# Patient Record
Sex: FEMALE | Race: WHITE | NOT HISPANIC OR LATINO | Employment: OTHER | URBAN - METROPOLITAN AREA
[De-identification: names, ages, dates, MRNs, and addresses within clinical notes are randomized per-mention and may not be internally consistent; named-entity substitution may affect disease eponyms.]

---

## 2017-01-25 ENCOUNTER — LAB (OUTPATIENT)
Dept: LAB | Facility: HOSPITAL | Age: 77
End: 2017-01-25

## 2017-01-25 DIAGNOSIS — C50.511 MALIGNANT NEOPLASM OF LOWER-OUTER QUADRANT OF RIGHT FEMALE BREAST (HCC): ICD-10-CM

## 2017-01-25 DIAGNOSIS — C88.0 MACROGLOBULINEMIA OF WALDENSTROM (HCC): ICD-10-CM

## 2017-01-25 LAB
ALBUMIN SERPL-MCNC: 4.7 G/DL (ref 3.5–5.2)
ALBUMIN/GLOB SERPL: 1.9 G/DL (ref 1.1–2.4)
ALP SERPL-CCNC: 77 U/L (ref 38–116)
ALT SERPL W P-5'-P-CCNC: 12 U/L (ref 0–33)
ANION GAP SERPL CALCULATED.3IONS-SCNC: 14.9 MMOL/L
AST SERPL-CCNC: 18 U/L (ref 0–32)
BASOPHILS # BLD AUTO: 0.05 10*3/MM3 (ref 0–0.1)
BASOPHILS NFR BLD AUTO: 0.9 % (ref 0–1.1)
BILIRUB SERPL-MCNC: 0.6 MG/DL (ref 0.1–1.2)
BUN BLD-MCNC: 9 MG/DL (ref 6–20)
BUN/CREAT SERPL: 11 (ref 7.3–30)
CALCIUM SPEC-SCNC: 10.3 MG/DL (ref 8.5–10.2)
CHLORIDE SERPL-SCNC: 92 MMOL/L (ref 98–107)
CO2 SERPL-SCNC: 29.1 MMOL/L (ref 22–29)
CREAT BLD-MCNC: 0.82 MG/DL (ref 0.6–1.1)
DEPRECATED RDW RBC AUTO: 39.8 FL (ref 37–49)
EOSINOPHIL # BLD AUTO: 0.19 10*3/MM3 (ref 0–0.36)
EOSINOPHIL NFR BLD AUTO: 3.3 % (ref 1–5)
ERYTHROCYTE [DISTWIDTH] IN BLOOD BY AUTOMATED COUNT: 11.8 % (ref 11.7–14.5)
GFR SERPL CREATININE-BSD FRML MDRD: 68 ML/MIN/1.73
GLOBULIN UR ELPH-MCNC: 2.5 GM/DL (ref 1.8–3.5)
GLUCOSE BLD-MCNC: 107 MG/DL (ref 74–124)
HCT VFR BLD AUTO: 40.9 % (ref 34–45)
HGB BLD-MCNC: 13.9 G/DL (ref 11.5–14.9)
IMM GRANULOCYTES # BLD: 0.02 10*3/MM3 (ref 0–0.03)
IMM GRANULOCYTES NFR BLD: 0.3 % (ref 0–0.5)
LYMPHOCYTES # BLD AUTO: 1.25 10*3/MM3 (ref 1–3.5)
LYMPHOCYTES NFR BLD AUTO: 21.5 % (ref 20–49)
MCH RBC QN AUTO: 31.4 PG (ref 27–33)
MCHC RBC AUTO-ENTMCNC: 34 G/DL (ref 32–35)
MCV RBC AUTO: 92.5 FL (ref 83–97)
MONOCYTES # BLD AUTO: 0.46 10*3/MM3 (ref 0.25–0.8)
MONOCYTES NFR BLD AUTO: 7.9 % (ref 4–12)
NEUTROPHILS # BLD AUTO: 3.84 10*3/MM3 (ref 1.5–7)
NEUTROPHILS NFR BLD AUTO: 66.1 % (ref 39–75)
NRBC BLD MANUAL-RTO: 0 /100 WBC (ref 0–0)
PLATELET # BLD AUTO: 174 10*3/MM3 (ref 150–375)
PMV BLD AUTO: 9.1 FL (ref 8.9–12.1)
POTASSIUM BLD-SCNC: 3.9 MMOL/L (ref 3.5–4.7)
PROT SERPL-MCNC: 7.2 G/DL (ref 6.3–8)
RBC # BLD AUTO: 4.42 10*6/MM3 (ref 3.9–5)
SODIUM BLD-SCNC: 136 MMOL/L (ref 134–145)
WBC NRBC COR # BLD: 5.81 10*3/MM3 (ref 4–10)

## 2017-01-25 PROCEDURE — 85025 COMPLETE CBC W/AUTO DIFF WBC: CPT | Performed by: INTERNAL MEDICINE

## 2017-01-25 PROCEDURE — 36415 COLL VENOUS BLD VENIPUNCTURE: CPT | Performed by: INTERNAL MEDICINE

## 2017-01-25 PROCEDURE — 80053 COMPREHEN METABOLIC PANEL: CPT | Performed by: INTERNAL MEDICINE

## 2017-01-26 LAB
ALBUMIN SERPL-MCNC: 4.1 G/DL (ref 2.9–4.4)
ALBUMIN/GLOB SERPL: 1.6 {RATIO} (ref 0.7–1.7)
ALPHA1 GLOB FLD ELPH-MCNC: 0.3 G/DL (ref 0–0.4)
ALPHA2 GLOB SERPL ELPH-MCNC: 0.8 G/DL (ref 0.4–1)
B-GLOBULIN SERPL ELPH-MCNC: 0.9 G/DL (ref 0.7–1.3)
GAMMA GLOB SERPL ELPH-MCNC: 0.7 G/DL (ref 0.4–1.8)
GLOBULIN SER CALC-MCNC: 2.6 G/DL (ref 2.2–3.9)
IGA SERPL-MCNC: 54 MG/DL (ref 64–422)
IGG SERPL-MCNC: 452 MG/DL (ref 700–1600)
IGM SERPL-MCNC: 283 MG/DL (ref 26–217)
INTERPRETATION SERPL IEP-IMP: ABNORMAL
KAPPA LC SERPL-MCNC: 12.91 MG/L (ref 3.3–19.4)
KAPPA LC/LAMBDA SER: 1.66 {RATIO} (ref 0.26–1.65)
LAMBDA LC FREE SERPL-MCNC: 7.8 MG/L (ref 5.71–26.3)
Lab: ABNORMAL
M-SPIKE: 0.4 G/DL
PROT SERPL-MCNC: 6.7 G/DL (ref 6–8.5)

## 2017-02-01 ENCOUNTER — APPOINTMENT (OUTPATIENT)
Dept: LAB | Facility: HOSPITAL | Age: 77
End: 2017-02-01

## 2017-02-01 ENCOUNTER — OFFICE VISIT (OUTPATIENT)
Dept: ONCOLOGY | Facility: CLINIC | Age: 77
End: 2017-02-01

## 2017-02-01 VITALS
HEART RATE: 62 BPM | WEIGHT: 151.4 LBS | SYSTOLIC BLOOD PRESSURE: 138 MMHG | DIASTOLIC BLOOD PRESSURE: 62 MMHG | BODY MASS INDEX: 25.22 KG/M2 | HEIGHT: 65 IN | RESPIRATION RATE: 16 BRPM | OXYGEN SATURATION: 100 % | TEMPERATURE: 98.9 F

## 2017-02-01 DIAGNOSIS — C88.0 MACROGLOBULINEMIA OF WALDENSTROM (HCC): Primary | ICD-10-CM

## 2017-02-01 DIAGNOSIS — E83.52 HYPERCALCEMIA: ICD-10-CM

## 2017-02-01 DIAGNOSIS — C50.811 MALIGNANT NEOPLASM OF OVERLAPPING SITES OF RIGHT FEMALE BREAST (HCC): ICD-10-CM

## 2017-02-01 PROCEDURE — G0463 HOSPITAL OUTPT CLINIC VISIT: HCPCS | Performed by: INTERNAL MEDICINE

## 2017-02-01 PROCEDURE — 99213 OFFICE O/P EST LOW 20 MIN: CPT | Performed by: INTERNAL MEDICINE

## 2017-02-01 NOTE — PROGRESS NOTES
Subjective  REASONS FOR FOLLOWUP:     1. History of Waldenstrom macroglobulinemia. The patient  remains in remission about this condition with stable level of immunoglobulin M . Normal IgA. Normal IgG. Normal white count, hemoglobin, and platelets. No abnormal bleeding. No peripheral adenopathy. No hepatosplenomegaly. The patient will remain in observation.   2. History of breast cancer stage I on the left, status post mastectomy. The patient remains on aromatase inhibitor without any evidence of breast cancer recurrence and good tolerance to her aromatase inhibitor medicine. She will remain on the same issue for the time being.                History of Present Illness  As above. She is here today with no complaints in regard to her previous history of breast cancer status post left mastectomy and her Waldenström macroglobulinemia.       The patient feels like having a good appetite, stable weight, normal bowel activity, normal urination. She has had some exacerbation of her usual fall allergies with a runny nose, sore throat and minimal cough and wheezing associated with her asthma. No sputum production. No pleuritic pain, no hemoptysis, no fever, no chills. She has not developed any other masses at any level and no peripheral adenopathy. No abnormal bleeding. Her right knee is back to normality and she has been able to walk any distance with no limitations.             1. Past Medical History, Past Surgical HistoryHypertension.    2. Hypothyroidism many years ago and has not taken any thyroid medication in quite some time.   3. Thyroid nodule removed more than 35 years ago.    4. Cholecystectomy.    5. Tonsillectomy and adenoidectomy.    6. Appendectomy.  7. Partial hysterectomy many years ago.      She has mammogram and pelvic examination yearly that have been normal.  Colonoscopy 4 years ago was normal.   She also has had a leaky valve in her heart without any significance.      During the recent workup at  [facility], the patient had a transesophageal echocardiogram that failed to document any vegetation.    Clostridium colitis requiring admission to [facility] in 2008.      Arthroscopy in 2009 of the right knee with finding of chondromalacia and appropriate cleanup of the joint was performed by Dr. Moraes.      On 2013 the patient has lost 14 pounds, just cutting down on junk food.  Her glucose intolerance has improved substantially and her glucose has normalized.      On 2014, we reviewed her blood pressure issues recently. She has had a very stable blood pressure for the last week, and today is normal. Her physical examination is otherwise unremarkable and normal feeling.     We advised her to remain in observation from this point of view.    As per 10/01/2014, the patient has undergone evaluation by neurology service at [facility] for consideration of gabapentin that she has been taking for so many years for possible seizure activity.   I am aware of an MRI scan of the brain that shows not too much, besides minimal vascular disease and no other lesions.  Her neurologist will be informing us in regard how to proceed in this regard.      She wanted to have a right knee replaced in 2009 through her orthopedic surgeon. She developed delirium in the hospital and extensive ecchymosis and hematoma formation in the whole right lower extremity requiring transfusion of red cells for a hemoglobin of 7.   SOCIAL HISTORY:  .  Retired from [employer] where she worked for many years; she never was exposed to any chemicals.   She is a never smoker and nondrinker.   She lives with her oldest daughter.      FAMILY HISTORY: The patient’s father  of complications from heart disease at age 70.  Mother  after a psychiatric illness at age 67.  A brother  in a car accident at 43 and a sister, age 75, is in good health.   Her three daughters are in  "good health.  She has no FH of lymphoma or leukemia.    Review of Systems    General: no fever, chills, fatigue, weight changes, or lack of appetite.  Eyes: no epiphora, conjunctivitis, pain, glaucoma, blurred vision, blindness, secretion, photophobia.  Ears: no otorrhea, tinnitus, otorrhagia, deafness, pain, vertigo.  Nose: no rhinorrhea, epistaxis, alteration in perception of odors, sinuses pressure.  Mouth: no alteration in gums or teeth,  ulcers, no difficulty with mastication or deglut ion, no odynophagia.  Neck: no masses or pain, no thyroid alterations, no pain in muscles or arteries, no carotid odynia, no crepitation.  Lungs: no cough, sputum production, dyspnea, trepopnea, pleuritic pain, hemoptysis.  Heart: no syncope, irregularity, palpitations, angina, orthopnea, paroxysmal nocturnal dyspnea.  GI: no dysphagia, odynophagia, no regurgitation, no heartburn, indigestion, nausea, vomiting, hematemesis ,melena, jaundice, distention, obstipation, enterorrhagia, proctalgia, anal  Lesions, changes in bowel habits.  : no frequency, hesitancy, hematuria, discharge, pain.  Musculoskeletal: no muscle or tendon pain or inflammation, joint pain, edema, functional limitation, fasciculations,NEW R SHOULDER mass.MRI DONE REPORT REQUESTED  Neurologic: no headache, seizures, alterations on Craneal nerves, no motor or senssory deficit, normal coordination.  Skin: stated r le  rash,severe pruritus.  Psychiatric: no anxiety, depression, agitation, delusions, proper insight.  A comprehensive 14 point review of systems was performed and was negative except as mentioned.    Medications:  The current medication list was reviewed in the EMR    ALLERGIES:    Allergies   Allergen Reactions   • Cortisone    • Darvon  [Propoxyphene] Palpitations       Objective      Vitals:    02/01/17 1032   BP: 138/62   Pulse: 62   Resp: 16   Temp: 98.9 °F (37.2 °C)   SpO2: 100%   Weight: 151 lb 6.4 oz (68.7 kg)   Height: 64.76\" (164.5 cm) "   PainSc: 0-No pain     Current Status 2/1/2017   ECOG score 0       Physical Exam    GENERAL:  Well-developed, well-nourished  Patient  in no acute distress.   SKIN:  Warm, dry with papular erythematous external rle rash and ryness, no typical of anything in particular,no purpura or petechiae.  HEENT:  Pupils were equal and reactive to light and accomodation, conjunctivas non injected, no pterigion, normal extraocular movements, normal visual acuity.   Mouth mucosa was moist, no exudates in oropharynx, normal gum line, normal roof of the mouth and pillars, normal papillations of the tongue.  NECK:  Supple with good range of motion; no thyromegaly or masses, no JVD or bruits, no cervical adenopathies.  LYMPHATICS:  No cervical, supraclavicular, axillary or inguinal adenopathy.  CHEST:  Lungs clear to percussion and auscultation, normal breath sounds bilaterally, no wheezing, crackles or ronchi, no stridor.  CARDIAC:  Regular rate and rhythm without murmurs, rubs or gallops.  INSPECTION of right breast documented symmetry of the tissue per se and location and size of the nipple,no retractions or inversion of the nipple, normal skin without lesions, no erythema or nodules,no paud'orange, no prominence of superficial veins or chest wall collateral circulation.PALPATION of the breast documented normal skin turgor, no induration, alteration in local temperature, or pain, no palpable masses or nodules, normal mobility of the tissues,no fixation of the tissue or parenchyma to the chest wall, no alteration at the tail of the breast or axillas, no adenopathies.Mastectomy left Surgical site was well healed.No lymphedema in either extremity.  ABDOMEN:  Soft, nontender with no organomegaly or masses, no ascites, no collateral circulation, no abdominal pain, no inguinal hernias, no abdominal bruits.  EXTREMITIES:  No clubbing, cyanosis or edema, no deformities or pain.Pain and edema in replaced right knee with limitation for  flexion, extension.  NEUROLOGICAL:  Patient was awake, alert, oriented to time, person and place    RECENT LABS:  Hematology WBC   Date Value Ref Range Status   01/25/2017 5.81 4.00 - 10.00 10*3/mm3 Final   07/10/2014 4.36 (L) 4.50 - 10.70 K/Cumm Final     RBC   Date Value Ref Range Status   01/25/2017 4.42 3.90 - 5.00 10*6/mm3 Final   07/10/2014 4.11 3.90 - 5.20 Million Final     HEMOGLOBIN   Date Value Ref Range Status   01/25/2017 13.9 11.5 - 14.9 g/dL Final   07/10/2014 13.0 11.9 - 15.5 g/dL Final     HEMATOCRIT   Date Value Ref Range Status   01/25/2017 40.9 34.0 - 45.0 % Final   07/10/2014 38.4 35.6 - 45.5 % Final     PLATELETS   Date Value Ref Range Status   01/25/2017 174 150 - 375 10*3/mm3 Final   07/10/2014 115 (L) 140 - 500 K/Cumm Final        Component      Latest Ref Rng 11/2/2016 1/25/2017 1/25/2017          11:13 AM 10:34 AM 10:34 AM   IgG      700 - 1600 mg/dL 441 (L)  452 (L)   IgA      64 - 422 mg/dL 35 (L)  54 (L)   IgM      26 - 217 mg/dL 311 (H)  283 (H)   Total Protein      6.3 - 8.0 g/dL 6.8 7.2 6.7   Albumin      3.50 - 5.20 g/dL 4.2 4.70 4.1   Alpha-1-Globulin      0.0 - 0.4 g/dL 0.3  0.3   Alpha-2-Globulin      0.4 - 1.0 g/dL 0.7  0.8   Beta Globulin      0.7 - 1.3 g/dL 1.0  0.9   Gamma Globulin      0.4 - 1.8 g/dL 0.6  0.7   M-Pratik      Not Observed g/dL 0.3 (H)  0.4 (H)   Globulin      2.2 - 3.9 g/dL 2.6  2.6   A/G Ratio      1.1 - 2.4 g/dL 1.7 1.9 1.6   Immunofixation Reflex, Serum       Comment  Comment   Please Note       Comment  Comment   Free Light Chain, Elmo      3.30 - 19.40 mg/L 12.44  12.91   Free Lambda Light Chains      5.71 - 26.30 mg/L 6.39  7.80   Kappa/Lambda Ratio      0.26 - 1.65 1.95 (H)  1.66 (H)   :  Final result   Visible to patient:  No (Not Released) Dx:  Macroglobulinemia of Waldenstrom; Mal...      Newer results are available. Click to view them now.            Ref Range & Units 7d ago     Glucose 74 - 124 mg/dL 107   BUN 6 - 20 mg/dL 9   Creatinine 0.60 -  1.10 mg/dL 0.82   Sodium 134 - 145 mmol/L 136   Potassium 3.5 - 4.7 mmol/L 3.9   Chloride 98 - 107 mmol/L 92 (L)   CO2 22.0 - 29.0 mmol/L 29.1 (H)   Calcium 8.5 - 10.2 mg/dL 10.3 (H)   Total Protein 6.3 - 8.0 g/dL 7.2   Albumin 3.50 - 5.20 g/dL 4.70   ALT (SGPT) 0 - 33 U/L 12   AST (SGOT) 0 - 32 U/L 18   Alkaline Phosphatase 38 - 116 U/L 77   Total Bilirubin 0.1 - 1.2 mg/dL 0.6   eGFR Non African Amer >60 mL/min/1.73 68   Globulin 1.8 - 3.5 gm/dL 2.5   A/G Ratio 1.1 - 2.4 g/dL 1.9   BUN/Creatinine Ratio 7.3 - 30.0 11.0   Anion Gap mmol/L 14.9   Resulting Agency   CBC LAB                  Assessment/Plan     1. 1.  This patient has history of Waldenström macroglobulinemia.  Her Igm monoclonal protein remains stable.  The patient is asymptomatic in this regard.  She has no abnormal bleeding.  No peripheral adenopathy.  No hepatosplenomegaly.  No B symptoms. .  Her CBC remains normal with normal white count, hemoglobin and platelets.  Her platelet count is very much normal as well and her chemistry profile remains unchanged with the exception of serum      Calcium that has become elevated. The patient is not taking an excessive amount of Tums, either drinking an excessive amount of milk or eating an excessive amount of cheese. The orange juice that she takes does not have any calcium supplemented. She is taking 2 medicines that could be the culprit in regard high calcium; one is her vitamin D and second is the Hyzaar that contains hydrochlorothiazide. Given this fact, I advised her even though this is not symptomatic to discontinue the vitamin D and recheck another BMP and a PTH level in a couple of weeks. I do not think the high calcium is related to the Waldenström.    Also in the blood draw in 2 weeks, were going to run a PTH.     Otherwise, given the stability of her breast cancer and her stability of her Waldenström, [facility] and she will remain on Femara adjuvant  therapy for her breast cancer. Her mastectomy site is normal. She is up to date with her mammogram.     A tentative appointment to see her back in 3 months with a CBC, CMP and serum protein immunoelectrophoresis, kappa lambda chains in the blood the week before MD appointment.    She will be called at home with the report of her calcium level when she comes back in a couple of weeks and subsequent her level of PTH once that becomes available.     Also the patient complained of minimal hirsutism in the upper lip area associated with Femara. This has no implications besides the need to bleach or wax if she thinks that she needs to do that. Today I did not observe any excessive amount of hair growth in this area. She has not shaved.                                 2/1/2017      CC:

## 2017-02-16 ENCOUNTER — CLINICAL SUPPORT (OUTPATIENT)
Dept: ONCOLOGY | Facility: HOSPITAL | Age: 77
End: 2017-02-16

## 2017-02-16 ENCOUNTER — LAB (OUTPATIENT)
Dept: LAB | Facility: HOSPITAL | Age: 77
End: 2017-02-16

## 2017-02-16 DIAGNOSIS — E83.52 HYPERCALCEMIA: ICD-10-CM

## 2017-02-16 DIAGNOSIS — C88.0 MACROGLOBULINEMIA OF WALDENSTROM (HCC): ICD-10-CM

## 2017-02-16 DIAGNOSIS — C50.811 MALIGNANT NEOPLASM OF OVERLAPPING SITES OF RIGHT FEMALE BREAST (HCC): Primary | ICD-10-CM

## 2017-02-16 LAB
ANION GAP SERPL CALCULATED.3IONS-SCNC: 12 MMOL/L
BASOPHILS # BLD AUTO: 0.02 10*3/MM3 (ref 0–0.1)
BASOPHILS NFR BLD AUTO: 0.4 % (ref 0–1.1)
BUN BLD-MCNC: 10 MG/DL (ref 6–20)
BUN/CREAT SERPL: 10.9 (ref 7.3–30)
CALCIUM SPEC-SCNC: 10.2 MG/DL (ref 8.5–10.2)
CHLORIDE SERPL-SCNC: 90 MMOL/L (ref 98–107)
CO2 SERPL-SCNC: 32 MMOL/L (ref 22–29)
CREAT BLD-MCNC: 0.92 MG/DL (ref 0.6–1.1)
DEPRECATED RDW RBC AUTO: 41.5 FL (ref 37–49)
EOSINOPHIL # BLD AUTO: 0.23 10*3/MM3 (ref 0–0.36)
EOSINOPHIL NFR BLD AUTO: 4.1 % (ref 1–5)
ERYTHROCYTE [DISTWIDTH] IN BLOOD BY AUTOMATED COUNT: 12.2 % (ref 11.7–14.5)
GFR SERPL CREATININE-BSD FRML MDRD: 59 ML/MIN/1.73
GLUCOSE BLD-MCNC: 120 MG/DL (ref 74–124)
HCT VFR BLD AUTO: 41.4 % (ref 34–45)
HGB BLD-MCNC: 14.6 G/DL (ref 11.5–14.9)
IMM GRANULOCYTES # BLD: 0.02 10*3/MM3 (ref 0–0.03)
IMM GRANULOCYTES NFR BLD: 0.4 % (ref 0–0.5)
LYMPHOCYTES # BLD AUTO: 1.55 10*3/MM3 (ref 1–3.5)
LYMPHOCYTES NFR BLD AUTO: 27.8 % (ref 20–49)
MCH RBC QN AUTO: 32.4 PG (ref 27–33)
MCHC RBC AUTO-ENTMCNC: 35.3 G/DL (ref 32–35)
MCV RBC AUTO: 92 FL (ref 83–97)
MONOCYTES # BLD AUTO: 0.49 10*3/MM3 (ref 0.25–0.8)
MONOCYTES NFR BLD AUTO: 8.8 % (ref 4–12)
NEUTROPHILS # BLD AUTO: 3.26 10*3/MM3 (ref 1.5–7)
NEUTROPHILS NFR BLD AUTO: 58.5 % (ref 39–75)
NRBC BLD MANUAL-RTO: 0 /100 WBC (ref 0–0)
PLATELET # BLD AUTO: 165 10*3/MM3 (ref 150–375)
PMV BLD AUTO: 9.3 FL (ref 8.9–12.1)
POTASSIUM BLD-SCNC: 4.1 MMOL/L (ref 3.5–4.7)
PTH-INTACT SERPL-MCNC: 33.9 PG/ML (ref 15–65)
RBC # BLD AUTO: 4.5 10*6/MM3 (ref 3.9–5)
SODIUM BLD-SCNC: 134 MMOL/L (ref 134–145)
WBC NRBC COR # BLD: 5.57 10*3/MM3 (ref 4–10)

## 2017-02-16 PROCEDURE — 80048 BASIC METABOLIC PNL TOTAL CA: CPT | Performed by: INTERNAL MEDICINE

## 2017-02-16 PROCEDURE — 36415 COLL VENOUS BLD VENIPUNCTURE: CPT | Performed by: INTERNAL MEDICINE

## 2017-02-16 PROCEDURE — 85025 COMPLETE CBC W/AUTO DIFF WBC: CPT | Performed by: INTERNAL MEDICINE

## 2017-02-16 PROCEDURE — 83970 ASSAY OF PARATHORMONE: CPT | Performed by: INTERNAL MEDICINE

## 2017-02-16 NOTE — PROGRESS NOTES
CBC REVIEWED WITH PATIENT AND LABS WNL. CALLED PATIENT WITH CALCIUM RESULTS. LVM PER PATIENT'S REQUEST. CALCIUM AT THE HIGH END OF NORMAL AT 10.2. PTH LEVEL DRAWN. IT WILL BE SENT OUT.

## 2017-02-17 ENCOUNTER — TELEPHONE (OUTPATIENT)
Dept: ONCOLOGY | Facility: CLINIC | Age: 77
End: 2017-02-17

## 2017-02-17 NOTE — TELEPHONE ENCOUNTER
Phone with pt normal calcium normal pth nothing else to do, don't take any more vitamin d, she agrees.

## 2017-03-16 ENCOUNTER — HOSPITAL ENCOUNTER (EMERGENCY)
Facility: HOSPITAL | Age: 77
Discharge: HOME | End: 2017-03-16
Attending: EMERGENCY MEDICINE | Admitting: EMERGENCY MEDICINE

## 2017-03-16 ENCOUNTER — APPOINTMENT (OUTPATIENT)
Dept: CT IMAGING | Facility: HOSPITAL | Age: 77
End: 2017-03-16

## 2017-03-16 ENCOUNTER — TELEPHONE (OUTPATIENT)
Dept: ONCOLOGY | Facility: CLINIC | Age: 77
End: 2017-03-16

## 2017-03-16 VITALS
RESPIRATION RATE: 14 BRPM | TEMPERATURE: 97.5 F | HEIGHT: 65 IN | OXYGEN SATURATION: 97 % | WEIGHT: 150 LBS | BODY MASS INDEX: 24.99 KG/M2 | SYSTOLIC BLOOD PRESSURE: 147 MMHG | HEART RATE: 67 BPM | DIASTOLIC BLOOD PRESSURE: 72 MMHG

## 2017-03-16 DIAGNOSIS — H70.92 MASTOIDITIS, LEFT: Primary | ICD-10-CM

## 2017-03-16 LAB
ALBUMIN SERPL-MCNC: 5 G/DL (ref 3.5–5.2)
ALBUMIN/GLOB SERPL: 2.1 G/DL
ALP SERPL-CCNC: 80 U/L (ref 39–117)
ALT SERPL W P-5'-P-CCNC: 17 U/L (ref 1–33)
ANION GAP SERPL CALCULATED.3IONS-SCNC: 15.7 MMOL/L
AST SERPL-CCNC: 23 U/L (ref 1–32)
BASOPHILS # BLD AUTO: 0.01 10*3/MM3 (ref 0–0.2)
BASOPHILS NFR BLD AUTO: 0.1 % (ref 0–1.5)
BILIRUB SERPL-MCNC: 0.7 MG/DL (ref 0.1–1.2)
BUN BLD-MCNC: 10 MG/DL (ref 8–23)
BUN/CREAT SERPL: 11.1 (ref 7–25)
CALCIUM SPEC-SCNC: 10.4 MG/DL (ref 8.6–10.5)
CHLORIDE SERPL-SCNC: 90 MMOL/L (ref 98–107)
CO2 SERPL-SCNC: 26.3 MMOL/L (ref 22–29)
CREAT BLD-MCNC: 0.9 MG/DL (ref 0.57–1)
D-LACTATE SERPL-SCNC: 1.1 MMOL/L (ref 0.5–2)
DEPRECATED RDW RBC AUTO: 42.3 FL (ref 37–54)
EOSINOPHIL # BLD AUTO: 0.33 10*3/MM3 (ref 0–0.7)
EOSINOPHIL NFR BLD AUTO: 4.7 % (ref 0.3–6.2)
ERYTHROCYTE [DISTWIDTH] IN BLOOD BY AUTOMATED COUNT: 12.5 % (ref 11.7–13)
GFR SERPL CREATININE-BSD FRML MDRD: 61 ML/MIN/1.73
GLOBULIN UR ELPH-MCNC: 2.4 GM/DL
GLUCOSE BLD-MCNC: 139 MG/DL (ref 65–99)
HCT VFR BLD AUTO: 41.9 % (ref 35.6–45.5)
HGB BLD-MCNC: 14.7 G/DL (ref 11.9–15.5)
IMM GRANULOCYTES # BLD: 0.02 10*3/MM3 (ref 0–0.03)
IMM GRANULOCYTES NFR BLD: 0.3 % (ref 0–0.5)
INR PPP: 0.97 (ref 0.9–1.1)
LYMPHOCYTES # BLD AUTO: 1.26 10*3/MM3 (ref 0.9–4.8)
LYMPHOCYTES NFR BLD AUTO: 17.8 % (ref 19.6–45.3)
MCH RBC QN AUTO: 32.4 PG (ref 26.9–32)
MCHC RBC AUTO-ENTMCNC: 35.1 G/DL (ref 32.4–36.3)
MCV RBC AUTO: 92.3 FL (ref 80.5–98.2)
MONOCYTES # BLD AUTO: 0.42 10*3/MM3 (ref 0.2–1.2)
MONOCYTES NFR BLD AUTO: 5.9 % (ref 5–12)
NEUTROPHILS # BLD AUTO: 5.03 10*3/MM3 (ref 1.9–8.1)
NEUTROPHILS NFR BLD AUTO: 71.2 % (ref 42.7–76)
PLATELET # BLD AUTO: 154 10*3/MM3 (ref 140–500)
PMV BLD AUTO: 9.9 FL (ref 6–12)
POTASSIUM BLD-SCNC: 3.6 MMOL/L (ref 3.5–5.2)
PROCALCITONIN SERPL-MCNC: 0.11 NG/ML (ref 0.1–0.25)
PROT SERPL-MCNC: 7.4 G/DL (ref 6–8.5)
PROTHROMBIN TIME: 12.5 SECONDS (ref 11.7–14.2)
RBC # BLD AUTO: 4.54 10*6/MM3 (ref 3.9–5.2)
SODIUM BLD-SCNC: 132 MMOL/L (ref 136–145)
WBC NRBC COR # BLD: 7.07 10*3/MM3 (ref 4.5–10.7)

## 2017-03-16 PROCEDURE — 85610 PROTHROMBIN TIME: CPT | Performed by: EMERGENCY MEDICINE

## 2017-03-16 PROCEDURE — 25010000002 PIPERACILLIN SOD-TAZOBACTAM PER 1 G: Performed by: EMERGENCY MEDICINE

## 2017-03-16 PROCEDURE — 96375 TX/PRO/DX INJ NEW DRUG ADDON: CPT

## 2017-03-16 PROCEDURE — 25010000002 ONDANSETRON PER 1 MG: Performed by: EMERGENCY MEDICINE

## 2017-03-16 PROCEDURE — 80053 COMPREHEN METABOLIC PANEL: CPT | Performed by: EMERGENCY MEDICINE

## 2017-03-16 PROCEDURE — 96365 THER/PROPH/DIAG IV INF INIT: CPT

## 2017-03-16 PROCEDURE — 85025 COMPLETE CBC W/AUTO DIFF WBC: CPT | Performed by: EMERGENCY MEDICINE

## 2017-03-16 PROCEDURE — 70482 CT ORBIT/EAR/FOSSA W/O&W/DYE: CPT

## 2017-03-16 PROCEDURE — 99284 EMERGENCY DEPT VISIT MOD MDM: CPT

## 2017-03-16 PROCEDURE — 0 IOPAMIDOL 61 % SOLUTION: Performed by: EMERGENCY MEDICINE

## 2017-03-16 PROCEDURE — 84145 PROCALCITONIN (PCT): CPT | Performed by: EMERGENCY MEDICINE

## 2017-03-16 PROCEDURE — 83605 ASSAY OF LACTIC ACID: CPT | Performed by: EMERGENCY MEDICINE

## 2017-03-16 PROCEDURE — 25010000002 HYDROMORPHONE PER 4 MG: Performed by: EMERGENCY MEDICINE

## 2017-03-16 RX ORDER — HYDROMORPHONE HYDROCHLORIDE 1 MG/ML
0.5 INJECTION, SOLUTION INTRAMUSCULAR; INTRAVENOUS; SUBCUTANEOUS ONCE
Status: COMPLETED | OUTPATIENT
Start: 2017-03-16 | End: 2017-03-16

## 2017-03-16 RX ORDER — ONDANSETRON 2 MG/ML
4 INJECTION INTRAMUSCULAR; INTRAVENOUS ONCE
Status: COMPLETED | OUTPATIENT
Start: 2017-03-16 | End: 2017-03-16

## 2017-03-16 RX ORDER — ONDANSETRON 4 MG/1
4 TABLET, FILM COATED ORAL EVERY 6 HOURS PRN
Qty: 20 TABLET | Refills: 0 | Status: SHIPPED | OUTPATIENT
Start: 2017-03-16 | End: 2017-04-26

## 2017-03-16 RX ORDER — AMOXICILLIN AND CLAVULANATE POTASSIUM 875; 125 MG/1; MG/1
1 TABLET, FILM COATED ORAL 2 TIMES DAILY
COMMUNITY
Start: 2017-03-14 | End: 2017-04-26

## 2017-03-16 RX ORDER — TRAMADOL HYDROCHLORIDE 50 MG/1
50 TABLET ORAL EVERY 6 HOURS PRN
Qty: 20 TABLET | Refills: 0 | Status: SHIPPED | OUTPATIENT
Start: 2017-03-16 | End: 2017-04-26

## 2017-03-16 RX ADMIN — IOPAMIDOL 85 ML: 612 INJECTION, SOLUTION INTRAVENOUS at 16:23

## 2017-03-16 RX ADMIN — HYDROMORPHONE HYDROCHLORIDE 0.5 MG: 1 INJECTION, SOLUTION INTRAMUSCULAR; INTRAVENOUS; SUBCUTANEOUS at 14:48

## 2017-03-16 RX ADMIN — TAZOBACTAM SODIUM AND PIPERACILLIN SODIUM 4.5 G: 500; 4 INJECTION, SOLUTION INTRAVENOUS at 14:56

## 2017-03-16 RX ADMIN — ONDANSETRON 4 MG: 2 INJECTION INTRAMUSCULAR; INTRAVENOUS at 14:49

## 2017-03-16 NOTE — TELEPHONE ENCOUNTER
----- Message from Gisela Sheehan sent at 3/16/2017 10:18 AM EDT -----  Contact: self   Patient has questions for the nurse thinks she needs to go to ER but wants Dr Larose to   Admit her

## 2017-03-16 NOTE — TELEPHONE ENCOUNTER
Pt. States she went to see her allergist on tuesday due to pain lt lower ear.  Was dx with mastoiditis.  Was prescribed augmentin 875 bid x 10 days.  Was told if she wasn't any bettter by today she would need to go to the er.  Wants to make sure dr. Larose is ok with this.  All d/w dr. Larose, pt. Advised to go to the er as prescribed.  V/u.

## 2017-03-16 NOTE — ED PROVIDER NOTES
EMERGENCY DEPARTMENT ENCOUNTER    CHIEF COMPLAINT  Chief Complaint: Earache  History given by: Pt  History limited by: N/A  Room Number: 17/17  PMD: No Known Provider      HPI:  Pt is a 76 y.o. female who presents complaining of L earache over the past 4 days. She was seen by her allergist two days ago and was diagnosed with mastoiditis. Pt was started on Augmentin, which provided brief relief but her symptoms have begun to worsen. Pt was told by her allergist to come to the ER for a CT scan if her symptoms worsened. Pt was unable to get an appointment with her PCP.    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   • Chronic right shoulder pain 11/22/2016   • Hypercalcemia 02/01/2017     Resolved Ambulatory Problems     Diagnosis Date Noted   • Lymphoma malignant, nodular, 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     Past Medical History   Diagnosis Date   • Asthma    • Breast cancer in female    • Cancer    • Clostridium difficile colitis    • Disease of thyroid gland    • H/O Ecchymosis    • H/O transesophageal echocardiography ([facility]ICRUZ)    • History of right breast cancer    • History of Waldenstrom's macroglobulinemia    • Hypertension    • Leaky heart valve    • Osteoporosis    • Thyroid nodule        PAST SURGICAL HISTORY  Past Surgical History   Procedure Laterality Date   • Replacement total knee Right    • Mastectomy     • Cholecystectomy     • Appendectomy     • Adenoidectomy     • Tonsillectomy     • Hysterectomy        Partial, many years ago   • Colonoscopy     • Knee arthroscopy Right 03/2009     Finding of chondromalacia and appropriate cleanup of joint was performed by Dr. Morase.       FAMILY HISTORY  Family History   Problem Relation Age of Onset   • Mental illness Mother    • Heart disease Father    • No Known Problems Sister    • No Known Problems Daughter    • No Known Problems Daughter    • No Known Problems Daughter    • Lymphoma Neg Hx    • Leukemia Neg Hx        SOCIAL HISTORY  Social History     Social History   • Marital status:      Spouse name: N/A   • Number of children: N/A   • Years of education: N/A     Occupational History   • Retired [employer]     Social History Main Topics   • Smoking status: Never Smoker   • Smokeless tobacco: Never Used   • Alcohol use No   • Drug use: No   • Sexual activity: Defer     Other Topics Concern   • Not on file     Social History Narrative    She lives with her oldest daughter.       ALLERGIES  Cortisone and Darvon  [propoxyphene]    REVIEW OF SYSTEMS  Review of Systems   Constitutional: Negative.    HENT: Positive for ear pain (L).    Respiratory: Negative.    Cardiovascular: Negative.    Gastrointestinal: Negative.    Genitourinary: Negative.    All other systems reviewed and are negative.      PHYSICAL EXAM  ED Triage Vitals   Temp Heart Rate Resp BP SpO2   03/16/17 1238 03/16/17 1238 03/16/17 1238 03/16/17 1254 03/16/17 1238   97.3 °F (36.3 °C) 85 16 147/75 97 %      Temp src Heart Rate Source Patient Position BP Location FiO2 (%)   03/16/17 1238 -- 03/16/17 1254 03/16/17 1254 --   Tympanic  Sitting Right arm        Physical Exam   Constitutional: She is oriented to person, place, and time and well-developed, well-nourished, and in no distress. No distress.   HENT:   Head: Normocephalic and atraumatic.   Left Ear: Tympanic membrane and ear canal normal.   No TMJ tenderness. There is tenderness over the L mastoid.   Eyes: EOM are normal. Pupils are equal,  round, and reactive to light.   Neck: Normal range of motion. Neck supple.   Cardiovascular: Normal rate, regular rhythm and normal heart sounds.    Pulmonary/Chest: Effort normal and breath sounds normal. No respiratory distress.   Abdominal: Soft. Bowel sounds are normal. She exhibits no distension. There is no tenderness. There is no rebound and no guarding.   Musculoskeletal: Normal range of motion. She exhibits no edema.   Neurological: She is alert and oriented to person, place, and time. She has normal sensation and normal strength.   Normal neuro exam   Skin: Skin is warm and dry. No rash noted.   Psychiatric: Mood and affect normal.   Nursing note and vitals reviewed.      LAB RESULTS  Lab Results (last 24 hours)     Procedure Component Value Units Date/Time    CBC & Differential [[id number]] Collected:  03/16/17 1432    Specimen:  Blood Updated:  03/16/17 1502    Narrative:       The following orders were created for panel order CBC & Differential.  Procedure                               Abnormality         Status                     ---------                               -----------         ------                     CBC Auto Differential[[id number]]         Abnormal            Final result                 Please view results for these tests on the individual orders.    Comprehensive Metabolic Panel [[id number]]  (Abnormal) Collected:  03/16/17 1432    Specimen:  Blood Updated:  03/16/17 1509     Glucose 139 (H) mg/dL      BUN 10 mg/dL      Creatinine 0.90 mg/dL      Sodium 132 (L) mmol/L      Potassium 3.6 mmol/L      Chloride 90 (L) mmol/L      CO2 26.3 mmol/L      Calcium 10.4 mg/dL      Total Protein 7.4 g/dL      Albumin 5.00 g/dL      ALT (SGPT) 17 U/L      AST (SGOT) 23 U/L      Alkaline Phosphatase 80 U/L      Total Bilirubin 0.7 mg/dL      eGFR Non African Amer 61 mL/min/1.73      Globulin 2.4 gm/dL      A/G Ratio 2.1 g/dL      BUN/Creatinine Ratio 11.1      Anion Gap 15.7 mmol/L     Narrative:        "The MDRD GFR formula is only valid for adults with stable renal function between ages 18 and 70.    Protime-INR [[id number]]  (Normal) Collected:  03/16/17 1432    Specimen:  Blood Updated:  03/16/17 1508     Protime 12.5 Seconds      INR 0.97     Procalcitonin [[id number]]  (Normal) Collected:  03/16/17 1432    Specimen:  Blood Updated:  03/16/17 1515     Procalcitonin 0.11 ng/mL     Narrative:       As a Marker for Sepsis (Non-Neonates):   1. <0.5 ng/mL represents a low risk of severe sepsis and/or septic shock.  1. >2 ng/mL represents a high risk of severe sepsis and/or septic shock.    As a Marker for Lower Respiratory Tract Infections that require antibiotic therapy:  PCT on Admission     Antibiotic Therapy             6-12 Hrs later  > 0.5                Strongly Recommended            >0.25 - <0.5         Recommended  0.1 - 0.25           Discouraged                   Remeasure/reassess PCT  <0.1                 Strongly Discouraged          Remeasure/reassess PCT      As 28 day mortality risk marker: \"Change in Procalcitonin Result\" (> 80 % or <=80 %) if Day 0 (or Day 1) and Day 4 values are available. Refer to http://www.[employer]-pct-calculator.com/   Change in PCT <=80 %   A decrease of PCT levels below or equal to 80 % defines a positive change in PCT test result representing a higher risk for 28-day all-cause mortality of patients diagnosed with severe sepsis or septic shock.  Change in PCT > 80 %   A decrease of PCT levels of more than 80 % defines a negative change in PCT result representing a lower risk for 28-day all-cause mortality of patients diagnosed with severe sepsis or septic shock.                Lactic Acid, Plasma [[id number]]  (Normal) Collected:  03/16/17 1432    Specimen:  Blood Updated:  03/16/17 1457     Lactate 1.1 mmol/L     CBC Auto Differential [[id number]]  (Abnormal) Collected:  03/16/17 1432    Specimen:  Blood Updated:  03/16/17 1502     WBC 7.07 10*3/mm3      RBC 4.54 10*6/mm3      " Hemoglobin 14.7 g/dL      Hematocrit 41.9 %      MCV 92.3 fL      MCH 32.4 (H) pg      MCHC 35.1 g/dL      RDW 12.5 %      RDW-SD 42.3 fl      MPV 9.9 fL      Platelets 154 10*3/mm3      Neutrophil % 71.2 %      Lymphocyte % 17.8 (L) %      Monocyte % 5.9 %      Eosinophil % 4.7 %      Basophil % 0.1 %      Immature Grans % 0.3 %      Neutrophils, Absolute 5.03 10*3/mm3      Lymphocytes, Absolute 1.26 10*3/mm3      Monocytes, Absolute 0.42 10*3/mm3      Eosinophils, Absolute 0.33 10*3/mm3      Basophils, Absolute 0.01 10*3/mm3      Immature Grans, Absolute 0.02 10*3/mm3           I ordered the above labs and reviewed the results    RADIOLOGY  CT Temporal Bones With & Without Contrast   Final Result       No evidence for mastoiditis. Follow-up as indications persist.               This report was finalized on 3/16/2017 5:29 PM by Dr. Thiago Walsh MD.            I ordered the above noted radiological studies. Interpreted by radiologist. Discussed with radiologist. Reviewed by me in PACS.       PROCEDURES  Procedures      PROGRESS AND CONSULTS  ED Course   2:11 PM:  Vitals: BP: 147/75 HR: 85 Temp: 97.3 °F (36.3 °C) (Tympanic) O2 sat: 97%  D/w pt plan for labs and CT for further evaluation. Ordered Zosyn for infection, Dilaudid and Zofran for pain management. Pt understands and agrees with the plan, all questions answered.    2:17 PM:  Discussed pt's case with Dr. Dennison (Radiology), who recommends CT Temporal Bones to evaluate for mastoiditis.    5:54 PM:  Vitals: BP: 151/74 HR: 64 Temp: 97.3 °F (36.3 °C) (Tympanic) O2 sat: 97%  Rechecked pt. Pt is resting comfortably. Discussed results of labs and imaging, which were unremarkable, and the plan for discharge. Advised the pt to continue taking her antibiotics as prescribed and the need to f/u with ENT for further care. Will provide the pt prescriptions for Ultram and Zofran to manage her pain. Pt understands and agrees with the plan, all questions  answered.    MEDICAL DECISION MAKING  Results were reviewed/discussed with the patient and they were also made aware of online access. Pt also made aware that some labs, such as cultures, will not be resulted during ER visit and follow up with PMD is necessary.     MDM  Number of Diagnoses or Management Options     Amount and/or Complexity of Data Reviewed  Clinical lab tests: reviewed and ordered (Sodium 132)  Tests in the radiology section of CPT®: reviewed and ordered (CT Temporal Bones: No evidence for mastoiditis. Follow-up as indications persist.)  Discussion of test results with the performing providers: yes  Independent visualization of images, tracings, or specimens: yes    Patient Progress  Patient progress: stable         DIAGNOSIS  Final diagnoses:   Mastoiditis, left - partially treated       DISPOSITION  DISCHARGE    Patient discharged in stable condition.    Reviewed implications of results, diagnosis, meds, responsibility to follow up, warning signs and symptoms of possible worsening, potential complications and reasons to return to ER, including any new or worsening symptoms.    Patient/Family voiced understanding of above instructions.    Discussed plan for discharge, as there is no emergent indication for admission.  Pt/family is agreeable and understands need for follow up and repeat testing.  Pt is aware that discharge does not mean that nothing is wrong but it indicates no emergency is present that requires admission and they must continue care with follow-up as given below or physician of their choice.     FOLLOW-UP  Dick Esteves MD  [address] [place] [address]  [phone]    In 1 week           Medication List      New Prescriptions          ondansetron 4 MG tablet   Commonly known as:  ZOFRAN   Take 1 tablet by mouth Every 6 (Six) Hours As Needed for Nausea or   Vomiting.       traMADol 50 MG tablet   Commonly known as:  ULTRAM   Take 1 tablet by mouth Every 6 (Six)  Hours As Needed for Moderate Pain   (4-6).           Latest Documented Vital Signs:  As of 6:27 PM  BP- 147/75 HR- 85 Temp- 97.3 °F (36.3 °C) (Tympanic) O2 sat- 97%    --  Documentation assistance provided by jenelle Vela for Dr. Fernandez.  Information recorded by the scribe was done at my direction and has been verified and validated by me.     Gomez Vela  03/16/17 1827       Evan Fernandez MD  03/16/17 2030

## 2017-04-19 ENCOUNTER — LAB (OUTPATIENT)
Dept: LAB | Facility: HOSPITAL | Age: 77
End: 2017-04-19

## 2017-04-19 DIAGNOSIS — C50.811 MALIGNANT NEOPLASM OF OVERLAPPING SITES OF RIGHT FEMALE BREAST (HCC): ICD-10-CM

## 2017-04-19 DIAGNOSIS — C88.0 MACROGLOBULINEMIA OF WALDENSTROM (HCC): ICD-10-CM

## 2017-04-19 LAB
ALBUMIN SERPL-MCNC: 4.7 G/DL (ref 3.5–5.2)
ALBUMIN/GLOB SERPL: 2.2 G/DL (ref 1.1–2.4)
ALP SERPL-CCNC: 76 U/L (ref 38–116)
ALT SERPL W P-5'-P-CCNC: 13 U/L (ref 0–33)
ANION GAP SERPL CALCULATED.3IONS-SCNC: 12.2 MMOL/L
AST SERPL-CCNC: 18 U/L (ref 0–32)
BASOPHILS # BLD AUTO: 0.03 10*3/MM3 (ref 0–0.1)
BASOPHILS NFR BLD AUTO: 0.5 % (ref 0–1.1)
BILIRUB SERPL-MCNC: 0.5 MG/DL (ref 0.1–1.2)
BUN BLD-MCNC: 8 MG/DL (ref 6–20)
BUN/CREAT SERPL: 11.6 (ref 7.3–30)
CALCIUM SPEC-SCNC: 9.9 MG/DL (ref 8.5–10.2)
CHLORIDE SERPL-SCNC: 89 MMOL/L (ref 98–107)
CO2 SERPL-SCNC: 29.8 MMOL/L (ref 22–29)
CREAT BLD-MCNC: 0.69 MG/DL (ref 0.6–1.1)
DEPRECATED RDW RBC AUTO: 41.1 FL (ref 37–49)
EOSINOPHIL # BLD AUTO: 0.11 10*3/MM3 (ref 0–0.36)
EOSINOPHIL NFR BLD AUTO: 1.9 % (ref 1–5)
ERYTHROCYTE [DISTWIDTH] IN BLOOD BY AUTOMATED COUNT: 11.9 % (ref 11.7–14.5)
GFR SERPL CREATININE-BSD FRML MDRD: 82 ML/MIN/1.73
GLOBULIN UR ELPH-MCNC: 2.1 GM/DL (ref 1.8–3.5)
GLUCOSE BLD-MCNC: 102 MG/DL (ref 74–124)
HCT VFR BLD AUTO: 40.6 % (ref 34–45)
HGB BLD-MCNC: 14.3 G/DL (ref 11.5–14.9)
IMM GRANULOCYTES # BLD: 0.01 10*3/MM3 (ref 0–0.03)
IMM GRANULOCYTES NFR BLD: 0.2 % (ref 0–0.5)
LYMPHOCYTES # BLD AUTO: 1.45 10*3/MM3 (ref 1–3.5)
LYMPHOCYTES NFR BLD AUTO: 25.3 % (ref 20–49)
MCH RBC QN AUTO: 32.8 PG (ref 27–33)
MCHC RBC AUTO-ENTMCNC: 35.2 G/DL (ref 32–35)
MCV RBC AUTO: 93.1 FL (ref 83–97)
MONOCYTES # BLD AUTO: 0.5 10*3/MM3 (ref 0.25–0.8)
MONOCYTES NFR BLD AUTO: 8.7 % (ref 4–12)
NEUTROPHILS # BLD AUTO: 3.62 10*3/MM3 (ref 1.5–7)
NEUTROPHILS NFR BLD AUTO: 63.4 % (ref 39–75)
NRBC BLD MANUAL-RTO: 0 /100 WBC (ref 0–0)
PLATELET # BLD AUTO: 147 10*3/MM3 (ref 150–375)
PMV BLD AUTO: 9.3 FL (ref 8.9–12.1)
POTASSIUM BLD-SCNC: 4.1 MMOL/L (ref 3.5–4.7)
PROT SERPL-MCNC: 6.8 G/DL (ref 6.3–8)
RBC # BLD AUTO: 4.36 10*6/MM3 (ref 3.9–5)
SODIUM BLD-SCNC: 131 MMOL/L (ref 134–145)
WBC NRBC COR # BLD: 5.72 10*3/MM3 (ref 4–10)

## 2017-04-19 PROCEDURE — 85025 COMPLETE CBC W/AUTO DIFF WBC: CPT

## 2017-04-19 PROCEDURE — 80053 COMPREHEN METABOLIC PANEL: CPT

## 2017-04-19 PROCEDURE — 36415 COLL VENOUS BLD VENIPUNCTURE: CPT

## 2017-04-20 LAB
ALBUMIN SERPL-MCNC: 4 G/DL (ref 2.9–4.4)
ALBUMIN/GLOB SERPL: 1.7 {RATIO} (ref 0.7–1.7)
ALPHA1 GLOB FLD ELPH-MCNC: 0.3 G/DL (ref 0–0.4)
ALPHA2 GLOB SERPL ELPH-MCNC: 0.7 G/DL (ref 0.4–1)
B-GLOBULIN SERPL ELPH-MCNC: 0.9 G/DL (ref 0.7–1.3)
GAMMA GLOB SERPL ELPH-MCNC: 0.6 G/DL (ref 0.4–1.8)
GLOBULIN SER CALC-MCNC: 2.5 G/DL (ref 2.2–3.9)
IGA SERPL-MCNC: 30 MG/DL (ref 64–422)
IGG SERPL-MCNC: 421 MG/DL (ref 700–1600)
IGM SERPL-MCNC: 304 MG/DL (ref 26–217)
INTERPRETATION SERPL IEP-IMP: ABNORMAL
KAPPA LC SERPL-MCNC: 12.18 MG/L (ref 3.3–19.4)
KAPPA LC/LAMBDA SER: 2.16 {RATIO} (ref 0.26–1.65)
LAMBDA LC FREE SERPL-MCNC: 5.65 MG/L (ref 5.71–26.3)
Lab: ABNORMAL
M-SPIKE: 0.3 G/DL
PROT SERPL-MCNC: 6.5 G/DL (ref 6–8.5)

## 2017-04-26 ENCOUNTER — OFFICE VISIT (OUTPATIENT)
Dept: ONCOLOGY | Facility: CLINIC | Age: 77
End: 2017-04-26

## 2017-04-26 ENCOUNTER — APPOINTMENT (OUTPATIENT)
Dept: LAB | Facility: HOSPITAL | Age: 77
End: 2017-04-26

## 2017-04-26 VITALS
BODY MASS INDEX: 25.96 KG/M2 | OXYGEN SATURATION: 100 % | HEART RATE: 65 BPM | DIASTOLIC BLOOD PRESSURE: 60 MMHG | HEIGHT: 65 IN | WEIGHT: 155.8 LBS | SYSTOLIC BLOOD PRESSURE: 134 MMHG | TEMPERATURE: 98.3 F | RESPIRATION RATE: 12 BRPM

## 2017-04-26 DIAGNOSIS — C50.811 MALIGNANT NEOPLASM OF OVERLAPPING SITES OF RIGHT FEMALE BREAST (HCC): ICD-10-CM

## 2017-04-26 DIAGNOSIS — C88.0 MACROGLOBULINEMIA OF WALDENSTROM (HCC): Primary | ICD-10-CM

## 2017-04-26 DIAGNOSIS — E83.52 HYPERCALCEMIA: ICD-10-CM

## 2017-04-26 PROCEDURE — G0463 HOSPITAL OUTPT CLINIC VISIT: HCPCS | Performed by: INTERNAL MEDICINE

## 2017-04-26 PROCEDURE — 99214 OFFICE O/P EST MOD 30 MIN: CPT | Performed by: INTERNAL MEDICINE

## 2017-04-26 RX ORDER — BACLOFEN 10 MG/1
5 TABLET ORAL
Qty: 10 TABLET | Refills: 0 | Status: SHIPPED | OUTPATIENT
Start: 2017-04-26 | End: 2017-07-24

## 2017-04-26 NOTE — PROGRESS NOTES
Subjective  REASONS FOR FOLLOWUP:     1. History of Waldenstrom macroglobulinemia. The patient  remains in remission about this condition with stable level of immunoglobulin M . Normal IgA. Normal IgG. Normal white count, hemoglobin, and platelets. No abnormal bleeding. No peripheral adenopathy. No hepatosplenomegaly. The patient will remain in observation.   2. History of breast cancer stage I on the left, status post mastectomy. The patient remains on aromatase inhibitor without any evidence of breast cancer recurrence and good tolerance to her aromatase inhibitor medicine. She will remain on the same issue for the time being.                History of Present Illness  As above. She is here today with no complaints in regard to her previous history of breast cancer status post left mastectomy and her Waldenström macroglobulinemia.       The patient feels like having a good appetite, stable weight, normal bowel activity, normal urination. She has had some exacerbation of her usual fall allergies with a runny nose, sore throat and minimal cough and wheezing associated with her asthma. No sputum production. No pleuritic pain, no hemoptysis, no fever, no chills. She has not developed any other masses at any level and no peripheral adenopathy. No abnormal bleeding. Her right knee is back to normality and she has been able to walk any distance with no limitations.      In the meantime she developed a very peculiar severe pain in the muscles posteriorly on the neck on the left side. She was seen by her primary physician and subsequently ENT; initially she was told that she could have mastoiditis, a visit to the Emergency Room [facility] ruled out this condition through a temporal bone x-rays and CT scan.  The patient had no ear systems to suggest this diagnosis in the first place anyway.  Subsequently she was told that she had TMJ, but the symptoms do not match this. Typically the pain is in the  posterior occipital area on the left side associated with significant discomfort upon moving the head in certain situations including extension and lateral inclination toward the right that stretches the muscle.  When she inclines her head toward the left the pain improves.  She has used local sport creams with some benefit and Aleve with some benefit. She has not had any hearing deficit, she has not had any alteration in any other cranial nerve, she has not developed any rashes or blister formation, she has not had any chin numb syndrome and she has not developed any numbness in the shoulder or alterations in her chest wall.  She has not had any pain in the carotid artery on the left and neither alteration in the left side of the neck.  She has been stressed out to the point that she took some Zoloft by Dr. Johnson that made her sick and she discontinued.      The patient denies any fevers or chills, she denies any alterations inside of her mouth and she denies any alterations in her face otherwise. She has not had any other sites of skeletal pain or bone pain besides her usual osteoarthritis.  The pain intensity sometimes is as bad as 7/10 and keeps her from moving her head.             1. Past Medical History, Past Surgical HistoryHypertension.    2. Hypothyroidism many years ago and has not taken any thyroid medication in quite some time.   3. Thyroid nodule removed more than 35 years ago.    4. Cholecystectomy.    5. Tonsillectomy and adenoidectomy.    6. Appendectomy.  7. Partial hysterectomy many years ago.      She has mammogram and pelvic examination yearly that have been normal.  Colonoscopy 4 years ago was normal.   She also has had a leaky valve in her heart without any significance.      During the recent workup at [facility], the patient had a transesophageal echocardiogram that failed to document any vegetation.    Clostridium colitis requiring admission to [facility] in  2008.      Arthroscopy in 2009 of the right knee with finding of chondromalacia and appropriate cleanup of the joint was performed by Dr. Moraes.      On 2013 the patient has lost 14 pounds, just cutting down on junk food.  Her glucose intolerance has improved substantially and her glucose has normalized.      On 2014, we reviewed her blood pressure issues recently. She has had a very stable blood pressure for the last week, and today is normal. Her physical examination is otherwise unremarkable and normal feeling.     We advised her to remain in observation from this point of view.    As per 10/01/2014, the patient has undergone evaluation by neurology service at [facility] for consideration of gabapentin that she has been taking for so many years for possible seizure activity.   I am aware of an MRI scan of the brain that shows not too much, besides minimal vascular disease and no other lesions.  Her neurologist will be informing us in regard how to proceed in this regard.      She wanted to have a right knee replaced in 2009 through her orthopedic surgeon. She developed delirium in the hospital and extensive ecchymosis and hematoma formation in the whole right lower extremity requiring transfusion of red cells for a hemoglobin of 7.   SOCIAL HISTORY:  .  Retired from [employer] where she worked for many years; she never was exposed to any chemicals.   She is a never smoker and nondrinker.   She lives with her oldest daughter.      FAMILY HISTORY: The patient’s father  of complications from heart disease at age 70.  Mother  after a psychiatric illness at age 67.  A brother  in a car accident at 43 and a sister, age 75, is in good health.   Her three daughters are in good health.  She has no FH of lymphoma or leukemia.    Review of Systems    General: no fever, chills, fatigue, weight changes, or lack of appetite.  Eyes: no epiphora, conjunctivitis,  "pain, glaucoma, blurred vision, blindness, secretion, photophobia.  Ears: no otorrhea, tinnitus, otorrhagia, deafness, pain, vertigo.  Nose: no rhinorrhea, epistaxis, alteration in perception of odors, sinuses pressure.  Mouth: no alteration in gums or teeth,  ulcers, no difficulty with mastication or deglut ion, no odynophagia.  Neck: no masses or pain, no thyroid alterations, no pain in muscles or arteries, no carotid odynia, no crepitation.  Lungs: no cough, sputum production, dyspnea, trepopnea, pleuritic pain, hemoptysis.  Heart: no syncope, irregularity, palpitations, angina, orthopnea, paroxysmal nocturnal dyspnea.  GI: no dysphagia, odynophagia, no regurgitation, no heartburn, indigestion, nausea, vomiting, hematemesis ,melena, jaundice, distention, obstipation, enterorrhagia, proctalgia, anal  Lesions, changes in bowel habits.  : no frequency, hesitancy, hematuria, discharge, pain.  Musculoskeletal: STATED muscle or tendon pain or inflammation, joint pain, edema, functional limitation, fasciculations,  Neurologic: no headache, seizures, alterations on Craneal nerves, no motor or senssory deficit, normal coordination.  Skin: stated r le  rash,severe pruritus.  Psychiatric: no anxiety, depression, agitation, delusions, proper insight.  A comprehensive 14 point review of systems was performed and was negative except as mentioned.    Medications:  The current medication list was reviewed in the EMR    ALLERGIES:    Allergies   Allergen Reactions   • Cortisone    • Darvon  [Propoxyphene] Palpitations       Objective      Vitals:    04/26/17 1057   BP: 134/60   Pulse: 65   Resp: 12   Temp: 98.3 °F (36.8 °C)   TempSrc: Oral   SpO2: 100%   Weight: 155 lb 12.8 oz (70.7 kg)   Height: 64.76\" (164.5 cm)   PainSc: 4  Comment: LT side of neck pain     Current Status 4/26/2017   ECOG score 0       Physical Exam    GENERAL:  Well-developed, well-nourished  Patient  in no acute distress.   SKIN:  Warm, dry with papular " erythematous external rle rash and ryness, no typical of anything in particular,no purpura or petechiae.  HEENT:  Pupils were equal and reactive to light and accomodation, conjunctivas non injected, no pterigion, normal extraocular movements, normal visual acuity.   Mouth mucosa was moist, no exudates in oropharynx, normal gum line, normal roof of the mouth and pillars, normal papillations of the tongue.  NECK:  Supple with good range of motion; no thyromegaly or masses, no JVD or bruits, no cervical adenopathies.  LYMPHATICS:  No cervical, supraclavicular, axillary or inguinal adenopathy.  CHEST:  Lungs clear to percussion and auscultation, normal breath sounds bilaterally, no wheezing, crackles or ronchi, no stridor.  CARDIAC:  Regular rate and rhythm without murmurs, rubs or gallops.  INSPECTION of right breast documented symmetry of the tissue per se and location and size of the nipple,no retractions or inversion of the nipple, normal skin without lesions, no erythema or nodules,no paud'orange, no prominence of superficial veins or chest wall collateral circulation.PALPATION of the breast documented normal skin turgor, no induration, alteration in local temperature, or pain, no palpable masses or nodules, normal mobility of the tissues,no fixation of the tissue or parenchyma to the chest wall, no alteration at the tail of the breast or axillas, no adenopathies.Mastectomy left Surgical site was well healed.No lymphedema in either extremity.  ABDOMEN:  Soft, nontender with no organomegaly or masses, no ascites, no collateral circulation, no abdominal pain, no inguinal hernias, no abdominal bruits.  EXTREMITIES:  No clubbing, cyanosis or edema, no deformities or pain.Pain and edema in replaced right knee with limitation for flexion, extension. She has posterior cervical pain on the left side, especially at the base of the cranium with significant discomfort upon stretching her head and inclining her head toward the  right.  Inclination of the head toward the left decreased the pain. She had limitation in extension and flexion because of discomfort. She had no tenderness in the parotid glands, she had no tenderness in the TMJs, aperture of her mouth was completely normal with normal opening of her mouth and no pain upon placing my pinky finger in her ear canals. She had no tenderness in the mastoid area per se.  Tympanic membranes were normal and her oral mucosals were normal, her neck was normal in regard to movements and range of motion and no alterations in the trapezius muscle. She had no lesions in the neck that would suggest shingles.  She had no obvious sensory deficit in the upper or lower extremities.     She had no numbness in her chin.       NEUROLOGICAL:  Patient was awake, alert, oriented to time, person and place    RECENT LABS:  Hematology WBC   Date Value Ref Range Status   04/19/2017 5.72 4.00 - 10.00 10*3/mm3 Final     RBC   Date Value Ref Range Status   04/19/2017 4.36 3.90 - 5.00 10*6/mm3 Final     Hemoglobin   Date Value Ref Range Status   04/19/2017 14.3 11.5 - 14.9 g/dL Final     Hematocrit   Date Value Ref Range Status   04/19/2017 40.6 34.0 - 45.0 % Final     Platelets   Date Value Ref Range Status   04/19/2017 147 (L) 150 - 375 10*3/mm3 Final        Component      Latest Ref Rng & Units 1/25/2017 3/16/2017 4/19/2017 4/19/2017          10:34 AM   1:19 PM  1:19 PM   IgG      700 - 1600 mg/dL 452 (L)   421 (L)   IgA      64 - 422 mg/dL 54 (L)   30 (L)   IgM      26 - 217 mg/dL 283 (H)   304 (H)   Total Protein      6.3 - 8.0 g/dL 6.7 7.4 6.8 6.5   Albumin      3.50 - 5.20 g/dL 4.1 5.00 4.70 4.0   Alpha-1-Globulin      0.0 - 0.4 g/dL 0.3   0.3   Alpha-2-Globulin      0.4 - 1.0 g/dL 0.8   0.7   Beta Globulin      0.7 - 1.3 g/dL 0.9   0.9   Gamma Globulin      0.4 - 1.8 g/dL 0.7   0.6   M-Pratik      Not Observed g/dL 0.4 (H)   0.3 (H)   Globulin      2.2 - 3.9 g/dL 2.6   2.5   A/G Ratio      1.1 - 2.4 g/dL  1.6 2.1 2.2 1.7   Immunofixation Reflex, Serum       Comment   Comment   Please Note       Comment   Comment   Free Light Chain, [place]      3.30 - 19.40 mg/L 12.91   12.18   Free Lambda Light Chains      5.71 - 26.30 mg/L 7.80   5.65 (L)   Kappa/Lambda Ratio      0.26 - 1.65 1.66 (H)   2.16 (H)       TEMPORAL BONES W WO CONTRAST-      INDICATIONS: Left mastoid pain      TECHNIQUE: CT OF THE TEMPORAL BONES, WITHOUT AND WITH INTRAVENOUS  CONTRAST MATERIAL      COMPARISON: None available      FINDINGS:      The mastoid air cells are clear. Minimal mucosal thickening is seen in  ethmoid air cells, right sphenoid sinus, left maxillary sinus. Small  likely mucous retention cyst at left ethmoid air cell. Ostiomeatal units  are patent.      The external auditory canals are clear. The tympanic membranes appear  unremarkable.      The ossicles appear intact, unremarkable. Prussak's space is clear. No  evidence for cholesteatoma or middle ear effusion.The structures of the  inner ears appear unremarkable. No otosclerosis is identified.      Temporomandibular joints appear unremarkable.          Visualized orbital contents appear unremarkable. No enhancing lesions  are seen of the visualized base of the brain.      No enhancing lesion or fluid collection is identified in the soft  tissues.          IMPRESSION:      No evidence for mastoiditis. Follow-up as indications persist.        Assessment/Plan     1.  1.  This patient has history of Waldenstroms macroglobulinemia; this condition remains asymptomatic at this time.  Her monoclonal protein IgM with minimal rise has no implications at this time, her total immune globulin level remains normal, her chemistry profile is normal and here CBC remains normal.  There is no indication to pursue any other therapy at this time on this condition.   2.  History of breast cancer.  The patient has had a left-side mastectomy.  She remains on Femara adjuvant therapy with no side effects of  the medicine and no clinical evidence of recurrence of breast cancer.  3.  Hypercalcemia. In the past we have performed a PTH and also we have performed further analysis figuring out the reason for this.  We asked her to discontinue her calcium supplement and since then the calcium has normalized.  No other indication for intervention from this point of view.   4.  Hyponatremia.  This corresponds to a pseudohyponatremia associated with her monoclonal protein with no indication for any intervention or modification in medicines or anything of this nature at this time.   5.  Patient has a peculiar pain at the base of the cranium on the left side that is in my opinion associated with muscle spasm.  She has been told that she has TMJ; in my opinion her alignment of her teeth and her TMJ is functionally perfectly normal. She also was told that she has mastoiditis; the pain is not related to this and her ear canals were normal, tympanic membranes are normal and mastoid bone CT scan stated above was completely unremarkable.     Therefore, again in my opinion, the problem is muscle spasm and for this reason I advised her to use local massage, local heat and to take a very low-dose of baclofen 5 mg at nighttime only with 10 tablets, no refills. Otherwise, we will review her back in 3 months with a CBC, CMP and an ARON.                                      4/26/2017      CC:

## 2017-07-17 ENCOUNTER — LAB (OUTPATIENT)
Dept: LAB | Facility: HOSPITAL | Age: 77
End: 2017-07-17

## 2017-07-17 DIAGNOSIS — C88.0 MACROGLOBULINEMIA OF WALDENSTROM (HCC): ICD-10-CM

## 2017-07-17 DIAGNOSIS — E83.52 HYPERCALCEMIA: ICD-10-CM

## 2017-07-17 DIAGNOSIS — C50.811 MALIGNANT NEOPLASM OF OVERLAPPING SITES OF RIGHT FEMALE BREAST (HCC): ICD-10-CM

## 2017-07-17 LAB
ALBUMIN SERPL-MCNC: 4.5 G/DL (ref 3.5–5.2)
ALBUMIN/GLOB SERPL: 1.7 G/DL (ref 1.1–2.4)
ALP SERPL-CCNC: 75 U/L (ref 38–116)
ALT SERPL W P-5'-P-CCNC: 16 U/L (ref 0–33)
ANION GAP SERPL CALCULATED.3IONS-SCNC: 13.4 MMOL/L
AST SERPL-CCNC: 21 U/L (ref 0–32)
BASOPHILS # BLD AUTO: 0.03 10*3/MM3 (ref 0–0.1)
BASOPHILS NFR BLD AUTO: 0.6 % (ref 0–1.1)
BILIRUB SERPL-MCNC: 0.7 MG/DL (ref 0.1–1.2)
BUN BLD-MCNC: 9 MG/DL (ref 6–20)
BUN/CREAT SERPL: 11.1 (ref 7.3–30)
CALCIUM SPEC-SCNC: 10.1 MG/DL (ref 8.5–10.2)
CHLORIDE SERPL-SCNC: 91 MMOL/L (ref 98–107)
CO2 SERPL-SCNC: 29.6 MMOL/L (ref 22–29)
CREAT BLD-MCNC: 0.81 MG/DL (ref 0.6–1.1)
DEPRECATED RDW RBC AUTO: 42 FL (ref 37–49)
EOSINOPHIL # BLD AUTO: 0.28 10*3/MM3 (ref 0–0.36)
EOSINOPHIL NFR BLD AUTO: 5.6 % (ref 1–5)
ERYTHROCYTE [DISTWIDTH] IN BLOOD BY AUTOMATED COUNT: 12.1 % (ref 11.7–14.5)
GFR SERPL CREATININE-BSD FRML MDRD: 69 ML/MIN/1.73
GLOBULIN UR ELPH-MCNC: 2.6 GM/DL (ref 1.8–3.5)
GLUCOSE BLD-MCNC: 106 MG/DL (ref 74–124)
HCT VFR BLD AUTO: 41.4 % (ref 34–45)
HGB BLD-MCNC: 14.7 G/DL (ref 11.5–14.9)
IMM GRANULOCYTES # BLD: 0.02 10*3/MM3 (ref 0–0.03)
IMM GRANULOCYTES NFR BLD: 0.4 % (ref 0–0.5)
LYMPHOCYTES # BLD AUTO: 1.23 10*3/MM3 (ref 1–3.5)
LYMPHOCYTES NFR BLD AUTO: 24.6 % (ref 20–49)
MCH RBC QN AUTO: 33.2 PG (ref 27–33)
MCHC RBC AUTO-ENTMCNC: 35.5 G/DL (ref 32–35)
MCV RBC AUTO: 93.5 FL (ref 83–97)
MONOCYTES # BLD AUTO: 0.48 10*3/MM3 (ref 0.25–0.8)
MONOCYTES NFR BLD AUTO: 9.6 % (ref 4–12)
NEUTROPHILS # BLD AUTO: 2.96 10*3/MM3 (ref 1.5–7)
NEUTROPHILS NFR BLD AUTO: 59.2 % (ref 39–75)
NRBC BLD MANUAL-RTO: 0 /100 WBC (ref 0–0)
PLATELET # BLD AUTO: 148 10*3/MM3 (ref 150–375)
PMV BLD AUTO: 9.2 FL (ref 8.9–12.1)
POTASSIUM BLD-SCNC: 3.9 MMOL/L (ref 3.5–4.7)
PROT SERPL-MCNC: 7.1 G/DL (ref 6.3–8)
RBC # BLD AUTO: 4.43 10*6/MM3 (ref 3.9–5)
SODIUM BLD-SCNC: 134 MMOL/L (ref 134–145)
WBC NRBC COR # BLD: 5 10*3/MM3 (ref 4–10)

## 2017-07-17 PROCEDURE — 36415 COLL VENOUS BLD VENIPUNCTURE: CPT

## 2017-07-17 PROCEDURE — 85025 COMPLETE CBC W/AUTO DIFF WBC: CPT

## 2017-07-17 PROCEDURE — 80053 COMPREHEN METABOLIC PANEL: CPT

## 2017-07-18 LAB
ALBUMIN SERPL-MCNC: 4.1 G/DL (ref 2.9–4.4)
ALBUMIN/GLOB SERPL: 1.6 {RATIO} (ref 0.7–1.7)
ALPHA1 GLOB FLD ELPH-MCNC: 0.3 G/DL (ref 0–0.4)
ALPHA2 GLOB SERPL ELPH-MCNC: 0.7 G/DL (ref 0.4–1)
B-GLOBULIN SERPL ELPH-MCNC: 0.9 G/DL (ref 0.7–1.3)
GAMMA GLOB SERPL ELPH-MCNC: 0.7 G/DL (ref 0.4–1.8)
GLOBULIN SER CALC-MCNC: 2.6 G/DL (ref 2.2–3.9)
IGA SERPL-MCNC: 33 MG/DL (ref 64–422)
IGG SERPL-MCNC: 414 MG/DL (ref 700–1600)
IGM SERPL-MCNC: 385 MG/DL (ref 26–217)
INTERPRETATION SERPL IEP-IMP: ABNORMAL
KAPPA LC SERPL-MCNC: 11.9 MG/L (ref 3.3–19.4)
KAPPA LC/LAMBDA SER: 1.8 {RATIO} (ref 0.26–1.65)
LAMBDA LC FREE SERPL-MCNC: 6.6 MG/L (ref 5.7–26.3)
Lab: ABNORMAL
M-SPIKE: 0.4 G/DL
PROT SERPL-MCNC: 6.7 G/DL (ref 6–8.5)

## 2017-07-24 ENCOUNTER — APPOINTMENT (OUTPATIENT)
Dept: LAB | Facility: HOSPITAL | Age: 77
End: 2017-07-24

## 2017-07-24 ENCOUNTER — OFFICE VISIT (OUTPATIENT)
Dept: ONCOLOGY | Facility: CLINIC | Age: 77
End: 2017-07-24

## 2017-07-24 VITALS
DIASTOLIC BLOOD PRESSURE: 78 MMHG | HEART RATE: 62 BPM | HEIGHT: 65 IN | SYSTOLIC BLOOD PRESSURE: 120 MMHG | OXYGEN SATURATION: 99 % | TEMPERATURE: 98.1 F | WEIGHT: 155.2 LBS | RESPIRATION RATE: 16 BRPM | BODY MASS INDEX: 25.86 KG/M2

## 2017-07-24 DIAGNOSIS — C50.812 MALIGNANT NEOPLASM OF OVERLAPPING SITES OF LEFT FEMALE BREAST (HCC): ICD-10-CM

## 2017-07-24 DIAGNOSIS — C88.0 MACROGLOBULINEMIA OF WALDENSTROM (HCC): Primary | ICD-10-CM

## 2017-07-24 PROCEDURE — 99213 OFFICE O/P EST LOW 20 MIN: CPT | Performed by: INTERNAL MEDICINE

## 2017-07-24 PROCEDURE — G0463 HOSPITAL OUTPT CLINIC VISIT: HCPCS | Performed by: INTERNAL MEDICINE

## 2017-07-24 RX ORDER — CYCLOBENZAPRINE HCL 10 MG
TABLET ORAL
COMMUNITY
Start: 2017-06-26 | End: 2017-07-24

## 2017-07-24 NOTE — PROGRESS NOTES
Subjective  REASONS FOR FOLLOWUP:     1. History of Waldenstrom macroglobulinemia. The patient  remains in remission about this condition with stable level of immunoglobulin M . Normal IgA. Normal IgG. Normal white count, hemoglobin, and platelets. No abnormal bleeding. No peripheral adenopathy. No hepatosplenomegaly. The patient will remain in observation.   2. History of breast cancer stage I on the left, status post mastectomy. The patient remains on aromatase inhibitor without any evidence of breast cancer recurrence and good tolerance to her aromatase inhibitor medicine. She will remain on the same issue for the time being.                History of Present Illness  As above. She is here today with no complaints in regard to her previous history of breast cancer status post left mastectomy and her Waldenström macroglobulinemia.       The patient feels like having a good appetite, stable weight, normal bowel activity, normal urination. She has had some exacerbation of her usual fall allergies with a runny nose, sore throat and minimal cough and wheezing associated with her asthma. No sputum production. No pleuritic pain, no hemoptysis, no fever, no chills. She has not developed any other masses at any level and no peripheral adenopathy. No abnormal bleeding. Her right knee is back to normality and she has been able to walk any distance with no limitations.  He occasionally the patient is complaining of some numbness sensation on the plantar aspect of both feet especially when she goes to bed but is of transitory phenomenon that doesn't last long.  Similar to the symptoms that she had when she was first diagnosed with Waldenström's with a difference that the pain and discomfort then was continuous and came down to below the knee all the time.  Has no difficulty walking she has no difficulty with perception of temperature .            1. Past Medical History, Past Surgical HistoryHypertension.     2. Hypothyroidism many years ago and has not taken any thyroid medication in quite some time.   3. Thyroid nodule removed more than 35 years ago.    4. Cholecystectomy.    5. Tonsillectomy and adenoidectomy.    6. Appendectomy.  7. Partial hysterectomy many years ago.      She has mammogram and pelvic examination yearly that have been normal.  Colonoscopy 4 years ago was normal.   She also has had a leaky valve in her heart without any significance.      During the recent workup at [facility], the patient had a transesophageal echocardiogram that failed to document any vegetation.    Clostridium colitis requiring admission to [facility] in 09/2008.      Arthroscopy in 03/2009 of the right knee with finding of chondromalacia and appropriate cleanup of the joint was performed by Dr. Moraes.      On 07/24/2013 the patient has lost 14 pounds, just cutting down on junk food.  Her glucose intolerance has improved substantially and her glucose has normalized.      On 01/12/2014, we reviewed her blood pressure issues recently. She has had a very stable blood pressure for the last week, and today is normal. Her physical examination is otherwise unremarkable and normal feeling.     We advised her to remain in observation from this point of view.    As per 10/01/2014, the patient has undergone evaluation by neurology service at [facility] for consideration of gabapentin that she has been taking for so many years for possible seizure activity.   I am aware of an MRI scan of the brain that shows not too much, besides minimal vascular disease and no other lesions.  Her neurologist will be informing us in regard how to proceed in this regard.      She wanted to have a right knee replaced in 12/2009 through her orthopedic surgeon. She developed delirium in the hospital and extensive ecchymosis and hematoma formation in the whole right lower extremity requiring transfusion of red cells  for a hemoglobin of 7.   SOCIAL HISTORY:  .  Retired from [employer] where she worked for many years; she never was exposed to any chemicals.   She is a never smoker and nondrinker.   She lives with her oldest daughter.      FAMILY HISTORY: The patient’s father  of complications from heart disease at age 70.  Mother  after a psychiatric illness at age 67.  A brother  in a car accident at 43 and a sister, age 75, is in good health.   Her three daughters are in good health.  She has no FH of lymphoma or leukemia.    Review of Systems    General: no fever, chills, fatigue, weight changes, or lack of appetite.  Eyes: no epiphora, conjunctivitis, pain, glaucoma, blurred vision, blindness, secretion, photophobia.  Ears: no otorrhea, tinnitus, otorrhagia, deafness, pain, vertigo.  Nose: no rhinorrhea, epistaxis, alteration in perception of odors, sinuses pressure.  Mouth: no alteration in gums or teeth,  ulcers, no difficulty with mastication or deglut ion, no odynophagia.  Neck: no masses or pain, no thyroid alterations, no pain in muscles or arteries, no carotid odynia, no crepitation.  Lungs: no cough, sputum production, dyspnea, trepopnea, pleuritic pain, hemoptysis.  Heart: no syncope, irregularity, palpitations, angina, orthopnea, paroxysmal nocturnal dyspnea.  GI: no dysphagia, odynophagia, no regurgitation, no heartburn, indigestion, nausea, vomiting, hematemesis ,melena, jaundice, distention, obstipation, enterorrhagia, proctalgia, anal  Lesions, changes in bowel habits.  : no frequency, hesitancy, hematuria, discharge, pain.  Musculoskeletal: no muscle or tendon pain or inflammation, joint pain, edema, functional limitation, fasciculations,  Neurologic: no headache, seizures, alterations on Craneal nerves, no motor  deficit, normal coordination.numbness feet as stated.  Skin: stated r le  rash,severe pruritus.  Psychiatric: no anxiety, depression, agitation, delusions, proper  "insight.  A comprehensive 14 point review of systems was performed and was negative except as mentioned.    Medications:  The current medication list was reviewed in the EMR    ALLERGIES:    Allergies   Allergen Reactions   • Cortisone    • Darvon  [Propoxyphene] Palpitations       Objective      Vitals:    07/24/17 1254   BP: 120/78   Pulse: 62   Resp: 16   Temp: 98.1 °F (36.7 °C)   TempSrc: Oral   SpO2: 99%   Weight: 155 lb 3.2 oz (70.4 kg)   Height: 64.76\" (164.5 cm)   PainSc: 0-No pain     Current Status 7/24/2017   ECOG score 0       Physical Exam    GENERAL:  Well-developed, well-nourished  Patient  in no acute distress.   SKIN:  Warm, dry with papular erythematous external rle rash and ryness, no typical of anything in particular,no purpura or petechiae.  HEENT:  Pupils were equal and reactive to light and accomodation, conjunctivas non injected, no pterigion, normal extraocular movements, normal visual acuity.   Mouth mucosa was moist, no exudates in oropharynx, normal gum line, normal roof of the mouth and pillars, normal papillations of the tongue.  NECK:  Supple with good range of motion; no thyromegaly or masses, no JVD or bruits, no cervical adenopathies.  LYMPHATICS:  No cervical, supraclavicular, axillary or inguinal adenopathy.  CHEST:  Lungs clear to percussion and auscultation, normal breath sounds bilaterally, no wheezing, crackles or ronchi, no stridor.  CARDIAC:  Regular rate and rhythm without murmurs, rubs or gallops.  INSPECTION of right breast documented symmetry of the tissue per se and location and size of the nipple,no retractions or inversion of the nipple, normal skin without lesions, no erythema or nodules,no paud'orange, no prominence of superficial veins or chest wall collateral circulation.PALPATION of the breast documented normal skin turgor, no induration, alteration in local temperature, or pain, no palpable masses or nodules, normal mobility of the tissues,no fixation of the " tissue or parenchyma to the chest wall, no alteration at the tail of the breast or axillas, no adenopathies.Mastectomy left Surgical site was well healed.No lymphedema in either extremity.  ABDOMEN:  Soft, nontender with no organomegaly or masses, no ascites, no collateral circulation, no abdominal pain, no inguinal hernias, no abdominal bruits.  EXTREMITIES:  No clubbing, cyanosis or edema, no deformities or pain.    NEUROLOGICAL:  Patient was awake, alert, oriented to time, person and place    RECENT LABS:  Hematology WBC   Date Value Ref Range Status   07/17/2017 5.00 4.00 - 10.00 10*3/mm3 Final     RBC   Date Value Ref Range Status   07/17/2017 4.43 3.90 - 5.00 10*6/mm3 Final     Hemoglobin   Date Value Ref Range Status   07/17/2017 14.7 11.5 - 14.9 g/dL Final     Hematocrit   Date Value Ref Range Status   07/17/2017 41.4 34.0 - 45.0 % Final     Platelets   Date Value Ref Range Status   07/17/2017 148 (L) 150 - 375 10*3/mm3 Final        Component      Latest Ref Rng & Units 4/19/2017 7/17/2017 7/17/2017           1:19 PM 11:29 AM 11:29 AM   IgG      700 - 1600 mg/dL 421 (L)  414 (L)   IgA      64 - 422 mg/dL 30 (L)  33 (L)   IgM      26 - 217 mg/dL 304 (H)  385 (H)   Total Protein      6.3 - 8.0 g/dL 6.5 7.1 6.7   Albumin      3.50 - 5.20 g/dL 4.0 4.50 4.1   Alpha-1-Globulin      0.0 - 0.4 g/dL 0.3  0.3   Alpha-2-Globulin      0.4 - 1.0 g/dL 0.7  0.7   Beta Globulin      0.7 - 1.3 g/dL 0.9  0.9   Gamma Globulin      0.4 - 1.8 g/dL 0.6  0.7   M-Pratik      Not Observed g/dL 0.3 (H)  0.4 (H)   Globulin      2.2 - 3.9 g/dL 2.5  2.6   A/G Ratio      1.1 - 2.4 g/dL 1.7 1.7 1.6   Immunofixation Reflex, Serum       Comment  Comment   Please Note       Comment  Comment   Free Light Chain, Kappa      3.3 - 19.4 mg/L 12.18  11.9   Free Lambda Light Chains      5.7 - 26.3 mg/L 5.65 (L)  6.6   Kappa/Lambda Ratio      0.26 - 1.65 2.16 (H)  1.80 (H)     Assessment/Plan     1.  1.  This patient has history of Waldenstrom’s  macroglobulinemia; this condition remains asymptomatic at this time.  Her monoclonal protein IgM with minimal rise has no implications at this time, her total immune globulin level remains normal, her chemistry profile is normal and here CBC remains normal.  There is no indication to pursue any other therapy at this time on this condition.   2.  History of  Left breast cancer.  The patient has had a left-side mastectomy.  She remains on Femara adjuvant therapy with no side effects of the medicine and no clinical evidence of recurrence of breast cancer.  3.  Hypercalcemia. In the past we have performed a PTH and also we have performed further analysis figuring out the reason for this.  We asked her to discontinue her calcium supplement and since then the calcium has normalized.  No other indication for intervention from this point of view.   4.  Hyponatremia.  This corresponds to a pseudohyponatremia associated with her monoclonal protein with no indication for any intervention or modification in medicines or anything of this nature at this time. .   Otherwise, we will review her back in 3 months with a CBC, CMP and an ARON.  To be done the week before md. she will remain on her Femara for the treatment adjuvantly LEFT breast cancer.  He has not experienced any side effects of this medication.  He is not due for her mammogram until the next visit.  I asked her to watch for more neuropathic pain in both feet this becomes permanently it will be an indicator to pursue therapy again for her Waldenström's                                    7/24/2017      CC:

## 2017-08-24 RX ORDER — LETROZOLE 2.5 MG/1
TABLET, FILM COATED ORAL
Qty: 90 TABLET | Refills: 2 | Status: SHIPPED | OUTPATIENT
Start: 2017-08-24 | End: 2018-05-29 | Stop reason: SDUPTHER

## 2017-10-04 ENCOUNTER — LAB (OUTPATIENT)
Dept: LAB | Facility: HOSPITAL | Age: 77
End: 2017-10-04

## 2017-10-04 DIAGNOSIS — C88.0 MACROGLOBULINEMIA OF WALDENSTROM (HCC): ICD-10-CM

## 2017-10-04 DIAGNOSIS — C50.812 MALIGNANT NEOPLASM OF OVERLAPPING SITES OF LEFT FEMALE BREAST (HCC): ICD-10-CM

## 2017-10-04 LAB
ALBUMIN SERPL-MCNC: 4.6 G/DL (ref 3.5–5.2)
ALBUMIN/GLOB SERPL: 1.8 G/DL (ref 1.1–2.4)
ALP SERPL-CCNC: 81 U/L (ref 38–116)
ALT SERPL W P-5'-P-CCNC: 14 U/L (ref 0–33)
ANION GAP SERPL CALCULATED.3IONS-SCNC: 12.9 MMOL/L
AST SERPL-CCNC: 18 U/L (ref 0–32)
BASOPHILS # BLD AUTO: 0.03 10*3/MM3 (ref 0–0.1)
BASOPHILS NFR BLD AUTO: 0.4 % (ref 0–1.1)
BILIRUB SERPL-MCNC: 0.9 MG/DL (ref 0.1–1.2)
BUN BLD-MCNC: 10 MG/DL (ref 6–20)
BUN/CREAT SERPL: 13 (ref 7.3–30)
CALCIUM SPEC-SCNC: 9.9 MG/DL (ref 8.5–10.2)
CHLORIDE SERPL-SCNC: 92 MMOL/L (ref 98–107)
CO2 SERPL-SCNC: 28.1 MMOL/L (ref 22–29)
CREAT BLD-MCNC: 0.77 MG/DL (ref 0.6–1.1)
DEPRECATED RDW RBC AUTO: 42.3 FL (ref 37–49)
EOSINOPHIL # BLD AUTO: 0.21 10*3/MM3 (ref 0–0.36)
EOSINOPHIL NFR BLD AUTO: 2.9 % (ref 1–5)
ERYTHROCYTE [DISTWIDTH] IN BLOOD BY AUTOMATED COUNT: 12.2 % (ref 11.7–14.5)
GFR SERPL CREATININE-BSD FRML MDRD: 72 ML/MIN/1.73
GLOBULIN UR ELPH-MCNC: 2.6 GM/DL (ref 1.8–3.5)
GLUCOSE BLD-MCNC: 110 MG/DL (ref 74–124)
HCT VFR BLD AUTO: 40.5 % (ref 34–45)
HGB BLD-MCNC: 14.1 G/DL (ref 11.5–14.9)
IMM GRANULOCYTES # BLD: 0.03 10*3/MM3 (ref 0–0.03)
IMM GRANULOCYTES NFR BLD: 0.4 % (ref 0–0.5)
LYMPHOCYTES # BLD AUTO: 1.15 10*3/MM3 (ref 1–3.5)
LYMPHOCYTES NFR BLD AUTO: 16 % (ref 20–49)
MCH RBC QN AUTO: 32.9 PG (ref 27–33)
MCHC RBC AUTO-ENTMCNC: 34.8 G/DL (ref 32–35)
MCV RBC AUTO: 94.6 FL (ref 83–97)
MONOCYTES # BLD AUTO: 0.65 10*3/MM3 (ref 0.25–0.8)
MONOCYTES NFR BLD AUTO: 9 % (ref 4–12)
NEUTROPHILS # BLD AUTO: 5.14 10*3/MM3 (ref 1.5–7)
NEUTROPHILS NFR BLD AUTO: 71.3 % (ref 39–75)
NRBC BLD MANUAL-RTO: 0 /100 WBC (ref 0–0)
PLATELET # BLD AUTO: 153 10*3/MM3 (ref 150–375)
PMV BLD AUTO: 9.2 FL (ref 8.9–12.1)
POTASSIUM BLD-SCNC: 3.9 MMOL/L (ref 3.5–4.7)
PROT SERPL-MCNC: 7.2 G/DL (ref 6.3–8)
RBC # BLD AUTO: 4.28 10*6/MM3 (ref 3.9–5)
SODIUM BLD-SCNC: 133 MMOL/L (ref 134–145)
WBC NRBC COR # BLD: 7.21 10*3/MM3 (ref 4–10)

## 2017-10-04 PROCEDURE — 85025 COMPLETE CBC W/AUTO DIFF WBC: CPT | Performed by: INTERNAL MEDICINE

## 2017-10-04 PROCEDURE — 36415 COLL VENOUS BLD VENIPUNCTURE: CPT | Performed by: INTERNAL MEDICINE

## 2017-10-04 PROCEDURE — 80053 COMPREHEN METABOLIC PANEL: CPT | Performed by: INTERNAL MEDICINE

## 2017-10-05 LAB
ALBUMIN SERPL-MCNC: 4.1 G/DL (ref 2.9–4.4)
ALBUMIN/GLOB SERPL: 1.5 {RATIO} (ref 0.7–1.7)
ALPHA1 GLOB FLD ELPH-MCNC: 0.4 G/DL (ref 0–0.4)
ALPHA2 GLOB SERPL ELPH-MCNC: 0.8 G/DL (ref 0.4–1)
B-GLOBULIN SERPL ELPH-MCNC: 1 G/DL (ref 0.7–1.3)
GAMMA GLOB SERPL ELPH-MCNC: 0.7 G/DL (ref 0.4–1.8)
GLOBULIN SER CALC-MCNC: 2.9 G/DL (ref 2.2–3.9)
IGA SERPL-MCNC: 35 MG/DL (ref 64–422)
IGG SERPL-MCNC: 384 MG/DL (ref 700–1600)
IGM SERPL-MCNC: 434 MG/DL (ref 26–217)
INTERPRETATION SERPL IEP-IMP: ABNORMAL
KAPPA LC SERPL-MCNC: 11.2 MG/L (ref 3.3–19.4)
KAPPA LC/LAMBDA SER: 2.07 {RATIO} (ref 0.26–1.65)
LAMBDA LC FREE SERPL-MCNC: 5.4 MG/L (ref 5.7–26.3)
Lab: ABNORMAL
M-SPIKE: 0.5 G/DL
PROT SERPL-MCNC: 7 G/DL (ref 6–8.5)

## 2017-10-11 ENCOUNTER — APPOINTMENT (OUTPATIENT)
Dept: ONCOLOGY | Facility: CLINIC | Age: 77
End: 2017-10-11

## 2017-10-11 ENCOUNTER — APPOINTMENT (OUTPATIENT)
Dept: LAB | Facility: HOSPITAL | Age: 77
End: 2017-10-11

## 2017-10-11 ENCOUNTER — OFFICE VISIT (OUTPATIENT)
Dept: ONCOLOGY | Facility: CLINIC | Age: 77
End: 2017-10-11

## 2017-10-11 VITALS
OXYGEN SATURATION: 99 % | HEIGHT: 64 IN | TEMPERATURE: 97.9 F | BODY MASS INDEX: 26.5 KG/M2 | RESPIRATION RATE: 12 BRPM | WEIGHT: 155.2 LBS | DIASTOLIC BLOOD PRESSURE: 82 MMHG | SYSTOLIC BLOOD PRESSURE: 148 MMHG | HEART RATE: 66 BPM

## 2017-10-11 DIAGNOSIS — Z17.0 MALIGNANT NEOPLASM OF OVERLAPPING SITES OF LEFT BREAST IN FEMALE, ESTROGEN RECEPTOR POSITIVE (HCC): ICD-10-CM

## 2017-10-11 DIAGNOSIS — C50.812 MALIGNANT NEOPLASM OF OVERLAPPING SITES OF LEFT BREAST IN FEMALE, ESTROGEN RECEPTOR POSITIVE (HCC): ICD-10-CM

## 2017-10-11 DIAGNOSIS — C88.0 MACROGLOBULINEMIA OF WALDENSTROM (HCC): Primary | ICD-10-CM

## 2017-10-11 DIAGNOSIS — E83.52 HYPERCALCEMIA: ICD-10-CM

## 2017-10-11 PROCEDURE — 99214 OFFICE O/P EST MOD 30 MIN: CPT | Performed by: INTERNAL MEDICINE

## 2017-10-11 NOTE — PROGRESS NOTES
Subjective  REASONS FOR FOLLOWUP:     1. History of Waldenstrom macroglobulinemia. The patient  remains in remission about this condition with stable level of immunoglobulin M . Normal IgA. Normal IgG. Normal white count, hemoglobin, and platelets. No abnormal bleeding. No peripheral adenopathy. No hepatosplenomegaly. The patient will remain in observation.   2. History of breast cancer stage I on the left, status post mastectomy. The patient remains on aromatase inhibitor without any evidence of breast cancer recurrence and good tolerance to her aromatase inhibitor medicine. She will remain on the same issue for the time being.                History of Present Illness  As above. She is here today with no complaints in regard to her previous history of breast cancer status post left mastectomy and her Waldenström macroglobulinemia.       The patient feels like having a good appetite, stable weight, normal bowel activity, normal urination. She has had some exacerbation of her usual summer allergies with a runny nose, sore throat and  cough and wheezing associated with her asthma requiring nebs by his allergy MD.. No sputum production. No pleuritic pain, no hemoptysis, no fever, no chills. She has not developed any other masses at any level and no peripheral adenopathy. No abnormal bleeding. Her right knee is back to normality and she has been able to walk any distance with no limitations.  He occasionally the patient is complaining of some numbness sensation on the plantar aspect of both feet especially when she goes to bed but is of transitory phenomenon that doesn't last long.  Similar to the symptoms that she had when she was first diagnosed with Waldenström's with a difference that the pain and discomfort then was continuous and came down to below the knee all the time.  Has no difficulty walking she has no difficulty with perception of temperature .            1. Past Medical History, Past Surgical  HistoryHypertension.    2. Hypothyroidism many years ago and has not taken any thyroid medication in quite some time.   3. Thyroid nodule removed more than 35 years ago.    4. Cholecystectomy.    5. Tonsillectomy and adenoidectomy.    6. Appendectomy.  7. Partial hysterectomy many years ago.      She has mammogram and pelvic examination yearly that have been normal.  Colonoscopy 4 years ago was normal.   She also has had a leaky valve in her heart without any significance.      During the recent workup at [facility], the patient had a transesophageal echocardiogram that failed to document any vegetation.    Clostridium colitis requiring admission to [facility] in 09/2008.      Arthroscopy in 03/2009 of the right knee with finding of chondromalacia and appropriate cleanup of the joint was performed by Dr. Moraes.      On 07/24/2013 the patient has lost 14 pounds, just cutting down on junk food.  Her glucose intolerance has improved substantially and her glucose has normalized.      On 01/12/2014, we reviewed her blood pressure issues recently. She has had a very stable blood pressure for the last week, and today is normal. Her physical examination is otherwise unremarkable and normal feeling.     We advised her to remain in observation from this point of view.    As per 10/01/2014, the patient has undergone evaluation by neurology service at [facility] for consideration of gabapentin that she has been taking for so many years for possible seizure activity.   I am aware of an MRI scan of the brain that shows not too much, besides minimal vascular disease and no other lesions.  Her neurologist will be informing us in regard how to proceed in this regard.      She wanted to have a right knee replaced in 12/2009 through her orthopedic surgeon. She developed delirium in the hospital and extensive ecchymosis and hematoma formation in the whole right lower extremity requiring  transfusion of red cells for a hemoglobin of 7.   SOCIAL HISTORY:  .  Retired from [employer] where she worked for many years; she never was exposed to any chemicals.   She is a never smoker and nondrinker.   She lives with her oldest daughter.      FAMILY HISTORY: The patient’s father  of complications from heart disease at age 70.  Mother  after a psychiatric illness at age 67.  A brother  in a car accident at 43 and a sister, age 75, is in good health.   Her three daughters are in good health.  She has no FH of lymphoma or leukemia.    Review of Systems    General: no fever, chills, fatigue, weight changes, or lack of appetite.  Eyes: no epiphora, conjunctivitis, pain, glaucoma, blurred vision, blindness, secretion, photophobia.  Ears: no otorrhea, tinnitus, otorrhagia, deafness, pain, vertigo.  Nose: no rhinorrhea, epistaxis, alteration in perception of odors, sinuses pressure.  Mouth: no alteration in gums or teeth,  ulcers, no difficulty with mastication or deglut ion, no odynophagia.  Neck: no masses or pain, no thyroid alterations, no pain in muscles or arteries, no carotid odynia, no crepitation.  Lungs: stated cough, sputum production, dyspnea,no  trepopnea, pleuritic pain, hemoptysis.  Heart: no syncope, irregularity, palpitations, angina, orthopnea, paroxysmal nocturnal dyspnea.  GI: no dysphagia, odynophagia, no regurgitation, no heartburn, indigestion, nausea, vomiting, hematemesis ,melena, jaundice, distention, obstipation, enterorrhagia, proctalgia, anal  Lesions, changes in bowel habits.  : no frequency, hesitancy, hematuria, discharge, pain.  Musculoskeletal: no muscle or tendon pain or inflammation, joint pain, edema, functional limitation, fasciculations,  Neurologic: no headache, seizures, alterations on Craneal nerves, no motor  deficit, normal coordination.numbness feet as stated.  Skin: stated r le  rash,severe pruritus.  Psychiatric: no anxiety, depression,  "agitation, delusions, proper insight.  A comprehensive 14 point review of systems was performed and was negative except as mentioned.    Medications:  The current medication list was reviewed in the EMR    ALLERGIES:    Allergies   Allergen Reactions   • Cortisone    • Darvon  [Propoxyphene] Palpitations       Objective      Vitals:    10/11/17 0911   BP: 148/82   Pulse: 66   Resp: 12   Temp: 97.9 °F (36.6 °C)   TempSrc: Oral   SpO2: 99%   Weight: 155 lb 3.2 oz (70.4 kg)   Height: 63.98\" (162.5 cm)  Comment: new height   PainSc: 0-No pain     Current Status 10/11/2017   ECOG score 0       Physical Exam    GENERAL:  Well-developed, well-nourished  Patient  in no acute distress.   SKIN:  Warm, dry with papular erythematous external rle rash and ryness, no typical of anything in particular,no purpura or petechiae.  HEENT:  Pupils were equal and reactive to light and accomodation, conjunctivas non injected, no pterigion, normal extraocular movements, normal visual acuity.   Mouth mucosa was moist, no exudates in oropharynx, normal gum line, normal roof of the mouth and pillars, normal papillations of the tongue.  NECK:  Supple with good range of motion; no thyromegaly or masses, no JVD or bruits, no cervical adenopathies.  LYMPHATICS:  No cervical, supraclavicular, axillary or inguinal adenopathy.  CHEST:  Lungs clear to percussion and auscultation, normal breath sounds bilaterally, no wheezing, crackles or ronchi, no stridor.  CARDIAC:  Regular rate and rhythm without murmurs, rubs or gallops.  INSPECTION of right breast documented symmetry of the tissue per se and location and size of the nipple,no retractions or inversion of the nipple, normal skin without lesions, no erythema or nodules,no paud'orange, no prominence of superficial veins or chest wall collateral circulation.PALPATION of the breast documented normal skin turgor, no induration, alteration in local temperature, or pain, no palpable masses or nodules, " normal mobility of the tissues,no fixation of the tissue or parenchyma to the chest wall, no alteration at the tail of the breast or axillas, no adenopathies.Mastectomy left Surgical site was well healed.No lymphedema in either extremity.  ABDOMEN:  Soft, nontender with no organomegaly or masses, no ascites, no collateral circulation, no abdominal pain, no inguinal hernias, no abdominal bruits.  EXTREMITIES:  No clubbing, cyanosis or edema, no deformities or pain.   NEUROLOGICAL:  Patient was awake, alert, oriented to time, person and place, decreased vibratory sensation both feet, normal gait, negative Romberg.abolished reflexes all over, normal strength in upper and lower extremities.    RECENT LABS:Comprehensive Metabolic Panel   Order: [id number]   Status:  Final result   Visible to patient:  No (Not Released) Dx:  Macroglobulinemia of Waldenstrom; Mal...      Newer results are available. Click to view them now.            Ref Range & Units 7d ago     Glucose 74 - 124 mg/dL 110   BUN 6 - 20 mg/dL 10   Creatinine 0.60 - 1.10 mg/dL 0.77   Sodium 134 - 145 mmol/L 133 (L)   Potassium 3.5 - 4.7 mmol/L 3.9   Chloride 98 - 107 mmol/L 92 (L)   CO2 22.0 - 29.0 mmol/L 28.1   Calcium 8.5 - 10.2 mg/dL 9.9   Total Protein 6.3 - 8.0 g/dL 7.2   Albumin 3.50 - 5.20 g/dL 4.60   ALT (SGPT) 0 - 33 U/L 14   AST (SGOT) 0 - 32 U/L 18   Alkaline Phosphatase 38 - 116 U/L 81   Total Bilirubin 0.1 - 1.2 mg/dL 0.9   eGFR Non African Amer >60 mL/min/1.73 72   Globulin 1.8 - 3.5 gm/dL 2.6   A/G Ratio 1.1 - 2.4 g/dL 1.8   BUN/Creatinine Ratio 7.3 - 30.0 13.0   Anion Gap mmol/L 12.9   Resulting Agency   CBC LAB                                                                        WBC   Date Value Ref Range Status   10/04/2017 7.21 4.00 - 10.00 10*3/mm3 Final     RBC   Date Value Ref Range Status   10/04/2017 4.28 3.90 - 5.00 10*6/mm3 Final     Hemoglobin   Date Value Ref Range Status   10/04/2017 14.1 11.5 - 14.9 g/dL Final     Hematocrit    Date Value Ref Range Status   10/04/2017 40.5 34.0 - 45.0 % Final     Platelets   Date Value Ref Range Status   10/04/2017 153 150 - 375 10*3/mm3 Final      Assessment/Plan    1.  1.  This patient has history of Waldenstrom’s macroglobulinemia;The patient is experiencing a little bit more numbness in her feet at nighttime, almost restless legs, and her monoclonal protein studies are increased and IgM level to the point that I do believe that the patient is going to require retreatment. 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 I do not want to go in this way again. In the past also she has been refractory to Rituxan therapy but maybe this is the only choice that we have under the present circumstances. I will further investigate in regard on how to proceed about medications as well. She is not too much into mood of taking anything unless that it is Rituxan-related treatment that was so benign on her in the past.      2. The patient has history of left breast cancer status post mastectomy, undergoing adjuvant therapy with Femara. I find no side effects of this medication. No evidence of recurrence of breast cancer. Her right breast examination remains normal.     3. Difficult to explain all her respiratory symptomatology in the summer besides that this year has been terrible for allergy patients in [place]. She already has been by her allergist medicine physician. She is having no symptoms at this time. Her oxygenation on room air and after exercise today remain unchanged at 97%. Her pulmonary auscultation is normal. Her heart auscultation is normal. She has no edema and she has no jugular vein distention.     Therefore after all these reviews, we will review further information about treatment for her Waldenstrom because she does not want to pursue any other intervention besides going back into Rituxan. This will be discussed with her on  the telephone in a few days.                                   10/11/2017      CC:

## 2017-10-13 ENCOUNTER — TELEPHONE (OUTPATIENT)
Dept: ONCOLOGY | Facility: CLINIC | Age: 77
End: 2017-10-13

## 2017-10-13 NOTE — TELEPHONE ENCOUNTER
PHONE WITH PT: I THINK RE TREATMENT WITH RITUXAN IS THE BEST CHOICE, SHE AGREES, WEEKLY X 4, WE WILL CALL HER AT HOME ON WEDNESDAY NEXT WEEK AFTER RETURNING FROM TRIP TO [facility] TO START IN 2 WEEKS, SHE AGREES

## 2017-10-16 ENCOUNTER — TELEPHONE (OUTPATIENT)
Dept: ONCOLOGY | Facility: CLINIC | Age: 77
End: 2017-10-16

## 2017-10-16 ENCOUNTER — TELEPHONE (OUTPATIENT)
Dept: ONCOLOGY | Facility: HOSPITAL | Age: 77
End: 2017-10-16

## 2017-10-16 NOTE — TELEPHONE ENCOUNTER
----- Message from Jenna Shipley RN sent at 10/16/2017  7:41 AM EDT -----  Per Dr Larose, please schedule, pt is out of town until wednesday  ----- Message -----     From: Florencio Larose MD     Sent: 10/13/2017   5:52 PM       To: Cyn Alejo RN, #    SHE NEEDS RITUXAN WEEKLY X 4 WEEKS, START IN 2 WEEKS, CALL HER ON WEDNESDAY NEXT WEEK READ MY PHONE NOTE, MD SERA IN 6 WEEKS

## 2017-10-16 NOTE — TELEPHONE ENCOUNTER
----- Message from Florencio Larose MD sent at 10/13/2017  5:52 PM EDT -----  SHE NEEDS RITUXAN WEEKLY X 4 WEEKS, START IN 2 WEEKS, CALL HER ON WEDNESDAY NEXT WEEK READ MY PHONE NOTE, MD SERA IN 6 WEEKS

## 2017-10-27 DIAGNOSIS — C88.0 MACROGLOBULINEMIA OF WALDENSTROM (HCC): ICD-10-CM

## 2017-10-27 RX ORDER — DIPHENHYDRAMINE HYDROCHLORIDE 50 MG/ML
50 INJECTION INTRAMUSCULAR; INTRAVENOUS AS NEEDED
Status: CANCELLED | OUTPATIENT
Start: 2017-11-01

## 2017-10-27 RX ORDER — DIPHENHYDRAMINE HYDROCHLORIDE 50 MG/ML
50 INJECTION INTRAMUSCULAR; INTRAVENOUS AS NEEDED
Status: CANCELLED | OUTPATIENT
Start: 2017-11-22

## 2017-10-27 RX ORDER — FAMOTIDINE 10 MG/ML
20 INJECTION, SOLUTION INTRAVENOUS AS NEEDED
Status: CANCELLED | OUTPATIENT
Start: 2017-11-22

## 2017-10-27 RX ORDER — MEPERIDINE HYDROCHLORIDE 50 MG/ML
25 INJECTION INTRAMUSCULAR; INTRAVENOUS; SUBCUTANEOUS
Status: CANCELLED | OUTPATIENT
Start: 2017-11-15

## 2017-10-27 RX ORDER — MEPERIDINE HYDROCHLORIDE 50 MG/ML
25 INJECTION INTRAMUSCULAR; INTRAVENOUS; SUBCUTANEOUS
Status: CANCELLED | OUTPATIENT
Start: 2017-11-22

## 2017-10-27 RX ORDER — FAMOTIDINE 10 MG/ML
20 INJECTION, SOLUTION INTRAVENOUS AS NEEDED
Status: CANCELLED | OUTPATIENT
Start: 2017-11-08

## 2017-10-27 RX ORDER — DIPHENHYDRAMINE HYDROCHLORIDE 50 MG/ML
50 INJECTION INTRAMUSCULAR; INTRAVENOUS AS NEEDED
Status: CANCELLED | OUTPATIENT
Start: 2017-11-08

## 2017-10-27 RX ORDER — MEPERIDINE HYDROCHLORIDE 50 MG/ML
25 INJECTION INTRAMUSCULAR; INTRAVENOUS; SUBCUTANEOUS
Status: CANCELLED | OUTPATIENT
Start: 2017-11-08

## 2017-10-27 RX ORDER — ACETAMINOPHEN 325 MG/1
650 TABLET ORAL ONCE
Status: CANCELLED | OUTPATIENT
Start: 2017-11-15

## 2017-10-27 RX ORDER — FAMOTIDINE 10 MG/ML
20 INJECTION, SOLUTION INTRAVENOUS AS NEEDED
Status: CANCELLED | OUTPATIENT
Start: 2017-11-01

## 2017-10-27 RX ORDER — MEPERIDINE HYDROCHLORIDE 50 MG/ML
25 INJECTION INTRAMUSCULAR; INTRAVENOUS; SUBCUTANEOUS
Status: CANCELLED | OUTPATIENT
Start: 2017-11-01

## 2017-10-27 RX ORDER — ACETAMINOPHEN 325 MG/1
650 TABLET ORAL ONCE
Status: CANCELLED | OUTPATIENT
Start: 2017-11-01

## 2017-10-27 RX ORDER — FAMOTIDINE 10 MG/ML
20 INJECTION, SOLUTION INTRAVENOUS AS NEEDED
Status: CANCELLED | OUTPATIENT
Start: 2017-11-15

## 2017-10-27 RX ORDER — ACETAMINOPHEN 325 MG/1
650 TABLET ORAL ONCE
Status: CANCELLED | OUTPATIENT
Start: 2017-11-08

## 2017-10-27 RX ORDER — DIPHENHYDRAMINE HYDROCHLORIDE 50 MG/ML
50 INJECTION INTRAMUSCULAR; INTRAVENOUS AS NEEDED
Status: CANCELLED | OUTPATIENT
Start: 2017-11-15

## 2017-10-27 RX ORDER — ACETAMINOPHEN 325 MG/1
650 TABLET ORAL ONCE
Status: CANCELLED | OUTPATIENT
Start: 2017-11-22

## 2017-10-30 DIAGNOSIS — C88.0 MACROGLOBULINEMIA OF WALDENSTROM (HCC): ICD-10-CM

## 2017-10-30 RX ORDER — FAMOTIDINE 10 MG/ML
20 INJECTION, SOLUTION INTRAVENOUS ONCE
Status: CANCELLED | OUTPATIENT
Start: 2017-11-08

## 2017-10-30 RX ORDER — FAMOTIDINE 10 MG/ML
20 INJECTION, SOLUTION INTRAVENOUS ONCE
Status: CANCELLED | OUTPATIENT
Start: 2017-11-15

## 2017-10-30 RX ORDER — FAMOTIDINE 10 MG/ML
20 INJECTION, SOLUTION INTRAVENOUS ONCE
Status: CANCELLED | OUTPATIENT
Start: 2017-11-22

## 2017-10-30 RX ORDER — FAMOTIDINE 10 MG/ML
20 INJECTION, SOLUTION INTRAVENOUS ONCE
Status: CANCELLED | OUTPATIENT
Start: 2017-11-01

## 2017-10-31 RX ORDER — LANSOPRAZOLE 30 MG/1
30 CAPSULE, DELAYED RELEASE ORAL DAILY
Qty: 90 CAPSULE | Refills: 0 | Status: SHIPPED | OUTPATIENT
Start: 2017-10-31 | End: 2017-12-14 | Stop reason: SDUPTHER

## 2017-11-01 ENCOUNTER — LAB (OUTPATIENT)
Dept: LAB | Facility: HOSPITAL | Age: 77
End: 2017-11-01

## 2017-11-01 ENCOUNTER — INFUSION (OUTPATIENT)
Dept: ONCOLOGY | Facility: HOSPITAL | Age: 77
End: 2017-11-01

## 2017-11-01 VITALS
BODY MASS INDEX: 26.66 KG/M2 | HEART RATE: 65 BPM | SYSTOLIC BLOOD PRESSURE: 148 MMHG | WEIGHT: 155.2 LBS | DIASTOLIC BLOOD PRESSURE: 80 MMHG | TEMPERATURE: 98.3 F

## 2017-11-01 DIAGNOSIS — C88.0 MACROGLOBULINEMIA OF WALDENSTROM (HCC): ICD-10-CM

## 2017-11-01 DIAGNOSIS — C88.0 MACROGLOBULINEMIA OF WALDENSTROM (HCC): Primary | ICD-10-CM

## 2017-11-01 LAB
ALBUMIN SERPL-MCNC: 4.5 G/DL (ref 3.5–5.2)
ALBUMIN/GLOB SERPL: 1.8 G/DL (ref 1.1–2.4)
ALP SERPL-CCNC: 69 U/L (ref 38–116)
ALT SERPL W P-5'-P-CCNC: 14 U/L (ref 0–33)
ANION GAP SERPL CALCULATED.3IONS-SCNC: 9.8 MMOL/L
AST SERPL-CCNC: 21 U/L (ref 0–32)
BASOPHILS # BLD AUTO: 0.03 10*3/MM3 (ref 0–0.1)
BASOPHILS NFR BLD AUTO: 0.5 % (ref 0–1.1)
BILIRUB SERPL-MCNC: 0.6 MG/DL (ref 0.1–1.2)
BUN BLD-MCNC: 8 MG/DL (ref 6–20)
BUN/CREAT SERPL: 10.4 (ref 7.3–30)
CALCIUM SPEC-SCNC: 10.3 MG/DL (ref 8.5–10.2)
CHLORIDE SERPL-SCNC: 92 MMOL/L (ref 98–107)
CO2 SERPL-SCNC: 29.2 MMOL/L (ref 22–29)
CREAT BLD-MCNC: 0.77 MG/DL (ref 0.6–1.1)
DEPRECATED RDW RBC AUTO: 39.9 FL (ref 37–49)
EOSINOPHIL # BLD AUTO: 0.48 10*3/MM3 (ref 0–0.36)
EOSINOPHIL NFR BLD AUTO: 8.7 % (ref 1–5)
ERYTHROCYTE [DISTWIDTH] IN BLOOD BY AUTOMATED COUNT: 12 % (ref 11.7–14.5)
GFR SERPL CREATININE-BSD FRML MDRD: 72 ML/MIN/1.73
GLOBULIN UR ELPH-MCNC: 2.5 GM/DL (ref 1.8–3.5)
GLUCOSE BLD-MCNC: 105 MG/DL (ref 74–124)
HCT VFR BLD AUTO: 39.2 % (ref 34–45)
HGB BLD-MCNC: 14.3 G/DL (ref 11.5–14.9)
IMM GRANULOCYTES # BLD: 0.03 10*3/MM3 (ref 0–0.03)
IMM GRANULOCYTES NFR BLD: 0.5 % (ref 0–0.5)
LYMPHOCYTES # BLD AUTO: 1.58 10*3/MM3 (ref 1–3.5)
LYMPHOCYTES NFR BLD AUTO: 28.5 % (ref 20–49)
MCH RBC QN AUTO: 33 PG (ref 27–33)
MCHC RBC AUTO-ENTMCNC: 36.5 G/DL (ref 32–35)
MCV RBC AUTO: 90.5 FL (ref 83–97)
MONOCYTES # BLD AUTO: 0.61 10*3/MM3 (ref 0.25–0.8)
MONOCYTES NFR BLD AUTO: 11 % (ref 4–12)
NEUTROPHILS # BLD AUTO: 2.81 10*3/MM3 (ref 1.5–7)
NEUTROPHILS NFR BLD AUTO: 50.8 % (ref 39–75)
NRBC BLD MANUAL-RTO: 0 /100 WBC (ref 0–0)
PLATELET # BLD AUTO: 141 10*3/MM3 (ref 150–375)
PMV BLD AUTO: 9.7 FL (ref 8.9–12.1)
POTASSIUM BLD-SCNC: 3.9 MMOL/L (ref 3.5–4.7)
PROT SERPL-MCNC: 7 G/DL (ref 6.3–8)
RBC # BLD AUTO: 4.33 10*6/MM3 (ref 3.9–5)
SODIUM BLD-SCNC: 131 MMOL/L (ref 134–145)
WBC NRBC COR # BLD: 5.54 10*3/MM3 (ref 4–10)

## 2017-11-01 PROCEDURE — 25010000002 RITUXIMAB 10 MG/ML SOLUTION 10 ML VIAL: Performed by: INTERNAL MEDICINE

## 2017-11-01 PROCEDURE — 80053 COMPREHEN METABOLIC PANEL: CPT | Performed by: INTERNAL MEDICINE

## 2017-11-01 PROCEDURE — 36415 COLL VENOUS BLD VENIPUNCTURE: CPT | Performed by: INTERNAL MEDICINE

## 2017-11-01 PROCEDURE — 25010000002 RITUXIMAB 10 MG/ML SOLUTION 50 ML VIAL: Performed by: INTERNAL MEDICINE

## 2017-11-01 PROCEDURE — 36416 COLLJ CAPILLARY BLOOD SPEC: CPT | Performed by: INTERNAL MEDICINE

## 2017-11-01 PROCEDURE — 25010000002 DIPHENHYDRAMINE PER 50 MG: Performed by: INTERNAL MEDICINE

## 2017-11-01 PROCEDURE — 96415 CHEMO IV INFUSION ADDL HR: CPT | Performed by: INTERNAL MEDICINE

## 2017-11-01 PROCEDURE — 96413 CHEMO IV INFUSION 1 HR: CPT | Performed by: INTERNAL MEDICINE

## 2017-11-01 PROCEDURE — 85025 COMPLETE CBC W/AUTO DIFF WBC: CPT | Performed by: INTERNAL MEDICINE

## 2017-11-01 PROCEDURE — 96375 TX/PRO/DX INJ NEW DRUG ADDON: CPT | Performed by: INTERNAL MEDICINE

## 2017-11-01 RX ORDER — FAMOTIDINE 10 MG/ML
20 INJECTION, SOLUTION INTRAVENOUS ONCE
Status: COMPLETED | OUTPATIENT
Start: 2017-11-01 | End: 2017-11-01

## 2017-11-01 RX ORDER — ACETAMINOPHEN 325 MG/1
650 TABLET ORAL ONCE
Status: COMPLETED | OUTPATIENT
Start: 2017-11-01 | End: 2017-11-01

## 2017-11-01 RX ADMIN — ACETAMINOPHEN 650 MG: 325 TABLET ORAL at 09:51

## 2017-11-01 RX ADMIN — SODIUM CHLORIDE 250 ML: 900 INJECTION, SOLUTION INTRAVENOUS at 09:51

## 2017-11-01 RX ADMIN — RITUXIMAB 660 MG: 10 INJECTION, SOLUTION INTRAVENOUS at 10:39

## 2017-11-01 RX ADMIN — DIPHENHYDRAMINE HYDROCHLORIDE 50 MG: 50 INJECTION, SOLUTION INTRAMUSCULAR; INTRAVENOUS at 09:54

## 2017-11-01 RX ADMIN — FAMOTIDINE 20 MG: 10 INJECTION, SOLUTION INTRAVENOUS at 09:52

## 2017-11-02 LAB
HBV CORE AB SER DONR QL IA: NEGATIVE
HBV SURFACE AB SER QL: NON REACTIVE
HBV SURFACE AG SERPL QL IA: NEGATIVE

## 2017-11-08 ENCOUNTER — APPOINTMENT (OUTPATIENT)
Dept: LAB | Facility: HOSPITAL | Age: 77
End: 2017-11-08

## 2017-11-08 ENCOUNTER — INFUSION (OUTPATIENT)
Dept: ONCOLOGY | Facility: HOSPITAL | Age: 77
End: 2017-11-08

## 2017-11-08 ENCOUNTER — OFFICE VISIT (OUTPATIENT)
Dept: ONCOLOGY | Facility: CLINIC | Age: 77
End: 2017-11-08

## 2017-11-08 ENCOUNTER — LAB (OUTPATIENT)
Dept: LAB | Facility: HOSPITAL | Age: 77
End: 2017-11-08

## 2017-11-08 VITALS
DIASTOLIC BLOOD PRESSURE: 70 MMHG | RESPIRATION RATE: 16 BRPM | HEART RATE: 64 BPM | WEIGHT: 154.8 LBS | HEIGHT: 64 IN | SYSTOLIC BLOOD PRESSURE: 122 MMHG | TEMPERATURE: 98 F | BODY MASS INDEX: 26.43 KG/M2

## 2017-11-08 VITALS — DIASTOLIC BLOOD PRESSURE: 77 MMHG | HEART RATE: 63 BPM | SYSTOLIC BLOOD PRESSURE: 145 MMHG

## 2017-11-08 DIAGNOSIS — Z17.0 MALIGNANT NEOPLASM OF OVERLAPPING SITES OF LEFT BREAST IN FEMALE, ESTROGEN RECEPTOR POSITIVE (HCC): ICD-10-CM

## 2017-11-08 DIAGNOSIS — C88.0 MACROGLOBULINEMIA OF WALDENSTROM (HCC): Primary | ICD-10-CM

## 2017-11-08 DIAGNOSIS — C88.0 MACROGLOBULINEMIA OF WALDENSTROM (HCC): ICD-10-CM

## 2017-11-08 DIAGNOSIS — C50.812 MALIGNANT NEOPLASM OF OVERLAPPING SITES OF LEFT BREAST IN FEMALE, ESTROGEN RECEPTOR POSITIVE (HCC): ICD-10-CM

## 2017-11-08 LAB
BASOPHILS # BLD AUTO: 0.04 10*3/MM3 (ref 0–0.1)
BASOPHILS NFR BLD AUTO: 0.7 % (ref 0–1.1)
DEPRECATED RDW RBC AUTO: 40.5 FL (ref 37–49)
EOSINOPHIL # BLD AUTO: 0.28 10*3/MM3 (ref 0–0.36)
EOSINOPHIL NFR BLD AUTO: 4.7 % (ref 1–5)
ERYTHROCYTE [DISTWIDTH] IN BLOOD BY AUTOMATED COUNT: 12.1 % (ref 11.7–14.5)
HCT VFR BLD AUTO: 41.2 % (ref 34–45)
HGB BLD-MCNC: 14.7 G/DL (ref 11.5–14.9)
IMM GRANULOCYTES # BLD: 0.02 10*3/MM3 (ref 0–0.03)
IMM GRANULOCYTES NFR BLD: 0.3 % (ref 0–0.5)
LYMPHOCYTES # BLD AUTO: 1.54 10*3/MM3 (ref 1–3.5)
LYMPHOCYTES NFR BLD AUTO: 26 % (ref 20–49)
MCH RBC QN AUTO: 32.4 PG (ref 27–33)
MCHC RBC AUTO-ENTMCNC: 35.7 G/DL (ref 32–35)
MCV RBC AUTO: 90.7 FL (ref 83–97)
MONOCYTES # BLD AUTO: 0.71 10*3/MM3 (ref 0.25–0.8)
MONOCYTES NFR BLD AUTO: 12 % (ref 4–12)
NEUTROPHILS # BLD AUTO: 3.34 10*3/MM3 (ref 1.5–7)
NEUTROPHILS NFR BLD AUTO: 56.3 % (ref 39–75)
NRBC BLD MANUAL-RTO: 0 /100 WBC (ref 0–0)
PLATELET # BLD AUTO: 165 10*3/MM3 (ref 150–375)
PMV BLD AUTO: 9.3 FL (ref 8.9–12.1)
RBC # BLD AUTO: 4.54 10*6/MM3 (ref 3.9–5)
WBC NRBC COR # BLD: 5.93 10*3/MM3 (ref 4–10)

## 2017-11-08 PROCEDURE — 25010000002 DIPHENHYDRAMINE PER 50 MG: Performed by: INTERNAL MEDICINE

## 2017-11-08 PROCEDURE — 85025 COMPLETE CBC W/AUTO DIFF WBC: CPT | Performed by: INTERNAL MEDICINE

## 2017-11-08 PROCEDURE — 96375 TX/PRO/DX INJ NEW DRUG ADDON: CPT | Performed by: INTERNAL MEDICINE

## 2017-11-08 PROCEDURE — 25010000002 RITUXIMAB 10 MG/ML SOLUTION 10 ML VIAL: Performed by: INTERNAL MEDICINE

## 2017-11-08 PROCEDURE — 99213 OFFICE O/P EST LOW 20 MIN: CPT | Performed by: INTERNAL MEDICINE

## 2017-11-08 PROCEDURE — 96413 CHEMO IV INFUSION 1 HR: CPT | Performed by: INTERNAL MEDICINE

## 2017-11-08 PROCEDURE — 96415 CHEMO IV INFUSION ADDL HR: CPT | Performed by: INTERNAL MEDICINE

## 2017-11-08 PROCEDURE — 36416 COLLJ CAPILLARY BLOOD SPEC: CPT | Performed by: INTERNAL MEDICINE

## 2017-11-08 PROCEDURE — 25010000002 RITUXIMAB 10 MG/ML SOLUTION 50 ML VIAL: Performed by: INTERNAL MEDICINE

## 2017-11-08 RX ORDER — ACETAMINOPHEN 325 MG/1
650 TABLET ORAL ONCE
Status: COMPLETED | OUTPATIENT
Start: 2017-11-08 | End: 2017-11-08

## 2017-11-08 RX ORDER — INFLUENZA A VIRUS A/MICHIGAN/45/2015 X-275 (H1N1) ANTIGEN (FORMALDEHYDE INACTIVATED), INFLUENZA A VIRUS A/SINGAPORE/INFIMH-16-0019/2016 IVR-186 (H3N2) ANTIGEN (FORMALDEHYDE INACTIVATED), AND INFLUENZA B VIRUS B/MARYLAND/15/2016 BX-69A (A B/COLORADO/6/2017-LIKE VIRUS) ANTIGEN (FORMALDEHYDE INACTIVATED) 60; 60; 60 UG/.5ML; UG/.5ML; UG/.5ML
INJECTION, SUSPENSION INTRAMUSCULAR
Refills: 0 | COMMUNITY
Start: 2017-10-18 | End: 2017-12-14

## 2017-11-08 RX ORDER — FAMOTIDINE 10 MG/ML
20 INJECTION, SOLUTION INTRAVENOUS ONCE
Status: COMPLETED | OUTPATIENT
Start: 2017-11-08 | End: 2017-11-08

## 2017-11-08 RX ADMIN — ACETAMINOPHEN 650 MG: 325 TABLET ORAL at 10:49

## 2017-11-08 RX ADMIN — SODIUM CHLORIDE 250 ML: 900 INJECTION, SOLUTION INTRAVENOUS at 10:46

## 2017-11-08 RX ADMIN — DIPHENHYDRAMINE HYDROCHLORIDE 25 MG: 50 INJECTION, SOLUTION INTRAMUSCULAR; INTRAVENOUS at 10:48

## 2017-11-08 RX ADMIN — FAMOTIDINE 20 MG: 10 INJECTION, SOLUTION INTRAVENOUS at 10:46

## 2017-11-08 RX ADMIN — RITUXIMAB 660 MG: 10 INJECTION, SOLUTION INTRAVENOUS at 11:51

## 2017-11-08 NOTE — PROGRESS NOTES
Subjective  REASONS FOR FOLLOWUP:     1. History of Waldenstrom macroglobulinemia. The patient  remains in remission about this condition with stable level of immunoglobulin M . Normal IgA. Normal IgG. Normal white count, hemoglobin, and platelets. No abnormal bleeding. No peripheral adenopathy. No hepatosplenomegaly. The patient will remain in observation.   2. History of breast cancer stage I on the left, status post mastectomy. The patient remains on aromatase inhibitor without any evidence of breast cancer recurrence and good tolerance to her aromatase inhibitor medicine. She will remain on the same issue for the time being.                History of Present Illness  As above. She is here today with no complaints in regard to her previous history of breast cancer status post left mastectomy and her Waldenström macroglobulinemia.       The patient feels like having a good appetite, stable weight, normal bowel activity, normal urination.  She has not developed any other masses at any level and no peripheral adenopathy. No abnormal bleeding. Her right knee is back to normality and she has been able to walk any distance with no limitations.  He occasionally the patient is complaining of some numbness sensation on the plantar aspect of both feet especially when she goes to bed but is of transitory phenomenon that doesn't last long.  Similar to the symptoms that she had when she was first diagnosed with Waldenström's with a difference that the pain and discomfort then was continuous and came down to below the knee all the time.  Has no difficulty walking she has no difficulty with perception of temperature .    On 11/08/2017, the patient stated that she did not have any issues in regard to the Rituxan infusion the 1st with time chills, fevers, nausea, or vomiting.  She will continue and complete 4 cycles of Rituxan and we are planning to repeat laboratory assessment after this to followup on her Waldenstrom's.  In  regard to her history of breast cancer, she will remain on her hormone adjuvant therapy.             1. Past Medical History, Past Surgical HistoryHypertension.    2. Hypothyroidism many years ago and has not taken any thyroid medication in quite some time.   3. Thyroid nodule removed more than 35 years ago.    4. Cholecystectomy.    5. Tonsillectomy and adenoidectomy.    6. Appendectomy.  7. Partial hysterectomy many years ago.      She has mammogram and pelvic examination yearly that have been normal.  Colonoscopy 4 years ago was normal.   She also has had a leaky valve in her heart without any significance.      During the recent workup at [facility], the patient had a transesophageal echocardiogram that failed to document any vegetation.    Clostridium colitis requiring admission to [facility] in 09/2008.      Arthroscopy in 03/2009 of the right knee with finding of chondromalacia and appropriate cleanup of the joint was performed by Dr. Moraes.      On 07/24/2013 the patient has lost 14 pounds, just cutting down on junk food.  Her glucose intolerance has improved substantially and her glucose has normalized.      On 01/12/2014, we reviewed her blood pressure issues recently. She has had a very stable blood pressure for the last week, and today is normal. Her physical examination is otherwise unremarkable and normal feeling.     We advised her to remain in observation from this point of view.    As per 10/01/2014, the patient has undergone evaluation by neurology service at [facility] for consideration of gabapentin that she has been taking for so many years for possible seizure activity.   I am aware of an MRI scan of the brain that shows not too much, besides minimal vascular disease and no other lesions.  Her neurologist will be informing us in regard how to proceed in this regard.      She wanted to have a right knee replaced in 12/2009 through her orthopedic  surgeon. She developed delirium in the hospital and extensive ecchymosis and hematoma formation in the whole right lower extremity requiring transfusion of red cells for a hemoglobin of 7.   SOCIAL HISTORY:  .  Retired from [employer] where she worked for many years; she never was exposed to any chemicals.   She is a never smoker and nondrinker.   She lives with her oldest daughter.      FAMILY HISTORY: The patient’s father  of complications from heart disease at age 70.  Mother  after a psychiatric illness at age 67.  A brother  in a car accident at 43 and a sister, age 75, is in good health.   Her three daughters are in good health.  She has no FH of lymphoma or leukemia.    Review of Systems    General: no fever, chills, fatigue, weight changes, or lack of appetite.  Eyes: no epiphora, conjunctivitis, pain, glaucoma, blurred vision, blindness, secretion, photophobia.  Ears: no otorrhea, tinnitus, otorrhagia, deafness, pain, vertigo.  Nose: no rhinorrhea, epistaxis, alteration in perception of odors, sinuses pressure.  Mouth: no alteration in gums or teeth,  ulcers, no difficulty with mastication or deglut ion, no odynophagia.  Neck: no masses or pain, no thyroid alterations, no pain in muscles or arteries, no carotid odynia, no crepitation.  Lungs: stated cough, sputum production, dyspnea,no  trepopnea, pleuritic pain, hemoptysis.  Heart: no syncope, irregularity, palpitations, angina, orthopnea, paroxysmal nocturnal dyspnea.  GI: no dysphagia, odynophagia, no regurgitation, no heartburn, indigestion, nausea, vomiting, hematemesis ,melena, jaundice, distention, obstipation, enterorrhagia, proctalgia, anal  Lesions, changes in bowel habits.  : no frequency, hesitancy, hematuria, discharge, pain.  Musculoskeletal: no muscle or tendon pain or inflammation, joint pain, edema, functional limitation, fasciculations,  Neurologic: no headache, seizures, alterations on Craneal nerves, no motor   "deficit, normal coordination.numbness feet as stated.  Skin: stated r le  rash,severe pruritus.  Psychiatric: no anxiety, depression, agitation, delusions, proper insight.  A comprehensive 14 point review of systems was performed and was negative except as mentioned.    Medications:  The current medication list was reviewed in the EMR    ALLERGIES:    Allergies   Allergen Reactions   • Cortisone    • Darvon  [Propoxyphene] Palpitations       Objective      Vitals:    11/08/17 0946   BP: 122/70   Pulse: 64   Resp: 16   Temp: 98 °F (36.7 °C)   Weight: 154 lb 12.8 oz (70.2 kg)   Height: 63.98\" (162.5 cm)   PainSc: 0-No pain     Current Status 11/8/2017   ECOG score 0       Physical Exam    GENERAL:  Well-developed, well-nourished  Patient  in no acute distress.   SKIN:  Warm, dry with papular erythematous external rle rash and ryness, no typical of anything in particular,no purpura or petechiae.  HEENT:  Pupils were equal and reactive to light and accomodation, conjunctivas non injected, no pterigion, normal extraocular movements, normal visual acuity.   Mouth mucosa was moist, no exudates in oropharynx, normal gum line, normal roof of the mouth and pillars, normal papillations of the tongue.  NECK:  Supple with good range of motion; no thyromegaly or masses, no JVD or bruits, no cervical adenopathies.  LYMPHATICS:  No cervical, supraclavicular, axillary or inguinal adenopathy.  CHEST:  Lungs clear to percussion and auscultation, normal breath sounds bilaterally, no wheezing, crackles or ronchi, no stridor.  CARDIAC:  Regular rate and rhythm without murmurs, rubs or gallops.  INSPECTION of right breast documented symmetry of the tissue per se and location and size of the nipple,no retractions or inversion of the nipple, normal skin without lesions, no erythema or nodules,no paud'orange, no prominence of superficial veins or chest wall collateral circulation.PALPATION of the breast documented normal skin turgor, no " induration, alteration in local temperature, or pain, no palpable masses or nodules, normal mobility of the tissues,no fixation of the tissue or parenchyma to the chest wall, no alteration at the tail of the breast or axillas, no adenopathies.Mastectomy left Surgical site was well healed.No lymphedema in either extremity.  ABDOMEN:  Soft, nontender with no organomegaly or masses, no ascites, no collateral circulation, no abdominal pain, no inguinal hernias, no abdominal bruits.  EXTREMITIES:  No clubbing, cyanosis or edema, no deformities or pain.   NEUROLOGICAL:  Patient was awake, alert, oriented to time, person and place, decreased vibratory sensation both feet, normal gait, negative Romberg.abolished reflexes all over, normal strength in upper and lower extremities.    RECENT LABS:                                                                 WBC   Date Value Ref Range Status   11/08/2017 5.93 4.00 - 10.00 10*3/mm3 Final     RBC   Date Value Ref Range Status   11/08/2017 4.54 3.90 - 5.00 10*6/mm3 Final     Hemoglobin   Date Value Ref Range Status   11/08/2017 14.7 11.5 - 14.9 g/dL Final     Hematocrit   Date Value Ref Range Status   11/08/2017 41.2 34.0 - 45.0 % Final     Platelets   Date Value Ref Range Status   11/08/2017 165 150 - 375 10*3/mm3 Final      Assessment/Plan    1.  1.  This patient has history of Waldenstrom’s macroglobulinemia;The patient is experiencing a little bit more numbness in her feet at nighttime, almost restless legs, and her monoclonal protein studies are increased and IgM level to the point that I do believe that the patient is going to require retreatment. 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 I do not want to go in this way again.Since the previous visit, I advised the patient to resume Rituxan, she will complete her 2nd treatment today out of 4, and we are planning in 5 weeks to measure her monoclonal  protein studies and review her back in 6 weeks.    In regard to her history of breast cancer, there is no indication of recurrent disease, she is up-to-date on her right-sided mammogram, she remains on adjuvant hormonal therapy, and she has no need for any other medication or followup on this and left-sided mastectomy remains normal.      Therefore, my plan again is to proceed with Rituxan today, next week and the following week, monoclonal protein studies, CBC and CMP in 5 weeks and doctor visit in 6 weeks.        2. The patient has history of left breast cancer status post mastectomy, undergoing adjuvant therapy with Femara. I find no side effects of this medication. No evidence of recurrence of breast cancer. Her right breast examination remains normal.                                        11/8/2017      CC:

## 2017-11-15 ENCOUNTER — LAB (OUTPATIENT)
Dept: LAB | Facility: HOSPITAL | Age: 77
End: 2017-11-15

## 2017-11-15 ENCOUNTER — INFUSION (OUTPATIENT)
Dept: ONCOLOGY | Facility: HOSPITAL | Age: 77
End: 2017-11-15

## 2017-11-15 VITALS
DIASTOLIC BLOOD PRESSURE: 77 MMHG | SYSTOLIC BLOOD PRESSURE: 146 MMHG | TEMPERATURE: 98.1 F | BODY MASS INDEX: 26.93 KG/M2 | HEART RATE: 60 BPM | WEIGHT: 156.8 LBS

## 2017-11-15 DIAGNOSIS — C88.0 MACROGLOBULINEMIA OF WALDENSTROM (HCC): Primary | ICD-10-CM

## 2017-11-15 DIAGNOSIS — C50.812 MALIGNANT NEOPLASM OF OVERLAPPING SITES OF LEFT BREAST IN FEMALE, ESTROGEN RECEPTOR POSITIVE (HCC): ICD-10-CM

## 2017-11-15 DIAGNOSIS — Z17.0 MALIGNANT NEOPLASM OF OVERLAPPING SITES OF LEFT BREAST IN FEMALE, ESTROGEN RECEPTOR POSITIVE (HCC): ICD-10-CM

## 2017-11-15 DIAGNOSIS — C88.0 MACROGLOBULINEMIA OF WALDENSTROM (HCC): ICD-10-CM

## 2017-11-15 LAB
BASOPHILS # BLD AUTO: 0.06 10*3/MM3 (ref 0–0.1)
BASOPHILS NFR BLD AUTO: 1.1 % (ref 0–1.1)
DEPRECATED RDW RBC AUTO: 40.7 FL (ref 37–49)
EOSINOPHIL # BLD AUTO: 0.35 10*3/MM3 (ref 0–0.36)
EOSINOPHIL NFR BLD AUTO: 6.2 % (ref 1–5)
ERYTHROCYTE [DISTWIDTH] IN BLOOD BY AUTOMATED COUNT: 12.2 % (ref 11.7–14.5)
HCT VFR BLD AUTO: 40.3 % (ref 34–45)
HGB BLD-MCNC: 14.4 G/DL (ref 11.5–14.9)
IMM GRANULOCYTES # BLD: 0.02 10*3/MM3 (ref 0–0.03)
IMM GRANULOCYTES NFR BLD: 0.4 % (ref 0–0.5)
LYMPHOCYTES # BLD AUTO: 1.39 10*3/MM3 (ref 1–3.5)
LYMPHOCYTES NFR BLD AUTO: 24.4 % (ref 20–49)
MCH RBC QN AUTO: 32.4 PG (ref 27–33)
MCHC RBC AUTO-ENTMCNC: 35.7 G/DL (ref 32–35)
MCV RBC AUTO: 90.8 FL (ref 83–97)
MONOCYTES # BLD AUTO: 0.69 10*3/MM3 (ref 0.25–0.8)
MONOCYTES NFR BLD AUTO: 12.1 % (ref 4–12)
NEUTROPHILS # BLD AUTO: 3.18 10*3/MM3 (ref 1.5–7)
NEUTROPHILS NFR BLD AUTO: 55.8 % (ref 39–75)
NRBC BLD MANUAL-RTO: 0 /100 WBC (ref 0–0)
PLATELET # BLD AUTO: 162 10*3/MM3 (ref 150–375)
PMV BLD AUTO: 9.4 FL (ref 8.9–12.1)
RBC # BLD AUTO: 4.44 10*6/MM3 (ref 3.9–5)
WBC NRBC COR # BLD: 5.69 10*3/MM3 (ref 4–10)

## 2017-11-15 PROCEDURE — 85025 COMPLETE CBC W/AUTO DIFF WBC: CPT | Performed by: INTERNAL MEDICINE

## 2017-11-15 PROCEDURE — 25010000002 DIPHENHYDRAMINE PER 50 MG: Performed by: INTERNAL MEDICINE

## 2017-11-15 PROCEDURE — 96415 CHEMO IV INFUSION ADDL HR: CPT | Performed by: INTERNAL MEDICINE

## 2017-11-15 PROCEDURE — 25010000002 RITUXIMAB 10 MG/ML SOLUTION 10 ML VIAL: Performed by: INTERNAL MEDICINE

## 2017-11-15 PROCEDURE — 25010000002 RITUXIMAB 10 MG/ML SOLUTION 50 ML VIAL: Performed by: INTERNAL MEDICINE

## 2017-11-15 PROCEDURE — 96375 TX/PRO/DX INJ NEW DRUG ADDON: CPT | Performed by: INTERNAL MEDICINE

## 2017-11-15 PROCEDURE — 96413 CHEMO IV INFUSION 1 HR: CPT | Performed by: INTERNAL MEDICINE

## 2017-11-15 PROCEDURE — 36416 COLLJ CAPILLARY BLOOD SPEC: CPT | Performed by: INTERNAL MEDICINE

## 2017-11-15 RX ORDER — FAMOTIDINE 10 MG/ML
20 INJECTION, SOLUTION INTRAVENOUS ONCE
Status: COMPLETED | OUTPATIENT
Start: 2017-11-15 | End: 2017-11-15

## 2017-11-15 RX ORDER — ACETAMINOPHEN 325 MG/1
650 TABLET ORAL ONCE
Status: COMPLETED | OUTPATIENT
Start: 2017-11-15 | End: 2017-11-15

## 2017-11-15 RX ADMIN — FAMOTIDINE 20 MG: 10 INJECTION, SOLUTION INTRAVENOUS at 09:40

## 2017-11-15 RX ADMIN — RITUXIMAB 660 MG: 10 INJECTION, SOLUTION INTRAVENOUS at 10:31

## 2017-11-15 RX ADMIN — ACETAMINOPHEN 650 MG: 325 TABLET ORAL at 09:40

## 2017-11-15 RX ADMIN — SODIUM CHLORIDE 250 ML: 900 INJECTION, SOLUTION INTRAVENOUS at 09:37

## 2017-11-15 RX ADMIN — DIPHENHYDRAMINE HYDROCHLORIDE 25 MG: 50 INJECTION, SOLUTION INTRAMUSCULAR; INTRAVENOUS at 09:43

## 2017-11-22 ENCOUNTER — INFUSION (OUTPATIENT)
Dept: ONCOLOGY | Facility: HOSPITAL | Age: 77
End: 2017-11-22

## 2017-11-22 ENCOUNTER — LAB (OUTPATIENT)
Dept: LAB | Facility: HOSPITAL | Age: 77
End: 2017-11-22

## 2017-11-22 VITALS
TEMPERATURE: 98.6 F | DIASTOLIC BLOOD PRESSURE: 86 MMHG | BODY MASS INDEX: 26.83 KG/M2 | SYSTOLIC BLOOD PRESSURE: 153 MMHG | WEIGHT: 156.2 LBS | HEART RATE: 65 BPM

## 2017-11-22 DIAGNOSIS — C88.0 MACROGLOBULINEMIA OF WALDENSTROM (HCC): Primary | ICD-10-CM

## 2017-11-22 LAB
BASOPHILS # BLD AUTO: 0.03 10*3/MM3 (ref 0–0.1)
BASOPHILS NFR BLD AUTO: 0.5 % (ref 0–1.1)
DEPRECATED RDW RBC AUTO: 40.5 FL (ref 37–49)
EOSINOPHIL # BLD AUTO: 0.58 10*3/MM3 (ref 0–0.36)
EOSINOPHIL NFR BLD AUTO: 10.2 % (ref 1–5)
ERYTHROCYTE [DISTWIDTH] IN BLOOD BY AUTOMATED COUNT: 12.1 % (ref 11.7–14.5)
HCT VFR BLD AUTO: 40 % (ref 34–45)
HGB BLD-MCNC: 14.5 G/DL (ref 11.5–14.9)
IMM GRANULOCYTES # BLD: 0.03 10*3/MM3 (ref 0–0.03)
IMM GRANULOCYTES NFR BLD: 0.5 % (ref 0–0.5)
LYMPHOCYTES # BLD AUTO: 1.08 10*3/MM3 (ref 1–3.5)
LYMPHOCYTES NFR BLD AUTO: 19.1 % (ref 20–49)
MCH RBC QN AUTO: 33 PG (ref 27–33)
MCHC RBC AUTO-ENTMCNC: 36.3 G/DL (ref 32–35)
MCV RBC AUTO: 91.1 FL (ref 83–97)
MONOCYTES # BLD AUTO: 0.64 10*3/MM3 (ref 0.25–0.8)
MONOCYTES NFR BLD AUTO: 11.3 % (ref 4–12)
NEUTROPHILS # BLD AUTO: 3.3 10*3/MM3 (ref 1.5–7)
NEUTROPHILS NFR BLD AUTO: 58.4 % (ref 39–75)
NRBC BLD MANUAL-RTO: 0 /100 WBC (ref 0–0)
PLATELET # BLD AUTO: 156 10*3/MM3 (ref 150–375)
PMV BLD AUTO: 9.8 FL (ref 8.9–12.1)
RBC # BLD AUTO: 4.39 10*6/MM3 (ref 3.9–5)
WBC NRBC COR # BLD: 5.66 10*3/MM3 (ref 4–10)

## 2017-11-22 PROCEDURE — 96375 TX/PRO/DX INJ NEW DRUG ADDON: CPT | Performed by: NURSE PRACTITIONER

## 2017-11-22 PROCEDURE — 96413 CHEMO IV INFUSION 1 HR: CPT | Performed by: NURSE PRACTITIONER

## 2017-11-22 PROCEDURE — 25010000002 DIPHENHYDRAMINE PER 50 MG: Performed by: INTERNAL MEDICINE

## 2017-11-22 PROCEDURE — 25010000002 RITUXIMAB 10 MG/ML SOLUTION 50 ML VIAL: Performed by: INTERNAL MEDICINE

## 2017-11-22 PROCEDURE — 25010000002 RITUXIMAB 10 MG/ML SOLUTION 10 ML VIAL: Performed by: INTERNAL MEDICINE

## 2017-11-22 PROCEDURE — 36416 COLLJ CAPILLARY BLOOD SPEC: CPT | Performed by: INTERNAL MEDICINE

## 2017-11-22 PROCEDURE — 85025 COMPLETE CBC W/AUTO DIFF WBC: CPT | Performed by: INTERNAL MEDICINE

## 2017-11-22 PROCEDURE — 96415 CHEMO IV INFUSION ADDL HR: CPT | Performed by: NURSE PRACTITIONER

## 2017-11-22 RX ORDER — FAMOTIDINE 10 MG/ML
20 INJECTION, SOLUTION INTRAVENOUS ONCE
Status: COMPLETED | OUTPATIENT
Start: 2017-11-22 | End: 2017-11-22

## 2017-11-22 RX ORDER — ACETAMINOPHEN 325 MG/1
650 TABLET ORAL ONCE
Status: COMPLETED | OUTPATIENT
Start: 2017-11-22 | End: 2017-11-22

## 2017-11-22 RX ADMIN — DIPHENHYDRAMINE HYDROCHLORIDE 25 MG: 50 INJECTION, SOLUTION INTRAMUSCULAR; INTRAVENOUS at 09:52

## 2017-11-22 RX ADMIN — ACETAMINOPHEN 650 MG: 325 TABLET ORAL at 09:50

## 2017-11-22 RX ADMIN — SODIUM CHLORIDE 250 ML: 900 INJECTION, SOLUTION INTRAVENOUS at 09:40

## 2017-11-22 RX ADMIN — RITUXIMAB 660 MG: 10 INJECTION, SOLUTION INTRAVENOUS at 10:39

## 2017-11-22 RX ADMIN — FAMOTIDINE 20 MG: 10 INJECTION, SOLUTION INTRAVENOUS at 09:50

## 2017-11-28 ENCOUNTER — APPOINTMENT (OUTPATIENT)
Dept: WOMENS IMAGING | Facility: HOSPITAL | Age: 77
End: 2017-11-28

## 2017-11-28 PROCEDURE — 77063 BREAST TOMOSYNTHESIS BI: CPT | Performed by: RADIOLOGY

## 2017-11-28 PROCEDURE — MDREVIEWSP: Performed by: RADIOLOGY

## 2017-11-28 PROCEDURE — G0202 SCR MAMMO BI INCL CAD: HCPCS | Performed by: RADIOLOGY

## 2017-12-04 ENCOUNTER — TELEPHONE (OUTPATIENT)
Dept: ONCOLOGY | Facility: HOSPITAL | Age: 77
End: 2017-12-04

## 2017-12-04 NOTE — TELEPHONE ENCOUNTER
Wants Dr. Houston to refer her to a surgeon about her carpal tunnel . In basket message to Dr. Larose and called patient and told her we would let her know.

## 2017-12-07 ENCOUNTER — LAB (OUTPATIENT)
Dept: LAB | Facility: HOSPITAL | Age: 77
End: 2017-12-07

## 2017-12-07 DIAGNOSIS — C50.812 MALIGNANT NEOPLASM OF OVERLAPPING SITES OF LEFT BREAST IN FEMALE, ESTROGEN RECEPTOR POSITIVE (HCC): ICD-10-CM

## 2017-12-07 DIAGNOSIS — Z17.0 MALIGNANT NEOPLASM OF OVERLAPPING SITES OF LEFT BREAST IN FEMALE, ESTROGEN RECEPTOR POSITIVE (HCC): ICD-10-CM

## 2017-12-07 DIAGNOSIS — G56.00 CARPAL TUNNEL SYNDROME, UNSPECIFIED LATERALITY: Primary | ICD-10-CM

## 2017-12-07 DIAGNOSIS — C88.0 MACROGLOBULINEMIA OF WALDENSTROM (HCC): ICD-10-CM

## 2017-12-07 LAB
ALBUMIN SERPL-MCNC: 4.5 G/DL (ref 3.5–5.2)
ALBUMIN/GLOB SERPL: 1.7 G/DL (ref 1.1–2.4)
ALP SERPL-CCNC: 74 U/L (ref 38–116)
ALT SERPL W P-5'-P-CCNC: 11 U/L (ref 0–33)
ANION GAP SERPL CALCULATED.3IONS-SCNC: 11.1 MMOL/L
AST SERPL-CCNC: 18 U/L (ref 0–32)
BASOPHILS # BLD AUTO: 0.04 10*3/MM3 (ref 0–0.1)
BASOPHILS NFR BLD AUTO: 0.7 % (ref 0–1.1)
BILIRUB SERPL-MCNC: 0.6 MG/DL (ref 0.1–1.2)
BUN BLD-MCNC: 8 MG/DL (ref 6–20)
BUN/CREAT SERPL: 10.4 (ref 7.3–30)
CALCIUM SPEC-SCNC: 10.5 MG/DL (ref 8.5–10.2)
CHLORIDE SERPL-SCNC: 93 MMOL/L (ref 98–107)
CO2 SERPL-SCNC: 29.9 MMOL/L (ref 22–29)
CREAT BLD-MCNC: 0.77 MG/DL (ref 0.6–1.1)
DEPRECATED RDW RBC AUTO: 40.5 FL (ref 37–49)
EOSINOPHIL # BLD AUTO: 0.44 10*3/MM3 (ref 0–0.36)
EOSINOPHIL NFR BLD AUTO: 7.5 % (ref 1–5)
ERYTHROCYTE [DISTWIDTH] IN BLOOD BY AUTOMATED COUNT: 12 % (ref 11.7–14.5)
GFR SERPL CREATININE-BSD FRML MDRD: 72 ML/MIN/1.73
GLOBULIN UR ELPH-MCNC: 2.6 GM/DL (ref 1.8–3.5)
GLUCOSE BLD-MCNC: 111 MG/DL (ref 74–124)
HCT VFR BLD AUTO: 41.4 % (ref 34–45)
HGB BLD-MCNC: 14.6 G/DL (ref 11.5–14.9)
IMM GRANULOCYTES # BLD: 0.02 10*3/MM3 (ref 0–0.03)
IMM GRANULOCYTES NFR BLD: 0.3 % (ref 0–0.5)
LYMPHOCYTES # BLD AUTO: 1.35 10*3/MM3 (ref 1–3.5)
LYMPHOCYTES NFR BLD AUTO: 23.2 % (ref 20–49)
MCH RBC QN AUTO: 32.4 PG (ref 27–33)
MCHC RBC AUTO-ENTMCNC: 35.3 G/DL (ref 32–35)
MCV RBC AUTO: 92 FL (ref 83–97)
MONOCYTES # BLD AUTO: 0.62 10*3/MM3 (ref 0.25–0.8)
MONOCYTES NFR BLD AUTO: 10.6 % (ref 4–12)
NEUTROPHILS # BLD AUTO: 3.36 10*3/MM3 (ref 1.5–7)
NEUTROPHILS NFR BLD AUTO: 57.7 % (ref 39–75)
NRBC BLD MANUAL-RTO: 0 /100 WBC (ref 0–0)
PLATELET # BLD AUTO: 178 10*3/MM3 (ref 150–375)
PMV BLD AUTO: 9.2 FL (ref 8.9–12.1)
POTASSIUM BLD-SCNC: 3.9 MMOL/L (ref 3.5–4.7)
PROT SERPL-MCNC: 7.1 G/DL (ref 6.3–8)
RBC # BLD AUTO: 4.5 10*6/MM3 (ref 3.9–5)
SODIUM BLD-SCNC: 134 MMOL/L (ref 134–145)
WBC NRBC COR # BLD: 5.83 10*3/MM3 (ref 4–10)

## 2017-12-07 PROCEDURE — 85025 COMPLETE CBC W/AUTO DIFF WBC: CPT | Performed by: INTERNAL MEDICINE

## 2017-12-07 PROCEDURE — 80053 COMPREHEN METABOLIC PANEL: CPT | Performed by: INTERNAL MEDICINE

## 2017-12-07 PROCEDURE — 36415 COLL VENOUS BLD VENIPUNCTURE: CPT | Performed by: INTERNAL MEDICINE

## 2017-12-08 LAB
ALBUMIN SERPL-MCNC: 4.2 G/DL (ref 2.9–4.4)
ALBUMIN/GLOB SERPL: 1.6 {RATIO} (ref 0.7–1.7)
ALPHA1 GLOB FLD ELPH-MCNC: 0.3 G/DL (ref 0–0.4)
ALPHA2 GLOB SERPL ELPH-MCNC: 0.7 G/DL (ref 0.4–1)
B-GLOBULIN SERPL ELPH-MCNC: 0.9 G/DL (ref 0.7–1.3)
GAMMA GLOB SERPL ELPH-MCNC: 0.8 G/DL (ref 0.4–1.8)
GLOBULIN SER CALC-MCNC: 2.7 G/DL (ref 2.2–3.9)
IGA SERPL-MCNC: 52 MG/DL (ref 64–422)
IGG SERPL-MCNC: 430 MG/DL (ref 700–1600)
IGM SERPL-MCNC: 502 MG/DL (ref 26–217)
INTERPRETATION SERPL IEP-IMP: ABNORMAL
KAPPA LC SERPL-MCNC: 12.1 MG/L (ref 3.3–19.4)
KAPPA LC/LAMBDA SER: 1.83 {RATIO} (ref 0.26–1.65)
LAMBDA LC FREE SERPL-MCNC: 6.6 MG/L (ref 5.7–26.3)
Lab: ABNORMAL
M-SPIKE: ABNORMAL G/DL
PROT SERPL-MCNC: 6.9 G/DL (ref 6–8.5)

## 2017-12-14 ENCOUNTER — APPOINTMENT (OUTPATIENT)
Dept: LAB | Facility: HOSPITAL | Age: 77
End: 2017-12-14

## 2017-12-14 ENCOUNTER — OFFICE VISIT (OUTPATIENT)
Dept: FAMILY MEDICINE CLINIC | Facility: CLINIC | Age: 77
End: 2017-12-14

## 2017-12-14 ENCOUNTER — OFFICE VISIT (OUTPATIENT)
Dept: ONCOLOGY | Facility: CLINIC | Age: 77
End: 2017-12-14

## 2017-12-14 VITALS
HEART RATE: 72 BPM | DIASTOLIC BLOOD PRESSURE: 82 MMHG | WEIGHT: 154 LBS | SYSTOLIC BLOOD PRESSURE: 180 MMHG | OXYGEN SATURATION: 96 % | BODY MASS INDEX: 26.29 KG/M2 | TEMPERATURE: 98.1 F | HEIGHT: 64 IN

## 2017-12-14 VITALS
WEIGHT: 154.4 LBS | HEIGHT: 64 IN | TEMPERATURE: 97.9 F | HEART RATE: 71 BPM | BODY MASS INDEX: 26.36 KG/M2 | DIASTOLIC BLOOD PRESSURE: 80 MMHG | SYSTOLIC BLOOD PRESSURE: 178 MMHG | RESPIRATION RATE: 16 BRPM | OXYGEN SATURATION: 97 %

## 2017-12-14 DIAGNOSIS — C50.812 MALIGNANT NEOPLASM OF OVERLAPPING SITES OF LEFT BREAST IN FEMALE, ESTROGEN RECEPTOR POSITIVE (HCC): ICD-10-CM

## 2017-12-14 DIAGNOSIS — E03.9 ADULT HYPOTHYROIDISM: ICD-10-CM

## 2017-12-14 DIAGNOSIS — E83.52 HYPERCALCEMIA: ICD-10-CM

## 2017-12-14 DIAGNOSIS — Z17.0 MALIGNANT NEOPLASM OF OVERLAPPING SITES OF LEFT BREAST IN FEMALE, ESTROGEN RECEPTOR POSITIVE (HCC): ICD-10-CM

## 2017-12-14 DIAGNOSIS — J45.20 MILD INTERMITTENT ASTHMA WITHOUT COMPLICATION: ICD-10-CM

## 2017-12-14 DIAGNOSIS — C88.0 MACROGLOBULINEMIA OF WALDENSTROM (HCC): Primary | ICD-10-CM

## 2017-12-14 DIAGNOSIS — I10 ESSENTIAL HYPERTENSION: Primary | ICD-10-CM

## 2017-12-14 PROBLEM — G43.909 HEADACHE, MIGRAINE: Status: ACTIVE | Noted: 2017-12-14

## 2017-12-14 PROBLEM — G40.109: Status: ACTIVE | Noted: 2017-12-14

## 2017-12-14 PROBLEM — J45.909 AIRWAY HYPERREACTIVITY: Status: ACTIVE | Noted: 2017-12-14

## 2017-12-14 PROBLEM — G64 DISORDER OF PERIPHERAL NERVOUS SYSTEM: Status: ACTIVE | Noted: 2017-12-14

## 2017-12-14 PROCEDURE — G0463 HOSPITAL OUTPT CLINIC VISIT: HCPCS | Performed by: INTERNAL MEDICINE

## 2017-12-14 PROCEDURE — 99213 OFFICE O/P EST LOW 20 MIN: CPT | Performed by: INTERNAL MEDICINE

## 2017-12-14 PROCEDURE — 99214 OFFICE O/P EST MOD 30 MIN: CPT | Performed by: FAMILY MEDICINE

## 2017-12-14 RX ORDER — GABAPENTIN 400 MG/1
400 CAPSULE ORAL 4 TIMES DAILY
Qty: 120 CAPSULE | Refills: 0 | Status: SHIPPED | OUTPATIENT
Start: 2017-12-14 | End: 2018-01-26 | Stop reason: SDUPTHER

## 2017-12-14 RX ORDER — LEVOTHYROXINE SODIUM 0.05 MG/1
50 TABLET ORAL DAILY
Qty: 90 TABLET | Refills: 1 | Status: SHIPPED | OUTPATIENT
Start: 2017-12-14 | End: 2018-07-23 | Stop reason: SDUPTHER

## 2017-12-14 RX ORDER — LOSARTAN POTASSIUM AND HYDROCHLOROTHIAZIDE 25; 100 MG/1; MG/1
1 TABLET ORAL DAILY
Qty: 90 TABLET | Refills: 1 | Status: SHIPPED | OUTPATIENT
Start: 2017-12-14 | End: 2018-02-26 | Stop reason: ALTCHOICE

## 2017-12-14 RX ORDER — METOPROLOL SUCCINATE 25 MG/1
25 TABLET, EXTENDED RELEASE ORAL DAILY
Qty: 90 TABLET | Refills: 1 | Status: SHIPPED | OUTPATIENT
Start: 2017-12-14 | End: 2018-05-29 | Stop reason: SDUPTHER

## 2017-12-14 RX ORDER — POTASSIUM CHLORIDE 20 MEQ/1
20 TABLET, EXTENDED RELEASE ORAL DAILY
Qty: 90 TABLET | Refills: 1 | Status: SHIPPED | OUTPATIENT
Start: 2017-12-14 | End: 2018-09-27 | Stop reason: SDUPTHER

## 2017-12-14 RX ORDER — LANSOPRAZOLE 30 MG/1
30 CAPSULE, DELAYED RELEASE ORAL DAILY
Qty: 90 CAPSULE | Refills: 0 | Status: ON HOLD | OUTPATIENT
Start: 2017-12-14 | End: 2019-01-07

## 2017-12-14 NOTE — PROGRESS NOTES
Subjective  REASONS FOR FOLLOWUP:     1. History of Waldenstrom macroglobulinemia. The patient  remains in remission about this condition with stable level of immunoglobulin M . Normal IgA. Normal IgG. Normal white count, hemoglobin, and platelets. No abnormal bleeding. No peripheral adenopathy. No hepatosplenomegaly. The patient will remain in observation.   2. History of breast cancer stage I on the left, status post mastectomy. The patient remains on aromatase inhibitor without any evidence of breast cancer recurrence and good tolerance to her aromatase inhibitor medicine. She will remain on the same issue for the time being.                History of Present Illness  As above. She is here today with no complaints in regard to her previous history of breast cancer status post left mastectomy and her Waldenström macroglobulinemia.       The patient feels like having a good appetite, stable weight, normal bowel activity, normal urination.  She has not developed any other masses at any level and no peripheral adenopathy. No abnormal bleeding. Her right knee is back to normality and she has been able to walk any distance with no limitations.  He occasionally the patient is complaining of some numbness sensation on the plantar aspect of both feet especially when she goes to bed but is of transitory phenomenon that doesn't last long.  Similar to the symptoms that she had when she was first diagnosed with Waldenström's with a difference that the pain and discomfort then was continuous and came down to below the knee all the time.  Has no difficulty walking she has no difficulty with perception of temperature .    On 11/08/2017, the patient stated that she did not have any issues in regard to the Rituxan infusion the 1st with time chills, fevers, nausea, or vomiting.  She will continue and complete 4 cycles of Rituxan and we are planning to repeat laboratory assessment after this to followup on her Waldenstrom's.  In  regard to her history of breast cancer, she will remain on her hormone adjuvant therapy.       On 12/14/2017, she had further crescendo pain in the carpal tunnel area on the right hand with very significant symptomatology. She was scheduled to be seen by Dr. Garcia, [facility], very soon. She believes that he helps her and an orthopedic device makes the pain worse. This could be associated with her Waldenstrom.          1. Past Medical History, Past Surgical HistoryHypertension.    2. Hypothyroidism many years ago and has not taken any thyroid medication in quite some time.   3. Thyroid nodule removed more than 35 years ago.    4. Cholecystectomy.    5. Tonsillectomy and adenoidectomy.    6. Appendectomy.  7. Partial hysterectomy many years ago.      She has mammogram and pelvic examination yearly that have been normal.  Colonoscopy 4 years ago was normal.   She also has had a leaky valve in her heart without any significance.      During the recent workup at [facility], the patient had a transesophageal echocardiogram that failed to document any vegetation.    Clostridium colitis requiring admission to [facility] in 09/2008.      Arthroscopy in 03/2009 of the right knee with finding of chondromalacia and appropriate cleanup of the joint was performed by Dr. Moraes.      On 07/24/2013 the patient has lost 14 pounds, just cutting down on junk food.  Her glucose intolerance has improved substantially and her glucose has normalized.      On 01/12/2014, we reviewed her blood pressure issues recently. She has had a very stable blood pressure for the last week, and today is normal. Her physical examination is otherwise unremarkable and normal feeling.     We advised her to remain in observation from this point of view.    As per 10/01/2014, the patient has undergone evaluation by neurology service at [facility] for consideration of gabapentin that she has  been taking for so many years for possible seizure activity.   I am aware of an MRI scan of the brain that shows not too much, besides minimal vascular disease and no other lesions.  Her neurologist will be informing us in regard how to proceed in this regard.      She wanted to have a right knee replaced in 2009 through her orthopedic surgeon. She developed delirium in the hospital and extensive ecchymosis and hematoma formation in the whole right lower extremity requiring transfusion of red cells for a hemoglobin of 7.   SOCIAL HISTORY:  .  Retired from [employer] where she worked for many years; she never was exposed to any chemicals.   She is a never smoker and nondrinker.   She lives with her oldest daughter.      FAMILY HISTORY: The patient’s father  of complications from heart disease at age 70.  Mother  after a psychiatric illness at age 67.  A brother  in a car accident at 43 and a sister, age 75, is in good health.   Her three daughters are in good health.  She has no FH of lymphoma or leukemia.    Review of Systems    General: no fever, chills, fatigue, weight changes, or lack of appetite.  Eyes: no epiphora, conjunctivitis, pain, glaucoma, blurred vision, blindness, secretion, photophobia.  Ears: no otorrhea, tinnitus, otorrhagia, deafness, pain, vertigo.  Nose: no rhinorrhea, epistaxis, alteration in perception of odors, sinuses pressure.  Mouth: no alteration in gums or teeth,  ulcers, no difficulty with mastication or deglut ion, no odynophagia.  Neck: no masses or pain, no thyroid alterations, no pain in muscles or arteries, no carotid odynia, no crepitation.  Lungs: stated cough, sputum production, dyspnea,no  trepopnea, pleuritic pain, hemoptysis.  Heart: no syncope, irregularity, palpitations, angina, orthopnea, paroxysmal nocturnal dyspnea.  GI: no dysphagia, odynophagia, no regurgitation, no heartburn, indigestion, nausea, vomiting, hematemesis ,melena, jaundice,  "distention, obstipation, enterorrhagia, proctalgia, anal  Lesions, changes in bowel habits.  : no frequency, hesitancy, hematuria, discharge, pain.  Musculoskeletal: no muscle or tendon pain or inflammation, joint pain, edema, functional limitation, fasciculations,  Neurologic: no headache, seizures, alterations on Craneal nerves, no motor  deficit, normal coordination.numbness feet as stated.  Skin: stated r le  rash,severe pruritus.  Psychiatric: no anxiety, depression, agitation, delusions, proper insight.  A comprehensive 14 point review of systems was performed and was negative except as mentioned.    Medications:  The current medication list was reviewed in the EMR    ALLERGIES:    Allergies   Allergen Reactions   • Cortisone    • Darvon  [Propoxyphene] Palpitations       Objective      Vitals:    12/14/17 1541   BP: 178/80   Pulse: 71   Resp: 16   Temp: 97.9 °F (36.6 °C)   TempSrc: Oral   SpO2: 97%   Weight: 70 kg (154 lb 6.4 oz)   Height: 162.5 cm (63.98\")   PainSc: 0-No pain     Current Status 12/14/2017   ECOG score 0       Physical Exam    GENERAL:  Well-developed, well-nourished  Patient  in no acute distress.   SKIN:  Warm, dry with papular erythematous external rle rash and ryness, no typical of anything in particular,no purpura or petechiae.  HEENT:  Pupils were equal and reactive to light and accomodation, conjunctivas non injected, no pterigion, normal extraocular movements, normal visual acuity.   Mouth mucosa was moist, no exudates in oropharynx, normal gum line, normal roof of the mouth and pillars, normal papillations of the tongue.  NECK:  Supple with good range of motion; no thyromegaly or masses, no JVD or bruits, no cervical adenopathies.  LYMPHATICS:  No cervical, supraclavicular, axillary or inguinal adenopathy.  CHEST:  Lungs clear to percussion and auscultation, normal breath sounds bilaterally, no wheezing, crackles or ronchi, no stridor.  CARDIAC:  Regular rate and rhythm without " murmurs, rubs or gallops.  INSPECTION of right breast documented symmetry of the tissue per se and location and size of the nipple,no retractions or inversion of the nipple, normal skin without lesions, no erythema or nodules,no paud'orange, no prominence of superficial veins or chest wall collateral circulation.PALPATION of the breast documented normal skin turgor, no induration, alteration in local temperature, or pain, no palpable masses or nodules, normal mobility of the tissues,no fixation of the tissue or parenchyma to the chest wall, no alteration at the tail of the breast or axillas, no adenopathies.Mastectomy left Surgical site was well healed.No lymphedema in either extremity.  ABDOMEN:  Soft, nontender with no organomegaly or masses, no ascites, no collateral circulation, no abdominal pain, no inguinal hernias, no abdominal bruits.  EXTREMITIES:  No clubbing, cyanosis or edema, no deformities or pain.   NEUROLOGICAL:  Patient was awake, alert, oriented to time, person and place, decreased vibratory sensation both feet, normal gait, negative Romberg.abolished reflexes all over, normal strength in upper and lower extremities.    RECENT LABS:  Component      Latest Ref Rng & Units 10/4/2017 11/1/2017 12/7/2017 12/7/2017          10:06 AM  9:12 AM 10:13 AM 10:13 AM   IgG      700 - 1600 mg/dL 384 (L)   430 (L)   IgA      64 - 422 mg/dL 35 (L)   52 (L)   IgM      26 - 217 mg/dL 434 (H)   502 (H)   Total Protein      6.3 - 8.0 g/dL 7.0 7.0 7.1 6.9   Albumin      3.50 - 5.20 g/dL 4.1 4.50 4.50 4.2   Alpha-1-Globulin      0.0 - 0.4 g/dL 0.4   0.3   Alpha-2-Globulin      0.4 - 1.0 g/dL 0.8   0.7   Beta Globulin      0.7 - 1.3 g/dL 1.0   0.9   Gamma Globulin      0.4 - 1.8 g/dL 0.7   0.8   M-Pratik      Not Observed g/dL 0.5 (H)   Comment:   Globulin      2.2 - 3.9 g/dL 2.9   2.7   A/G Ratio      1.1 - 2.4 g/dL 1.5 1.8 1.7 1.6   Immunofixation Reflex, Serum       Comment   Comment   Please Note       Comment    Comment   Free Light Chain, Kappa      3.3 - 19.4 mg/L 11.2   12.1   Free Lambda Light Chains      5.7 - 26.3 mg/L 5.4 (L)   6.6   Kappa/Lambda Ratio      0.26 - 1.65 2.07 (H)   1.83 (H)                     Component      Latest Ref Rng & Units 11/1/2017 12/7/2017           9:12 AM 10:13 AM   Glucose      74 - 124 mg/dL 105 111   BUN      6 - 20 mg/dL 8 8   Creatinine      0.60 - 1.10 mg/dL 0.77 0.77   Sodium      134 - 145 mmol/L 131 (L) 134   Potassium      3.5 - 4.7 mmol/L 3.9 3.9   Chloride      98 - 107 mmol/L 92 (L) 93 (L)   CO2      22.0 - 29.0 mmol/L 29.2 (H) 29.9 (H)   Calcium      8.5 - 10.2 mg/dL 10.3 (H) 10.5 (H)   Total Protein      6.3 - 8.0 g/dL 7.0 7.1   Albumin      3.50 - 5.20 g/dL 4.50 4.50   ALT (SGPT)      0 - 33 U/L 14 11   AST (SGOT)      0 - 32 U/L 21 18   Alkaline Phosphatase      38 - 116 U/L 69 74   Total Bilirubin      0.1 - 1.2 mg/dL 0.6 0.6         11/1/2017  Day 1 11/8/2017  Day 8   RiTUXimab  mg/m2  = 660 mg 375 mg/m2  = 660 mg   acetaminophen (TYLENOL)  mg 650 mg   diphenhydrAMINE (BENADRYL) IV 50 mg 25 mg   famotidine (PEPCID) IV 20 mg 20 mg   sodium chloride 0.9 %  mL 250 mL   RiTUXimab (RITUXAN)  mg/m2  = 660 mg 375 mg/m2  = 660 mg      11/15/2017  Day 15 11/22/2017  Day 22   RiTUXimab  mg/m2  = 660 mg 375 mg/m2  = 660 mg   acetaminophen (TYLENOL)  mg 650 mg   diphenhydrAMINE (BENADRYL) IV 25 mg 25 mg   famotidine (PEPCID) IV 20 mg 20 mg   sodium chloride 0.9 %  mL 250 mL   RiTUXimab (RITUXAN)  mg/m2  = 660 mg 375 mg/m2  = 660 mg      WBC   Date Value Ref Range Status   12/07/2017 5.83 4.00 - 10.00 10*3/mm3 Final     RBC   Date Value Ref Range Status   12/07/2017 4.50 3.90 - 5.00 10*6/mm3 Final     Hemoglobin   Date Value Ref Range Status   12/07/2017 14.6 11.5 - 14.9 g/dL Final     Hematocrit   Date Value Ref Range Status   12/07/2017 41.4 34.0 - 45.0 % Final     Platelets   Date Value Ref Range Status   12/07/2017 178 150 - 375  10*3/mm3 Final      Assessment/Plan    1.  1.  This patient has history of Waldenstrom’s macroglobulinemia;In the interim the patient has had completion of her Rituxan therapy 6 weeks ago and she is here for monoclonal protein studies that show a raise in her monoclonal protein IgM as expected after Rituxan use. The patient has also increase in the intensity of pain and discomfort due to carpal tunnel in the right upper extremity and this could be also associated with Waldenstrom. The patient also has had minimal elevation of the calcium level. This has been investigated in the past. Vitamin D and calcium supplements were discontinued. PTH level was done in the past, being negative normal. Nevertheless, we need to reinvestigate all these issues. In preparation for this, we will review her back in 6 weeks and in 5 weeks she will have a CBC, serum protein immunoelectrophoresis, chemistry profile and a PTH level. She will be reviewed a week later. She already has an appointment to see Dr. Garcia, [facility], to review the carpal tunnel issue on the right hand and decide how to proceed. I made the patient aware that she does not need to be surprised if he asks her to proceed with surgery. The functional limitation and the pain in the hand are very substantial.     In regard to her breast cancer, recent mammogram was negative on the right chest wall and the left remains normal. Femara is ongoing. No indication of recurrent disease.                                         12/14/2017      CC:

## 2017-12-15 NOTE — PROGRESS NOTES
Subjective   Karli Loomis is a 77 y.o. female. Pt is here to establish care. Was previously seen by Dr. Johnson. Has hx significant for left breast cancer, ductal carcinoma in situ with negative LNs s/p mastectomy 7/2014 and tx with aromatase inhibitor. Also with Waldenstrom's macroglobulinemia with recent recurrence restarted Rituxan and followed by Hem/Onc. Here with follow up for HTN and hypothyroidism. Having some break through symptoms of her asthma and has hypercalcemia.    Chief Complaint   Patient presents with   • Hypothyroidism     routine followup         History of Present Illness    Hypothyroidism  Reports this was found incidentally on labs and was started on thyroid medication. Complains that she wonders about the dose being too low. Her skin is so dry all the time. She reports that she did not ever have a thyroid nodule or ultrasound. In documentation in her chart it is mentioned that she had a thyroid nodule removed about 35 years ago. Last TSH from 1/5/16 was normal at 2.18. No labs otherwise for TSH available in the chart.    HTN  Not well controlled today. Pt reports she is very stressed and worried about going to her Hem/Onc appointment shortly after this one today. She will find out whether the treatment she restarted for Waldenstrom's has been effective again. Had been on rituxan then became resistant, transitioned to Videlco which gave her significant neuropathy and she is now going back to rituxan. Tolerated tx just so worried about the outcome. BP is going to be high as she explains. She has taken amlodipine 5 mg and losartan-HCTZ 10-25 mg daily. Reported that Dr. Larose encouraged her to ask PCP about d/c amlodipine related to her LE symptoms/swelling. Consider metoprolol.     Asthma  Diagnosed 4 years ago. Has significant allergies. Taking flonase with good relief, cetirizine daily. Has previously been on singulair without improvement. She had been taking Qvar with really good results,  not much breathing or coughing difficulties. As this is very expensive she was getting samples from allergist but they have lost the agent who brings them. She was switched to mometasone for the past one month. Feels she gets more SOB, feels her voice has changed some. Has an albuterol inhaler at home, doesn't use ever because on the Qvar she never needed it. Has also tried breo but had palpitations, and others that just did not work as well.    Hypercalcemia  Hx of and now recurrence. Per chart review had mild elevated of Ca in 12/1/16 of 10.5 and vitamin D was 42, elevated in 1/25/17 at 10.3, work up 2/16/17 done showed Ca 10.2 (normal), iPTH 33.9 (nl). It had been normal for a several month duration after this and then 11/17/17 was mildly elevated again to 10.3 and 12/7/17 to of 10.5 with normal albumin 4.5, potassium 3.9 and sodium 134. She is uptodate on her right mammogram. Had colonoscopy about 4 years ago and because of terrible prep experience she refuses to do again. Has been doing the cologuard. She does take hydrochlorothiazide.    Of note she developed GERD/heartburn symptoms in 2014. She was prescribed daily lansoprazole but does not take it that way. She takes it for about 4 day duration when develops symptoms, has relief then she goes off again. Will take famotine intermittently when she knows she will have something like pizza to aggravate her GERD. She never takes them together.    The following portions of the patient's history were reviewed and updated as appropriate: allergies, current medications, past family history, past medical history, past social history, past surgical history and problem list.      Review of Systems   Constitutional: Negative for activity change, appetite change, fatigue and unexpected weight change.   HENT: Negative for congestion, postnasal drip, rhinorrhea and sinus pain.    Respiratory: Negative for cough and shortness of breath.    Cardiovascular: Negative for chest  "pain and leg swelling.   Gastrointestinal: Negative for abdominal pain, blood in stool, constipation and diarrhea.   Genitourinary: Negative for difficulty urinating and dysuria.   Musculoskeletal: Positive for arthralgias and myalgias. Negative for back pain, gait problem and joint swelling.   Allergic/Immunologic: Positive for environmental allergies.   Neurological: Negative for weakness and headaches.   Psychiatric/Behavioral: Negative for dysphoric mood and sleep disturbance.       Objective   Blood pressure 180/82, pulse 72, temperature 98.1 °F (36.7 °C), height 163.2 cm (64.25\"), weight 69.9 kg (154 lb), SpO2 96 %.  Physical Exam   Constitutional: She is oriented to person, place, and time. She appears well-nourished. No distress.   HENT:   Right Ear: External ear normal.   Left Ear: External ear normal.   Nose: Nose normal.   Mouth/Throat: Oropharynx is clear and moist. No oropharyngeal exudate.   Bilateral TMs and ear canals are normal   Eyes: Conjunctivae are normal. Right eye exhibits no discharge. Left eye exhibits no discharge.   Neck: Neck supple. No thyromegaly present.   Cardiovascular: Normal rate, regular rhythm and normal heart sounds.  Exam reveals no gallop and no friction rub.    No murmur heard.  Pulmonary/Chest: Effort normal and breath sounds normal. No respiratory distress. She has no wheezes. She has no rales.   Lymphadenopathy:     She has no cervical adenopathy.   Neurological: She is alert and oriented to person, place, and time. She exhibits normal muscle tone. Coordination normal.   Psychiatric:   Tearful when talking about being treated for Waldenstrom's again and fearful of lab results.   Vitals reviewed.      Assessment/Plan   Karli was seen today for hypothyroidism.    Diagnoses and all orders for this visit:    Essential hypertension    Adult hypothyroidism  -     TSH Rfx On Abnormal To Free T4    Mild intermittent asthma without complication    Hypercalcemia    Other orders  -     " metoprolol succinate XL (TOPROL-XL) 25 MG 24 hr tablet; Take 1 tablet by mouth Daily.  -     lansoprazole (PREVACID) 30 MG capsule; Take 1 capsule by mouth Daily.  -     levothyroxine (SYNTHROID, LEVOTHROID) 50 MCG tablet; Take 1 tablet by mouth Daily.  -     losartan-hydrochlorothiazide (HYZAAR) 100-25 MG per tablet; Take 1 tablet by mouth Daily.  -     potassium chloride (KLOR-CON) 20 MEQ CR tablet; Take 1 tablet by mouth Daily.  -     gabapentin (NEURONTIN) 400 MG capsule; Take 1 capsule by mouth 4 (Four) Times a Day.    discussed her asthma symptoms  We do not have Qvar samples for her like she was getting from allergy  Discussed using her albuterol, has already been on singulair without improvement  Follow up with allergy as scheduled  May need an alternative steroid/controller inhaler if continues to be less well controlled on mometasone    Hypercalcemia  Per onc note, working up again with PTH repeat would add ionized Ca  Mild, chronic elevated is unlikely malignancy as that is rapidly progressive but pt has hx of breast cancer, up to date on her mammogram  She is also on thiazide diuretic  If PTH is normal can consider other labs including Ph, Mg, urine Ca, vit D    Ordered TSH for pt today but she had to leave prior to labs to make it to her other appointment

## 2017-12-20 ENCOUNTER — RESULTS ENCOUNTER (OUTPATIENT)
Dept: FAMILY MEDICINE CLINIC | Facility: CLINIC | Age: 77
End: 2017-12-20

## 2017-12-20 DIAGNOSIS — E83.52 HYPERCALCEMIA: ICD-10-CM

## 2017-12-21 ENCOUNTER — OFFICE VISIT (OUTPATIENT)
Dept: RETAIL CLINIC | Facility: CLINIC | Age: 77
End: 2017-12-21

## 2017-12-21 VITALS
DIASTOLIC BLOOD PRESSURE: 70 MMHG | RESPIRATION RATE: 16 BRPM | HEART RATE: 83 BPM | OXYGEN SATURATION: 93 % | SYSTOLIC BLOOD PRESSURE: 150 MMHG | TEMPERATURE: 96.9 F

## 2017-12-21 DIAGNOSIS — J40 BRONCHITIS: Primary | ICD-10-CM

## 2017-12-21 PROCEDURE — 99213 OFFICE O/P EST LOW 20 MIN: CPT | Performed by: NURSE PRACTITIONER

## 2017-12-21 RX ORDER — DOXYCYCLINE HYCLATE 100 MG
100 TABLET ORAL 2 TIMES DAILY
Qty: 20 TABLET | Refills: 0 | Status: SHIPPED | OUTPATIENT
Start: 2017-12-21 | End: 2017-12-31

## 2017-12-21 NOTE — PATIENT INSTRUCTIONS
Acute Bronchitis  Bronchitis is inflammation of the airways that extend from the windpipe into the lungs (bronchi). The inflammation often causes mucus to develop. This leads to a cough, which is the most common symptom of bronchitis.   In acute bronchitis, the condition usually develops suddenly and goes away over time, usually in a couple weeks. Smoking, allergies, and asthma can make bronchitis worse. Repeated episodes of bronchitis may cause further lung problems.   CAUSES  Acute bronchitis is most often caused by the same virus that causes a cold. The virus can spread from person to person (contagious) through coughing, sneezing, and touching contaminated objects.  SIGNS AND SYMPTOMS   · Cough.    · Fever.    · Coughing up mucus.    · Body aches.    · Chest congestion.    · Chills.    · Shortness of breath.    · Sore throat.    DIAGNOSIS   Acute bronchitis is usually diagnosed through a physical exam. Your health care provider will also ask you questions about your medical history. Tests, such as chest X-rays, are sometimes done to rule out other conditions.   TREATMENT   Acute bronchitis usually goes away in a couple weeks. Oftentimes, no medical treatment is necessary. Medicines are sometimes given for relief of fever or cough. Antibiotic medicines are usually not needed but may be prescribed in certain situations. In some cases, an inhaler may be recommended to help reduce shortness of breath and control the cough. A cool mist vaporizer may also be used to help thin bronchial secretions and make it easier to clear the chest.   HOME CARE INSTRUCTIONS  · Get plenty of rest.    · Drink enough fluids to keep your urine clear or pale yellow (unless you have a medical condition that requires fluid restriction). Increasing fluids may help thin your respiratory secretions (sputum) and reduce chest congestion, and it will prevent dehydration.    · Take medicines only as directed by your health care provider.  · If  you were prescribed an antibiotic medicine, finish it all even if you start to feel better.  · Avoid smoking and secondhand smoke. Exposure to cigarette smoke or irritating chemicals will make bronchitis worse. If you are a smoker, consider using nicotine gum or skin patches to help control withdrawal symptoms. Quitting smoking will help your lungs heal faster.    · Reduce the chances of another bout of acute bronchitis by washing your hands frequently, avoiding people with cold symptoms, and trying not to touch your hands to your mouth, nose, or eyes.    · Keep all follow-up visits as directed by your health care provider.    SEEK MEDICAL CARE IF:  Your symptoms do not improve after 1 week of treatment.   SEEK IMMEDIATE MEDICAL CARE IF:  · You develop an increased fever or chills.    · You have chest pain.    · You have severe shortness of breath.  · You have bloody sputum.    · You develop dehydration.  · You faint or repeatedly feel like you are going to pass out.  · You develop repeated vomiting.  · You develop a severe headache.  MAKE SURE YOU:   · Understand these instructions.  · Will watch your condition.  · Will get help right away if you are not doing well or get worse.     This information is not intended to replace advice given to you by your health care provider. Make sure you discuss any questions you have with your health care provider.     Document Released: 01/25/2006 Document Revised: 01/08/2016 Document Reviewed: 06/10/2014  [employer] Patient Education ©2017 [employer]    Doxycyline as prescribed.  Albuterol nebulizer treatments every 4 hours as needed for shortness of breath and wheezing.  Follow up if no improvement.  To Urgent care or ER for increasing shortness of breath, chest pain, high fever or the like.

## 2017-12-21 NOTE — PROGRESS NOTES
"Subjective   Patient ID: Karli Loomis is a 77 y.o. female presents with   Chief Complaint   Patient presents with   • Shortness of Breath   • Cough     bronchitis   • Nasal Congestion       Shortness of Breath   This is a new problem. The current episode started 1 to 4 weeks ago (4 weeks). The problem occurs daily. The problem has been unchanged. Associated symptoms include sputum production and wheezing. Pertinent negatives include no abdominal pain, chest pain, claudication, coryza, ear pain, fever, headaches, hemoptysis, leg pain, leg swelling, neck pain, orthopnea, PND, rash, rhinorrhea, sore throat, syncope or vomiting. Nothing aggravates the symptoms. The patient has no known risk factors for DVT/PE. She has tried beta agonist inhalers (2 rounds of Z-pack) for the symptoms. The treatment provided no relief. Her past medical history is significant for chronic lung disease (bronchitis). There is no history of allergies, CAD or a heart failure.   Cough   This is a new problem. The current episode started 1 to 4 weeks ago (4 weeks). The problem occurs every few minutes. The cough is productive of purulent sputum. Associated symptoms include shortness of breath and wheezing. Pertinent negatives include no chest pain, chills, ear congestion, ear pain, fever, headaches, hemoptysis, rash, rhinorrhea or sore throat. Her past medical history is significant for bronchitis.     Patient states she has taken 2 \"rounds\" of Z-pack with no improvement in her symptoms.      Allergies   Allergen Reactions   • Cortisone    • Darvon  [Propoxyphene] Palpitations       The following portions of the patient's history were reviewed and updated as appropriate: allergies, current medications, past family history, past medical history, past social history, past surgical history and problem list.      Review of Systems   Constitutional: Negative for chills and fever.   HENT: Negative for congestion, ear pain, rhinorrhea and sore throat. "    Respiratory: Positive for cough, sputum production, chest tightness, shortness of breath and wheezing. Negative for hemoptysis.    Cardiovascular: Negative for chest pain, palpitations, orthopnea, claudication, leg swelling, syncope and PND.   Gastrointestinal: Negative for abdominal pain, diarrhea, nausea and vomiting.   Musculoskeletal: Negative for neck pain.   Skin: Negative for rash.   Neurological: Negative for headaches.       Objective     Vitals:    12/21/17 1000   BP: 150/70   Pulse: 83   Resp: 16   Temp: 96.9 °F (36.1 °C)   SpO2: 93%         Physical Exam   Constitutional: She is oriented to person, place, and time. She appears well-developed and well-nourished. She does not appear ill. No distress.   HENT:   Head: Normocephalic.   Right Ear: Hearing, tympanic membrane, external ear and ear canal normal.   Left Ear: Hearing, tympanic membrane, external ear and ear canal normal.   Nose: Nose normal. No rhinorrhea or sinus tenderness.   Mouth/Throat: Oropharynx is clear and moist and mucous membranes are normal. Tonsils are 2+ on the right. Tonsils are 2+ on the left. No tonsillar exudate.   Eyes: Conjunctivae are normal.   Sclera white.   Neck: No tracheal deviation present.   Cardiovascular: Normal rate, regular rhythm, S1 normal, S2 normal and normal heart sounds.    Pulmonary/Chest: Effort normal. No accessory muscle usage. No respiratory distress. She has wheezes (inspiratory and expiratory) in the right upper field, the right middle field, the right lower field, the left upper field, the left middle field and the left lower field.   Abdominal: Soft. Bowel sounds are normal. There is no tenderness.   Lymphadenopathy:     She has no cervical adenopathy.   Neurological: She is alert and oriented to person, place, and time.   Skin: Skin is warm and dry.   Vitals reviewed.    Offered patient nebulizer treatment.  Patient refused and stated she would take one as soon as she gets home.  Suggested patient  "take course of steroids as well.  Patient refused stating that she \"can't tolerate\" steroids.  She also stated that she can only tolerate Z-pack because other antibiotics cause her to have diarrhea and C. Diff.      Karli was seen today for shortness of breath, cough and nasal congestion.    Diagnoses and all orders for this visit:    Bronchitis  -     doxycycline (VIBRAMYICN) 100 MG tablet; Take 1 tablet by mouth 2 (Two) Times a Day for 10 days.      Doxycyline as prescribed.  Albuterol nebulizer treatments every 4 hours as needed for shortness of breath and wheezing.  Follow up if no improvement.  To Urgent care or ER for increasing shortness of breath, chest pain, high fever or the like.   Follow-up with Primary Care Physician in 48-72 hours if these symptoms worsen or fail to improve as anticipated. Patient verbalizes understanding.    "

## 2017-12-22 ENCOUNTER — OFFICE VISIT (OUTPATIENT)
Dept: FAMILY MEDICINE CLINIC | Facility: CLINIC | Age: 77
End: 2017-12-22

## 2017-12-22 VITALS
OXYGEN SATURATION: 96 % | HEART RATE: 81 BPM | DIASTOLIC BLOOD PRESSURE: 60 MMHG | SYSTOLIC BLOOD PRESSURE: 120 MMHG | TEMPERATURE: 98.4 F

## 2017-12-22 DIAGNOSIS — J45.41 MODERATE PERSISTENT ASTHMA WITH EXACERBATION: Primary | ICD-10-CM

## 2017-12-22 DIAGNOSIS — R06.2 WHEEZING: ICD-10-CM

## 2017-12-22 DIAGNOSIS — R05.9 COUGH: ICD-10-CM

## 2017-12-22 PROCEDURE — 99214 OFFICE O/P EST MOD 30 MIN: CPT | Performed by: FAMILY MEDICINE

## 2017-12-22 RX ORDER — IPRATROPIUM BROMIDE AND ALBUTEROL SULFATE 2.5; .5 MG/3ML; MG/3ML
3 SOLUTION RESPIRATORY (INHALATION)
Status: DISCONTINUED | OUTPATIENT
Start: 2017-12-22 | End: 2019-01-07

## 2017-12-22 NOTE — PROGRESS NOTES
Subjective   Karli Loomis is a 77 y.o. female. Pt is here because of asthma exacerbation.    Chief Complaint   Patient presents with   • Cough     worse since last week, small amount of blood in sputum        History of Present Illness    Asthma  Pt has been coughing Since the beginning of December.  About a month ago she was changed from her controller being Qvar to Asmanex.  Since that time she relates that her asthma has not been as well controlled.  She used to never need her albuterol inhaler and now she feels she needs it more often.  She gets short of breath with minimal exertion.  She has been coughing up blood-tinged sputum since last night.  She started doing her nebulizer treatments yesterday and did 2.  She did have some relief with this.  She went to see urgent care yesterday and since she had already been treated with 2 rounds of azithromycin 5 day courses without improvement she was started on doxycycline.  She took her first dose of that medication this morning.  Per her history she usually does improve after the second Z-Ivan but usually requires a second one.  She feels like late in the mid touch the symptoms this time.  She's not had any GI symptoms like abdominal cramping or diarrhea.  She has some upper airway congestion and significant cough.  She also has felt more weak today.  When she got up she was going to do a breathing treatment but she felt so tired she couldn't even get it ready and call the office.  Her son brought her today.  She does live with her granddaughter who is a nurse but is working now.  Patient historically also cannot tolerate steroids.  They make her feel like she needs to go running and she cannot get any rest.  Her first not to have a steroid treatment at this time      The following portions of the patient's history were reviewed and updated as appropriate: allergies, current medications and problem list.      Review of Systems   Constitutional: Positive for activity  change, appetite change and fatigue. Negative for fever.   HENT: Positive for congestion, rhinorrhea and sore throat.    Respiratory: Positive for cough, shortness of breath and wheezing.    Cardiovascular: Negative for chest pain.   Gastrointestinal: Negative for abdominal pain, diarrhea and vomiting.       Objective   Blood pressure 120/60, pulse 81, temperature 98.4 °F (36.9 °C), SpO2 96 %.  Physical Exam   Constitutional: She is oriented to person, place, and time. She appears well-nourished. No distress.   Cardiovascular: Normal rate, regular rhythm and normal heart sounds.    Pulmonary/Chest: No respiratory distress. She has wheezes.   Pt with little increased WOB but able to speak in full sentences. Course sounds throughout, poor air movement prior to duo-neb. After duo-neb treatment there was little better air movement and expiratory wheeze.   Neurological: She is alert and oriented to person, place, and time.   Vitals reviewed.      Assessment/Plan   Karli was seen today for cough.    Diagnoses and all orders for this visit:    Moderate persistent asthma with exacerbation  -     ipratropium-albuterol (DUO-NEB) nebulizer solution 3 mL; Take 3 mL by nebulization 4 (Four) Times a Day.  -     XR chest pa and lateral; Future  -     XR chest pa and lateral    Wheezing  -     ipratropium-albuterol (DUO-NEB) nebulizer solution 3 mL; Take 3 mL by nebulization 4 (Four) Times a Day.  -     XR chest pa and lateral; Future  -     XR chest pa and lateral    Cough  -     ipratropium-albuterol (DUO-NEB) nebulizer solution 3 mL; Take 3 mL by nebulization 4 (Four) Times a Day.  -     XR chest pa and lateral; Future  -     XR chest pa and lateral    She was given 2 DuoNeb's in the office and had a chest x-ray.  Chest x-ray was notable for no consolidation, costophrenic angles are clear, no cardiomegaly, right-sided haziness of the upper lobe and base, clear on left. Fluid line between RUL and RML. Formal radiology read  pending.    Pt given symbicort 160-45 to take 4 puffs BID while she is sick, d/c asmanex at this time    Called hospital team for admission but [facility] is on diversion. Discussed with pt. She would like to try to go home, grand daughter who is nurse in home at 2:30 this afternoon. Told her to do her neb every 4 hours at a minimum and have very low tolerance for any worsening of her breathing she needs to go to ED. Given alternative of [facility] [address] for evaluation and possible admission. Pt would like to avoid steroid burst as well, discussed she should call if she changes her mind as she would likely benefit from them from a respiratory stand point. She can continue with doxycycline.

## 2018-01-04 ENCOUNTER — OFFICE VISIT (OUTPATIENT)
Dept: FAMILY MEDICINE CLINIC | Facility: CLINIC | Age: 78
End: 2018-01-04

## 2018-01-04 VITALS
SYSTOLIC BLOOD PRESSURE: 128 MMHG | OXYGEN SATURATION: 99 % | BODY MASS INDEX: 25.49 KG/M2 | WEIGHT: 153 LBS | HEIGHT: 65 IN | TEMPERATURE: 98.3 F | DIASTOLIC BLOOD PRESSURE: 62 MMHG

## 2018-01-04 DIAGNOSIS — J45.40 MODERATE PERSISTENT ASTHMA WITHOUT COMPLICATION: Primary | ICD-10-CM

## 2018-01-04 PROCEDURE — 99213 OFFICE O/P EST LOW 20 MIN: CPT | Performed by: FAMILY MEDICINE

## 2018-01-04 RX ORDER — BUDESONIDE AND FORMOTEROL FUMARATE DIHYDRATE 160; 4.5 UG/1; UG/1
2 AEROSOL RESPIRATORY (INHALATION)
Qty: 10.2 G | Refills: 12 | Status: SHIPPED | OUTPATIENT
Start: 2018-01-04 | End: 2018-01-16 | Stop reason: SDUPTHER

## 2018-01-04 NOTE — PROGRESS NOTES
"Subjective   Karli Loomis is a 77 y.o. female. Pt here to follow up after asthma exacerbation.    Chief Complaint   Patient presents with   • Follow-up        History of Present Illness    Asthma exacerbation  Pt reported doing much better. She did not go to the hospital for the illness.  She ended up doing her DuoNeb at home every 4 hours.  Did report significant shakiness as a side effect but had improvement with the treatments as far as her breathing goes.  Also was using the Symbicort she was given in the office and feels this really helped too.  Only has about 4 days of Symbicort left from this sample.  Reported she just had significant amounts of sputum production and nasal secretions but this is also improving.  She has been doing the DuoNeb's every 4 hours at home since 12/22.  Wondering when she should decrease the frequency and when she should resume her allergy shots.      The following portions of the patient's history were reviewed and updated as appropriate: allergies, current medications and problem list.      Review of Systems   Constitutional: Positive for activity change and appetite change.   HENT: Negative for congestion.    Respiratory: Negative for cough, shortness of breath and wheezing.        Objective   Blood pressure 128/62, temperature 98.3 °F (36.8 °C), temperature source Oral, height 165.1 cm (65\"), weight 69.4 kg (153 lb), SpO2 99 %.  Physical Exam   Constitutional: She appears well-nourished. No distress.   Cardiovascular: Normal rate, regular rhythm and normal heart sounds.    No murmur heard.  Pulmonary/Chest: Breath sounds normal. No respiratory distress.   Good air movement throughout.  Had one expiratory wheeze   Psychiatric: She has a normal mood and affect. Her behavior is normal.   Vitals reviewed.      Assessment/Plan   Karli was seen today for follow-up.    Diagnoses and all orders for this visit:    Moderate persistent asthma without complication    Other orders  -     " budesonide-formoterol (SYMBICORT) 160-4.5 MCG/ACT inhaler; Inhale 2 puffs 2 (Two) Times a Day.    Patient to continue on her DuoNebs for the next few days.  She can start to space the treatments if tolerated to 2-3 treatments per day.  Would really like to get her prescription for Symbicort since she does significantly better on this and the other options.  She has been on Asmanex and Dulera.  We'll contact insurance to address this coverage.  Hold on allergy shots at this time since we'll have to go to elevated dose since has been off for 2 months.  Think would be appropriate if she continues to do well and Symbicort goes through to resume the shots.    Patient to follow-up with oncology.  Expecting port placement and new chemotherapy as planned from last visit.  She will follow-up after this has been taking care of.

## 2018-01-16 ENCOUNTER — TELEPHONE (OUTPATIENT)
Dept: FAMILY MEDICINE CLINIC | Facility: CLINIC | Age: 78
End: 2018-01-16

## 2018-01-16 RX ORDER — BUDESONIDE AND FORMOTEROL FUMARATE DIHYDRATE 160; 4.5 UG/1; UG/1
2 AEROSOL RESPIRATORY (INHALATION)
Qty: 10.2 G | Refills: 12 | Status: SHIPPED | OUTPATIENT
Start: 2018-01-16 | End: 2018-11-29

## 2018-01-24 RX ORDER — GABAPENTIN 400 MG/1
CAPSULE ORAL
Qty: 120 CAPSULE | Refills: 0 | Status: CANCELLED | OUTPATIENT
Start: 2018-01-24

## 2018-01-25 ENCOUNTER — OFFICE VISIT (OUTPATIENT)
Dept: ONCOLOGY | Facility: CLINIC | Age: 78
End: 2018-01-25

## 2018-01-25 ENCOUNTER — LAB (OUTPATIENT)
Dept: LAB | Facility: HOSPITAL | Age: 78
End: 2018-01-25

## 2018-01-25 VITALS
HEART RATE: 53 BPM | OXYGEN SATURATION: 99 % | DIASTOLIC BLOOD PRESSURE: 76 MMHG | BODY MASS INDEX: 26.7 KG/M2 | WEIGHT: 156.4 LBS | HEIGHT: 64 IN | TEMPERATURE: 98.2 F | SYSTOLIC BLOOD PRESSURE: 180 MMHG | RESPIRATION RATE: 16 BRPM

## 2018-01-25 DIAGNOSIS — Z17.0 MALIGNANT NEOPLASM OF OVERLAPPING SITES OF LEFT BREAST IN FEMALE, ESTROGEN RECEPTOR POSITIVE (HCC): ICD-10-CM

## 2018-01-25 DIAGNOSIS — C50.812 MALIGNANT NEOPLASM OF OVERLAPPING SITES OF LEFT BREAST IN FEMALE, ESTROGEN RECEPTOR POSITIVE (HCC): ICD-10-CM

## 2018-01-25 DIAGNOSIS — E83.52 HYPERCALCEMIA: ICD-10-CM

## 2018-01-25 DIAGNOSIS — C88.0 MACROGLOBULINEMIA OF WALDENSTROM (HCC): Primary | ICD-10-CM

## 2018-01-25 DIAGNOSIS — C88.0 MACROGLOBULINEMIA OF WALDENSTROM (HCC): ICD-10-CM

## 2018-01-25 LAB
ALBUMIN SERPL-MCNC: 4.8 G/DL (ref 3.5–5.2)
ALBUMIN/GLOB SERPL: 1.9 G/DL (ref 1.1–2.4)
ALP SERPL-CCNC: 80 U/L (ref 38–116)
ALT SERPL W P-5'-P-CCNC: 13 U/L (ref 0–33)
ANION GAP SERPL CALCULATED.3IONS-SCNC: 11.1 MMOL/L
AST SERPL-CCNC: 21 U/L (ref 0–32)
BASOPHILS # BLD AUTO: 0.02 10*3/MM3 (ref 0–0.1)
BASOPHILS NFR BLD AUTO: 0.4 % (ref 0–1.1)
BILIRUB SERPL-MCNC: 0.7 MG/DL (ref 0.1–1.2)
BUN BLD-MCNC: 7 MG/DL (ref 6–20)
BUN/CREAT SERPL: 9 (ref 7.3–30)
CALCIUM SPEC-SCNC: 10.1 MG/DL (ref 8.5–10.2)
CHLORIDE SERPL-SCNC: 88 MMOL/L (ref 98–107)
CO2 SERPL-SCNC: 29.9 MMOL/L (ref 22–29)
CREAT BLD-MCNC: 0.78 MG/DL (ref 0.6–1.1)
DEPRECATED RDW RBC AUTO: 41.1 FL (ref 37–49)
EOSINOPHIL # BLD AUTO: 0.15 10*3/MM3 (ref 0–0.36)
EOSINOPHIL NFR BLD AUTO: 3.4 % (ref 1–5)
ERYTHROCYTE [DISTWIDTH] IN BLOOD BY AUTOMATED COUNT: 11.9 % (ref 11.7–14.5)
GFR SERPL CREATININE-BSD FRML MDRD: 71 ML/MIN/1.73
GLOBULIN UR ELPH-MCNC: 2.5 GM/DL (ref 1.8–3.5)
GLUCOSE BLD-MCNC: 114 MG/DL (ref 74–124)
HCT VFR BLD AUTO: 42.2 % (ref 34–45)
HGB BLD-MCNC: 14.7 G/DL (ref 11.5–14.9)
IMM GRANULOCYTES # BLD: 0.01 10*3/MM3 (ref 0–0.03)
IMM GRANULOCYTES NFR BLD: 0.2 % (ref 0–0.5)
LYMPHOCYTES # BLD AUTO: 0.99 10*3/MM3 (ref 1–3.5)
LYMPHOCYTES NFR BLD AUTO: 22.2 % (ref 20–49)
MCH RBC QN AUTO: 32.6 PG (ref 27–33)
MCHC RBC AUTO-ENTMCNC: 34.8 G/DL (ref 32–35)
MCV RBC AUTO: 93.6 FL (ref 83–97)
MONOCYTES # BLD AUTO: 0.41 10*3/MM3 (ref 0.25–0.8)
MONOCYTES NFR BLD AUTO: 9.2 % (ref 4–12)
NEUTROPHILS # BLD AUTO: 2.87 10*3/MM3 (ref 1.5–7)
NEUTROPHILS NFR BLD AUTO: 64.6 % (ref 39–75)
NRBC BLD MANUAL-RTO: 0 /100 WBC (ref 0–0)
PLATELET # BLD AUTO: 146 10*3/MM3 (ref 150–375)
PMV BLD AUTO: 9.8 FL (ref 8.9–12.1)
POTASSIUM BLD-SCNC: 4.1 MMOL/L (ref 3.5–4.7)
PROT SERPL-MCNC: 7.3 G/DL (ref 6.3–8)
PTH-INTACT SERPL-MCNC: 31.6 PG/ML (ref 15–65)
RBC # BLD AUTO: 4.51 10*6/MM3 (ref 3.9–5)
SODIUM BLD-SCNC: 129 MMOL/L (ref 134–145)
WBC NRBC COR # BLD: 4.45 10*3/MM3 (ref 4–10)

## 2018-01-25 PROCEDURE — 99213 OFFICE O/P EST LOW 20 MIN: CPT | Performed by: INTERNAL MEDICINE

## 2018-01-25 PROCEDURE — 36415 COLL VENOUS BLD VENIPUNCTURE: CPT | Performed by: INTERNAL MEDICINE

## 2018-01-25 PROCEDURE — 80053 COMPREHEN METABOLIC PANEL: CPT | Performed by: INTERNAL MEDICINE

## 2018-01-25 PROCEDURE — 85025 COMPLETE CBC W/AUTO DIFF WBC: CPT | Performed by: INTERNAL MEDICINE

## 2018-01-25 PROCEDURE — 83970 ASSAY OF PARATHORMONE: CPT | Performed by: INTERNAL MEDICINE

## 2018-01-25 NOTE — PROGRESS NOTES
Subjective  REASONS FOR FOLLOWUP:     1. History of Waldenstrom macroglobulinemia. The patient  remains in remission about this condition with stable level of immunoglobulin M . Normal IgA. Normal IgG. Normal white count, hemoglobin, and platelets. No abnormal bleeding. No peripheral adenopathy. No hepatosplenomegaly. The patient will remain in observation.   2. History of breast cancer stage I on the left, status post mastectomy. The patient remains on aromatase inhibitor without any evidence of breast cancer recurrence and good tolerance to her aromatase inhibitor medicine. She will remain on the same issue for the time being.                History of Present Illness  As above. She is here today with no complaints in regard to her previous history of breast cancer status post left mastectomy and her Waldenström macroglobulinemia.       The patient feels like having a good appetite, stable weight, normal bowel activity, normal urination.  She has not developed any other masses at any level and no peripheral adenopathy. No abnormal bleeding. Her right knee is back to normality and she has been able to walk any distance with no limitations.  He occasionally the patient is complaining of some numbness sensation on the plantar aspect of both feet especially when she goes to bed but is of transitory phenomenon that doesn't last long.  Similar to the symptoms that she had when she was first diagnosed with Waldenström's with a difference that the pain and discomfort then was continuous and came down to below the knee all the time.  Has no difficulty walking she has no difficulty with perception of temperature .    The patient was further reviewed on 01/25/2018. Since the previous visit the patient had an episode of pneumonia. She was treated with antibiotics. She could not be admitted to the hospital because there was no bed available but finally she was able to get over it. She coughed so much that she bruised her rib  cage on the left with minimal pain that is slowly improving but no obvious symptoms that would suggest a refracture. She has no cough, no sputum production, no shortness of breath. Her appetite has improved. Her bowel function and urination are fine. She has been seen by hand surgery and she had a local steroid injection in the right wrist that gave her relief of the pain. The numbness in the 1st 3 fingers of the right hand remain ongoing and she has started physical therapy for this already. Otherwise no other new symptoms. She has good bowel activity, normal urination and no fevers, no chills, no sweats.        1. Past Medical History, Past Surgical HistoryHypertension.    2. Hypothyroidism many years ago and has not taken any thyroid medication in quite some time.   3. Thyroid nodule removed more than 35 years ago.    4. Cholecystectomy.    5. Tonsillectomy and adenoidectomy.    6. Appendectomy.  7. Partial hysterectomy many years ago.      She has mammogram and pelvic examination yearly that have been normal.  Colonoscopy 4 years ago was normal.   She also has had a leaky valve in her heart without any significance.      During the recent workup at [facility], the patient had a transesophageal echocardiogram that failed to document any vegetation.    Clostridium colitis requiring admission to [facility] in 09/2008.      Arthroscopy in 03/2009 of the right knee with finding of chondromalacia and appropriate cleanup of the joint was performed by Dr. Moraes.      On 07/24/2013 the patient has lost 14 pounds, just cutting down on junk food.  Her glucose intolerance has improved substantially and her glucose has normalized.      On 01/12/2014, we reviewed her blood pressure issues recently. She has had a very stable blood pressure for the last week, and today is normal. Her physical examination is otherwise unremarkable and normal feeling.     We advised her to remain in observation from  this point of view.    As per 10/01/2014, the patient has undergone evaluation by neurology service at [facility] for consideration of gabapentin that she has been taking for so many years for possible seizure activity.   I am aware of an MRI scan of the brain that shows not too much, besides minimal vascular disease and no other lesions.  Her neurologist will be informing us in regard how to proceed in this regard.      She wanted to have a right knee replaced in 2009 through her orthopedic surgeon. She developed delirium in the hospital and extensive ecchymosis and hematoma formation in the whole right lower extremity requiring transfusion of red cells for a hemoglobin of 7.   SOCIAL HISTORY:  .  Retired from [employer] where she worked for many years; she never was exposed to any chemicals.   She is a never smoker and nondrinker.   She lives with her oldest daughter.      FAMILY HISTORY: The patient’s father  of complications from heart disease at age 70.  Mother  after a psychiatric illness at age 67.  A brother  in a car accident at 43 and a sister, age 75, is in good health.   Her three daughters are in good health.  She has no FH of lymphoma or leukemia.    Review of Systems    General: no fever, chills, fatigue, weight changes, or lack of appetite.  Eyes: no epiphora, conjunctivitis, pain, glaucoma, blurred vision, blindness, secretion, photophobia.  Ears: no otorrhea, tinnitus, otorrhagia, deafness, pain, vertigo.  Nose: no rhinorrhea, epistaxis, alteration in perception of odors, sinuses pressure.  Mouth: no alteration in gums or teeth,  ulcers, no difficulty with mastication or deglut ion, no odynophagia.  Neck: no masses or pain, no thyroid alterations, no pain in muscles or arteries, no carotid odynia, no crepitation.  Lungs: RESOLVED cough, sputum production,NO dyspnea,no  trepopnea, pleuritic pain, hemoptysis.  Heart: no syncope, irregularity, palpitations,  "angina, orthopnea, paroxysmal nocturnal dyspnea.  GI: no dysphagia, odynophagia, no regurgitation, no heartburn, indigestion, nausea, vomiting, hematemesis ,melena, jaundice, distention, obstipation, enterorrhagia, proctalgia, anal  Lesions, changes in bowel habits.  : no frequency, hesitancy, hematuria, discharge, pain.  Musculoskeletal: no muscle or tendon pain or inflammation, joint pain, edema, functional limitation, fasciculations,  Neurologic: no headache, seizures, alterations on Craneal nerves, no motor  deficit, normal coordination.numbness feet as stated.  Skin:NEGATIVE  Psychiatric: no anxiety, depression, agitation, delusions, proper insight.  A comprehensive 14 point review of systems was performed and was negative except as mentioned.    Medications:  The current medication list was reviewed in the EMR    ALLERGIES:    Allergies   Allergen Reactions   • Cortisone    • Darvon  [Propoxyphene] Palpitations       Objective      Vitals:    01/25/18 1055   BP: 180/76  Comment: pt may not have taken her BP meds this morning.   Pulse: 53   Resp: 16   Temp: 98.2 °F (36.8 °C)   TempSrc: Oral   SpO2: 99%   Weight: 70.9 kg (156 lb 6.4 oz)   Height: 162.5 cm (63.98\")   PainSc: 4  Comment: L breast/axilla- past few weeks     Current Status 1/25/2018   ECOG score 0       Physical Exam    GENERAL:  Well-developed, well-nourished  Patient  in no acute distress.   SKIN:  Warm, dry with papular erythematous external rle rash and ryness, no typical of anything in particular,no purpura or petechiae.  HEENT:  Pupils were equal and reactive to light and accomodation, conjunctivas non injected, no pterigion, normal extraocular movements, normal visual acuity.   Mouth mucosa was moist, no exudates in oropharynx, normal gum line, normal roof of the mouth and pillars, normal papillations of the tongue.  NECK:  Supple with good range of motion; no thyromegaly or masses, no JVD or bruits, no cervical adenopathies.  LYMPHATICS:  " No cervical, supraclavicular, axillary or inguinal adenopathy.  CHEST:  Lungs clear to percussion and auscultation, normal breath sounds bilaterally, no wheezing, crackles or ronchi, no stridor.NO PAIN ON RIB CAGE LEFT NEITHER CREPITATION.  CARDIAC:  Regular rate and rhythm without murmurs, rubs or gallops.  ABDOMEN:  Soft, nontender with no organomegaly or masses, no ascites, no collateral circulation, no abdominal pain, no inguinal hernias, no abdominal bruits.  EXTREMITIES:  No clubbing, cyanosis or edema, no deformities or pain.   NEUROLOGICAL:  Patient was awake, alert, oriented to time, person and place, decreased vibratory sensation both feet, normal gait, negative Romberg.abolished reflexes all over, normal strength in upper and lower extremities.    RECENT LABS:  Assessment/Plan    1.  1.  This patient has history of Waldenstrom’s macroglobulinemia;In the interim the patient has had completion of her Rituxan therapyweeks ago and she is here for monoclonal protein studies.She has now healed from the point of view of her pneumonia and I think the pain that she has in the rib cage on the left is consequence of so much coughing that was so bad and severe then. The pain is slowly improving. She has no lesions in the skin that would suggest shingles and she has nothing that to suggest a fracture of the rib. Her left mastectomy site remains intact. She continues her hormonal therapy for her breast cancer with no difficulties whatsoever.     She has had relief of the pain in the right hand after steroid injection by hand surgery. She is starting physical therapy for this as well.    She has no issues that would suggest high calcium level manifestations. She is no longer taking calcium or vitamin D supplementation as I suggested during the previous visit.    RECOMMENDATIONS: The patient will be called at home with the report of her monoclonal protein studies and calcium levels once that they become available this  coming Monday. Otherwise tentative appointment to see her back in 3 months and follow up on her monoclonal proteins them. If the patient has a rise in her monoclonal protein IgM she will be scheduled to proceed with therapy with Rituxan, bendamustine                                           1/25/2018      CC:

## 2018-01-26 ENCOUNTER — TELEPHONE (OUTPATIENT)
Dept: FAMILY MEDICINE CLINIC | Facility: CLINIC | Age: 78
End: 2018-01-26

## 2018-01-26 ENCOUNTER — TELEPHONE (OUTPATIENT)
Dept: ONCOLOGY | Facility: CLINIC | Age: 78
End: 2018-01-26

## 2018-01-26 LAB
ALBUMIN SERPL-MCNC: 4 G/DL (ref 2.9–4.4)
ALBUMIN/GLOB SERPL: 1.5 {RATIO} (ref 0.7–1.7)
ALPHA1 GLOB FLD ELPH-MCNC: 0.3 G/DL (ref 0–0.4)
ALPHA2 GLOB SERPL ELPH-MCNC: 0.8 G/DL (ref 0.4–1)
B-GLOBULIN SERPL ELPH-MCNC: 0.9 G/DL (ref 0.7–1.3)
GAMMA GLOB SERPL ELPH-MCNC: 0.7 G/DL (ref 0.4–1.8)
GLOBULIN SER CALC-MCNC: 2.7 G/DL (ref 2.2–3.9)
IGA SERPL-MCNC: 34 MG/DL (ref 64–422)
IGG SERPL-MCNC: 394 MG/DL (ref 700–1600)
IGM SERPL-MCNC: 365 MG/DL (ref 26–217)
INTERPRETATION SERPL IEP-IMP: ABNORMAL
KAPPA LC SERPL-MCNC: 11.2 MG/L (ref 3.3–19.4)
KAPPA LC/LAMBDA SER: 3.11 {RATIO} (ref 0.26–1.65)
LAMBDA LC FREE SERPL-MCNC: 3.6 MG/L (ref 5.7–26.3)
Lab: ABNORMAL
M-SPIKE: 0.4 G/DL
PROT SERPL-MCNC: 6.7 G/DL (ref 6–8.5)

## 2018-01-26 RX ORDER — GABAPENTIN 400 MG/1
400 CAPSULE ORAL 4 TIMES DAILY
Qty: 120 CAPSULE | Refills: 0 | OUTPATIENT
Start: 2018-01-26 | End: 2018-02-20 | Stop reason: SDUPTHER

## 2018-01-26 NOTE — TELEPHONE ENCOUNTER
PHONE WITH PT; M SPIKE SUBSTANTIALLY DOWN, NO NEED FOR MORE THERAPY NOW, REPEAT TEST IN 11 WEEKS AND SEE HER AFTER THAT, HER LOW SODIUM IS PSEUDOHYPONATREMIA RELATED TO THE M PROTEIN NO NEED FOR CORRECTION.SHE AGREES

## 2018-01-29 ENCOUNTER — TELEPHONE (OUTPATIENT)
Dept: ONCOLOGY | Facility: CLINIC | Age: 78
End: 2018-01-29

## 2018-02-01 RX ORDER — GABAPENTIN 400 MG/1
CAPSULE ORAL
Qty: 120 CAPSULE | Refills: 0 | OUTPATIENT
Start: 2018-02-01

## 2018-02-20 ENCOUNTER — OFFICE VISIT (OUTPATIENT)
Dept: FAMILY MEDICINE CLINIC | Facility: CLINIC | Age: 78
End: 2018-02-20

## 2018-02-20 VITALS
HEART RATE: 60 BPM | SYSTOLIC BLOOD PRESSURE: 140 MMHG | DIASTOLIC BLOOD PRESSURE: 80 MMHG | HEIGHT: 64 IN | BODY MASS INDEX: 26.63 KG/M2 | WEIGHT: 156 LBS | OXYGEN SATURATION: 99 %

## 2018-02-20 DIAGNOSIS — E87.1 HYPONATREMIA: ICD-10-CM

## 2018-02-20 DIAGNOSIS — E03.9 ADULT HYPOTHYROIDISM: ICD-10-CM

## 2018-02-20 DIAGNOSIS — R51.9 NONINTRACTABLE HEADACHE, UNSPECIFIED CHRONICITY PATTERN, UNSPECIFIED HEADACHE TYPE: Primary | ICD-10-CM

## 2018-02-20 DIAGNOSIS — R35.0 URINE FREQUENCY: ICD-10-CM

## 2018-02-20 LAB
BILIRUB BLD-MCNC: NEGATIVE MG/DL
CLARITY, POC: CLEAR
COLOR UR: YELLOW
GLUCOSE UR STRIP-MCNC: NEGATIVE MG/DL
KETONES UR QL: NEGATIVE
LEUKOCYTE EST, POC: NEGATIVE
NITRITE UR-MCNC: NEGATIVE MG/ML
PH UR: 7 [PH] (ref 5–8)
PROT UR STRIP-MCNC: NEGATIVE MG/DL
RBC # UR STRIP: NEGATIVE /UL
SP GR UR: 1.01 (ref 1–1.03)
UROBILINOGEN UR QL: NORMAL

## 2018-02-20 PROCEDURE — 81003 URINALYSIS AUTO W/O SCOPE: CPT | Performed by: FAMILY MEDICINE

## 2018-02-20 PROCEDURE — 99214 OFFICE O/P EST MOD 30 MIN: CPT | Performed by: FAMILY MEDICINE

## 2018-02-20 RX ORDER — GABAPENTIN 400 MG/1
400 CAPSULE ORAL 4 TIMES DAILY
Qty: 120 CAPSULE | Refills: 2 | Status: SHIPPED | OUTPATIENT
Start: 2018-02-20 | End: 2018-05-29 | Stop reason: SDUPTHER
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
## 2018-02-21 LAB
BUN SERPL-MCNC: 8 MG/DL (ref 8–23)
BUN/CREAT SERPL: 9.9 (ref 7–25)
CALCIUM SERPL-MCNC: 10.2 MG/DL (ref 8.6–10.5)
CHLORIDE SERPL-SCNC: 85 MMOL/L (ref 98–107)
CO2 SERPL-SCNC: 30.6 MMOL/L (ref 22–29)
CREAT SERPL-MCNC: 0.81 MG/DL (ref 0.57–1)
GFR SERPLBLD CREATININE-BSD FMLA CKD-EPI: 68 ML/MIN/1.73
GFR SERPLBLD CREATININE-BSD FMLA CKD-EPI: 83 ML/MIN/1.73
GLUCOSE SERPL-MCNC: 103 MG/DL (ref 65–99)
POTASSIUM SERPL-SCNC: 4.2 MMOL/L (ref 3.5–5.2)
SODIUM SERPL-SCNC: 128 MMOL/L (ref 136–145)
TSH SERPL DL<=0.005 MIU/L-ACNC: 1.88 MIU/ML (ref 0.27–4.2)

## 2018-02-26 DIAGNOSIS — E87.1 HYPONATREMIA: Primary | ICD-10-CM

## 2018-02-26 RX ORDER — LOSARTAN POTASSIUM 100 MG/1
100 TABLET ORAL DAILY
Qty: 30 TABLET | Refills: 2 | Status: SHIPPED | OUTPATIENT
Start: 2018-02-26 | End: 2018-03-08 | Stop reason: ALTCHOICE

## 2018-02-26 NOTE — PROGRESS NOTES
Spoke with patient about her lab results.  Sodium continues to be low.  We will discontinue the hydrochlorothiazide but at this time continue losartan 100 mg.  Noted that it can also be a cause of hyponatremia.  She will take her blood pressure during this time to make sure it stays well-controlled with medication changes.  She will continue to practice the same low sodium diet as we don't want to exacerbate her hypertension.  Follow-up in one week with repeat BMP.

## 2018-03-05 ENCOUNTER — RESULTS ENCOUNTER (OUTPATIENT)
Dept: FAMILY MEDICINE CLINIC | Facility: CLINIC | Age: 78
End: 2018-03-05

## 2018-03-05 DIAGNOSIS — E87.1 HYPONATREMIA: ICD-10-CM

## 2018-03-08 LAB
BUN SERPL-MCNC: 8 MG/DL (ref 8–23)
BUN/CREAT SERPL: 9.9 (ref 7–25)
CALCIUM SERPL-MCNC: 9.7 MG/DL (ref 8.6–10.5)
CHLORIDE SERPL-SCNC: 97 MMOL/L (ref 98–107)
CO2 SERPL-SCNC: 31.4 MMOL/L (ref 22–29)
CREAT SERPL-MCNC: 0.81 MG/DL (ref 0.57–1)
GFR SERPLBLD CREATININE-BSD FMLA CKD-EPI: 68 ML/MIN/1.73
GFR SERPLBLD CREATININE-BSD FMLA CKD-EPI: 83 ML/MIN/1.73
GLUCOSE SERPL-MCNC: 100 MG/DL (ref 65–99)
POTASSIUM SERPL-SCNC: 4.2 MMOL/L (ref 3.5–5.2)
SODIUM SERPL-SCNC: 139 MMOL/L (ref 136–145)

## 2018-03-08 RX ORDER — LOSARTAN POTASSIUM AND HYDROCHLOROTHIAZIDE 25; 100 MG/1; MG/1
1 TABLET ORAL DAILY
Qty: 90 TABLET | Refills: 1 | Status: SHIPPED | OUTPATIENT
Start: 2018-03-08 | End: 2018-10-11 | Stop reason: SDUPTHER

## 2018-03-14 ENCOUNTER — TELEPHONE (OUTPATIENT)
Dept: FAMILY MEDICINE CLINIC | Facility: CLINIC | Age: 78
End: 2018-03-14

## 2018-04-12 ENCOUNTER — LAB (OUTPATIENT)
Dept: LAB | Facility: HOSPITAL | Age: 78
End: 2018-04-12

## 2018-04-12 ENCOUNTER — OFFICE VISIT (OUTPATIENT)
Dept: FAMILY MEDICINE CLINIC | Facility: CLINIC | Age: 78
End: 2018-04-12

## 2018-04-12 VITALS
BODY MASS INDEX: 26.89 KG/M2 | DIASTOLIC BLOOD PRESSURE: 70 MMHG | TEMPERATURE: 98.2 F | HEIGHT: 64 IN | SYSTOLIC BLOOD PRESSURE: 150 MMHG | HEART RATE: 65 BPM | OXYGEN SATURATION: 97 % | WEIGHT: 157.5 LBS

## 2018-04-12 DIAGNOSIS — Z17.0 MALIGNANT NEOPLASM OF OVERLAPPING SITES OF LEFT BREAST IN FEMALE, ESTROGEN RECEPTOR POSITIVE (HCC): Primary | ICD-10-CM

## 2018-04-12 DIAGNOSIS — C50.812 MALIGNANT NEOPLASM OF OVERLAPPING SITES OF LEFT BREAST IN FEMALE, ESTROGEN RECEPTOR POSITIVE (HCC): Primary | ICD-10-CM

## 2018-04-12 DIAGNOSIS — J40 BRONCHITIS: Primary | ICD-10-CM

## 2018-04-12 LAB
ALBUMIN SERPL-MCNC: 4.4 G/DL (ref 3.5–5.2)
ALBUMIN/GLOB SERPL: 1.6 G/DL (ref 1.1–2.4)
ALP SERPL-CCNC: 72 U/L (ref 38–116)
ALT SERPL W P-5'-P-CCNC: 13 U/L (ref 0–33)
ANION GAP SERPL CALCULATED.3IONS-SCNC: 10.6 MMOL/L
AST SERPL-CCNC: 21 U/L (ref 0–32)
BASOPHILS # BLD AUTO: 0.04 10*3/MM3 (ref 0–0.1)
BASOPHILS NFR BLD AUTO: 0.7 % (ref 0–1.1)
BILIRUB SERPL-MCNC: 0.5 MG/DL (ref 0.1–1.2)
BUN BLD-MCNC: 9 MG/DL (ref 6–20)
BUN/CREAT SERPL: 12.3 (ref 7.3–30)
CALCIUM SPEC-SCNC: 10.5 MG/DL (ref 8.5–10.2)
CHLORIDE SERPL-SCNC: 90 MMOL/L (ref 98–107)
CO2 SERPL-SCNC: 31.4 MMOL/L (ref 22–29)
CREAT BLD-MCNC: 0.73 MG/DL (ref 0.6–1.1)
DEPRECATED RDW RBC AUTO: 41.6 FL (ref 37–49)
EOSINOPHIL # BLD AUTO: 0.29 10*3/MM3 (ref 0–0.36)
EOSINOPHIL NFR BLD AUTO: 5.4 % (ref 1–5)
ERYTHROCYTE [DISTWIDTH] IN BLOOD BY AUTOMATED COUNT: 12.5 % (ref 11.7–14.5)
GFR SERPL CREATININE-BSD FRML MDRD: 77 ML/MIN/1.73
GLOBULIN UR ELPH-MCNC: 2.7 GM/DL (ref 1.8–3.5)
GLUCOSE BLD-MCNC: 108 MG/DL (ref 74–124)
HCT VFR BLD AUTO: 42.2 % (ref 34–45)
HGB BLD-MCNC: 14.5 G/DL (ref 11.5–14.9)
IMM GRANULOCYTES # BLD: 0.02 10*3/MM3 (ref 0–0.03)
IMM GRANULOCYTES NFR BLD: 0.4 % (ref 0–0.5)
LYMPHOCYTES # BLD AUTO: 1.12 10*3/MM3 (ref 1–3.5)
LYMPHOCYTES NFR BLD AUTO: 20.7 % (ref 20–49)
MCH RBC QN AUTO: 31.5 PG (ref 27–33)
MCHC RBC AUTO-ENTMCNC: 34.4 G/DL (ref 32–35)
MCV RBC AUTO: 91.5 FL (ref 83–97)
MONOCYTES # BLD AUTO: 0.48 10*3/MM3 (ref 0.25–0.8)
MONOCYTES NFR BLD AUTO: 8.9 % (ref 4–12)
NEUTROPHILS # BLD AUTO: 3.47 10*3/MM3 (ref 1.5–7)
NEUTROPHILS NFR BLD AUTO: 63.9 % (ref 39–75)
NRBC BLD MANUAL-RTO: 0 /100 WBC (ref 0–0)
PLATELET # BLD AUTO: 184 10*3/MM3 (ref 150–375)
PMV BLD AUTO: 9.4 FL (ref 8.9–12.1)
POTASSIUM BLD-SCNC: 4 MMOL/L (ref 3.5–4.7)
PROT SERPL-MCNC: 7.1 G/DL (ref 6.3–8)
RBC # BLD AUTO: 4.61 10*6/MM3 (ref 3.9–5)
SODIUM BLD-SCNC: 132 MMOL/L (ref 134–145)
WBC NRBC COR # BLD: 5.42 10*3/MM3 (ref 4–10)

## 2018-04-12 PROCEDURE — 85025 COMPLETE CBC W/AUTO DIFF WBC: CPT | Performed by: INTERNAL MEDICINE

## 2018-04-12 PROCEDURE — 36415 COLL VENOUS BLD VENIPUNCTURE: CPT | Performed by: INTERNAL MEDICINE

## 2018-04-12 PROCEDURE — 80053 COMPREHEN METABOLIC PANEL: CPT

## 2018-04-12 PROCEDURE — 99213 OFFICE O/P EST LOW 20 MIN: CPT | Performed by: NURSE PRACTITIONER

## 2018-04-12 RX ORDER — BENZONATATE 100 MG/1
100 CAPSULE ORAL 3 TIMES DAILY PRN
Qty: 30 CAPSULE | Refills: 0 | Status: SHIPPED | OUTPATIENT
Start: 2018-04-12 | End: 2018-08-17

## 2018-04-12 RX ORDER — DOXYCYCLINE 100 MG/1
100 CAPSULE ORAL EVERY 12 HOURS SCHEDULED
Qty: 20 CAPSULE | Refills: 0 | Status: SHIPPED | OUTPATIENT
Start: 2018-04-12 | End: 2018-08-17

## 2018-04-12 NOTE — PROGRESS NOTES
Subjective   Karli Loomis is a 77 y.o. female presents with cough x 12 days ago. Start a z-luis fernando from her allergist, started diarrhea, stopped on day 4. Previously tolerated doxycycline back in December. Mild wheezing, at night when lying down. Using nebulizer with good relief. Denies shortness of breath currently. Improving. Cough is productive, purulent sputum, frequent. Currently taking symbicort.  Denies sinus pain or pressure, increased post nasal drainage.     Bronchitis   This is a new problem. The current episode started 1 to 4 weeks ago. The problem occurs daily. The problem has been unchanged. Associated symptoms include congestion and coughing. Pertinent negatives include no abdominal pain, anorexia, arthralgias, change in bowel habit, chest pain, chills, diaphoresis, fatigue, fever, headaches, joint swelling, myalgias, nausea, neck pain, numbness, rash, sore throat, swollen glands, urinary symptoms, vertigo, visual change, vomiting or weakness. Nothing aggravates the symptoms. Treatments tried: nebulizer, zpak. The treatment provided mild relief.   Cough   This is a new problem. The current episode started 1 to 4 weeks ago. The problem has been gradually worsening. The problem occurs every few minutes. The cough is productive of purulent sputum. Associated symptoms include nasal congestion, postnasal drip, shortness of breath and wheezing. Pertinent negatives include no chest pain, chills, ear congestion, ear pain, fever, headaches, heartburn, hemoptysis, myalgias, rash, sore throat, sweats or weight loss. Nothing aggravates the symptoms. She has tried steroid inhaler and a beta-agonist inhaler for the symptoms. The treatment provided moderate relief. Her past medical history is significant for asthma and bronchitis.        The following portions of the patient's history were reviewed and updated as appropriate: allergies, current medications, past family history, past medical history, past social  history, past surgical history and problem list.    Review of Systems   Constitutional: Negative for chills, diaphoresis, fatigue, fever and weight loss.   HENT: Positive for congestion and postnasal drip. Negative for ear pain and sore throat.    Respiratory: Positive for cough, shortness of breath and wheezing. Negative for hemoptysis.    Cardiovascular: Negative for chest pain.   Gastrointestinal: Negative for abdominal pain, anorexia, change in bowel habit, heartburn, nausea and vomiting.   Musculoskeletal: Negative for arthralgias, joint swelling, myalgias and neck pain.   Skin: Negative for rash.   Neurological: Negative for vertigo, weakness, numbness and headaches.       Objective   Physical Exam   Constitutional: She is oriented to person, place, and time. She appears well-developed and well-nourished.   Cardiovascular: Normal rate, regular rhythm and normal heart sounds.  Exam reveals no gallop and no friction rub.    No murmur heard.  Pulmonary/Chest: Effort normal and breath sounds normal. No respiratory distress. She has no wheezes. She has no rales.   Neurological: She is alert and oriented to person, place, and time.   Skin: Skin is warm and dry.   Psychiatric: She has a normal mood and affect.   Vitals reviewed.      Assessment/Plan   Karli was seen today for bronchitis, cough and foot swelling.    Diagnoses and all orders for this visit:    Bronchitis    Other orders  -     doxycycline (MONODOX) 100 MG capsule; Take 1 capsule by mouth Every 12 (Twelve) Hours.  -     benzonatate (TESSALON PERLES) 100 MG capsule; Take 1 capsule by mouth 3 (Three) Times a Day As Needed for Cough.      Increase fluids  Start probiotic  Take antibiotic after eating to prevent upset stomach  Follow up for worsening symptom

## 2018-04-12 NOTE — PATIENT INSTRUCTIONS
Increase fluids  Start probiotic  Take antibiotic after eating to prevent upset stomach  Follow up for worsening symptom

## 2018-04-13 LAB
ALBUMIN SERPL-MCNC: 3.8 G/DL (ref 2.9–4.4)
ALBUMIN/GLOB SERPL: 1.6 {RATIO} (ref 0.7–1.7)
ALPHA1 GLOB FLD ELPH-MCNC: 0.3 G/DL (ref 0–0.4)
ALPHA2 GLOB SERPL ELPH-MCNC: 0.7 G/DL (ref 0.4–1)
B-GLOBULIN SERPL ELPH-MCNC: 0.9 G/DL (ref 0.7–1.3)
GAMMA GLOB SERPL ELPH-MCNC: 0.6 G/DL (ref 0.4–1.8)
GLOBULIN SER CALC-MCNC: 2.4 G/DL (ref 2.2–3.9)
IGA SERPL-MCNC: 33 MG/DL (ref 64–422)
IGG SERPL-MCNC: 374 MG/DL (ref 700–1600)
IGM SERPL-MCNC: 297 MG/DL (ref 26–217)
INTERPRETATION SERPL IEP-IMP: ABNORMAL
KAPPA LC SERPL-MCNC: 11.6 MG/L (ref 3.3–19.4)
KAPPA LC/LAMBDA SER: 2.97 {RATIO} (ref 0.26–1.65)
LAMBDA LC FREE SERPL-MCNC: 3.9 MG/L (ref 5.7–26.3)
Lab: ABNORMAL
M-SPIKE: 0.3 G/DL
PROT SERPL-MCNC: 6.2 G/DL (ref 6–8.5)

## 2018-04-19 ENCOUNTER — OFFICE VISIT (OUTPATIENT)
Dept: ONCOLOGY | Facility: CLINIC | Age: 78
End: 2018-04-19

## 2018-04-19 ENCOUNTER — OFFICE VISIT (OUTPATIENT)
Dept: LAB | Facility: HOSPITAL | Age: 78
End: 2018-04-19

## 2018-04-19 VITALS
HEART RATE: 58 BPM | HEIGHT: 64 IN | RESPIRATION RATE: 16 BRPM | DIASTOLIC BLOOD PRESSURE: 82 MMHG | OXYGEN SATURATION: 96 % | SYSTOLIC BLOOD PRESSURE: 130 MMHG | TEMPERATURE: 98.1 F | WEIGHT: 158.3 LBS | BODY MASS INDEX: 27.02 KG/M2

## 2018-04-19 DIAGNOSIS — C50.812 MALIGNANT NEOPLASM OF OVERLAPPING SITES OF LEFT BREAST IN FEMALE, ESTROGEN RECEPTOR POSITIVE (HCC): ICD-10-CM

## 2018-04-19 DIAGNOSIS — E83.52 HYPERCALCEMIA: ICD-10-CM

## 2018-04-19 DIAGNOSIS — C88.0 MACROGLOBULINEMIA OF WALDENSTROM (HCC): Primary | ICD-10-CM

## 2018-04-19 DIAGNOSIS — G25.0 BENIGN FAMILIAL TREMOR: ICD-10-CM

## 2018-04-19 DIAGNOSIS — Z17.0 MALIGNANT NEOPLASM OF OVERLAPPING SITES OF LEFT BREAST IN FEMALE, ESTROGEN RECEPTOR POSITIVE (HCC): ICD-10-CM

## 2018-04-19 LAB
ALBUMIN SERPL-MCNC: 4.7 G/DL (ref 3.5–5.2)
ALBUMIN/GLOB SERPL: 1.9 G/DL (ref 1.1–2.4)
ALP SERPL-CCNC: 83 U/L (ref 38–116)
ALT SERPL W P-5'-P-CCNC: 14 U/L (ref 0–33)
ANION GAP SERPL CALCULATED.3IONS-SCNC: 10.8 MMOL/L
AST SERPL-CCNC: 25 U/L (ref 0–32)
BILIRUB SERPL-MCNC: 0.8 MG/DL (ref 0.1–1.2)
BUN BLD-MCNC: 9 MG/DL (ref 6–20)
BUN/CREAT SERPL: 11.8 (ref 7.3–30)
CALCIUM SPEC-SCNC: 10.4 MG/DL (ref 8.5–10.2)
CHLORIDE SERPL-SCNC: 85 MMOL/L (ref 98–107)
CO2 SERPL-SCNC: 29.2 MMOL/L (ref 22–29)
CREAT BLD-MCNC: 0.76 MG/DL (ref 0.6–1.1)
FERRITIN SERPL-MCNC: 78.3 NG/ML
GFR SERPL CREATININE-BSD FRML MDRD: 74 ML/MIN/1.73
GLOBULIN UR ELPH-MCNC: 2.5 GM/DL (ref 1.8–3.5)
GLUCOSE BLD-MCNC: 111 MG/DL (ref 74–124)
HGB RETIC QN: 37.2 PG (ref 29.8–36.1)
IMM RETICS NFR: 3.4 % (ref 3–15.8)
IRON 24H UR-MRATE: 124 MCG/DL (ref 37–145)
IRON SATN MFR SERPL: 31 % (ref 14–48)
POTASSIUM BLD-SCNC: 3.9 MMOL/L (ref 3.5–4.7)
PROT SERPL-MCNC: 7.2 G/DL (ref 6.3–8)
PTH-INTACT SERPL-MCNC: 22.8 PG/ML (ref 15–65)
RETICS/RBC NFR AUTO: 1.38 % (ref 0.6–2)
SODIUM BLD-SCNC: 125 MMOL/L (ref 134–145)
TIBC SERPL-MCNC: 402 MCG/DL (ref 249–505)
TRANSFERRIN SERPL-MCNC: 287 MG/DL (ref 200–360)

## 2018-04-19 PROCEDURE — 82728 ASSAY OF FERRITIN: CPT | Performed by: INTERNAL MEDICINE

## 2018-04-19 PROCEDURE — 83970 ASSAY OF PARATHORMONE: CPT | Performed by: INTERNAL MEDICINE

## 2018-04-19 PROCEDURE — 80053 COMPREHEN METABOLIC PANEL: CPT | Performed by: INTERNAL MEDICINE

## 2018-04-19 PROCEDURE — 85046 RETICYTE/HGB CONCENTRATE: CPT | Performed by: INTERNAL MEDICINE

## 2018-04-19 PROCEDURE — 83540 ASSAY OF IRON: CPT | Performed by: INTERNAL MEDICINE

## 2018-04-19 PROCEDURE — 84466 ASSAY OF TRANSFERRIN: CPT | Performed by: INTERNAL MEDICINE

## 2018-04-19 PROCEDURE — 36415 COLL VENOUS BLD VENIPUNCTURE: CPT | Performed by: INTERNAL MEDICINE

## 2018-04-19 PROCEDURE — 99215 OFFICE O/P EST HI 40 MIN: CPT | Performed by: INTERNAL MEDICINE

## 2018-04-19 NOTE — PROGRESS NOTES
Subjective  REASONS FOR FOLLOWUP:     1. History of Waldenstrom macroglobulinemia. The patient  remains in remission about this condition with stable level of immunoglobulin M . Normal IgA. Normal IgG. Normal white count, hemoglobin, and platelets. No abnormal bleeding. No peripheral adenopathy. No hepatosplenomegaly. The patient will remain in observation.   2. History of breast cancer stage I on the left, status post mastectomy. The patient remains on aromatase inhibitor without any evidence of breast cancer recurrence and good tolerance to her aromatase inhibitor medicine. She will remain on the same issue for the time being.                History of Present Illness The patient returns today to the office for followup of her Waldenstrom's macroglobulinemia that has been treated in the past with Rituxan. She has had a new monoclonal protein study done the week before. Overall the patient continues feeling very fatigued and tired. She has had another bout of respiratory infection requiring antibiotic therapy and bronchodilator by her primary physician. She is healing from this and she has minimal mucoid sputum production at this time with no wheezing and no shortness of breath. Otherwise her appetite is acceptable, her weight is stable, her bowel function and urination are normal. She has had cold sensitivity and she has not had the desire to get out of the house. She has not had any changes in her bowel activity. Urination is normal. She has not had any skeletal pain. Her carpal tunnel syndrome has gotten dramatic improvement after local steroid injections by hand surgery. The patient also complains of significant progression of her tremor in her upper extremities especially when she 1st wakes up in the morning. She recalls that most of her family members have tremor similar to hers. She is having difficulty drinking a cup of coffee, putting sugar in her coffee and she is not able to write because of the  intensity of this. This is effecting the quality of her life in a negative way and this will require assessment.         1. Past Medical History, Past Surgical HistoryHypertension.    2. Hypothyroidism many years ago and has not taken any thyroid medication in quite some time.   3. Thyroid nodule removed more than 35 years ago.    4. Cholecystectomy.    5. Tonsillectomy and adenoidectomy.    6. Appendectomy.  7. Partial hysterectomy many years ago.      She has mammogram and pelvic examination yearly that have been normal.  Colonoscopy 4 years ago was normal.   She also has had a leaky valve in her heart without any significance.      During the recent workup at [facility], the patient had a transesophageal echocardiogram that failed to document any vegetation.    Clostridium colitis requiring admission to [facility] in 09/2008.      Arthroscopy in 03/2009 of the right knee with finding of chondromalacia and appropriate cleanup of the joint was performed by Dr. Moraes.      On 07/24/2013 the patient has lost 14 pounds, just cutting down on junk food.  Her glucose intolerance has improved substantially and her glucose has normalized.      On 01/12/2014, we reviewed her blood pressure issues recently. She has had a very stable blood pressure for the last week, and today is normal. Her physical examination is otherwise unremarkable and normal feeling.     We advised her to remain in observation from this point of view.    As per 10/01/2014, the patient has undergone evaluation by neurology service at [facility] for consideration of gabapentin that she has been taking for so many years for possible seizure activity.   I am aware of an MRI scan of the brain that shows not too much, besides minimal vascular disease and no other lesions.  Her neurologist will be informing us in regard how to proceed in this regard.      She wanted to have a right knee replaced in 12/2009 through  her orthopedic surgeon. She developed delirium in the hospital and extensive ecchymosis and hematoma formation in the whole right lower extremity requiring transfusion of red cells for a hemoglobin of 7.   SOCIAL HISTORY:  .  Retired from [employer] where she worked for many years; she never was exposed to any chemicals.   She is a never smoker and nondrinker.   She lives with her oldest daughter.      FAMILY HISTORY: The patient’s father  of complications from heart disease at age 70.  Mother  after a psychiatric illness at age 67.  A brother  in a car accident at 43 and a sister, age 75, is in good health.   Her three daughters are in good health.  She has no FH of lymphoma or leukemia.    Review of Systems   General: no fever, chills,SIGNIFICANT fatigue, weight changes, or lack of appetite.COLD SENSITIVE  Eyes: no epiphora, xerophthalmia,conjunctivitis, pain, glaucoma, blurred vision, blindness, secretion, photophobia, proptosis, diplopia.  Ears: no otorrhea, tinnitus, otorrhagia, deafness, pain, vertigo.  Nose: no rhinorrhea, epistaxis, alteration in perception of odors, sinuses pressure.  Mouth: no alteration in gums or teeth,  ulcers, no difficulty with mastication or deglut ion, no odynophagia.  Neck: no masses or pain, no thyroid alterations, no pain in muscles or arteries, no carotid odynia, no crepitation.  Respiratory: DRY cough, CLEAR sputum production, dyspnea,NO trepopnea, pleuritic pain, hemoptysis.  Heart: no syncope, irregularity, palpitations, angina, orthopnea, paroxysmal nocturnal dyspnea.  Vascular Venous: no tenderness,edema, palpable cords, postphlebitic syndrome, skin changes or ulcerations.  Vascular Arterial: no distal ischemia, claudication, gangrene, neuropathic ischemic pain, skin ulcers, paleness or cyanosis.  GI: no dysphagia, odynophagia, no regurgitation, no heartburn,no indigestion,no nausea,no vomiting,no hematemesis ,no melena,no jaundice,no distention,  "no obstipation,no enterorrhagia,no proctalgia,no anal  lesions, nochanges in bowel habits.  : no frequency, hesitancy, hematuria, discharge, pain.  Musculoskeletal: no muscle or tendon pain or inflammation, joint pain, edema, functional limitation, fasciculations, mass.  Neurologic: no headache, seizures, alterations on Craneal nerves, no motor or senssory deficit, normal coordination, no alteration in memory, orientation, calculation,writting, verbal or written language.TREMOR IN UPPER BODY HEAD AND HANDS.  Skin: no rashes, pruritus or localized lesions.  Psychiatric: no anxiety, depression, agitation, delusions, proper insight.     Medications:  The current medication list was reviewed in the EMR    ALLERGIES:    Allergies   Allergen Reactions   • Cortisone    • Darvon  [Propoxyphene] Palpitations       Objective      Vitals:    04/19/18 1030   BP: 130/82   Pulse: 58   Resp: 16   Temp: 98.1 °F (36.7 °C)   SpO2: 96%   Weight: 71.8 kg (158 lb 4.8 oz)   Height: 162.5 cm (63.98\")   PainSc: 0-No pain     Current Status 4/19/2018   ECOG score 0       Physical Exam    GENERAL:  Well-developed, well-nourished  Patient  in no acute distress.   SKIN:  Warm, dry without rashes, purpura or petechiae. PIGMENTED NEVUS WITH HALO ANTERIOR ABD WALL 6 MM SIZE NO ATYPICAL FEATURES; SEBORRHEIC KERATOSIS IN  BACK  HEENT:  Pupils were equal and reactive to light and accomodation, conjunctivas non injected, no pterigion, normal extraocular movements, normal visual acuity.   Mouth mucosa was moist, no exudates in oropharynx, normal gum line, normal roof of the mouth and pillars, normal papillations of the tongue.Ear canals were normal, as well tympanic membranes, normal hearing acuity.No pain in mastoid area or erythema.  NECK:  Supple with good range of motion; no thyromegaly or masses, no JVD or bruits, no cervical adenopathies.No carotid arteries pain, no carotid abnormal pulsation or arterial dance.  LYMPHATICS:  No cervical, " supraclavicular, axillary, epitrochlear or inguinal adenopathy.  CHEST:  Normal excursion of both michelet thoraces, normal voice fremitus, no subcutaneous emphysema, normal axillas, no rashes or acanthosis nigricans. Lungs clear to percussion and auscultation, normal breath sounds bilaterally, no wheezing, crackles or ronchi, no stridor, no rubs.  CARDIAC AND VASCULAR: PMI not displaced,no thrills, normal rate and regular rhythm, without murmurs, rubs or S3 or S4 right or left sided gallops. Normal femoral, popliteal, pedis, brachial and carotid pulses.  INSPECTION of R breast documented symmetry of the tissue per se and location and size of the nipple,no retractions or inversion of the nipple, normal skin without lesions, no erythema or nodules,no paud'orange, no prominence of superficial veins or chest wall collateral circulation.PALPATION of the breasts documented normal skin turgor, no induration, alteration in local temperature, or pain, no palpable masses or nodules, normal mobility of the tissues,no fixation of the tissue or parenchyma to the chest wall, no alteration at the tail of the breasts or axillas, no adenopathies.LEFT MASTECTOMY Surgical site was well healed.No lymphedema in either extremity.  ABDOMEN:  Soft, nontender with no organomegaly or masses, no ascites, no collateral circulation,no distention,no Max sign, no abdominal pain, no inguinal hernias,no umbilical hernias, no abdominal bruits. Normal bowel sounds.  GENITAL: Not  Performed.  EXTREMITIES  AND SPINE:  No clubbing, cyanosis or edema, no deformities or pain .No kyphosis, scoliosis, deformities or pain in spine, ribs or pelvic bone.  NEUROLOGICAL:  Patient was awake, alert, oriented to time, person and place,normal gait and coordination, negative Romberg; cranial nerves were normal, motor strength in upper and lower extremities was 5/5 proximally and distally, reflexes were symmetric, toes were down going, normal sensory modalities to  touch, pinprick, temperature discrimination, and vibratory sensation, normal propioception, no meningeal signs with supple neck.DISTAL TREMOR IN HANDS AND HEAD THAT GETS WORSE UPON MOVEMENT, NO SPACTICITY OR RIGIDITY, NO COGWHEEL PHENOMENOM          RECENT LABS:  Component      Latest Ref Rng & Units 12/7/2017 1/25/2018 1/25/2018 4/12/2018          10:13 AM 10:45 AM 10:45 AM 11:02 AM   IgG      700 - 1600 mg/dL 430 (L)  394 (L)    IgA      64 - 422 mg/dL 52 (L)  34 (L)    IgM      26 - 217 mg/dL 502 (H)  365 (H)    Total Protein      6.3 - 8.0 g/dL 6.9 7.3 6.7 7.1   Albumin      3.50 - 5.20 g/dL 4.2 4.80 4.0 4.40   Alpha-1-Globulin      0.0 - 0.4 g/dL 0.3  0.3    Alpha-2-Globulin      0.4 - 1.0 g/dL 0.7  0.8    Beta Globulin      0.7 - 1.3 g/dL 0.9  0.9    Gamma Globulin      0.4 - 1.8 g/dL 0.8  0.7    M-Pratik      Not Observed g/dL Comment:  0.4 (H)    Globulin      2.2 - 3.9 g/dL 2.7  2.7    A/G Ratio      1.1 - 2.4 g/dL 1.6 1.9 1.5 1.6   Immunofixation Reflex, Serum       Comment  Comment    Please Note       Comment  Comment    Free Light Chain, Kappa      3.3 - 19.4 mg/L 12.1  11.2    Free Lambda Light Chains      5.7 - 26.3 mg/L 6.6  3.6 (L)    Kappa/Lambda Ratio      0.26 - 1.65 1.83 (H)  3.11 (H)      Component      Latest Ref Rng & Units 4/12/2018          11:02 AM   IgG      700 - 1600 mg/dL 374 (L)   IgA      64 - 422 mg/dL 33 (L)   IgM      26 - 217 mg/dL 297 (H)   Total Protein      6.3 - 8.0 g/dL 6.2   Albumin      3.50 - 5.20 g/dL 3.8   Alpha-1-Globulin      0.0 - 0.4 g/dL 0.3   Alpha-2-Globulin      0.4 - 1.0 g/dL 0.7   Beta Globulin      0.7 - 1.3 g/dL 0.9   Gamma Globulin      0.4 - 1.8 g/dL 0.6   M-Pratik      Not Observed g/dL 0.3 (H)   Globulin      2.2 - 3.9 g/dL 2.4   A/G Ratio      1.1 - 2.4 g/dL 1.6   Immunofixation Reflex, Serum       Comment   Please Note       Comment   Free Light Chain, Kappa      3.3 - 19.4 mg/L 11.6   Free Lambda Light Chains      5.7 - 26.3 mg/L 3.9 (L)   Kappa/Lambda  Ratio      0.26 - 1.65 2.97 (H)   Comprehensive Metabolic Panel   Order: [id number]   Status:  Final result   Visible to patient:  No (Not Released) Dx:  Malignant neoplasm of overlapping sit...   Newer results are available. Click to view them now.    Ref Range & Units 7d ago   Glucose 74 - 124 mg/dL 108    BUN 6 - 20 mg/dL 9    Creatinine 0.60 - 1.10 mg/dL 0.73    Sodium 134 - 145 mmol/L 132     Potassium 3.5 - 4.7 mmol/L 4.0    Chloride 98 - 107 mmol/L 90     CO2 22.0 - 29.0 mmol/L 31.4     Calcium 8.5 - 10.2 mg/dL 10.5     Total Protein 6.3 - 8.0 g/dL 7.1    Albumin 3.50 - 5.20 g/dL 4.40    ALT (SGPT) 0 - 33 U/L 13    AST (SGOT) 0 - 32 U/L 21    Alkaline Phosphatase 38 - 116 U/L 72    Total Bilirubin 0.1 - 1.2 mg/dL 0.5    eGFR Non African Amer >60 mL/min/1.73 77    Globulin 1.8 - 3.5 gm/dL 2.7    A/G Ratio 1.1 - 2.4 g/dL 1.6    BUN/Creatinine Ratio 7.3 - 30.0 12.3    Anion Gap mmol/L 10.6    Resulting Agency   CBC            CBC Auto Differential   Order: [id number] - Part of Panel Order [id number]   Status:  Final result   Visible to patient:  No (Not Released)    Ref Range & Units 7d ago   WBC 4.00 - 10.00 10*3/mm3 5.42    RBC 3.90 - 5.00 10*6/mm3 4.61    Hemoglobin 11.5 - 14.9 g/dL 14.5    Hematocrit 34.0 - 45.0 % 42.2    MCV 83.0 - 97.0 fL 91.5    MCH 27.0 - 33.0 pg 31.5    MCHC 32.0 - 35.0 g/dL 34.4    RDW 11.7 - 14.5 % 12.5    RDW-SD 37.0 - 49.0 fl 41.6    MPV 8.9 - 12.1 fL 9.4    Platelets 150 - 375 10*3/mm3 184    Neutrophil % 39.0 - 75.0 % 63.9    Lymphocyte % 20.0 - 49.0 % 20.7    Monocyte % 4.0 - 12.0 % 8.9    Eosinophil % 1.0 - 5.0 % 5.4     Basophil % 0.0 - 1.1 % 0.7    Immature Grans % 0.0 - 0.5 % 0.4    Neutrophils, Absolute 1.50 - 7.00 10*3/mm3 3.47              Assessment/Plan    1. This patient has Waldenstrom's macroglobulinemia. She has been treated in the past with Rituxan as we discussed with her before sometimes can take several weeks before Rituxan is able to decrease her IgM monoclonal  protein production. It seems to be that indeed this is the case. Her IgM has reached the lowest level since last fall and actually the patient has minimal symptomatology related to this phenomenon at this time. She has no blurred vision, no abnormal bleeding, no peripheral adenopathy, no splenomegaly or B symptoms of any nature. Her white count, hemoglobin and platelets are excellent. Her white count differential is normal. Therefore from my point of view this condition will remain in observation planning to repeat her monoclonal protein studies in 3 months.   2. The patient has history of breast cancer on the left status post mastectomy stage I disease very early. She is undergoing adjuvant therapy with Femara. Femara is not producing any side effects and I do not find any clinical evidence of recurrence. Her right breast examination is normal. Her left chest examination is normal. She will remain on Femara for the time being.   3. The patient has cold sensitivity. If this is effect of just aging or effect of iron deficiency remains to be seen. In the past we have tested thyroid function being okay in regard to proper replacement of hormone. I think it is worth it to recheck ferritin, iron profile on her and see what these numbers look like. Her hemoglobin and MCV are normal though. Sometimes patients can have functional iron deficiency that is manifested by cold sensitivity without the need of administration of anemia.   4. The patient has very significant tremor. She has strong family history of tremor in her daughter, her son, her sister and her mother. All of the tremors in them were similar. 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 She is able to button shirts and function but she is having more and more difficulty. She has seen neurology before. She has not seen  them in a long time and it is time for referral and reevaluate this issue. I find nothing to suggest Parkinson's disease but again the differential diagnosis of this will be up to neurology. Please review my neurological exam as stated above.   5. 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 She has been checked by her dermatologist not too long ago. No mention about this issue. I think this remains to be seen if this is going to change. There are no atypical features but I think I will need to followup on this periodically. Right now I do not think she needs to have biopsy at this time.   6. The patient has an elevated calcium level. In the past we have measured PTH being normal. I do not think the Waldenstrom's is producing hypercalcemia and we have measured tumor markers in the past for breast cancer being negative normal. My personal diagnosis in this patient is that she has primary hyperparathyroidism but I have not been able to confirm this diagnosis. Maybe this is one of the reasons for fatigue. I have sent her back to the lab to check her calcium level today and PTH level and see what evolves from this. She will be called at home with the report of this whenever it becomes available.     In summary I will review her back in 3 months or sooner depending on the clinical circumstances.     The patient had great benefit of steroid injection for her carpel tunnel by hand surgery. She is very grateful about this.                                            4/19/2018      CC:

## 2018-04-23 ENCOUNTER — TELEPHONE (OUTPATIENT)
Dept: ONCOLOGY | Facility: CLINIC | Age: 78
End: 2018-04-23

## 2018-04-23 NOTE — TELEPHONE ENCOUNTER
PHONE WITH PT PTH FINE, LOW SODIUM IS REFLECTION OF PSEUDOHYPONATREMIA DUE TO M PROTEIN, SHE FEELS FINE , NO CHANGES IN CARE PLAN, SHE AGREES

## 2018-05-24 ENCOUNTER — TELEPHONE (OUTPATIENT)
Dept: FAMILY MEDICINE CLINIC | Facility: CLINIC | Age: 78
End: 2018-05-24

## 2018-05-29 RX ORDER — GABAPENTIN 400 MG/1
400 CAPSULE ORAL 4 TIMES DAILY
Qty: 120 CAPSULE | Refills: 2 | Status: SHIPPED | OUTPATIENT
Start: 2018-05-29 | End: 2018-08-23 | Stop reason: SDUPTHER

## 2018-05-30 RX ORDER — LETROZOLE 2.5 MG/1
TABLET, FILM COATED ORAL
Qty: 90 TABLET | Refills: 2 | Status: ON HOLD | OUTPATIENT
Start: 2018-05-30 | End: 2019-01-07

## 2018-05-30 RX ORDER — METOPROLOL SUCCINATE 25 MG/1
TABLET, EXTENDED RELEASE ORAL
Qty: 90 TABLET | Refills: 1 | Status: SHIPPED | OUTPATIENT
Start: 2018-05-30 | End: 2018-11-29

## 2018-06-12 ENCOUNTER — TELEPHONE (OUTPATIENT)
Dept: ONCOLOGY | Facility: CLINIC | Age: 78
End: 2018-06-12

## 2018-06-12 NOTE — TELEPHONE ENCOUNTER
Pt called and said her PCP wanted her to see a lung doctor for chronic bronchitis but she wants to go to a physician Dr Larose would recommend. Message sent to Dr Larose.

## 2018-06-13 ENCOUNTER — TELEPHONE (OUTPATIENT)
Dept: ONCOLOGY | Facility: CLINIC | Age: 78
End: 2018-06-13

## 2018-06-13 NOTE — TELEPHONE ENCOUNTER
Patient called and informed that pulmonary MD per Dr. Larose would be Dr. Ogden.  Patient had previously called and wanted recommendation from Dr. Larose.  Pt given name and phone number for Dr. Ogden.  Pt v/u.

## 2018-06-13 NOTE — TELEPHONE ENCOUNTER
----- Message from Florencio Larose MD sent at 6/12/2018  5:38 PM EDT -----  DR HOYOS AND ONLY HIM

## 2018-07-05 ENCOUNTER — LAB (OUTPATIENT)
Dept: LAB | Facility: HOSPITAL | Age: 78
End: 2018-07-05

## 2018-07-05 DIAGNOSIS — Z17.0 MALIGNANT NEOPLASM OF OVERLAPPING SITES OF LEFT BREAST IN FEMALE, ESTROGEN RECEPTOR POSITIVE (HCC): ICD-10-CM

## 2018-07-05 DIAGNOSIS — C50.812 MALIGNANT NEOPLASM OF OVERLAPPING SITES OF LEFT BREAST IN FEMALE, ESTROGEN RECEPTOR POSITIVE (HCC): ICD-10-CM

## 2018-07-05 DIAGNOSIS — E83.52 HYPERCALCEMIA: ICD-10-CM

## 2018-07-05 DIAGNOSIS — C88.0 MACROGLOBULINEMIA OF WALDENSTROM (HCC): ICD-10-CM

## 2018-07-05 LAB
ALBUMIN SERPL-MCNC: 4.4 G/DL (ref 3.5–5.2)
ALBUMIN/GLOB SERPL: 1.8 G/DL (ref 1.1–2.4)
ALP SERPL-CCNC: 71 U/L (ref 38–116)
ALT SERPL W P-5'-P-CCNC: 14 U/L (ref 0–33)
ANION GAP SERPL CALCULATED.3IONS-SCNC: 10.6 MMOL/L
AST SERPL-CCNC: 19 U/L (ref 0–32)
BASOPHILS # BLD AUTO: 0.04 10*3/MM3 (ref 0–0.1)
BASOPHILS NFR BLD AUTO: 0.7 % (ref 0–1.1)
BILIRUB SERPL-MCNC: 0.6 MG/DL (ref 0.1–1.2)
BUN BLD-MCNC: 8 MG/DL (ref 6–20)
BUN/CREAT SERPL: 9.8 (ref 7.3–30)
CALCIUM SPEC-SCNC: 10 MG/DL (ref 8.5–10.2)
CHLORIDE SERPL-SCNC: 92 MMOL/L (ref 98–107)
CO2 SERPL-SCNC: 29.4 MMOL/L (ref 22–29)
CREAT BLD-MCNC: 0.82 MG/DL (ref 0.6–1.1)
DEPRECATED RDW RBC AUTO: 42.5 FL (ref 37–49)
EOSINOPHIL # BLD AUTO: 0.39 10*3/MM3 (ref 0–0.36)
EOSINOPHIL NFR BLD AUTO: 7.1 % (ref 1–5)
ERYTHROCYTE [DISTWIDTH] IN BLOOD BY AUTOMATED COUNT: 12.5 % (ref 11.7–14.5)
GFR SERPL CREATININE-BSD FRML MDRD: 67 ML/MIN/1.73
GLOBULIN UR ELPH-MCNC: 2.4 GM/DL (ref 1.8–3.5)
GLUCOSE BLD-MCNC: 110 MG/DL (ref 74–124)
HCT VFR BLD AUTO: 42.2 % (ref 34–45)
HGB BLD-MCNC: 14.7 G/DL (ref 11.5–14.9)
IMM GRANULOCYTES # BLD: 0.02 10*3/MM3 (ref 0–0.03)
IMM GRANULOCYTES NFR BLD: 0.4 % (ref 0–0.5)
LYMPHOCYTES # BLD AUTO: 1.35 10*3/MM3 (ref 1–3.5)
LYMPHOCYTES NFR BLD AUTO: 24.4 % (ref 20–49)
MCH RBC QN AUTO: 32.3 PG (ref 27–33)
MCHC RBC AUTO-ENTMCNC: 34.8 G/DL (ref 32–35)
MCV RBC AUTO: 92.7 FL (ref 83–97)
MONOCYTES # BLD AUTO: 0.59 10*3/MM3 (ref 0.25–0.8)
MONOCYTES NFR BLD AUTO: 10.7 % (ref 4–12)
NEUTROPHILS # BLD AUTO: 3.14 10*3/MM3 (ref 1.5–7)
NEUTROPHILS NFR BLD AUTO: 56.7 % (ref 39–75)
NRBC BLD MANUAL-RTO: 0 /100 WBC (ref 0–0)
PLATELET # BLD AUTO: 166 10*3/MM3 (ref 150–375)
PMV BLD AUTO: 9.6 FL (ref 8.9–12.1)
POTASSIUM BLD-SCNC: 4.1 MMOL/L (ref 3.5–4.7)
PROT SERPL-MCNC: 6.8 G/DL (ref 6.3–8)
RBC # BLD AUTO: 4.55 10*6/MM3 (ref 3.9–5)
SODIUM BLD-SCNC: 132 MMOL/L (ref 134–145)
WBC NRBC COR # BLD: 5.53 10*3/MM3 (ref 4–10)

## 2018-07-05 PROCEDURE — 85025 COMPLETE CBC W/AUTO DIFF WBC: CPT | Performed by: INTERNAL MEDICINE

## 2018-07-05 PROCEDURE — 36415 COLL VENOUS BLD VENIPUNCTURE: CPT | Performed by: INTERNAL MEDICINE

## 2018-07-05 PROCEDURE — 80053 COMPREHEN METABOLIC PANEL: CPT | Performed by: INTERNAL MEDICINE

## 2018-07-06 LAB
ALBUMIN SERPL-MCNC: 4.1 G/DL (ref 2.9–4.4)
ALBUMIN/GLOB SERPL: 1.8 {RATIO} (ref 0.7–1.7)
ALPHA1 GLOB FLD ELPH-MCNC: 0.2 G/DL (ref 0–0.4)
ALPHA2 GLOB SERPL ELPH-MCNC: 0.7 G/DL (ref 0.4–1)
B-GLOBULIN SERPL ELPH-MCNC: 0.9 G/DL (ref 0.7–1.3)
GAMMA GLOB SERPL ELPH-MCNC: 0.5 G/DL (ref 0.4–1.8)
GLOBULIN SER CALC-MCNC: 2.3 G/DL (ref 2.2–3.9)
IGA SERPL-MCNC: 34 MG/DL (ref 64–422)
IGG SERPL-MCNC: 347 MG/DL (ref 700–1600)
IGM SERPL-MCNC: 313 MG/DL (ref 26–217)
INTERPRETATION SERPL IEP-IMP: ABNORMAL
KAPPA LC SERPL-MCNC: 10.7 MG/L (ref 3.3–19.4)
KAPPA LC/LAMBDA SER: 1.91 {RATIO} (ref 0.26–1.65)
LAMBDA LC FREE SERPL-MCNC: 5.6 MG/L (ref 5.7–26.3)
Lab: ABNORMAL
M-SPIKE: 0.2 G/DL
PROT SERPL-MCNC: 6.4 G/DL (ref 6–8.5)

## 2018-07-12 ENCOUNTER — APPOINTMENT (OUTPATIENT)
Dept: LAB | Facility: HOSPITAL | Age: 78
End: 2018-07-12

## 2018-07-12 ENCOUNTER — OFFICE VISIT (OUTPATIENT)
Dept: ONCOLOGY | Facility: CLINIC | Age: 78
End: 2018-07-12

## 2018-07-12 VITALS
HEART RATE: 65 BPM | DIASTOLIC BLOOD PRESSURE: 82 MMHG | SYSTOLIC BLOOD PRESSURE: 210 MMHG | WEIGHT: 158.2 LBS | HEIGHT: 64 IN | RESPIRATION RATE: 16 BRPM | BODY MASS INDEX: 27.01 KG/M2 | OXYGEN SATURATION: 98 % | TEMPERATURE: 98.6 F

## 2018-07-12 DIAGNOSIS — E83.52 HYPERCALCEMIA: ICD-10-CM

## 2018-07-12 DIAGNOSIS — C82.09 GRADE I FOLLICULAR LYMPHOMA OF EXTRANODAL SITE EXCLUDING SPLEEN AND OTHER SOLID ORGANS (HCC): Primary | ICD-10-CM

## 2018-07-12 DIAGNOSIS — C88.0 MACROGLOBULINEMIA OF WALDENSTROM (HCC): ICD-10-CM

## 2018-07-12 DIAGNOSIS — C50.812 MALIGNANT NEOPLASM OF OVERLAPPING SITES OF LEFT BREAST IN FEMALE, ESTROGEN RECEPTOR POSITIVE (HCC): ICD-10-CM

## 2018-07-12 DIAGNOSIS — Z17.0 MALIGNANT NEOPLASM OF OVERLAPPING SITES OF LEFT BREAST IN FEMALE, ESTROGEN RECEPTOR POSITIVE (HCC): ICD-10-CM

## 2018-07-12 DIAGNOSIS — J32.4 CHRONIC PANSINUSITIS: ICD-10-CM

## 2018-07-12 PROCEDURE — G0463 HOSPITAL OUTPT CLINIC VISIT: HCPCS | Performed by: INTERNAL MEDICINE

## 2018-07-12 PROCEDURE — 99215 OFFICE O/P EST HI 40 MIN: CPT | Performed by: INTERNAL MEDICINE

## 2018-07-12 RX ORDER — CEFDINIR 300 MG/1
300 CAPSULE ORAL 2 TIMES DAILY
Qty: 14 CAPSULE | Refills: 0 | Status: SHIPPED | OUTPATIENT
Start: 2018-07-12 | End: 2018-11-29

## 2018-07-12 NOTE — PROGRESS NOTES
Subjective  REASONS FOR FOLLOWUP:     1. History of Waldenstrom macroglobulinemia. The patient  remains in remission about this condition with stable level of immunoglobulin M . Normal IgA. Normal IgG. Normal white count, hemoglobin, and platelets. No abnormal bleeding. No peripheral adenopathy. No hepatosplenomegaly. The patient will remain in observation.   2. History of breast cancer stage I on the left, status post mastectomy. The patient remains on aromatase inhibitor without any evidence of breast cancer recurrence and good tolerance to her aromatase inhibitor medicine. She will remain on the same issue for the time being.                History of Present Illness This patient returns today to the office for followup stating that for the last 2 months she has been having persistent purulent rhinorrhea associated with stuffiness in the nose, postnasal drip and persistent cough with mucopurulent sputum production associated with wheezing. She has an element of shortness of breath. She has not had any fevers, chills, pleuritic pain or hemoptysis. She has not had any lesions in her mouth or trouble in her throat besides hoarseness. She denies any reflux symptomatology. She has not noticed any modification in the clinical picture no matter where she is. She has not had any itching in her eyes or nose; no sneezing and no pruritus at any level, neither skin lesions. No joint pain. Her appetite has remained stable. Her bowel function is normal. Urination is normal. She has not seen Dr. Ogden yet but she has an appointment to see him. The patient has seen her allergist, nurse practitioner, and she has taken different kinds of inhalers with not too much benefit in regard to the clinical picture. She does not want to take any prednisone because this medicine makes her very hyper and puts her out of focus completely.     She also denies any skeletal pain or other new neurological symptomatology. In spite of this, her  energy level is acceptable and she is very much planning a trip to the beach in September.            1. Past Medical History, Past Surgical HistoryHypertension.    2. Hypothyroidism many years ago and has not taken any thyroid medication in quite some time.   3. Thyroid nodule removed more than 35 years ago.    4. Cholecystectomy.    5. Tonsillectomy and adenoidectomy.    6. Appendectomy.  7. Partial hysterectomy many years ago.      She has mammogram and pelvic examination yearly that have been normal.  Colonoscopy 4 years ago was normal.   She also has had a leaky valve in her heart without any significance.      During the recent workup at [facility], the patient had a transesophageal echocardiogram that failed to document any vegetation.    Clostridium colitis requiring admission to [facility] in 09/2008.      Arthroscopy in 03/2009 of the right knee with finding of chondromalacia and appropriate cleanup of the joint was performed by Dr. Moraes.      On 07/24/2013 the patient has lost 14 pounds, just cutting down on junk food.  Her glucose intolerance has improved substantially and her glucose has normalized.      On 01/12/2014, we reviewed her blood pressure issues recently. She has had a very stable blood pressure for the last week, and today is normal. Her physical examination is otherwise unremarkable and normal feeling.     We advised her to remain in observation from this point of view.    As per 10/01/2014, the patient has undergone evaluation by neurology service at [facility] for consideration of gabapentin that she has been taking for so many years for possible seizure activity.   I am aware of an MRI scan of the brain that shows not too much, besides minimal vascular disease and no other lesions.  Her neurologist will be informing us in regard how to proceed in this regard.      She wanted to have a right knee replaced in 12/2009 through her orthopedic  surgeon. She developed delirium in the hospital and extensive ecchymosis and hematoma formation in the whole right lower extremity requiring transfusion of red cells for a hemoglobin of 7.   SOCIAL HISTORY:  .  Retired from [employer] where she worked for many years; she never was exposed to any chemicals.   She is a never smoker and nondrinker.   She lives with her oldest daughter.      FAMILY HISTORY: The patient’s father  of complications from heart disease at age 70.  Mother  after a psychiatric illness at age 67.  A brother  in a car accident at 43 and a sister, age 75, is in good health.   Her three daughters are in good health.  She has no FH of lymphoma or leukemia.    Review of Systems   General: no fever, chills, fatigue, weight changes, or lack of appetite.  Eyes: no epiphora, xerophthalmia,conjunctivitis, pain, glaucoma, blurred vision, blindness, secretion, photophobia, proptosis, diplopia.  Ears: no otorrhea, tinnitus, otorrhagia, deafness, pain, vertigo.  Nose: stated rhinorrhea,no epistaxis, alteration in perception of odors,significant sinuses pressure.  Mouth: no alteration in gums or teeth,  ulcers, no difficulty with mastication or deglut ion, no odynophagia.  Neck: no masses or pain, no thyroid alterations, no pain in muscles or arteries, no carotid odynia, no crepitation.  Respiratory: stated cough, sputum production, dyspnea,no trepopnea, pleuritic pain, hemoptysis.  Heart: no syncope, irregularity, palpitations, angina, orthopnea, paroxysmal nocturnal dyspnea.  Vascular Venous: no tenderness,edema, palpable cords, postphlebitic syndrome, skin changes or ulcerations.  Vascular Arterial: no distal ischemia, claudication, gangrene, neuropathic ischemic pain, skin ulcers, paleness or cyanosis.  GI: no dysphagia, odynophagia, no regurgitation, no heartburn,no indigestion,no nausea,no vomiting,no hematemesis ,no melena,no jaundice,no distention, no obstipation,no  enterorrhagia,no proctalgia,no anal  lesions, nochanges in bowel habits.  : no frequency, hesitancy, hematuria, discharge, pain.  Musculoskeletal: no muscle or tendon pain or inflammation, joint pain, edema, functional limitation, fasciculations, mass.  Neurologic: no headache, seizures, alterations on Craneal nerves, no motor or senssory deficit, normal coordination, no alteration in memory, orientation, calculation,writting, verbal or written language.  Skin: no rashes, pruritus or localized lesions.  Psychiatric: no anxiety, depression, agitation, delusions, proper insight.     Medications:  The current medication list was reviewed in the EMR    ALLERGIES:    Allergies   Allergen Reactions   • Cortisone    • Darvon  [Propoxyphene] Palpitations       Objective      There were no vitals filed for this visit.  Current Status 4/19/2018   ECOG score 0       Physical Exam    GENERAL:  Well-developed, well-nourished  Patient  in no acute distress.   SKIN:  Warm, dry without rashes, purpura or petechiae.  HEENT:  Pupils were equal and reactive to light and accomodation, conjunctivas non injected, no pterigion, normal extraocular movements, normal visual acuity.   Mouth mucosa was moist, no exudates in oropharynx, normal gum line, normal roof of the mouth and pillars, normal papillations of the tongue, congested red turbinates with purulent material.tm were normal  NECK:  Supple with good range of motion; no thyromegaly or masses, no JVD or bruits, no cervical adenopathies.No carotid arteries pain, no carotid abnormal pulsation or arterial dance.  LYMPHATICS:  No cervical, supraclavicular, axillary, epitrochlear or inguinal adenopathy.  CHEST:  Normal excursion of both michelet thoraces, normal voice fremitus, no subcutaneous emphysema, normal axillas, no rashes or acanthosis nigricans. Abnormal breath sounds bilaterally, with wheezing, no crackles or ronchi, no stridor, no rubs.  CARDIAC AND VASCULAR: normal rate and  regular rhythm, without murmurs, rubs or S3 or S4 right or left sided gallops. Normal femoral, popliteal, pedis, brachial and carotid pulses.  INSPECTION of r breast documented symmetry of the tissue per se and location and size of the nipple,no retractions or inversion of the nipple, normal skin without lesions, no erythema or nodules,no paud'orange, no prominence of superficial veins or chest wall collateral circulation.PALPATION of the breast documented normal skin turgor, no induration, alteration in local temperature, or pain, no palpable masses or nodules, normal mobility of the tissues,no fixation of the tissue or parenchyma to the chest wall, no alteration at the tail of the breasts or axillas, no adenopathies. Left mastectomySurgical site was well healed.No lymphedema in either extremity.  ABDOMEN:  Soft, nontender with no organomegaly or masses, no ascites, no collateral circulation,no distention,no [place] sign, no abdominal pain, no inguinal hernias,no umbilical hernias, no abdominal bruits. Normal bowel sounds.  GENITAL: Not  Performed.  EXTREMITIES  AND SPINE:  No clubbing, cyanosis or edema, no deformities or pain .No kyphosis, scoliosis, deformities or pain in spine, ribs or pelvic bone.  NEUROLOGICAL:  Patient was awake, alert, oriented to time, person and place            RECENT LABS:CBC Auto Differential   Order: [id number] - Part of Panel Order [id number]   Status:  Final result   Visible to patient:  No (Not Released) Dx:  Hypercalcemia; Macroglobulinemia of W...    Ref Range & Units 7d ago   WBC 4.00 - 10.00 10*3/mm3 5.53    RBC 3.90 - 5.00 10*6/mm3 4.55    Hemoglobin 11.5 - 14.9 g/dL 14.7    Hematocrit 34.0 - 45.0 % 42.2    MCV 83.0 - 97.0 fL 92.7    MCH 27.0 - 33.0 pg 32.3    MCHC 32.0 - 35.0 g/dL 34.8    RDW 11.7 - 14.5 % 12.5    RDW-SD 37.0 - 49.0 fl 42.5    MPV 8.9 - 12.1 fL 9.6    Platelets 150 - 375 10*3/mm3 166    Neutrophil % 39.0 - 75.0 % 56.7    Lymphocyte % 20.0 - 49.0 % 24.4     Monocyte % 4.0 - 12.0 % 10.7    Eosinophil % 1.0 - 5.0 % 7.1     Basophil % 0.0 - 1.1 % 0.7    Immature Grans % 0.0 - 0.5 % 0.4           Component      Latest Ref Rng & Units 1/25/2018 4/12/2018 4/12/2018 4/19/2018          10:45 AM 11:02 AM 11:02 AM 11:31 AM   IgG      700 - 1600 mg/dL 394 (L)  374 (L)    IgA      64 - 422 mg/dL 34 (L)  33 (L)    IgM      26 - 217 mg/dL 365 (H)  297 (H)    Total Protein      6.3 - 8.0 g/dL 6.7 7.1 6.2 7.2   Albumin      3.50 - 5.20 g/dL 4.0 4.40 3.8 4.70   Alpha-1-Globulin      0.0 - 0.4 g/dL 0.3  0.3    Alpha-2-Globulin      0.4 - 1.0 g/dL 0.8  0.7    Beta Globulin      0.7 - 1.3 g/dL 0.9  0.9    Gamma Globulin      0.4 - 1.8 g/dL 0.7  0.6    M-Pratik      Not Observed g/dL 0.4 (H)  0.3 (H)    Globulin      2.2 - 3.9 g/dL 2.7  2.4    A/G Ratio      1.1 - 2.4 g/dL 1.5 1.6 1.6 1.9   Immunofixation Reflex, Serum       Comment  Comment    Please Note       Comment  Comment    Free Light Chain, Kappa      3.3 - 19.4 mg/L 11.2  11.6    Free Lambda Light Chains      5.7 - 26.3 mg/L 3.6 (L)  3.9 (L)    Kappa/Lambda Ratio      0.26 - 1.65 3.11 (H)  2.97 (H)      Component      Latest Ref Rng & Units 7/5/2018 7/5/2018          10:28 AM 10:28 AM   IgG      700 - 1600 mg/dL  347 (L)   IgA      64 - 422 mg/dL  34 (L)   IgM      26 - 217 mg/dL  313 (H)   Total Protein      6.3 - 8.0 g/dL 6.8 6.4   Albumin      3.50 - 5.20 g/dL 4.40 4.1   Alpha-1-Globulin      0.0 - 0.4 g/dL  0.2   Alpha-2-Globulin      0.4 - 1.0 g/dL  0.7   Beta Globulin      0.7 - 1.3 g/dL  0.9   Gamma Globulin      0.4 - 1.8 g/dL  0.5   M-Pratik      Not Observed g/dL  0.2 (H)   Globulin      2.2 - 3.9 g/dL  2.3   A/G Ratio      1.1 - 2.4 g/dL 1.8 1.8 (H)   Immunofixation Reflex, Serum        Comment   Please Note        Comment   Free Light Chain, Kappa      3.3 - 19.4 mg/L  10.7   Free Lambda Light Chains      5.7 - 26.3 mg/L  5.6 (L)   Kappa/Lambda Ratio      0.26 - 1.65  1.91 (H)     Comprehensive Metabolic Panel    Order: [id number]   Status:  Final result   Visible to patient:  No (Not Released) Dx:  Hypercalcemia; Macroglobulinemia of W...   Newer results are available. Click to view them now.    Ref Range & Units 7d ago   Glucose 74 - 124 mg/dL 110    BUN 6 - 20 mg/dL 8    Creatinine 0.60 - 1.10 mg/dL 0.82    Sodium 134 - 145 mmol/L 132     Potassium 3.5 - 4.7 mmol/L 4.1    Chloride 98 - 107 mmol/L 92     CO2 22.0 - 29.0 mmol/L 29.4     Calcium 8.5 - 10.2 mg/dL 10.0    Total Protein 6.3 - 8.0 g/dL 6.8    Albumin 3.50 - 5.20 g/dL 4.40    ALT (SGPT) 0 - 33 U/L 14                Assessment/Plan    1. This patient has Waldenstrom's macroglobulinemia. She has been treated in the past with Rituxan as we discussed with her before sometimes can take several weeks before Rituxan is able to decrease her IgM monoclonal protein production. It seems to be that indeed this is the case. Her IgM remains stable at this time and I find nothing to suggest recurrence of her Waldenstrom at this time on clinical grounds.   2. The patient has history of left breast cancer, status post mastectomy and undergoing adjuvant therapy with Femara. So far I do not see anything that the Femara is producing on her in regard to side effects and the patient has nothing to suggest breast cancer recurrence. The right breast examination remains normal. She is up to date in her mammogram.   3. The patient has had hypercalcemia. Her PTH has been normal. She has no hypercalcemia or malignancy and I strongly believe that the calcium issue is related to hyperparathyroidism. I have not been able to prove the point though. Today her calcium level is normal. This will be monitored.   4. 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 This per se is not damaging to her and has no implications.   5. The patient has very significant sinus and respiratory symptomatology consistent with sinus bronchitis. I would not be surprised if she  has bronchiectasis and the persistent infection of the sinuses draining into the lung is playing ping-pong back and forth with 2 months’ worth of symptoms that she feels disgusted about. In spite of taking long ago antibiotic with no benefit, the patient has used different inhalers and she has taken medications through her allergist that gave her a headache. She stopped taking this other medication for this. Therefore, my advice to her is as follows:     1. From the point of view of the Waldenstrom, we will monitor her again in 11 weeks with a CBC, CMP, monoclonal protein studies.   2. From the point of view of calcium and sodium, the CMP will take care of monitoring this.   3. From the point of view of her breast cancer, she will remain on her Femara 2.5 mg a day.   4. From the point of view of her sinus bronchitis, the patient will proceed with a CT scan of the sinus and a CT scan of the chest at some point next week and we will discuss the findings with her.   5. For the same reason, I have sent a prescription for cefdinir 300 mg b.i.d. for 7 days to the pharmacy.   6. She will take a probiotic as long as she takes antibiotic at least for 10 days.   7. The patient has appointment to see Dr. Ogden. I will share the information of the CT scans with Dr. Ogden for review.   8. I asked her to decrease her dose of the medicine prescribed through her allergist SINGULAR from 10 mg to 5 mg and maybe that will have a better tolerance and she can take this medicine at bedtime.     I discussed all these facts with the patient and she was ready to proceed.                                       7/12/2018      CC:

## 2018-07-17 ENCOUNTER — HOSPITAL ENCOUNTER (OUTPATIENT)
Dept: CT IMAGING | Facility: HOSPITAL | Age: 78
Discharge: HOME | End: 2018-07-17
Attending: INTERNAL MEDICINE | Admitting: INTERNAL MEDICINE

## 2018-07-17 DIAGNOSIS — C50.812 MALIGNANT NEOPLASM OF OVERLAPPING SITES OF LEFT BREAST IN FEMALE, ESTROGEN RECEPTOR POSITIVE (HCC): ICD-10-CM

## 2018-07-17 DIAGNOSIS — Z17.0 MALIGNANT NEOPLASM OF OVERLAPPING SITES OF LEFT BREAST IN FEMALE, ESTROGEN RECEPTOR POSITIVE (HCC): ICD-10-CM

## 2018-07-17 DIAGNOSIS — C82.09 GRADE I FOLLICULAR LYMPHOMA OF EXTRANODAL SITE EXCLUDING SPLEEN AND OTHER SOLID ORGANS (HCC): ICD-10-CM

## 2018-07-17 DIAGNOSIS — J32.4 CHRONIC PANSINUSITIS: ICD-10-CM

## 2018-07-17 DIAGNOSIS — C88.0 MACROGLOBULINEMIA OF WALDENSTROM (HCC): ICD-10-CM

## 2018-07-17 LAB — CREAT BLDA-MCNC: 0.8 MG/DL (ref 0.6–1.3)

## 2018-07-17 PROCEDURE — 71260 CT THORAX DX C+: CPT

## 2018-07-17 PROCEDURE — 70486 CT MAXILLOFACIAL W/O DYE: CPT

## 2018-07-17 PROCEDURE — 82565 ASSAY OF CREATININE: CPT

## 2018-07-17 PROCEDURE — 25010000002 IOPAMIDOL 61 % SOLUTION: Performed by: INTERNAL MEDICINE

## 2018-07-17 RX ADMIN — IOPAMIDOL 75 ML: 612 INJECTION, SOLUTION INTRAVENOUS at 14:28

## 2018-07-19 ENCOUNTER — TELEPHONE (OUTPATIENT)
Dept: ONCOLOGY | Facility: CLINIC | Age: 78
End: 2018-07-19

## 2018-07-19 NOTE — TELEPHONE ENCOUNTER
Phone with pt: ct sinuses shows fluid collection r maxillary sinus, ct chest does not show major changes, no tumors or bronchiectasis or pneumonia or nodes; she needs to be seen by ent dr Goncalves popyp will be made, antibiotic is making her to fill better, she will see dr HOYOS in one more week

## 2018-07-20 ENCOUNTER — TELEPHONE (OUTPATIENT)
Dept: GENERAL RADIOLOGY | Facility: HOSPITAL | Age: 78
End: 2018-07-20

## 2018-07-20 NOTE — TELEPHONE ENCOUNTER
----- Message from Florencio Larose MD sent at 7/19/2018  5:32 PM EDT -----  Read my phone note, make referral dr wills sinusitis needs to drain r maxillary sinus

## 2018-07-24 RX ORDER — LEVOTHYROXINE SODIUM 0.05 MG/1
TABLET ORAL
Qty: 90 TABLET | Refills: 1 | Status: SHIPPED | OUTPATIENT
Start: 2018-07-24 | End: 2018-12-17 | Stop reason: SDUPTHER

## 2018-08-10 ENCOUNTER — OFFICE VISIT (OUTPATIENT)
Dept: NEUROLOGY | Facility: CLINIC | Age: 78
End: 2018-08-10

## 2018-08-10 VITALS
HEART RATE: 87 BPM | DIASTOLIC BLOOD PRESSURE: 80 MMHG | HEIGHT: 65 IN | WEIGHT: 156 LBS | OXYGEN SATURATION: 93 % | SYSTOLIC BLOOD PRESSURE: 144 MMHG | BODY MASS INDEX: 25.99 KG/M2

## 2018-08-10 DIAGNOSIS — G62.0 PERIPHERAL NEUROPATHY DUE TO CHEMOTHERAPY (HCC): ICD-10-CM

## 2018-08-10 DIAGNOSIS — T45.1X5A PERIPHERAL NEUROPATHY DUE TO CHEMOTHERAPY (HCC): ICD-10-CM

## 2018-08-10 DIAGNOSIS — G25.0 HEREDITARY ESSENTIAL TREMOR: Primary | ICD-10-CM

## 2018-08-10 PROCEDURE — 99204 OFFICE O/P NEW MOD 45 MIN: CPT | Performed by: PSYCHIATRY & NEUROLOGY

## 2018-08-10 RX ORDER — POTASSIUM CHLORIDE 1500 MG/1
TABLET, FILM COATED, EXTENDED RELEASE ORAL
Status: ON HOLD | COMMUNITY
Start: 2018-07-24 | End: 2019-01-07

## 2018-08-10 RX ORDER — PRIMIDONE 50 MG/1
TABLET ORAL
Qty: 90 TABLET | Refills: 5 | Status: SHIPPED | OUTPATIENT
Start: 2018-08-10 | End: 2018-11-29
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
## 2018-08-14 ENCOUNTER — TELEPHONE (OUTPATIENT)
Dept: NEUROLOGY | Facility: CLINIC | Age: 78
End: 2018-08-14

## 2018-08-14 NOTE — TELEPHONE ENCOUNTER
----- Message from Syl Baptiste sent at 8/13/2018  3:23 PM EDT -----  Contact: [phone]  PT SAID THE PRIMIDONE IS  MAKING HER CANT WAKE UP AND MAKING HER STAGGER ALL AROUND SHE FEELS DRUNK SHE IS GOING TO STOP FOR NOW, SHE WANTED YOU TO BE AWARE. IF SHE DECIDES IF SHE WANTS TO DO ANYTHING FURTHER SHE WILL CALL BACK

## 2018-08-17 ENCOUNTER — OFFICE VISIT (OUTPATIENT)
Dept: FAMILY MEDICINE CLINIC | Facility: CLINIC | Age: 78
End: 2018-08-17

## 2018-08-17 VITALS
OXYGEN SATURATION: 97 % | HEART RATE: 74 BPM | DIASTOLIC BLOOD PRESSURE: 80 MMHG | TEMPERATURE: 98.4 F | HEIGHT: 65 IN | WEIGHT: 159.1 LBS | SYSTOLIC BLOOD PRESSURE: 145 MMHG | BODY MASS INDEX: 26.51 KG/M2

## 2018-08-17 DIAGNOSIS — M19.90 OSTEOARTHRITIS, UNSPECIFIED OSTEOARTHRITIS TYPE, UNSPECIFIED SITE: ICD-10-CM

## 2018-08-17 DIAGNOSIS — R35.0 URINARY FREQUENCY: Primary | ICD-10-CM

## 2018-08-17 PROCEDURE — 99213 OFFICE O/P EST LOW 20 MIN: CPT | Performed by: NURSE PRACTITIONER

## 2018-08-17 PROCEDURE — 81003 URINALYSIS AUTO W/O SCOPE: CPT | Performed by: NURSE PRACTITIONER

## 2018-08-17 RX ORDER — NITROFURANTOIN 25; 75 MG/1; MG/1
100 CAPSULE ORAL EVERY 12 HOURS SCHEDULED
Qty: 14 CAPSULE | Refills: 0 | Status: SHIPPED | OUTPATIENT
Start: 2018-08-17 | End: 2018-11-29

## 2018-08-17 NOTE — PROGRESS NOTES
Subjective   Karli Loomis is a 78 y.o. female presents for increased urination, getting up 5-6 x nightly. Has lower back pain, joints are aching, worse after sitting. Improves once she gets moving. Denies fever or chills. Thought it may be related to UTI. Currently prescribed diuretic but states it is worse.       Difficulty Urinating   This is a new problem. The current episode started more than 1 month ago. The problem occurs intermittently. The problem has been waxing and waning. Associated symptoms include arthralgias (joint stiffness after sitting for prolonged periods) and urinary symptoms. Pertinent negatives include no abdominal pain, anorexia, change in bowel habit, chest pain, chills, congestion, coughing, diaphoresis, fatigue, fever, headaches, joint swelling, myalgias, nausea, neck pain, numbness, rash, sore throat, swollen glands, vertigo, visual change, vomiting or weakness. Nothing aggravates the symptoms. She has tried nothing for the symptoms. The treatment provided no relief.        The following portions of the patient's history were reviewed and updated as appropriate: allergies, current medications, past family history, past medical history, past social history, past surgical history and problem list.    Review of Systems   Constitutional: Negative.  Negative for chills, diaphoresis, fatigue and fever.   HENT: Negative for congestion and sore throat.    Respiratory: Negative for cough.    Cardiovascular: Negative for chest pain.   Gastrointestinal: Negative for abdominal pain, anorexia, change in bowel habit, nausea and vomiting.   Genitourinary: Positive for difficulty urinating, dysuria and frequency.        Nocturia     Musculoskeletal: Positive for arthralgias (joint stiffness after sitting for prolonged periods) and back pain. Negative for joint swelling, myalgias and neck pain.   Skin: Negative for rash.   Neurological: Negative for vertigo, weakness, numbness and headaches.        Objective   Physical Exam   Constitutional: She is oriented to person, place, and time. She appears well-developed and well-nourished.   Cardiovascular: Normal rate, regular rhythm and normal heart sounds.  Exam reveals no gallop and no friction rub.    No murmur heard.  Pulmonary/Chest: Effort normal and breath sounds normal. No respiratory distress. She has no wheezes. She has no rales.   Abdominal: Soft. Bowel sounds are normal. She exhibits no distension. There is no tenderness.   Neurological: She is alert and oriented to person, place, and time.   Skin: Skin is warm and dry.   Psychiatric: She has a normal mood and affect.   Vitals reviewed.      Assessment/Plan   Karli was seen today for urine frequency, joint pain and back pain.    Diagnoses and all orders for this visit:    Urinary frequency  -     POCT urinalysis dipstick, automated  -     Urine Culture - Urine, Urine, Clean Catch    Osteoarthritis, unspecified osteoarthritis type, unspecified site    Other orders  -     nitrofurantoin, macrocrystal-monohydrate, (MACROBID) 100 MG capsule; Take 1 capsule by mouth Every 12 (Twelve) Hours.    will start macrobid and await culture, symptoms could be related to hctz, however with worsening at night, may need urology referral.  Body aches/back pain appear to be arthritis, apply heating pad as needed and may use biofreeze

## 2018-08-19 LAB
BACTERIA UR CULT: NORMAL
BACTERIA UR CULT: NORMAL

## 2018-08-20 DIAGNOSIS — R35.0 URINARY FREQUENCY: Primary | ICD-10-CM

## 2018-08-23 NOTE — TELEPHONE ENCOUNTER
Should she decide to try it again I would sugest starting with 1/2 tablet. Sme peaple are just very sensitive to it and even small doses make them dizzy, sleepy etc.    gns

## 2018-08-24 RX ORDER — GABAPENTIN 400 MG/1
CAPSULE ORAL
Qty: 120 CAPSULE | Refills: 1 | Status: SHIPPED | OUTPATIENT
Start: 2018-08-24 | End: 2018-10-18 | Stop reason: SDUPTHER

## 2018-09-27 ENCOUNTER — LAB (OUTPATIENT)
Dept: LAB | Facility: HOSPITAL | Age: 78
End: 2018-09-27

## 2018-09-27 DIAGNOSIS — Z17.0 MALIGNANT NEOPLASM OF OVERLAPPING SITES OF LEFT BREAST IN FEMALE, ESTROGEN RECEPTOR POSITIVE (HCC): ICD-10-CM

## 2018-09-27 DIAGNOSIS — C50.812 MALIGNANT NEOPLASM OF OVERLAPPING SITES OF LEFT BREAST IN FEMALE, ESTROGEN RECEPTOR POSITIVE (HCC): ICD-10-CM

## 2018-09-27 DIAGNOSIS — C82.09 GRADE I FOLLICULAR LYMPHOMA OF EXTRANODAL SITE EXCLUDING SPLEEN AND OTHER SOLID ORGANS (HCC): ICD-10-CM

## 2018-09-27 DIAGNOSIS — C88.0 MACROGLOBULINEMIA OF WALDENSTROM (HCC): ICD-10-CM

## 2018-09-27 LAB
ALBUMIN SERPL-MCNC: 4.5 G/DL (ref 3.5–5.2)
ALBUMIN/GLOB SERPL: 2.4 G/DL (ref 1.1–2.4)
ALP SERPL-CCNC: 76 U/L (ref 38–116)
ALT SERPL W P-5'-P-CCNC: 15 U/L (ref 0–33)
ANION GAP SERPL CALCULATED.3IONS-SCNC: 13.9 MMOL/L
AST SERPL-CCNC: 23 U/L (ref 0–32)
BASOPHILS # BLD AUTO: 0.03 10*3/MM3 (ref 0–0.1)
BASOPHILS NFR BLD AUTO: 0.6 % (ref 0–1.1)
BILIRUB SERPL-MCNC: 0.9 MG/DL (ref 0.1–1.2)
BUN BLD-MCNC: 9 MG/DL (ref 6–20)
BUN/CREAT SERPL: 12 (ref 7.3–30)
CALCIUM SPEC-SCNC: 9.5 MG/DL (ref 8.5–10.2)
CHLORIDE SERPL-SCNC: 88 MMOL/L (ref 98–107)
CO2 SERPL-SCNC: 27.1 MMOL/L (ref 22–29)
CREAT BLD-MCNC: 0.75 MG/DL (ref 0.6–1.1)
DEPRECATED RDW RBC AUTO: 42.3 FL (ref 37–49)
EOSINOPHIL # BLD AUTO: 0.25 10*3/MM3 (ref 0–0.36)
EOSINOPHIL NFR BLD AUTO: 5.2 % (ref 1–5)
ERYTHROCYTE [DISTWIDTH] IN BLOOD BY AUTOMATED COUNT: 12.2 % (ref 11.7–14.5)
GFR SERPL CREATININE-BSD FRML MDRD: 75 ML/MIN/1.73
GLOBULIN UR ELPH-MCNC: 1.9 GM/DL (ref 1.8–3.5)
GLUCOSE BLD-MCNC: 111 MG/DL (ref 74–124)
HCT VFR BLD AUTO: 41 % (ref 34–45)
HGB BLD-MCNC: 14.4 G/DL (ref 11.5–14.9)
IMM GRANULOCYTES # BLD: 0.01 10*3/MM3 (ref 0–0.03)
IMM GRANULOCYTES NFR BLD: 0.2 % (ref 0–0.5)
LYMPHOCYTES # BLD AUTO: 1.13 10*3/MM3 (ref 1–3.5)
LYMPHOCYTES NFR BLD AUTO: 23.5 % (ref 20–49)
MCH RBC QN AUTO: 32.7 PG (ref 27–33)
MCHC RBC AUTO-ENTMCNC: 35.1 G/DL (ref 32–35)
MCV RBC AUTO: 93.2 FL (ref 83–97)
MONOCYTES # BLD AUTO: 0.56 10*3/MM3 (ref 0.25–0.8)
MONOCYTES NFR BLD AUTO: 11.6 % (ref 4–12)
NEUTROPHILS # BLD AUTO: 2.83 10*3/MM3 (ref 1.5–7)
NEUTROPHILS NFR BLD AUTO: 58.9 % (ref 39–75)
NRBC BLD MANUAL-RTO: 0 /100 WBC (ref 0–0)
PLATELET # BLD AUTO: 142 10*3/MM3 (ref 150–375)
PMV BLD AUTO: 9.2 FL (ref 8.9–12.1)
POTASSIUM BLD-SCNC: 3.8 MMOL/L (ref 3.5–4.7)
PROT SERPL-MCNC: 6.4 G/DL (ref 6.3–8)
RBC # BLD AUTO: 4.4 10*6/MM3 (ref 3.9–5)
SODIUM BLD-SCNC: 129 MMOL/L (ref 134–145)
WBC NRBC COR # BLD: 4.81 10*3/MM3 (ref 4–10)

## 2018-09-27 PROCEDURE — 85025 COMPLETE CBC W/AUTO DIFF WBC: CPT | Performed by: INTERNAL MEDICINE

## 2018-09-27 PROCEDURE — 36415 COLL VENOUS BLD VENIPUNCTURE: CPT | Performed by: INTERNAL MEDICINE

## 2018-09-27 PROCEDURE — 80053 COMPREHEN METABOLIC PANEL: CPT

## 2018-09-28 LAB
ALBUMIN SERPL-MCNC: 4.2 G/DL (ref 2.9–4.4)
ALBUMIN/GLOB SERPL: 1.8 {RATIO} (ref 0.7–1.7)
ALPHA1 GLOB FLD ELPH-MCNC: 0.3 G/DL (ref 0–0.4)
ALPHA2 GLOB SERPL ELPH-MCNC: 0.7 G/DL (ref 0.4–1)
B-GLOBULIN SERPL ELPH-MCNC: 1 G/DL (ref 0.7–1.3)
GAMMA GLOB SERPL ELPH-MCNC: 0.5 G/DL (ref 0.4–1.8)
GLOBULIN SER CALC-MCNC: 2.4 G/DL (ref 2.2–3.9)
IGA SERPL-MCNC: 31 MG/DL (ref 64–422)
IGG SERPL-MCNC: 413 MG/DL (ref 700–1600)
IGM SERPL-MCNC: 260 MG/DL (ref 26–217)
INTERPRETATION SERPL IEP-IMP: ABNORMAL
KAPPA LC SERPL-MCNC: 11.2 MG/L (ref 3.3–19.4)
KAPPA LC/LAMBDA SER: 2.11 {RATIO} (ref 0.26–1.65)
LAMBDA LC FREE SERPL-MCNC: 5.3 MG/L (ref 5.7–26.3)
Lab: ABNORMAL
M-SPIKE: 0.3 G/DL
PROT SERPL-MCNC: 6.6 G/DL (ref 6–8.5)

## 2018-09-28 RX ORDER — POTASSIUM CHLORIDE 1500 MG/1
TABLET, EXTENDED RELEASE ORAL
Qty: 90 TABLET | Refills: 1 | Status: ON HOLD | OUTPATIENT
Start: 2018-09-28 | End: 2019-01-07

## 2018-10-12 RX ORDER — LOSARTAN POTASSIUM AND HYDROCHLOROTHIAZIDE 25; 100 MG/1; MG/1
TABLET ORAL
Qty: 90 TABLET | Refills: 1 | Status: SHIPPED | OUTPATIENT
Start: 2018-10-12 | End: 2018-11-29

## 2018-10-18 ENCOUNTER — APPOINTMENT (OUTPATIENT)
Dept: ONCOLOGY | Facility: CLINIC | Age: 78
End: 2018-10-18

## 2018-10-18 ENCOUNTER — OFFICE VISIT (OUTPATIENT)
Dept: ONCOLOGY | Facility: CLINIC | Age: 78
End: 2018-10-18

## 2018-10-18 ENCOUNTER — APPOINTMENT (OUTPATIENT)
Dept: LAB | Facility: HOSPITAL | Age: 78
End: 2018-10-18

## 2018-10-18 VITALS
DIASTOLIC BLOOD PRESSURE: 82 MMHG | OXYGEN SATURATION: 97 % | TEMPERATURE: 99 F | HEIGHT: 64 IN | WEIGHT: 157 LBS | BODY MASS INDEX: 26.8 KG/M2 | SYSTOLIC BLOOD PRESSURE: 158 MMHG | RESPIRATION RATE: 14 BRPM | HEART RATE: 66 BPM

## 2018-10-18 DIAGNOSIS — I67.841 REVERSIBLE CEREBROVASCULAR VASOCONSTRICTION SYNDROME: ICD-10-CM

## 2018-10-18 DIAGNOSIS — Z17.0 MALIGNANT NEOPLASM OF OVERLAPPING SITES OF LEFT BREAST IN FEMALE, ESTROGEN RECEPTOR POSITIVE (HCC): ICD-10-CM

## 2018-10-18 DIAGNOSIS — G45.9 TIA (TRANSIENT ISCHEMIC ATTACK): ICD-10-CM

## 2018-10-18 DIAGNOSIS — C50.812 MALIGNANT NEOPLASM OF OVERLAPPING SITES OF LEFT BREAST IN FEMALE, ESTROGEN RECEPTOR POSITIVE (HCC): ICD-10-CM

## 2018-10-18 DIAGNOSIS — C88.0 MACROGLOBULINEMIA OF WALDENSTROM (HCC): Primary | ICD-10-CM

## 2018-10-18 PROCEDURE — G0463 HOSPITAL OUTPT CLINIC VISIT: HCPCS | Performed by: INTERNAL MEDICINE

## 2018-10-18 PROCEDURE — 99215 OFFICE O/P EST HI 40 MIN: CPT | Performed by: INTERNAL MEDICINE

## 2018-10-18 RX ORDER — INFLUENZA A VIRUS A/MICHIGAN/45/2015 X-275 (H1N1) ANTIGEN (FORMALDEHYDE INACTIVATED), INFLUENZA A VIRUS A/SINGAPORE/INFIMH-16-0019/2016 IVR-186 (H3N2) ANTIGEN (FORMALDEHYDE INACTIVATED), AND INFLUENZA B VIRUS B/MARYLAND/15/2016 BX-69A (A B/COLORADO/6/2017-LIKE VIRUS) ANTIGEN (FORMALDEHYDE INACTIVATED) 60; 60; 60 UG/.5ML; UG/.5ML; UG/.5ML
INJECTION, SUSPENSION INTRAMUSCULAR
Refills: 0 | COMMUNITY
Start: 2018-10-06 | End: 2018-11-29

## 2018-10-18 NOTE — PROGRESS NOTES
Subjective  REASONS FOR FOLLOWUP:     1. History of Waldenstrom macroglobulinemia. The patient  remains in remission about this condition with stable level of immunoglobulin M . Normal IgA. Normal IgG. Normal white count, hemoglobin, and platelets. No abnormal bleeding. No peripheral adenopathy. No hepatosplenomegaly. The patient will remain in observation.   2. History of breast cancer stage I on the left, status post mastectomy. The patient remains on aromatase inhibitor without any evidence of breast cancer recurrence and good tolerance to her aromatase inhibitor medicine. She will remain on the same issue for the time being.                History of Present Illness This patient returns today to the office for followup for her multiple issues. Today she is very concerned because several weeks ago she developed sensation of numbness and tingling in the right foot. She was not able to operate the right lower extremity at least for an hour. At the same time, she had a sensation of dizziness and somnolence. She thought that she could be having a stroke. She did not do anything, she just stayed put and in a few minutes, almost 50 minutes, the symptoms resolved. She has not had any other episodes. She reminds herself that 25 years ago, she had a similar episode and on that occasion she had some visual deficit in the field in the right eye. Other than that the patient has had much better control of her respiratory situation with a new inhaler given to her by Pulmonary Medicine. She has questions in regard to the potentiality for this inhaler to end up with complications including hypertension and diabetes. The patient has had a good appetite. Her weight is stable. Her bowel function is normal. Urination is normal. She has not had any further tingling or numbness in her lower extremities. Her energy level has been acceptable. She has not had any fevers. She remains on her aromatase inhibitor for the treatment of her  left breast cancer. She will be due to stop medication after 5 years next spring.              1. Past Medical History, Past Surgical HistoryHypertension.    2. Hypothyroidism many years ago and has not taken any thyroid medication in quite some time.   3. Thyroid nodule removed more than 35 years ago.    4. Cholecystectomy.    5. Tonsillectomy and adenoidectomy.    6. Appendectomy.  7. Partial hysterectomy many years ago.      She has mammogram and pelvic examination yearly that have been normal.  Colonoscopy 4 years ago was normal.   She also has had a leaky valve in her heart without any significance.      During the recent workup at [facility], the patient had a transesophageal echocardiogram that failed to document any vegetation.    Clostridium colitis requiring admission to [facility] in 09/2008.      Arthroscopy in 03/2009 of the right knee with finding of chondromalacia and appropriate cleanup of the joint was performed by Dr. Moraes.      On 07/24/2013 the patient has lost 14 pounds, just cutting down on junk food.  Her glucose intolerance has improved substantially and her glucose has normalized.      On 01/12/2014, we reviewed her blood pressure issues recently. She has had a very stable blood pressure for the last week, and today is normal. Her physical examination is otherwise unremarkable and normal feeling.     We advised her to remain in observation from this point of view.    As per 10/01/2014, the patient has undergone evaluation by neurology service at [facility] for consideration of gabapentin that she has been taking for so many years for possible seizure activity.   I am aware of an MRI scan of the brain that shows not too much, besides minimal vascular disease and no other lesions.  Her neurologist will be informing us in regard how to proceed in this regard.      She wanted to have a right knee replaced in 12/2009 through her orthopedic surgeon.  She developed delirium in the hospital and extensive ecchymosis and hematoma formation in the whole right lower extremity requiring transfusion of red cells for a hemoglobin of 7.     SOCIAL HISTORY:  .  Retired from [employer] where she worked for many years; she never was exposed to any chemicals.   She is a never smoker and nondrinker.   She lives with her oldest daughter.      FAMILY HISTORY: The patient’s father  of complications from heart disease at age 70.  Mother  after a psychiatric illness at age 67.  A brother  in a car accident at 43 and a sister, age 75, is in good health.   Her three daughters are in good health.  She has no FH of lymphoma or leukemia.    Review of Systems     General: no fever, no chills, no fatigue,no weight changes, no lack of appetite.  Eyes: no epiphora, xerophthalmia,conjunctivitis, pain, glaucoma, blurred vision, blindness, secretion, photophobia, proptosis, diplopia.  Ears: no otorrhea, tinnitus, otorrhagia, deafness, pain, vertigo.  Nose: no rhinorrhea, no epistaxis, no alteration in perception of odors, no sinuses pressure.  Mouth: no alteration in gums or teeth,  No ulcers, no difficulty with mastication or deglut ion, no odynophagia.  Neck: no masses or pain, no thyroid alterations, no pain in muscles or arteries, no carotid odynia, no crepitation.  Respiratory: no cough, no sputum production,no dyspnea,no trepopnea, no pleuritic pain,no hemoptysis.  Heart: no syncope, no irregularity, no palpitations, no angina,no orthopnea,no paroxysmal nocturnal dyspnea.  Vascular Venous: no tenderness,no edema,no palpable cords,no postphlebitic syndrome, no skin changes no ulcerations.  Vascular Arterial: no distal ischemia, noclaudication, no gangrene, no neuropathic ischemic pain, no skin ulcers, no paleness no cyanosis.  GI: no dysphagia, no odynophagia, no regurgitation, no heartburn,no indigestion,no nausea,no vomiting,no hematemesis ,no melena,no  "jaundice,no distention, no obstipation,no enterorrhagia,no proctalgia,no anal  lesions, no changes in bowel habits.  : no frequency, no hesitancy, no hematuria, no discharge,no  pain.  Musculoskeletal: no muscle or tendon pain or inflammation,no  joint pain, no edema, no functional limitation,no fasciculations, no mass.  Neurologic: no headache, no seizures, STATED alterations on Craneal nerves, AND motor deficit, no sensory deficit, normal coordination, no alteration in memory,normal orientation, calculation,normal writting, verbal and written language.  Skin: no rashes,no pruritus no localized lesions.  Psychiatric: no anxiety, no depression,no agitation, no delusions, proper insight.       Medications:  The current medication list was reviewed in the EMR    ALLERGIES:    Allergies   Allergen Reactions   • Cortisone    • Darvon  [Propoxyphene] Palpitations       Objective      Vitals:    10/18/18 1053   BP: 158/82   Pulse: 66   Resp: 14   Temp: 99 °F (37.2 °C)   TempSrc: Oral   SpO2: 97%   Weight: 71.2 kg (157 lb)   Height: 162.5 cm (63.98\")   PainSc: 0-No pain     Current Status 10/18/2018   ECOG score 0       Physical Exam   GENERAL:  Well-developed, well-nourished  Patient  in no acute distress.   SKIN:  Warm, dry ,NO rashes,NO purpura ,NO petechiae.  HEENT:  Pupils were equal and reactive to light and accomodation, conjunctivas non injected, no pterigion, normal extraocular movements, normal visual acuity.   Mouth mucosa was moist, no exudates in oropharynx, normal gum line, normal roof of the mouth and pillars, normal papillations of the tongue.  NECK:  Supple with good range of motion; no thyromegaly or masses, no JVD or bruits, no cervical adenopathies.No carotid arteries pain, no carotid abnormal pulsation , NO arterial dance.  LYMPHATICS:  No cervical, NO supraclavicular, NO axillary,NO epitrochlear , NO inguinal adenopathy.  CHEST:  Normal excursion of both michelet thoraces, normal voice fremitus, no " subcutaneous emphysema, normal axillas, no rashes or acanthosis nigricans. Lungs clear to percussion and auscultation, normal breath sounds bilaterally, no wheezing,NO crackles NO ronchi, NO stridor, NO rubs.  CARDIAC AND VASCULAR:  normal rate and regular rhythm, without murmurs,NO rubs NO S3 NO S4 right or left . Normal femoral, popliteal, pedis, brachial and carotid pulses.  INSPECTION of  breast documented symmetry of the tissue per se and location and size of the nipple,no retractions or inversion of the nipple, normal skin without lesions, no erythema or nodules,no paud'orange, no prominence of superficial veins or chest wall collateral circulation.PALPATION of the breast documented normal skin turgor, no induration, alteration in local temperature, or pain, no palpable masses or nodules, normal mobility of the tissues,no fixation of the tissue or parenchyma to the chest wall, no alteration at the tail of the breasts or axillas, no adenopathies. LEFT MASTECTOMY Surgical site was well healed.No lymphedema in either extremity.  ABDOMEN:  Soft, nontender with no organomegaly or masses, no ascites, no collateral circulation,no distention,no Max sign, no abdominal pain, no inguinal hernias,no umbilical hernia, no abdominal bruits. Normal bowel sounds.  GENITAL: Not  Performed.  EXTREMITIES  AND SPINE:  No clubbing, cyanosis or edema, no deformities or pain .No kyphosis, scoliosis, deformities or pain in spine, ribs or pelvic bone.  NEUROLOGICAL:  Patient was awake, alert, oriented to time, person and place.A detailed neurological exam disclosed cranial nerves that were completely normal. Her motor exam in upper and lower extremities disclosed a strength of 5/5 proximally and distally. She had no sensory alterations that I can tell. She had normal vibratory sensation and her coordination was appropriate. Finger-to-nose was negative. She had minimal tremor upon MOVEMENT when she was doing test with both upper  extremities. She had no alterations in her gait and she had no difficulty with her balance standing up on only 1 foot. She had negative Babinski. She had no alterations like a Romberg test positivity.                    RECENT LABS:  Component      Latest Ref Rng & Units 7/5/2018 9/27/2018 9/27/2018          10:28 AM 10:57 AM 10:57 AM   IgG      700 - 1600 mg/dL 347 (L)  413 (L)   IgA      64 - 422 mg/dL 34 (L)  31 (L)   IgM      26 - 217 mg/dL 313 (H)  260 (H)   Total Protein      6.3 - 8.0 g/dL 6.4 6.4 6.6   Albumin      3.50 - 5.20 g/dL 4.1 4.50 4.2   Alpha-1-Globulin      0.0 - 0.4 g/dL 0.2  0.3   Alpha-2-Globulin      0.4 - 1.0 g/dL 0.7  0.7   Beta Globulin      0.7 - 1.3 g/dL 0.9  1.0   Gamma Globulin      0.4 - 1.8 g/dL 0.5  0.5   M-Pratik      Not Observed g/dL 0.2 (H)  0.3 (H)   Globulin      2.2 - 3.9 g/dL 2.3  2.4   A/G Ratio      1.1 - 2.4 g/dL 1.8 (H) 2.4 1.8 (H)   Immunofixation Reflex, Serum       Comment  Comment   Please Note       Comment  Comment   Free Light Chain, Kappa      3.3 - 19.4 mg/L 10.7  11.2   Free Lambda Light Chains      5.7 - 26.3 mg/L 5.6 (L)  5.3 (L)   Kappa/Lambda Ratio      0.26 - 1.65 1.91 (H)  2.11 (H)     CBC Auto Differential   Order: [id number] - Part of Panel Order [id number]   Status:  Final result   Visible to patient:  No (Not Released) Dx:  Macroglobulinemia of Waldenstrom (CMS...    Ref Range & Units 3wk ago   WBC 4.00 - 10.00 10*3/mm3 4.81    RBC 3.90 - 5.00 10*6/mm3 4.40    Hemoglobin 11.5 - 14.9 g/dL 14.4    Hematocrit 34.0 - 45.0 % 41.0    MCV 83.0 - 97.0 fL 93.2    MCH 27.0 - 33.0 pg 32.7    MCHC 32.0 - 35.0 g/dL 35.1     RDW 11.7 - 14.5 % 12.2    RDW-SD 37.0 - 49.0 fl 42.3    MPV 8.9 - 12.1 fL 9.2    Platelets 150 - 375 10*3/mm3 142     Neutrophil % 39.0 - 75.0 % 58.9    Lymphocyte % 20.0 - 49.0 % 23.5    Monocyte % 4.0 - 12.0 % 11.6    Eosinophil % 1.0 - 5.0 % 5.2     Basophil % 0.0 - 1.1 % 0.6    Immature Grans % 0.0 - 0.5 % 0.2    Neutrophils, Absolute 1.50 -  7.00 10*3/mm3 2.83            Comprehensive Metabolic Panel   Order: [id number]   Status:  Final result   Visible to patient:  No (Not Released) Dx:  Macroglobulinemia of Waldenstrom (CMS...   Newer results are available. Click to view them now.    Ref Range & Units 3wk ago   Glucose 74 - 124 mg/dL 111    BUN 6 - 20 mg/dL 9    Creatinine 0.60 - 1.10 mg/dL 0.75    Sodium 134 - 145 mmol/L 129     Potassium 3.5 - 4.7 mmol/L 3.8    Chloride 98 - 107 mmol/L 88     CO2 22.0 - 29.0 mmol/L 27.1    Calcium 8.5 - 10.2 mg/dL 9.5    Total Protein 6.3 - 8.0 g/dL 6.4    Albumin 3.50 - 5.20 g/dL 4.50    ALT (SGPT) 0 - 33 U/L 15    AST (SGOT) 0 - 32 U/L 23    Alkaline Phosphatase 38 - 116 U/L 76    Total Bilirubin 0.1 - 1.2 mg/dL 0.9    eGFR Non African Amer >60 mL/min/1.73 75                    Assessment/Plan    1. This patient has Waldenstrom's macroglobulinemia. She has been treated in the past with Rituxan as we discussed with her before sometimes can take several weeks before Rituxan is able to decrease her IgM monoclonal protein production. It seems to be that indeed this is the case. Her IgM remains stable at this time and I find nothing to suggest recurrence of her Waldenstrom at this time on clinical grounds.   2. The patient has history of left breast cancer, status post mastectomy and undergoing adjuvant therapy with Femara. So far I do not see anything that the Femara is producing on her in regard to side effects and the patient has nothing to suggest breast cancer recurrence. The right breast examination remains normal. She is up to date in her mammogram.   3. The patient has had hypercalcemia. Her PTH has been normal. She has no hypercalcemia or malignancy and I strongly believe that the calcium issue is related to hyperparathyroidism. I have not been able to prove the point though. Today her calcium level is normal. This will be monitored.   4. 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 This per se is not damaging to her and has no implications.   5. Today the patient has symptoms that are worrisome for a transient ischemic attack. The numbness and the motor deficit in the right lower extremity for a few minutes along with the sensation of dizziness and being up in space for the same period of time makes me to worry. She has risk factors for this including hypertension and her age. Therefore, under the present circumstances, I do believe that the least thing we can do is get a CT scan of the head and cardiovascular studies. She will be called at home with the report of this. The patient raised the question if she needs to remain on aspirin and I asked her please do continue the aspirin. I do not want for her to stop. I wonder if in the long run she will require besides aspirin another medication like Plavix. For this, she probably will require neurological consultation. I also requested for her carotid Dopplers.     The patient again will be called at home with the report of her Dopplers and her CT scan. If any findings are made there, she probably will be seen by Neurology.     In regard to Femara, I pointed out to her that she will be due to discontinue this medicine in the spring of next year after completion of 5 years of therapy.     Her Waldenstrom still be watched and I want to review her back in 4 months with a CBC, CMP and monoclonal protein studies done the week before MD appointment.                                  10/18/2018      CC:

## 2018-10-19 RX ORDER — GABAPENTIN 400 MG/1
CAPSULE ORAL
Qty: 120 CAPSULE | Refills: 0 | Status: SHIPPED | OUTPATIENT
Start: 2018-10-19 | End: 2018-11-21 | Stop reason: SDUPTHER

## 2018-10-25 ENCOUNTER — APPOINTMENT (OUTPATIENT)
Dept: CT IMAGING | Facility: HOSPITAL | Age: 78
End: 2018-10-25
Attending: INTERNAL MEDICINE

## 2018-10-25 ENCOUNTER — HOSPITAL ENCOUNTER (OUTPATIENT)
Dept: CARDIOLOGY | Facility: HOSPITAL | Age: 78
End: 2018-10-25
Attending: INTERNAL MEDICINE

## 2018-10-26 ENCOUNTER — HOSPITAL ENCOUNTER (OUTPATIENT)
Dept: CT IMAGING | Facility: HOSPITAL | Age: 78
Discharge: HOME | End: 2018-10-26
Attending: INTERNAL MEDICINE | Admitting: INTERNAL MEDICINE

## 2018-10-26 ENCOUNTER — HOSPITAL ENCOUNTER (OUTPATIENT)
Dept: CARDIOLOGY | Facility: HOSPITAL | Age: 78
Discharge: HOME | End: 2018-10-26
Attending: INTERNAL MEDICINE

## 2018-10-26 DIAGNOSIS — Z17.0 MALIGNANT NEOPLASM OF OVERLAPPING SITES OF LEFT BREAST IN FEMALE, ESTROGEN RECEPTOR POSITIVE (HCC): ICD-10-CM

## 2018-10-26 DIAGNOSIS — C88.0 MACROGLOBULINEMIA OF WALDENSTROM (HCC): ICD-10-CM

## 2018-10-26 DIAGNOSIS — C50.812 MALIGNANT NEOPLASM OF OVERLAPPING SITES OF LEFT BREAST IN FEMALE, ESTROGEN RECEPTOR POSITIVE (HCC): ICD-10-CM

## 2018-10-26 DIAGNOSIS — G45.9 TIA (TRANSIENT ISCHEMIC ATTACK): ICD-10-CM

## 2018-10-26 DIAGNOSIS — I67.841 REVERSIBLE CEREBROVASCULAR VASOCONSTRICTION SYNDROME: ICD-10-CM

## 2018-10-26 LAB
BH CV XLRA MEAS LEFT CCA RATIO VEL: 64.5 CM/SEC
BH CV XLRA MEAS LEFT DIST CCA EDV: 9.4 CM/SEC
BH CV XLRA MEAS LEFT DIST CCA PSV: 64.5 CM/SEC
BH CV XLRA MEAS LEFT DIST ICA EDV: -13.6 CM/SEC
BH CV XLRA MEAS LEFT DIST ICA PSV: -60.5 CM/SEC
BH CV XLRA MEAS LEFT ICA RATIO VEL: -64.5 CM/SEC
BH CV XLRA MEAS LEFT ICA/CCA RATIO: -1
BH CV XLRA MEAS LEFT MID ICA EDV: -12.5 CM/SEC
BH CV XLRA MEAS LEFT MID ICA PSV: -55.4 CM/SEC
BH CV XLRA MEAS LEFT PROX CCA EDV: 12.2 CM/SEC
BH CV XLRA MEAS LEFT PROX CCA PSV: 84.1 CM/SEC
BH CV XLRA MEAS LEFT PROX ECA PSV: -95 CM/SEC
BH CV XLRA MEAS LEFT PROX ICA EDV: -8.8 CM/SEC
BH CV XLRA MEAS LEFT PROX ICA PSV: -64.5 CM/SEC
BH CV XLRA MEAS LEFT PROX SCLA PSV: 130 CM/SEC
BH CV XLRA MEAS LEFT VERTEBRAL A EDV: 7.1 CM/SEC
BH CV XLRA MEAS LEFT VERTEBRAL A PSV: 40.5 CM/SEC
BH CV XLRA MEAS RIGHT CCA RATIO VEL: 69.8 CM/SEC
BH CV XLRA MEAS RIGHT DIST CCA EDV: 10 CM/SEC
BH CV XLRA MEAS RIGHT DIST CCA PSV: 69.8 CM/SEC
BH CV XLRA MEAS RIGHT DIST ICA EDV: -13.3 CM/SEC
BH CV XLRA MEAS RIGHT DIST ICA PSV: -67.5 CM/SEC
BH CV XLRA MEAS RIGHT ICA RATIO VEL: -67.5 CM/SEC
BH CV XLRA MEAS RIGHT ICA/CCA RATIO: -0.97
BH CV XLRA MEAS RIGHT MID ICA EDV: -16.3 CM/SEC
BH CV XLRA MEAS RIGHT MID ICA PSV: -62.9 CM/SEC
BH CV XLRA MEAS RIGHT PROX CCA EDV: -5.5 CM/SEC
BH CV XLRA MEAS RIGHT PROX CCA PSV: -58.9 CM/SEC
BH CV XLRA MEAS RIGHT PROX ECA PSV: -80.9 CM/SEC
BH CV XLRA MEAS RIGHT PROX ICA EDV: -14.1 CM/SEC
BH CV XLRA MEAS RIGHT PROX ICA PSV: -65.1 CM/SEC
BH CV XLRA MEAS RIGHT PROX SCLA PSV: 174 CM/SEC
BH CV XLRA MEAS RIGHT VERTEBRAL A EDV: 7.5 CM/SEC
BH CV XLRA MEAS RIGHT VERTEBRAL A PSV: 41.3 CM/SEC
LEFT ARM BP: NORMAL MMHG
RIGHT ARM BP: NORMAL MMHG

## 2018-10-26 PROCEDURE — 93880 EXTRACRANIAL BILAT STUDY: CPT

## 2018-10-26 PROCEDURE — 70450 CT HEAD/BRAIN W/O DYE: CPT

## 2018-10-29 ENCOUNTER — TELEPHONE (OUTPATIENT)
Dept: ONCOLOGY | Facility: CLINIC | Age: 78
End: 2018-10-29

## 2018-10-29 NOTE — TELEPHONE ENCOUNTER
Had called earlier regarding test results.  Message sent to Dr. Larose letting him know she was calling for results.  Results called to pt per Dr. Larose.

## 2018-10-29 NOTE — TELEPHONE ENCOUNTER
----- Message from Luisa Espinoza sent at 10/29/2018  1:54 PM EDT -----  [phone]  Had tests last week and  Dr. Larose came  in and  had gotten results and wanted her to call him.

## 2018-10-29 NOTE — TELEPHONE ENCOUNTER
PHONE WITH PT: CAROTID STUDIES AND BRAIN CT ARE OK, NO NEED FOR ANY OTHER INTERVENTION, CONTINUE ASPIRIN 81 MG A DAY, SHE AGREES

## 2018-11-21 RX ORDER — GABAPENTIN 400 MG/1
CAPSULE ORAL
Qty: 120 CAPSULE | Refills: 0 | Status: SHIPPED | OUTPATIENT
Start: 2018-11-21 | End: 2018-12-21 | Stop reason: SDUPTHER

## 2018-11-30 ENCOUNTER — OFFICE VISIT (OUTPATIENT)
Dept: FAMILY MEDICINE CLINIC | Facility: CLINIC | Age: 78
End: 2018-11-30

## 2018-11-30 VITALS
SYSTOLIC BLOOD PRESSURE: 134 MMHG | HEART RATE: 77 BPM | WEIGHT: 156.5 LBS | TEMPERATURE: 98 F | DIASTOLIC BLOOD PRESSURE: 62 MMHG | BODY MASS INDEX: 26.88 KG/M2 | OXYGEN SATURATION: 97 %
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
## 2018-11-30 DIAGNOSIS — J40 BRONCHITIS: Primary | ICD-10-CM

## 2018-11-30 DIAGNOSIS — R05.9 COUGH: ICD-10-CM

## 2018-11-30 DIAGNOSIS — R06.02 SHORTNESS OF BREATH: ICD-10-CM

## 2018-11-30 PROCEDURE — 99214 OFFICE O/P EST MOD 30 MIN: CPT | Performed by: NURSE PRACTITIONER

## 2018-11-30 PROCEDURE — 71046 X-RAY EXAM CHEST 2 VIEWS: CPT | Performed by: NURSE PRACTITIONER

## 2018-11-30 RX ORDER — DOXYCYCLINE 100 MG/1
100 CAPSULE ORAL EVERY 12 HOURS SCHEDULED
Qty: 20 CAPSULE | Refills: 0 | Status: ON HOLD | OUTPATIENT
Start: 2018-11-30 | End: 2019-01-06

## 2018-12-05 ENCOUNTER — TELEPHONE (OUTPATIENT)
Dept: FAMILY MEDICINE CLINIC | Facility: CLINIC | Age: 78
End: 2018-12-05

## 2018-12-18 RX ORDER — LEVOTHYROXINE SODIUM 0.05 MG/1
TABLET ORAL
Qty: 90 TABLET | Refills: 1 | Status: SHIPPED | OUTPATIENT
Start: 2018-12-18 | End: 2019-07-09 | Stop reason: SDUPTHER

## 2018-12-21 RX ORDER — GABAPENTIN 400 MG/1
400 CAPSULE ORAL 3 TIMES DAILY
Qty: 360 CAPSULE | Refills: 0 | Status: ON HOLD | OUTPATIENT
Start: 2018-12-21 | End: 2019-01-07

## 2018-12-21 NOTE — TELEPHONE ENCOUNTER
patient contacted office for refill on gabapentin 400 mg TID. Only has 1 week supply left. Please send refill.

## 2018-12-29 RX ORDER — NITROFURANTOIN 25; 75 MG/1; MG/1
100 CAPSULE ORAL 2 TIMES DAILY
Qty: 20 CAPSULE | Refills: 0 | Status: ON HOLD | OUTPATIENT
Start: 2018-12-29 | End: 2019-01-06

## 2018-12-29 NOTE — PROGRESS NOTES
Spoke with pt after review of results from . Susceptibility back today. Pt is continuing to have symptoms. Says she is having urinary symptoms and has been fallen. Has fallen three times since 12/24. Falling backwards after getting up to walk in the house. Her son is staying with her and helping her up. She has fallen backwards. Remembers the fall, no LOC, she has not hit her head, has not sustained any injuries or been evaluated further. She is sore. Says she has also developed diarrhea from the cephalexin they prescribed. She has not gotten any change to her antibiotic regimen.   Discussed that she needs to be staying well hydrated and that her UTI and symptoms of dehydration 2/2 infection, poor PO and diarrhea can be making her fall. Counseled on hydration requirements, using her walker, only walking with assistance when needed like for bathroom and meals. Someone should go  her new antibiotic nitrofurantion which I will call in [facility].   I did call [facility] pharmacy of pt and spoke with Rola for abx, made sure this would be prepared tonight.

## 2019-01-03 ENCOUNTER — TELEPHONE (OUTPATIENT)
Dept: FAMILY MEDICINE CLINIC | Facility: CLINIC | Age: 79
End: 2019-01-03

## 2019-01-03 NOTE — TELEPHONE ENCOUNTER
Patient contacted office this am stating that she has taken a turn for the worst and feels like she is very weak and needs to be seen at the ER. Advised patietn to have someone take her and patient stated her son was gonna take her. Patient wanted to notify Dr. Sorensen of her decision.

## 2019-01-06 ENCOUNTER — APPOINTMENT (OUTPATIENT)
Dept: CT IMAGING | Facility: HOSPITAL | Age: 79
End: 2019-01-06

## 2019-01-06 ENCOUNTER — APPOINTMENT (OUTPATIENT)
Dept: GENERAL RADIOLOGY | Facility: HOSPITAL | Age: 79
End: 2019-01-06

## 2019-01-06 ENCOUNTER — HOSPITAL ENCOUNTER (INPATIENT)
Facility: HOSPITAL | Age: 79
LOS: 5 days | Discharge: HOME | End: 2019-01-11
Attending: EMERGENCY MEDICINE | Admitting: INTERNAL MEDICINE

## 2019-01-06 DIAGNOSIS — K11.20 SIALOADENITIS: ICD-10-CM

## 2019-01-06 DIAGNOSIS — J45.41 MODERATE PERSISTENT ASTHMA WITH EXACERBATION: ICD-10-CM

## 2019-01-06 DIAGNOSIS — R53.1 WEAKNESS: Primary | ICD-10-CM

## 2019-01-06 DIAGNOSIS — R06.2 WHEEZING: ICD-10-CM

## 2019-01-06 DIAGNOSIS — Z74.09 IMPAIRED FUNCTIONAL MOBILITY AND ACTIVITY TOLERANCE: ICD-10-CM

## 2019-01-06 DIAGNOSIS — N39.0 URINARY TRACT INFECTION WITHOUT HEMATURIA, SITE UNSPECIFIED: ICD-10-CM

## 2019-01-06 DIAGNOSIS — R05.9 COUGH: ICD-10-CM
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
## 2019-01-06 DIAGNOSIS — J18.9 PNEUMONIA OF BOTH UPPER LOBES DUE TO INFECTIOUS ORGANISM: ICD-10-CM

## 2019-01-06 DIAGNOSIS — J01.00 ACUTE NON-RECURRENT MAXILLARY SINUSITIS: ICD-10-CM

## 2019-01-06 PROBLEM — I95.1 ORTHOSTATIC HYPOTENSION: Status: ACTIVE | Noted: 2019-01-06

## 2019-01-06 PROBLEM — R11.2 NAUSEA & VOMITING: Status: ACTIVE | Noted: 2019-01-06

## 2019-01-06 PROBLEM — Z16.12 UTI DUE TO EXTENDED-SPECTRUM BETA LACTAMASE (ESBL) PRODUCING ESCHERICHIA COLI: Status: ACTIVE | Noted: 2019-01-06

## 2019-01-06 PROBLEM — B96.29 UTI DUE TO EXTENDED-SPECTRUM BETA LACTAMASE (ESBL) PRODUCING ESCHERICHIA COLI: Status: ACTIVE | Noted: 2019-01-06

## 2019-01-06 PROBLEM — A41.9 SEPSIS: Status: ACTIVE | Noted: 2019-01-06

## 2019-01-06 PROBLEM — I10 ESSENTIAL HYPERTENSION: Status: ACTIVE | Noted: 2019-01-06

## 2019-01-06 LAB
ALBUMIN SERPL-MCNC: 4.2 G/DL (ref 3.5–5.2)
ALBUMIN/GLOB SERPL: 1.2 G/DL
ALP SERPL-CCNC: 106 U/L (ref 39–117)
ALT SERPL W P-5'-P-CCNC: 16 U/L (ref 1–33)
ANION GAP SERPL CALCULATED.3IONS-SCNC: 11.7 MMOL/L
ARTERIAL PATENCY WRIST A: POSITIVE
ARTERIAL PATENCY WRIST A: POSITIVE
AST SERPL-CCNC: 20 U/L (ref 1–32)
ATMOSPHERIC PRESS: 754.5 MMHG
ATMOSPHERIC PRESS: 754.7 MMHG
BACTERIA UR QL AUTO: ABNORMAL /HPF
BASE EXCESS BLDA CALC-SCNC: 3.1 MMOL/L (ref 0–2)
BASE EXCESS BLDA CALC-SCNC: 3.8 MMOL/L (ref 0–2)
BASOPHILS # BLD AUTO: 0.02 10*3/MM3 (ref 0–0.2)
BASOPHILS NFR BLD AUTO: 0.3 % (ref 0–1.5)
BDY SITE: ABNORMAL
BDY SITE: ABNORMAL
BILIRUB SERPL-MCNC: 1.1 MG/DL (ref 0.1–1.2)
BILIRUB UR QL STRIP: NEGATIVE
BUN BLD-MCNC: 7 MG/DL (ref 8–23)
BUN/CREAT SERPL: 8.9 (ref 7–25)
CALCIUM SPEC-SCNC: 10.2 MG/DL (ref 8.6–10.5)
CHLORIDE SERPL-SCNC: 87 MMOL/L (ref 98–107)
CK SERPL-CCNC: 54 U/L (ref 20–180)
CLARITY UR: CLEAR
CO2 SERPL-SCNC: 30.3 MMOL/L (ref 22–29)
COLOR UR: ABNORMAL
CREAT BLD-MCNC: 0.79 MG/DL (ref 0.57–1)
D-LACTATE SERPL-SCNC: 1.2 MMOL/L (ref 0.5–2)
DEPRECATED RDW RBC AUTO: 42.1 FL (ref 37–54)
EOSINOPHIL # BLD AUTO: 0.31 10*3/MM3 (ref 0–0.7)
EOSINOPHIL NFR BLD AUTO: 5.4 % (ref 0.3–6.2)
ERYTHROCYTE [DISTWIDTH] IN BLOOD BY AUTOMATED COUNT: 12.8 % (ref 11.7–13)
GFR SERPL CREATININE-BSD FRML MDRD: 70 ML/MIN/1.73
GLOBULIN UR ELPH-MCNC: 3.4 GM/DL
GLUCOSE BLD-MCNC: 116 MG/DL (ref 65–99)
GLUCOSE UR STRIP-MCNC: NEGATIVE MG/DL
HCO3 BLDA-SCNC: 27.2 MMOL/L (ref 22–28)
HCO3 BLDA-SCNC: 28 MMOL/L (ref 22–28)
HCT VFR BLD AUTO: 44.2 % (ref 35.6–45.5)
HGB BLD-MCNC: 15.8 G/DL (ref 11.9–15.5)
HGB UR QL STRIP.AUTO: NEGATIVE
HYALINE CASTS UR QL AUTO: ABNORMAL /LPF
IMM GRANULOCYTES # BLD AUTO: 0.05 10*3/MM3 (ref 0–0.03)
IMM GRANULOCYTES NFR BLD AUTO: 0.9 % (ref 0–0.5)
KETONES UR QL STRIP: NEGATIVE
L PNEUMO1 AG UR QL IA: NEGATIVE
LEUKOCYTE ESTERASE UR QL STRIP.AUTO: ABNORMAL
LYMPHOCYTES # BLD AUTO: 1.08 10*3/MM3 (ref 0.9–4.8)
LYMPHOCYTES NFR BLD AUTO: 18.7 % (ref 19.6–45.3)
MCH RBC QN AUTO: 32.2 PG (ref 26.9–32)
MCHC RBC AUTO-ENTMCNC: 35.7 G/DL (ref 32.4–36.3)
MCV RBC AUTO: 90 FL (ref 80.5–98.2)
MODALITY: ABNORMAL
MODALITY: ABNORMAL
MONOCYTES # BLD AUTO: 0.94 10*3/MM3 (ref 0.2–1.2)
MONOCYTES NFR BLD AUTO: 16.3 % (ref 5–12)
MUCOUS THREADS URNS QL MICRO: ABNORMAL /HPF
NEUTROPHILS # BLD AUTO: 3.43 10*3/MM3 (ref 1.9–8.1)
NEUTROPHILS NFR BLD AUTO: 59.3 % (ref 42.7–76)
NITRITE UR QL STRIP: NEGATIVE
NRBC BLD AUTO-RTO: 0 /100 WBC (ref 0–0)
NT-PROBNP SERPL-MCNC: 400.3 PG/ML (ref 0–1800)
PCO2 BLDA: 38.8 MM HG (ref 35–45)
PCO2 BLDA: 39.7 MM HG (ref 35–45)
PH BLDA: 7.45 PH UNITS (ref 7.35–7.45)
PH BLDA: 7.46 PH UNITS (ref 7.35–7.45)
PH UR STRIP.AUTO: 7 [PH] (ref 5–8)
PLATELET # BLD AUTO: 199 10*3/MM3 (ref 140–500)
PMV BLD AUTO: 9.3 FL (ref 6–12)
PO2 BLDA: 54.9 MM HG (ref 80–100)
PO2 BLDA: 62 MM HG (ref 80–100)
POTASSIUM BLD-SCNC: 3.7 MMOL/L (ref 3.5–5.2)
PROCALCITONIN SERPL-MCNC: 0.33 NG/ML (ref 0.1–0.25)
PROT SERPL-MCNC: 7.6 G/DL (ref 6–8.5)
PROT UR QL STRIP: ABNORMAL
RBC # BLD AUTO: 4.91 10*6/MM3 (ref 3.9–5.2)
RBC # UR: ABNORMAL /HPF
REF LAB TEST METHOD: ABNORMAL
S PNEUM AG SPEC QL LA: NEGATIVE
SAO2 % BLDCOA: 89.7 % (ref 92–99)
SAO2 % BLDCOA: 92.5 % (ref 92–99)
SODIUM BLD-SCNC: 129 MMOL/L (ref 136–145)
SP GR UR STRIP: 1.01 (ref 1–1.03)
SQUAMOUS #/AREA URNS HPF: ABNORMAL /HPF
TOTAL RATE: 16 BREATHS/MINUTE
TOTAL RATE: 18 BREATHS/MINUTE
TROPONIN T SERPL-MCNC: <0.01 NG/ML (ref 0–0.03)
UROBILINOGEN UR QL STRIP: ABNORMAL
WBC NRBC COR # BLD: 5.78 10*3/MM3 (ref 4.5–10.7)
WBC UR QL AUTO: ABNORMAL /HPF

## 2019-01-06 PROCEDURE — 83880 ASSAY OF NATRIURETIC PEPTIDE: CPT | Performed by: EMERGENCY MEDICINE

## 2019-01-06 PROCEDURE — 71046 X-RAY EXAM CHEST 2 VIEWS: CPT

## 2019-01-06 PROCEDURE — 71250 CT THORAX DX C-: CPT

## 2019-01-06 PROCEDURE — 84484 ASSAY OF TROPONIN QUANT: CPT | Performed by: EMERGENCY MEDICINE

## 2019-01-06 PROCEDURE — 87899 AGENT NOS ASSAY W/OPTIC: CPT | Performed by: INTERNAL MEDICINE

## 2019-01-06 PROCEDURE — 36600 WITHDRAWAL OF ARTERIAL BLOOD: CPT

## 2019-01-06 PROCEDURE — 80053 COMPREHEN METABOLIC PANEL: CPT | Performed by: EMERGENCY MEDICINE

## 2019-01-06 PROCEDURE — 93005 ELECTROCARDIOGRAM TRACING: CPT | Performed by: EMERGENCY MEDICINE

## 2019-01-06 PROCEDURE — 25010000002 IOPAMIDOL 61 % SOLUTION: Performed by: EMERGENCY MEDICINE

## 2019-01-06 PROCEDURE — 94799 UNLISTED PULMONARY SVC/PX: CPT

## 2019-01-06 PROCEDURE — 72110 X-RAY EXAM L-2 SPINE 4/>VWS: CPT

## 2019-01-06 PROCEDURE — 85025 COMPLETE CBC W/AUTO DIFF WBC: CPT | Performed by: EMERGENCY MEDICINE

## 2019-01-06 PROCEDURE — 82550 ASSAY OF CK (CPK): CPT | Performed by: EMERGENCY MEDICINE

## 2019-01-06 PROCEDURE — 84145 PROCALCITONIN (PCT): CPT | Performed by: EMERGENCY MEDICINE

## 2019-01-06 PROCEDURE — 99223 1ST HOSP IP/OBS HIGH 75: CPT | Performed by: INTERNAL MEDICINE

## 2019-01-06 PROCEDURE — 94640 AIRWAY INHALATION TREATMENT: CPT

## 2019-01-06 PROCEDURE — 70491 CT SOFT TISSUE NECK W/DYE: CPT

## 2019-01-06 PROCEDURE — 25010000002 CEFTRIAXONE PER 250 MG: Performed by: EMERGENCY MEDICINE

## 2019-01-06 PROCEDURE — 87040 BLOOD CULTURE FOR BACTERIA: CPT | Performed by: EMERGENCY MEDICINE

## 2019-01-06 PROCEDURE — 72170 X-RAY EXAM OF PELVIS: CPT

## 2019-01-06 PROCEDURE — 99284 EMERGENCY DEPT VISIT MOD MDM: CPT

## 2019-01-06 PROCEDURE — 36415 COLL VENOUS BLD VENIPUNCTURE: CPT | Performed by: EMERGENCY MEDICINE

## 2019-01-06 PROCEDURE — 25010000002 AZITHROMYCIN PER 500 MG: Performed by: EMERGENCY MEDICINE

## 2019-01-06 PROCEDURE — 81001 URINALYSIS AUTO W/SCOPE: CPT | Performed by: EMERGENCY MEDICINE

## 2019-01-06 PROCEDURE — 82803 BLOOD GASES ANY COMBINATION: CPT

## 2019-01-06 PROCEDURE — 25010000002 ENOXAPARIN PER 10 MG: Performed by: INTERNAL MEDICINE

## 2019-01-06 PROCEDURE — 83605 ASSAY OF LACTIC ACID: CPT | Performed by: EMERGENCY MEDICINE

## 2019-01-06 PROCEDURE — 93010 ELECTROCARDIOGRAM REPORT: CPT | Performed by: INTERNAL MEDICINE

## 2019-01-06 RX ORDER — CETIRIZINE HYDROCHLORIDE 5 MG/1
5 TABLET ORAL DAILY
Status: ON HOLD | COMMUNITY
End: 2019-01-07

## 2019-01-06 RX ORDER — SENNA AND DOCUSATE SODIUM 50; 8.6 MG/1; MG/1
2 TABLET, FILM COATED ORAL 2 TIMES DAILY PRN
Status: DISCONTINUED | OUTPATIENT
Start: 2019-01-06 | End: 2019-01-11 | Stop reason: HOSPADM

## 2019-01-06 RX ORDER — HYDROCODONE BITARTRATE AND ACETAMINOPHEN 5; 325 MG/1; MG/1
1 TABLET ORAL EVERY 4 HOURS PRN
Status: DISCONTINUED | OUTPATIENT
Start: 2019-01-06 | End: 2019-01-11 | Stop reason: HOSPADM

## 2019-01-06 RX ORDER — LETROZOLE 2.5 MG/1
2.5 TABLET, FILM COATED ORAL DAILY
Status: DISCONTINUED | OUTPATIENT
Start: 2019-01-06 | End: 2019-01-11 | Stop reason: HOSPADM

## 2019-01-06 RX ORDER — ACETAMINOPHEN 325 MG/1
650 TABLET ORAL EVERY 4 HOURS PRN
Status: DISCONTINUED | OUTPATIENT
Start: 2019-01-06 | End: 2019-01-11 | Stop reason: HOSPADM

## 2019-01-06 RX ORDER — ALBUTEROL SULFATE 2.5 MG/3ML
2.5 SOLUTION RESPIRATORY (INHALATION) ONCE
Status: DISCONTINUED | OUTPATIENT
Start: 2019-01-06 | End: 2019-01-06

## 2019-01-06 RX ORDER — SODIUM CHLORIDE 0.9 % (FLUSH) 0.9 %
3 SYRINGE (ML) INJECTION EVERY 12 HOURS SCHEDULED
Status: DISCONTINUED | OUTPATIENT
Start: 2019-01-06 | End: 2019-01-11 | Stop reason: HOSPADM

## 2019-01-06 RX ORDER — HYDRALAZINE HYDROCHLORIDE 25 MG/1
25 TABLET, FILM COATED ORAL EVERY 6 HOURS PRN
Status: DISCONTINUED | OUTPATIENT
Start: 2019-01-06 | End: 2019-01-11 | Stop reason: HOSPADM

## 2019-01-06 RX ORDER — ONDANSETRON 4 MG/1
4 TABLET, FILM COATED ORAL EVERY 6 HOURS PRN
Status: DISCONTINUED | OUTPATIENT
Start: 2019-01-06 | End: 2019-01-11 | Stop reason: HOSPADM

## 2019-01-06 RX ORDER — ONDANSETRON 2 MG/ML
4 INJECTION INTRAMUSCULAR; INTRAVENOUS EVERY 6 HOURS PRN
Status: DISCONTINUED | OUTPATIENT
Start: 2019-01-06 | End: 2019-01-11 | Stop reason: HOSPADM

## 2019-01-06 RX ORDER — SODIUM CHLORIDE 0.9 % (FLUSH) 0.9 %
10 SYRINGE (ML) INJECTION AS NEEDED
Status: DISCONTINUED | OUTPATIENT
Start: 2019-01-06 | End: 2019-01-11 | Stop reason: HOSPADM

## 2019-01-06 RX ORDER — LEVOTHYROXINE SODIUM 0.05 MG/1
50 TABLET ORAL DAILY
Status: DISCONTINUED | OUTPATIENT
Start: 2019-01-06 | End: 2019-01-11 | Stop reason: HOSPADM

## 2019-01-06 RX ORDER — CEFTRIAXONE SODIUM 1 G/50ML
1 INJECTION, SOLUTION INTRAVENOUS ONCE
Status: COMPLETED | OUTPATIENT
Start: 2019-01-06 | End: 2019-01-06

## 2019-01-06 RX ORDER — SODIUM CHLORIDE 9 MG/ML
125 INJECTION, SOLUTION INTRAVENOUS CONTINUOUS
Status: DISCONTINUED | OUTPATIENT
Start: 2019-01-06 | End: 2019-01-07

## 2019-01-06 RX ORDER — FLUTICASONE PROPIONATE 50 MCG
2 SPRAY, SUSPENSION (ML) NASAL DAILY
Status: DISCONTINUED | OUTPATIENT
Start: 2019-01-06 | End: 2019-01-11 | Stop reason: HOSPADM

## 2019-01-06 RX ORDER — ONDANSETRON 4 MG/1
4 TABLET, ORALLY DISINTEGRATING ORAL EVERY 6 HOURS PRN
Status: DISCONTINUED | OUTPATIENT
Start: 2019-01-06 | End: 2019-01-11 | Stop reason: HOSPADM

## 2019-01-06 RX ORDER — NITROFURANTOIN MACROCRYSTALS 100 MG/1
100 CAPSULE ORAL 2 TIMES DAILY
Status: DISCONTINUED | OUTPATIENT
Start: 2019-01-06 | End: 2019-01-06

## 2019-01-06 RX ORDER — FAMOTIDINE 20 MG/1
20 TABLET, FILM COATED ORAL DAILY
Status: DISCONTINUED | OUTPATIENT
Start: 2019-01-06 | End: 2019-01-11 | Stop reason: HOSPADM

## 2019-01-06 RX ORDER — ASPIRIN 81 MG/1
81 TABLET ORAL DAILY
Status: DISCONTINUED | OUTPATIENT
Start: 2019-01-06 | End: 2019-01-11 | Stop reason: HOSPADM

## 2019-01-06 RX ORDER — NITROFURANTOIN 25; 75 MG/1; MG/1
100 CAPSULE ORAL 2 TIMES DAILY
Status: DISCONTINUED | OUTPATIENT
Start: 2019-01-06 | End: 2019-01-11

## 2019-01-06 RX ORDER — CEFTRIAXONE SODIUM 1 G/50ML
1 INJECTION, SOLUTION INTRAVENOUS EVERY 24 HOURS
Status: DISCONTINUED | OUTPATIENT
Start: 2019-01-07 | End: 2019-01-11

## 2019-01-06 RX ORDER — PANTOPRAZOLE SODIUM 40 MG/1
40 TABLET, DELAYED RELEASE ORAL EVERY MORNING
Status: DISCONTINUED | OUTPATIENT
Start: 2019-01-07 | End: 2019-01-11 | Stop reason: HOSPADM

## 2019-01-06 RX ORDER — SODIUM CHLORIDE 0.9 % (FLUSH) 0.9 %
3-10 SYRINGE (ML) INJECTION AS NEEDED
Status: DISCONTINUED | OUTPATIENT
Start: 2019-01-06 | End: 2019-01-11 | Stop reason: HOSPADM

## 2019-01-06 RX ORDER — IPRATROPIUM BROMIDE AND ALBUTEROL SULFATE 2.5; .5 MG/3ML; MG/3ML
3 SOLUTION RESPIRATORY (INHALATION)
Status: DISCONTINUED | OUTPATIENT
Start: 2019-01-06 | End: 2019-01-11 | Stop reason: HOSPADM

## 2019-01-06 RX ORDER — ALBUTEROL SULFATE 2.5 MG/3ML
2.5 SOLUTION RESPIRATORY (INHALATION) EVERY 6 HOURS PRN
Status: DISCONTINUED | OUTPATIENT
Start: 2019-01-06 | End: 2019-01-11 | Stop reason: HOSPADM

## 2019-01-06 RX ORDER — CETIRIZINE HYDROCHLORIDE 10 MG/1
5 TABLET ORAL DAILY
Status: DISCONTINUED | OUTPATIENT
Start: 2019-01-06 | End: 2019-01-11 | Stop reason: HOSPADM

## 2019-01-06 RX ORDER — NITROFURANTOIN MACROCRYSTALS 100 MG/1
100 CAPSULE ORAL 2 TIMES DAILY
COMMUNITY
End: 2019-01-11 | Stop reason: HOSPADM

## 2019-01-06 RX ORDER — GABAPENTIN 400 MG/1
400 CAPSULE ORAL 3 TIMES DAILY
Status: DISCONTINUED | OUTPATIENT
Start: 2019-01-06 | End: 2019-01-11 | Stop reason: HOSPADM

## 2019-01-06 RX ADMIN — GABAPENTIN 400 MG: 400 CAPSULE ORAL at 15:27

## 2019-01-06 RX ADMIN — LETROZOLE 2.5 MG: 2.5 TABLET ORAL at 15:27

## 2019-01-06 RX ADMIN — IPRATROPIUM BROMIDE AND ALBUTEROL SULFATE 3 ML: 2.5; .5 SOLUTION RESPIRATORY (INHALATION) at 14:53

## 2019-01-06 RX ADMIN — SODIUM CHLORIDE 125 ML/HR: 9 INJECTION, SOLUTION INTRAVENOUS at 23:23

## 2019-01-06 RX ADMIN — SODIUM CHLORIDE, PRESERVATIVE FREE 3 ML: 5 INJECTION INTRAVENOUS at 15:27

## 2019-01-06 RX ADMIN — IPRATROPIUM BROMIDE 0.5 MG: 0.5 SOLUTION RESPIRATORY (INHALATION) at 08:13

## 2019-01-06 RX ADMIN — SODIUM CHLORIDE, PRESERVATIVE FREE 3 ML: 5 INJECTION INTRAVENOUS at 20:41

## 2019-01-06 RX ADMIN — IOPAMIDOL 75 ML: 612 INJECTION, SOLUTION INTRAVENOUS at 08:41

## 2019-01-06 RX ADMIN — CEFTRIAXONE SODIUM 1 G: 1 INJECTION, SOLUTION INTRAVENOUS at 09:31

## 2019-01-06 RX ADMIN — SODIUM CHLORIDE 125 ML/HR: 9 INJECTION, SOLUTION INTRAVENOUS at 15:03

## 2019-01-06 RX ADMIN — NITROFURANTOIN MONOHYDRATE/MACROCRYSTALLINE 100 MG: 25; 75 CAPSULE ORAL at 20:41

## 2019-01-06 RX ADMIN — GABAPENTIN 400 MG: 400 CAPSULE ORAL at 20:41

## 2019-01-06 RX ADMIN — ENOXAPARIN SODIUM 40 MG: 40 INJECTION SUBCUTANEOUS at 15:26

## 2019-01-06 RX ADMIN — AZITHROMYCIN MONOHYDRATE 500 MG: 500 INJECTION, POWDER, LYOPHILIZED, FOR SOLUTION INTRAVENOUS at 10:02

## 2019-01-06 RX ADMIN — SODIUM CHLORIDE 1000 ML: 9 INJECTION, SOLUTION INTRAVENOUS at 07:30

## 2019-01-06 NOTE — ED PROVIDER NOTES
EMERGENCY DEPARTMENT ENCOUNTER    CHIEF COMPLAINT  Chief Complaint: Generalized weakness  History given by: Patient  History limited by: None  Room Number: 14/14  PMD: Teresa Sorensen MD      HPI:  Pt is a 78 y.o. female who presents complaining of generalized weakness, that has caused 6 falls, since 12/25/18 when she was dx with a UTI at [facility]. She was discharged from [facility] with Keflex, that was later changed to Macrobid by her PCP as her culture showed resistance to Keflex. Pt also c/o chills, lumbar back pain, L sided neck swelling, and nonproductive cough, but denies vomiting, diarrhea, and fever.    Duration: Since 12/25/18  Onset: Gradual  Timing: Constant  Intensity/Severity: Moderate  Progression: Unchanged  Associated Symptoms: Chills, lumbar back pain, L sided neck swelling, and nonproductive cough  Previous Episodes: Dx with UTI on 12/25/18 at [facility].  Treatment before arrival: She has been taking Macrobid.    PAST MEDICAL HISTORY  Active Ambulatory Problems     Diagnosis Date Noted   • Malignant neoplasm of overlapping sites of left female breast (CMS/[facility]) 04/13/2016   • Macroglobulinemia of Waldenstrom (CMS/[facility]) 11/09/2016   • Hypercalcemia 02/01/2017   • Airway hyperreactivity 12/14/2017   • BP (high blood pressure) 12/14/2017   • Adult hypothyroidism 12/14/2017   • Primary osteoarthritis of left knee 07/22/2015   • Disorder of peripheral nervous system 12/14/2017   • Epilepsy with simple partial seizures (CMS/[facility]) 12/14/2017   • Headache, migraine 12/14/2017   • Hyponatremia 02/26/2018   • Chronic pansinusitis 07/12/2018   • TIA (transient ischemic attack) 10/18/2018     Resolved Ambulatory Problems     Diagnosis Date Noted   • Lymphoma malignant, nodular, lymphocytic (CMS/HCC) 04/13/2016   • Chronic right shoulder pain 11/22/2016     Past Medical History:   Diagnosis Date   • Acid reflux    • Arthritis    • Asthma    • Breast cancer in female (CMS/[facility])    • Clostridium difficile colitis     • Disease of thyroid gland    • Epilepsy (CMS/HCC)    • H/O Ecchymosis    • H/O Heart murmur    • H/O transesophageal echocardiography (MARICRUZ)    • History of transfusion of packed red blood cells    • History of Waldenstrom's macroglobulinemia    • Hypertension    • Leaky heart valve    • Measles    • Migraine    • Mumps    • Osteoporosis    • Peripheral neuropathy    • Seasonal allergies    • Seizures (CMS/HCC)    • Thyroid nodule        PAST SURGICAL HISTORY  Past Surgical History:   Procedure Laterality Date   • ADENOIDECTOMY     • APPENDECTOMY     • CATARACT EXTRACTION Bilateral    • CHOLECYSTECTOMY     • COLONOSCOPY     • HYSTERECTOMY      Partial, many years ago, heavy bleeding, no cancer   • KNEE ARTHROSCOPY Right 03/2009    Finding of chondromalacia and appropriate cleanup of joint was performed by Dr. Moraes.   • MASTECTOMY Left 07/2014   • REPLACEMENT TOTAL KNEE Right 12/2015   • TONSILLECTOMY         FAMILY HISTORY  Family History   Problem Relation Age of Onset   • Mental illness Mother    • Migraines Mother    • Dementia Mother    • Heart disease Father    • Hypertension Father    • Kidney disease Father    • Stroke Sister    • No Known Problems Daughter    • No Known Problems Daughter    • Breast cancer Other    • Heart disease Other    • Hypertension Other    • Diabetes Other    • Cancer Maternal Grandmother    • No Known Problems Maternal Grandfather    • No Known Problems Paternal Grandmother    • No Known Problems Paternal Grandfather    • Lymphoma Neg Hx    • Leukemia Neg Hx        SOCIAL HISTORY  Social History     Socioeconomic History   • Marital status:      Spouse name: Not on file   • Number of children: Not on file   • Years of education: Not on file   • Highest education level: Not on file   Social Needs   • Financial resource strain: Not on file   • Food insecurity - worry: Not on file   • Food insecurity - inability: Not on file   • Transportation needs - medical: Not on file    • Transportation needs - non-medical: Not on file   Occupational History     Employer: [employer]     Employer: RETIRED   Tobacco Use   • Smoking status: Never Smoker   • Smokeless tobacco: Never Used   Substance and Sexual Activity   • Alcohol use: No   • Drug use: No   • Sexual activity: Defer   Other Topics Concern   • Not on file   Social History Narrative    She lives with her oldest daughter.       ALLERGIES  Cortisone and Darvon  [propoxyphene]    REVIEW OF SYSTEMS  Review of Systems   Constitutional: Positive for chills. Negative for fever.   HENT: Positive for facial swelling (L sided neck). Negative for sore throat.    Eyes: Negative.    Respiratory: Positive for cough (nonproductive). Negative for shortness of breath.    Cardiovascular: Negative for chest pain.   Gastrointestinal: Negative for abdominal pain, diarrhea and vomiting.   Genitourinary: Negative for dysuria.   Musculoskeletal: Positive for back pain (lumbar). Negative for neck pain.   Skin: Negative for rash.   Allergic/Immunologic: Negative.    Neurological: Positive for weakness (generalized). Negative for numbness and headaches.   Hematological: Negative.    Psychiatric/Behavioral: Negative.    All other systems reviewed and are negative.      PHYSICAL EXAM  ED Triage Vitals [01/06/19 0600]   Temp Heart Rate Resp BP SpO2   97.9 °F (36.6 °C) 94 22 -- (!) 89 %      Temp src Heart Rate Source Patient Position BP Location FiO2 (%)   -- -- -- -- --       Physical Exam   Constitutional: She is oriented to person, place, and time. No distress.   HENT:   Head: Normocephalic and atraumatic.   Mouth/Throat: Oropharynx is clear and moist.   There is L submandibular swelling and tenderness. There is a mass like area to the angle of the L mandible.   Eyes: EOM are normal. Pupils are equal, round, and reactive to light.   Neck: Normal range of motion. Neck supple.   Cardiovascular: Normal rate, regular rhythm and normal heart sounds.    Pulmonary/Chest: Effort normal and breath sounds normal. No respiratory distress.   Abdominal: Soft. There is no tenderness. There is no rebound and no guarding.   Musculoskeletal: Normal range of motion. She exhibits no edema.        Lumbar back: She exhibits tenderness (to palpation).   Neurological: She is alert and oriented to person, place, and time. She has normal sensation and normal strength.   Skin: Skin is warm and dry. No rash noted.   Psychiatric: Mood and affect normal.   Nursing note and vitals reviewed.      LAB RESULTS  Lab Results (last 24 hours)     Procedure Component Value Units Date/Time    Lactic Acid, Plasma [[id number]]  (Normal) Collected:  01/06/19 0711    Specimen:  Blood Updated:  01/06/19 0750     Lactate 1.2 mmol/L     CBC & Differential [[id number]] Collected:  01/06/19 0713    Specimen:  Blood Updated:  01/06/19 0741    Narrative:       The following orders were created for panel order CBC & Differential.  Procedure                               Abnormality         Status                     ---------                               -----------         ------                     CBC Auto Differential[[id number]]        Abnormal            Final result                 Please view results for these tests on the individual orders.    CBC Auto Differential [[id number]]  (Abnormal) Collected:  01/06/19 0713    Specimen:  Blood Updated:  01/06/19 0741     WBC 5.78 10*3/mm3      RBC 4.91 10*6/mm3      Hemoglobin 15.8 g/dL      Hematocrit 44.2 %      MCV 90.0 fL      MCH 32.2 pg      MCHC 35.7 g/dL      RDW 12.8 %      RDW-SD 42.1 fl      MPV 9.3 fL      Platelets 199 10*3/mm3      Neutrophil % 59.3 %      Lymphocyte % 18.7 %      Monocyte % 16.3 %      Eosinophil % 5.4 %      Basophil % 0.3 %      Immature Grans % 0.9 %      Neutrophils, Absolute 3.43 10*3/mm3      Lymphocytes, Absolute 1.08 10*3/mm3      Monocytes, Absolute 0.94 10*3/mm3      Eosinophils, Absolute 0.31 10*3/mm3      Basophils,  "Absolute 0.02 10*3/mm3      Immature Grans, Absolute 0.05 10*3/mm3      nRBC 0.0 /100 WBC     Comprehensive Metabolic Panel [[id number]]  (Abnormal) Collected:  01/06/19 0725    Specimen:  Blood Updated:  01/06/19 0749     Glucose 116 mg/dL      BUN 7 mg/dL      Creatinine 0.79 mg/dL      Sodium 129 mmol/L      Potassium 3.7 mmol/L      Chloride 87 mmol/L      CO2 30.3 mmol/L      Calcium 10.2 mg/dL      Total Protein 7.6 g/dL      Albumin 4.20 g/dL      ALT (SGPT) 16 U/L      AST (SGOT) 20 U/L      Alkaline Phosphatase 106 U/L      Total Bilirubin 1.1 mg/dL      eGFR Non African Amer 70 mL/min/1.73      Globulin 3.4 gm/dL      A/G Ratio 1.2 g/dL      BUN/Creatinine Ratio 8.9     Anion Gap 11.7 mmol/L     Narrative:       The MDRD GFR formula is only valid for adults with stable renal function between ages 18 and 70.    Procalcitonin [[id number]]  (Abnormal) Collected:  01/06/19 0725    Specimen:  Blood Updated:  01/06/19 0754     Procalcitonin 0.33 ng/mL     Narrative:       As a Marker for Sepsis (Non-Neonates):   1. <0.5 ng/mL represents a low risk of severe sepsis and/or septic shock.  1. >2 ng/mL represents a high risk of severe sepsis and/or septic shock.    As a Marker for Lower Respiratory Tract Infections that require antibiotic therapy:  PCT on Admission     Antibiotic Therapy             6-12 Hrs later  > 0.5                Strongly Recommended            >0.25 - <0.5         Recommended  0.1 - 0.25           Discouraged                   Remeasure/reassess PCT  <0.1                 Strongly Discouraged          Remeasure/reassess PCT      As 28 day mortality risk marker: \"Change in Procalcitonin Result\" (> 80 % or <=80 %) if Day 0 (or Day 1) and Day 4 values are available. Refer to http://www.[facility]-pct-calculator.com/   Change in PCT <=80 %   A decrease of PCT levels below or equal to 80 % defines a positive change in PCT test result representing a higher risk for 28-day all-cause mortality of patients " diagnosed with severe sepsis or septic shock.  Change in PCT > 80 %   A decrease of PCT levels of more than 80 % defines a negative change in PCT result representing a lower risk for 28-day all-cause mortality of patients diagnosed with severe sepsis or septic shock.                Troponin [[id number]]  (Normal) Collected:  01/06/19 0725    Specimen:  Blood Updated:  01/06/19 0749     Troponin T <0.010 ng/mL     Narrative:       Troponin T Reference Ranges:  Less than 0.03 ng/mL:    Negative for AMI  0.03 to 0.09 ng/mL:      Indeterminant for AMI  Greater than 0.09 ng/mL: Positive for AMI    CK [[id number]]  (Normal) Collected:  01/06/19 0725    Specimen:  Blood Updated:  01/06/19 0749     Creatine Kinase 54 U/L     BNP [[id number]]  (Normal) Collected:  01/06/19 0725    Specimen:  Blood Updated:  01/06/19 0926     proBNP 400.3 pg/mL     Narrative:       Among patients with dyspnea, NT-proBNP is highly sensitive for the detection of acute congestive heart failure. In addition NT-proBNP of <300 pg/ml effectively rules out acute congestive heart failure with 99% negative predictive value.    Urinalysis With Culture If Indicated - Urine, Clean Catch [[id number]]  (Abnormal) Collected:  01/06/19 0729    Specimen:  Urine, Clean Catch Updated:  01/06/19 0803     Color, UA Dark Yellow     Appearance, UA Clear     pH, UA 7.0     Specific Gravity, UA 1.014     Glucose, UA Negative     Ketones, UA Negative     Bilirubin, UA Negative     Blood, UA Negative     Protein, UA 30 mg/dL (1+)     Leuk Esterase, UA Trace     Nitrite, UA Negative     Urobilinogen, UA 1.0 E.U./dL    Blood Culture - Blood, Arm, Right [[id number]] Collected:  01/06/19 0729    Specimen:  Blood from Arm, Right Updated:  01/06/19 0736    Blood Culture - Blood, Arm, Left [[id number]] Collected:  01/06/19 0729    Specimen:  Blood from Arm, Left Updated:  01/06/19 0736    Urinalysis, Microscopic Only - Urine, Clean Catch [[id number]]  (Abnormal) Collected:  01/06/19  0729    Specimen:  Urine, Clean Catch Updated:  01/06/19 0803     RBC, UA None Seen /HPF      WBC, UA 3-5 /HPF      Bacteria, UA None Seen /HPF      Squamous Epithelial Cells, UA 0-2 /HPF      Hyaline Casts, UA 0-2 /LPF      Mucus, UA Small/1+ /HPF      Methodology Manual Light Microscopy    Blood Gas, Arterial [[id number]]  (Abnormal) Collected:  01/06/19 0756    Specimen:  Arterial Blood Updated:  01/06/19 0801     Site Arterial: right brachial     Rehan's Test Positive     pH, Arterial 7.456 pH units      pCO2, Arterial 39.7 mm Hg      pO2, Arterial 54.9 mm Hg      Comment: Critical:        HCO3, Arterial 28.0 mmol/L      Base Excess, Arterial 3.8 mmol/L      O2 Saturation Calculated 89.7 %      Barometric Pressure for Blood Gas 754.5 mmHg      Modality Room Air     Rate 16 Breaths/minute     Blood Gas, Arterial [[id number]]  (Abnormal) Collected:  01/06/19 0808    Specimen:  Arterial Blood Updated:  01/06/19 0811     Site Arterial: right brachial     Rehan's Test Positive     pH, Arterial 7.453 pH units      pCO2, Arterial 38.8 mm Hg      pO2, Arterial 62.0 mm Hg      HCO3, Arterial 27.2 mmol/L      Base Excess, Arterial 3.1 mmol/L      O2 Saturation Calculated 92.5 %      Barometric Pressure for Blood Gas 754.7 mmHg      Modality Room Air     Rate 18 Breaths/minute           I ordered the above labs and reviewed the results    RADIOLOGY  XR Chest 2 View   Final Result   As described.       This report was finalized on 1/6/2019 9:28 AM by Dr. Thiago Walsh M.D.          XR Spine Lumbar 4+ View   Final Result       No acute fracture is identified. Degenerative changes. If there is   further clinical concern, MRI could be considered for further aeration.       This report was finalized on 1/6/2019 9:23 AM by Dr. Thiago Walsh M.D.          CT Soft Tissue Neck With Contrast   Final Result           1. Left submandibular sialoadenitis, slight overlying skin thickening,   correlate clinically.   2. Acute  sinusitis.   3. Multiple densities in the visualized upper lungs, with considerations   including infection, neoplasm, clinical correlation and follow-up   evaluation recommended.       Discussed by telephone with Dr. Mares 0901, 1/6/2019.               This report was finalized on 1/6/2019 9:07 AM by Dr. Thiago Walsh M.D.               I ordered the above noted radiological studies. Interpreted by radiologist. Discussed with radiologist (Dr. Walsh). Reviewed by me in PACS.     PROCEDURES  Procedures  EKG          EKG time: 0710  Rhythm/Rate: NSR rate 80  P waves and CA: Nml P, Nml HUMERA  QRS, axis: L axis deviation, LVH   ST and T waves: Nonspecific ST changes     Interpreted Contemporaneously by me, independently viewed  No significant changes compared to prior 7/10/14    PROGRESS AND CONSULTS      0703 Ordered EKG, orthostatic vitals, CBC, troponin, ABG, procalcitonin, lactic acid, blood cultures, UA, CMP, CK, CXR, and XR L spine for further evaluation. Ordered IVF for hydration.    0718 Ordered CT soft tissue neck for further evaluation.    0735 Per RN, pt is mildly orthostatic.    0756 Ordered repeat ABG for further evaluation.    0810 Ordered atrovent breathing treatment.    0906 Ordered BNP for further evaluation.    0924 Rechecked with pt and discussed that she has pneumonia and infection in her salivary glands and sinuses. Plan to admit. Pt understands and agrees with the plan, all questions answered.    0925 Placed call to [facility] for admission. Ordered rocephin and zithromax for treatment of pneumonia.    1020 Discussed pt with Dr. Barbosa, [facility], who agrees to admit.    MEDICAL DECISION MAKING  Results were reviewed/discussed with the patient and they were also made aware of online access. Pt also made aware that some labs, such as cultures, will not be resulted during ER visit and follow up with PMD is necessary.     MDM  Number of Diagnoses or Management Options     Amount and/or Complexity of Data  Reviewed  Clinical lab tests: ordered and reviewed (Procalcitonin= 0.33, troponin= <0.010, WBC= 5.78, sodium= 129, UA shows no signs of infection)  Tests in the radiology section of CPT®: ordered and reviewed (CT soft tissue neck shows L sialadenitis, with no stones or adenopathy. There is bilateral maxillary sinusitis. There is bilateral upper lobe densities that is questionable infectious, questionable neoplasm.)  Tests in the medicine section of CPT®: ordered and reviewed (See procedure notes for EKG.)  Decide to obtain previous medical records or to obtain history from someone other than the patient: yes  Review and summarize past medical records: yes (In reviewing old records from [facility], pt most recent CXR was negative.)    Patient Progress  Patient progress: stable         DIAGNOSIS  Final diagnoses:   Weakness   Fall, initial encounter   Sialoadenitis   Acute non-recurrent maxillary sinusitis   Pneumonia of both upper lobes due to infectious organism (CMS/HCC)   Urinary tract infection without hematuria, site unspecified- treated       DISPOSITION  ADMISSION TO TELEMETRY    Discussed treatment plan and reason for admission with pt/family and admitting physician.  Pt/family voiced understanding of the plan for admission for further testing/treatment as needed.     Latest Documented Vital Signs:  As of 10:24 AM  BP- 120/90 HR- 74 Temp- 98.3 °F (36.8 °C) O2 sat- 98%    --  Documentation assistance provided by jenelle Tong for Dr. Kovacs.  Information recorded by the scribe was done at my direction and has been verified and validated by me.     Massiel Tong  01/06/19 5689       Joseph Kovacs MD  01/06/19 0194

## 2019-01-06 NOTE — CONSULTS
"    .     REASON FOR CONSULTATION:   ana laura  Provide an opinion on any further workup or treatment                             REQUESTING PHYSICIAN: Abebe Barbosa MD  RECORDS OBTAINED:  Records of the patients history including those from the electronic medical record were reviewed and summarized in detail.    HISTORY OF PRESENT ILLNESS:  The patient is a 78 y.o. year old female  who is here for follow-up with the above-mentioned history.    Came to ER today, 1/6/19 with generalized weakness and 6 falls since Christmas.  Was diagnosed with urinary tract infection at [facility] on Christmas.  Discharged on Keflex.  Later changed to Macrobid by PCP based on culture sensitivities.  She also complained of left neck swelling.  Workup in the ER Included a urinalysis that showed no clear evidence of UTI.  However, mildly elevated pro-calcitonin.  CT neck 1/6/19: Left submandibular CLL adenitis and acute sinusitis.  Multiple densities upper lungs possibly due to infection, neoplasm.    Patient states she is eating very little, mainly because of decreased appetite.  She has not tried to eat more.  Has not noticed any nausea with eating.    Past Medical History:   Diagnosis Date   • Acid reflux    • Arthritis    • Asthma    • Breast cancer in female (CMS/HCC)     Left   • Clostridium difficile colitis     Requiring admission to [facility] in 09/2008   • Disease of thyroid gland     HYPOTHROIDISM   • Epilepsy (CMS/HCC)    • H/O Ecchymosis     Extensive, and hematoma formation in the whole right lower extremity requiring transfusion of red cells for a hemoglobin of 7.   • H/O Heart murmur     \"MANY YEARS AGO\" NO TX, NO SYMPTOMS   • H/O transesophageal echocardiography (MARICRUZ)    • History of transfusion of packed red blood cells     R/T SURGERY   • History of Waldenstrom's macroglobulinemia    • Hypertension    • Leaky heart valve    • Measles    • Migraine    • Mumps    • Osteoporosis    • Peripheral " neuropathy    • Seasonal allergies    • Seizures (CMS/HCC)    • Thyroid nodule     Removed more than 35 years ago     Past Surgical History:   Procedure Laterality Date   • ADENOIDECTOMY     • APPENDECTOMY     • CATARACT EXTRACTION Bilateral    • CHOLECYSTECTOMY     • COLONOSCOPY     • HYSTERECTOMY      Partial, many years ago, heavy bleeding, no cancer   • KNEE ARTHROSCOPY Right 03/2009    Finding of chondromalacia and appropriate cleanup of joint was performed by Dr. Moraes.   • MASTECTOMY Left 07/2014   • REPLACEMENT TOTAL KNEE Right 12/2015   • TONSILLECTOMY         MEDICATIONS    Current Facility-Administered Medications:   •  acetaminophen (TYLENOL) tablet 650 mg, 650 mg, Oral, Q4H PRN, Abebe Barbosa MD  •  aspirin EC tablet 81 mg, 81 mg, Oral, Daily, Abebe Barbosa MD  •  [START ON 1/7/2019] azithromycin (ZITHROMAX) 500 mg 0.9% NaCl (Add-vantage) 250 mL, 500 mg, Intravenous, Q24H, Abebe Barbosa MD  •  [START ON 1/7/2019] cefTRIAXone (ROCEPHIN) IVPB 1 g, 1 g, Intravenous, Q24H, Abebe Barbosa MD  •  cetirizine (zyrTEC) tablet 5 mg, 5 mg, Oral, Daily, Abebe Barbosa MD  •  enoxaparin (LOVENOX) syringe 40 mg, 40 mg, Subcutaneous, Q24H, Abebe Barbosa MD  •  famotidine (PEPCID) tablet 20 mg, 20 mg, Oral, Daily, Abebe Barbosa MD  •  gabapentin (NEURONTIN) capsule 400 mg, 400 mg, Oral, TID, Abebe Barbosa MD  •  HYDROcodone-acetaminophen (NORCO) 5-325 MG per tablet 1 tablet, 1 tablet, Oral, Q4H PRN, Abebe Barbosa MD  •  ipratropium-albuterol (DUO-NEB) nebulizer solution 3 mL, 3 mL, Nebulization, 4x Daily - RT, Abebe Brabosa MD  •  letrozole (FEMARA) tablet 2.5 mg, 2.5 mg, Oral, Daily, Abebe Barbosa MD  •  levothyroxine (SYNTHROID, LEVOTHROID) tablet 50 mcg, 50 mcg, Oral, Daily, Abebe Barbosa MD  •  nitrofurantoin (MACRODANTIN) capsule 100 mg, 100 mg, Oral, BID, Abebe Barbosa MD  •  ondansetron (ZOFRAN) tablet 4 mg, 4 mg, Oral, Q6H PRN **OR**  ondansetron ODT (ZOFRAN-ODT) disintegrating tablet 4 mg, 4 mg, Oral, Q6H PRN **OR** ondansetron (ZOFRAN) injection 4 mg, 4 mg, Intravenous, Q6H PRN, Abebe Barbosa MD  •  [START ON 1/7/2019] pantoprazole (PROTONIX) EC tablet 40 mg, 40 mg, Oral, QAM, Abebe Barbosa MD  •  sennosides-docusate sodium (SENOKOT-S) 8.6-50 MG tablet 2 tablet, 2 tablet, Oral, BID PRN, Abebe Barbosa MD  •  [COMPLETED] Insert peripheral IV, , , Once **AND** sodium chloride 0.9 % flush 10 mL, 10 mL, Intravenous, PRN, Joseph Kovacs MD  •  sodium chloride 0.9 % flush 3 mL, 3 mL, Intravenous, Q12H, Abebe Barbosa MD  •  sodium chloride 0.9 % flush 3-10 mL, 3-10 mL, Intravenous, PRN, Abebe Barbosa MD  •  Sodium chloride 0.9 % infusion, 125 mL/hr, Intravenous, Continuous, Abebe Barbosa MD    ALLERGIES:     Allergies   Allergen Reactions   • Cortisone    • Darvon  [Propoxyphene] Palpitations       SOCIAL HISTORY:       Social History     Socioeconomic History   • Marital status:      Spouse name: Not on file   • Number of children: Not on file   • Years of education: Not on file   • Highest education level: Not on file   Social Needs   • Financial resource strain: Not on file   • Food insecurity - worry: Not on file   • Food insecurity - inability: Not on file   • Transportation needs - medical: Not on file   • Transportation needs - non-medical: Not on file   Occupational History     Employer: [employer]     Employer: RETIRED   Tobacco Use   • Smoking status: Never Smoker   • Smokeless tobacco: Never Used   Substance and Sexual Activity   • Alcohol use: No   • Drug use: No   • Sexual activity: Defer   Other Topics Concern   • Not on file   Social History Narrative    She lives with her oldest daughter.         FAMILY HISTORY:  Family History   Problem Relation Age of Onset   • Mental illness Mother    • Migraines Mother    • Dementia Mother    • Heart disease Father    • Hypertension Father    •  "Kidney disease Father    • Stroke Sister    • No Known Problems Daughter    • No Known Problems Daughter    • Breast cancer Other    • Heart disease Other    • Hypertension Other    • Diabetes Other    • Cancer Maternal Grandmother    • No Known Problems Maternal Grandfather    • No Known Problems Paternal Grandmother    • No Known Problems Paternal Grandfather    • Lymphoma Neg Hx    • Leukemia Neg Hx        REVIEW OF SYSTEMS:  Review of Systems   Constitutional: Positive for appetite change and fatigue. Negative for activity change.   HENT: Negative for nosebleeds and trouble swallowing.    Respiratory: Negative for shortness of breath and wheezing.    Cardiovascular: Negative for chest pain and palpitations.   Gastrointestinal: Negative for constipation, diarrhea and nausea.   Genitourinary: Negative for dysuria and hematuria.   Musculoskeletal: Negative for arthralgias and myalgias.   Skin: Negative for rash and wound.   Neurological: Negative for seizures and syncope.   Hematological: Negative for adenopathy. Does not bruise/bleed easily.   Psychiatric/Behavioral: Negative for confusion.              Vitals:    01/06/19 0930 01/06/19 0932 01/06/19 1219 01/06/19 1248   BP:  120/90 172/80    BP Location:   Right arm    Patient Position:   Lying    Pulse: 71 74     Resp:  18 16    Temp:  98.3 °F (36.8 °C) 97.4 °F (36.3 °C)    TempSrc:   Oral    SpO2: 98% 98%     Weight:    68.7 kg (151 lb 8 oz)   Height:    167.6 cm (65.98\")     Current Status 10/18/2018   ECOG score 0      PHYSICAL EXAM:    CONSTITUTIONAL:  Vital signs reviewed.  No distress, looks comfortable.  Elderly.  EYES:  Conjunctiva and lids unremarkable.  PERRLA  EARS,NOSE,MOUTH,THROAT:  Ears and nose appear unremarkable.  Lips, teeth, gums appear unremarkable.  RESPIRATORY:  Normal respiratory effort.  Lungs clear to auscultation bilaterally.  CARDIOVASCULAR:  Normal S1, S2.  No murmurs rubs or gallops.  No significant lower extremity " edema.  GASTROINTESTINAL: Abdomen appears unremarkable.  Nontender.  No hepatomegaly.  No splenomegaly.  LYMPHATIC:  No cervical, supraclavicular, axillary lymphadenopathy.  Left neck fullness.  NEURO: cranial nerves 2-12 grossly intact.  No focal deficits.  Appears to have equal strength all 4 extremities.  MUSCULOSKELETAL:  Unremarkable digits/nails.  No cyanosis or clubbing.  No apparent joint deformities.  SKIN:  Warm.  No rashes.  PSYCHIATRIC:  Normal judgment and insight.  Normal mood and affect.     RECENT LABS:        WBC   Date Value Ref Range Status   01/06/2019 5.78 4.50 - 10.70 10*3/mm3 Final     Hemoglobin   Date Value Ref Range Status   01/06/2019 15.8 (H) 11.9 - 15.5 g/dL Final     Platelets   Date Value Ref Range Status   01/06/2019 199 140 - 500 10*3/mm3 Final       Assessment/Plan     *Waldenström macroglobulinemia.  Completed treatment with fourth weekly dose of Rituxan 11/22/17.  Observation since then.  M spike and IgM just minimally elevated prior to treatment and remained minimally elevated after treatment.  No signs of progressive Waldenström's contributing to the infections.  She was due to have repeat labs in the office on 1/10/19 and see Dr. Pro on 1/18/19 to review these.  If she is not out of the hospital by then, these appointments can be rescheduled.    *Left breast cancer.  Mastectomy.  On adjuvant Femara.    *History of hypercalcemia    *Left submandibular sialoadenitis.  Antibiotics per hospitalist service.    *Possible pneumonia bilateral upper lobes on CT neck 1/6/19.  CT images personally reviewed by me.    Antibiotics per hospitalist service.  CT chest has been ordered.    Plan  Do not think there are any active issues with her Waldenström's contributing to her current infections.  Therefore, we will not follow daily.  Please call if you discover we can help in any way.  I will sign off.

## 2019-01-06 NOTE — H&P
"    Name: Karli Loomis ADMIT: 2019   : 1940  PCP: Teresa Sorensen MD    MRN: [id number] LOS: 0 days   AGE/SEX: 78 y.o. female  ROOM: 02/     Chief Complaint   Patient presents with   • Chills       Subjective   Ms. Loomis is a 78 y.o. female with a history of ESBL UTI and Waldenström's macroglobulinemia who presents to [facility] with worsening weakness and increasing falls at home. She reports she was initially given Keflex for her UTI and then when sensitivities were obtained showing ESBL resistance she was changed to Macrobid based on the sensitivities. She does not have any dysuria or urinary urgency now. No flank pain. She has had no fevers however she has had intermittent chills at home. She reports no dyspnea although has had nonproductive cough. She then had swelling of the left side of her neck which started today. No pain or difficulty with swallowing. She has dry mouth however does not report dry eyes. She has had increased difficulty with ambulating and multiple falls due to generalized weakness. She has bilateral hip pain and back pain radiating up from her hips from these falls.    History of Present Illness    Past Medical History:   Diagnosis Date   • Acid reflux    • Arthritis    • Asthma    • Breast cancer in female (CMS/HCC)     Left   • Clostridium difficile colitis     Requiring admission to [facility] in 2008   • Disease of thyroid gland     HYPOTHROIDISM   • Epilepsy (CMS/[facility])    • H/O Ecchymosis     Extensive, and hematoma formation in the whole right lower extremity requiring transfusion of red cells for a hemoglobin of 7.   • H/O Heart murmur     \"MANY YEARS AGO\" NO TX, NO SYMPTOMS   • H/O transesophageal echocardiography (MARICRUZ)    • History of transfusion of packed red blood cells     R/T SURGERY   • History of Waldenstrom's macroglobulinemia    • Hypertension    • Leaky heart valve    • Measles    • Migraine    • Mumps    • Osteoporosis    • " Peripheral neuropathy    • Seasonal allergies    • Seizures (CMS/HCC)    • Thyroid nodule     Removed more than 35 years ago     Past Surgical History:   Procedure Laterality Date   • ADENOIDECTOMY     • APPENDECTOMY     • CATARACT EXTRACTION Bilateral    • CHOLECYSTECTOMY     • COLONOSCOPY     • HYSTERECTOMY      Partial, many years ago, heavy bleeding, no cancer   • KNEE ARTHROSCOPY Right 03/2009    Finding of chondromalacia and appropriate cleanup of joint was performed by Dr. Moraes.   • MASTECTOMY Left 07/2014   • REPLACEMENT TOTAL KNEE Right 12/2015   • TONSILLECTOMY       Family History   Problem Relation Age of Onset   • Mental illness Mother    • Migraines Mother    • Dementia Mother    • Heart disease Father    • Hypertension Father    • Kidney disease Father    • Stroke Sister    • No Known Problems Daughter    • No Known Problems Daughter    • Breast cancer Other    • Heart disease Other    • Hypertension Other    • Diabetes Other    • Cancer Maternal Grandmother    • No Known Problems Maternal Grandfather    • No Known Problems Paternal Grandmother    • No Known Problems Paternal Grandfather    • Lymphoma Neg Hx    • Leukemia Neg Hx      Social History     Tobacco Use   • Smoking status: Never Smoker   • Smokeless tobacco: Never Used   Substance Use Topics   • Alcohol use: No   • Drug use: No     Facility-Administered Medications Prior to Admission   Medication Dose Route Frequency Provider Last Rate Last Dose   • ipratropium-albuterol (DUO-NEB) nebulizer solution 3 mL  3 mL Nebulization 4x Daily - RT Teresa Sorensen MD         Medications Prior to Admission   Medication Sig Dispense Refill Last Dose   • aspirin 81 MG tablet Take 81 mg by mouth.   1/6/2019 at Unknown time   • Budesonide (RHINOCORT ALLERGY NA) 2 sprays into the nostril(s) as directed by provider Daily.   Taking   • cetirizine (zyrTEC) 5 MG tablet Take 5 mg by mouth Daily.      • famotidine (PEPCID) 20 MG tablet Take  by mouth.   Taking    • gabapentin (NEURONTIN) 400 MG capsule Take 1 capsule by mouth 3 (Three) Times a Day. 360 capsule 0    • hydrocortisone 2.5 % cream Apply  topically 2 (Two) Times a Day.   Taking   • KLOR-CON 20 MEQ CR tablet TAKE 1 TABLET EVERY DAY 90 tablet 1 Taking   • lansoprazole (PREVACID) 30 MG capsule Take 1 capsule by mouth Daily. (Patient taking differently: Take 20 mg by mouth Daily.) 90 capsule 0 Not Taking   • letrozole (FEMARA) 2.5 MG tablet TAKE 1 TABLET EVERY DAY 90 tablet 2 1/5/2019 at 2100   • levothyroxine (SYNTHROID, LEVOTHROID) 50 MCG tablet TAKE 1 TABLET EVERY DAY 90 tablet 1 1/6/2019 at 0900   • nitrofurantoin (MACRODANTIN) 100 MG capsule Take 100 mg by mouth 2 (Two) Times a Day.   1/5/2019 at 2100   • potassium chloride ER (K-TAB) 20 MEQ tablet controlled-release ER tablet    1/4/2019 at 2100     Allergies:    Allergies   Allergen Reactions   • Cortisone    • Darvon  [Propoxyphene] Palpitations       Review of Systems   Constitutional: Positive for chills. Negative for fever.   HENT: Negative for sore throat and trouble swallowing.    Eyes: Negative for pain and visual disturbance.   Respiratory: Negative for cough and shortness of breath.    Cardiovascular: Negative for chest pain, palpitations and leg swelling.   Gastrointestinal: Positive for nausea and vomiting. Negative for constipation and diarrhea.   Endocrine: Negative for cold intolerance and heat intolerance.   Genitourinary: Negative for difficulty urinating and dysuria.   Musculoskeletal: Negative for neck pain and neck stiffness.   Skin: Negative for pallor and rash.   Allergic/Immunologic: Negative for environmental allergies and food allergies.   Neurological: Negative for seizures and syncope.   Hematological: Negative for adenopathy. Does not bruise/bleed easily.   Psychiatric/Behavioral: Negative for agitation and confusion.        Objective    Vital Signs  Temp:  [97.4 °F (36.3 °C)-98.3 °F (36.8 °C)] 97.4 °F (36.3 °C)  Heart Rate:   [] 74  Resp:  [16-22] 16  BP: (120-198)/(75-92) 172/80  SpO2:  [89 %-98 %] 98 %  on  Flow (L/min):  [2] 2;   Device (Oxygen Therapy): room air  Body mass index is 24.46 kg/m².    Physical Exam   Constitutional: She appears well-developed. No distress.   HENT:   Head: Atraumatic.   Nose: Nose normal.   Eyes: Conjunctivae and EOM are normal.   Neck: Neck supple.   Left sided swelling and erythema   Cardiovascular: Normal rate, regular rhythm and intact distal pulses.   Pulmonary/Chest: Effort normal. She has no wheezes. She has rales.   Abdominal: Soft. She exhibits no distension. There is no tenderness. There is no rebound and no guarding.   Musculoskeletal: She exhibits tenderness (bilateral hip). She exhibits no edema.   Neurological: She is alert. No cranial nerve deficit.   Skin: Skin is warm and dry. She is not diaphoretic.   Psychiatric: She has a normal mood and affect. Her behavior is normal.   Nursing note and vitals reviewed.      Results Review:  I reviewed the patient's new clinical results.  I reviewed imaging, agree with interpretation.  I reviewed EKG/telemetry, sinus rhythm, no acute st change.  I reviewed prior records.    Lab Results (last 24 hours)     Procedure Component Value Units Date/Time    Lactic Acid, Plasma [[id number]]  (Normal) Collected:  01/06/19 0711    Specimen:  Blood Updated:  01/06/19 0750     Lactate 1.2 mmol/L     CBC & Differential [[id number]] Collected:  01/06/19 0713    Specimen:  Blood Updated:  01/06/19 0741    Narrative:       The following orders were created for panel order CBC & Differential.  Procedure                               Abnormality         Status                     ---------                               -----------         ------                     CBC Auto Differential[[id number]]        Abnormal            Final result                 Please view results for these tests on the individual orders.    CBC Auto Differential [[id number]]  (Abnormal)  Collected:  01/06/19 0713    Specimen:  Blood Updated:  01/06/19 0741     WBC 5.78 10*3/mm3      RBC 4.91 10*6/mm3      Hemoglobin 15.8 g/dL      Hematocrit 44.2 %      MCV 90.0 fL      MCH 32.2 pg      MCHC 35.7 g/dL      RDW 12.8 %      RDW-SD 42.1 fl      MPV 9.3 fL      Platelets 199 10*3/mm3      Neutrophil % 59.3 %      Lymphocyte % 18.7 %      Monocyte % 16.3 %      Eosinophil % 5.4 %      Basophil % 0.3 %      Immature Grans % 0.9 %      Neutrophils, Absolute 3.43 10*3/mm3      Lymphocytes, Absolute 1.08 10*3/mm3      Monocytes, Absolute 0.94 10*3/mm3      Eosinophils, Absolute 0.31 10*3/mm3      Basophils, Absolute 0.02 10*3/mm3      Immature Grans, Absolute 0.05 10*3/mm3      nRBC 0.0 /100 WBC     Comprehensive Metabolic Panel [[id number]]  (Abnormal) Collected:  01/06/19 0725    Specimen:  Blood Updated:  01/06/19 0749     Glucose 116 mg/dL      BUN 7 mg/dL      Creatinine 0.79 mg/dL      Sodium 129 mmol/L      Potassium 3.7 mmol/L      Chloride 87 mmol/L      CO2 30.3 mmol/L      Calcium 10.2 mg/dL      Total Protein 7.6 g/dL      Albumin 4.20 g/dL      ALT (SGPT) 16 U/L      AST (SGOT) 20 U/L      Alkaline Phosphatase 106 U/L      Total Bilirubin 1.1 mg/dL      eGFR Non African Amer 70 mL/min/1.73      Globulin 3.4 gm/dL      A/G Ratio 1.2 g/dL      BUN/Creatinine Ratio 8.9     Anion Gap 11.7 mmol/L     Narrative:       The MDRD GFR formula is only valid for adults with stable renal function between ages 18 and 70.    Procalcitonin [[id number]]  (Abnormal) Collected:  01/06/19 0725    Specimen:  Blood Updated:  01/06/19 0754     Procalcitonin 0.33 ng/mL     Narrative:       As a Marker for Sepsis (Non-Neonates):   1. <0.5 ng/mL represents a low risk of severe sepsis and/or septic shock.  1. >2 ng/mL represents a high risk of severe sepsis and/or septic shock.    As a Marker for Lower Respiratory Tract Infections that require antibiotic therapy:  PCT on Admission     Antibiotic Therapy             6-12 Hrs  "later  > 0.5                Strongly Recommended            >0.25 - <0.5         Recommended  0.1 - 0.25           Discouraged                   Remeasure/reassess PCT  <0.1                 Strongly Discouraged          Remeasure/reassess PCT      As 28 day mortality risk marker: \"Change in Procalcitonin Result\" (> 80 % or <=80 %) if Day 0 (or Day 1) and Day 4 values are available. Refer to http://[email or web address] [email or web address]pct[facility]cal[facility]/   Change in PCT <=80 %   A decrease of PCT levels below or equal to 80 % defines a positive change in PCT test result representing a higher risk for 28-day all-cause mortality of patients diagnosed with severe sepsis or septic shock.  Change in PCT > 80 %   A decrease of PCT levels of more than 80 % defines a negative change in PCT result representing a lower risk for 28-day all-cause mortality of patients diagnosed with severe sepsis or septic shock.                Troponin [[id number]]  (Normal) Collected:  01/06/19 0725    Specimen:  Blood Updated:  01/06/19 0749     Troponin T <0.010 ng/mL     Narrative:       Troponin T Reference Ranges:  Less than 0.03 ng/mL:    Negative for AMI  0.03 to 0.09 ng/mL:      Indeterminant for AMI  Greater than 0.09 ng/mL: Positive for AMI    CK [[id number]]  (Normal) Collected:  01/06/19 0725    Specimen:  Blood Updated:  01/06/19 0749     Creatine Kinase 54 U/L     BNP [[id number]]  (Normal) Collected:  01/06/19 0725    Specimen:  Blood Updated:  01/06/19 0926     proBNP 400.3 pg/mL     Narrative:       Among patients with dyspnea, NT-proBNP is highly sensitive for the detection of acute congestive heart failure. In addition NT-proBNP of <300 pg/ml effectively rules out acute congestive heart failure with 99% negative predictive value.    Urinalysis With Culture If Indicated - Urine, Clean Catch [[id number]]  (Abnormal) Collected:  01/06/19 0729    Specimen:  Urine, Clean Catch Updated:  01/06/19 0803     Color, UA Dark Yellow     Appearance, UA Clear     pH, " UA 7.0     Specific Gravity, UA 1.014     Glucose, UA Negative     Ketones, UA Negative     Bilirubin, UA Negative     Blood, UA Negative     Protein, UA 30 mg/dL (1+)     Leuk Esterase, UA Trace     Nitrite, UA Negative     Urobilinogen, UA 1.0 E.U./dL    Blood Culture - Blood, Arm, Right [[id number]] Collected:  01/06/19 0729    Specimen:  Blood from Arm, Right Updated:  01/06/19 0736    Blood Culture - Blood, Arm, Left [[id number]] Collected:  01/06/19 0729    Specimen:  Blood from Arm, Left Updated:  01/06/19 0736    Urinalysis, Microscopic Only - Urine, Clean Catch [[id number]]  (Abnormal) Collected:  01/06/19 0729    Specimen:  Urine, Clean Catch Updated:  01/06/19 0803     RBC, UA None Seen /HPF      WBC, UA 3-5 /HPF      Bacteria, UA None Seen /HPF      Squamous Epithelial Cells, UA 0-2 /HPF      Hyaline Casts, UA 0-2 /LPF      Mucus, UA Small/1+ /HPF      Methodology Manual Light Microscopy    Blood Gas, Arterial [[id number]]  (Abnormal) Collected:  01/06/19 0756    Specimen:  Arterial Blood Updated:  01/06/19 0801     Site Arterial: right brachial     Rehan's Test Positive     pH, Arterial 7.456 pH units      pCO2, Arterial 39.7 mm Hg      pO2, Arterial 54.9 mm Hg      Comment: Critical:        HCO3, Arterial 28.0 mmol/L      Base Excess, Arterial 3.8 mmol/L      O2 Saturation Calculated 89.7 %      Barometric Pressure for Blood Gas 754.5 mmHg      Modality Room Air     Rate 16 Breaths/minute     Blood Gas, Arterial [[id number]]  (Abnormal) Collected:  01/06/19 0808    Specimen:  Arterial Blood Updated:  01/06/19 0811     Site Arterial: right brachial     Rehan's Test Positive     pH, Arterial 7.453 pH units      pCO2, Arterial 38.8 mm Hg      pO2, Arterial 62.0 mm Hg      HCO3, Arterial 27.2 mmol/L      Base Excess, Arterial 3.1 mmol/L      O2 Saturation Calculated 92.5 %      Barometric Pressure for Blood Gas 754.7 mmHg      Modality Room Air     Rate 18 Breaths/minute           XR Chest 2 View   Final Result    As described.       This report was finalized on 1/6/2019 9:28 AM by Dr. Thiago Walsh M.D.          XR Spine Lumbar 4+ View   Final Result       No acute fracture is identified. Degenerative changes. If there is   further clinical concern, MRI could be considered for further aeration.       This report was finalized on 1/6/2019 9:23 AM by Dr. Thiago Walsh M.D.          CT Soft Tissue Neck With Contrast   Final Result           1. Left submandibular sialoadenitis, slight overlying skin thickening,   correlate clinically.   2. Acute sinusitis.   3. Multiple densities in the visualized upper lungs, with considerations   including infection, neoplasm, clinical correlation and follow-up   evaluation recommended.       Discussed by telephone with Dr. Mares 0901, 1/6/2019.               This report was finalized on 1/6/2019 9:07 AM by Dr. Thiago Walsh M.D.          CT Chest Without Contrast    (Results Pending)     Assessment/Plan      Active Hospital Problems    Diagnosis Date Noted   • **Pneumonia due to infectious organism [J18.9] 01/06/2019   • Weakness [R53.1] 01/06/2019   • Sepsis (CMS/[facility]) [A41.9] 01/06/2019   • Sialoadenitis [K11.20] 01/06/2019   • UTI due to extended-spectrum beta lactamase (ESBL) producing Escherichia coli [N39.0, B96.29, Z16.12] 01/06/2019   • Nausea & vomiting [R11.2] 01/06/2019   • Orthostatic hypotension [I95.1] 01/06/2019   • Essential hypertension [I10] 01/06/2019   • Airway hyperreactivity [J45.909] 12/14/2017   • Macroglobulinemia of Waldenstrom (CMS/[facility]) [C88.0] 11/09/2016      Resolved Hospital Problems   No resolved problems to display.     · Pneumonia: Elevated procalcitonin infiltrate on imaging and she has had chills at home. Blood cultures of been obtained and we will check urinary antigens. Check CT chest. Give azithromycin and Rocephin for empiric treatment.  · Left submandibular Sialoadenitis: She is not having any difficulty swallowing at this point.  Rocephin for antibiotic treatment and will give fluids. If worsens or fails to improve we will consider ENT consult.  · UTI/ESBL Escherichia coli: Urinalysis here is not infectious and she is on nitrofurantoin for treatment raised on outside hospital sensitivities. We will continue the NTF.  · Sepsis: Lactic acid not elevated. Monitor with fluids for resuscitation.  · Orthostatic hypotension: Orthostatics are positive in the emergency room. Monitor with fluids.  · Airway hyperreactivity: Given her medications and at bedtime likely COPD. She did not have any acute exacerbation on exam. We will give breathing treatments.  · Waldenstrom macroglobulimenia: Consult oncology.  · Weakness: Secondary to sepsis and orthostasis. She did have bilateral hip tenderness on exam and will check pelvic x-ray. PT and OT consults.  · Prophylaxis: Lovenox  · Full code    I discussed the patients findings and my recommendations with patient, family and consulting provider.    Abebe Barbosa MD  [facility]  01/06/19  1:27 PM

## 2019-01-06 NOTE — ED NOTES
"Pt states \"I have had a bad UTI since amee jerry\". Pt also reports frequent falls. Pt c/o chills x 3 days.    Pt currently being treated with macrobid for UTI.      Carolyne Barker RN  01/06/19 0632    "

## 2019-01-06 NOTE — PLAN OF CARE
Problem: Patient Care Overview  Goal: Plan of Care Review  Outcome: Ongoing (interventions implemented as appropriate)   01/06/19 7427   Coping/Psychosocial   Plan of Care Reviewed With patient   Plan of Care Review   Progress no change   OTHER   Outcome Summary pt admit to rm 602 , with pneumonia , and pos sepsis screen , iv abx and ivf given in ER , pt on o2 2 l nc , na 129 , hypertension - pt took am meds prior to arrival.  pt sig other at bs , awaiting md admitting orders , will cont to monitor      Goal: Discharge Needs Assessment  Outcome: Ongoing (interventions implemented as appropriate)      Problem: Fall Risk (Adult)  Goal: Identify Related Risk Factors and Signs and Symptoms  Outcome: Ongoing (interventions implemented as appropriate)    Goal: Absence of Fall  Outcome: Ongoing (interventions implemented as appropriate)    Goal: Identify Related Risk Factors and Signs and Symptoms  Outcome: Ongoing (interventions implemented as appropriate)    Goal: Absence of Fall  Outcome: Ongoing (interventions implemented as appropriate)      Problem: Pneumonia (Adult)  Goal: Signs and Symptoms of Listed Potential Problems Will be Absent, Minimized or Managed (Pneumonia)  Outcome: Ongoing (interventions implemented as appropriate)

## 2019-01-07 LAB
ANION GAP SERPL CALCULATED.3IONS-SCNC: 12.6 MMOL/L
BASOPHILS # BLD AUTO: 0.02 10*3/MM3 (ref 0–0.2)
BASOPHILS NFR BLD AUTO: 0.5 % (ref 0–1.5)
BUN BLD-MCNC: 6 MG/DL (ref 8–23)
BUN/CREAT SERPL: 10.5 (ref 7–25)
CALCIUM SPEC-SCNC: 8.7 MG/DL (ref 8.6–10.5)
CHLORIDE SERPL-SCNC: 98 MMOL/L (ref 98–107)
CO2 SERPL-SCNC: 22.4 MMOL/L (ref 22–29)
CREAT BLD-MCNC: 0.57 MG/DL (ref 0.57–1)
DEPRECATED RDW RBC AUTO: 42.7 FL (ref 37–54)
EOSINOPHIL # BLD AUTO: 0.24 10*3/MM3 (ref 0–0.7)
EOSINOPHIL NFR BLD AUTO: 5.6 % (ref 0.3–6.2)
ERYTHROCYTE [DISTWIDTH] IN BLOOD BY AUTOMATED COUNT: 12.9 % (ref 11.7–13)
GFR SERPL CREATININE-BSD FRML MDRD: 102 ML/MIN/1.73
GLUCOSE BLD-MCNC: 90 MG/DL (ref 65–99)
HCT VFR BLD AUTO: 34.1 % (ref 35.6–45.5)
HGB BLD-MCNC: 12 G/DL (ref 11.9–15.5)
IMM GRANULOCYTES # BLD AUTO: 0.04 10*3/MM3 (ref 0–0.03)
IMM GRANULOCYTES NFR BLD AUTO: 0.9 % (ref 0–0.5)
LYMPHOCYTES # BLD AUTO: 1.65 10*3/MM3 (ref 0.9–4.8)
LYMPHOCYTES NFR BLD AUTO: 38.4 % (ref 19.6–45.3)
MCH RBC QN AUTO: 31.7 PG (ref 26.9–32)
MCHC RBC AUTO-ENTMCNC: 35.2 G/DL (ref 32.4–36.3)
MCV RBC AUTO: 90.2 FL (ref 80.5–98.2)
MONOCYTES # BLD AUTO: 0.82 10*3/MM3 (ref 0.2–1.2)
MONOCYTES NFR BLD AUTO: 19.1 % (ref 5–12)
NEUTROPHILS # BLD AUTO: 1.57 10*3/MM3 (ref 1.9–8.1)
NEUTROPHILS NFR BLD AUTO: 36.4 % (ref 42.7–76)
PLATELET # BLD AUTO: 166 10*3/MM3 (ref 140–500)
PMV BLD AUTO: 9 FL (ref 6–12)
POTASSIUM BLD-SCNC: 3.6 MMOL/L (ref 3.5–5.2)
RBC # BLD AUTO: 3.78 10*6/MM3 (ref 3.9–5.2)
SODIUM BLD-SCNC: 133 MMOL/L (ref 136–145)
WBC NRBC COR # BLD: 4.3 10*3/MM3 (ref 4.5–10.7)

## 2019-01-07 PROCEDURE — 80048 BASIC METABOLIC PNL TOTAL CA: CPT | Performed by: INTERNAL MEDICINE

## 2019-01-07 PROCEDURE — 94799 UNLISTED PULMONARY SVC/PX: CPT

## 2019-01-07 PROCEDURE — 97166 OT EVAL MOD COMPLEX 45 MIN: CPT

## 2019-01-07 PROCEDURE — 25010000002 CEFTRIAXONE PER 250 MG: Performed by: INTERNAL MEDICINE

## 2019-01-07 PROCEDURE — 85025 COMPLETE CBC W/AUTO DIFF WBC: CPT | Performed by: INTERNAL MEDICINE

## 2019-01-07 PROCEDURE — 97161 PT EVAL LOW COMPLEX 20 MIN: CPT

## 2019-01-07 PROCEDURE — 25010000002 AZITHROMYCIN PER 500 MG: Performed by: INTERNAL MEDICINE

## 2019-01-07 PROCEDURE — 97110 THERAPEUTIC EXERCISES: CPT

## 2019-01-07 PROCEDURE — 25010000002 ENOXAPARIN PER 10 MG: Performed by: INTERNAL MEDICINE

## 2019-01-07 RX ORDER — LANSOPRAZOLE 30 MG/1
30 CAPSULE, DELAYED RELEASE ORAL DAILY PRN
COMMUNITY
End: 2019-01-11 | Stop reason: HOSPADM

## 2019-01-07 RX ORDER — LOSARTAN POTASSIUM AND HYDROCHLOROTHIAZIDE 25; 100 MG/1; MG/1
1 TABLET ORAL DAILY
COMMUNITY
End: 2019-01-11 | Stop reason: HOSPADM

## 2019-01-07 RX ORDER — LOSARTAN POTASSIUM 100 MG/1
100 TABLET ORAL
Status: DISCONTINUED | OUTPATIENT
Start: 2019-01-07 | End: 2019-01-11 | Stop reason: HOSPADM

## 2019-01-07 RX ORDER — LETROZOLE 2.5 MG/1
2.5 TABLET, FILM COATED ORAL DAILY
COMMUNITY
End: 2019-03-14 | Stop reason: SDUPTHER

## 2019-01-07 RX ORDER — LEVALBUTEROL INHALATION SOLUTION 0.63 MG/3ML
1 SOLUTION RESPIRATORY (INHALATION) EVERY 4 HOURS PRN
COMMUNITY
End: 2019-01-17 | Stop reason: SDUPTHER

## 2019-01-07 RX ORDER — POTASSIUM CHLORIDE 20 MEQ/1
20 TABLET, EXTENDED RELEASE ORAL DAILY
COMMUNITY
End: 2019-01-11 | Stop reason: HOSPADM

## 2019-01-07 RX ORDER — LOSARTAN POTASSIUM 50 MG/1
100 TABLET ORAL ONCE
Status: COMPLETED | OUTPATIENT
Start: 2019-01-07 | End: 2019-01-07

## 2019-01-07 RX ORDER — CETIRIZINE HYDROCHLORIDE 10 MG/1
10 TABLET ORAL DAILY
COMMUNITY

## 2019-01-07 RX ORDER — GABAPENTIN 400 MG/1
400 CAPSULE ORAL 4 TIMES DAILY
COMMUNITY
End: 2019-01-11 | Stop reason: HOSPADM

## 2019-01-07 RX ADMIN — CEFTRIAXONE SODIUM 1 G: 1 INJECTION, SOLUTION INTRAVENOUS at 08:27

## 2019-01-07 RX ADMIN — ENOXAPARIN SODIUM 40 MG: 40 INJECTION SUBCUTANEOUS at 15:23

## 2019-01-07 RX ADMIN — ASPIRIN 81 MG: 81 TABLET, DELAYED RELEASE ORAL at 08:26

## 2019-01-07 RX ADMIN — GABAPENTIN 400 MG: 400 CAPSULE ORAL at 16:28

## 2019-01-07 RX ADMIN — PANTOPRAZOLE SODIUM 40 MG: 40 TABLET, DELAYED RELEASE ORAL at 06:09

## 2019-01-07 RX ADMIN — FLUTICASONE PROPIONATE 2 SPRAY: 50 SPRAY, METERED NASAL at 08:26

## 2019-01-07 RX ADMIN — IPRATROPIUM BROMIDE AND ALBUTEROL SULFATE 3 ML: 2.5; .5 SOLUTION RESPIRATORY (INHALATION) at 11:06

## 2019-01-07 RX ADMIN — AZITHROMYCIN MONOHYDRATE 500 MG: 500 INJECTION, POWDER, LYOPHILIZED, FOR SOLUTION INTRAVENOUS at 08:27

## 2019-01-07 RX ADMIN — LETROZOLE 2.5 MG: 2.5 TABLET ORAL at 08:27

## 2019-01-07 RX ADMIN — GABAPENTIN 400 MG: 400 CAPSULE ORAL at 20:05

## 2019-01-07 RX ADMIN — IPRATROPIUM BROMIDE AND ALBUTEROL SULFATE 3 ML: 2.5; .5 SOLUTION RESPIRATORY (INHALATION) at 16:02

## 2019-01-07 RX ADMIN — LOSARTAN POTASSIUM 100 MG: 50 TABLET, FILM COATED ORAL at 13:23

## 2019-01-07 RX ADMIN — SODIUM CHLORIDE, PRESERVATIVE FREE 3 ML: 5 INJECTION INTRAVENOUS at 08:28

## 2019-01-07 RX ADMIN — NITROFURANTOIN MONOHYDRATE/MACROCRYSTALLINE 100 MG: 25; 75 CAPSULE ORAL at 08:26

## 2019-01-07 RX ADMIN — GABAPENTIN 400 MG: 400 CAPSULE ORAL at 08:29

## 2019-01-07 RX ADMIN — IPRATROPIUM BROMIDE AND ALBUTEROL SULFATE 3 ML: 2.5; .5 SOLUTION RESPIRATORY (INHALATION) at 06:47

## 2019-01-07 RX ADMIN — HYDRALAZINE HYDROCHLORIDE 25 MG: 25 TABLET, FILM COATED ORAL at 20:04

## 2019-01-07 RX ADMIN — FAMOTIDINE 20 MG: 20 TABLET, FILM COATED ORAL at 08:26

## 2019-01-07 RX ADMIN — SODIUM CHLORIDE, PRESERVATIVE FREE 3 ML: 5 INJECTION INTRAVENOUS at 20:05

## 2019-01-07 RX ADMIN — LEVOTHYROXINE SODIUM 50 MCG: 50 TABLET ORAL at 08:27

## 2019-01-07 RX ADMIN — CETIRIZINE HYDROCHLORIDE 5 MG: 10 TABLET, FILM COATED ORAL at 08:27

## 2019-01-07 RX ADMIN — SODIUM CHLORIDE 125 ML/HR: 9 INJECTION, SOLUTION INTRAVENOUS at 06:55

## 2019-01-07 RX ADMIN — NITROFURANTOIN MONOHYDRATE/MACROCRYSTALLINE 100 MG: 25; 75 CAPSULE ORAL at 20:05

## 2019-01-07 NOTE — PLAN OF CARE
Problem: Patient Care Overview  Goal: Plan of Care Review  Outcome: Ongoing (interventions implemented as appropriate)   01/07/19 8174   Coping/Psychosocial   Plan of Care Reviewed With patient   OTHER   Outcome Summary pt presents with general weakness and unsteady gait, she will benefit from PT to address, anticipate that pt will only need a few PT visits, and pt will be safe to return home with family at discharge

## 2019-01-07 NOTE — THERAPY EVALUATION
Acute Care - Occupational Therapy Initial Evaluation  [facility]     Patient Name: Karli Loomis  : 1940  MRN: [id number]  Today's Date: 2019  Onset of Illness/Injury or Date of Surgery: 19     Referring Physician: Familia    Admit Date: 2019       ICD-10-CM ICD-9-CM   1. Weakness R53.1 780.79   2. Fall, initial encounter W19.XXXA E888.9   3. Sialoadenitis K11.20 527.2   4. Acute non-recurrent maxillary sinusitis J01.00 461.0   5. Pneumonia of both upper lobes due to infectious organism (CMS/HCC) J18.1 483.8   6. Urinary tract infection without hematuria, site unspecified- treated N39.0 599.0   7. Wheezing R06.2 786.07   8. Moderate persistent asthma with exacerbation J45.41 493.92   9. Cough R05 786.2   10. Impaired functional mobility and activity tolerance Z74.09 V49.89     Patient Active Problem List   Diagnosis   • Malignant neoplasm of overlapping sites of left female breast (CMS/HCC)   • Macroglobulinemia of Waldenstrom (CMS/HCC)   • Hypercalcemia   • Airway hyperreactivity   • BP (high blood pressure)   • Adult hypothyroidism   • Primary osteoarthritis of left knee   • Disorder of peripheral nervous system   • Epilepsy with simple partial seizures (CMS/HCC)   • Headache, migraine   • Hyponatremia   • Chronic pansinusitis   • TIA (transient ischemic attack)   • Weakness   • Pneumonia due to infectious organism   • Sepsis (CMS/HCC)   • Sialoadenitis   • UTI due to extended-spectrum beta lactamase (ESBL) producing Escherichia coli   • Nausea & vomiting   • Orthostatic hypotension   • Essential hypertension     Past Medical History:   Diagnosis Date   • Acid reflux    • Arthritis    • Asthma    • Breast cancer in female (CMS/HCC)     Left   • Clostridium difficile colitis     Requiring admission to [facility] in 2008   • Disease of thyroid gland     HYPOTHROIDISM   • Epilepsy (CMS/HCC)    • H/O Ecchymosis     Extensive, and hematoma formation in the whole right lower  "extremity requiring transfusion of red cells for a hemoglobin of 7.   • H/O Heart murmur     \"MANY YEARS AGO\" NO TX, NO SYMPTOMS   • H/O transesophageal echocardiography (MARICRUZ)    • History of transfusion of packed red blood cells     R/T SURGERY   • History of Waldenstrom's macroglobulinemia    • Hypertension    • Leaky heart valve    • Measles    • Migraine    • Mumps    • Osteoporosis    • Peripheral neuropathy    • Seasonal allergies    • Seizures (CMS/HCC)    • Thyroid nodule     Removed more than 35 years ago     Past Surgical History:   Procedure Laterality Date   • ADENOIDECTOMY     • APPENDECTOMY     • CATARACT EXTRACTION Bilateral    • CHOLECYSTECTOMY     • COLONOSCOPY     • HYSTERECTOMY      Partial, many years ago, heavy bleeding, no cancer   • KNEE ARTHROSCOPY Right 03/2009    Finding of chondromalacia and appropriate cleanup of joint was performed by Dr. Moraes.   • MASTECTOMY Left 07/2014   • REPLACEMENT TOTAL KNEE Right 12/2015   • TONSILLECTOMY            OT ASSESSMENT FLOWSHEET (last 72 hours)      Occupational Therapy Evaluation     Row Name 01/07/19 1602                   OT Evaluation Time/Intention    Subjective Information  no complaints  -MR        Document Type  evaluation  -MR        Mode of Treatment  occupational therapy  -MR        Patient Effort  good  -MR        Symptoms Noted During/After Treatment  none  -MR           General Information    Patient Profile Reviewed?  yes  -MR        Onset of Illness/Injury or Date of Surgery  01/06/19  -MR        Patient Observations  alert;cooperative;agree to therapy  -MR        Patient/Family Observations  pt in bed, no acute distress  -MR        Prior Level of Function  independent:  -MR        Pertinent History of Current Functional Problem  adm with PNA, UTI, sialadenitis  -MR        Existing Precautions/Restrictions  fall  -MR        Benefits Reviewed  patient:  -MR        Barriers to Rehab  none identified  -MR           " Relationship/Environment    Primary Source of Support/Comfort  -- pt's son lives with her  -MR        Lives With  child(yemi), adult  -MR           Resource/Environmental Concerns    Current Living Arrangements  home/apartment/condo  -MR           Cognitive Assessment/Intervention- PT/OT    Orientation Status (Cognition)  oriented x 4  -MR        Follows Commands (Cognition)  WFL  -MR           Bed Mobility Assessment/Treatment    Bed Mobility Assessment/Treatment  supine-sit;sit-supine  -MR        Supine-Sit [place] (Bed Mobility)  independent  -MR        Sit-Supine [place] (Bed Mobility)  independent  -MR           Functional Mobility    Functional Mobility- Ind. Level  contact guard assist;supervision required  -MR        Functional Mobility- Comment  pt performs functional mobility in room with no device  -MR           Transfer Assessment/Treatment    Transfer Assessment/Treatment  sit-stand transfer;stand-sit transfer  -MR           Sit-Stand Transfer    Sit-Stand [place] (Transfers)  supervision  -MR           Stand-Sit Transfer    Stand-Sit [place] (Transfers)  supervision  -MR           General ROM    GENERAL ROM COMMENTS  BUE AROM WFL  -MR           MMT (Manual Muscle Testing)    General MMT Comments  BUE > or = 4-/5  -MR           Positioning and Restraints    Pre-Treatment Position  in bed  -MR        Post Treatment Position  bed  -MR        In Bed  supine;call light within reach;encouraged to call for assist;with family/caregiver  -MR           Pain Assessment    Additional Documentation  Pain Scale: Numbers Pre/Post-Treatment (Group)  -MR           Pain Scale: Numbers Pre/Post-Treatment    Pain Scale: Numbers, Pretreatment  0/10 - no pain  -MR        Pain Scale: Numbers, Post-Treatment  0/10 - no pain  -MR           Clinical Impression (OT)    Criteria for Skilled Therapeutic Interventions Met (OT Eval)  yes;treatment indicated  -MR        Rehab Potential (OT Eval)  good, to achieve  stated therapy goals  -MR        Therapy Frequency (OT Eval)  5 times/wk  -MR        Anticipated Discharge Disposition (OT)  home with assist  -MR           OT Goals    Transfer Goal Selection (OT)  transfer, OT goal 1  -MR        Dressing Goal Selection (OT)  dressing, OT goal 1  -MR        Functional Mobility Goal Selection (OT)  functional mobility, OT goal 1  -MR        Additional Documentation  Functional Mobility Selection (OT) (Row)  -MR           Transfer Goal 1 (OT)    Activity/Assistive Device (Transfer Goal 1, OT)  bed-to-chair/chair-to-bed;toilet  -MR        [place] Level/Cues Needed (Transfer Goal 1, OT)  conditional independence  -MR        Time Frame (Transfer Goal 1, OT)  long term goal (LTG);by discharge  -MR           Dressing Goal 1 (OT)    Activity/Assistive Device (Dressing Goal 1, OT)  dressing skills, all  -MR        [place]/Cues Needed (Dressing Goal 1, OT)  independent  -MR        Time Frame (Dressing Goal 1, OT)  long term goal (LTG);by discharge  -MR           Functional Mobility Goal 1 (OT)    [place] Level/Cues Needed (Functional Mobility Goal 1, OT)  conditional independence  -MR        Distance Goal 1 (Functional Mobility, OT)  pt to perform functional mobility to the bathroom, to the sink for ADLs  -MR        Time Frame (Functional Mobility Goal 1, OT)  long term goal (LTG);by discharge  -MR          User Key  (r) = Recorded By, (t) = Taken By, (c) = Cosigned By    Initials Name Effective Dates    Sera Bar, OT 06/22/16 -          Occupational Therapy Education     Title: PT OT SLP Therapies (In Progress)     Topic: Occupational Therapy (Done)     Point: ADL training (Done)     Description: Instruct learner(s) on proper safety adaptation and remediation techniques during self care or transfers.   Instruct in proper use of assistive devices.    Learning Progress Summary           Patient Acceptance, E,TB, VU by MR at 1/7/2019  4:13 PM    Comment:  Educated on  role of OT; OT POC.   Family Acceptance, E,TB, VU by MR at 1/7/2019  4:13 PM    Comment:  Educated on role of OT; OT POC.                               User Key     Initials Effective Dates Name Provider Type Discipline    MR 06/22/16 -  Sera Cavazos, OT Occupational Therapist OT                  OT Recommendation and Plan  Outcome Summary/Treatment Plan (OT)  Anticipated Discharge Disposition (OT): home with assist  Therapy Frequency (OT Eval): 5 times/wk  Plan of Care Review  Plan of Care Reviewed With: patient  Plan of Care Reviewed With: patient  Outcome Summary: Pt seen for initial evaluation, recommend skilled OT services to increase safety and independence with performance of ADLs.    Outcome Measures     Row Name 01/07/19 1600 01/07/19 1500          How much help from another person do you currently need...    Turning from your back to your side while in flat bed without using bedrails?  --  4  -PC     Moving from lying on back to sitting on the side of a flat bed without bedrails?  --  4  -PC     Moving to and from a bed to a chair (including a wheelchair)?  --  4  -PC     Standing up from a chair using your arms (e.g., wheelchair, bedside chair)?  --  4  -PC     Climbing 3-5 steps with a railing?  --  3  -PC     To walk in hospital room?  --  3  -PC     AM-PAC 6 Clicks Score  --  22  -PC        How much help from another is currently needed...    Putting on and taking off regular lower body clothing?  3  -MR  --     Bathing (including washing, rinsing, and drying)  3  -MR  --     Toileting (which includes using toilet bed pan or urinal)  3  -MR  --     Putting on and taking off regular upper body clothing  3  -MR  --     Taking care of personal grooming (such as brushing teeth)  4  -MR  --     Eating meals  4  -MR  --     Score  20  -MR  --        Functional Assessment    Outcome Measure Options  AM-PAC 6 Clicks Daily Activity (OT)  -MR  AM-PAC 6 Clicks Basic Mobility (PT)  -PC       User Key  (r) =  Recorded By, (t) = Taken By, (c) = Cosigned By    Initials Name Provider Type    Shanel Gabriel, PT Physical Therapist    Sera Bar, OT Occupational Therapist          Time Calculation:   Time Calculation- OT     Row Name 01/07/19 1615             Time Calculation- OT    OT Start Time  1321  -MR      OT Stop Time  1333  -MR      OT Time Calculation (min)  12 min  -MR      OT Received On  01/07/19  -MR      OT Goal Re-Cert Due Date  01/14/19  -MR        User Key  (r) = Recorded By, (t) = Taken By, (c) = Cosigned By    Initials Name Provider Type    Sera Bar, OT Occupational Therapist        Therapy Suggested Charges     Code   Minutes Charges    None           Therapy Charges for Today     Code Description Service Date Service Provider Modifiers Qty    56690090437 HC OT EVAL MOD COMPLEXITY 2 1/7/2019 Sera Cavazos OT GO 1               Sera Cavazos OT  1/7/2019

## 2019-01-07 NOTE — PROGRESS NOTES
Discharge Planning Assessment  [facility]     Patient Name: Karli Loomis  MRN: [id number]  Today's Date: 1/7/2019    Admit Date: 1/6/2019    Discharge Needs Assessment     Row Name 01/07/19 1141       Living Environment    Lives With  child(yemi), adult    Name(s) of Who Lives With Patient  son, Deric Gipson ( [phone]), lives with her. He works outside the home Sunday- Thursday, but patient states that she has mutliple family members who are very involved and supportive    Current Living Arrangements  home/apartment/condo    Primary Care Provided by  self    Provides Primary Care For  no one    Family Caregiver if Needed  child(yemi), adult    Family Caregiver Names  sonDeric    Quality of Family Relationships  helpful;involved;supportive    Able to Return to Prior Arrangements  yes       Resource/Environmental Concerns    Resource/Environmental Concerns  none    Transportation Concerns  car, none       Transition Planning    Patient/Family Anticipates Transition to  home    Patient/Family Anticipated Services at Transition  none    Transportation Anticipated  family or friend will provide       Discharge Needs Assessment    Readmission Within the Last 30 Days  no previous admission in last 30 days    Concerns to be Addressed  denies needs/concerns at this time;no discharge needs identified    Equipment Currently Used at Home  nebulizer    Anticipated Changes Related to Illness  none    Equipment Needed After Discharge  none    Offered/Gave Vendor List  yes provided with [facility] and SNF list        Discharge Plan     Row Name 01/07/19 1145       Plan    Plan  return home    Patient/Family in Agreement with Plan  yes    Plan Comments  Spoke with patient in room.  Introduced self and explained role.  Facesheet, PCP and pharmacy verified. Patient lives in a  house and her son, Deric Gipson ( [phone]).  Son works outside the home Sunday- Wednesday, but patient has multiple family members who  can assist if needed.  Patient reports that she is IADLS and drives.  Only DME is a nebulizer.  She has used HH in the past after a knee replacement, but is unsure of the agency.  She does not have a SNF history.  Patient plans to return home with son and denies any needs.  [facility] and SNF list left at bedside.  CCP will follow. Amara Melchor RN        Destination      No service coordination in this encounter.      Durable Medical Equipment      No service coordination in this encounter.      Dialysis/Infusion      No service coordination in this encounter.      Home Medical Care      No service coordination in this encounter.      Community Resources      No service coordination in this encounter.          Demographic Summary     Row Name 01/07/19 1140       General Information    Admission Type  inpatient    Arrived From  emergency department    Required Notices Provided  Important Message from Medicare    Referral Source  admission list    Reason for Consult  discharge planning    Preferred Language  English        Functional Status     Row Name 01/07/19 1140       Functional Status    Usual Activity Tolerance  good    Current Activity Tolerance  moderate       Functional Status, IADL    Medications  independent    Meal Preparation  independent    Housekeeping  independent    Laundry  independent    Shopping  independent       Mental Status    General Appearance WDL  WDL       Mental Status Summary    Recent Changes in Mental Status/Cognitive Functioning  no changes       Employment/    Employment Status  retired        Psychosocial    No documentation.       Abuse/Neglect    No documentation.       Legal    No documentation.       Substance Abuse    No documentation.       Patient Forms    No documentation.           Amara Melchor RN

## 2019-01-07 NOTE — PLAN OF CARE
Problem: Patient Care Overview  Goal: Plan of Care Review   01/07/19 1619   Coping/Psychosocial   Plan of Care Reviewed With patient   OTHER   Outcome Summary Pt seen for initial evaluation, recommend skilled OT services to increase safety and independence with performance of ADLs.

## 2019-01-07 NOTE — THERAPY EVALUATION
Acute Care - Physical Therapy Initial Evaluation  [facility]     Patient Name: Karli Loomis  : 1940  MRN: [id number]  Today's Date: 2019   Onset of Illness/Injury or Date of Surgery: 19  Date of Referral to PT: 19  Referring Physician: Familia      Admit Date: 2019    Visit Dx:     ICD-10-CM ICD-9-CM   1. Weakness R53.1 780.79   2. Fall, initial encounter W19.XXXA E888.9   3. Sialoadenitis K11.20 527.2   4. Acute non-recurrent maxillary sinusitis J01.00 461.0   5. Pneumonia of both upper lobes due to infectious organism (CMS/[facility]) J18.1 483.8   6. Urinary tract infection without hematuria, site unspecified- treated N39.0 599.0   7. Wheezing R06.2 786.07   8. Moderate persistent asthma with exacerbation J45.41 493.92   9. Cough R05 786.2   10. Impaired functional mobility and activity tolerance Z74.09 V49.89     Patient Active Problem List   Diagnosis   • Malignant neoplasm of overlapping sites of left female breast (CMS/HCC)   • Macroglobulinemia of [place]denstrom (CMS/[facility])   • Hypercalcemia   • Airway hyperreactivity   • BP (high blood pressure)   • Adult hypothyroidism   • Primary osteoarthritis of left knee   • Disorder of peripheral nervous system   • Epilepsy with simple partial seizures (CMS/HCC)   • Headache, migraine   • Hyponatremia   • Chronic pansinusitis   • TIA (transient ischemic attack)   • Weakness   • Pneumonia due to infectious organism   • Sepsis (CMS/[facility])   • Sialoadenitis   • UTI due to extended-spectrum beta lactamase (ESBL) producing Escherichia coli   • Nausea & vomiting   • Orthostatic hypotension   • Essential hypertension     Past Medical History:   Diagnosis Date   • Acid reflux    • Arthritis    • Asthma    • Breast cancer in female (CMS/HCC)     Left   • Clostridium difficile colitis     Requiring admission to [facility] in 2008   • Disease of thyroid gland     HYPOTHROIDISM   • Epilepsy (CMS/[facility])    • H/O Ecchymosis     Extensive, and  "hematoma formation in the whole right lower extremity requiring transfusion of red cells for a hemoglobin of 7.   • H/O Heart murmur     \"MANY YEARS AGO\" NO TX, NO SYMPTOMS   • H/O transesophageal echocardiography (MARICRUZ)    • History of transfusion of packed red blood cells     R/T SURGERY   • History of Waldenstrom's macroglobulinemia    • Hypertension    • Leaky heart valve    • Measles    • Migraine    • Mumps    • Osteoporosis    • Peripheral neuropathy    • Seasonal allergies    • Seizures (CMS/HCC)    • Thyroid nodule     Removed more than 35 years ago     Past Surgical History:   Procedure Laterality Date   • ADENOIDECTOMY     • APPENDECTOMY     • CATARACT EXTRACTION Bilateral    • CHOLECYSTECTOMY     • COLONOSCOPY     • HYSTERECTOMY      Partial, many years ago, heavy bleeding, no cancer   • KNEE ARTHROSCOPY Right 03/2009    Finding of chondromalacia and appropriate cleanup of joint was performed by Dr. Moraes.   • MASTECTOMY Left 07/2014   • REPLACEMENT TOTAL KNEE Right 12/2015   • TONSILLECTOMY          PT ASSESSMENT (last 12 hours)      Physical Therapy Evaluation     Row Name 01/07/19 1522          PT Evaluation Time/Intention    Subjective Information  no complaints  -PC     Document Type  evaluation  -PC     Mode of Treatment  physical therapy  -PC     Patient Effort  good  -PC     Symptoms Noted During/After Treatment  none  -PC     Row Name 01/07/19 1522          General Information    Patient Profile Reviewed?  yes  -PC     Onset of Illness/Injury or Date of Surgery  01/06/19  -PC     Referring Physician  Familia  -     Patient Observations  alert;cooperative;agree to therapy  -PC     Patient/Family Observations  pt is in bed, no acute distress, pleasant and cooperative  -PC     Prior Level of Function  independent:;all household mobility;community mobility  -PC     Pertinent History of Current Functional Problem  adm with PNA, UTI, sialadenitis  -PC     Existing Precautions/Restrictions  fall  -PC "     Barriers to Rehab  none identified  -PC     Row Name 01/07/19 1522          Cognitive Assessment/Intervention- PT/OT    Orientation Status (Cognition)  oriented x 4  -PC     Follows Commands (Cognition)  WNL  -PC     Row Name 01/07/19 1522          Bed Mobility Assessment/Treatment    Bed Mobility Assessment/Treatment  supine-sit;sit-supine  -PC     Supine-Sit [place] (Bed Mobility)  independent  -PC     Sit-Supine [place] (Bed Mobility)  independent  -PC     Row Name 01/07/19 1522          Transfer Assessment/Treatment    Transfer Assessment/Treatment  sit-stand transfer;stand-sit transfer  -PC     Sit-Stand [place] (Transfers)  conditional independence  -PC     Stand-Sit [place] (Transfers)  conditional independence  -PC     Row Name 01/07/19 1522          Gait/Stairs Assessment/Training    [place] Level (Gait)  contact guard  -     Distance in Feet (Gait)  300 ft  -     Pattern (Gait)  step-through  -PC     Comment (Gait/Stairs)  gait is unsteady  -     Row Name 01/07/19 1522          General ROM    GENERAL ROM COMMENTS  WFL  -PC     Row Name 01/07/19 1522          MMT (Manual Muscle Testing)    General MMT Comments  WFL  -PC     Row Name 01/07/19 1522          Plan of Care Review    Plan of Care Reviewed With  patient  -PC     Row Name 01/07/19 1522          Physical Therapy Clinical Impression    Date of Referral to PT  01/07/19  -     Criteria for Skilled Interventions Met (PT Clinical Impression)  yes;treatment indicated  -     Impairments Found (describe specific impairments)  gait, locomotion, and balance  -     Rehab Potential (PT Clinical Summary)  good, to achieve stated therapy goals  -PC     Row Name 01/07/19 1522          Physical Therapy Goals    Gait Training Goal Selection (PT)  gait training, PT goal 1  -PC     Row Name 01/07/19 1522          Gait Training Goal 1 (PT)    Activity/Assistive Device (Gait Training Goal 1, PT)  gait (walking locomotion)  -      [place] Level (Gait Training Goal 1, PT)  supervision required  -PC     Distance (Gait Goal 1, PT)  450 ft  -PC     Time Frame (Gait Training Goal 1, PT)  1 week  -PC     Row Name 01/07/19 1522          Positioning and Restraints    Pre-Treatment Position  in bed  -PC     Post Treatment Position  bed  -PC     In Bed  supine;call light within reach;encouraged to call for assist;with family/caregiver  -PC       User Key  (r) = Recorded By, (t) = Taken By, (c) = Cosigned By    Initials Name Provider Type    PC Shanel Mendoza, PT Physical Therapist        Physical Therapy Education     Title: PT OT SLP Therapies (In Progress)     Topic: Physical Therapy (In Progress)     Point: Mobility training (Done)     Learning Progress Summary           Patient Acceptance, E,D, DU by PC at 1/7/2019  3:26 PM                               User Key     Initials Effective Dates Name Provider Type Discipline     04/03/18 -  Shanel Mendoza, PT Physical Therapist PT              PT Recommendation and Plan  Anticipated Discharge Disposition (PT): home with assist  Therapy Frequency (PT Clinical Impression): daily  Outcome Summary/Treatment Plan (PT)  Anticipated Discharge Disposition (PT): home with assist  Plan of Care Reviewed With: patient  Outcome Summary: pt presents with general weakness and unsteady gait, she will benefit from PT to address, anticipate that pt will only need a few PT visits, and pt will be safe to return home with family at discharge  Outcome Measures     Row Name 01/07/19 1500             How much help from another person do you currently need...    Turning from your back to your side while in flat bed without using bedrails?  4  -PC      Moving from lying on back to sitting on the side of a flat bed without bedrails?  4  -PC      Moving to and from a bed to a chair (including a wheelchair)?  4  -PC      Standing up from a chair using your arms (e.g., wheelchair, bedside chair)?  4  -PC      Climbing  3-5 steps with a railing?  3  -PC      To walk in hospital room?  3  -PC      AM-PAC 6 Clicks Score  22  -PC         Functional Assessment    Outcome Measure Options  AM-PAC 6 Clicks Basic Mobility (PT)  -PC        User Key  (r) = Recorded By, (t) = Taken By, (c) = Cosigned By    Initials Name Provider Type    PC Shanel Mendoza, PT Physical Therapist         Time Calculation:   PT Charges     Row Name 01/07/19 1529             Time Calculation    Start Time  1504  -PC      Stop Time  1520  -PC      Time Calculation (min)  16 min  -PC      PT Received On  01/07/19  -PC      PT - Next Appointment  01/08/19  -PC      PT Goal Re-Cert Due Date  01/14/19  -PC        User Key  (r) = Recorded By, (t) = Taken By, (c) = Cosigned By    Initials Name Provider Type    PC Shanel Mendoza, PT Physical Therapist        Therapy Suggested Charges     Code   Minutes Charges    None           Therapy Charges for Today     Code Description Service Date Service Provider Modifiers Qty    45503140031 HC PT EVAL LOW COMPLEXITY 2 1/7/2019 Shanel Mendoza, PT GP 1    [id number]  PT THER PROC EA 15 MIN 1/7/2019 Shanel Mendoza, PT GP 1          PT G-Codes  Outcome Measure Options: AM-PAC 6 Clicks Basic Mobility (PT)  AM-PAC 6 Clicks Score: 22      Shanel Mendoza, PT  1/7/2019

## 2019-01-07 NOTE — PROGRESS NOTES
Name: Karli Loomis ADMIT: 2019   : 1940  PCP: Teresa Sorensen MD    MRN: [id number] LOS: 1 days   AGE/SEX: 78 y.o. female  ROOM: [facility]   Subjective   Reports having more productive cough now. Dyspnea improving. Left neck welling isimproving as well. No dysphagia/odynophagia. No NVD. Tolerated PO liquids and would like regular diet. Frequent urination with IVF.    Objective   Vital Signs  Temp:  [97.8 °F (36.6 °C)-98.4 °F (36.9 °C)] 97.8 °F (36.6 °C)  Heart Rate:  [65-83] 67  Resp:  [16-20] 20  BP: (172-195)/(78-95) 195/79  SpO2:  [94 %-97 %] 97 %  on   ;   Device (Oxygen Therapy): room air  Body mass index is 24.46 kg/m².    Physical Exam   Constitutional: She is oriented to person, place, and time. She appears well-developed. No distress.   HENT:   Head: Atraumatic.   Nose: Nose normal.   Eyes: Conjunctivae and EOM are normal.   Neck: Normal range of motion. Neck supple.   Left neck has improved swelling and less erythema   Cardiovascular: Normal rate, regular rhythm and intact distal pulses.   Pulmonary/Chest: Effort normal. She has no wheezes. She has rales (BLF).   Abdominal: Soft. She exhibits no distension. There is no tenderness.   Musculoskeletal: Normal range of motion. She exhibits no edema.   Neurological: She is alert and oriented to person, place, and time.   Skin: Skin is warm and dry. She is not diaphoretic.   Psychiatric: She has a normal mood and affect. Her behavior is normal.   Nursing note and vitals reviewed.      Results Review:       I reviewed the patient's new clinical results.     I reviewed imaging, agree with interpretation.     I reviewed telemetry/EKG results, sinus rhythm.      Results from last 7 days   Lab Units  19   0524  19   0713   WBC 10*3/mm3  4.30*  5.78   HEMOGLOBIN g/dL  12.0  15.8*   PLATELETS 10*3/mm3  166  199     Results from last 7 days   Lab Units  19   0524  19   0725   SODIUM mmol/L  133*  129*   POTASSIUM mmol/L  3.6  3.7    CHLORIDE mmol/L  98  87*   CO2 mmol/L  22.4  30.3*   BUN mg/dL  6*  7*   CREATININE mg/dL  0.57  0.79   GLUCOSE mg/dL  90  116*   Estimated Creatinine Clearance: 61.8 mL/min (by C-G formula based on SCr of 0.57 mg/dL).  Results from last 7 days   Lab Units  01/07/19   0524  01/06/19   0725   CALCIUM mg/dL  8.7  10.2   ALBUMIN g/dL   --   4.20         aspirin 81 mg Oral Daily   azithromycin 500 mg Intravenous Q24H   ceftriaxone 1 g Intravenous Q24H   cetirizine 5 mg Oral Daily   enoxaparin 40 mg Subcutaneous Q24H   famotidine 20 mg Oral Daily   fluticasone 2 spray Each Nare Daily   gabapentin 400 mg Oral TID   ipratropium-albuterol 3 mL Nebulization 4x Daily - RT   letrozole 2.5 mg Oral Daily   levothyroxine 50 mcg Oral Daily   losartan 100 mg Oral Q24H   nitrofurantoin (macrocrystal-monohydrate) 100 mg Oral BID   pantoprazole 40 mg Oral QAM   sodium chloride 3 mL Intravenous Q12H      Diet Regular      Assessment/Plan      Active Hospital Problems    Diagnosis Date Noted   • **Pneumonia due to infectious organism [J18.9] 01/06/2019   • Weakness [R53.1] 01/06/2019   • Sepsis (CMS/HCC) [A41.9] 01/06/2019   • Sialoadenitis [K11.20] 01/06/2019   • UTI due to extended-spectrum beta lactamase (ESBL) producing Escherichia coli [N39.0, B96.29, Z16.12] 01/06/2019   • Nausea & vomiting [R11.2] 01/06/2019   • Orthostatic hypotension [I95.1] 01/06/2019   • Essential hypertension [I10] 01/06/2019   • Airway hyperreactivity [J45.909] 12/14/2017   • 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     - PNA: CT chest confirms PNA. Also has pulmonary nodule present. BCx ngtd. Urinary antigens negative. Continue Azithromycin/Rocpehin. Will need outpatient repeat CT to monitor the nodule.  - Left Submandibular Sialoadenitis: Improved with fluids and abx. Continue to monitor.  - UTI/ESBL: Admission UA negative. Continue NTF to complete her outpatient  course.  - Sepsis: Can stop fluids with stable labs and no hypotension.  - NV: Resolved  - HTN: Resume losartan which is home medication. Will hold the HCTZ component with her hyponatremia on admission. This is improving with IVF so was hypovolemic. Hydralazine PRN.  - Hyperreactive Airway: Continue breathing treatments.  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 Clinic followup.  - Disposition: Home/1-2 days depending on her progression    >35 minutes time spent.    Abebe Barbosa MD  [facility]  01/07/19  12:48 PM

## 2019-01-07 NOTE — PLAN OF CARE
Problem: Patient Care Overview  Goal: Plan of Care Review  Outcome: Ongoing (interventions implemented as appropriate)   01/07/19 2141   Coping/Psychosocial   Plan of Care Reviewed With patient   Plan of Care Review   Progress improving   OTHER   Outcome Summary continue w/ NS at 125, added losartan 100 mg for elevated BP, continue to monitor       Problem: Fall Risk (Adult)  Goal: Identify Related Risk Factors and Signs and Symptoms  Outcome: Outcome(s) achieved Date Met: 01/07/19    Goal: Absence of Fall  Outcome: Ongoing (interventions implemented as appropriate)    Goal: Identify Related Risk Factors and Signs and Symptoms  Outcome: Outcome(s) achieved Date Met: 01/07/19    Goal: Absence of Fall  Outcome: Ongoing (interventions implemented as appropriate)      Problem: Pneumonia (Adult)  Goal: Signs and Symptoms of Listed Potential Problems Will be Absent, Minimized or Managed (Pneumonia)  Outcome: Ongoing (interventions implemented as appropriate)

## 2019-01-07 NOTE — PROGRESS NOTES
Clinical Pharmacy Services: Medication History    Karli Loomis is a 78 y.o. female presenting to [facility] for Sialoadenitis [K11.20]  Weakness [R53.1]  Fall, initial encounter [W19.XXXA]  Pneumonia of both upper lobes due to infectious organism (CMS/[facility]) [J18.1]  Urinary tract infection without hematuria, site unspecified [N39.0]  Acute non-recurrent maxillary sinusitis [J01.00]    She  has a past medical history of Acid reflux, Arthritis, Asthma, Breast cancer in female (CMS/[facility]), Clostridium difficile colitis, Disease of thyroid gland, Epilepsy (CMS/[facility]), H/O Ecchymosis, H/O Heart murmur, H/O transesophageal echocardiography ([facility]UZ), History of transfusion of packed red blood cells, History of Waldenstrom's macroglobulinemia, Hypertension, Leaky heart valve, Measles, Migraine, Mumps, Osteoporosis, Peripheral neuropathy, Seasonal allergies, Seizures (CMS/[facility]), and Thyroid nodule.    Allergies as of 01/06/2019 - Reviewed 01/06/2019   Allergen Reaction Noted   • Cortisone  11/21/2016   • Darvon  [propoxyphene] Palpitations 03/14/2016       Medication information was obtained from: [facility] pharmacy; [facility] mailorder pharmacy and patient with med list  Pharmacy and Phone Number: on PTA med list    Prior to Admission Medications     Prescriptions Last Dose Informant Patient Reported? Taking?    aspirin 81 MG tablet 1/6/2019 Self Yes Yes    Take 81 mg by mouth Daily.    Budesonide (RHINOCORT ALLERGY NA)  Self Yes Yes    2 sprays into the nostril(s) as directed by provider Daily As Needed.    cetirizine (zyrTEC) 10 MG tablet  Self Yes Yes    Take 10 mg by mouth Daily.    famotidine (PEPCID) 20 MG tablet  Self Yes Yes    Take 20 mg by mouth Daily As Needed.    Fluticasone-Umeclidin-Vilant (TRELEGY ELLIPTA) 100-62.5-25 MCG/INH aerosol powder   Self Yes Yes    Inhale 1 each Daily.    gabapentin (NEURONTIN) 400 MG capsule  Self Yes Yes    Take 400 mg by mouth 4 (Four) Times a Day.    hydrocortisone 2.5 % cream    Yes Yes    Apply  topically 2 (Two) Times a Day.    lansoprazole (PREVACID) 30 MG capsule  Self Yes Yes    Take 30 mg by mouth Daily As Needed.    letrozole (FEMARA) 2.5 MG tablet  Pharmacy Yes Yes    Take 2.5 mg by mouth Daily.    levalbuterol (XOPENEX) 0.63 MG/3ML nebulizer solution  Pharmacy Yes Yes    Take 1 ampule by nebulization Every 4 (Four) Hours As Needed for Wheezing.    levothyroxine (SYNTHROID, LEVOTHROID) 50 MCG tablet 1/6/2019  No Yes    TAKE 1 TABLET EVERY DAY    losartan-hydrochlorothiazide (HYZAAR) 100-25 MG per tablet  Pharmacy Yes Yes    Take 1 tablet by mouth Daily.    nitrofurantoin (MACRODANTIN) 100 MG capsule 1/5/2019 Self Yes Yes    Take 100 mg by mouth 2 (Two) Times a Day.    potassium chloride (KLOR-CON) 20 MEQ CR tablet  Pharmacy Yes Yes    Take 20 mEq by mouth Daily.            Medication notes:       This medication list is complete to the best of my knowledge as of 1/7/2019    Please call if questions.    Syl Walsh, Pharm.D., [facility]    1/7/2019 3:01 PM

## 2019-01-07 NOTE — PLAN OF CARE
Problem: Patient Care Overview  Goal: Plan of Care Review  Outcome: Ongoing (interventions implemented as appropriate)   01/07/19 0459   Coping/Psychosocial   Plan of Care Reviewed With patient   Plan of Care Review   Progress improving   OTHER   Outcome Summary Pt without c/o this shift. Resting well except to got to BSC. BP elevated but much lower when taken manually. No b/p or sticks on left side. Will continue to monitor.     Goal: Individualization and Mutuality  Outcome: Ongoing (interventions implemented as appropriate)    Goal: Discharge Needs Assessment  Outcome: Ongoing (interventions implemented as appropriate)      Problem: Fall Risk (Adult)  Goal: Absence of Fall  Outcome: Ongoing (interventions implemented as appropriate)    Goal: Identify Related Risk Factors and Signs and Symptoms  Outcome: Ongoing (interventions implemented as appropriate)    Goal: Absence of Fall  Outcome: Ongoing (interventions implemented as appropriate)      Problem: Pneumonia (Adult)  Goal: Signs and Symptoms of Listed Potential Problems Will be Absent, Minimized or Managed (Pneumonia)  Outcome: Ongoing (interventions implemented as appropriate)

## 2019-01-08 LAB
ANION GAP SERPL CALCULATED.3IONS-SCNC: 12.2 MMOL/L
BASOPHILS # BLD AUTO: 0.02 10*3/MM3 (ref 0–0.2)
BASOPHILS NFR BLD AUTO: 0.3 % (ref 0–1.5)
BUN BLD-MCNC: 4 MG/DL (ref 8–23)
BUN/CREAT SERPL: 8.9 (ref 7–25)
CALCIUM SPEC-SCNC: 9 MG/DL (ref 8.6–10.5)
CHLORIDE SERPL-SCNC: 95 MMOL/L (ref 98–107)
CO2 SERPL-SCNC: 23.8 MMOL/L (ref 22–29)
CREAT BLD-MCNC: 0.45 MG/DL (ref 0.57–1)
DEPRECATED RDW RBC AUTO: 42.3 FL (ref 37–54)
EOSINOPHIL # BLD AUTO: 0.37 10*3/MM3 (ref 0–0.7)
EOSINOPHIL NFR BLD AUTO: 6.3 % (ref 0.3–6.2)
ERYTHROCYTE [DISTWIDTH] IN BLOOD BY AUTOMATED COUNT: 12.8 % (ref 11.7–13)
GFR SERPL CREATININE-BSD FRML MDRD: 134 ML/MIN/1.73
GLUCOSE BLD-MCNC: 104 MG/DL (ref 65–99)
HCT VFR BLD AUTO: 35.5 % (ref 35.6–45.5)
HGB BLD-MCNC: 12.5 G/DL (ref 11.9–15.5)
IMM GRANULOCYTES # BLD AUTO: 0.04 10*3/MM3 (ref 0–0.03)
IMM GRANULOCYTES NFR BLD AUTO: 0.7 % (ref 0–0.5)
LYMPHOCYTES # BLD AUTO: 1.29 10*3/MM3 (ref 0.9–4.8)
LYMPHOCYTES NFR BLD AUTO: 21.8 % (ref 19.6–45.3)
MAGNESIUM SERPL-MCNC: 1.6 MG/DL (ref 1.6–2.4)
MCH RBC QN AUTO: 31.6 PG (ref 26.9–32)
MCHC RBC AUTO-ENTMCNC: 35.2 G/DL (ref 32.4–36.3)
MCV RBC AUTO: 89.9 FL (ref 80.5–98.2)
MONOCYTES # BLD AUTO: 0.55 10*3/MM3 (ref 0.2–1.2)
MONOCYTES NFR BLD AUTO: 9.3 % (ref 5–12)
NEUTROPHILS # BLD AUTO: 3.68 10*3/MM3 (ref 1.9–8.1)
NEUTROPHILS NFR BLD AUTO: 62.3 % (ref 42.7–76)
PLATELET # BLD AUTO: 189 10*3/MM3 (ref 140–500)
PMV BLD AUTO: 8.9 FL (ref 6–12)
POTASSIUM BLD-SCNC: 2.7 MMOL/L (ref 3.5–5.2)
RBC # BLD AUTO: 3.95 10*6/MM3 (ref 3.9–5.2)
SODIUM BLD-SCNC: 131 MMOL/L (ref 136–145)
WBC NRBC COR # BLD: 5.91 10*3/MM3 (ref 4.5–10.7)

## 2019-01-08 PROCEDURE — 84132 ASSAY OF SERUM POTASSIUM: CPT | Performed by: INTERNAL MEDICINE

## 2019-01-08 PROCEDURE — 25010000002 HYDRALAZINE PER 20 MG: Performed by: INTERNAL MEDICINE

## 2019-01-08 PROCEDURE — 85025 COMPLETE CBC W/AUTO DIFF WBC: CPT | Performed by: INTERNAL MEDICINE

## 2019-01-08 PROCEDURE — 25010000002 MAGNESIUM SULFATE IN D5W 1G/100ML (PREMIX) 1-5 GM/100ML-% SOLUTION: Performed by: INTERNAL MEDICINE

## 2019-01-08 PROCEDURE — 25010000002 CEFTRIAXONE PER 250 MG: Performed by: INTERNAL MEDICINE

## 2019-01-08 PROCEDURE — 94799 UNLISTED PULMONARY SVC/PX: CPT

## 2019-01-08 PROCEDURE — 25010000002 AZITHROMYCIN PER 500 MG: Performed by: INTERNAL MEDICINE

## 2019-01-08 PROCEDURE — 97110 THERAPEUTIC EXERCISES: CPT

## 2019-01-08 PROCEDURE — 83735 ASSAY OF MAGNESIUM: CPT | Performed by: INTERNAL MEDICINE

## 2019-01-08 PROCEDURE — 97535 SELF CARE MNGMENT TRAINING: CPT

## 2019-01-08 PROCEDURE — 80048 BASIC METABOLIC PNL TOTAL CA: CPT | Performed by: INTERNAL MEDICINE

## 2019-01-08 PROCEDURE — 25010000002 ENOXAPARIN PER 10 MG: Performed by: INTERNAL MEDICINE

## 2019-01-08 RX ORDER — HYDROCHLOROTHIAZIDE 25 MG/1
25 TABLET ORAL DAILY
Status: DISCONTINUED | OUTPATIENT
Start: 2019-01-08 | End: 2019-01-10

## 2019-01-08 RX ORDER — POTASSIUM CHLORIDE 7.45 MG/ML
10 INJECTION INTRAVENOUS
Status: DISCONTINUED | OUTPATIENT
Start: 2019-01-08 | End: 2019-01-11 | Stop reason: HOSPADM

## 2019-01-08 RX ORDER — GABAPENTIN 400 MG/1
400 CAPSULE ORAL EVERY 8 HOURS SCHEDULED
Status: DISCONTINUED | OUTPATIENT
Start: 2019-01-08 | End: 2019-01-08

## 2019-01-08 RX ORDER — HYDRALAZINE HYDROCHLORIDE 20 MG/ML
20 INJECTION INTRAMUSCULAR; INTRAVENOUS ONCE
Status: COMPLETED | OUTPATIENT
Start: 2019-01-08 | End: 2019-01-08

## 2019-01-08 RX ORDER — MAGNESIUM SULFATE 1 G/100ML
1 INJECTION INTRAVENOUS ONCE
Status: COMPLETED | OUTPATIENT
Start: 2019-01-08 | End: 2019-01-08

## 2019-01-08 RX ORDER — LABETALOL HYDROCHLORIDE 5 MG/ML
20 INJECTION, SOLUTION INTRAVENOUS EVERY 4 HOURS PRN
Status: DISCONTINUED | OUTPATIENT
Start: 2019-01-08 | End: 2019-01-11 | Stop reason: HOSPADM

## 2019-01-08 RX ORDER — POTASSIUM CHLORIDE 750 MG/1
40 CAPSULE, EXTENDED RELEASE ORAL AS NEEDED
Status: DISCONTINUED | OUTPATIENT
Start: 2019-01-08 | End: 2019-01-11 | Stop reason: HOSPADM

## 2019-01-08 RX ORDER — POTASSIUM CHLORIDE 1.5 G/1.77G
40 POWDER, FOR SOLUTION ORAL AS NEEDED
Status: DISCONTINUED | OUTPATIENT
Start: 2019-01-08 | End: 2019-01-11 | Stop reason: HOSPADM

## 2019-01-08 RX ADMIN — IPRATROPIUM BROMIDE AND ALBUTEROL SULFATE 3 ML: 2.5; .5 SOLUTION RESPIRATORY (INHALATION) at 10:51

## 2019-01-08 RX ADMIN — POTASSIUM CHLORIDE 40 MEQ: 750 CAPSULE, EXTENDED RELEASE ORAL at 19:31

## 2019-01-08 RX ADMIN — CETIRIZINE HYDROCHLORIDE 5 MG: 10 TABLET, FILM COATED ORAL at 08:18

## 2019-01-08 RX ADMIN — IPRATROPIUM BROMIDE AND ALBUTEROL SULFATE 3 ML: 2.5; .5 SOLUTION RESPIRATORY (INHALATION) at 19:16

## 2019-01-08 RX ADMIN — HYDROCHLOROTHIAZIDE 25 MG: 25 TABLET ORAL at 11:37

## 2019-01-08 RX ADMIN — SODIUM CHLORIDE, PRESERVATIVE FREE 3 ML: 5 INJECTION INTRAVENOUS at 20:50

## 2019-01-08 RX ADMIN — NITROFURANTOIN MONOHYDRATE/MACROCRYSTALLINE 100 MG: 25; 75 CAPSULE ORAL at 20:50

## 2019-01-08 RX ADMIN — LABETALOL HYDROCHLORIDE 20 MG: 5 INJECTION, SOLUTION INTRAVENOUS at 16:34

## 2019-01-08 RX ADMIN — IPRATROPIUM BROMIDE AND ALBUTEROL SULFATE 3 ML: 2.5; .5 SOLUTION RESPIRATORY (INHALATION) at 14:41

## 2019-01-08 RX ADMIN — POTASSIUM CHLORIDE 40 MEQ: 750 CAPSULE, EXTENDED RELEASE ORAL at 08:18

## 2019-01-08 RX ADMIN — ENOXAPARIN SODIUM 40 MG: 40 INJECTION SUBCUTANEOUS at 13:30

## 2019-01-08 RX ADMIN — HYDRALAZINE HYDROCHLORIDE 25 MG: 25 TABLET, FILM COATED ORAL at 13:01

## 2019-01-08 RX ADMIN — HYDRALAZINE HYDROCHLORIDE 25 MG: 25 TABLET, FILM COATED ORAL at 02:01

## 2019-01-08 RX ADMIN — POTASSIUM CHLORIDE 40 MEQ: 750 CAPSULE, EXTENDED RELEASE ORAL at 13:35

## 2019-01-08 RX ADMIN — ASPIRIN 81 MG: 81 TABLET, DELAYED RELEASE ORAL at 08:18

## 2019-01-08 RX ADMIN — HYDRALAZINE HYDROCHLORIDE 20 MG: 20 INJECTION INTRAMUSCULAR; INTRAVENOUS at 18:28

## 2019-01-08 RX ADMIN — GABAPENTIN 400 MG: 400 CAPSULE ORAL at 20:50

## 2019-01-08 RX ADMIN — PANTOPRAZOLE SODIUM 40 MG: 40 TABLET, DELAYED RELEASE ORAL at 06:25

## 2019-01-08 RX ADMIN — ACETAMINOPHEN 650 MG: 325 TABLET, FILM COATED ORAL at 20:50

## 2019-01-08 RX ADMIN — GABAPENTIN 400 MG: 400 CAPSULE ORAL at 08:18

## 2019-01-08 RX ADMIN — AZITHROMYCIN MONOHYDRATE 500 MG: 500 INJECTION, POWDER, LYOPHILIZED, FOR SOLUTION INTRAVENOUS at 11:35

## 2019-01-08 RX ADMIN — LETROZOLE 2.5 MG: 2.5 TABLET ORAL at 08:18

## 2019-01-08 RX ADMIN — CEFTRIAXONE SODIUM 1 G: 1 INJECTION, SOLUTION INTRAVENOUS at 08:18

## 2019-01-08 RX ADMIN — LOSARTAN POTASSIUM 100 MG: 100 TABLET, FILM COATED ORAL at 08:18

## 2019-01-08 RX ADMIN — MAGNESIUM SULFATE HEPTAHYDRATE 1 G: 1 INJECTION, SOLUTION INTRAVENOUS at 13:30

## 2019-01-08 RX ADMIN — LEVOTHYROXINE SODIUM 50 MCG: 50 TABLET ORAL at 08:18

## 2019-01-08 RX ADMIN — IPRATROPIUM BROMIDE AND ALBUTEROL SULFATE 3 ML: 2.5; .5 SOLUTION RESPIRATORY (INHALATION) at 06:51

## 2019-01-08 RX ADMIN — NITROFURANTOIN MONOHYDRATE/MACROCRYSTALLINE 100 MG: 25; 75 CAPSULE ORAL at 08:18

## 2019-01-08 NOTE — PLAN OF CARE
Problem: Patient Care Overview  Goal: Plan of Care Review  Outcome: Ongoing (interventions implemented as appropriate)   01/08/19 0340   Coping/Psychosocial   Plan of Care Reviewed With patient   Plan of Care Review   Progress improving   OTHER   Outcome Summary B/P remains elevated, but less after 2nd dose of hydralazine po. Resting well. Up to BSC less often since IV fluids DC'd this shift.Will continue to monitor.     Goal: Individualization and Mutuality  Outcome: Ongoing (interventions implemented as appropriate)    Goal: Discharge Needs Assessment  Outcome: Ongoing (interventions implemented as appropriate)      Problem: Fall Risk (Adult)  Goal: Absence of Fall  Outcome: Ongoing (interventions implemented as appropriate)      Problem: Pneumonia (Adult)  Goal: Signs and Symptoms of Listed Potential Problems Will be Absent, Minimized or Managed (Pneumonia)  Outcome: Ongoing (interventions implemented as appropriate)

## 2019-01-08 NOTE — THERAPY TREATMENT NOTE
Acute Care - Physical Therapy Treatment Note  [facility]     Patient Name: Karli Loomis  : 1940  MRN: [id number]  Today's Date: 2019  Onset of Illness/Injury or Date of Surgery: 19  Date of Referral to PT: 19  Referring Physician: Familia    Admit Date: 2019    Visit Dx:    ICD-10-CM ICD-9-CM   1. Weakness R53.1 780.79   2. Fall, initial encounter W19.XXXA E888.9   3. Sialoadenitis K11.20 527.2   4. Acute non-recurrent maxillary sinusitis J01.00 461.0   5. Pneumonia of both upper lobes due to infectious organism (CMS/HCC) J18.1 483.8   6. Urinary tract infection without hematuria, site unspecified- treated N39.0 599.0   7. Wheezing R06.2 786.07   8. Moderate persistent asthma with exacerbation J45.41 493.92   9. Cough R05 786.2   10. Impaired functional mobility and activity tolerance Z74.09 V49.89     Patient Active Problem List   Diagnosis   • Malignant neoplasm of overlapping sites of left female breast (CMS/HCC)   • Macroglobulinemia of Waldenstrom (CMS/HCC)   • Hypercalcemia   • Airway hyperreactivity   • BP (high blood pressure)   • Adult hypothyroidism   • Primary osteoarthritis of left knee   • Disorder of peripheral nervous system   • Epilepsy with simple partial seizures (CMS/HCC)   • Headache, migraine   • Hyponatremia   • Chronic pansinusitis   • TIA (transient ischemic attack)   • Weakness   • Pneumonia due to infectious organism   • Sepsis (CMS/HCC)   • Sialoadenitis   • UTI due to extended-spectrum beta lactamase (ESBL) producing Escherichia coli   • Nausea & vomiting   • Orthostatic hypotension   • Essential hypertension       Therapy Treatment    Rehabilitation Treatment Summary     Row Name 19 0950             Treatment Time/Intention    Discipline  physical therapy assistant  -SM      Document Type  therapy note (daily note)  -SM      Subjective Information  no complaints  -SM      Mode of Treatment  physical therapy  -SM      Patient Effort  excellent  -SM       Existing Precautions/Restrictions  fall  -SM      Recorded by [] Vita Pretty, PTA 01/08/19 1019      Row Name 01/08/19 0950             Cognitive Assessment/Intervention    Additional Documentation  Cognitive Assessment/Intervention (Group)  -SM      Recorded by [] Vita Pretty, [facility] 01/08/19 1019      Row Name 01/08/19 0950             Cognitive Assessment/Intervention- PT/OT    Orientation Status (Cognition)  oriented x 4  -SM      Follows Commands (Cognition)  WNL  -SM      Personal Safety Interventions  fall prevention program maintained;gait belt;nonskid shoes/slippers when out of bed  -SM      Recorded by [SM] Vita Pretty, [facility] 01/08/19 1019      Row Name 01/08/19 0950             Bed Mobility Assessment/Treatment    Bed Mobility Assessment/Treatment  supine-sit;sit-supine  -SM      Supine-Sit [place] (Bed Mobility)  independent  -SM      Sit-Supine [place] (Bed Mobility)  independent  -SM      Recorded by [] Vita Pretty, [facility] 01/08/19 1019      Row Name 01/08/19 0950             Transfer Assessment/Treatment    Transfer Assessment/Treatment  sit-stand transfer;stand-sit transfer  -SM      Recorded by [] Vita Pretty, [facility] 01/08/19 1019      Row Name 01/08/19 0950             Sit-Stand Transfer    Sit-Stand [place] (Transfers)  stand by assist  -      Recorded by [] Vita Pretty, [facility] 01/08/19 1019      Row Name 01/08/19 0950             Stand-Sit Transfer    Stand-Sit [place] (Transfers)  stand by assist  -      Recorded by [] Vita Pretty, [facility] 01/08/19 1019      Row Name 01/08/19 0950             Gait/Stairs Assessment/Training    [place] Level (Gait)  contact guard;stand by assist  -      Distance in Feet (Gait)  300  -SM      Pattern (Gait)  step-through  -SM      Recorded by [] Vita Pretty, [facility] 01/08/19 1019      Row Name 01/08/19 0950             Positioning and Restraints    Pre-Treatment Position  in  bed  -SM      Post Treatment Position  bed  -SM      In Bed  supine;call light within reach;encouraged to call for assist  -SM      Recorded by [] Vita Pretty Chana, SPENCER 01/08/19 1019      Row Name 01/08/19 0950             Pain Scale: Numbers Pre/Post-Treatment    Pain Scale: Numbers, Pretreatment  0/10 - no pain  -SM      Pain Scale: Numbers, Post-Treatment  0/10 - no pain  -SM      Recorded by [] Vita PrettySPENCER 01/08/19 1019        User Key  (r) = Recorded By, (t) = Taken By, (c) = Cosigned By    Initials Name Effective Dates Discipline     Vita Pretty, SPENCER 03/07/18 -  PT                   Physical Therapy Education     Title: PT OT SLP Therapies (In Progress)     Topic: Physical Therapy (In Progress)     Point: Mobility training (Done)     Learning Progress Summary           Patient Acceptance, E,TB, VU,DU by  at 1/8/2019 10:20 AM    Acceptance, E,D, DU by  at 1/7/2019  3:26 PM                               User Key     Initials Effective Dates Name Provider Type Discipline     04/03/18 -  Shanel Mendoza PT Physical Therapist PT     03/07/18 -  Vita Pretty PTA Physical Therapy Assistant PT                PT Recommendation and Plan     Plan of Care Reviewed With: patient  Progress: improving  Outcome Summary: Pt tolerated treatment well this date. Ambulated 300ft w/ CGA-SBA. No complaints. PT will continue to address functional mobility deficits as tolerated.  Outcome Measures     Row Name 01/08/19 1000 01/07/19 1600 01/07/19 1500       How much help from another person do you currently need...    Turning from your back to your side while in flat bed without using bedrails?  4  -SM  --  4  -PC    Moving from lying on back to sitting on the side of a flat bed without bedrails?  4  -SM  --  4  -PC    Moving to and from a bed to a chair (including a wheelchair)?  4  -SM  --  4  -PC    Standing up from a chair using your arms (e.g., wheelchair, bedside chair)?  4  -SM  --   4  -PC    Climbing 3-5 steps with a railing?  3  -SM  --  3  -PC    To walk in hospital room?  3  -SM  --  3  -PC    AM-PAC 6 Clicks Score  22  -SM  --  22  -PC       How much help from another is currently needed...    Putting on and taking off regular lower body clothing?  --  3  -MR  --    Bathing (including washing, rinsing, and drying)  --  3  -MR  --    Toileting (which includes using toilet bed pan or urinal)  --  3  -MR  --    Putting on and taking off regular upper body clothing  --  3  -MR  --    Taking care of personal grooming (such as brushing teeth)  --  4  -MR  --    Eating meals  --  4  -MR  --    Score  --  20  -MR  --       Functional Assessment    Outcome Measure Options  AM-PAC 6 Clicks Basic Mobility (PT)  -  AM-PAC 6 Clicks Daily Activity (OT)  -  AM-PAC 6 Clicks Basic Mobility (PT)  -PC      User Key  (r) = Recorded By, (t) = Taken By, (c) = Cosigned By    Initials Name Provider Type    PC Shanel Mendoza, PT Physical Therapist    Sera Bar, OT Occupational Therapist    Vita Lu PTA Physical Therapy Assistant         Time Calculation:   PT Charges     Row Name 01/08/19 1021             Time Calculation    Start Time  0950  -      Stop Time  1005  -      Time Calculation (min)  15 min  -      PT Received On  01/08/19  -      PT - Next Appointment  01/09/19  -        User Key  (r) = Recorded By, (t) = Taken By, (c) = Cosigned By    Initials Name Provider Type     Vita Pretty PTA Physical Therapy Assistant        Therapy Suggested Charges     Code   Minutes Charges    None           Therapy Charges for Today     Code Description Service Date Service Provider Modifiers Qty    03027603577 HC PT THER PROC EA 15 MIN 1/8/2019 Vita Pretty PTA GP 1          PT G-Codes  Outcome Measure Options: AM-PAC 6 Clicks Basic Mobility (PT)  AM-PAC 6 Clicks Score: 22  Score: 20    Vita Pretty PTA  1/8/2019

## 2019-01-08 NOTE — PLAN OF CARE
Problem: Patient Care Overview  Goal: Plan of Care Review   01/08/19 1327   Coping/Psychosocial   Plan of Care Reviewed With patient   OTHER   Outcome Summary Pt seen for OT session this am, able to perform grooming ADLs standing at sink with supervision.

## 2019-01-08 NOTE — PROGRESS NOTES
Name: Karli Loomis ADMIT: 2019   : 1940  PCP: Teresa Sorensen MD    MRN: [id number] LOS: 2 days   AGE/SEX: 78 y.o. female  ROOM: [facility]   Subjective   No CP SOA NVD. Reports swelling in neck continues to improve. Discussed BP and potassium level with her and plan.    Objective   Vital Signs  Temp:  [98.1 °F (36.7 °C)-98.5 °F (36.9 °C)] 98.1 °F (36.7 °C)  Heart Rate:  [67-87] 87  Resp:  [18-20] 18  BP: (184-200)/(81-98) 184/81  SpO2:  [95 %-97 %] 95 %  on   ;   Device (Oxygen Therapy): room air  Body mass index is 24.46 kg/m².    Physical Exam   Constitutional: She is oriented to person, place, and time. She appears well-developed. No distress.   HENT:   Head: Atraumatic.   Nose: Nose normal.   Eyes: Conjunctivae and EOM are normal.   Neck: Normal range of motion. Neck supple.   Left neck swelling improving, erythema resolved   Cardiovascular: Normal rate, regular rhythm and intact distal pulses.   Pulmonary/Chest: Effort normal. She has no wheezes. She has rales (BLF (mild)).   Abdominal: Soft. She exhibits no distension. There is no tenderness.   Musculoskeletal: Normal range of motion. She exhibits no edema.   Neurological: She is alert and oriented to person, place, and time.   Skin: Skin is warm and dry. She is not diaphoretic.   Psychiatric: She has a normal mood and affect. Her behavior is normal.   Nursing note and vitals reviewed.      Results Review:       I reviewed the patient's new clinical results.     I reviewed telemetry/EKG results, sinus rhythm.      Results from last 7 days   Lab Units  19   0430  19   0524  19   0713   WBC 10*3/mm3  5.91  4.30*  5.78   HEMOGLOBIN g/dL  12.5  12.0  15.8*   PLATELETS 10*3/mm3  189  166  199     Results from last 7 days   Lab Units  19   0430  19   0524  19   0725   SODIUM mmol/L  131*  133*  129*   POTASSIUM mmol/L  2.7*  3.6  3.7   CHLORIDE mmol/L  95*  98  87*   CO2 mmol/L  23.8  22.4  30.3*   BUN mg/dL  4*  6*   7*   CREATININE mg/dL  0.45*  0.57  0.79   GLUCOSE mg/dL  104*  90  116*   Estimated Creatinine Clearance: 61.8 mL/min (A) (by C-G formula based on SCr of 0.45 mg/dL (L)).  Results from last 7 days   Lab Units  01/08/19   0430  01/07/19   0524  01/06/19   0725   CALCIUM mg/dL  9.0  8.7  10.2   ALBUMIN g/dL   --    --   4.20   MAGNESIUM mg/dL  1.6   --    --          aspirin 81 mg Oral Daily   azithromycin 500 mg Intravenous Q24H   ceftriaxone 1 g Intravenous Q24H   cetirizine 5 mg Oral Daily   enoxaparin 40 mg Subcutaneous Q24H   famotidine 20 mg Oral Daily   fluticasone 2 spray Each Nare Daily   gabapentin 400 mg Oral TID   gabapentin 400 mg Oral Q8H   Hydrochlorothiazide Oral 25 mg Oral Daily   ipratropium-albuterol 3 mL Nebulization 4x Daily - RT   letrozole 2.5 mg Oral Daily   levothyroxine 50 mcg Oral Daily   losartan 100 mg Oral Q24H   magnesium sulfate 1 g Intravenous Once   nitrofurantoin (macrocrystal-monohydrate) 100 mg Oral BID   pantoprazole 40 mg Oral QAM   sodium chloride 3 mL Intravenous Q12H      Diet Regular      Assessment/Plan      Active Hospital Problems    Diagnosis Date Noted   • **Pneumonia due to infectious organism [J18.9] 01/06/2019   • Weakness [R53.1] 01/06/2019   • Sepsis (CMS/HCC) [A41.9] 01/06/2019   • Sialoadenitis [K11.20] 01/06/2019   • UTI due to extended-spectrum beta lactamase (ESBL) producing Escherichia coli [N39.0, B96.29, Z16.12] 01/06/2019   • Nausea & vomiting [R11.2] 01/06/2019   • Orthostatic hypotension [I95.1] 01/06/2019   • Essential hypertension [I10] 01/06/2019   • Airway hyperreactivity [J45.909] 12/14/2017   • 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     - PNA: CT chest confirms PNA. Also has pulmonary nodule present. BCx ngtd.  Continue Azithromycin/Rocpehin. Will need outpatient repeat CT to monitor the nodule.  - Left Submandibular Sialoadenitis: Resolving.  - UTI/ESBL:  Admission UA negative. Continue NTF to complete her outpatient course.  - Sepsis: Resolved.  - NV: Resolved  - HTN: Still poorly controlled. Continue losartan. Resume HCTZ. Hydralazine and labatalol prn. Repeat BMP in morning.  - Hyperreactive Airway: Continue breathing treatments.  - Hypokalemia: Mag level is a bit low as well. Replacements ordered. Repeat labs in morning to monitor.  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 Clinic followup.  - Disposition: Home/possibly tomorrow    Abebe Barbosa MD  [facility]  01/08/19  10:40 AM

## 2019-01-08 NOTE — THERAPY TREATMENT NOTE
Acute Care - Occupational Therapy Treatment Note  [facility]     Patient Name: Karli Loomis  : 1940  MRN: [id number]  Today's Date: 2019  Onset of Illness/Injury or Date of Surgery: 19     Referring Physician: Familia    Admit Date: 2019       ICD-10-CM ICD-9-CM   1. Weakness R53.1 780.79   2. Fall, initial encounter W19.XXXA E888.9   3. Sialoadenitis K11.20 527.2   4. Acute non-recurrent maxillary sinusitis J01.00 461.0   5. Pneumonia of both upper lobes due to infectious organism (CMS/HCC) J18.1 483.8   6. Urinary tract infection without hematuria, site unspecified- treated N39.0 599.0   7. Wheezing R06.2 786.07   8. Moderate persistent asthma with exacerbation J45.41 493.92   9. Cough R05 786.2   10. Impaired functional mobility and activity tolerance Z74.09 V49.89     Patient Active Problem List   Diagnosis   • Malignant neoplasm of overlapping sites of left female breast (CMS/HCC)   • Macroglobulinemia of Waldenstrom (CMS/HCC)   • Hypercalcemia   • Airway hyperreactivity   • BP (high blood pressure)   • Adult hypothyroidism   • Primary osteoarthritis of left knee   • Disorder of peripheral nervous system   • Epilepsy with simple partial seizures (CMS/HCC)   • Headache, migraine   • Hyponatremia   • Chronic pansinusitis   • TIA (transient ischemic attack)   • Weakness   • Pneumonia due to infectious organism   • Sepsis (CMS/HCC)   • Sialoadenitis   • UTI due to extended-spectrum beta lactamase (ESBL) producing Escherichia coli   • Nausea & vomiting   • Orthostatic hypotension   • Essential hypertension     Past Medical History:   Diagnosis Date   • Acid reflux    • Arthritis    • Asthma    • Breast cancer in female (CMS/HCC)     Left   • Clostridium difficile colitis     Requiring admission to [facility] in 2008   • Disease of thyroid gland     HYPOTHROIDISM   • Epilepsy (CMS/HCC)    • H/O Ecchymosis     Extensive, and hematoma formation in the whole right lower  "extremity requiring transfusion of red cells for a hemoglobin of 7.   • H/O Heart murmur     \"MANY YEARS AGO\" NO TX, NO SYMPTOMS   • H/O transesophageal echocardiography (MARICRUZ)    • History of transfusion of packed red blood cells     R/T SURGERY   • History of Waldenstrom's macroglobulinemia    • Hypertension    • Leaky heart valve    • Measles    • Migraine    • Mumps    • Osteoporosis    • Peripheral neuropathy    • Seasonal allergies    • Seizures (CMS/HCC)    • Thyroid nodule     Removed more than 35 years ago     Past Surgical History:   Procedure Laterality Date   • ADENOIDECTOMY     • APPENDECTOMY     • CATARACT EXTRACTION Bilateral    • CHOLECYSTECTOMY     • COLONOSCOPY     • HYSTERECTOMY      Partial, many years ago, heavy bleeding, no cancer   • KNEE ARTHROSCOPY Right 03/2009    Finding of chondromalacia and appropriate cleanup of joint was performed by Dr. Moraes.   • MASTECTOMY Left 07/2014   • REPLACEMENT TOTAL KNEE Right 12/2015   • TONSILLECTOMY         Therapy Treatment    Rehabilitation Treatment Summary     Row Name 01/08/19 1200 01/08/19 0950          Treatment Time/Intention    Discipline  occupational therapist  -  physical therapy assistant  -SM     Document Type  therapy note (daily note)  -MR  therapy note (daily note)  -     Subjective Information  --  no complaints  -SM     Mode of Treatment  occupational therapy  -  physical therapy  -SM     Patient Effort  good  -MR  excellent  -SM     Existing Precautions/Restrictions  fall  -MR  fall  -SM     Recorded by [MR] Sera Cavazos, OT 01/08/19 1257 [SM] Vita Pretty, PTA 01/08/19 1019     Row Name 01/08/19 0950             Cognitive Assessment/Intervention    Additional Documentation  Cognitive Assessment/Intervention (Group)  -SM      Recorded by [SM] Vita Pretty, PTA 01/08/19 1019      Row Name 01/08/19 1200 01/08/19 0950          Cognitive Assessment/Intervention- PT/OT    Orientation Status (Cognition)  oriented x " 3  -MR  oriented x 4  -SM     Follows Commands (Cognition)  WFL  -MR  WNL  -SM     Personal Safety Interventions  fall prevention program maintained  -MR  fall prevention program maintained;gait belt;nonskid shoes/slippers when out of bed  -SM     Recorded by [MR] Sera Cavazos, OT 01/08/19 1257 [SM] Vita Pretty, PTA 01/08/19 1019     Row Name 01/08/19 1200 01/08/19 0950          Bed Mobility Assessment/Treatment    Bed Mobility Assessment/Treatment  supine-sit;sit-supine  -MR  supine-sit;sit-supine  -SM     Supine-Sit Geary (Bed Mobility)  independent  -MR  independent  -SM     Sit-Supine Geary (Bed Mobility)  independent  -MR  independent  -SM     Recorded by [MR] Sera Cavazoss, OT 01/08/19 1257 [SM] Vita Pretty, PTA 01/08/19 1019     Row Name 01/08/19 1200             Functional Mobility    Functional Mobility- Ind. Level  contact guard assist;supervision required  -MR      Functional Mobility- Comment  pt performs functional mobility to the sink for ADLs  -MR      Recorded by [MR] Sera Cavazoss, OT 01/08/19 1257      Row Name 01/08/19 1200 01/08/19 0950          Transfer Assessment/Treatment    Transfer Assessment/Treatment  sit-stand transfer;stand-sit transfer  -MR  sit-stand transfer;stand-sit transfer  -     Recorded by [MR] Sera Cavazoss, OT 01/08/19 1257 [SM] Vita Pretty, PTA 01/08/19 1019     Row Name 01/08/19 1200 01/08/19 0950          Sit-Stand Transfer    Sit-Stand Geary (Transfers)  stand by assist  -MR  stand by assist  -SM     Recorded by [MR] Sera Cavazoss, OT 01/08/19 1257 [SM] Vita Pretty, PTA 01/08/19 1019     Row Name 01/08/19 1200 01/08/19 0950          Stand-Sit Transfer    Stand-Sit Geary (Transfers)  stand by assist  -MR  stand by assist  -     Recorded by [MR] Sera Cavazoss, OT 01/08/19 1257 [SM] Vita Pretty, PTA 01/08/19 1019     Row Name 01/08/19 0950             Gait/Stairs  Assessment/Training    Blaine Level (Gait)  contact guard;stand by assist  -SM      Distance in Feet (Gait)  300  -SM      Pattern (Gait)  step-through  -SM      Recorded by [SM] Veronica Prettyah Chana, PTA 01/08/19 1019      Row Name 01/08/19 1200             ADL Assessment/Intervention    BADL Assessment/Intervention  grooming  -MR      Recorded by [MR] Sera Cavazos, OT 01/08/19 1257      Row Name 01/08/19 1200             Grooming Assessment/Training    Blaine Level (Grooming)  set up;supervision;grooming skills  -MR      Grooming Position  unsupported standing;sink side  -MR      Comment (Grooming)  pt completes grooming ADLs standing at sink  -MR      Recorded by [MR] Sera Cavazos, OT 01/08/19 1257      Row Name 01/08/19 1200 01/08/19 0950          Positioning and Restraints    Pre-Treatment Position  in bed  -MR  in bed  -SM     Post Treatment Position  bed  -MR  bed  -SM     In Bed  supine;notified nsg;call light within reach;encouraged to call for assist  -MR  supine;call light within reach;encouraged to call for assist  -SM     Recorded by [MR] Sera Cavazos, OT 01/08/19 1257 [SM] Veronica Prettyah Chana, PTA 01/08/19 1019     Row Name 01/08/19 1200 01/08/19 0950          Pain Scale: Numbers Pre/Post-Treatment    Pain Scale: Numbers, Pretreatment  0/10 - no pain  -MR  0/10 - no pain  -SM     Pain Scale: Numbers, Post-Treatment  0/10 - no pain  -MR  0/10 - no pain  -SM     Recorded by [MR] Sera Cavazos, OT 01/08/19 1257 [SM] Vita Pretty, [facility] 01/08/19 1019       User Key  (r) = Recorded By, (t) = Taken By, (c) = Cosigned By    Initials Name Effective Dates Discipline    Sera Bar, OT 06/22/16 -  OT    SM Veronica Prettyah Chana, [facility] 03/07/18 -  PT             Occupational Therapy Education     Title: PT OT SLP Therapies (In Progress)     Topic: Occupational Therapy (Done)     Point: ADL training (Done)     Description: Instruct learner(s) on proper safety adaptation  and remediation techniques during self care or transfers.   Instruct in proper use of assistive devices.    Learning Progress Summary           Patient Acceptance, E,TB, VU by MR at 1/8/2019 12:57 PM    Comment:  Pt reports she has UE tremor that occassionally interferes with ADLs. OT educated pt on adaptive strategies/equipment to assist with independence during functional activities.    Acceptance, E,TB, VU by MR at 1/7/2019  4:13 PM    Comment:  Educated on role of OT; OT POC.   Family Acceptance, E,TB, VU by MR at 1/7/2019  4:13 PM    Comment:  Educated on role of OT; OT POC.                               User Key     Initials Effective Dates Name Provider Type Discipline    MR 06/22/16 -  Sera Cavazos, OT Occupational Therapist OT                OT Recommendation and Plan  Outcome Summary/Treatment Plan (OT)  Anticipated Discharge Disposition (OT): home with assist  Therapy Frequency (OT Eval): 5 times/wk  Plan of Care Review  Plan of Care Reviewed With: patient  Plan of Care Reviewed With: patient  Outcome Summary: Pt seen for OT session this am, able to perform grooming ADLs standing at sink with supervision.  Outcome Measures     Row Name 01/08/19 1300 01/08/19 1000 01/07/19 1600       How much help from another person do you currently need...    Turning from your back to your side while in flat bed without using bedrails?  --  4  -SM  --    Moving from lying on back to sitting on the side of a flat bed without bedrails?  --  4  -SM  --    Moving to and from a bed to a chair (including a wheelchair)?  --  4  -SM  --    Standing up from a chair using your arms (e.g., wheelchair, bedside chair)?  --  4  -SM  --    Climbing 3-5 steps with a railing?  --  3  -SM  --    To walk in hospital room?  --  3  -SM  --    AM-PAC 6 Clicks Score  --  22  -SM  --       How much help from another is currently needed...    Putting on and taking off regular lower body clothing?  3  -MR  --  3  -MR    Bathing (including  washing, rinsing, and drying)  3  -MR  --  3  -MR    Toileting (which includes using toilet bed pan or urinal)  3  -MR  --  3  -MR    Putting on and taking off regular upper body clothing  3  -MR  --  3  -MR    Taking care of personal grooming (such as brushing teeth)  4  -MR  --  4  -MR    Eating meals  4  -MR  --  4  -MR    Score  20  -MR  --  20  -MR       Functional Assessment    Outcome Measure Options  --  AM-PAC 6 Clicks Basic Mobility (PT)  -  AM-PAC 6 Clicks Daily Activity (OT)  -MR    Row Name 01/07/19 1500             How much help from another person do you currently need...    Turning from your back to your side while in flat bed without using bedrails?  4  -PC      Moving from lying on back to sitting on the side of a flat bed without bedrails?  4  -PC      Moving to and from a bed to a chair (including a wheelchair)?  4  -PC      Standing up from a chair using your arms (e.g., wheelchair, bedside chair)?  4  -PC      Climbing 3-5 steps with a railing?  3  -PC      To walk in hospital room?  3  -PC      AM-PAC 6 Clicks Score  22  -PC         Functional Assessment    Outcome Measure Options  AM-PAC 6 Clicks Basic Mobility (PT)  -PC        User Key  (r) = Recorded By, (t) = Taken By, (c) = Cosigned By    Initials Name Provider Type    Shanel Gabriel PT Physical Therapist    Sera Bar, OT Occupational Therapist     Vita Pretty, PTA Physical Therapy Assistant           Time Calculation:   Time Calculation- OT     Row Name 01/08/19 1328             Time Calculation- OT    OT Start Time  0852  -MR      OT Stop Time  0915  -MR      OT Time Calculation (min)  23 min  -MR      Total Timed Code Minutes- OT  23 minute(s)  -MR      OT Received On  01/08/19  -        User Key  (r) = Recorded By, (t) = Taken By, (c) = Cosigned By    Initials Name Provider Type    Sera Bar, OT Occupational Therapist           Therapy Suggested Charges     Code   Minutes Charges    None            Therapy Charges for Today     Code Description Service Date Service Provider Modifiers Qty    91377294011 HC OT EVAL MOD COMPLEXITY 2 1/7/2019 Sera Cavazos, OT GO 1    21340683403 HC OT SELF CARE/MGMT/TRAIN EA 15 MIN 1/8/2019 Sera Cavazos, OT GO 2               Sera Cavazos, OT  1/8/2019

## 2019-01-08 NOTE — PLAN OF CARE
Problem: Patient Care Overview  Goal: Plan of Care Review  Outcome: Ongoing (interventions implemented as appropriate)   01/08/19 1020   Coping/Psychosocial   Plan of Care Reviewed With patient   Plan of Care Review   Progress improving   OTHER   Outcome Summary Pt tolerated treatment well this date. Ambulated 300ft w/ CGA-SBA. No complaints. PT will continue to address functional mobility deficits as tolerated.

## 2019-01-08 NOTE — NURSING NOTE
Pts BP has remained elevated today.  PRN PO hydralazine and IV labatelol given as well as scheduled BP meds this am.  BP still elevated and called  and order received for IV hydralazine. BP monitored frequently.

## 2019-01-09 LAB
ANION GAP SERPL CALCULATED.3IONS-SCNC: 10.7 MMOL/L
BUN BLD-MCNC: 6 MG/DL (ref 8–23)
BUN/CREAT SERPL: 10.2 (ref 7–25)
CALCIUM SPEC-SCNC: 9.4 MG/DL (ref 8.6–10.5)
CHLORIDE SERPL-SCNC: 102 MMOL/L (ref 98–107)
CO2 SERPL-SCNC: 24.3 MMOL/L (ref 22–29)
CREAT BLD-MCNC: 0.59 MG/DL (ref 0.57–1)
DEPRECATED RDW RBC AUTO: 45 FL (ref 37–54)
ERYTHROCYTE [DISTWIDTH] IN BLOOD BY AUTOMATED COUNT: 13.1 % (ref 11.7–13)
GFR SERPL CREATININE-BSD FRML MDRD: 98 ML/MIN/1.73
GLUCOSE BLD-MCNC: 96 MG/DL (ref 65–99)
HCT VFR BLD AUTO: 37.6 % (ref 35.6–45.5)
HGB BLD-MCNC: 12.5 G/DL (ref 11.9–15.5)
MAGNESIUM SERPL-MCNC: 2.1 MG/DL (ref 1.6–2.4)
MCH RBC QN AUTO: 31.3 PG (ref 26.9–32)
MCHC RBC AUTO-ENTMCNC: 33.2 G/DL (ref 32.4–36.3)
MCV RBC AUTO: 94.2 FL (ref 80.5–98.2)
PLATELET # BLD AUTO: 203 10*3/MM3 (ref 140–500)
PMV BLD AUTO: 8.7 FL (ref 6–12)
POTASSIUM BLD-SCNC: 4.1 MMOL/L (ref 3.5–5.2)
POTASSIUM BLD-SCNC: 4.1 MMOL/L (ref 3.5–5.2)
RBC # BLD AUTO: 3.99 10*6/MM3 (ref 3.9–5.2)
SODIUM BLD-SCNC: 137 MMOL/L (ref 136–145)
WBC NRBC COR # BLD: 5.15 10*3/MM3 (ref 4.5–10.7)

## 2019-01-09 PROCEDURE — 25010000002 ENOXAPARIN PER 10 MG: Performed by: INTERNAL MEDICINE

## 2019-01-09 PROCEDURE — 36415 COLL VENOUS BLD VENIPUNCTURE: CPT | Performed by: INTERNAL MEDICINE

## 2019-01-09 PROCEDURE — 82088 ASSAY OF ALDOSTERONE: CPT | Performed by: INTERNAL MEDICINE

## 2019-01-09 PROCEDURE — 85027 COMPLETE CBC AUTOMATED: CPT | Performed by: INTERNAL MEDICINE

## 2019-01-09 PROCEDURE — 84244 ASSAY OF RENIN: CPT | Performed by: INTERNAL MEDICINE

## 2019-01-09 PROCEDURE — 80048 BASIC METABOLIC PNL TOTAL CA: CPT | Performed by: INTERNAL MEDICINE

## 2019-01-09 PROCEDURE — 94799 UNLISTED PULMONARY SVC/PX: CPT

## 2019-01-09 PROCEDURE — 25010000002 CEFTRIAXONE PER 250 MG: Performed by: INTERNAL MEDICINE

## 2019-01-09 PROCEDURE — 83735 ASSAY OF MAGNESIUM: CPT | Performed by: INTERNAL MEDICINE

## 2019-01-09 PROCEDURE — 25010000002 AZITHROMYCIN PER 500 MG: Performed by: INTERNAL MEDICINE

## 2019-01-09 RX ORDER — AZITHROMYCIN 250 MG/1
500 TABLET, FILM COATED ORAL
Status: COMPLETED | OUTPATIENT
Start: 2019-01-10 | End: 2019-01-11

## 2019-01-09 RX ORDER — BUDESONIDE AND FORMOTEROL FUMARATE DIHYDRATE 160; 4.5 UG/1; UG/1
2 AEROSOL RESPIRATORY (INHALATION)
Status: DISCONTINUED | OUTPATIENT
Start: 2019-01-09 | End: 2019-01-11 | Stop reason: HOSPADM

## 2019-01-09 RX ORDER — HYDRALAZINE HYDROCHLORIDE 50 MG/1
100 TABLET, FILM COATED ORAL 2 TIMES DAILY
Status: DISCONTINUED | OUTPATIENT
Start: 2019-01-09 | End: 2019-01-10

## 2019-01-09 RX ORDER — SPIRONOLACTONE 25 MG/1
25 TABLET ORAL DAILY
Status: DISCONTINUED | OUTPATIENT
Start: 2019-01-09 | End: 2019-01-11 | Stop reason: HOSPADM

## 2019-01-09 RX ADMIN — SODIUM CHLORIDE, PRESERVATIVE FREE 3 ML: 5 INJECTION INTRAVENOUS at 20:58

## 2019-01-09 RX ADMIN — LOSARTAN POTASSIUM 100 MG: 100 TABLET, FILM COATED ORAL at 08:08

## 2019-01-09 RX ADMIN — NITROFURANTOIN MONOHYDRATE/MACROCRYSTALLINE 100 MG: 25; 75 CAPSULE ORAL at 20:52

## 2019-01-09 RX ADMIN — LETROZOLE 2.5 MG: 2.5 TABLET ORAL at 08:14

## 2019-01-09 RX ADMIN — SPIRONOLACTONE 25 MG: 25 TABLET, FILM COATED ORAL at 20:52

## 2019-01-09 RX ADMIN — IPRATROPIUM BROMIDE AND ALBUTEROL SULFATE 3 ML: 2.5; .5 SOLUTION RESPIRATORY (INHALATION) at 06:54

## 2019-01-09 RX ADMIN — PANTOPRAZOLE SODIUM 40 MG: 40 TABLET, DELAYED RELEASE ORAL at 06:50

## 2019-01-09 RX ADMIN — GABAPENTIN 400 MG: 400 CAPSULE ORAL at 20:52

## 2019-01-09 RX ADMIN — HYDRALAZINE HYDROCHLORIDE 100 MG: 50 TABLET, FILM COATED ORAL at 20:53

## 2019-01-09 RX ADMIN — NITROFURANTOIN MONOHYDRATE/MACROCRYSTALLINE 100 MG: 25; 75 CAPSULE ORAL at 08:08

## 2019-01-09 RX ADMIN — ENOXAPARIN SODIUM 40 MG: 40 INJECTION SUBCUTANEOUS at 16:02

## 2019-01-09 RX ADMIN — CETIRIZINE HYDROCHLORIDE 5 MG: 10 TABLET, FILM COATED ORAL at 08:08

## 2019-01-09 RX ADMIN — CEFTRIAXONE SODIUM 1 G: 1 INJECTION, SOLUTION INTRAVENOUS at 08:18

## 2019-01-09 RX ADMIN — AZITHROMYCIN MONOHYDRATE 500 MG: 500 INJECTION, POWDER, LYOPHILIZED, FOR SOLUTION INTRAVENOUS at 08:56

## 2019-01-09 RX ADMIN — HYDROCHLOROTHIAZIDE 25 MG: 25 TABLET ORAL at 08:08

## 2019-01-09 RX ADMIN — IPRATROPIUM BROMIDE AND ALBUTEROL SULFATE 3 ML: 2.5; .5 SOLUTION RESPIRATORY (INHALATION) at 14:34

## 2019-01-09 RX ADMIN — GABAPENTIN 400 MG: 400 CAPSULE ORAL at 08:18

## 2019-01-09 RX ADMIN — LEVOTHYROXINE SODIUM 50 MCG: 50 TABLET ORAL at 08:08

## 2019-01-09 RX ADMIN — HYDRALAZINE HYDROCHLORIDE 100 MG: 50 TABLET, FILM COATED ORAL at 10:02

## 2019-01-09 RX ADMIN — SODIUM CHLORIDE, PRESERVATIVE FREE 3 ML: 5 INJECTION INTRAVENOUS at 08:15

## 2019-01-09 RX ADMIN — GABAPENTIN 400 MG: 400 CAPSULE ORAL at 16:02

## 2019-01-09 RX ADMIN — ASPIRIN 81 MG: 81 TABLET, DELAYED RELEASE ORAL at 08:08

## 2019-01-09 NOTE — CONSULTS
Referring Provider: Keyana Paul M.D.  Reason for Consultation: Evaluation of patient with refractory hypertension    Subjective     Chief complaint   Chief Complaint   Patient presents with   • Chills       History of present illness:    This is a 78-year-old  female was admitted on 1/6/2019 chills being treated for ESBL UTI, she has history of hypertension for many years, her blood pressure admitted very difficult to control since admission.  She had no dysuria on admission but was found to have UTI and being treated she's been having nonproductive cough and was found to have pneumonia.  She has history of chronic bronchitis, history of fall,Waldenström's macroglobulinemia, history of breast cancer with left cystectomy about 4 years ago, history of hypothyroidism, partial simple seizure under good control with A printing, history of valvular heart disease, history of osteoporosis, peripheral neuropathy and seasonal allergies.    According to her blood pressure has been reasonably controlled, her antihypertensive agents includes losartan HCTZ also she was taken potassium chloride in addition even though her blood pressure on admission was 198/92.  Height restarting her medication and she continued to have significant systolic hypertension.  The patient complaining of recurrent cough, no orthopnea or PND, no lower extremity edema, no chest pain, no lightheadedness, no nausea or vomiting, no diarrhea.  Denies dysuria or gross hematuria.    Past Medical History:   Diagnosis Date   • Acid reflux    • Arthritis    • Asthma    • Breast cancer in female (CMS/[facility])     Left   • Clostridium difficile colitis     Requiring admission to [facility] in 09/2008   • Disease of thyroid gland     HYPOTHROIDISM   • Epilepsy (CMS/[facility])    • H/O Ecchymosis     Extensive, and hematoma formation in the whole right lower extremity requiring transfusion of red cells for a hemoglobin of 7.   • H/O Heart murmur      "\"MANY YEARS AGO\" NO TX, NO SYMPTOMS   • H/O transesophageal echocardiography ([facility]ICRU[facility])    • History of transfusion of packed red blood cells     R/T SURGERY   • History of Waldenstrom's macroglobulinemia    • Hypertension    • Leaky heart valve    • Measles    • Migraine    • Mumps    • Osteoporosis    • Peripheral neuropathy    • Seasonal allergies    • Seizures (CMS/HCC)    • Thyroid nodule     Removed more than 35 years ago     Past Surgical History:   Procedure Laterality Date   • ADENOIDECTOMY     • APPENDECTOMY     • CATARACT EXTRACTION Bilateral    • CHOLECYSTECTOMY     • COLONOSCOPY     • HYSTERECTOMY      Partial, many years ago, heavy bleeding, no cancer   • KNEE ARTHROSCOPY Right 03/2009    Finding of chondromalacia and appropriate cleanup of joint was performed by Dr. Moraes.   • MASTECTOMY Left 07/2014   • REPLACEMENT TOTAL KNEE Right 12/2015   • TONSILLECTOMY       Family History   Problem Relation Age of Onset   • Mental illness Mother    • Migraines Mother    • Dementia Mother    • Heart disease Father    • Hypertension Father    • Kidney disease Father    • Stroke Sister    • No Known Problems Daughter    • No Known Problems Daughter    • Breast cancer Other    • Heart disease Other    • Hypertension Other    • Diabetes Other    • Cancer Maternal Grandmother    • No Known Problems Maternal Grandfather    • No Known Problems Paternal Grandmother    • No Known Problems Paternal Grandfather    • Lymphoma Neg Hx    • Leukemia Neg Hx        Social History     Tobacco Use   • Smoking status: Never Smoker   • Smokeless tobacco: Never Used   Substance Use Topics   • Alcohol use: No   • Drug use: No     Facility-Administered Medications Prior to Admission   Medication Dose Route Frequency Provider Last Rate Last Dose   • [DISCONTINUED] ipratropium-albuterol (DUO-NEB) nebulizer solution 3 mL  3 mL Nebulization 4x Daily - RT Teresa Sorensen MD         Medications Prior to Admission   Medication Sig Dispense Refill " "Last Dose   • aspirin 81 MG tablet Take 81 mg by mouth Daily.   1/6/2019 at Unknown time   • Budesonide (RHINOCORT ALLERGY NA) 2 sprays into the nostril(s) as directed by provider Daily As Needed.   Taking   • cetirizine (zyrTEC) 10 MG tablet Take 10 mg by mouth Daily.      • famotidine (PEPCID) 20 MG tablet Take 20 mg by mouth Daily As Needed.   Taking   • Fluticasone-Umeclidin-Vilant (TRELEGY ELLIPTA) 100-62.5-25 MCG/INH aerosol powder  Inhale 1 each Daily.      • gabapentin (NEURONTIN) 400 MG capsule Take 400 mg by mouth 4 (Four) Times a Day.      • hydrocortisone 2.5 % cream Apply  topically 2 (Two) Times a Day.   Taking   • lansoprazole (PREVACID) 30 MG capsule Take 30 mg by mouth Daily As Needed.      • letrozole (FEMARA) 2.5 MG tablet Take 2.5 mg by mouth Daily.      • levalbuterol (XOPENEX) 0.63 MG/3ML nebulizer solution Take 1 ampule by nebulization Every 4 (Four) Hours As Needed for Wheezing.      • levothyroxine (SYNTHROID, LEVOTHROID) 50 MCG tablet TAKE 1 TABLET EVERY DAY 90 tablet 1 1/6/2019 at 0900   • losartan-hydrochlorothiazide (HYZAAR) 100-25 MG per tablet Take 1 tablet by mouth Daily.      • nitrofurantoin (MACRODANTIN) 100 MG capsule Take 100 mg by mouth 2 (Two) Times a Day.   1/5/2019 at 2100   • potassium chloride (KLOR-CON) 20 MEQ CR tablet Take 20 mEq by mouth Daily.        Allergies:  Cortisone and Darvon  [propoxyphene]    Review of Systems  14 points review of system was performed and it was negative other than what noted above in the history of present illness    Objective     Vital Signs  Temp:  [97.6 °F (36.4 °C)-98.6 °F (37 °C)] 98.3 °F (36.8 °C)  Heart Rate:  [] 97  Resp:  [16-20] 20  BP: (144-235)/() 197/98    Flowsheet Rows      First Filed Value   Admission Height  167.6 cm (66\") Documented at 01/06/2019 0600   Admission Weight  70.8 kg (156 lb) Documented at 01/06/2019 0731           I/O this shift:  In: 210 [P.O.:210]  Out: -   I/O last 3 completed shifts:  In: 180 " [P.O.:180]  Out: -     Intake/Output Summary (Last 24 hours) at 1/9/2019 1718  Last data filed at 1/9/2019 0900  Gross per 24 hour   Intake 390 ml   Output --   Net 390 ml       Physical Exam:  General Appearance: alert, oriented x 3, no acute distress,   Skin: warm and dry  HEENT: pupils round and reactive to light, oral mucosa normal,   Neck: supple, no JVD, trachea midline  Lungs: Bilateral rhonchi and crackles, unlabored breathing effort  Heart: RRR, normal S1 and S2, no S3, no rub  Abdomen: soft, non-tender,  present bowel sounds to auscultation  : no palpable bladder, no CVA tenderness  Joints: No significant deformities noted, no crepitation over the knees or the ankles  Lymphatics: No cervical or supraclavicular lymphadenopathy  Extremities: no edema, cyanosis or clubbing  Neuro: normal speech and mental status    Results Review:  Results for orders placed or performed during the hospital encounter of 01/06/19   Blood Culture - Blood, Arm, Right   Result Value Ref Range    Blood Culture No growth at 3 days    Blood Culture - Blood, Arm, Left   Result Value Ref Range    Blood Culture No growth at 3 days    S. Pneumo Ag Urine or CSF - Urine, Urine, Clean Catch   Result Value Ref Range    Strep Pneumo Ag Negative Negative   Legionella Antigen, Urine - Urine, Urine, Clean Catch   Result Value Ref Range    LEGIONELLA ANTIGEN, URINE Negative Negative   Comprehensive Metabolic Panel   Result Value Ref Range    Glucose 116 (H) 65 - 99 mg/dL    BUN 7 (L) 8 - 23 mg/dL    Creatinine 0.79 0.57 - 1.00 mg/dL    Sodium 129 (L) 136 - 145 mmol/L    Potassium 3.7 3.5 - 5.2 mmol/L    Chloride 87 (L) 98 - 107 mmol/L    CO2 30.3 (H) 22.0 - 29.0 mmol/L    Calcium 10.2 8.6 - 10.5 mg/dL    Total Protein 7.6 6.0 - 8.5 g/dL    Albumin 4.20 3.50 - 5.20 g/dL    ALT (SGPT) 16 1 - 33 U/L    AST (SGOT) 20 1 - 32 U/L    Alkaline Phosphatase 106 39 - 117 U/L    Total Bilirubin 1.1 0.1 - 1.2 mg/dL    eGFR Non African Amer 70 >60  mL/min/1.73    Globulin 3.4 gm/dL    A/G Ratio 1.2 g/dL    BUN/Creatinine Ratio 8.9 7.0 - 25.0    Anion Gap 11.7 mmol/L   Urinalysis With Culture If Indicated - Urine, Clean Catch   Result Value Ref Range    Color, UA Dark Yellow (A) Yellow, Straw    Appearance, UA Clear Clear    pH, UA 7.0 5.0 - 8.0    Specific Gravity, UA 1.014 1.005 - 1.030    Glucose, UA Negative Negative    Ketones, UA Negative Negative    Bilirubin, UA Negative Negative    Blood, UA Negative Negative    Protein, UA 30 mg/dL (1+) (A) Negative    Leuk Esterase, UA Trace (A) Negative    Nitrite, UA Negative Negative    Urobilinogen, UA 1.0 E.U./dL 0.2 - 1.0 E.U./dL   Lactic Acid, Plasma   Result Value Ref Range    Lactate 1.2 0.5 - 2.0 mmol/L   Procalcitonin   Result Value Ref Range    Procalcitonin 0.33 (H) 0.10 - 0.25 ng/mL   Troponin   Result Value Ref Range    Troponin T <0.010 0.000 - 0.030 ng/mL   CK   Result Value Ref Range    Creatine Kinase 54 20 - 180 U/L   CBC Auto Differential   Result Value Ref Range    WBC 5.78 4.50 - 10.70 10*3/mm3    RBC 4.91 3.90 - 5.20 10*6/mm3    Hemoglobin 15.8 (H) 11.9 - 15.5 g/dL    Hematocrit 44.2 35.6 - 45.5 %    MCV 90.0 80.5 - 98.2 fL    MCH 32.2 (H) 26.9 - 32.0 pg    MCHC 35.7 32.4 - 36.3 g/dL    RDW 12.8 11.7 - 13.0 %    RDW-SD 42.1 37.0 - 54.0 fl    MPV 9.3 6.0 - 12.0 fL    Platelets 199 140 - 500 10*3/mm3    Neutrophil % 59.3 42.7 - 76.0 %    Lymphocyte % 18.7 (L) 19.6 - 45.3 %    Monocyte % 16.3 (H) 5.0 - 12.0 %    Eosinophil % 5.4 0.3 - 6.2 %    Basophil % 0.3 0.0 - 1.5 %    Immature Grans % 0.9 (H) 0.0 - 0.5 %    Neutrophils, Absolute 3.43 1.90 - 8.10 10*3/mm3    Lymphocytes, Absolute 1.08 0.90 - 4.80 10*3/mm3    Monocytes, Absolute 0.94 0.20 - 1.20 10*3/mm3    Eosinophils, Absolute 0.31 0.00 - 0.70 10*3/mm3    Basophils, Absolute 0.02 0.00 - 0.20 10*3/mm3    Immature Grans, Absolute 0.05 (H) 0.00 - 0.03 10*3/mm3    nRBC 0.0 0.0 - 0.0 /100 WBC   Urinalysis, Microscopic Only - Urine, Clean Catch    Result Value Ref Range    RBC, UA None Seen None Seen, 0-2 /HPF    WBC, UA 3-5 (A) None Seen, 0-2 /HPF    Bacteria, UA None Seen None Seen /HPF    Squamous Epithelial Cells, UA 0-2 None Seen, 0-2 /HPF    Hyaline Casts, UA 0-2 None Seen /LPF    Mucus, UA Small/1+ (A) None Seen, Trace /HPF    Methodology Manual Light Microscopy    Blood Gas, Arterial   Result Value Ref Range    Site Arterial: right brachial     Rehan's Test Positive     pH, Arterial 7.456 (H) 7.350 - 7.450 pH units    pCO2, Arterial 39.7 35.0 - 45.0 mm Hg    pO2, Arterial 54.9 (C) 80.0 - 100.0 mm Hg    HCO3, Arterial 28.0 22.0 - 28.0 mmol/L    Base Excess, Arterial 3.8 (H) 0.0 - 2.0 mmol/L    O2 Saturation Calculated 89.7 (L) 92.0 - 99.0 %    Barometric Pressure for Blood Gas 754.5 mmHg    Modality Room Air     Rate 16 Breaths/minute   Blood Gas, Arterial   Result Value Ref Range    Site Arterial: right brachial     Rehan's Test Positive     pH, Arterial 7.453 (H) 7.350 - 7.450 pH units    pCO2, Arterial 38.8 35.0 - 45.0 mm Hg    pO2, Arterial 62.0 (L) 80.0 - 100.0 mm Hg    HCO3, Arterial 27.2 22.0 - 28.0 mmol/L    Base Excess, Arterial 3.1 (H) 0.0 - 2.0 mmol/L    O2 Saturation Calculated 92.5 92.0 - 99.0 %    Barometric Pressure for Blood Gas 754.7 mmHg    Modality Room Air     Rate 18 Breaths/minute   BNP   Result Value Ref Range    proBNP 400.3 0.0-1,800.0 pg/mL   CBC Auto Differential   Result Value Ref Range    WBC 4.30 (L) 4.50 - 10.70 10*3/mm3    RBC 3.78 (L) 3.90 - 5.20 10*6/mm3    Hemoglobin 12.0 11.9 - 15.5 g/dL    Hematocrit 34.1 (L) 35.6 - 45.5 %    MCV 90.2 80.5 - 98.2 fL    MCH 31.7 26.9 - 32.0 pg    MCHC 35.2 32.4 - 36.3 g/dL    RDW 12.9 11.7 - 13.0 %    RDW-SD 42.7 37.0 - 54.0 fl    MPV 9.0 6.0 - 12.0 fL    Platelets 166 140 - 500 10*3/mm3    Neutrophil % 36.4 (L) 42.7 - 76.0 %    Lymphocyte % 38.4 19.6 - 45.3 %    Monocyte % 19.1 (H) 5.0 - 12.0 %    Eosinophil % 5.6 0.3 - 6.2 %    Basophil % 0.5 0.0 - 1.5 %    Immature Grans % 0.9  (H) 0.0 - 0.5 %    Neutrophils, Absolute 1.57 (L) 1.90 - 8.10 10*3/mm3    Lymphocytes, Absolute 1.65 0.90 - 4.80 10*3/mm3    Monocytes, Absolute 0.82 0.20 - 1.20 10*3/mm3    Eosinophils, Absolute 0.24 0.00 - 0.70 10*3/mm3    Basophils, Absolute 0.02 0.00 - 0.20 10*3/mm3    Immature Grans, Absolute 0.04 (H) 0.00 - 0.03 10*3/mm3   Basic Metabolic Panel   Result Value Ref Range    Glucose 90 65 - 99 mg/dL    BUN 6 (L) 8 - 23 mg/dL    Creatinine 0.57 0.57 - 1.00 mg/dL    Sodium 133 (L) 136 - 145 mmol/L    Potassium 3.6 3.5 - 5.2 mmol/L    Chloride 98 98 - 107 mmol/L    CO2 22.4 22.0 - 29.0 mmol/L    Calcium 8.7 8.6 - 10.5 mg/dL    eGFR Non African Amer 102 >60 mL/min/1.73    BUN/Creatinine Ratio 10.5 7.0 - 25.0    Anion Gap 12.6 mmol/L   Basic Metabolic Panel   Result Value Ref Range    Glucose 104 (H) 65 - 99 mg/dL    BUN 4 (L) 8 - 23 mg/dL    Creatinine 0.45 (L) 0.57 - 1.00 mg/dL    Sodium 131 (L) 136 - 145 mmol/L    Potassium 2.7 (L) 3.5 - 5.2 mmol/L    Chloride 95 (L) 98 - 107 mmol/L    CO2 23.8 22.0 - 29.0 mmol/L    Calcium 9.0 8.6 - 10.5 mg/dL    eGFR Non African Amer 134 >60 mL/min/1.73    BUN/Creatinine Ratio 8.9 7.0 - 25.0    Anion Gap 12.2 mmol/L   CBC Auto Differential   Result Value Ref Range    WBC 5.91 4.50 - 10.70 10*3/mm3    RBC 3.95 3.90 - 5.20 10*6/mm3    Hemoglobin 12.5 11.9 - 15.5 g/dL    Hematocrit 35.5 (L) 35.6 - 45.5 %    MCV 89.9 80.5 - 98.2 fL    MCH 31.6 26.9 - 32.0 pg    MCHC 35.2 32.4 - 36.3 g/dL    RDW 12.8 11.7 - 13.0 %    RDW-SD 42.3 37.0 - 54.0 fl    MPV 8.9 6.0 - 12.0 fL    Platelets 189 140 - 500 10*3/mm3    Neutrophil % 62.3 42.7 - 76.0 %    Lymphocyte % 21.8 19.6 - 45.3 %    Monocyte % 9.3 5.0 - 12.0 %    Eosinophil % 6.3 (H) 0.3 - 6.2 %    Basophil % 0.3 0.0 - 1.5 %    Immature Grans % 0.7 (H) 0.0 - 0.5 %    Neutrophils, Absolute 3.68 1.90 - 8.10 10*3/mm3    Lymphocytes, Absolute 1.29 0.90 - 4.80 10*3/mm3    Monocytes, Absolute 0.55 0.20 - 1.20 10*3/mm3    Eosinophils, Absolute 0.37  0.00 - 0.70 10*3/mm3    Basophils, Absolute 0.02 0.00 - 0.20 10*3/mm3    Immature Grans, Absolute 0.04 (H) 0.00 - 0.03 10*3/mm3   Magnesium   Result Value Ref Range    Magnesium 1.6 1.6 - 2.4 mg/dL   Potassium   Result Value Ref Range    Potassium 4.1 3.5 - 5.2 mmol/L   Basic Metabolic Panel   Result Value Ref Range    Glucose 96 65 - 99 mg/dL    BUN 6 (L) 8 - 23 mg/dL    Creatinine 0.59 0.57 - 1.00 mg/dL    Sodium 137 136 - 145 mmol/L    Potassium 4.1 3.5 - 5.2 mmol/L    Chloride 102 98 - 107 mmol/L    CO2 24.3 22.0 - 29.0 mmol/L    Calcium 9.4 8.6 - 10.5 mg/dL    eGFR Non African Amer 98 >60 mL/min/1.73    BUN/Creatinine Ratio 10.2 7.0 - 25.0    Anion Gap 10.7 mmol/L   Magnesium   Result Value Ref Range    Magnesium 2.1 1.6 - 2.4 mg/dL   CBC (No Diff)   Result Value Ref Range    WBC 5.15 4.50 - 10.70 10*3/mm3    RBC 3.99 3.90 - 5.20 10*6/mm3    Hemoglobin 12.5 11.9 - 15.5 g/dL    Hematocrit 37.6 35.6 - 45.5 %    MCV 94.2 80.5 - 98.2 fL    MCH 31.3 26.9 - 32.0 pg    MCHC 33.2 32.4 - 36.3 g/dL    RDW 13.1 (H) 11.7 - 13.0 %    RDW-SD 45.0 37.0 - 54.0 fl    MPV 8.7 6.0 - 12.0 fL    Platelets 203 140 - 500 10*3/mm3     Imaging Results (last 72 hours)     Procedure Component Value Units Date/Time    CT Chest Without Contrast [[id number]] Collected:  01/06/19 1911     Updated:  01/06/19 1920    Narrative:       CT CHEST WO CONTRAST-     CLINICAL HISTORY: Follow-up lung parenchymal densities seen on CT scan  of the neck.     TECHNIQUE: Spiral CT images were obtained through the chest without IV  contrast and were reconstructed in 3 mm thick slices.     Radiation dose reduction techniques were utilized, including automated  exposure control and exposure modulation based on body size.     COMPARISON: CT scan of the neck dated 1/6/2019. CT scan of the chest  dated 7/17/2018.     FINDINGS: There are patchy areas of consolidation and interstitial  infiltrate within both lower lobes consistent with pneumonia that are  new since  the preceding chest CT. A few small groundglass opacities at  both lung apices are also new, and are likely infectious in etiology. In  addition, there is an approximately 8 mm diameter noncalcified nodule in  the lateral aspect of the right upper lobe that is new. This may be  inflammatory, as well. A very small neoplasm cannot be entirely  excluded. Continued CT follow-up of these abnormalities is recommended  to document complete clearing with appropriate medical treatment. There  is no mediastinal or hilar or axillary lymphadenopathy. Images through  the upper abdomen show no significant abnormality. The patient is status  post left mastectomy without breast reconstruction.       Impression:       Patchy areas of infiltrate in both lower lobes and small  groundglass nodular opacities at both lung apices compatible with  pneumonia. There is also a small approximately 7 mm diameter discrete  noncalcified pulmonary nodule in the right upper lobe. These findings  are all new since the previous CT scan of the chest dated 7/17/2018.  Continued CT follow-up is recommended to document clearing with  appropriate medical treatment.     This report was finalized on 1/6/2019 7:17 PM by Dr. Andi Maxwell M.D.                 aspirin 81 mg Oral Daily   [START ON 1/10/2019] azithromycin 500 mg Oral Q24H   ceftriaxone 1 g Intravenous Q24H   cetirizine 5 mg Oral Daily   enoxaparin 40 mg Subcutaneous Q24H   famotidine 20 mg Oral Daily   fluticasone 2 spray Each Nare Daily   gabapentin 400 mg Oral TID   hydrALAZINE 100 mg Oral BID   Hydrochlorothiazide Oral 25 mg Oral Daily   ipratropium-albuterol 3 mL Nebulization 4x Daily - RT   letrozole 2.5 mg Oral Daily   levothyroxine 50 mcg Oral Daily   losartan 100 mg Oral Q24H   nitrofurantoin (macrocrystal-monohydrate) 100 mg Oral BID   pantoprazole 40 mg Oral QAM   sodium chloride 3 mL Intravenous Q12H          Assessment/Plan   1.  Refractory hypertension, currently on hydralazine,  hydrochlorothiazide and losartan, blood pressure remains elevated.  2.  Pneumonia  3.  Chronic bronchitis  4.  ESBL UTI  5.  Waldenström's Macroglobulinemia  6.  Recurrent hypokalemia.        Plan:  1.  Will check the renal artery Doppler study to rule out renovascular disease.  2.  I will add the empirically, spironolactone 25 mg daily, after obtaining serum aldosterone to plasma renin activity ratio.  3.  Check orthostatic blood pressure  4.  Surveillance labs      Thank you Dr. Paul for asking me to see this patient in consultation         I discussed the patients findings and my recommendations with the patient    Leeroy Merchant MD  01/09/19  5:18 PM

## 2019-01-09 NOTE — SIGNIFICANT NOTE
01/09/19 1326   Rehab Treatment   Discipline physical therapy assistant   Reason Treatment Not Performed patient/family declined treatment, not feeling well  (pt declined stating that she does not feel well and her BP had been elevated today; ALEXA Rankin notified; PT will follow up tomorrow)   Recommendation   PT - Next Appointment 01/10/19

## 2019-01-09 NOTE — PLAN OF CARE
Problem: Patient Care Overview  Goal: Plan of Care Review  Outcome: Ongoing (interventions implemented as appropriate)   01/09/19 0350   Coping/Psychosocial   Plan of Care Reviewed With patient   Plan of Care Review   Progress improving   OTHER   Outcome Summary Patient c/o headache at the beginning of shift, resolved with PRN Tylenol. Ambulating with stand-by assist. BP slightly elevated but did come down through the night otherwise VSS. Potassium recheck 4.1. Has been sleeping well through the night. Possible discharge today. Will continue to monitor.     Goal: Discharge Needs Assessment  Outcome: Ongoing (interventions implemented as appropriate)      Problem: Fall Risk (Adult)  Goal: Absence of Fall  Outcome: Ongoing (interventions implemented as appropriate)      Problem: Pneumonia (Adult)  Goal: Signs and Symptoms of Listed Potential Problems Will be Absent, Minimized or Managed (Pneumonia)  Outcome: Ongoing (interventions implemented as appropriate)

## 2019-01-09 NOTE — PROGRESS NOTES
Continued Stay Note  [facility]     Patient Name: Karli Loomis  MRN: [id number]  Today's Date: 1/9/2019    Admit Date: 1/6/2019    Discharge Plan     Row Name 01/09/19 1308       Plan    Plan  Return home    Patient/Family in Agreement with Plan  yes    Plan Comments  Spoke with patient at bedside.  She confirms that plan remains for her to return home at [place].  CCP will continue to follow as needed.  BHumeniuk RN       Expected Discharge Date and Time     Expected Discharge Date Expected Discharge Time    Jan 9, 2019             Becky S. Humeniuk, RN

## 2019-01-09 NOTE — PROGRESS NOTES
Name: Karli Loomis ADMIT: 2019   : 1940  PCP: Teresa Sorensen MD    MRN: [id number] LOS: 3 days   AGE/SEX: 78 y.o. female  ROOM: [facility]   Subjective   No CP SOA NVD. Reports swelling in neck continues to improve.  The blood pressure is still high, I have asked renal to see    Objective   Vital Signs  Temp:  [97.6 °F (36.4 °C)-98.6 °F (37 °C)] 98.3 °F (36.8 °C)  Heart Rate:  [] 97  Resp:  [16-20] 20  BP: (144-235)/() 197/98  SpO2:  [94 %-98 %] 95 %  on   ;   Device (Oxygen Therapy): room air  Body mass index is 24.46 kg/m².    Physical Exam   Constitutional: She is oriented to person, place, and time. She appears well-developed. No distress.   HENT:   Head: Atraumatic.   Nose: Nose normal.   Eyes: Conjunctivae and EOM are normal.   Neck: Normal range of motion. Neck supple.   Left neck swelling improving, erythema resolved   Cardiovascular: Normal rate, regular rhythm and intact distal pulses.   Pulmonary/Chest: Effort normal. She has no wheezes. She has rales (BLF (mild)).   Abdominal: Soft. She exhibits no distension. There is no tenderness.   Musculoskeletal: Normal range of motion. She exhibits no edema.   Neurological: She is alert and oriented to person, place, and time.   Skin: Skin is warm and dry. She is not diaphoretic.   Psychiatric: She has a normal mood and affect. Her behavior is normal.   Nursing note and vitals reviewed.      Results Review:       I reviewed the patient's new clinical results.     I reviewed telemetry/EKG results, sinus rhythm.      Results from last 7 days   Lab Units  19   0525  19   0430  19   0524  19   0713   WBC 10*3/mm3  5.15  5.91  4.30*  5.78   HEMOGLOBIN g/dL  12.5  12.5  12.0  15.8*   PLATELETS 10*3/mm3  203  189  166  199     Results from last 7 days   Lab Units  19   0525  19   2340  19   0430  19   0524  19   0725   SODIUM mmol/L  137   --   131*  133*  129*   POTASSIUM mmol/L  4.1  4.1   2.7*  3.6  3.7   CHLORIDE mmol/L  102   --   95*  98  87*   CO2 mmol/L  24.3   --   23.8  22.4  30.3*   BUN mg/dL  6*   --   4*  6*  7*   CREATININE mg/dL  0.59   --   0.45*  0.57  0.79   GLUCOSE mg/dL  96   --   104*  90  116*   Estimated Creatinine Clearance: 61.8 mL/min (by C-G formula based on SCr of 0.59 mg/dL).  Results from last 7 days   Lab Units  01/09/19   0525  01/08/19   0430  01/07/19   0524  01/06/19   0725   CALCIUM mg/dL  9.4  9.0  8.7  10.2   ALBUMIN g/dL   --    --    --   4.20   MAGNESIUM mg/dL  2.1  1.6   --    --          aspirin 81 mg Oral Daily   [START ON 1/10/2019] azithromycin 500 mg Oral Q24H   ceftriaxone 1 g Intravenous Q24H   cetirizine 5 mg Oral Daily   enoxaparin 40 mg Subcutaneous Q24H   famotidine 20 mg Oral Daily   fluticasone 2 spray Each Nare Daily   gabapentin 400 mg Oral TID   hydrALAZINE 100 mg Oral BID   Hydrochlorothiazide Oral 25 mg Oral Daily   ipratropium-albuterol 3 mL Nebulization 4x Daily - RT   letrozole 2.5 mg Oral Daily   levothyroxine 50 mcg Oral Daily   losartan 100 mg Oral Q24H   nitrofurantoin (macrocrystal-monohydrate) 100 mg Oral BID   pantoprazole 40 mg Oral QAM   sodium chloride 3 mL Intravenous Q12H      Diet Regular      Assessment/Plan      Active Hospital Problems    Diagnosis Date Noted   • **Pneumonia due to infectious organism [J18.9] 01/06/2019   • Weakness [R53.1] 01/06/2019   • Sepsis (CMS/HCC) [A41.9] 01/06/2019   • Sialoadenitis [K11.20] 01/06/2019   • UTI due to extended-spectrum beta lactamase (ESBL) producing Escherichia coli [N39.0, B96.29, Z16.12] 01/06/2019   • Nausea & vomiting [R11.2] 01/06/2019   • Orthostatic hypotension [I95.1] 01/06/2019   • Essential hypertension [I10] 01/06/2019   • Airway hyperreactivity [J45.909] 12/14/2017   • 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     - PNA: CT chest confirms PNA. Also has pulmonary nodule present. BCx  ngtd.  Continue Azithromycin/Rocpehin. Will need outpatient repeat CT to monitor the nodule.  - Left Submandibular Sialoadenitis: Resolving.  - UTI/ESBL: Admission UA negative. Continue NTF to complete her outpatient course.  - Sepsis: Resolved.  - NV: Resolved  - HTN: Still poorly controlled.  Renal) consult  - COPD. add Spiriva and Symbicort as the patient was using Trilogy inhaler at home  - Hypokalemia: Mag level is a bit low as well. Replacements ordered. Repeat labs in morning to monitor.  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 Clinic followup.  - Disposition: 1-2 days after blood pressure improved    Hans Paul MD  [facility]  01/09/19  5:26 PM

## 2019-01-10 ENCOUNTER — APPOINTMENT (OUTPATIENT)
Dept: CARDIOLOGY | Facility: HOSPITAL | Age: 79
End: 2019-01-10
Attending: INTERNAL MEDICINE

## 2019-01-10 LAB
ALBUMIN SERPL-MCNC: 3.3 G/DL (ref 3.5–5.2)
ANION GAP SERPL CALCULATED.3IONS-SCNC: 13.7 MMOL/L
BH CV ECHO MEAS - DIST REN A EDV LEFT: 13.3 CM/SEC
BH CV ECHO MEAS - DIST REN A PSV LEFT: 71.5 CM/SEC
BH CV ECHO MEAS - DIST REN A RI LEFT: 0.81
BH CV ECHO MEAS - MID REN A EDV LEFT: 14.9 CM/SEC
BH CV ECHO MEAS - MID REN A PSV LEFT: 99.6 CM/SEC
BH CV ECHO MEAS - MID REN A RI LEFT: 0.85
BH CV ECHO MEAS - PROX REN A EDV LEFT: 22.3 CM/SEC
BH CV ECHO MEAS - PROX REN A PSV LEFT: 92.7 CM/SEC
BH CV ECHO MEAS - PROX REN A RI LEFT: 0.76
BH CV VAS BP LEFT ARM: NORMAL MMHG
BH CV VAS BP RIGHT ARM: NORMAL MMHG
BH CV VAS RENAL AORTIC MID EDV: 0 CM/S
BH CV VAS RENAL AORTIC MID PSV: 87 CM/S
BH CV VAS RENAL HILUM LEFT EDV: 5 CM/S
BH CV VAS RENAL HILUM LEFT PSV: 24 CM/S
BH CV VAS RENAL HILUM RIGHT EDV: 7 CM/S
BH CV VAS RENAL HILUM RIGHT PSV: 22 CM/S
BH CV XLRA MEAS - KID L LEFT: 10 CM
BH CV XLRA MEAS - RENAL A ORG RI LEFT: 0.83
BH CV XLRA MEAS DIST REN A EDV RIGHT: 14.9 CM/SEC
BH CV XLRA MEAS DIST REN A PSV RIGHT: 110 CM/SEC
BH CV XLRA MEAS DIST REN A RI RIGHT: 0.86
BH CV XLRA MEAS KID L RIGHT: 9.9 CM
BH CV XLRA MEAS KID W RIGHT: 4.8 CM
BH CV XLRA MEAS MID REN A EDV RIGHT: 37.1 CM/SEC
BH CV XLRA MEAS MID REN A PSV RIGHT: 125 CM/SEC
BH CV XLRA MEAS MID REN A RI RIGHT: 0.7
BH CV XLRA MEAS PROX REN A EDV RIGHT: 18.6 CM/SEC
BH CV XLRA MEAS PROX REN A PSV RIGHT: 100 CM/SEC
BH CV XLRA MEAS PROX REN A RI RIGHT: 0.81
BH CV XLRA MEAS RAR LEFT: 1.14
BH CV XLRA MEAS RAR RIGHT: 1.4
BH CV XLRA MEAS RENAL A ORG EDV LEFT: 14.1 CM/SEC
BH CV XLRA MEAS RENAL A ORG EDV RIGHT: 17.4 CM/SEC
BH CV XLRA MEAS RENAL A ORG PSV LEFT: 83.8 CM/SEC
BH CV XLRA MEAS RENAL A ORG PSV RIGHT: 86.3 CM/SEC
BH CV XLRA MEAS RENAL A ORG RI RIGHT: 0.8
BUN BLD-MCNC: 7 MG/DL (ref 8–23)
BUN/CREAT SERPL: 12.3 (ref 7–25)
CALCIUM SPEC-SCNC: 9.4 MG/DL (ref 8.6–10.5)
CHLORIDE SERPL-SCNC: 96 MMOL/L (ref 98–107)
CO2 SERPL-SCNC: 24.3 MMOL/L (ref 22–29)
CREAT BLD-MCNC: 0.57 MG/DL (ref 0.57–1)
GFR SERPL CREATININE-BSD FRML MDRD: 102 ML/MIN/1.73
GLUCOSE BLD-MCNC: 100 MG/DL (ref 65–99)
LEFT KIDNEY WIDTH: 4.9 CM
LEFT RENAL UPPER PARENCHYMA MAX: 21 CM/S
LEFT RENAL UPPER PARENCHYMA MIN: 5 CM/S
LEFT RENAL UPPER PARENCHYMA RI: 0.76
MAGNESIUM SERPL-MCNC: 1.8 MG/DL (ref 1.6–2.4)
PHOSPHATE SERPL-MCNC: 3.3 MG/DL (ref 2.5–4.5)
POTASSIUM BLD-SCNC: 3.8 MMOL/L (ref 3.5–5.2)
RIGHT RENAL UPPER PARENCHYMA MAX: 19 CM/S
RIGHT RENAL UPPER PARENCHYMA MIN: 5 CM/S
RIGHT RENAL UPPER PARENCHYMA RI: 0.74
SODIUM BLD-SCNC: 134 MMOL/L (ref 136–145)
URATE SERPL-MCNC: 2.3 MG/DL (ref 2.4–5.7)

## 2019-01-10 PROCEDURE — 25010000002 CEFTRIAXONE PER 250 MG: Performed by: INTERNAL MEDICINE

## 2019-01-10 PROCEDURE — 94799 UNLISTED PULMONARY SVC/PX: CPT

## 2019-01-10 PROCEDURE — 25010000002 ENOXAPARIN PER 10 MG: Performed by: INTERNAL MEDICINE

## 2019-01-10 PROCEDURE — 84550 ASSAY OF BLOOD/URIC ACID: CPT | Performed by: INTERNAL MEDICINE

## 2019-01-10 PROCEDURE — 83735 ASSAY OF MAGNESIUM: CPT | Performed by: INTERNAL MEDICINE

## 2019-01-10 PROCEDURE — 93975 VASCULAR STUDY: CPT

## 2019-01-10 PROCEDURE — 80069 RENAL FUNCTION PANEL: CPT | Performed by: INTERNAL MEDICINE

## 2019-01-10 PROCEDURE — 36415 COLL VENOUS BLD VENIPUNCTURE: CPT | Performed by: INTERNAL MEDICINE

## 2019-01-10 RX ORDER — FUROSEMIDE 20 MG/1
20 TABLET ORAL DAILY
Status: DISCONTINUED | OUTPATIENT
Start: 2019-01-10 | End: 2019-01-11 | Stop reason: HOSPADM

## 2019-01-10 RX ORDER — MINOXIDIL 2.5 MG/1
2.5 TABLET ORAL EVERY 12 HOURS SCHEDULED
Status: DISCONTINUED | OUTPATIENT
Start: 2019-01-10 | End: 2019-01-11 | Stop reason: HOSPADM

## 2019-01-10 RX ORDER — METOPROLOL TARTRATE 50 MG/1
50 TABLET, FILM COATED ORAL EVERY 12 HOURS SCHEDULED
Status: DISCONTINUED | OUTPATIENT
Start: 2019-01-10 | End: 2019-01-11 | Stop reason: HOSPADM

## 2019-01-10 RX ADMIN — LETROZOLE 2.5 MG: 2.5 TABLET ORAL at 08:22

## 2019-01-10 RX ADMIN — GABAPENTIN 400 MG: 400 CAPSULE ORAL at 08:22

## 2019-01-10 RX ADMIN — NITROFURANTOIN MONOHYDRATE/MACROCRYSTALLINE 100 MG: 25; 75 CAPSULE ORAL at 20:15

## 2019-01-10 RX ADMIN — SODIUM CHLORIDE, PRESERVATIVE FREE 3 ML: 5 INJECTION INTRAVENOUS at 08:26

## 2019-01-10 RX ADMIN — IPRATROPIUM BROMIDE AND ALBUTEROL SULFATE 3 ML: 2.5; .5 SOLUTION RESPIRATORY (INHALATION) at 20:04

## 2019-01-10 RX ADMIN — FLUTICASONE PROPIONATE 2 SPRAY: 50 SPRAY, METERED NASAL at 08:27

## 2019-01-10 RX ADMIN — SPIRONOLACTONE 25 MG: 25 TABLET, FILM COATED ORAL at 08:22

## 2019-01-10 RX ADMIN — ASPIRIN 81 MG: 81 TABLET, DELAYED RELEASE ORAL at 08:23

## 2019-01-10 RX ADMIN — AZITHROMYCIN 500 MG: 250 TABLET, FILM COATED ORAL at 08:23

## 2019-01-10 RX ADMIN — FUROSEMIDE 20 MG: 20 TABLET ORAL at 09:09

## 2019-01-10 RX ADMIN — CEFTRIAXONE SODIUM 1 G: 1 INJECTION, SOLUTION INTRAVENOUS at 08:22

## 2019-01-10 RX ADMIN — LEVOTHYROXINE SODIUM 50 MCG: 50 TABLET ORAL at 08:23

## 2019-01-10 RX ADMIN — SODIUM CHLORIDE, PRESERVATIVE FREE 3 ML: 5 INJECTION INTRAVENOUS at 20:16

## 2019-01-10 RX ADMIN — CETIRIZINE HYDROCHLORIDE 5 MG: 10 TABLET, FILM COATED ORAL at 08:23

## 2019-01-10 RX ADMIN — LOSARTAN POTASSIUM 100 MG: 100 TABLET, FILM COATED ORAL at 08:23

## 2019-01-10 RX ADMIN — MINOXIDIL 2.5 MG: 2.5 TABLET ORAL at 09:09

## 2019-01-10 RX ADMIN — FAMOTIDINE 20 MG: 20 TABLET, FILM COATED ORAL at 08:22

## 2019-01-10 RX ADMIN — ENOXAPARIN SODIUM 40 MG: 40 INJECTION SUBCUTANEOUS at 14:22

## 2019-01-10 RX ADMIN — METOPROLOL TARTRATE 50 MG: 50 TABLET, FILM COATED ORAL at 20:15

## 2019-01-10 RX ADMIN — GABAPENTIN 400 MG: 400 CAPSULE ORAL at 20:15

## 2019-01-10 RX ADMIN — METOPROLOL TARTRATE 50 MG: 50 TABLET, FILM COATED ORAL at 09:09

## 2019-01-10 RX ADMIN — MINOXIDIL 2.5 MG: 2.5 TABLET ORAL at 20:15

## 2019-01-10 RX ADMIN — NITROFURANTOIN MONOHYDRATE/MACROCRYSTALLINE 100 MG: 25; 75 CAPSULE ORAL at 08:23

## 2019-01-10 RX ADMIN — GABAPENTIN 400 MG: 400 CAPSULE ORAL at 17:28

## 2019-01-10 RX ADMIN — IPRATROPIUM BROMIDE AND ALBUTEROL SULFATE 3 ML: 2.5; .5 SOLUTION RESPIRATORY (INHALATION) at 16:05

## 2019-01-10 NOTE — PROGRESS NOTES
"   LOS: 4 days    Patient Care Team:  Teresa Sorensen MD as PCP - General (Family Medicine)  Florencio Larose MD as Consulting Physician (Hematology and Oncology)    Chief Complaint:    Chief Complaint   Patient presents with   • Chills     Follow UP refractory hypertension  Subjective     Interval History:   Patient is complaining of headache, blood pressure is quite elevated, and orthostatic hypotension, denies any chest pain or shortness of air, no nausea or vomiting, no abdominal pain, no dysuria or gross hematuria, no lower extremity edema, no lightheadedness.  She had a renal artery Doppler study done earlier this morning the results are not available      Review of Systems:   As noted above    Objective     Vital Signs  Temp:  [97.3 °F (36.3 °C)-98.7 °F (37.1 °C)] 97.3 °F (36.3 °C)  Heart Rate:  [] 84  Resp:  [16-20] 16  BP: (168-234)/() 234/114    Flowsheet Rows      First Filed Value   Admission Height  167.6 cm (66\") Documented at 01/06/2019 0600   Admission Weight  70.8 kg (156 lb) Documented at 01/06/2019 0731          No intake/output data recorded.  I/O last 3 completed shifts:  In: 590 [P.O.:590]  Out: -     Intake/Output Summary (Last 24 hours) at 1/10/2019 0825  Last data filed at 1/9/2019 1830  Gross per 24 hour   Intake 410 ml   Output --   Net 410 ml       Physical Exam:  General Appearance: alert, oriented x 3, no acute distress,   Skin: warm and dry  HEENT: pupils round and reactive to light, oral mucosa normal,   Neck: supple, no JVD, trachea midline  Lungs: Bilateral rhonchi and crackles, unlabored breathing effort  Heart: RRR, normal S1 and S2, no S3, no rub positive S4 gallop  Abdomen: soft, non-tender,  present bowel sounds to auscultation  : no palpable bladder, no CVA tenderness  Extremities: no edema, cyanosis or clubbing  Neuro: normal speech and mental status               Results Review:    Results from last 7 days   Lab Units  01/10/19   0530  01/09/19   0525  " 01/08/19   2340  01/08/19   0430   01/06/19   0725   SODIUM mmol/L  134*  137   --   131*   < >  129*   POTASSIUM mmol/L  3.8  4.1  4.1  2.7*   < >  3.7   CHLORIDE mmol/L  96*  102   --   95*   < >  87*   CO2 mmol/L  24.3  24.3   --   23.8   < >  30.3*   BUN mg/dL  7*  6*   --   4*   < >  7*   CREATININE mg/dL  0.57  0.59   --   0.45*   < >  0.79   CALCIUM mg/dL  9.4  9.4   --   9.0   < >  10.2   BILIRUBIN mg/dL   --    --    --    --    --   1.1   ALK PHOS U/L   --    --    --    --    --   106   ALT (SGPT) U/L   --    --    --    --    --   16   AST (SGOT) U/L   --    --    --    --    --   20   GLUCOSE mg/dL  100*  96   --   104*   < >  116*    < > = values in this interval not displayed.       Estimated Creatinine Clearance: 61.8 mL/min (by C-G formula based on SCr of 0.57 mg/dL).    Results from last 7 days   Lab Units  01/10/19   0530  01/09/19   0525  01/08/19   0430   MAGNESIUM mg/dL  1.8  2.1  1.6   PHOSPHORUS mg/dL  3.3   --    --        Results from last 7 days   Lab Units  01/10/19   0530   URIC ACID mg/dL  2.3*       Results from last 7 days   Lab Units  01/09/19   0525  01/08/19   0430  01/07/19   0524  01/06/19   0713   WBC 10*3/mm3  5.15  5.91  4.30*  5.78   HEMOGLOBIN g/dL  12.5  12.5  12.0  15.8*   PLATELETS 10*3/mm3  203  189  166  199               Imaging Results (last 24 hours)     ** No results found for the last 24 hours. **          aspirin 81 mg Oral Daily   azithromycin 500 mg Oral Q24H   budesonide-formoterol 2 puff Inhalation BID - RT   ceftriaxone 1 g Intravenous Q24H   cetirizine 5 mg Oral Daily   enoxaparin 40 mg Subcutaneous Q24H   famotidine 20 mg Oral Daily   fluticasone 2 spray Each Nare Daily   furosemide 20 mg Oral Daily   gabapentin 400 mg Oral TID   ipratropium-albuterol 3 mL Nebulization 4x Daily - RT   letrozole 2.5 mg Oral Daily   levothyroxine 50 mcg Oral Daily   losartan 100 mg Oral Q24H   metoprolol tartrate 50 mg Oral Q12H   minoxidil 2.5 mg Oral Q12H   nitrofurantoin  (macrocrystal-monohydrate) 100 mg Oral BID   pantoprazole 40 mg Oral QAM   sodium chloride 3 mL Intravenous Q12H   spironolactone 25 mg Oral Daily   tiotropium 1 capsule Inhalation Daily - RT          Medication Review:   Current Facility-Administered Medications   Medication Dose Route Frequency Provider Last Rate Last Dose   • acetaminophen (TYLENOL) tablet 650 mg  650 mg Oral Q4H PRN Abebe Barbosa MD   650 mg at 01/08/19 2050   • albuterol (PROVENTIL) nebulizer solution 0.083% 2.5 mg/3mL  2.5 mg Nebulization Q6H PRN Abebe Barbosa MD       • aspirin EC tablet 81 mg  81 mg Oral Daily Abebe Barbosa MD   81 mg at 01/10/19 0823   • azithromycin (ZITHROMAX) tablet 500 mg  500 mg Oral Q24H Hans Paul MD   500 mg at 01/10/19 0823   • budesonide-formoterol (SYMBICORT) 160-4.5 MCG/ACT inhaler 2 puff  2 puff Inhalation BID - RT Hans Paul MD       • cefTRIAXone (ROCEPHIN) IVPB 1 g  1 g Intravenous Q24H Abebe Barbosa  mL/hr at 01/10/19 0822 1 g at 01/10/19 0822   • cetirizine (zyrTEC) tablet 5 mg  5 mg Oral Daily Abebe Barbosa MD   5 mg at 01/10/19 0823   • enoxaparin (LOVENOX) syringe 40 mg  40 mg Subcutaneous Q24H Abebe Barbosa MD   40 mg at 01/09/19 1602   • famotidine (PEPCID) tablet 20 mg  20 mg Oral Daily Abebe Barbosa MD   20 mg at 01/10/19 0822   • fluticasone (FLONASE) 50 MCG/ACT nasal spray 2 spray  2 spray Each Nare Daily Abebe Barbosa MD   2 spray at 01/07/19 0826   • furosemide (LASIX) tablet 20 mg  20 mg Oral Daily Leeroy Merchant MD       • gabapentin (NEURONTIN) capsule 400 mg  400 mg Oral TID Abebe Barbosa MD   400 mg at 01/10/19 0822   • hydrALAZINE (APRESOLINE) tablet 25 mg  25 mg Oral Q6H PRN Abebe Barbosa MD   25 mg at 01/08/19 1301   • HYDROcodone-acetaminophen (NORCO) 5-325 MG per tablet 1 tablet  1 tablet Oral Q4H PRN Abebe Barbosa MD       • ipratropium-albuterol (DUO-NEB) nebulizer solution 3 mL  3 mL  Nebulization 4x Daily - RT Abebe Barbosa MD   3 mL at 01/09/19 1434   • labetalol (NORMODYNE,TRANDATE) injection 20 mg  20 mg Intravenous Q4H PRN Abebe Barbosa MD   20 mg at 01/08/19 1634   • letrozole (FEMARA) tablet 2.5 mg  2.5 mg Oral Daily Abebe Barbosa MD   2.5 mg at 01/10/19 0822   • levothyroxine (SYNTHROID, LEVOTHROID) tablet 50 mcg  50 mcg Oral Daily Abebe Barbosa MD   50 mcg at 01/10/19 0823   • losartan (COZAAR) tablet 100 mg  100 mg Oral Q24H Abebe Barbosa MD   100 mg at 01/10/19 0823   • metoprolol tartrate (LOPRESSOR) tablet 50 mg  50 mg Oral Q12H Leeroy Merchant MD       • minoxidil (LONITEN) tablet 2.5 mg  2.5 mg Oral Q12H Leeroy Merchant MD       • nitrofurantoin (macrocrystal-monohydrate) (MACROBID) capsule 100 mg  100 mg Oral BID Abebe Barbosa MD   100 mg at 01/10/19 0823   • ondansetron (ZOFRAN) tablet 4 mg  4 mg Oral Q6H PRN Abebe Barbosa MD        Or   • ondansetron ODT (ZOFRAN-ODT) disintegrating tablet 4 mg  4 mg Oral Q6H PRN Abebe Barbosa MD        Or   • ondansetron (ZOFRAN) injection 4 mg  4 mg Intravenous Q6H PRN Abebe Barbosa MD       • pantoprazole (PROTONIX) EC tablet 40 mg  40 mg Oral QAM Abebe Barbosa MD   40 mg at 01/09/19 0650   • potassium chloride (MICRO-K) CR capsule 40 mEq  40 mEq Oral PRN Abebe Barbosa MD   40 mEq at 01/08/19 1931    Or   • potassium chloride (KLOR-CON) packet 40 mEq  40 mEq Oral PRN Abebe Barbosa MD        Or   • potassium chloride 10 mEq in 100 mL IVPB  10 mEq Intravenous Q1H PRN Abebe Barbosa MD       • sennosides-docusate sodium (SENOKOT-S) 8.6-50 MG tablet 2 tablet  2 tablet Oral BID PRN Abebe Barbosa MD       • sodium chloride 0.9 % flush 10 mL  10 mL Intravenous PRN Joseph Kovacs MD       • sodium chloride 0.9 % flush 3 mL  3 mL Intravenous Q12H Abebe Barbosa MD   3 mL at 01/09/19 2058   • sodium chloride 0.9 % flush 3-10 mL  3-10 mL Intravenous PRN  Abebe Barbosa MD       • spironolactone (ALDACTONE) tablet 25 mg  25 mg Oral Daily Leeroy Merchant MD   25 mg at 01/10/19 0822   • tiotropium (SPIRIVA) 18 MCG per inhalation capsule 1 capsule  1 capsule Inhalation Daily - RT Hans Paul MD           Assessment/Plan      1.  Refractory hypertension, currently on hydralazine, hydrochlorothiazide and losartan, the blood pressure is quite elevated now 213/114.  The patient complaining of headache  2.  Pneumonia  3.  Chronic bronchitis  4.  ESBL UTI  5.  Waldenström's Macroglobulinemia  6.  Recurrent hypokalemia.,  Potassium 3.8  7.  Mild hyponatremia, sodium 134, related to hydrochlorothiazide      Plan:  1.  Stop the Hydralazine and hydrochlorothiazide, minoxidil 2.5 mg twice a day, metoprolol 50 mg twice a day and furosemide 20 mg daily  2.  Surveillance labs          Leeroy Merchant MD  01/10/19  8:25 AM

## 2019-01-10 NOTE — PLAN OF CARE
Problem: Patient Care Overview  Goal: Plan of Care Review  Outcome: Ongoing (interventions implemented as appropriate)   01/10/19 0303   Coping/Psychosocial   Plan of Care Reviewed With patient   Plan of Care Review   Progress improving   OTHER   Outcome Summary B/P remains elevated but SBP better in 160's at 0000. Pt without c/o but states she is tired and not getting enough sleep. All other VSS. Will continue to monitor.     Goal: Individualization and Mutuality  Outcome: Ongoing (interventions implemented as appropriate)    Goal: Discharge Needs Assessment  Outcome: Ongoing (interventions implemented as appropriate)      Problem: Fall Risk (Adult)  Goal: Absence of Fall  Outcome: Ongoing (interventions implemented as appropriate)

## 2019-01-11 VITALS
TEMPERATURE: 98.1 F | BODY MASS INDEX: 24.35 KG/M2 | DIASTOLIC BLOOD PRESSURE: 68 MMHG | WEIGHT: 151.5 LBS | HEART RATE: 65 BPM | SYSTOLIC BLOOD PRESSURE: 131 MMHG | RESPIRATION RATE: 18 BRPM | OXYGEN SATURATION: 96 % | HEIGHT: 66 IN

## 2019-01-11 PROBLEM — I95.1 ORTHOSTATIC HYPOTENSION: Status: RESOLVED | Noted: 2019-01-06 | Resolved: 2019-01-11

## 2019-01-11 LAB
ALBUMIN SERPL-MCNC: 4.7 G/DL (ref 3.5–5.2)
ANION GAP SERPL CALCULATED.3IONS-SCNC: 13.2 MMOL/L
BACTERIA SPEC AEROBE CULT: NORMAL
BACTERIA SPEC AEROBE CULT: NORMAL
BUN BLD-MCNC: 11 MG/DL (ref 8–23)
BUN/CREAT SERPL: 17.7 (ref 7–25)
CALCIUM SPEC-SCNC: 9.4 MG/DL (ref 8.6–10.5)
CHLORIDE SERPL-SCNC: 95 MMOL/L (ref 98–107)
CO2 SERPL-SCNC: 25.8 MMOL/L (ref 22–29)
CREAT BLD-MCNC: 0.62 MG/DL (ref 0.57–1)
GFR SERPL CREATININE-BSD FRML MDRD: 93 ML/MIN/1.73
GLUCOSE BLD-MCNC: 94 MG/DL (ref 65–99)
PHOSPHATE SERPL-MCNC: 3.2 MG/DL (ref 2.5–4.5)
POTASSIUM BLD-SCNC: 3.5 MMOL/L (ref 3.5–5.2)
SODIUM BLD-SCNC: 134 MMOL/L (ref 136–145)

## 2019-01-11 PROCEDURE — 97110 THERAPEUTIC EXERCISES: CPT

## 2019-01-11 PROCEDURE — 80069 RENAL FUNCTION PANEL: CPT | Performed by: INTERNAL MEDICINE

## 2019-01-11 PROCEDURE — 25010000002 CEFTRIAXONE PER 250 MG: Performed by: INTERNAL MEDICINE

## 2019-01-11 RX ORDER — LOSARTAN POTASSIUM 100 MG/1
100 TABLET ORAL
Qty: 30 TABLET | Refills: 1 | Status: SHIPPED | OUTPATIENT
Start: 2019-01-12 | End: 2019-03-19 | Stop reason: ALTCHOICE

## 2019-01-11 RX ORDER — CEFDINIR 300 MG/1
300 CAPSULE ORAL EVERY 12 HOURS SCHEDULED
Status: DISCONTINUED | OUTPATIENT
Start: 2019-01-11 | End: 2019-01-11 | Stop reason: HOSPADM

## 2019-01-11 RX ORDER — FUROSEMIDE 20 MG/1
20 TABLET ORAL DAILY
Qty: 30 TABLET | Refills: 1 | Status: SHIPPED | OUTPATIENT
Start: 2019-01-12 | End: 2019-03-19

## 2019-01-11 RX ORDER — POTASSIUM CHLORIDE 750 MG/1
40 CAPSULE, EXTENDED RELEASE ORAL ONCE
Status: COMPLETED | OUTPATIENT
Start: 2019-01-11 | End: 2019-01-11

## 2019-01-11 RX ORDER — MINOXIDIL 2.5 MG/1
2.5 TABLET ORAL EVERY 12 HOURS SCHEDULED
Qty: 30 TABLET | Refills: 1 | Status: SHIPPED | OUTPATIENT
Start: 2019-01-11 | End: 2019-06-28

## 2019-01-11 RX ORDER — CEFDINIR 300 MG/1
300 CAPSULE ORAL EVERY 12 HOURS SCHEDULED
Qty: 10 CAPSULE | Refills: 0 | Status: SHIPPED | OUTPATIENT
Start: 2019-01-11 | End: 2019-01-17

## 2019-01-11 RX ORDER — SPIRONOLACTONE 25 MG/1
25 TABLET ORAL DAILY
Qty: 30 TABLET | Refills: 1 | Status: SHIPPED | OUTPATIENT
Start: 2019-01-12 | End: 2019-03-19

## 2019-01-11 RX ADMIN — MINOXIDIL 2.5 MG: 2.5 TABLET ORAL at 10:17

## 2019-01-11 RX ADMIN — CEFTRIAXONE SODIUM 1 G: 1 INJECTION, SOLUTION INTRAVENOUS at 10:18

## 2019-01-11 RX ADMIN — CETIRIZINE HYDROCHLORIDE 5 MG: 10 TABLET, FILM COATED ORAL at 10:16

## 2019-01-11 RX ADMIN — GABAPENTIN 400 MG: 400 CAPSULE ORAL at 10:22

## 2019-01-11 RX ADMIN — SODIUM CHLORIDE, PRESERVATIVE FREE 3 ML: 5 INJECTION INTRAVENOUS at 10:25

## 2019-01-11 RX ADMIN — POTASSIUM CHLORIDE 40 MEQ: 750 CAPSULE, EXTENDED RELEASE ORAL at 10:22

## 2019-01-11 RX ADMIN — AZITHROMYCIN 500 MG: 250 TABLET, FILM COATED ORAL at 10:16

## 2019-01-11 RX ADMIN — SPIRONOLACTONE 25 MG: 25 TABLET, FILM COATED ORAL at 10:17

## 2019-01-11 RX ADMIN — METOPROLOL TARTRATE 50 MG: 50 TABLET, FILM COATED ORAL at 10:22

## 2019-01-11 RX ADMIN — LEVOTHYROXINE SODIUM 50 MCG: 50 TABLET ORAL at 10:17

## 2019-01-11 RX ADMIN — LETROZOLE 2.5 MG: 2.5 TABLET ORAL at 10:17

## 2019-01-11 RX ADMIN — FUROSEMIDE 20 MG: 20 TABLET ORAL at 10:22

## 2019-01-11 RX ADMIN — ASPIRIN 81 MG: 81 TABLET, DELAYED RELEASE ORAL at 10:16

## 2019-01-11 RX ADMIN — LOSARTAN POTASSIUM 100 MG: 100 TABLET, FILM COATED ORAL at 10:17

## 2019-01-11 RX ADMIN — PANTOPRAZOLE SODIUM 40 MG: 40 TABLET, DELAYED RELEASE ORAL at 06:07

## 2019-01-11 NOTE — PLAN OF CARE
Problem: Patient Care Overview  Goal: Plan of Care Review  Outcome: Ongoing (interventions implemented as appropriate)   01/11/19 1053   Coping/Psychosocial   Plan of Care Reviewed With patient   Plan of Care Review   Progress improving   OTHER   Outcome Summary patient able to ambulate 300 feet this am, no c/o headache. patient hoping to go home today

## 2019-01-11 NOTE — PROGRESS NOTES
Name: Karli Loomis ADMIT: 2019   : 1940  PCP: Teresa Sorensen MD    MRN: [id number] LOS: 4 days   AGE/SEX: 78 y.o. female  ROOM: [facility]   Subjective   No CP SOA NVD.  Blood pressure is much better today      Objective   Vital Signs  Temp:  [97.3 °F (36.3 °C)-98.7 °F (37.1 °C)] 98 °F (36.7 °C)  Heart Rate:  [67-95] 73  Resp:  [16] 16  BP: (159-234)/() 159/84  SpO2:  [93 %-97 %] 96 %  on   ;   Device (Oxygen Therapy): room air  Body mass index is 24.46 kg/m².    Physical Exam   Constitutional: She is oriented to person, place, and time. She appears well-developed. No distress.   HENT:   Head: Atraumatic.   Nose: Nose normal.   Eyes: Conjunctivae and EOM are normal.   Neck: Normal range of motion. Neck supple.   Cardiovascular: Normal rate, regular rhythm and intact distal pulses.   Pulmonary/Chest: Effort normal. She has no wheezes. She has no rales (BLF (mild)).   Abdominal: Soft. She exhibits no distension. There is no tenderness.   Musculoskeletal: Normal range of motion. She exhibits no edema.   Neurological: She is alert and oriented to person, place, and time.   Skin: Skin is warm and dry. She is not diaphoretic.   Psychiatric: She has a normal mood and affect. Her behavior is normal.   Nursing note and vitals reviewed.      Results Review:       I reviewed the patient's new clinical results.     I reviewed telemetry/EKG results, sinus rhythm.      Results from last 7 days   Lab Units  19   0525  19   0430  19   0524  19   0713   WBC 10*3/mm3  5.15  5.91  4.30*  5.78   HEMOGLOBIN g/dL  12.5  12.5  12.0  15.8*   PLATELETS 10*3/mm3  203  189  166  199     Results from last 7 days   Lab Units  01/10/19   0530  19   0525  19   2340  19   0430  19   0524   SODIUM mmol/L  134*  137   --   131*  133*   POTASSIUM mmol/L  3.8  4.1  4.1  2.7*  3.6   CHLORIDE mmol/L  96*  102   --   95*  98   CO2 mmol/L  24.3  24.3   --   23.8  22.4   BUN mg/dL  7*  6*    --   4*  6*   CREATININE mg/dL  0.57  0.59   --   0.45*  0.57   GLUCOSE mg/dL  100*  96   --   104*  90   Estimated Creatinine Clearance: 61.8 mL/min (by C-G formula based on SCr of 0.57 mg/dL).  Results from last 7 days   Lab Units  01/10/19   0530  01/09/19   0525  01/08/19   0430  01/07/19   0524  01/06/19   0725   CALCIUM mg/dL  9.4  9.4  9.0  8.7  10.2   ALBUMIN g/dL  3.30*   --    --    --   4.20   MAGNESIUM mg/dL  1.8  2.1  1.6   --    --    PHOSPHORUS mg/dL  3.3   --    --    --    --          aspirin 81 mg Oral Daily   azithromycin 500 mg Oral Q24H   budesonide-formoterol 2 puff Inhalation BID - RT   ceftriaxone 1 g Intravenous Q24H   cetirizine 5 mg Oral Daily   enoxaparin 40 mg Subcutaneous Q24H   famotidine 20 mg Oral Daily   fluticasone 2 spray Each Nare Daily   furosemide 20 mg Oral Daily   gabapentin 400 mg Oral TID   ipratropium-albuterol 3 mL Nebulization 4x Daily - RT   letrozole 2.5 mg Oral Daily   levothyroxine 50 mcg Oral Daily   losartan 100 mg Oral Q24H   metoprolol tartrate 50 mg Oral Q12H   minoxidil 2.5 mg Oral Q12H   nitrofurantoin (macrocrystal-monohydrate) 100 mg Oral BID   pantoprazole 40 mg Oral QAM   sodium chloride 3 mL Intravenous Q12H   spironolactone 25 mg Oral Daily   tiotropium 1 capsule Inhalation Daily - RT      Diet Regular      Assessment/Plan      Active Hospital Problems    Diagnosis Date Noted   • **Pneumonia due to infectious organism [J18.9] 01/06/2019   • Weakness [R53.1] 01/06/2019   • Sepsis (CMS/HCC) [A41.9] 01/06/2019   • Sialoadenitis [K11.20] 01/06/2019   • UTI due to extended-spectrum beta lactamase (ESBL) producing Escherichia coli [N39.0, B96.29, Z16.12] 01/06/2019   • Nausea & vomiting [R11.2] 01/06/2019   • Orthostatic hypotension [I95.1] 01/06/2019   • Essential hypertension [I10] 01/06/2019   • Airway hyperreactivity [J45.909] 12/14/2017   • 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     - PNA: CT chest confirms PNA. Also has pulmonary nodule present. BCx ngtd.  Continue Azithromycin/Rocpehin. Will need outpatient repeat CT to monitor the nodule.  - Left Submandibular Sialoadenitis: Resolving.  - UTI/ESBL: Admission UA negative. Continue NTF to complete her outpatient course.  - Sepsis: Resolved.  - NV: Resolved  - HTN: Still poorly controlled.  Renal) consult  - COPD. add Spiriva and Symbicort as the patient was using Trilogy inhaler at home  - Hypokalemia: Mag level is a bit low as well. Replacements ordered. Repeat labs in morning to monitor.  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 Clinic followup.  - Disposition: 1-2 days after blood pressure improved    Hans Paul MD  [facility]  01/10/19  7:20 PM

## 2019-01-11 NOTE — THERAPY TREATMENT NOTE
Acute Care - Physical Therapy Treatment Note  [facility]     Patient Name: Karli Loomis  : 1940  MRN: [id number]  Today's Date: 2019  Onset of Illness/Injury or Date of Surgery: 19  Date of Referral to PT: 19  Referring Physician: Familia    Admit Date: 2019    Visit Dx:    ICD-10-CM ICD-9-CM   1. Weakness R53.1 780.79   2. Fall, initial encounter W19.XXXA E888.9   3. Sialoadenitis K11.20 527.2   4. Acute non-recurrent maxillary sinusitis J01.00 461.0   5. Pneumonia of both upper lobes due to infectious organism (CMS/HCC) J18.1 483.8   6. Urinary tract infection without hematuria, site unspecified- treated N39.0 599.0   7. Wheezing R06.2 786.07   8. Moderate persistent asthma with exacerbation J45.41 493.92   9. Cough R05 786.2   10. Impaired functional mobility and activity tolerance Z74.09 V49.89     Patient Active Problem List   Diagnosis   • Malignant neoplasm of overlapping sites of left female breast (CMS/HCC)   • Macroglobulinemia of Waldenstrom (CMS/HCC)   • Hypercalcemia   • Airway hyperreactivity   • BP (high blood pressure)   • Adult hypothyroidism   • Primary osteoarthritis of left knee   • Disorder of peripheral nervous system   • Epilepsy with simple partial seizures (CMS/HCC)   • Headache, migraine   • Hyponatremia   • Chronic pansinusitis   • TIA (transient ischemic attack)   • Weakness   • Pneumonia due to infectious organism   • Sepsis (CMS/HCC)   • Sialoadenitis   • UTI due to extended-spectrum beta lactamase (ESBL) producing Escherichia coli   • Nausea & vomiting   • Orthostatic hypotension   • Essential hypertension       Therapy Treatment    Rehabilitation Treatment Summary     Row Name 19 1050             Treatment Time/Intention    Discipline  physical therapist  -EM      Document Type  therapy note (daily note)  -EM      Subjective Information  no complaints hoping to go home today   -EM      Mode of Treatment  physical therapy  -EM      Patient/Family  Observations  pt supine in bed, awake and alert   -EM      Patient Effort  good  -EM      Recorded by [EM] Chasity Holland, PT 01/11/19 1053      Row Name 01/11/19 1050             Bed Mobility Assessment/Treatment    Supine-Sit [place] (Bed Mobility)  independent  -EM      Sit-Supine [place] (Bed Mobility)  independent  -EM      Recorded by [EM] Chasity Holland, PT 01/11/19 1053      Row Name 01/11/19 1050             Sit-Stand Transfer    Sit-Stand [place] (Transfers)  supervision  -EM      Recorded by [EM] Chasity Holland, PT 01/11/19 1053      Row Name 01/11/19 1050             Stand-Sit Transfer    Stand-Sit [place] (Transfers)  supervision  -EM      Recorded by [EM] Chasity Holland, PT 01/11/19 1053      Row Name 01/11/19 1050             Gait/Stairs Assessment/Training    Dorado Level (Gait)  contact guard  -EM      Distance in Feet (Gait)  300  -EM      Comment (Gait/Stairs)  slightly unsteady with turns, steps outside LEON to self correct   -EM      Recorded by [EM] Chasity Holland, PT 01/11/19 1053      Row Name 01/11/19 1050             Positioning and Restraints    Pre-Treatment Position  in bed  -EM      Post Treatment Position  bed  -EM      In Bed  supine;call light within reach  -EM      Recorded by [EM] Chasity Holland, PT 01/11/19 1053      Row Name 01/11/19 1050             Pain Scale: Numbers Pre/Post-Treatment    Pain Scale: Numbers, Pretreatment  0/10 - no pain  -EM      Recorded by [GLORIA] Chasity Holland, PT 01/11/19 1053      Row Name 01/11/19 1050             Outcome Summary/Treatment Plan (PT)    Anticipated Discharge Disposition (PT)  home with assist  -EM      Recorded by [GLORIA] Chasity Holland, PT 01/11/19 1053        User Key  (r) = Recorded By, (t) = Taken By, (c) = Cosigned By    Initials Name Effective Dates Discipline    EM Chasity Holland, PT 04/03/18 -  PT                   Physical Therapy Education     Title: PT  OT SLP Therapies (In Progress)     Topic: Physical Therapy (In Progress)     Point: Mobility training (Done)     Learning Progress Summary           Patient Acceptance, E, VU,DU by  at 1/11/2019 10:53 AM    Acceptance, E,TB, VU,DU by  at 1/8/2019 10:20 AM    Acceptance, E,D, DU by  at 1/7/2019  3:26 PM                               User Key     Initials Effective Dates Name Provider Type Discipline     04/03/18 -  Shanel Mendoza, PT Physical Therapist PT    EM 04/03/18 -  Chasity Holland PT Physical Therapist PT     03/07/18 -  Vita Pretty, PTA Physical Therapy Assistant PT                PT Recommendation and Plan  Anticipated Discharge Disposition (PT): home with assist  Outcome Summary/Treatment Plan (PT)  Anticipated Discharge Disposition (PT): home with assist  Plan of Care Reviewed With: patient  Progress: improving  Outcome Summary: patient able to ambulate 300 feet this am, no c/o headache. patient hoping to go home today   Outcome Measures     Row Name 01/11/19 1000 01/08/19 1300          How much help from another person do you currently need...    Turning from your back to your side while in flat bed without using bedrails?  4  -EM  --     Moving from lying on back to sitting on the side of a flat bed without bedrails?  4  -EM  --     Moving to and from a bed to a chair (including a wheelchair)?  4  -EM  --     Standing up from a chair using your arms (e.g., wheelchair, bedside chair)?  4  -EM  --     Climbing 3-5 steps with a railing?  3  -EM  --     To walk in hospital room?  3  -EM  --     AM-PAC 6 Clicks Score  22  -EM  --        How much help from another is currently needed...    Putting on and taking off regular lower body clothing?  --  3  -MR     Bathing (including washing, rinsing, and drying)  --  3  -MR     Toileting (which includes using toilet bed pan or urinal)  --  3  -MR     Putting on and taking off regular upper body clothing  --  3  -MR     Taking care of  personal grooming (such as brushing teeth)  --  4  -MR     Eating meals  --  4  -MR     Score  --  20  -MR       User Key  (r) = Recorded By, (t) = Taken By, (c) = Cosigned By    Initials Name Provider Type    Chasity Torres PT Physical Therapist    MR Cavazos Sera Perry, OT Occupational Therapist         Time Calculation:   PT Charges     Row Name 01/11/19 1054             Time Calculation    Start Time  1033  -EM      Stop Time  1048  -EM      Time Calculation (min)  15 min  -EM      PT Received On  01/11/19  -EM      PT - Next Appointment  01/12/19  -EM         Time Calculation- PT    Total Timed Code Minutes- PT  15 minute(s)  -EM        User Key  (r) = Recorded By, (t) = Taken By, (c) = Cosigned By    Initials Name Provider Type    Chasity Torres PT Physical Therapist        Therapy Suggested Charges     Code   Minutes Charges    None           Therapy Charges for Today     Code Description Service Date Service Provider Modifiers Qty    24000134725 HC PT THER PROC EA 15 MIN 1/11/2019 Chasity Holland PT GP 1          PT G-Codes  Outcome Measure Options: AM-PAC 6 Clicks Basic Mobility (PT)  AM-PAC 6 Clicks Score: 22  Score: 20    Chasity Holland PT  1/11/2019

## 2019-01-11 NOTE — PLAN OF CARE
Problem: Patient Care Overview  Goal: Plan of Care Review  Outcome: Ongoing (interventions implemented as appropriate)   01/11/19 1424   Coping/Psychosocial   Plan of Care Reviewed With patient   Plan of Care Review   Progress improving   OTHER   Outcome Summary Ready for d/c. BP much better.        Problem: Fall Risk (Adult)  Goal: Absence of Fall  Outcome: Ongoing (interventions implemented as appropriate)    Goal: Absence of Fall  Outcome: Ongoing (interventions implemented as appropriate)      Problem: Pneumonia (Adult)  Goal: Signs and Symptoms of Listed Potential Problems Will be Absent, Minimized or Managed (Pneumonia)  Outcome: Ongoing (interventions implemented as appropriate)

## 2019-01-11 NOTE — PROGRESS NOTES
"   LOS: 5 days    Patient Care Team:  Teresa Sorensen MD as PCP - General (Family Medicine)  Florencio Larose MD as Consulting Physician (Hematology and Oncology)    Chief Complaint:    Chief Complaint   Patient presents with   • Chills     Follow UP refractory hypertension  Subjective     Interval History:   The patient is feeling much better today, no headache, blood pressure improved significantly with the changes of her medication yesterday but has increased again this morning but not to the same level.  Denies any chest pain, no shortness of air, no orthopnea or PND, no nausea or vomiting, no dysuria or gross hematuria, no lower extremity edema, no lightheadedness.  Patient is complaining of headache, blood pressure is quite elevated, and orthostatic hypotension, denies any chest pain or shortness of air, no nausea or vomiting, no abdominal pain, no dysuria or gross hematuria, no lower extremity edema, no lightheadedness.  The renal artery Doppler study revealed no evidence of renal artery      Review of Systems:   As noted above    Objective     Vital Signs  Temp:  [97.8 °F (36.6 °C)-98.2 °F (36.8 °C)] 98 °F (36.7 °C)  Heart Rate:  [62-84] 78  Resp:  [16-18] 18  BP: (135-187)/(64-87) 187/87    Flowsheet Rows      First Filed Value   Admission Height  167.6 cm (66\") Documented at 01/06/2019 0600   Admission Weight  70.8 kg (156 lb) Documented at 01/06/2019 0731          No intake/output data recorded.  I/O last 3 completed shifts:  In: 210 [P.O.:210]  Out: -     Intake/Output Summary (Last 24 hours) at 1/11/2019 0912  Last data filed at 1/10/2019 0934  Gross per 24 hour   Intake 210 ml   Output --   Net 210 ml       Physical Exam:  General Appearance: alert, oriented x 3, no acute distress,   Skin: warm and dry  HEENT: pupils round and reactive to light, oral mucosa normal,   Neck: supple, no JVD, trachea midline  Lungs: Bilateral rhonchi and crackles, unlabored breathing effort  Heart: RRR, normal S1 and S2, no " S3, no rub positive S4 gallop  Abdomen: soft, non-tender,  present bowel sounds to auscultation  : no palpable bladder, no CVA tenderness  Extremities: no edema, cyanosis or clubbing  Neuro: normal speech and mental status               Results Review:    Results from last 7 days   Lab Units  01/11/19   0426  01/10/19   0530  01/09/19   0525   01/06/19   0725   SODIUM mmol/L  134*  134*  137   < >  129*   POTASSIUM mmol/L  3.5  3.8  4.1   < >  3.7   CHLORIDE mmol/L  95*  96*  102   < >  87*   CO2 mmol/L  25.8  24.3  24.3   < >  30.3*   BUN mg/dL  11  7*  6*   < >  7*   CREATININE mg/dL  0.62  0.57  0.59   < >  0.79   CALCIUM mg/dL  9.4  9.4  9.4   < >  10.2   BILIRUBIN mg/dL   --    --    --    --   1.1   ALK PHOS U/L   --    --    --    --   106   ALT (SGPT) U/L   --    --    --    --   16   AST (SGOT) U/L   --    --    --    --   20   GLUCOSE mg/dL  94  100*  96   < >  116*    < > = values in this interval not displayed.       Estimated Creatinine Clearance: 61.8 mL/min (by C-G formula based on SCr of 0.62 mg/dL).    Results from last 7 days   Lab Units  01/11/19   0426  01/10/19   0530  01/09/19   0525  01/08/19   0430   MAGNESIUM mg/dL   --   1.8  2.1  1.6   PHOSPHORUS mg/dL  3.2  3.3   --    --        Results from last 7 days   Lab Units  01/10/19   0530   URIC ACID mg/dL  2.3*       Results from last 7 days   Lab Units  01/09/19   0525  01/08/19   0430  01/07/19   0524  01/06/19   0713   WBC 10*3/mm3  5.15  5.91  4.30*  5.78   HEMOGLOBIN g/dL  12.5  12.5  12.0  15.8*   PLATELETS 10*3/mm3  203  189  166  199               Imaging Results (last 24 hours)     ** No results found for the last 24 hours. **          aspirin 81 mg Oral Daily   azithromycin 500 mg Oral Q24H   budesonide-formoterol 2 puff Inhalation BID - RT   ceftriaxone 1 g Intravenous Q24H   cetirizine 5 mg Oral Daily   enoxaparin 40 mg Subcutaneous Q24H   famotidine 20 mg Oral Daily   fluticasone 2 spray Each Nare Daily   furosemide 20 mg Oral  Daily   gabapentin 400 mg Oral TID   ipratropium-albuterol 3 mL Nebulization 4x Daily - RT   letrozole 2.5 mg Oral Daily   levothyroxine 50 mcg Oral Daily   losartan 100 mg Oral Q24H   metoprolol tartrate 50 mg Oral Q12H   minoxidil 2.5 mg Oral Q12H   nitrofurantoin (macrocrystal-monohydrate) 100 mg Oral BID   pantoprazole 40 mg Oral QAM   sodium chloride 3 mL Intravenous Q12H   spironolactone 25 mg Oral Daily   tiotropium 1 capsule Inhalation Daily - RT          Medication Review:   Current Facility-Administered Medications   Medication Dose Route Frequency Provider Last Rate Last Dose   • acetaminophen (TYLENOL) tablet 650 mg  650 mg Oral Q4H PRN Abebe Barbosa MD   650 mg at 01/08/19 2050   • albuterol (PROVENTIL) nebulizer solution 0.083% 2.5 mg/3mL  2.5 mg Nebulization Q6H PRN Abebe Barbosa MD       • aspirin EC tablet 81 mg  81 mg Oral Daily bAebe Barbosa MD   81 mg at 01/10/19 0823   • azithromycin (ZITHROMAX) tablet 500 mg  500 mg Oral Q24H Hans Paul MD   500 mg at 01/10/19 0823   • budesonide-formoterol (SYMBICORT) 160-4.5 MCG/ACT inhaler 2 puff  2 puff Inhalation BID - RT Hans Paul MD       • cefTRIAXone (ROCEPHIN) IVPB 1 g  1 g Intravenous Q24H Abebe Barbosa  mL/hr at 01/10/19 0822 1 g at 01/10/19 0822   • cetirizine (zyrTEC) tablet 5 mg  5 mg Oral Daily Abebe Barbosa MD   5 mg at 01/10/19 0823   • enoxaparin (LOVENOX) syringe 40 mg  40 mg Subcutaneous Q24H Abebe Barbosa MD   40 mg at 01/10/19 1422   • famotidine (PEPCID) tablet 20 mg  20 mg Oral Daily Abebe Barbosa MD   20 mg at 01/10/19 0822   • fluticasone (FLONASE) 50 MCG/ACT nasal spray 2 spray  2 spray Each Nare Daily Abebe Barbosa MD   2 spray at 01/10/19 0827   • furosemide (LASIX) tablet 20 mg  20 mg Oral Daily Leeroy Merchant MD   20 mg at 01/10/19 0909   • gabapentin (NEURONTIN) capsule 400 mg  400 mg Oral TID Abebe Barbosa MD   400 mg at 01/10/19 2015   •  hydrALAZINE (APRESOLINE) tablet 25 mg  25 mg Oral Q6H PRN Abebe Barbosa MD   25 mg at 01/08/19 1301   • HYDROcodone-acetaminophen (NORCO) 5-325 MG per tablet 1 tablet  1 tablet Oral Q4H PRN Abebe Barbosa MD       • ipratropium-albuterol (DUO-NEB) nebulizer solution 3 mL  3 mL Nebulization 4x Daily - RT Abebe Barbosa MD   3 mL at 01/10/19 2004   • labetalol (NORMODYNE,TRANDATE) injection 20 mg  20 mg Intravenous Q4H PRN Abebe Barbosa MD   20 mg at 01/08/19 1634   • letrozole (FEMARA) tablet 2.5 mg  2.5 mg Oral Daily Abebe Barbosa MD   2.5 mg at 01/10/19 0822   • levothyroxine (SYNTHROID, LEVOTHROID) tablet 50 mcg  50 mcg Oral Daily Abebe Barbosa MD   50 mcg at 01/10/19 0823   • losartan (COZAAR) tablet 100 mg  100 mg Oral Q24H Abebe Barbosa MD   100 mg at 01/10/19 0823   • metoprolol tartrate (LOPRESSOR) tablet 50 mg  50 mg Oral Q12H Leeroy Merchant MD   50 mg at 01/10/19 2015   • minoxidil (LONITEN) tablet 2.5 mg  2.5 mg Oral Q12H Leeroy Merchant MD   2.5 mg at 01/10/19 2015   • nitrofurantoin (macrocrystal-monohydrate) (MACROBID) capsule 100 mg  100 mg Oral BID Abebe Barbosa MD   100 mg at 01/10/19 2015   • ondansetron (ZOFRAN) tablet 4 mg  4 mg Oral Q6H PRN Abebe Barbosa MD        Or   • ondansetron ODT (ZOFRAN-ODT) disintegrating tablet 4 mg  4 mg Oral Q6H PRN Abebe Barbosa MD        Or   • ondansetron (ZOFRAN) injection 4 mg  4 mg Intravenous Q6H PRN Abebe Barbosa MD       • pantoprazole (PROTONIX) EC tablet 40 mg  40 mg Oral QAM Abebe Barbosa MD   40 mg at 01/11/19 0607   • potassium chloride (MICRO-K) CR capsule 40 mEq  40 mEq Oral PRN Abebe Barbosa MD   40 mEq at 01/08/19 1931    Or   • potassium chloride (KLOR-CON) packet 40 mEq  40 mEq Oral PRN Abebe Barbosa MD        Or   • potassium chloride 10 mEq in 100 mL IVPB  10 mEq Intravenous Q1H PRN Abebe Barbosa MD       • sennosides-docusate sodium (SENOKOT-S)  8.6-50 MG tablet 2 tablet  2 tablet Oral BID PRN Abebe Barbosa MD       • sodium chloride 0.9 % flush 10 mL  10 mL Intravenous PRN Joseph Kovacs MD       • sodium chloride 0.9 % flush 3 mL  3 mL Intravenous Q12H Abebe Barobsa MD   3 mL at 01/10/19 2016   • sodium chloride 0.9 % flush 3-10 mL  3-10 mL Intravenous PRN Abebe Barbosa MD       • spironolactone (ALDACTONE) tablet 25 mg  25 mg Oral Daily Leeroy Merchant MD   25 mg at 01/10/19 0822   • tiotropium (SPIRIVA) 18 MCG per inhalation capsule 1 capsule  1 capsule Inhalation Daily - RT Hans Paul MD           Assessment/Plan      1.  Refractory hypertension, improved with the addition of minoxidil, metoprolol and furosemide.  Blood pressure was within normal range yesterday she was asymptomatic headache resolved.  Blood pressure slightly elevated today the patient has the not gotten her medication yet this morning and hopefully after being on the same medication for the next few days her blood pressure will stabilize and would have no further fluctuation.  2.  Pneumonia  3.  Chronic bronchitis  4.  ESBL UTI  5.  Waldenström's Macroglobulinemia  6.  Recurrent hypokalemia.,  Potassium 3.5  7.  Mild hyponatremia, sodium 134, related to hydrochlorothiazide      Plan:  1.  Replete potassium today  2.  Surveillance labs          Leeroy Merchant MD  01/11/19  9:12 AM

## 2019-01-11 NOTE — DISCHARGE SUMMARY
"       Name: Karli Loomis  Age: 78 y.o.  Sex: female  :  1940  MRN: [id number]         Primary Care Physician: Teresa Sorensen MD      Date of Admission:  2019  Date of Discharge:  2019      CHIEF COMPLAINT     Chills         DISCHARGE DIAGNOSIS  Active Hospital Problems    Diagnosis Date Noted   • **Pneumonia due to infectious organism [J18.9] 2019   • Essential hypertension [I10] 2019     Priority: High   • Weakness [R53.1] 2019   • Sepsis (CMS/HCC) [A41.9] 2019   • Sialoadenitis [K11.20] 2019   • UTI due to extended-spectrum beta lactamase (ESBL) producing Escherichia coli [N39.0, B96.29, Z16.12] 2019   • Nausea & vomiting [R11.2] 2019   • Airway hyperreactivity [J45.909] 2017   • 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   • H/O Heart murmur     \"MANY YEARS AGO\" NO TX, NO SYMPTOMS   • H/O transesophageal echocardiography ([facility])    • History of transfusion of packed red blood cells     R/T SURGERY   • History of Waldenstrom's macroglobulinemia    • Hypertension    • Leaky heart valve    • Measles    • Migraine    • Mumps    • Osteoporosis    • Peripheral neuropathy    • Seasonal allergies    • Seizures (CMS/HCC)    • Thyroid nodule     Removed more than 35 years ago       CONSULTS   Consulting Physician(s)     Provider Relationship " "Specialty    Shonda, Leeroy Martinez MD Consulting Physician Nephrology            PROCEDURES PERFORMED  Renal ultrasound      HOSPITAL COURSE   Ms. Loomis is a 78-year-old lady who was admitted with pneumonia, left submandibular sialoadenitis, and sepsis. She was being treated for ESBL UTI with nitrofurantoin as an outpatient. She was admitted and repeat cultures and urinalysis were obtained. Her urinalysis was no longer infectious and so she was continued on the nitrofurantoin to complete her outpatient course. She was given aggressive fluids and azithromycin and Rocephin for empiric treatment of pneumonia and sialoadenitis. CT of her neck was obtained and this showed some abnormal findings in her upper lung fields. CT chest confirmed her pneumonia and also showed a pulmonary nodule. Her neck swelling has improved and is nearly resolved. Her pneumonia symptoms of dyspnea and cough are improving as well with treatment. Sepsis has since resolved. She does have really controlled hypertension and has been resumed on home losartan and thiazide. She did have mild hyponatremia and this will be monitored with repeat BMP. She has hypokalemia and borderline hypomagnesemia which are being replaced. She also has a history of \"hyperactive airway\". Given her medication regimen and age she likely has COPD. She has been maintained with breathing treatments and does not have acute bronchospasm currently. She has waldenstrom macroglobulinemia. Oncology was consulted and she will follow up with them as scheduled in clinic.    Patient had a significant elevation of blood pressure and the readings were consistently more than 210 systolic and 115 diastolic.  She was seen by nephrology and made some adjustments for her blood pressure medications.  She has significantly improved her blood pressure and now the systolic is around 160.  Her hydrochlorothiazide was stopped and she was put on Lasix, spironolactone, minoxidil and she will " continue Losartin without hydrochlorothiazide.  Potassium supplements also been discontinued      PHYSICAL EXAM  Temp:  [97.8 °F (36.6 °C)-98.2 °F (36.8 °C)] 98 °F (36.7 °C)  Heart Rate:  [62-84] 78  Resp:  [16-18] 18  BP: (135-187)/(64-89) 167/89  Body mass index is 24.46 kg/m².  Physical Exam      CONDITION ON DISCHARGE  Stable.      DISCHARGE DISPOSITION   Home      ALLERGIES  Allergies   Allergen Reactions   • Cortisone    • Darvon  [Propoxyphene] Palpitations       RECENT LABS  Results from last 7 days   Lab Units  01/09/19   0525  01/08/19   0430  01/07/19   0524   WBC 10*3/mm3  5.15  5.91  4.30*   HEMOGLOBIN g/dL  12.5  12.5  12.0   HEMATOCRIT %  37.6  35.5*  34.1*   PLATELETS 10*3/mm3  203  189  166     Results from last 7 days   Lab Units  01/11/19   0426  01/10/19   0530  01/09/19   0525   SODIUM mmol/L  134*  134*  137   POTASSIUM mmol/L  3.5  3.8  4.1   CHLORIDE mmol/L  95*  96*  102   CO2 mmol/L  25.8  24.3  24.3   BUN mg/dL  11  7*  6*   CREATININE mg/dL  0.62  0.57  0.59   GLUCOSE mg/dL  94  100*  96   CALCIUM mg/dL  9.4  9.4  9.4           DIET;  Diet Order   Procedures   • Diet Regular       DISCHARGE MEDICATIONS     Your medication list      START taking these medications      Instructions Last Dose Given Next Dose Due   cefdinir 300 MG capsule  Commonly known as:  OMNICEF      Take 1 capsule by mouth Every 12 (Twelve) Hours for 10 doses.       furosemide 20 MG tablet  Commonly known as:  LASIX      Take 1 tablet by mouth Daily.       losartan 100 MG tablet  Commonly known as:  COZAAR      Take 1 tablet by mouth Daily.       minoxidil 2.5 MG tablet  Commonly known as:  LONITEN      Take 1 tablet by mouth Every 12 (Twelve) Hours.       spironolactone 25 MG tablet  Commonly known as:  ALDACTONE      Take 1 tablet by mouth Daily.          CONTINUE taking these medications      Instructions Last Dose Given Next Dose Due   aspirin 81 MG tablet      Take 81 mg by mouth Daily.       cetirizine 10 MG  tablet  Commonly known as:  zyrTEC      Take 10 mg by mouth Daily.       hydrocortisone 2.5 % cream      Apply  topically 2 (Two) Times a Day.       letrozole 2.5 MG tablet  Commonly known as:  FEMARA      Take 2.5 mg by mouth Daily.       levalbuterol 0.63 MG/3ML nebulizer solution  Commonly known as:  XOPENEX      Take 1 ampule by nebulization Every 4 (Four) Hours As Needed for Wheezing.       levothyroxine 50 MCG tablet  Commonly known as:  SYNTHROID, LEVOTHROID      TAKE 1 TABLET EVERY DAY       PEPCID 20 MG tablet  Generic drug:  famotidine      Take 20 mg by mouth Daily As Needed.       RHINOCORT ALLERGY NA      2 sprays into the nostril(s) as directed by provider Daily As Needed.       TRELEGY ELLIPTA 100-62.5-25 MCG/INH aerosol powder   Generic drug:  Fluticasone-Umeclidin-Vilant      Inhale 1 each Daily.          STOP taking these medications    gabapentin 400 MG capsule  Commonly known as:  NEURONTIN        KLOR-CON 20 MEQ CR tablet  Generic drug:  potassium chloride        lansoprazole 30 MG capsule  Commonly known as:  PREVACID        losartan-hydrochlorothiazide 100-25 MG per tablet  Commonly known as:  HYZAAR        nitrofurantoin 100 MG capsule  Commonly known as:  MACRODANTIN              Where to Get Your Medications      These medications were sent to [facility] [address] [place] [address] AT [facility] [phone] [facility] [phone] [place] [id number]    Phone:  [phone]   · cefdinir 300 MG capsule  · furosemide 20 MG tablet  · losartan 100 MG tablet  · minoxidil 2.5 MG tablet  · spironolactone 25 MG tablet          Future Appointments   Date Time Provider Department Center   1/17/2019  2:00 PM Teresa Sorensen MD [facility] None   1/18/2019 11:00 AM VITALS ONLY [facility]  [facility]   1/18/2019 11:20 AM Florencio Larose MD [facility]  [facility]     Follow-up Information     Teresa Sorensen MD Follow up in 7 day(s).    Specialty:   Family Medicine  Why:  Follow up on January 17th @ 2:00 PM  Contact information:  [address]  [address]  [facility] [address] [phone]             Leeroy Merchant MD Follow up in 3 week(s).    Specialty:  Nephrology  Contact information:  [address]  [address]  [facility] [id number] [phone]                   TEST  RESULTS PENDING AT DISCHARGE  None   Order Current Status    Aldosterone / Renin Ratio In process           CODE STATUS  Code Status and Medical Interventions:   Ordered at: 01/06/19 1312     Code Status:    CPR     Medical Interventions (Level of Support Prior to Arrest):    Full           Hans Paul MD  [facility]  01/11/19      Time: greater than 35 minutes.

## 2019-01-11 NOTE — PROGRESS NOTES
Case Management Discharge Note    Final Note: DC home via private auto. Amara Melchor RN    Destination      No service has been selected for the patient.      Durable Medical Equipment      No service has been selected for the patient.      Dialysis/Infusion      No service has been selected for the patient.      Home Medical Care      No service has been selected for the patient.      Community Resources      No service has been selected for the patient.        Other: Other(private auto)    Final Discharge Disposition Code: 01 - home or self-care

## 2019-01-11 NOTE — PLAN OF CARE
Problem: Patient Care Overview  Goal: Plan of Care Review  Outcome: Ongoing (interventions implemented as appropriate)   01/11/19 0423   Coping/Psychosocial   Plan of Care Reviewed With patient   Plan of Care Review   Progress improving   OTHER   Outcome Summary no c.o any discomfort this shift. BP has remained stable tonight. up to bathroom w assist x1. rested well. VSS. will cont to monitor.     Goal: Individualization and Mutuality  Outcome: Ongoing (interventions implemented as appropriate)    Goal: Discharge Needs Assessment  Outcome: Ongoing (interventions implemented as appropriate)    Goal: Interprofessional Rounds/Family Conf  Outcome: Ongoing (interventions implemented as appropriate)      Problem: Fall Risk (Adult)  Goal: Absence of Fall  Outcome: Ongoing (interventions implemented as appropriate)    Goal: Absence of Fall  Outcome: Ongoing (interventions implemented as appropriate)      Problem: Pneumonia (Adult)  Goal: Signs and Symptoms of Listed Potential Problems Will be Absent, Minimized or Managed (Pneumonia)  Outcome: Ongoing (interventions implemented as appropriate)

## 2019-01-12 ENCOUNTER — READMISSION MANAGEMENT (OUTPATIENT)
Dept: CALL CENTER | Facility: HOSPITAL | Age: 79
End: 2019-01-12

## 2019-01-12 NOTE — OUTREACH NOTE
Prep Survey      Responses   Facility patient discharged from?  [facility]   Is patient eligible?  Yes   Discharge diagnosis  Pneumonia due to infectious organism    Does the patient have one of the following disease processes/diagnoses(primary or secondary)?  COPD/Pneumonia   Does the patient have Home health ordered?  No   Is there a DME ordered?  No   Prep survey completed?  Yes          Ade Moreno RN

## 2019-01-13 ENCOUNTER — READMISSION MANAGEMENT (OUTPATIENT)
Dept: CALL CENTER | Facility: HOSPITAL | Age: 79
End: 2019-01-13

## 2019-01-13 NOTE — OUTREACH NOTE
COPD/PN Week 1 Survey      Responses   Facility patient discharged from?  [facility]   Does the patient have one of the following disease processes/diagnoses(primary or secondary)?  COPD/Pneumonia   Is there a successful TCM telephone encounter documented?  No   Was the primary reason for admission:  Pneumonia   Week 1 attempt successful?  Yes   Call start time  1549   Rescheduled  Revoked   Revoke  Decline to participate   Call end time  1550   Discharge diagnosis  Pneumonia due to infectious organism           Nicki Sesay RN

## 2019-01-15 LAB
ALDOST SERPL-MCNC: <1 NG/DL (ref 0–30)
ALDOST/RENIN PLAS-RTO: <3.1 {RATIO} (ref 0–30)
RENIN PLAS-CCNC: 0.33 NG/ML/HR (ref 0.17–5.38)

## 2019-01-17 ENCOUNTER — OFFICE VISIT (OUTPATIENT)
Dept: FAMILY MEDICINE CLINIC | Facility: CLINIC | Age: 79
End: 2019-01-17

## 2019-01-17 VITALS
DIASTOLIC BLOOD PRESSURE: 66 MMHG | WEIGHT: 153.1 LBS | BODY MASS INDEX: 24.6 KG/M2 | OXYGEN SATURATION: 96 % | TEMPERATURE: 97.7 F | HEART RATE: 91 BPM | HEIGHT: 66 IN | RESPIRATION RATE: 19 BRPM | SYSTOLIC BLOOD PRESSURE: 138 MMHG

## 2019-01-17 DIAGNOSIS — Z09 HOSPITAL DISCHARGE FOLLOW-UP: ICD-10-CM

## 2019-01-17 DIAGNOSIS — R91.1 PULMONARY NODULE: ICD-10-CM

## 2019-01-17 DIAGNOSIS — E87.6 HYPOKALEMIA: Primary | ICD-10-CM

## 2019-01-17 DIAGNOSIS — I10 ESSENTIAL HYPERTENSION: ICD-10-CM

## 2019-01-17 PROCEDURE — 99214 OFFICE O/P EST MOD 30 MIN: CPT | Performed by: FAMILY MEDICINE

## 2019-01-17 RX ORDER — LEVALBUTEROL INHALATION SOLUTION 0.63 MG/3ML
1 SOLUTION RESPIRATORY (INHALATION) EVERY 8 HOURS PRN
Qty: 24 ML | Refills: 3 | Status: SHIPPED | OUTPATIENT
Start: 2019-01-17 | End: 2020-07-09 | Stop reason: ALTCHOICE

## 2019-01-17 RX ORDER — GABAPENTIN 400 MG/1
400 CAPSULE ORAL 4 TIMES DAILY
COMMUNITY
End: 2019-03-11 | Stop reason: SDUPTHER

## 2019-01-17 NOTE — PROGRESS NOTES
Chief Complaint   Patient presents with   • Hypertension     medication    • Follow-up     s/p hospital UTI, PNA, sinus infection 1/6/19       Subjective   Karli Loomis is a 78 y.o. female.     History of Present Illness   Hospital follow up  Pt was admistted for pneumonia with history of asthma.   She was being treated for UTI at home. Antibiotics were changed after cultre returned. Pt have weakness and fatigue, also falling at home. Was being cared for by her son at home. Related to her increased weakness and trouble with her breathing she presented to the ED and was found to have PNA. She was treated inpatient and reports since discharge she feels better as pertains to her breathing. But still tires and not having her strength back. She is not walking with her walker anymore feels well enough for that but not walking long distances. She is worried related to they told her not to take her potassium anymore but they gave her potassium every day in the hospital. She has continued to take it at home and wondering what she should do.     CT chest showed PNA and pulmonary nodule. She had some neck swelling but his improved. Her BP was elevated to the 200s during admission but under good control on her home meds at discharge. Her BP meds were changed. She continued her home losartan but HCTZ was d/c (had hyponatremia) and she was started on lasix, spironolactone and minoxidil.    Pulmonary nodule was in RUL, 8mm, new since her CT chest 7/2018, will require follow up for resolution/stability.    The following portions of the patient's history were reviewed and updated as appropriate: allergies, current medications, past medical history and problem list.    Review of Systems   HENT: Positive for congestion.    Respiratory: Positive for cough.    Cardiovascular: Negative.    Neurological: Positive for weakness.   Psychiatric/Behavioral: Negative.        Vitals:    01/17/19 1340   BP: 138/66   BP Location: Right arm  "  Patient Position: Sitting   Cuff Size: Adult   Pulse: 91   Resp: 19   Temp: 97.7 °F (36.5 °C)   TempSrc: Oral   SpO2: 96%   Weight: 69.4 kg (153 lb 1.6 oz)   Height: 167.6 cm (65.98\")       Objective   Physical Exam   Constitutional: She is oriented to person, place, and time. She appears well-nourished. No distress.   Eyes: Conjunctivae are normal. Right eye exhibits no discharge. Left eye exhibits no discharge. No scleral icterus.   Cardiovascular: Normal rate, regular rhythm, normal heart sounds and intact distal pulses. Exam reveals no gallop and no friction rub.   No murmur heard.  Pulmonary/Chest: Effort normal and breath sounds normal. No respiratory distress. She has no wheezes.   Musculoskeletal: She exhibits no edema.   Neurological: She is alert and oriented to person, place, and time.   Psychiatric: She has a normal mood and affect. Her behavior is normal.   Vitals reviewed.      Assessment/Plan   Karli was seen today for hypertension and follow-up.    Diagnoses and all orders for this visit:    Hypokalemia  -     Basic Metabolic Panel  Will check level. Given her K at discharge and being treated inpt with K, reasonable to be on and will determine d/c or not when we see results.   Hospital discharge follow-up  Reviewed documents, imaging reports, med changes and lab results.  Pulmonary nodule  Will order CT for follow up  Essential hypertension  Well controlled. Have medication changes and pt is following with those meds at home. D/c HCTZ.  Other orders  -     levalbuterol (XOPENEX) 0.63 MG/3ML nebulizer solution; Take 1 ampule by nebulization Every 8 (Eight) Hours As Needed for Wheezing.  -     Specimen Status Report    Current outpatient and discharge medications have been reconciled for the patient.  Reviewed by: Teresa Sorensen MD             "

## 2019-01-18 ENCOUNTER — OFFICE VISIT (OUTPATIENT)
Dept: ONCOLOGY | Facility: CLINIC | Age: 79
End: 2019-01-18

## 2019-01-18 ENCOUNTER — APPOINTMENT (OUTPATIENT)
Dept: LAB | Facility: HOSPITAL | Age: 79
End: 2019-01-18

## 2019-01-18 VITALS
WEIGHT: 152.7 LBS | BODY MASS INDEX: 26.07 KG/M2 | DIASTOLIC BLOOD PRESSURE: 82 MMHG | HEIGHT: 64 IN | SYSTOLIC BLOOD PRESSURE: 163 MMHG | OXYGEN SATURATION: 96 % | RESPIRATION RATE: 16 BRPM | TEMPERATURE: 98.1 F | HEART RATE: 82 BPM

## 2019-01-18 DIAGNOSIS — Z17.0 MALIGNANT NEOPLASM OF OVERLAPPING SITES OF LEFT BREAST IN FEMALE, ESTROGEN RECEPTOR POSITIVE (HCC): ICD-10-CM

## 2019-01-18 DIAGNOSIS — C50.812 MALIGNANT NEOPLASM OF OVERLAPPING SITES OF LEFT BREAST IN FEMALE, ESTROGEN RECEPTOR POSITIVE (HCC): ICD-10-CM

## 2019-01-18 DIAGNOSIS — E87.1 HYPONATREMIA: ICD-10-CM

## 2019-01-18 DIAGNOSIS — C88.0 MACROGLOBULINEMIA OF WALDENSTROM (HCC): Primary | ICD-10-CM

## 2019-01-18 LAB
ALBUMIN SERPL-MCNC: 4.6 G/DL (ref 3.5–5.2)
ALBUMIN/GLOB SERPL: 1.7 G/DL (ref 1.1–2.4)
ALP SERPL-CCNC: 168 U/L (ref 38–116)
ALT SERPL W P-5'-P-CCNC: 18 U/L (ref 0–33)
ANION GAP SERPL CALCULATED.3IONS-SCNC: 13.2 MMOL/L
AST SERPL-CCNC: 21 U/L (ref 0–32)
BASOPHILS # BLD AUTO: 0.05 10*3/MM3 (ref 0–0.1)
BASOPHILS NFR BLD AUTO: 0.7 % (ref 0–1.1)
BILIRUB SERPL-MCNC: 0.7 MG/DL (ref 0.1–1.2)
BUN BLD-MCNC: 10 MG/DL (ref 6–20)
BUN SERPL-MCNC: NORMAL MG/DL
BUN/CREAT SERPL: 12 (ref 7.3–30)
CALCIUM SERPL-MCNC: NORMAL MG/DL
CALCIUM SPEC-SCNC: 10.4 MG/DL (ref 8.5–10.2)
CHLORIDE SERPL-SCNC: 93 MMOL/L (ref 98–107)
CHLORIDE SERPL-SCNC: NORMAL MMOL/L
CO2 SERPL-SCNC: 27.8 MMOL/L (ref 22–29)
CO2 SERPL-SCNC: NORMAL MMOL/L
CREAT BLD-MCNC: 0.83 MG/DL (ref 0.6–1.1)
CREAT SERPL-MCNC: NORMAL MG/DL
DEPRECATED RDW RBC AUTO: 44.2 FL (ref 37–49)
EOSINOPHIL # BLD AUTO: 0.14 10*3/MM3 (ref 0–0.36)
EOSINOPHIL NFR BLD AUTO: 2 % (ref 1–5)
ERYTHROCYTE [DISTWIDTH] IN BLOOD BY AUTOMATED COUNT: 12.8 % (ref 11.7–14.5)
GFR SERPL CREATININE-BSD FRML MDRD: 66 ML/MIN/1.73
GLOBULIN UR ELPH-MCNC: 2.7 GM/DL (ref 1.8–3.5)
GLUCOSE BLD-MCNC: 105 MG/DL (ref 74–124)
GLUCOSE SERPL-MCNC: NORMAL MG/DL
HCT VFR BLD AUTO: 39.5 % (ref 34–45)
HGB BLD-MCNC: 13.5 G/DL (ref 11.5–14.9)
IMM GRANULOCYTES # BLD AUTO: 0.05 10*3/MM3 (ref 0–0.03)
IMM GRANULOCYTES NFR BLD AUTO: 0.7 % (ref 0–0.5)
LYMPHOCYTES # BLD AUTO: 1.79 10*3/MM3 (ref 1–3.5)
LYMPHOCYTES NFR BLD AUTO: 25.4 % (ref 20–49)
MCH RBC QN AUTO: 32.2 PG (ref 27–33)
MCHC RBC AUTO-ENTMCNC: 34.2 G/DL (ref 32–35)
MCV RBC AUTO: 94.3 FL (ref 83–97)
MONOCYTES # BLD AUTO: 0.68 10*3/MM3 (ref 0.25–0.8)
MONOCYTES NFR BLD AUTO: 9.7 % (ref 4–12)
NEUTROPHILS # BLD AUTO: 4.33 10*3/MM3 (ref 1.5–7)
NEUTROPHILS NFR BLD AUTO: 61.5 % (ref 39–75)
NRBC BLD AUTO-RTO: 0 /100 WBC (ref 0–0)
PLATELET # BLD AUTO: 240 10*3/MM3 (ref 150–375)
PMV BLD AUTO: 8.8 FL (ref 8.9–12.1)
POTASSIUM BLD-SCNC: 4.6 MMOL/L (ref 3.5–4.7)
POTASSIUM SERPL-SCNC: NORMAL MMOL/L
PROT SERPL-MCNC: 7.3 G/DL (ref 6.3–8)
RBC # BLD AUTO: 4.19 10*6/MM3 (ref 3.9–5)
SODIUM BLD-SCNC: 134 MMOL/L (ref 134–145)
SODIUM SERPL-SCNC: NORMAL MMOL/L
SPECIMEN STATUS: NORMAL
WBC NRBC COR # BLD: 7.04 10*3/MM3 (ref 4–10)

## 2019-01-18 PROCEDURE — 80053 COMPREHEN METABOLIC PANEL: CPT | Performed by: INTERNAL MEDICINE

## 2019-01-18 PROCEDURE — 85025 COMPLETE CBC W/AUTO DIFF WBC: CPT | Performed by: INTERNAL MEDICINE

## 2019-01-18 PROCEDURE — 36415 COLL VENOUS BLD VENIPUNCTURE: CPT | Performed by: INTERNAL MEDICINE

## 2019-01-18 PROCEDURE — 99215 OFFICE O/P EST HI 40 MIN: CPT | Performed by: INTERNAL MEDICINE

## 2019-01-18 NOTE — PROGRESS NOTES
Subjective  REASONS FOR FOLLOWUP:     1. History of Waldenstrom macroglobulinemia. The patient  remains in remission about this condition with stable level of immunoglobulin M . Normal IgA. Normal IgG. Normal white count, hemoglobin, and platelets. No abnormal bleeding. No peripheral adenopathy. No hepatosplenomegaly. The patient will remain in observation.   2. History of breast cancer stage I on the left, status post mastectomy. The patient remains on aromatase inhibitor without any evidence of breast cancer recurrence and good tolerance to her aromatase inhibitor medicine. She will remain on the same issue for the time being.                History of Present Illness   This patient returns today to the office for followup after she has been in the hospital recently with an episode of pneumonia. She has improved from this but unfortunately, when she came into the hospital, her blood pressure medications were not placed on the medication list and she missed them for many days. Not surprising, her blood pressure had a dramatic rise and her medicines were modified. Now she is all confused about what to take and what not to take. She has been given minoxidil, furosemide and spironolactone along with potassium. My fear is this combination has a lot of potential to produce tremendous damage to the patient including hyperkalemia. Please review below. Physically, the patient is feeling a little bit better. Her energy is slowly coming back, her appetite is better and she has no bowel or urinary difficulties. Her breathing is better. She has minimal cough and she has no chest pain. She has not had any fevers or chills.     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 This is taking a secondary role given the fact of all the issues in regard to her blood pressure and her respiratory situation.     She continues taking her Femara in regard to the  treatment of adjuvant therapy for her breast cancer status post mastectomy. She is 100% compliant on this medication.              1. Past Medical History, Past Surgical HistoryHypertension.    2. Hypothyroidism many years ago and has not taken any thyroid medication in quite some time.   3. Thyroid nodule removed more than 35 years ago.    4. Cholecystectomy.    5. Tonsillectomy and adenoidectomy.    6. Appendectomy.  7. Partial hysterectomy many years ago.      She has mammogram and pelvic examination yearly that have been normal.  Colonoscopy 4 years ago was normal.   She also has had a leaky valve in her heart without any significance.      During the recent workup at [facility], the patient had a transesophageal echocardiogram that failed to document any vegetation.    Clostridium colitis requiring admission to [facility] in 09/2008.      Arthroscopy in 03/2009 of the right knee with finding of chondromalacia and appropriate cleanup of the joint was performed by Dr. Moraes.      On 07/24/2013 the patient has lost 14 pounds, just cutting down on junk food.  Her glucose intolerance has improved substantially and her glucose has normalized.      On 01/12/2014, we reviewed her blood pressure issues recently. She has had a very stable blood pressure for the last week, and today is normal. Her physical examination is otherwise unremarkable and normal feeling.     We advised her to remain in observation from this point of view.    As per 10/01/2014, the patient has undergone evaluation by neurology service at [facility] for consideration of gabapentin that she has been taking for so many years for possible seizure activity.   I am aware of an MRI scan of the brain that shows not too much, besides minimal vascular disease and no other lesions.  Her neurologist will be informing us in regard how to proceed in this regard.      She wanted to have a right knee replaced in  2009 through her orthopedic surgeon. She developed delirium in the hospital and extensive ecchymosis and hematoma formation in the whole right lower extremity requiring transfusion of red cells for a hemoglobin of 7.     SOCIAL HISTORY:  .  Retired from [employer] where she worked for many years; she never was exposed to any chemicals.   She is a never smoker and nondrinker.   She lives with her oldest daughter.      FAMILY HISTORY: The patient’s father  of complications from heart disease at age 70.  Mother  after a psychiatric illness at age 67.  A brother  in a car accident at 43 and a sister, age 75, is in good health.   Her three daughters are in good health.  She has no FH of lymphoma or leukemia.    Review of Systems       General: no fever, no chills, STATED fatigue,no weight changes, no lack of appetite.  Eyes: no epiphora, xerophthalmia,conjunctivitis, pain, glaucoma, blurred vision, blindness, secretion, photophobia, proptosis, diplopia.  Ears: no otorrhea, tinnitus, otorrhagia, deafness, pain, vertigo.  Nose: no rhinorrhea, no epistaxis, no alteration in perception of odors, no sinuses pressure.  Mouth: no alteration in gums or teeth,  No ulcers, no difficulty with mastication or deglut ion, no odynophagia.  Neck: no masses or pain, no thyroid alterations, no pain in muscles or arteries, no carotid odynia, no crepitation.  Respiratory: DRY cough, no sputum production,LESS dyspnea,no trepopnea, no pleuritic pain,no hemoptysis.  Heart: no syncope, no irregularity, no palpitations, no angina,no orthopnea,no paroxysmal nocturnal dyspnea.  Vascular Venous: no tenderness,no edema,no palpable cords,no postphlebitic syndrome, no skin changes no ulcerations.  Vascular Arterial: no distal ischemia, noclaudication, no gangrene, no neuropathic ischemic pain, no skin ulcers, no paleness no cyanosis.  GI: no dysphagia, no odynophagia, no regurgitation, no heartburn,no indigestion,no  "nausea,no vomiting,no hematemesis ,no melena,no jaundice,no distention, no obstipation,no enterorrhagia,no proctalgia,no anal  lesions, no changes in bowel habits.  : no frequency, no hesitancy, no hematuria, no discharge,no  pain.  Musculoskeletal: no muscle or tendon pain or inflammation,no  joint pain, no edema, no functional limitation,no fasciculations, no mass.  Neurologic: no headache, no seizures, noalterations on Craneal nerves, no motor deficit, LE  sensory deficit, normal coordination, no alteration in memory,normal orientation, calculation,normal writting, verbal and written language.  Skin: no rashes,no pruritus no localized lesions.  Psychiatric: no anxiety, no depression,no agitation, no delusions, proper insight.         Medications:  The current medication list was reviewed in the EMR    ALLERGIES:    Allergies   Allergen Reactions   • Cortisone    • Darvon  [Propoxyphene] Palpitations       Objective      Vitals:    01/18/19 1147   BP: 163/82   Pulse: 82   Resp: 16   Temp: 98.1 °F (36.7 °C)   TempSrc: Oral   SpO2: 96%   Weight: 69.3 kg (152 lb 11.2 oz)   Height: 162.5 cm (63.98\")   PainSc: 0-No pain     Current Status 1/18/2019   ECOG score 0       Physical Exam   GENERAL:  Well-developed, well-nourished  Patient  in no acute distress.   SKIN:  Warm, dry ,NO rashes,NO purpura ,NO petechiae.  HEENT:  Pupils were equal and reactive to light and accomodation, conjunctivas non injected, no pterigion, normal extraocular movements, normal visual acuity.   Mouth mucosa was moist, no exudates in oropharynx, normal gum line, normal roof of the mouth and pillars, normal papillations of the tongue.  NECK:  Supple with good range of motion; no thyromegaly or masses, no JVD or bruits, no cervical adenopathies.No carotid arteries pain, no carotid abnormal pulsation , NO arterial dance.  LYMPHATICS:  No cervical, NO supraclavicular, NO axillary,NO epitrochlear , NO inguinal adenopathy.  CHEST:  Normal excursion " of both michelet thoraces, normal voice fremitus, no subcutaneous emphysema, normal axillas, no rashes or acanthosis nigricans. Lungs clear to percussion and auscultation, normal breath sounds bilaterally, no wheezing,NO crackles NO ronchi, NO stridor, NO rubs.  CARDIAC AND VASCULAR:  normal rate and regular rhythm, without murmurs,NO rubs NO S3 NO S4 right or left . Normal femoral, popliteal, pedis, brachial and carotid pulses.  ABDOMEN:  Soft, nontender with no organomegaly or masses, no ascites, no collateral circulation,no distention,no Max sign, no abdominal pain, no inguinal hernias,no umbilical hernia, no abdominal bruits. Normal bowel sounds.  GENITAL: Not  Performed.  EXTREMITIES  AND SPINE:  No clubbing, cyanosis or edema, no deformities or pain .No kyphosis, scoliosis, deformities or pain in spine, ribs or pelvic bone.  NEUROLOGICAL:  Patient was awake, alert, oriented to time, person and place.SENSORY NEUROPATHY IN FEET                          RECENT LABS:Differential   Order: [id number] - Part of Panel Order [id number]   Status:  Final result   Visible to patient:  No (Not Released)   Dx:  Macroglobulinemia of Waldenstrom (CMS...    Ref Range & Units 12:39   WBC 4.00 - 10.00 10*3/mm3 7.04    RBC 3.90 - 5.00 10*6/mm3 4.19    Hemoglobin 11.5 - 14.9 g/dL 13.5    Hematocrit 34.0 - 45.0 % 39.5    MCV 83.0 - 97.0 fL 94.3    MCH 27.0 - 33.0 pg 32.2    MCHC 32.0 - 35.0 g/dL 34.2    RDW 11.7 - 14.5 % 12.8    RDW-SD 37.0 - 49.0 fl 44.2    MPV 8.9 - 12.1 fL 8.8 Abnormally low     Platelets 150 - 375 10*3/mm3 240    Neutrophil % 39.0 - 75.0 % 61.5    Lymphocyte % 20.0 - 49.0 % 25.4    Monocyte % 4.0 - 12.0 % 9.7    Eosinophil % 1.0 - 5.0 % 2.0    Basophil % 0.0 - 1.1 % 0.7    Immature Grans % 0.0 - 0.5 % 0.7 Abnormally high     Neutrophils, Absolute 1.50 - 7.00 10*3/mm3 4.33    Lymphocytes, Absolute 1.00 - 3.50 10*3/mm3 1.79    Monocytes, Absolute 0.25 - 0.80 10*3/mm3 0.68    Eosinophils, Absolute 0.00 - 0.36  10*3/mm3 0.14    Basophils, Absolute 0.00 - 0.10 10*3/mm3 0.05            Comprehensive Metabolic Panel   Order: [id number]   Status:  Final result   Visible to patient:  No (Not Released)   Dx:  Macroglobulinemia of Waldenstrom (CMS...    Ref Range & Units 12:39   Glucose 74 - 124 mg/dL 105    BUN 6 - 20 mg/dL 10    Creatinine 0.60 - 1.10 mg/dL 0.83    Sodium 134 - 145 mmol/L 134    Potassium 3.5 - 4.7 mmol/L 4.6    Chloride 98 - 107 mmol/L 93 Abnormally low     CO2 22.0 - 29.0 mmol/L 27.8    Calcium 8.5 - 10.2 mg/dL 10.4 Abnormally high     Total Protein 6.3 - 8.0 g/dL 7.3    Albumin 3.50 - 5.20 g/dL 4.60    ALT (SGPT) 0 - 33 U/L 18                          Assessment/Plan      1. This patient has history of Waldenstrom macroglobulinemia that has been treated in the past with Rituxan. She was supposed to have her monoclonal protein studies done a few days ago. She could not come because she was hospitalized. This laboratory has been drawn today. She has some sensory discomfort in her lower extremities that makes me to worry that maybe the disease is starting to come back. Again, we will wait for the monoclonal protein studies to become available to discuss this with her on the telephone.   2. The patient has history of Stage I breast cancer. She has mastectomy and she has been undergoing adjuvant Femara therapy. So far, the patient is 100% compliant and I do not see anything that would suggest breast cancer recurrence.   3. The patient was admitted recently to the hospital with pneumonia. I reviewed the CT scan. Indeed she had bilateral infiltrates but she had no fever and the sputum was always clear. She has improved though from the shortness of breath. Pulmonary auscultation today is normal. She has no wheezing and no crackles. Therefore, I have advised her to discontinue her nebulizer and continue with her inhalers that usually she takes for her asthma.   4. The patient is extremely confused in regard to her  blood pressure medications. The typical blood pressure medications that she usually takes were not placed in the computer at the time of her admission then she had a spike in her blood pressure then minoxidil, furosemide and spironolactone along with potassium were initiated. The patient has been taking these medicines after discharge. I have been very worried today about her potentiality for potassium to rise with these medications and I have advised her to hold off on the spironolactone and wait for the report of her chemistry profile that has been requested stat.   5. She will be called at home with the report of this once that it becomes available. The choice will be to go back on the medicines that she was taking before as long as her granddaughter who is a nurse takes her blood pressure on regular basis and she notified her primary physician about what the numbers are. The other choice will be to remain on minoxidil, furosemide; stop the spironolactone and just have potassium supplementation. She is back on losartan but she is not taking this medicine along with the diuretic, hydrochlorothiazide that she was taking before.   6. The patient will be called at home with the report of her laboratory parameters for the potassium today and later on next week in regard to her monoclonal protein studies.     I gave her a tentative appointment to come back and see us in 3 months with CBC, CMP and monoclonal protein studies a week before MD appointment but this could change depending on the clinical circumstances and the reports upcoming in the laboratory parameters.                                  1/18/2019      CC:

## 2019-01-22 LAB
ALBUMIN SERPL-MCNC: 4 G/DL (ref 2.9–4.4)
ALBUMIN/GLOB SERPL: 1.5 {RATIO} (ref 0.7–1.7)
ALPHA1 GLOB FLD ELPH-MCNC: 0.4 G/DL (ref 0–0.4)
ALPHA2 GLOB SERPL ELPH-MCNC: 0.9 G/DL (ref 0.4–1)
B-GLOBULIN SERPL ELPH-MCNC: 1 G/DL (ref 0.7–1.3)
GAMMA GLOB SERPL ELPH-MCNC: 0.5 G/DL (ref 0.4–1.8)
GLOBULIN SER CALC-MCNC: 2.8 G/DL (ref 2.2–3.9)
IGA SERPL-MCNC: 47 MG/DL (ref 64–422)
IGG SERPL-MCNC: 373 MG/DL (ref 700–1600)
IGM SERPL-MCNC: 211 MG/DL (ref 26–217)
INTERPRETATION SERPL IEP-IMP: ABNORMAL
KAPPA LC SERPL-MCNC: 13.3 MG/L (ref 3.3–19.4)
KAPPA LC/LAMBDA SER: 1.64 {RATIO} (ref 0.26–1.65)
LAMBDA LC FREE SERPL-MCNC: 8.1 MG/L (ref 5.7–26.3)
Lab: ABNORMAL
M-SPIKE: ABNORMAL G/DL
PROT SERPL-MCNC: 6.8 G/DL (ref 6–8.5)

## 2019-01-23 ENCOUNTER — TELEPHONE (OUTPATIENT)
Dept: ONCOLOGY | Facility: CLINIC | Age: 79
End: 2019-01-23

## 2019-01-23 NOTE — TELEPHONE ENCOUNTER
I spoke with Krali Loomis and said gave me her blood pressures from the last few days.     1/19/19   162/64  1/20/19   146/62  1/21/19   146/64  1/22/19   148/72

## 2019-01-23 NOTE — TELEPHONE ENCOUNTER
----- Message from Florencio Larose MD sent at 1/23/2019  7:13 AM EST -----  CALL HER m PROTEIN WAS STABLE NO NEED TO TREAT FOR NOW NEXT REI THE SAME

## 2019-01-23 NOTE — TELEPHONE ENCOUNTER
I spoke to the pt and explained to her that her protein was stable, no need to treat right now. Pt v/u and asked about her blood pressures her daughter had been taking at home. I asked Dr. Larose and he said to only take b/p once a week until she comes back here and to follow up with her PCP. Pt v/u with this as well.

## 2019-02-05 NOTE — PLAN OF CARE
Problem: Patient Care Overview  Goal: Plan of Care Review   01/09/19 1840   Coping/Psychosocial   Plan of Care Reviewed With patient   Plan of Care Review   Progress no change   OTHER   Outcome Summary Patients BP remains elevated. Renal consulted for blood pressure control. NPO for renal artery duplex in AM. Some other blood work ordered from renal MD. DC home when BP is better.       Problem: Fall Risk (Adult)  Goal: Absence of Fall  Outcome: Ongoing (interventions implemented as appropriate)    Goal: Absence of Fall  Outcome: Ongoing (interventions implemented as appropriate)      Problem: Pneumonia (Adult)  Goal: Signs and Symptoms of Listed Potential Problems Will be Absent, Minimized or Managed (Pneumonia)  Outcome: Ongoing (interventions implemented as appropriate)   01/09/19 0350 01/09/19 1840   Goal/Outcome Evaluation   Problems Assessed (Pneumonia) --  all   Problems Present (Pneumonia) none --           Yes

## 2019-02-13 ENCOUNTER — TELEPHONE (OUTPATIENT)
Dept: NEUROLOGY | Facility: CLINIC | Age: 79
End: 2019-02-13

## 2019-02-13 NOTE — TELEPHONE ENCOUNTER
----- Message from Tamy Carlos sent at 2/12/2019  3:38 PM EST -----  PT WAS CALLED ON 2/12/19 TO SCHEDULE FOLLOW UP WITH VIKTOR VIZCAINO. PT DECLINED TO SCHEDULE FOLLOW UP APPOINTMENT. PLEASE ADVISE

## 2019-02-18 ENCOUNTER — TELEPHONE (OUTPATIENT)
Dept: FAMILY MEDICINE CLINIC | Facility: CLINIC | Age: 79
End: 2019-02-18

## 2019-02-18 NOTE — TELEPHONE ENCOUNTER
Spoke with this patient today and she stated that she is taking her BP medication losartan daily. Pt states that she is monitoring her BP at home with help. They have been running 2/11 152/74,2/12 178/72,2/4 178/88 and another reading on 2/11 170/76. Pt states that she was on  Amlodipine and her oncologist took her off of it r/t interaction with her inhaler. Is there any other medication that can help her? Please advise.

## 2019-02-20 ENCOUNTER — TELEPHONE (OUTPATIENT)
Dept: FAMILY MEDICINE CLINIC | Facility: CLINIC | Age: 79
End: 2019-02-20

## 2019-02-21 NOTE — TELEPHONE ENCOUNTER
Pt states she is only taking losartin, snythroid and K+ daily. No lasix and no spironolactone. Pt says that Thuan took her off all medications that she normally wasn't taking. Please advise if you needs to change her regimen.

## 2019-02-21 NOTE — TELEPHONE ENCOUNTER
Her medications were changed in the hospital for her BP. In addition to the losartan 100 mg she should be taking furosemide 20 mg and spironolactone 25 mg. Did she omit those because she is not taking them or did not know they were for BP. Also please ask her whether or not she is still taking potassium. I need to know what she is on before I can adjust, but think she needs it adjusted for better control. Thanks.

## 2019-02-22 RX ORDER — AMLODIPINE BESYLATE 5 MG/1
5 TABLET ORAL DAILY
Qty: 90 TABLET | Refills: 0 | Status: SHIPPED | OUTPATIENT
Start: 2019-02-22 | End: 2019-05-18 | Stop reason: SINTOL

## 2019-02-22 NOTE — PROGRESS NOTES
Spoke with pt. She is still on her preadmission medication. She is taking losartan with HCTZ 100-25 mg. And potassium, this is what she has been taking for years. She does not have good BP control at this time. She had been on amlodipine with good tolerance previously but this was d/c by her oncologist. Pt said related to it interfering with her inhaler. I think this would be a safe and good choice to go back on at this time given what she is already on and her K concerns. I will send to pharmacy. Pt voiced understanding and Irma will call her from the office 2/25 to get her in sooner for BP check.

## 2019-03-11 NOTE — TELEPHONE ENCOUNTER
Pt states that you told her that you were taking over her gabapentin rx. Last seen 1/17/19. No agreement. [place] in folder.

## 2019-03-12 RX ORDER — GABAPENTIN 400 MG/1
CAPSULE ORAL
Qty: 270 CAPSULE | Refills: 0 | OUTPATIENT
Start: 2019-03-12

## 2019-03-12 RX ORDER — GABAPENTIN 400 MG/1
400 CAPSULE ORAL 4 TIMES DAILY
Qty: 120 CAPSULE | Refills: 0 | Status: SHIPPED | OUTPATIENT
Start: 2019-03-12 | End: 2019-04-05 | Stop reason: SDUPTHER

## 2019-03-13 ENCOUNTER — TELEPHONE (OUTPATIENT)
Dept: ONCOLOGY | Facility: HOSPITAL | Age: 79
End: 2019-03-13

## 2019-03-13 RX ORDER — LANSOPRAZOLE 30 MG/1
CAPSULE, DELAYED RELEASE ORAL
Qty: 90 CAPSULE | Refills: 0 | Status: SHIPPED | OUTPATIENT
Start: 2019-03-13 | End: 2019-08-20 | Stop reason: SDUPTHER

## 2019-03-15 ENCOUNTER — TELEPHONE (OUTPATIENT)
Dept: PHARMACY | Facility: HOSPITAL | Age: 79
End: 2019-03-15

## 2019-03-15 RX ORDER — LETROZOLE 2.5 MG/1
TABLET, FILM COATED ORAL
Qty: 90 TABLET | Refills: 0 | Status: SHIPPED | OUTPATIENT
Start: 2019-03-15 | End: 2019-06-28

## 2019-03-15 NOTE — TELEPHONE ENCOUNTER
Called pt to see if she wanted a month supply of Femara phoned in to local pharmacy since she was unsure if she was to continue medication. Since she does not know the price at the local [facility], she wants me to approve just 1 90 day supply thru her mail order.

## 2019-03-19 ENCOUNTER — OFFICE VISIT (OUTPATIENT)
Dept: FAMILY MEDICINE CLINIC | Facility: CLINIC | Age: 79
End: 2019-03-19

## 2019-03-19 VITALS
OXYGEN SATURATION: 98 % | RESPIRATION RATE: 17 BRPM | WEIGHT: 154.7 LBS | SYSTOLIC BLOOD PRESSURE: 132 MMHG | BODY MASS INDEX: 26.41 KG/M2 | HEIGHT: 64 IN | TEMPERATURE: 97.9 F | HEART RATE: 72 BPM | DIASTOLIC BLOOD PRESSURE: 84 MMHG

## 2019-03-19 DIAGNOSIS — R35.0 URINARY FREQUENCY: ICD-10-CM

## 2019-03-19 DIAGNOSIS — J45.20 MILD INTERMITTENT ASTHMA WITHOUT COMPLICATION: ICD-10-CM

## 2019-03-19 DIAGNOSIS — G62.0 DRUG-INDUCED POLYNEUROPATHY (HCC): ICD-10-CM

## 2019-03-19 DIAGNOSIS — N30.00 ACUTE CYSTITIS WITHOUT HEMATURIA: ICD-10-CM

## 2019-03-19 DIAGNOSIS — R82.90 ABNORMAL URINE ODOR: ICD-10-CM

## 2019-03-19 DIAGNOSIS — G40.109 EPILEPSY WITH SIMPLE PARTIAL SEIZURES (HCC): ICD-10-CM

## 2019-03-19 DIAGNOSIS — I10 ESSENTIAL HYPERTENSION: Primary | ICD-10-CM

## 2019-03-19 PROBLEM — R11.2 NAUSEA & VOMITING: Status: RESOLVED | Noted: 2019-01-06 | Resolved: 2019-03-19

## 2019-03-19 PROBLEM — J18.9 PNEUMONIA DUE TO INFECTIOUS ORGANISM: Status: RESOLVED | Noted: 2019-01-06 | Resolved: 2019-03-19

## 2019-03-19 PROBLEM — A41.9 SEPSIS: Status: RESOLVED | Noted: 2019-01-06 | Resolved: 2019-03-19

## 2019-03-19 PROBLEM — R53.1 WEAKNESS: Status: RESOLVED | Noted: 2019-01-06 | Resolved: 2019-03-19

## 2019-03-19 LAB
BILIRUB BLD-MCNC: NEGATIVE MG/DL
CLARITY, POC: ABNORMAL
COLOR UR: ABNORMAL
GLUCOSE UR STRIP-MCNC: NEGATIVE MG/DL
KETONES UR QL: NEGATIVE
LEUKOCYTE EST, POC: ABNORMAL
NITRITE UR-MCNC: POSITIVE MG/ML
PH UR: 7 [PH] (ref 5–8)
PROT UR STRIP-MCNC: ABNORMAL MG/DL
RBC # UR STRIP: ABNORMAL /UL
SP GR UR: 1.02 (ref 1–1.03)
UROBILINOGEN UR QL: NORMAL

## 2019-03-19 PROCEDURE — 99214 OFFICE O/P EST MOD 30 MIN: CPT | Performed by: FAMILY MEDICINE

## 2019-03-19 PROCEDURE — 81003 URINALYSIS AUTO W/O SCOPE: CPT | Performed by: FAMILY MEDICINE

## 2019-03-19 RX ORDER — LOSARTAN POTASSIUM AND HYDROCHLOROTHIAZIDE 25; 100 MG/1; MG/1
1 TABLET ORAL EVERY MORNING
COMMUNITY
Start: 2019-02-17 | End: 2019-06-19 | Stop reason: SDUPTHER

## 2019-03-19 RX ORDER — NITROFURANTOIN 25; 75 MG/1; MG/1
100 CAPSULE ORAL 2 TIMES DAILY
Qty: 10 CAPSULE | Refills: 0 | Status: SHIPPED | OUTPATIENT
Start: 2019-03-19 | End: 2019-04-10

## 2019-03-19 RX ORDER — BUDESONIDE AND FORMOTEROL FUMARATE DIHYDRATE 80; 4.5 UG/1; UG/1
2 AEROSOL RESPIRATORY (INHALATION)
COMMUNITY
End: 2021-12-17
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
## 2019-03-20 LAB
BUN SERPL-MCNC: 8 MG/DL (ref 8–23)
BUN/CREAT SERPL: 10.3 (ref 7–25)
CALCIUM SERPL-MCNC: 10.7 MG/DL (ref 8.6–10.5)
CHLORIDE SERPL-SCNC: 88 MMOL/L (ref 98–107)
CO2 SERPL-SCNC: 28.5 MMOL/L (ref 22–29)
CREAT SERPL-MCNC: 0.78 MG/DL (ref 0.57–1)
GLUCOSE SERPL-MCNC: 115 MG/DL (ref 65–99)
POTASSIUM SERPL-SCNC: 4.6 MMOL/L (ref 3.5–5.2)
SODIUM SERPL-SCNC: 131 MMOL/L (ref 136–145)

## 2019-03-22 LAB
BACTERIA UR CULT: ABNORMAL
BACTERIA UR CULT: ABNORMAL
OTHER ANTIBIOTIC SUSC ISLT: ABNORMAL

## 2019-03-27 ENCOUNTER — APPOINTMENT (OUTPATIENT)
Dept: WOMENS IMAGING | Facility: HOSPITAL | Age: 79
End: 2019-03-27

## 2019-03-27 PROCEDURE — 77067 SCR MAMMO BI INCL CAD: CPT | Performed by: RADIOLOGY

## 2019-03-27 PROCEDURE — 77063 BREAST TOMOSYNTHESIS BI: CPT | Performed by: RADIOLOGY

## 2019-03-27 PROCEDURE — MDREVIEWSP: Performed by: RADIOLOGY

## 2019-04-03 ENCOUNTER — HOSPITAL ENCOUNTER (OUTPATIENT)
Dept: CT IMAGING | Facility: HOSPITAL | Age: 79
Discharge: HOME | End: 2019-04-03
Admitting: FAMILY MEDICINE

## 2019-04-03 ENCOUNTER — LAB (OUTPATIENT)
Dept: LAB | Facility: HOSPITAL | Age: 79
End: 2019-04-03

## 2019-04-03 DIAGNOSIS — C50.812 MALIGNANT NEOPLASM OF OVERLAPPING SITES OF LEFT BREAST IN FEMALE, ESTROGEN RECEPTOR POSITIVE (HCC): ICD-10-CM

## 2019-04-03 DIAGNOSIS — C88.0 MACROGLOBULINEMIA OF WALDENSTROM (HCC): ICD-10-CM

## 2019-04-03 DIAGNOSIS — E87.1 HYPONATREMIA: ICD-10-CM

## 2019-04-03 DIAGNOSIS — R91.1 PULMONARY NODULE: ICD-10-CM

## 2019-04-03 DIAGNOSIS — Z17.0 MALIGNANT NEOPLASM OF OVERLAPPING SITES OF LEFT BREAST IN FEMALE, ESTROGEN RECEPTOR POSITIVE (HCC): ICD-10-CM

## 2019-04-03 LAB
ALBUMIN SERPL-MCNC: 4.6 G/DL (ref 3.5–5.2)
ALBUMIN/GLOB SERPL: 1.8 G/DL (ref 1.1–2.4)
ALP SERPL-CCNC: 85 U/L (ref 38–116)
ALT SERPL W P-5'-P-CCNC: 14 U/L (ref 0–33)
ANION GAP SERPL CALCULATED.3IONS-SCNC: 11.8 MMOL/L
AST SERPL-CCNC: 22 U/L (ref 0–32)
BASOPHILS # BLD AUTO: 0.03 10*3/MM3 (ref 0–0.2)
BASOPHILS NFR BLD AUTO: 0.6 % (ref 0–1.5)
BILIRUB SERPL-MCNC: 0.8 MG/DL (ref 0.2–1.2)
BUN BLD-MCNC: 8 MG/DL (ref 6–20)
BUN/CREAT SERPL: 11.4 (ref 7.3–30)
CALCIUM SPEC-SCNC: 10.3 MG/DL (ref 8.5–10.2)
CHLORIDE SERPL-SCNC: 90 MMOL/L (ref 98–107)
CO2 SERPL-SCNC: 29.2 MMOL/L (ref 22–29)
CREAT BLD-MCNC: 0.7 MG/DL (ref 0.6–1.1)
DEPRECATED RDW RBC AUTO: 43.6 FL (ref 37–54)
EOSINOPHIL # BLD AUTO: 0.07 10*3/MM3 (ref 0–0.4)
EOSINOPHIL NFR BLD AUTO: 1.4 % (ref 0.3–6.2)
ERYTHROCYTE [DISTWIDTH] IN BLOOD BY AUTOMATED COUNT: 12.9 % (ref 12.3–15.4)
GFR SERPL CREATININE-BSD FRML MDRD: 81 ML/MIN/1.73
GLOBULIN UR ELPH-MCNC: 2.6 GM/DL (ref 1.8–3.5)
GLUCOSE BLD-MCNC: 117 MG/DL (ref 74–124)
HCT VFR BLD AUTO: 40.1 % (ref 34–46.6)
HGB BLD-MCNC: 14.1 G/DL (ref 12–15.9)
IMM GRANULOCYTES # BLD AUTO: 0.03 10*3/MM3 (ref 0–0.05)
IMM GRANULOCYTES NFR BLD AUTO: 0.6 % (ref 0–0.5)
LYMPHOCYTES # BLD AUTO: 1.68 10*3/MM3 (ref 0.7–3.1)
LYMPHOCYTES NFR BLD AUTO: 32.7 % (ref 19.6–45.3)
MCH RBC QN AUTO: 32.4 PG (ref 26.6–33)
MCHC RBC AUTO-ENTMCNC: 35.2 G/DL (ref 31.5–35.7)
MCV RBC AUTO: 92.2 FL (ref 79–97)
MONOCYTES # BLD AUTO: 0.48 10*3/MM3 (ref 0.1–0.9)
MONOCYTES NFR BLD AUTO: 9.3 % (ref 5–12)
NEUTROPHILS # BLD AUTO: 2.85 10*3/MM3 (ref 1.4–7)
NEUTROPHILS NFR BLD AUTO: 55.4 % (ref 42.7–76)
NRBC BLD AUTO-RTO: 0 /100 WBC (ref 0–0)
PLATELET # BLD AUTO: 155 10*3/MM3 (ref 140–450)
PMV BLD AUTO: 8.7 FL (ref 6–12)
POTASSIUM BLD-SCNC: 4.1 MMOL/L (ref 3.5–4.7)
PROT SERPL-MCNC: 7.2 G/DL (ref 6.3–8)
RBC # BLD AUTO: 4.35 10*6/MM3 (ref 3.77–5.28)
SODIUM BLD-SCNC: 131 MMOL/L (ref 134–145)
WBC NRBC COR # BLD: 5.14 10*3/MM3 (ref 3.4–10.8)

## 2019-04-03 PROCEDURE — 71250 CT THORAX DX C-: CPT

## 2019-04-03 PROCEDURE — 80053 COMPREHEN METABOLIC PANEL: CPT

## 2019-04-03 PROCEDURE — 85025 COMPLETE CBC W/AUTO DIFF WBC: CPT

## 2019-04-03 PROCEDURE — 36415 COLL VENOUS BLD VENIPUNCTURE: CPT

## 2019-04-04 LAB
ALBUMIN SERPL-MCNC: 3.8 G/DL (ref 2.9–4.4)
ALBUMIN/GLOB SERPL: 1.3 {RATIO} (ref 0.7–1.7)
ALPHA1 GLOB FLD ELPH-MCNC: 0.3 G/DL (ref 0–0.4)
ALPHA2 GLOB SERPL ELPH-MCNC: 0.8 G/DL (ref 0.4–1)
B-GLOBULIN SERPL ELPH-MCNC: 1.1 G/DL (ref 0.7–1.3)
GAMMA GLOB SERPL ELPH-MCNC: 0.8 G/DL (ref 0.4–1.8)
GLOBULIN SER CALC-MCNC: 3.1 G/DL (ref 2.2–3.9)
IGA SERPL-MCNC: 40 MG/DL (ref 64–422)
IGG SERPL-MCNC: 509 MG/DL (ref 700–1600)
IGM SERPL-MCNC: 254 MG/DL (ref 26–217)
INTERPRETATION SERPL IEP-IMP: ABNORMAL
KAPPA LC SERPL-MCNC: 13.1 MG/L (ref 3.3–19.4)
KAPPA LC/LAMBDA SER: 1.87 {RATIO} (ref 0.26–1.65)
LAMBDA LC FREE SERPL-MCNC: 7 MG/L (ref 5.7–26.3)
Lab: ABNORMAL
M-SPIKE: 0.4 G/DL
PROT SERPL-MCNC: 6.9 G/DL (ref 6–8.5)

## 2019-04-07 RX ORDER — GABAPENTIN 400 MG/1
CAPSULE ORAL
Qty: 120 CAPSULE | Refills: 0 | Status: SHIPPED | OUTPATIENT
Start: 2019-04-07 | End: 2019-05-07 | Stop reason: SDUPTHER

## 2019-04-10 ENCOUNTER — APPOINTMENT (OUTPATIENT)
Dept: LAB | Facility: HOSPITAL | Age: 79
End: 2019-04-10

## 2019-04-10 ENCOUNTER — OFFICE VISIT (OUTPATIENT)
Dept: ONCOLOGY | Facility: CLINIC | Age: 79
End: 2019-04-10

## 2019-04-10 VITALS
TEMPERATURE: 98.4 F | BODY MASS INDEX: 26.91 KG/M2 | WEIGHT: 157.6 LBS | HEART RATE: 74 BPM | DIASTOLIC BLOOD PRESSURE: 77 MMHG | RESPIRATION RATE: 12 BRPM | OXYGEN SATURATION: 99 % | SYSTOLIC BLOOD PRESSURE: 191 MMHG | HEIGHT: 64 IN

## 2019-04-10 DIAGNOSIS — G62.0 DRUG-INDUCED POLYNEUROPATHY (HCC): ICD-10-CM

## 2019-04-10 DIAGNOSIS — C88.0 MACROGLOBULINEMIA OF WALDENSTROM (HCC): Primary | ICD-10-CM

## 2019-04-10 DIAGNOSIS — E87.1 HYPONATREMIA: ICD-10-CM

## 2019-04-10 DIAGNOSIS — C50.812 MALIGNANT NEOPLASM OF OVERLAPPING SITES OF LEFT BREAST IN FEMALE, ESTROGEN RECEPTOR POSITIVE (HCC): ICD-10-CM

## 2019-04-10 DIAGNOSIS — Z17.0 MALIGNANT NEOPLASM OF OVERLAPPING SITES OF LEFT BREAST IN FEMALE, ESTROGEN RECEPTOR POSITIVE (HCC): ICD-10-CM

## 2019-04-10 DIAGNOSIS — M89.9 DISORDER OF BONE: ICD-10-CM

## 2019-04-10 PROCEDURE — G0463 HOSPITAL OUTPT CLINIC VISIT: HCPCS | Performed by: INTERNAL MEDICINE

## 2019-04-10 PROCEDURE — 99215 OFFICE O/P EST HI 40 MIN: CPT | Performed by: INTERNAL MEDICINE

## 2019-04-10 NOTE — PROGRESS NOTES
Subjective  REASONS FOR FOLLOWUP:     1. History of Waldenstrom macroglobulinemia. The patient  remains in remission about this condition with stable level of immunoglobulin M . Normal IgA. Normal IgG. Normal white count, hemoglobin, and platelets. No abnormal bleeding. No peripheral adenopathy. No hepatosplenomegaly. The patient will remain in observation.   2. History of breast cancer stage I on the left, status post mastectomy. The patient remains on aromatase inhibitor without any evidence of breast cancer recurrence and good tolerance to her aromatase inhibitor medicine. She will remain on the same issue for the time being.                History of Present Illness This patient returns today to the office for followup stating that she has not had any further respiratory issues since 01/2019 when she had terrible asthma, pneumonia and so forth. She has not had any wheezing or shortness of breath and she has minimal amount of sinus congestion if any at all. She has not had any fevers or chills. Her appetite is acceptable. No trouble with bowel or urinary problems. She has not had any back pain. She has had a new CT scan by Dr. Sorensen recently and she is still waiting to review the report with her. The patient's blood pressure has been under better control. The patient remains on her letrozole for the treatment of her previous history of breast cancer.     The patient otherwise has neuropathic pain in her feet. She takes care of this with gabapentin. She remains fully functional, driving her car, cooking and doing normal house chores.     She feels afraid of being outside because of pollen load and her asthma.                  1. Past Medical History, Past Surgical HistoryHypertension.    2. Hypothyroidism many years ago and has not taken any thyroid medication in quite some time.   3. Thyroid nodule removed more than 35 years ago.    4. Cholecystectomy.    5. Tonsillectomy and adenoidectomy.     6. Appendectomy.  7. Partial hysterectomy many years ago.      She has mammogram and pelvic examination yearly that have been normal.  Colonoscopy 4 years ago was normal.   She also has had a leaky valve in her heart without any significance.      During the recent workup at [facility], the patient had a transesophageal echocardiogram that failed to document any vegetation.    Clostridium colitis requiring admission to [facility] in 09/2008.      Arthroscopy in 03/2009 of the right knee with finding of chondromalacia and appropriate cleanup of the joint was performed by Dr. Moraes.      On 07/24/2013 the patient has lost 14 pounds, just cutting down on junk food.  Her glucose intolerance has improved substantially and her glucose has normalized.      On 01/12/2014, we reviewed her blood pressure issues recently. She has had a very stable blood pressure for the last week, and today is normal. Her physical examination is otherwise unremarkable and normal feeling.     We advised her to remain in observation from this point of view.    As per 10/01/2014, the patient has undergone evaluation by neurology service at [facility] for consideration of gabapentin that she has been taking for so many years for possible seizure activity.   I am aware of an MRI scan of the brain that shows not too much, besides minimal vascular disease and no other lesions.  Her neurologist will be informing us in regard how to proceed in this regard.      She wanted to have a right knee replaced in 12/2009 through her orthopedic surgeon. She developed delirium in the hospital and extensive ecchymosis and hematoma formation in the whole right lower extremity requiring transfusion of red cells for a hemoglobin of 7.     SOCIAL HISTORY:  .  Retired from [employer] where she worked for many years; she never was exposed to any chemicals.   She is a never smoker and nondrinker.   She lives  with her oldest daughter.      FAMILY HISTORY: The patient’s father  of complications from heart disease at age 70.  Mother  after a psychiatric illness at age 67.  A brother  in a car accident at 43 and a sister, age 75, is in good health.   Her three daughters are in good health.  She has no FH of lymphoma or leukemia.    Review of Systems         General: no fever, no chills, no fatigue,no weight changes, no lack of appetite.  Eyes: no epiphora, xerophthalmia,conjunctivitis, pain, glaucoma, blurred vision, blindness, secretion, photophobia, proptosis, diplopia.  Ears: no otorrhea, tinnitus, otorrhagia, deafness, pain, vertigo.  Nose: no rhinorrhea, no epistaxis, no alteration in perception of odors, no sinuses pressure.  Mouth: no alteration in gums or teeth,  No ulcers, no difficulty with mastication or deglut ion, no odynophagia.  Neck: no masses or pain, no thyroid alterations, no pain in muscles or arteries, no carotid odynia, no crepitation.  Respiratory: no cough, no sputum production,no dyspnea,no trepopnea, no pleuritic pain,no hemoptysis.  Heart: no syncope, no irregularity, no palpitations, no angina,no orthopnea,no paroxysmal nocturnal dyspnea.  Vascular Venous: no tenderness,no edema,no palpable cords,no postphlebitic syndrome, no skin changes no ulcerations.  Vascular Arterial: no distal ischemia, noclaudication, no gangrene, no neuropathic ischemic pain, no skin ulcers, no paleness no cyanosis.  GI: no dysphagia, no odynophagia, no regurgitation, no heartburn,no indigestion,no nausea,no vomiting,no hematemesis ,no melena,no jaundice,no distention, no obstipation,no enterorrhagia,no proctalgia,no anal  lesions, no changes in bowel habits.  : no frequency, no hesitancy, no hematuria, no discharge,no  pain.  Musculoskeletal: no muscle or tendon pain or inflammation,no  joint pain, no edema, no functional limitation,no fasciculations, no mass.  Neurologic: no headache, no seizures,  "noalterations on Craneal nerves, no motor deficit, stated le sensory deficit, normal coordination, no alteration in memory,normal orientation, calculation,normal writting, verbal and written language.  Skin: no rashes,no pruritus no localized lesions.  Psychiatric: no anxiety, no depression,no agitation, no delusions, proper insight.           Medications:  The current medication list was reviewed in the EMR    ALLERGIES:    Allergies   Allergen Reactions   • Cortisone    • Darvon  [Propoxyphene] Palpitations       Objective      Vitals:    04/10/19 1600   BP: (!) 191/77   Pulse: 74   Resp: 12   Temp: 98.4 °F (36.9 °C)   TempSrc: Oral   SpO2: 99%   Weight: 71.5 kg (157 lb 9.6 oz)   Height: 162.5 cm (63.98\")   PainSc: 6  Comment: hands and feet pain, neuropathy pain     Current Status 4/10/2019   ECOG score 0       Physical Exam   GENERAL:  Well-developed, well-nourished  Patient  in no acute distress.   SKIN:  Warm, dry ,NO rashes,NO purpura ,NO petechiae.  HEENT:  Pupils were equal and reactive to light and accomodation, conjunctivas non injected, no pterigion, normal extraocular movements, normal visual acuity.   Mouth mucosa was moist, no exudates in oropharynx, normal gum line, normal roof of the mouth and pillars, normal papillations of the tongue.  NECK:  Supple with good range of motion; no thyromegaly or masses, no JVD or bruits, no cervical adenopathies.No carotid arteries pain, no carotid abnormal pulsation , NO arterial dance.  LYMPHATICS:  No cervical, NO supraclavicular, NO axillary,NO epitrochlear , NO inguinal adenopathy.  CHEST:  Normal excursion of both michelet thoraces, normal voice fremitus, no subcutaneous emphysema, normal axillas, no rashes or acanthosis nigricans. Lungs clear to percussion and auscultation, normal breath sounds bilaterally, no wheezing,NO crackles NO ronchi, NO stridor, NO rubs.  CARDIAC AND VASCULAR:  normal rate and regular rhythm, without murmurs,NO rubs NO S3 NO S4 right or " left . Normal femoral, popliteal, pedis, brachial and carotid pulses.  ABDOMEN:  Soft, nontender with no organomegaly or masses, no ascites, no collateral circulation,no distention,no [place] sign, no abdominal pain, no inguinal hernias,no umbilical hernia, no abdominal bruits. Normal bowel sounds.  GENITAL: Not  Performed.  EXTREMITIES  AND SPINE:  No clubbing, cyanosis or edema, no deformities or pain .t spine kyphosis,no scoliosis, deformities or pain in  ribs or pelvic bone.  NEUROLOGICAL:  Patient was awake, alert, oriented to time, person and place.neuropathic pain in legs              RECENT LABS:  Component      Latest Ref Rng & Units 9/27/2018 1/6/2019 1/10/2019 1/11/2019          10:57 AM      IgG      700 - 1600 mg/dL 413 (L)      IgA      64 - 422 mg/dL 31 (L)      IgM      26 - 217 mg/dL 260 (H)      Total Protein      6.3 - 8.0 g/dL 6.6 7.6     Albumin      3.50 - 5.20 g/dL 4.2 4.20 3.30 (L) 4.70   Alpha-1-Globulin      0.0 - 0.4 g/dL 0.3      Alpha-2-Globulin      0.4 - 1.0 g/dL 0.7      Beta Globulin      0.7 - 1.3 g/dL 1.0      Gamma Globulin      0.4 - 1.8 g/dL 0.5      M-Pratik      Not Observed g/dL 0.3 (H)      Globulin      2.2 - 3.9 g/dL 2.4      A/G Ratio      1.1 - 2.4 g/dL 1.8 (H) 1.2     Immunofixation Reflex, Serum       Comment      Please Note       Comment      Kappa FLC      3.3 - 19.4 mg/L 11.2      Lambda FLC      5.7 - 26.3 mg/L 5.3 (L)      Kappa/Lambda Ratio      0.26 - 1.65 2.11 (H)        Component      Latest Ref Rng & Units 1/18/2019 1/18/2019 4/3/2019 4/3/2019          12:39 PM 12:39 PM 10:40 AM 10:40 AM   IgG      700 - 1600 mg/dL  373 (L)  509 (L)   IgA      64 - 422 mg/dL  47 (L)  40 (L)   IgM      26 - 217 mg/dL  211  254 (H)   Total Protein      6.3 - 8.0 g/dL 7.3 6.8 7.2 6.9   Albumin      3.50 - 5.20 g/dL 4.60 4.0 4.60 3.8   Alpha-1-Globulin      0.0 - 0.4 g/dL  0.4  0.3   Alpha-2-Globulin      0.4 - 1.0 g/dL  0.9  0.8   Beta Globulin      0.7 - 1.3 g/dL  1.0  1.1    Gamma Globulin      0.4 - 1.8 g/dL  0.5  0.8   M-Pratik      Not Observed g/dL  Comment:  0.4 (H)   Globulin      2.2 - 3.9 g/dL  2.8  3.1   A/G Ratio      1.1 - 2.4 g/dL 1.7 1.5 1.8 1.3   Immunofixation Reflex, Serum        Comment  Comment   Please Note        Comment  Comment   Kappa FLC      3.3 - 19.4 mg/L  13.3  13.1   Lambda FLC      5.7 - 26.3 mg/L  8.1  7.0   Kappa/Lambda Ratio      0.26 - 1.65  1.64  1.87 (H)         CT CHEST WO CONTRAST-     CLINICAL HISTORY: Follow-up infiltrates and nodules.     TECHNIQUE: Spiral CT images were obtained through the chest without IV  contrast and were reconstructed in 3 mm thick axial slices     Radiation dose reduction techniques were utilized, including automated  exposure control and exposure modulation based on body size.     COMPARISON: Chest CT dated 01/06/2019.     FINDINGS: The previous CT scan demonstrated extensive peribronchial  thickening in both lower lobes and also patchy areas of consolidation  and interstitial infiltrate in both lower lobes consistent with  pneumonia. These findings have resolved completely. A few small  ill-defined nodular opacities in the right upper lobe and at the left  lung apex have also resolved. There are currently no nodules in the  lungs that warrant follow-up based on Fleischner Society criteria.  Minimal linear scarring at the right lung base medially is stable. There  is no mediastinal or hilar or axillary lymphadenopathy. There are no  pleural effusions. The patient is status post left mastectomy. The chest  wall is otherwise unremarkable. Images through the upper abdomen show no  significant abnormality. Bone window images demonstrate no lytic or  blastic lesions. There is mild compression deformity of the superior  endplates of the T4 and T12 vertebral bodies that are new since the  preceding CT scan. These are most likely osteoporotic insufficiency  fractures.     IMPRESSION:  Complete interval clearing of bilateral  lower lobe  infiltrates and also ill-defined nodular opacities in the upper lung  zones since the preceding CT scan dated 01/06/2019. There is mild  compression deformity of the superior endplates of the T4 and T12  vertebral bodies that are new and are most likely as a part  insufficiency fractures.     This report was finalized on 4/3/2019 1:42 PM by Dr. Andi Maxwell M.D.      Differential   Order: [id number] - Part of Panel Order [id number]   Status:  Final result   Visible to patient:  No (Not Released)   Dx:  Hyponatremia; Macroglobulinemia of Wa...    Ref Range & Units 7d ago   WBC 3.40 - 10.80 10*3/mm3 5.14    RBC 3.77 - 5.28 10*6/mm3 4.35    Hemoglobin 12.0 - 15.9 g/dL 14.1    Hematocrit 34.0 - 46.6 % 40.1    MCV 79.0 - 97.0 fL 92.2    MCH 26.6 - 33.0 pg 32.4    MCHC 31.5 - 35.7 g/dL 35.2    RDW 12.3 - 15.4 % 12.9    RDW-SD 37.0 - 54.0 fl 43.6    MPV 6.0 - 12.0 fL 8.7    Platelets 140 - 450 10*3/mm3 155    Neutrophil % 42.7 - 76.0 % 55.4    Lymphocyte % 19.6 - 45.3 % 32.7    Monocyte % 5.0 - 12.0 % 9.3    Eosinophil % 0.3 - 6.2 % 1.4    Basophil % 0.0 - 1.5 % 0.6    Immature Grans % 0.0 - 0.5 % 0.6 Abnormally high     Neutrophils, Absolute 1.40 - 7.00 10*3/mm3 2.85    Lymphocytes, Absolute 0.70 - 3.10 10*3/mm3 1.68    Monocytes, Absolute 0.10 - 0.90 10*3/mm3 0.48    Eosinophils, Absolute 0.00 - 0.40 10*3/mm3 0.07    Basophils, Absolute 0.00 - 0.20 10*3/mm3 0.03    Immature Grans, Absolute 0.00 - 0.05 10*3/mm3 0.03    nRBC 0.0 - 0.0 /100 WBC 0.0    Resulting Agency   CBC LAB         Specimen Collected: 04/03/19 10:40 Last Resulted: 04/03/19 10:53        Lab Flowsheet      Order Details      View Encounter      Lab and Collection Details      Routing      Result History                       Assessment/Plan      1. 1. This patient has longstanding history of Waldenstrom macroglobulinemia that has been treated in the past mainly with Rituxan. In the past, also she was treated with Velcade with very dramatic  improvement in her monoclonal protein but very dramatic sensory peripheral neuropathy. This medication was discontinued altogether. The patient has not required any other intervention regarding Waldenstrom and her monoclonal protein IgM recently done shows stability in comparison with previous assessment as stated above. Her white count, hemoglobin and platelets remain stable.   2. 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   3. The patient has minimal calcium elevation; PTH in the past has been negative or normal and no evidence of hyperparathyroidism. The patient probably has a high calcium related to her monoclonal protein but has no implication at this time.   4. The patient has had asthma and severe respiratory infection. She is over this. I have personally reviewed the CT scan stated above that shows complete resolution of her pulmonary infiltrates. On the other hand, the radiologist mentioned the collapse of vertebral bodies associated with osteoporosis. She had no recollection of any problem or trauma, neither pain; therefore, this happened silently. We will need to do further assessment in regard to DEXA scan. The last one was done in the summer of 2016.   5. The patient has history of breast cancer, status post mastectomy. She had stage I disease. She has been taking letrozole and she will be done in the summer of 2019 in regard to 5 years of adjuvant therapy. I asked her to stop her letrozole on 07/04/2019.     The patient is up-to-date in regard to her mammogram and the recent report shows no abnormality.     RECOMMENDATIONS:   1. For her Waldenstrom, I will review her back in 3 months with a CBC, CMP and monoclonal protein studies done the week before.   2. She will proceed with the DEXA scan. We will call her with the report of this and depending on what we find, we will need to define the role for the  patient to receive Prolia.   3. As stated above, she will discontinue her letrozole on 07/04/2019, five years of adjuvant therapy.     The patient wants to otherwise be reviewed back in 3 months.                        4/10/2019      CC:

## 2019-04-11 ENCOUNTER — TELEPHONE (OUTPATIENT)
Dept: GENERAL RADIOLOGY | Facility: HOSPITAL | Age: 79
End: 2019-04-11

## 2019-04-11 NOTE — TELEPHONE ENCOUNTER
----- Message from Shannon Seay sent at 4/11/2019  8:50 AM EDT -----  Did you send referral to new PCP, Dr. Kaye?

## 2019-04-18 ENCOUNTER — APPOINTMENT (OUTPATIENT)
Dept: WOMENS IMAGING | Facility: HOSPITAL | Age: 79
End: 2019-04-18

## 2019-04-18 PROCEDURE — 77080 DXA BONE DENSITY AXIAL: CPT | Performed by: RADIOLOGY

## 2019-04-26 RX ORDER — POTASSIUM CHLORIDE 20 MEQ/1
TABLET, EXTENDED RELEASE ORAL
Qty: 90 TABLET | Refills: 1 | Status: SHIPPED | OUTPATIENT
Start: 2019-04-26 | End: 2019-10-03 | Stop reason: SDUPTHER

## 2019-05-01 ENCOUNTER — TELEPHONE (OUTPATIENT)
Dept: ONCOLOGY | Facility: HOSPITAL | Age: 79
End: 2019-05-01

## 2019-05-01 NOTE — TELEPHONE ENCOUNTER
----- Message from Massiel Cabral sent at 5/1/2019  1:35 PM EDT -----  Contact: [phone]  Bone Density results.  Normal patient verbalized understanding.

## 2019-05-02 ENCOUNTER — TELEPHONE (OUTPATIENT)
Dept: ONCOLOGY | Facility: CLINIC | Age: 79
End: 2019-05-02

## 2019-05-07 RX ORDER — GABAPENTIN 400 MG/1
CAPSULE ORAL
Qty: 120 CAPSULE | Refills: 1 | Status: SHIPPED | OUTPATIENT
Start: 2019-05-07 | End: 2019-07-03 | Stop reason: SDUPTHER

## 2019-05-17 ENCOUNTER — TELEPHONE (OUTPATIENT)
Dept: FAMILY MEDICINE CLINIC | Facility: CLINIC | Age: 79
End: 2019-05-17

## 2019-05-17 NOTE — TELEPHONE ENCOUNTER
Pt called office r/t wanting her amlodipine changed due to it causing her feet to swell. Please advise.

## 2019-05-18 ENCOUNTER — TELEPHONE (OUTPATIENT)
Dept: FAMILY MEDICINE CLINIC | Facility: CLINIC | Age: 79
End: 2019-05-18

## 2019-05-18 RX ORDER — DILTIAZEM HYDROCHLORIDE 120 MG/1
120 CAPSULE, EXTENDED RELEASE ORAL DAILY
Qty: 30 CAPSULE | Refills: 1 | Status: SHIPPED | OUTPATIENT
Start: 2019-05-18 | End: 2019-06-28 | Stop reason: SINTOL

## 2019-05-18 NOTE — TELEPHONE ENCOUNTER
Spoke with pt. We will stop the amlodipine 2/2 unwanted side effect peripheral edema. Pt reported this happened last time she was on the medication. Her BP has been good, her granddaughter is a nurse and takes it at home. We will have diltiazem at the pharmacy for her to take if BP goes up. Not as likely to cause swelling. Pt will come in this coming week for BP check with the nurse.

## 2019-05-24 ENCOUNTER — CLINICAL SUPPORT (OUTPATIENT)
Dept: FAMILY MEDICINE CLINIC | Facility: CLINIC | Age: 79
End: 2019-05-24

## 2019-05-24 VITALS — SYSTOLIC BLOOD PRESSURE: 142 MMHG | DIASTOLIC BLOOD PRESSURE: 62 MMHG | HEART RATE: 68 BPM | OXYGEN SATURATION: 97 %

## 2019-05-24 NOTE — PROGRESS NOTES
Pt comes in today r/tBP check for medication changes. Pt states medication that she was changed is making her so sick at her stomach and causing her to have headaches everyday. She was placed on ditalizem 120 mg.

## 2019-06-13 ENCOUNTER — TELEPHONE (OUTPATIENT)
Dept: ONCOLOGY | Facility: HOSPITAL | Age: 79
End: 2019-06-13

## 2019-06-17 ENCOUNTER — OFFICE VISIT (OUTPATIENT)
Dept: RETAIL CLINIC | Facility: CLINIC | Age: 79
End: 2019-06-17

## 2019-06-17 VITALS
TEMPERATURE: 97.9 F | RESPIRATION RATE: 20 BRPM | DIASTOLIC BLOOD PRESSURE: 68 MMHG | HEART RATE: 96 BPM | OXYGEN SATURATION: 98 % | SYSTOLIC BLOOD PRESSURE: 164 MMHG

## 2019-06-17 DIAGNOSIS — N39.0 URINARY TRACT INFECTION WITH HEMATURIA, SITE UNSPECIFIED: Primary | ICD-10-CM

## 2019-06-17 DIAGNOSIS — R31.9 URINARY TRACT INFECTION WITH HEMATURIA, SITE UNSPECIFIED: Primary | ICD-10-CM

## 2019-06-17 LAB
BILIRUB BLD-MCNC: NEGATIVE MG/DL
CLARITY, POC: ABNORMAL
COLOR UR: YELLOW
GLUCOSE UR STRIP-MCNC: NEGATIVE MG/DL
KETONES UR QL: NEGATIVE
LEUKOCYTE EST, POC: ABNORMAL
NITRITE UR-MCNC: POSITIVE MG/ML
PH UR: 8 [PH] (ref 5–8)
PROT UR STRIP-MCNC: NEGATIVE MG/DL
RBC # UR STRIP: NEGATIVE /UL
SP GR UR: 1.01 (ref 1–1.03)
UROBILINOGEN UR QL: NORMAL

## 2019-06-17 PROCEDURE — 99213 OFFICE O/P EST LOW 20 MIN: CPT | Performed by: NURSE PRACTITIONER

## 2019-06-17 PROCEDURE — 81003 URINALYSIS AUTO W/O SCOPE: CPT | Performed by: NURSE PRACTITIONER

## 2019-06-17 RX ORDER — NITROFURANTOIN 25; 75 MG/1; MG/1
100 CAPSULE ORAL 2 TIMES DAILY
Qty: 14 CAPSULE | Refills: 0 | Status: CANCELLED | OUTPATIENT
Start: 2019-06-17 | End: 2019-06-24

## 2019-06-17 RX ORDER — CIPROFLOXACIN 500 MG/1
500 TABLET, FILM COATED ORAL 2 TIMES DAILY
Qty: 14 TABLET | Refills: 0 | Status: SHIPPED | OUTPATIENT
Start: 2019-06-17 | End: 2019-06-18 | Stop reason: ALTCHOICE

## 2019-06-17 NOTE — PROGRESS NOTES
Subjective   Karli Loomis is a 78 y.o. female.     Urinary Tract Infection    This is a new problem. The current episode started 1 to 4 weeks ago (2 weeks ). The patient is experiencing no pain. There has been no fever. She is not sexually active. There is no history of pyelonephritis. Associated symptoms include urgency. Pertinent negatives include no chills, discharge, flank pain, frequency, hematuria, nausea, possible pregnancy or vomiting. Associated symptoms comments: odor. Treatments tried: increased water  The treatment provided no relief. Her past medical history is significant for recurrent UTIs. There is no history of kidney stones.        The following portions of the patient's history were reviewed and updated as appropriate: allergies, current medications, past family history, past medical history, past social history, past surgical history and problem list.    Review of Systems   Constitutional: Negative.  Negative for chills and fever.   HENT: Negative.    Eyes: Negative.    Respiratory: Negative.    Gastrointestinal: Negative for nausea and vomiting.   Genitourinary: Positive for urgency. Negative for dysuria, flank pain, frequency and hematuria.   Musculoskeletal: Negative.    Allergic/Immunologic: Negative.    Neurological: Negative.    Hematological: Negative.    Psychiatric/Behavioral: Negative.        Objective   Physical Exam   Constitutional: She is oriented to person, place, and time. Vital signs are normal. She appears well-developed and well-nourished.   HENT:   Head: Normocephalic and atraumatic.   Right Ear: Hearing, tympanic membrane, external ear and ear canal normal.   Left Ear: Hearing, tympanic membrane, external ear and ear canal normal.   Nose: Nose normal. Right sinus exhibits no maxillary sinus tenderness and no frontal sinus tenderness. Left sinus exhibits no maxillary sinus tenderness and no frontal sinus tenderness.   Mouth/Throat: Uvula is midline, oropharynx is clear and  moist and mucous membranes are normal. No tonsillar exudate.   Eyes: Conjunctivae and lids are normal. Pupils are equal, round, and reactive to light.   Neck: Trachea normal and normal range of motion. Neck supple.   Cardiovascular: Normal rate, regular rhythm, S1 normal, S2 normal and normal heart sounds.   Pulmonary/Chest: Effort normal and breath sounds normal.   Abdominal: Soft. Normal appearance and bowel sounds are normal. She exhibits no distension. There is no tenderness. There is no CVA tenderness.   Musculoskeletal: Normal range of motion.   Lymphadenopathy:     She has no cervical adenopathy.   Neurological: She is alert and oriented to person, place, and time. She has normal strength.   Skin: Skin is warm, dry and intact. Turgor is normal. No rash noted.   Psychiatric: She has a normal mood and affect. Her speech is normal and behavior is normal.   Vitals reviewed.        Assessment/Plan   Karli was seen today for urinary tract infection.    Diagnoses and all orders for this visit:    Urinary tract infection with hematuria, site unspecified  -     Urine Culture, Comprehen ([facility]) - Urine, Clean Catch  -     POCT urinalysis dipstick, automated    Other orders  -     Cancel: nitrofurantoin, macrocrystal-monohydrate, (MACROBID) 100 MG capsule; Take 1 capsule by mouth 2 (Two) Times a Day for 7 days.  -     ciprofloxacin (CIPRO) 500 MG tablet; Take 1 tablet by mouth 2 (Two) Times a Day for 7 days.      Spoke with pharmacist at [facility]. D/c Macrobid and Cipro Rxed due to this being a elderly patient. Pharmacist reports he will discuss with patient

## 2019-06-18 ENCOUNTER — TELEPHONE (OUTPATIENT)
Dept: RETAIL CLINIC | Facility: CLINIC | Age: 79
End: 2019-06-18

## 2019-06-18 RX ORDER — AMOXICILLIN AND CLAVULANATE POTASSIUM 875; 125 MG/1; MG/1
1 TABLET, FILM COATED ORAL 2 TIMES DAILY
Qty: 20 TABLET | Refills: 0 | Status: SHIPPED | OUTPATIENT
Start: 2019-06-18 | End: 2019-06-28 | Stop reason: SINTOL

## 2019-06-18 NOTE — TELEPHONE ENCOUNTER
Patient called to report that she did well on macrobid back in march and thought that the provider from yesterday sent in Macrobid. Advised that the provider changed the abx from Macrobid because it is not the first choice in elderly due to unfavorable side effects. Patient reports that she took her first Cipro pill today and it gave her a bad headache. She reports that the pamphlet says to report to your MD if you have diabetes, neuropathy, hx of seizures, etc and she reports that she has all 3. Patient reports that she does not feel comfortable continuing Cipro. Advised that her sensitivity report from March showed that she could take Augmentin. Advised that she can stop the Cipro and I would send in Augmentin and await new culture results. Patient verbalizes understanding.

## 2019-06-20 ENCOUNTER — TELEPHONE (OUTPATIENT)
Dept: RETAIL CLINIC | Facility: CLINIC | Age: 79
End: 2019-06-20

## 2019-06-20 LAB
BACTERIA UR CULT: ABNORMAL
BACTERIA UR CULT: ABNORMAL
OTHER ANTIBIOTIC SUSC ISLT: ABNORMAL

## 2019-06-20 RX ORDER — LOSARTAN POTASSIUM AND HYDROCHLOROTHIAZIDE 25; 100 MG/1; MG/1
TABLET ORAL
Qty: 90 TABLET | Refills: 1 | Status: SHIPPED | OUTPATIENT
Start: 2019-06-20 | End: 2019-08-27 | Stop reason: HOSPADM

## 2019-06-20 NOTE — TELEPHONE ENCOUNTER
Call placed to pt regarding final urine culture report.  Bacteria is resistant to cipro however pt has not went to  the augmentin that was called in.  Educated and encouraged pt to pick uup the new prescription and start immediately/stop cipro.  Pt verbalized understanding.

## 2019-06-26 ENCOUNTER — LAB (OUTPATIENT)
Dept: LAB | Facility: HOSPITAL | Age: 79
End: 2019-06-26

## 2019-06-26 DIAGNOSIS — C88.0 MACROGLOBULINEMIA OF WALDENSTROM (HCC): ICD-10-CM

## 2019-06-26 DIAGNOSIS — E87.1 HYPONATREMIA: ICD-10-CM

## 2019-06-26 DIAGNOSIS — G62.0 DRUG-INDUCED POLYNEUROPATHY (HCC): ICD-10-CM

## 2019-06-26 DIAGNOSIS — C50.812 MALIGNANT NEOPLASM OF OVERLAPPING SITES OF LEFT BREAST IN FEMALE, ESTROGEN RECEPTOR POSITIVE (HCC): ICD-10-CM

## 2019-06-26 DIAGNOSIS — Z17.0 MALIGNANT NEOPLASM OF OVERLAPPING SITES OF LEFT BREAST IN FEMALE, ESTROGEN RECEPTOR POSITIVE (HCC): ICD-10-CM

## 2019-06-26 LAB
ALBUMIN SERPL-MCNC: 5 G/DL (ref 3.5–5.2)
ALBUMIN/GLOB SERPL: 2.2 G/DL (ref 1.1–2.4)
ALP SERPL-CCNC: 85 U/L (ref 38–116)
ALT SERPL W P-5'-P-CCNC: 17 U/L (ref 0–33)
ANION GAP SERPL CALCULATED.3IONS-SCNC: 12 MMOL/L (ref 5–15)
AST SERPL-CCNC: 20 U/L (ref 0–32)
BASOPHILS # BLD AUTO: 0.03 10*3/MM3 (ref 0–0.2)
BASOPHILS NFR BLD AUTO: 0.4 % (ref 0–1.5)
BILIRUB SERPL-MCNC: 0.7 MG/DL (ref 0.2–1.2)
BUN BLD-MCNC: 10 MG/DL (ref 6–20)
BUN/CREAT SERPL: 12.8 (ref 7.3–30)
CALCIUM SPEC-SCNC: 10.4 MG/DL (ref 8.5–10.2)
CHLORIDE SERPL-SCNC: 85 MMOL/L (ref 98–107)
CO2 SERPL-SCNC: 29 MMOL/L (ref 22–29)
CREAT BLD-MCNC: 0.78 MG/DL (ref 0.6–1.1)
DEPRECATED RDW RBC AUTO: 42.1 FL (ref 37–54)
EOSINOPHIL # BLD AUTO: 0.07 10*3/MM3 (ref 0–0.4)
EOSINOPHIL NFR BLD AUTO: 0.9 % (ref 0.3–6.2)
ERYTHROCYTE [DISTWIDTH] IN BLOOD BY AUTOMATED COUNT: 12.2 % (ref 12.3–15.4)
GFR SERPL CREATININE-BSD FRML MDRD: 71 ML/MIN/1.73
GLOBULIN UR ELPH-MCNC: 2.3 GM/DL (ref 1.8–3.5)
GLUCOSE BLD-MCNC: 105 MG/DL (ref 74–124)
HCT VFR BLD AUTO: 40.7 % (ref 34–46.6)
HGB BLD-MCNC: 14.4 G/DL (ref 12–15.9)
IMM GRANULOCYTES # BLD AUTO: 0.03 10*3/MM3 (ref 0–0.05)
IMM GRANULOCYTES NFR BLD AUTO: 0.4 % (ref 0–0.5)
LYMPHOCYTES # BLD AUTO: 2.17 10*3/MM3 (ref 0.7–3.1)
LYMPHOCYTES NFR BLD AUTO: 29.2 % (ref 19.6–45.3)
MCH RBC QN AUTO: 32.7 PG (ref 26.6–33)
MCHC RBC AUTO-ENTMCNC: 35.4 G/DL (ref 31.5–35.7)
MCV RBC AUTO: 92.5 FL (ref 79–97)
MONOCYTES # BLD AUTO: 0.71 10*3/MM3 (ref 0.1–0.9)
MONOCYTES NFR BLD AUTO: 9.6 % (ref 5–12)
NEUTROPHILS # BLD AUTO: 4.42 10*3/MM3 (ref 1.7–7)
NEUTROPHILS NFR BLD AUTO: 59.5 % (ref 42.7–76)
NRBC BLD AUTO-RTO: 0 /100 WBC (ref 0–0.2)
PLATELET # BLD AUTO: 181 10*3/MM3 (ref 140–450)
PMV BLD AUTO: 9 FL (ref 6–12)
POTASSIUM BLD-SCNC: 4 MMOL/L (ref 3.5–4.7)
PROT SERPL-MCNC: 7.3 G/DL (ref 6.3–8)
RBC # BLD AUTO: 4.4 10*6/MM3 (ref 3.77–5.28)
SODIUM BLD-SCNC: 126 MMOL/L (ref 134–145)
WBC NRBC COR # BLD: 7.43 10*3/MM3 (ref 3.4–10.8)

## 2019-06-26 PROCEDURE — 80053 COMPREHEN METABOLIC PANEL: CPT | Performed by: INTERNAL MEDICINE

## 2019-06-26 PROCEDURE — 85025 COMPLETE CBC W/AUTO DIFF WBC: CPT | Performed by: INTERNAL MEDICINE

## 2019-06-26 PROCEDURE — 36415 COLL VENOUS BLD VENIPUNCTURE: CPT | Performed by: INTERNAL MEDICINE

## 2019-06-27 LAB
ALBUMIN SERPL-MCNC: 4.1 G/DL (ref 2.9–4.4)
ALBUMIN/GLOB SERPL: 1.6 {RATIO} (ref 0.7–1.7)
ALPHA1 GLOB FLD ELPH-MCNC: 0.3 G/DL (ref 0–0.4)
ALPHA2 GLOB SERPL ELPH-MCNC: 0.8 G/DL (ref 0.4–1)
B-GLOBULIN SERPL ELPH-MCNC: 1 G/DL (ref 0.7–1.3)
GAMMA GLOB SERPL ELPH-MCNC: 0.7 G/DL (ref 0.4–1.8)
GLOBULIN SER CALC-MCNC: 2.7 G/DL (ref 2.2–3.9)
IGA SERPL-MCNC: 39 MG/DL (ref 64–422)
IGG SERPL-MCNC: 497 MG/DL (ref 700–1600)
IGM SERPL-MCNC: 227 MG/DL (ref 26–217)
INTERPRETATION SERPL IEP-IMP: ABNORMAL
KAPPA LC SERPL-MCNC: 12.5 MG/L (ref 3.3–19.4)
KAPPA LC/LAMBDA SER: 1.6 {RATIO} (ref 0.26–1.65)
LAMBDA LC FREE SERPL-MCNC: 7.8 MG/L (ref 5.7–26.3)
Lab: ABNORMAL
M-SPIKE: 0.3 G/DL
PROT SERPL-MCNC: 6.8 G/DL (ref 6–8.5)

## 2019-06-28 ENCOUNTER — OFFICE VISIT (OUTPATIENT)
Dept: FAMILY MEDICINE CLINIC | Facility: CLINIC | Age: 79
End: 2019-06-28

## 2019-06-28 ENCOUNTER — APPOINTMENT (OUTPATIENT)
Dept: LAB | Facility: HOSPITAL | Age: 79
End: 2019-06-28

## 2019-06-28 VITALS
RESPIRATION RATE: 17 BRPM | HEIGHT: 64 IN | DIASTOLIC BLOOD PRESSURE: 78 MMHG | SYSTOLIC BLOOD PRESSURE: 138 MMHG | HEART RATE: 65 BPM | WEIGHT: 153.7 LBS | BODY MASS INDEX: 26.24 KG/M2 | TEMPERATURE: 98.2 F | OXYGEN SATURATION: 97 %

## 2019-06-28 DIAGNOSIS — Z00.00 MEDICARE ANNUAL WELLNESS VISIT, SUBSEQUENT: ICD-10-CM

## 2019-06-28 DIAGNOSIS — G89.29 CHRONIC NONINTRACTABLE HEADACHE, UNSPECIFIED HEADACHE TYPE: ICD-10-CM

## 2019-06-28 DIAGNOSIS — E87.1 HYPONATREMIA: Primary | ICD-10-CM

## 2019-06-28 DIAGNOSIS — E03.9 ADULT HYPOTHYROIDISM: ICD-10-CM

## 2019-06-28 DIAGNOSIS — I10 ESSENTIAL HYPERTENSION: ICD-10-CM

## 2019-06-28 DIAGNOSIS — R51.9 CHRONIC NONINTRACTABLE HEADACHE, UNSPECIFIED HEADACHE TYPE: ICD-10-CM

## 2019-06-28 LAB
ANION GAP SERPL CALCULATED.3IONS-SCNC: 11 MMOL/L (ref 5–15)
BUN BLD-MCNC: 10 MG/DL (ref 8–23)
BUN/CREAT SERPL: 14.7 (ref 7–25)
CALCIUM SPEC-SCNC: 10 MG/DL (ref 8.6–10.5)
CHLORIDE SERPL-SCNC: 89 MMOL/L (ref 98–107)
CO2 SERPL-SCNC: 24 MMOL/L (ref 22–29)
CREAT BLD-MCNC: 0.68 MG/DL (ref 0.57–1)
GFR SERPL CREATININE-BSD FRML MDRD: 83 ML/MIN/1.73
GLUCOSE BLD-MCNC: 117 MG/DL (ref 65–99)
POTASSIUM BLD-SCNC: 4.2 MMOL/L (ref 3.5–5.2)
SODIUM BLD-SCNC: 124 MMOL/L (ref 136–145)
T4 SERPL-MCNC: 9.33 MCG/DL (ref 4.5–11.7)
TSH SERPL DL<=0.05 MIU/L-ACNC: 1.82 MIU/ML (ref 0.27–4.2)

## 2019-06-28 PROCEDURE — 84443 ASSAY THYROID STIM HORMONE: CPT | Performed by: FAMILY MEDICINE

## 2019-06-28 PROCEDURE — 80048 BASIC METABOLIC PNL TOTAL CA: CPT | Performed by: FAMILY MEDICINE

## 2019-06-28 PROCEDURE — 84436 ASSAY OF TOTAL THYROXINE: CPT | Performed by: FAMILY MEDICINE

## 2019-06-28 PROCEDURE — 90732 PPSV23 VACC 2 YRS+ SUBQ/IM: CPT | Performed by: FAMILY MEDICINE

## 2019-06-28 PROCEDURE — 36415 COLL VENOUS BLD VENIPUNCTURE: CPT | Performed by: FAMILY MEDICINE

## 2019-06-28 PROCEDURE — 99214 OFFICE O/P EST MOD 30 MIN: CPT | Performed by: FAMILY MEDICINE

## 2019-06-28 PROCEDURE — G0439 PPPS, SUBSEQ VISIT: HCPCS | Performed by: FAMILY MEDICINE

## 2019-06-28 PROCEDURE — G0009 ADMIN PNEUMOCOCCAL VACCINE: HCPCS | Performed by: FAMILY MEDICINE

## 2019-06-28 RX ORDER — NITROFURANTOIN 25; 75 MG/1; MG/1
100 CAPSULE ORAL 2 TIMES DAILY
Qty: 14 CAPSULE | Refills: 0 | Status: SHIPPED | OUTPATIENT
Start: 2019-06-28 | End: 2019-07-08

## 2019-06-28 NOTE — PROGRESS NOTES
QUICK REFERENCE INFORMATION:  The ABCs of the Annual Wellness Visit    Subsequent Medicare Wellness Visit     HEALTH RISK ASSESSMENT    : 1940    Recent Hospitalizations:  Recently treated at the following:  [facility].  [facility]      Current Medical Providers:  Patient Care Team:  Teresa Sorensen MD as PCP - General (Family Medicine)  Florencio Larose MD as PCP - Claims Attributed  Florencio Larose MD as Consulting Physician (Hematology and Oncology)        Smoking Status:  Social History     Tobacco Use   Smoking Status Never Smoker   Smokeless Tobacco Never Used       Alcohol Consumption:  Social History     Substance and Sexual Activity   Alcohol Use No       Depression Screen:   PHQ-2/PHQ-9 Depression Screening 2019   Little interest or pleasure in doing things 0   Feeling down, depressed, or hopeless 0   Total Score 0       Health Habits and Functional and Cognitive Screening:  Functional & Cognitive Status 2019   Do you have difficulty preparing food and eating? No   Do you have difficulty bathing yourself, getting dressed or grooming yourself? No   Do you have difficulty using the toilet? No   Do you have difficulty moving around from place to place? No   Do you have trouble with steps or getting out of a bed or a chair? No   In the past year have you fallen or experienced a near fall? Yes   Current Diet Well Balanced Diet   Dental Exam Up to date   Eye Exam Up to date   Exercise (times per week) 0 times per week   Current Exercise Activities Include None   Do you need help using the phone?  No   Are you deaf or do you have serious difficulty hearing?  No   Do you need help with transportation? No   Do you need help shopping? No   Do you need help preparing meals?  No   Do you need help with housework?  No   Do you need help with laundry? No   Do you need help taking your medications? No   Do you need help managing money? No   Do you ever drive or ride in a car without wearing a  seat belt? No   Have you felt unusual stress, anger or loneliness in the last month? No   Who do you live with? Child   If you need help, do you have trouble finding someone available to you? No   Have you been bothered in the last four weeks by sexual problems? No   Do you have difficulty concentrating, remembering or making decisions? No           Does the patient have evidence of cognitive impairment? No    Asiprin use counseling: Taking ASA appropriately as indicated      Recent Lab Results:    Lab Results   Component Value Date     (H) 03/19/2019                 Age-appropriate Screening Schedule:  Refer to the list below for future screening recommendations based on patient's age, sex and/or medical conditions. Orders for these recommended tests are listed in the plan section. The patient has been provided with a written plan.    Health Maintenance   Topic Date Due   • TDAP/TD VACCINES (1 - Tdap) 08/04/1958   • ZOSTER VACCINE (1 of 2) 08/04/1989   • PNEUMOCOCCAL VACCINES (65+ LOW/MEDIUM RISK) (2 of 2 - PPSV23) 12/01/2017   • INFLUENZA VACCINE  08/01/2019   • MAMMOGRAM  03/27/2021   • DXA SCAN  04/18/2021        Subjective    Chief Complaint   Patient presents with   • Medicare Wellness-subsequent   • Immunizations     per chart needs updating    • Urinary Tract Infection     was seen at  placed on 2 different meds last on made her sick       History of Present Illness    Karli Loomis is a 78 y.o. female who presents for an Annual Wellness Visit.    The following portions of the patient's history were reviewed and updated as appropriate: allergies, current medications, past family history, past medical history, past social history, past surgical history and problem list.    Outpatient Medications Prior to Visit   Medication Sig Dispense Refill   • aspirin 81 MG tablet Take 81 mg by mouth Daily.     • Budesonide (RHINOCORT ALLERGY NA) 2 sprays into the nostril(s) as directed by provider Daily As  Needed.     • budesonide-formoterol (SYMBICORT) 80-4.5 MCG/ACT inhaler Inhale 2 puffs 2 (Two) Times a Day.     • cetirizine (zyrTEC) 10 MG tablet Take 10 mg by mouth Daily.     • famotidine (PEPCID) 20 MG tablet Take 20 mg by mouth Daily As Needed.     • gabapentin (NEURONTIN) 400 MG capsule TAKE ONE CAPSULE BY MOUTH FOUR TIMES A  capsule 1   • lansoprazole (PREVACID) 30 MG capsule TAKE 1 CAPSULE EVERY DAY (Patient taking differently: TAKE 1 CAPSULE EVERY DAY Pt states takes as needed) 90 capsule 0   • levothyroxine (SYNTHROID, LEVOTHROID) 50 MCG tablet TAKE 1 TABLET EVERY DAY 90 tablet 1   • losartan-hydrochlorothiazide (HYZAAR) 100-25 MG per tablet TAKE 1 TABLET EVERY DAY 90 tablet 1   • potassium chloride (K-DUR,KLOR-CON) 20 MEQ CR tablet TAKE 1 TABLET EVERY DAY 90 tablet 1   • diltiaZEM (TIAZAC) 120 MG 24 hr capsule Take 1 capsule by mouth Daily. 30 capsule 1   • hydrocortisone 2.5 % cream Apply  topically 2 (Two) Times a Day.     • levalbuterol (XOPENEX) 0.63 MG/3ML nebulizer solution Take 1 ampule by nebulization Every 8 (Eight) Hours As Needed for Wheezing. 24 mL 3   • amoxicillin-clavulanate (AUGMENTIN) 875-125 MG per tablet Take 1 tablet by mouth 2 (Two) Times a Day for 10 days. 20 tablet 0   • letrozole (FEMARA) 2.5 MG tablet TAKE 1 TABLET EVERY DAY 90 tablet 0   • minoxidil (LONITEN) 2.5 MG tablet Take 1 tablet by mouth Every 12 (Twelve) Hours. 30 tablet 1     No facility-administered medications prior to visit.        Patient Active Problem List   Diagnosis   • Malignant neoplasm of overlapping sites of left female breast (CMS/HCC)   • Macroglobulinemia of [place] (CMS/HCC)   • Hypercalcemia   • Airway hyperreactivity   • BP (high blood pressure)   • Adult hypothyroidism   • Primary osteoarthritis of left knee   • Drug-induced polyneuropathy (CMS/HCC)   • Epilepsy with simple partial seizures (CMS/HCC)   • Headache, migraine   • Hyponatremia   • Chronic pansinusitis   • TIA (transient ischemic  attack)   • Sialoadenitis   • UTI due to extended-spectrum beta lactamase (ESBL) producing Escherichia coli   • Essential hypertension       Advance Care Planning:  Patient does not have an advance directive - not interested in additional information    Identification of Risk Factors:  Risk factors include: cardiovascular risk, inactivity, increased fall risk and incontinence.    Review of Systems   Constitutional: Positive for fatigue. Negative for activity change, appetite change, fever and unexpected weight change.   Respiratory: Negative for cough, shortness of breath and wheezing.    Genitourinary: Positive for dysuria, frequency and urgency.       Compared to one year ago, the patient feels her physical health is worse.  Compared to one year ago, the patient feels her mental health is the same.  Patient reports that since her admission in January 2019 with a urinary tract infection and pneumonia that she has just been more tired and had's less stamina.  This is new for her and sort of hard to accept but she is adjusting to it and enjoying her grandchildren.    Objective     Physical Exam   Constitutional: She is oriented to person, place, and time. She appears well-nourished. No distress.   HENT:   Right Ear: External ear normal.   Left Ear: External ear normal.   Nose: Nose normal.   Mouth/Throat: Oropharynx is clear and moist.   TMs and canals are clear, normal.   Eyes: Conjunctivae are normal. Right eye exhibits no discharge. Left eye exhibits no discharge. No scleral icterus.   Neck: Neck supple. No thyromegaly present.   Cardiovascular: Normal rate, regular rhythm and normal heart sounds. Exam reveals no gallop and no friction rub.   No murmur heard.  Pulmonary/Chest: Effort normal and breath sounds normal. No respiratory distress. She has no wheezes.   Musculoskeletal: She exhibits no edema.   Lymphadenopathy:     She has no cervical adenopathy.   Neurological: She is alert and oriented to person, place,  "and time. She exhibits normal muscle tone.   Psychiatric: She has a normal mood and affect. Her behavior is normal.   Vitals reviewed.      Vitals:    06/28/19 1124   BP: 138/78   BP Location: Left arm   Patient Position: Sitting   Cuff Size: Adult   Pulse: 65   Resp: 17   Temp: 98.2 °F (36.8 °C)   TempSrc: Oral   SpO2: 97%   Weight: 69.7 kg (153 lb 11.2 oz)   Height: 162.5 cm (63.98\")       Patient's Body mass index is 26.4 kg/m². BMI is above normal parameters. Recommendations include: nutrition counseling.      Assessment/Plan   Patient Self-Management and Personalized Health Advice  The patient has been provided with information about: diet and fall prevention and preventive services including:   · Advance directive, Fall Risk assessment done, Nutrition counseling provided, Pneumococcal vaccine , Urinary Incontinence assessment done.    Visit Diagnoses:    ICD-10-CM ICD-9-CM   1. Hyponatremia E87.1 276.1   2. Adult hypothyroidism E03.9 244.9       Orders Placed This Encounter   Procedures   • Pneumococcal Polysaccharide Vaccine 23-Valent Greater Than or Equal To 3yo Subcutaneous / IM   • TSH   • T4   • Basic Metabolic Panel       Outpatient Encounter Medications as of 6/28/2019   Medication Sig Dispense Refill   • aspirin 81 MG tablet Take 81 mg by mouth Daily.     • Budesonide (RHINOCORT ALLERGY NA) 2 sprays into the nostril(s) as directed by provider Daily As Needed.     • budesonide-formoterol (SYMBICORT) 80-4.5 MCG/ACT inhaler Inhale 2 puffs 2 (Two) Times a Day.     • cetirizine (zyrTEC) 10 MG tablet Take 10 mg by mouth Daily.     • famotidine (PEPCID) 20 MG tablet Take 20 mg by mouth Daily As Needed.     • gabapentin (NEURONTIN) 400 MG capsule TAKE ONE CAPSULE BY MOUTH FOUR TIMES A  capsule 1   • lansoprazole (PREVACID) 30 MG capsule TAKE 1 CAPSULE EVERY DAY (Patient taking differently: TAKE 1 CAPSULE EVERY DAY Pt states takes as needed) 90 capsule 0   • levothyroxine (SYNTHROID, LEVOTHROID) 50 MCG " tablet TAKE 1 TABLET EVERY DAY 90 tablet 1   • losartan-hydrochlorothiazide (HYZAAR) 100-25 MG per tablet TAKE 1 TABLET EVERY DAY 90 tablet 1   • potassium chloride (K-DUR,KLOR-CON) 20 MEQ CR tablet TAKE 1 TABLET EVERY DAY 90 tablet 1   • [DISCONTINUED] diltiaZEM (TIAZAC) 120 MG 24 hr capsule Take 1 capsule by mouth Daily. 30 capsule 1   • hydrocortisone 2.5 % cream Apply  topically 2 (Two) Times a Day.     • levalbuterol (XOPENEX) 0.63 MG/3ML nebulizer solution Take 1 ampule by nebulization Every 8 (Eight) Hours As Needed for Wheezing. 24 mL 3   • nitrofurantoin, macrocrystal-monohydrate, (MACROBID) 100 MG capsule Take 1 capsule by mouth 2 (Two) Times a Day. 14 capsule 0   • verapamil SR (CALAN-SR) 120 MG CR tablet Take 1 tablet by mouth Every Night. 30 tablet 1   • [DISCONTINUED] amoxicillin-clavulanate (AUGMENTIN) 875-125 MG per tablet Take 1 tablet by mouth 2 (Two) Times a Day for 10 days. 20 tablet 0   • [DISCONTINUED] letrozole (FEMARA) 2.5 MG tablet TAKE 1 TABLET EVERY DAY 90 tablet 0   • [DISCONTINUED] minoxidil (LONITEN) 2.5 MG tablet Take 1 tablet by mouth Every 12 (Twelve) Hours. 30 tablet 1     No facility-administered encounter medications on file as of 6/28/2019.        Reviewed use of high risk medication in the elderly: yes  Reviewed for potential of harmful drug interactions in the elderly: yes    Follow Up:  No Follow-up on file.     An After Visit Summary and PPPS with all of these plans were given to the patient.           UTI  She was on cipro but culture came back resistant. Then put on augmentin but gave her very bad diarrhea.  She only was able to take about 3 doses of Augmentin so she has been untreated for this most recent UTI.  She waited to come in here to see what to do next.  For her UTI we reviewed the susceptibilities from the culture.  It is resistant to ciprofloxacin.  It is sensitive to nitrofurantoin, her breathing status is very good right now, she has been on it previously and  tolerated it very well.  We are going to do a course of nitrofurantoin for her UTI.  Patient asking about her frequency of urinary tract infections and if there is any way to help with this.  She does have incontinence of urine and frequent urination which is exacerbates things as well as the postmenopausal changes.  I will send a message to Dr. Larose about her potentially doing topical estrogen and small quantities 1-2 times per week in an effort to prevent urinary tract infections.  However, she does have history of breast cancer and this may not be an option for us but we will go ahead and inquire.    Hyponatremia  Patient has history of hyponatremia.  She had recent labs with her oncology office but has not had follow-up with them yet.  It shows that it is significantly lower than it has been stably.  I will review the medication list to see if anything is significantly impacting her sodium level.  She describes drinking a lot of water and when she tells me how much it is it is probably between 60 and 72 ounces daily.  I am not sure that is in the off unless her diet just is not adequate.  Working to recheck it today to verify this level.  She is asymptomatic.  She is walking fine and is not having any confusion or difficulty communicating.    Headache  She reports that she has had a double top of her head headache ever since she has started the diltiazem.  She does have really good control of her blood pressure but the headache is annoying.  Working to try verapamil.  Can do another diuretic in her she already has a lot of urinary incontinence and urinary frequency.  We cannot do anymore angiotensin receptor blocker or ACE inhibitor related to her losartan.  She does not tolerate the other calcium channel blockers.  She understands some limitations we have here.  I am not a big fan of clonidine just because of the multi-dosing and risk of rebound hypertension.  We will give verapamil try.  We will check in on  her blood pressure when she goes to see her oncology team on 7/5/2019.  She will let us know if she is having any concerns or symptoms in between time.    Hypothyroidism.  Labs have not been done since 2/2018.  She is on a low dose and will check in on that today.  May be related to why she is feeling more tired since her hospitalization.

## 2019-07-03 RX ORDER — GABAPENTIN 400 MG/1
CAPSULE ORAL
Qty: 120 CAPSULE | Refills: 0 | Status: SHIPPED | OUTPATIENT
Start: 2019-07-03 | End: 2019-08-04 | Stop reason: SDUPTHER

## 2019-07-05 ENCOUNTER — APPOINTMENT (OUTPATIENT)
Dept: LAB | Facility: HOSPITAL | Age: 79
End: 2019-07-05

## 2019-07-05 ENCOUNTER — APPOINTMENT (OUTPATIENT)
Dept: ONCOLOGY | Facility: CLINIC | Age: 79
End: 2019-07-05

## 2019-07-08 ENCOUNTER — OFFICE VISIT (OUTPATIENT)
Dept: ONCOLOGY | Facility: CLINIC | Age: 79
End: 2019-07-08

## 2019-07-08 ENCOUNTER — APPOINTMENT (OUTPATIENT)
Dept: LAB | Facility: HOSPITAL | Age: 79
End: 2019-07-08

## 2019-07-08 ENCOUNTER — TELEPHONE (OUTPATIENT)
Dept: FAMILY MEDICINE CLINIC | Facility: CLINIC | Age: 79
End: 2019-07-08

## 2019-07-08 VITALS
HEART RATE: 68 BPM | DIASTOLIC BLOOD PRESSURE: 66 MMHG | SYSTOLIC BLOOD PRESSURE: 172 MMHG | RESPIRATION RATE: 12 BRPM | HEIGHT: 64 IN | BODY MASS INDEX: 26.41 KG/M2 | TEMPERATURE: 98.7 F | OXYGEN SATURATION: 98 % | WEIGHT: 154.7 LBS

## 2019-07-08 DIAGNOSIS — C50.812 MALIGNANT NEOPLASM OF OVERLAPPING SITES OF LEFT BREAST IN FEMALE, ESTROGEN RECEPTOR POSITIVE (HCC): ICD-10-CM

## 2019-07-08 DIAGNOSIS — C88.0 MACROGLOBULINEMIA OF WALDENSTROM (HCC): Primary | ICD-10-CM

## 2019-07-08 DIAGNOSIS — Z17.0 MALIGNANT NEOPLASM OF OVERLAPPING SITES OF LEFT BREAST IN FEMALE, ESTROGEN RECEPTOR POSITIVE (HCC): ICD-10-CM

## 2019-07-08 DIAGNOSIS — E87.1 HYPONATREMIA WITH NORMAL EXTRACELLULAR FLUID VOLUME: ICD-10-CM

## 2019-07-08 DIAGNOSIS — G62.0 DRUG-INDUCED POLYNEUROPATHY (HCC): ICD-10-CM

## 2019-07-08 PROBLEM — E83.52 HYPERCALCEMIA: Status: RESOLVED | Noted: 2017-02-01 | Resolved: 2019-07-08

## 2019-07-08 PROCEDURE — 99214 OFFICE O/P EST MOD 30 MIN: CPT | Performed by: INTERNAL MEDICINE

## 2019-07-08 PROCEDURE — G0463 HOSPITAL OUTPT CLINIC VISIT: HCPCS | Performed by: INTERNAL MEDICINE

## 2019-07-08 NOTE — TELEPHONE ENCOUNTER
Spoke with patient per Franchesca request after ONC appt. Per ac note she had BP reading of 172/66. Pt states she took her BP again on manual cuff and she was in 160s systolic and she states around 60s for dystolic. Pt refused to come back to office for a BP check but states that granddaughter is nurse and she would take her BP with manual cuff and pt would call office with reading on thurs or fri. Pt states that her son would have to bring her back and its his only day off.

## 2019-07-08 NOTE — PROGRESS NOTES
Subjective  REASONS FOR FOLLOWUP:     1. History of Waldenstrom macroglobulinemia. The patient  remains in remission about this condition with stable level of immunoglobulin M . Normal IgA. Normal IgG. Normal white count, hemoglobin, and platelets. No abnormal bleeding. No peripheral adenopathy. No hepatosplenomegaly. The patient will remain in observation.   2. History of breast cancer stage I on the left, status post mastectomy. The patient remains on aromatase inhibitor without any evidence of breast cancer recurrence and good tolerance to her aromatase inhibitor medicine. She will remain on the same issue for the time being.                History of Present Illness This patient returns today to the office for followup stating she has had several urinary tract infections since the previous visit being treated with antibiotics and resolved.  Dr. Muir is planning to give her topical estrogen cream in the vagina to try to minimize this phenomenon and I agree with this completely.  In the meantime, the patient has had a relatively good appetite and normal bowel activity and no cardiovascular or respiratory issues to speak of.  Her carpal tunnel in the right hand is worse and she has decided in favor of surgery for this.  She has not had any areas of skeletal pain, fevers, chills, sweats or bleeding.  Her history of left breast cancer has remained unchanged she has had updated mammogram that has not shown any abnormalities.  Her chronic peripheral neuropathy in her feet is very much unchanged.  Her hyponatremia that is a chronic issue related to Waldenstrom's that is called pseudohyponatremia has remained unchanged.                    1. Past Medical History, Past Surgical HistoryHypertension.    2. Hypothyroidism many years ago and has not taken any thyroid medication in quite some time.   3. Thyroid nodule removed more than 35 years ago.    4. Cholecystectomy.    5. Tonsillectomy and adenoidectomy.     6. Appendectomy.  7. Partial hysterectomy many years ago.      She has mammogram and pelvic examination yearly that have been normal.  Colonoscopy 4 years ago was normal.   She also has had a leaky valve in her heart without any significance.      During the recent workup at [facility], the patient had a transesophageal echocardiogram that failed to document any vegetation.    Clostridium colitis requiring admission to [facility] in 09/2008.      Arthroscopy in 03/2009 of the right knee with finding of chondromalacia and appropriate cleanup of the joint was performed by Dr. Moraes.      On 07/24/2013 the patient has lost 14 pounds, just cutting down on junk food.  Her glucose intolerance has improved substantially and her glucose has normalized.      On 01/12/2014, we reviewed her blood pressure issues recently. She has had a very stable blood pressure for the last week, and today is normal. Her physical examination is otherwise unremarkable and normal feeling.     We advised her to remain in observation from this point of view.    As per 10/01/2014, the patient has undergone evaluation by neurology service at [facility] for consideration of gabapentin that she has been taking for so many years for possible seizure activity.   I am aware of an MRI scan of the brain that shows not too much, besides minimal vascular disease and no other lesions.  Her neurologist will be informing us in regard how to proceed in this regard.      She wanted to have a right knee replaced in 12/2009 through her orthopedic surgeon. She developed delirium in the hospital and extensive ecchymosis and hematoma formation in the whole right lower extremity requiring transfusion of red cells for a hemoglobin of 7.     SOCIAL HISTORY:  .  Retired from [employer] where she worked for many years; she never was exposed to any chemicals.   She is a never smoker and nondrinker.   She lives  with her oldest daughter.      FAMILY HISTORY: The patient’s father  of complications from heart disease at age 70.  Mother  after a psychiatric illness at age 67.  A brother  in a car accident at 43 and a sister, age 75, is in good health.   Her three daughters are in good health.  She has no FH of lymphoma or leukemia.    Review of Systems       General: no fever, no chills, no fatigue,no weight changes, no lack of appetite.  Eyes: no epiphora, xerophthalmia,conjunctivitis, pain, glaucoma, blurred vision, blindness, secretion, photophobia, proptosis, diplopia.  Ears: no otorrhea, tinnitus, otorrhagia, deafness, pain, vertigo.  Nose: no rhinorrhea, no epistaxis, no alteration in perception of odors, no sinuses pressure.  Mouth: no alteration in gums or teeth,  No ulcers, no difficulty with mastication or deglut ion, no odynophagia.  Neck: no masses or pain, no thyroid alterations, no pain in muscles or arteries, no carotid odynia, no crepitation.  Respiratory: no cough, no sputum production,no dyspnea,no trepopnea, no pleuritic pain,no hemoptysis.  Heart: no syncope, no irregularity, no palpitations, no angina,no orthopnea,no paroxysmal nocturnal dyspnea.  Vascular Venous: no tenderness,no edema,no palpable cords,no postphlebitic syndrome, no skin changes no ulcerations.  Vascular Arterial: no distal ischemia, noclaudication, no gangrene, no neuropathic ischemic pain, no skin ulcers, no paleness no cyanosis.  GI: no dysphagia, no odynophagia, no regurgitation, no heartburn,no indigestion,no nausea,no vomiting,no hematemesis ,no melena,no jaundice,no distention, no obstipation,no enterorrhagia,no proctalgia,no anal  lesions, no changes in bowel habits.  : no frequency, no hesitancy, no hematuria, no discharge,no  Pain.stated uti  Musculoskeletal: stated muscle or tendon pain or inflammation,no  joint pain, no edema, no functional limitation,no fasciculations, no mass.  Neurologic: no headache, no  "seizures, noalterations on Craneal nerves, no motor deficit, stated sensory deficit, normal coordination, no alteration in memory,normal orientation, calculation,normal writting, verbal and written language.  Skin: no rashes,no pruritus no localized lesions.  Psychiatric: no anxiety, no depression,no agitation, no delusions, proper insight.             Medications:  The current medication list was reviewed in the EMR    ALLERGIES:    Allergies   Allergen Reactions   • Cortisone    • Darvon  [Propoxyphene] Palpitations       Objective      Vitals:    07/08/19 1121   BP: 172/66   Pulse: 68   Resp: 12   Temp: 98.7 °F (37.1 °C)   TempSrc: Oral   SpO2: 98%   Weight: 70.2 kg (154 lb 11.2 oz)   Height: 162.5 cm (63.98\")   PainSc: 0-No pain     Current Status 7/8/2019   ECOG score 0       Physical Exam   GENERAL:  Well-developed, well-nourished  Patient  in no acute distress.   SKIN:  Warm, dry ,NO rashes,NO purpura ,NO petechiae.  HEENT:  Pupils were equal and reactive to light and accomodation, conjunctivas non injected, no pterigion, normal extraocular movements, normal visual acuity.   Mouth mucosa was moist, no exudates in oropharynx, normal gum line, normal roof of the mouth and pillars, normal papillations of the tongue.  NECK:  Supple with good range of motion; no thyromegaly or masses, no JVD or bruits, no cervical adenopathies.No carotid arteries pain, no carotid abnormal pulsation , NO arterial dance.  LYMPHATICS:  No cervical, NO supraclavicular, NO axillary,NO epitrochlear , NO inguinal adenopathy.  CHEST:  Normal excursion of both michelet thoraces, normal voice fremitus, no subcutaneous emphysema, normal axillas, no rashes or acanthosis nigricans. Lungs clear to percussion and auscultation, normal breath sounds bilaterally, no wheezing,NO crackles NO ronchi, NO stridor, NO rubs.  CARDIAC AND VASCULAR:  normal rate and regular rhythm, without murmurs,NO rubs NO S3 NO S4 right or left . Normal femoral, popliteal, " pedis, brachial and carotid pulses.  INSPECTION of  breast documented symmetry of the tissue per se and location and size of the nipple,no retractions or inversion of the nipple, normal skin without lesions, no erythema or nodules,no paud'orange, no prominence of superficial veins or chest wall collateral circulation.PALPATION of the breast documented normal skin turgor, no induration, alteration in local temperature, or pain, no palpable masses or nodules, normal mobility of the tissues,no fixation of the tissue or parenchyma to the chest wall, no alteration at the tail of the breasts or axillas, no adenopathies. Left mastectomy Surgical site was well healed.No lymphedema in either extremity.  ABDOMEN:  Soft, nontender with no organomegaly or masses, no ascites, no collateral circulation,no distention,no Max sign, no abdominal pain, no inguinal hernias,no umbilical hernia, no abdominal bruits. Normal bowel sounds.  GENITAL: Not  Performed.  EXTREMITIES  AND SPINE:  No clubbing, cyanosis or edema, no deformities or pain .No kyphosis, scoliosis, deformities or pain in spine, ribs or pelvic bone.  NEUROLOGICAL:  Patient was awake, alert, oriented to time, person and place.sensory neuropathy feet, carpal tunnel bilateral r worse than left                  RECENT LABS:     Ref Range & Units 12d ago   IgG 700 - 1600 mg/dL 497 Abnormally low     IgA 64 - 422 mg/dL 39 Abnormally low     Comment: Result confirmed on concentration.   IgM 26 - 217 mg/dL 227 Abnormally high     Total Protein 6.0 - 8.5 g/dL 6.8    Albumin 2.9 - 4.4 g/dL 4.1    Alpha-1-Globulin 0.0 - 0.4 g/dL 0.3    Alpha-2-Globulin 0.4 - 1.0 g/dL 0.8    Beta Globulin 0.7 - 1.3 g/dL 1.0    Gamma Globulin 0.4 - 1.8 g/dL 0.7    M-Pratik Not Observed g/dL 0.3 Abnormally high     Globulin 2.2 - 3.9 g/dL 2.7    A/G Ratio 0.7 - 1.7 1.6    Immunofixation Reflex, Serum  Comment    Comment: Immunofixation shows IgM monoclonal protein with kappa light chain    specificity.   Please note  Comment    Comment: Protein electrophoresis scan will follow via computer, mail, or    delivery.   Free Light Chain, Kappa 3.3 - 19.4 mg/L 12.5    Free Lambda Light Chains 5.7 - 26.3 mg/L 7.8    Kappa/Lambda Ratio 0.26 - 1.65 1.60       Narrative     Performed at:  [facility]  [id number]  : Kiel Galvez PhD, Phone:  [phone]            Contains critical data Comprehensive Metabolic Panel   Order: [id number]   Collected:  6/26/2019 12:29   View Full Report   Ref Range & Units 12d ago   Glucose 74 - 124 mg/dL 105    BUN 6 - 20 mg/dL 10    Creatinine 0.60 - 1.10 mg/dL 0.78    Sodium 134 - 145 mmol/L 126 Critically low     Potassium 3.5 - 4.7 mmol/L 4.0    Chloride 98 - 107 mmol/L 85 Abnormally low     CO2 22.0 - 29.0 mmol/L 29.0    Calcium 8.5 - 10.2 mg/dL 10.4 Abnormally high     Total Protein 6.3 - 8.0 g/dL 7.3    Albumin 3.50 - 5.20 g/dL 5.00    ALT (SGPT) 0 - 33 U/L 17    AST (SGOT) 0 - 32 U/L 20    Alkaline Phosphatase 38 - 116 U/L 85    Total Bilirubin 0.2 - 1.2 mg/dL 0.7    eGFR Non African Amer >60 mL/min/1.73 71    Globulin 1.8 - 3.5 gm/dL 2.3    A/G Ratio 1.1 - 2.4 g/dL 2.2    BUN/Creatinine Ratio 7.3 - 30.0 12.8    Anion Gap 5.0 - 15.0 mmol/L 12.0       Narrative     The MDRD GFR formula is only valid for adults with stable renal function between ages 18 and 70.            Contains abnormal data CBC & Differential   Order: [id number]   Collected:  6/26/2019 12:29   View Full Report              Contains abnormal data CBC Auto Differential   Order: [id number] - Part of Panel Order [id number]   Collected:  6/26/2019 12:29   View Full Report   Ref Range & Units 12d ago   WBC 3.40 - 10.80 10*3/mm3 7.43    RBC 3.77 - 5.28 10*6/mm3 4.40    Hemoglobin 12.0 - 15.9 g/dL 14.4    Hematocrit 34.0 - 46.6 % 40.7    MCV 79.0 - 97.0 fL 92.5    MCH 26.6 - 33.0 pg 32.7    MCHC 31.5 - 35.7 g/dL 35.4    RDW 12.3 - 15.4 % 12.2 Abnormally  low     RDW-SD 37.0 - 54.0 fl 42.1    MPV 6.0 - 12.0 fL 9.0    Platelets 140 - 450 10*3/mm3 181    Neutrophil % 42.7 - 76.0 % 59.5    Lymphocyte % 19.6 - 45.3 % 29.2    Monocyte % 5.0 - 12.0 % 9.6    Eosinophil % 0.3 - 6.2 % 0.9    Basophil % 0.0 - 1.5 % 0.4    Immature Grans % 0.0 - 0.5 % 0.4    Neutrophils, Absolute 1.70 - 7.00 10*3/mm3 4.42    Lymphocytes, Absolute 0.70 - 3.10 10*3/mm3 2.17    Monocytes, Absolute 0.10 - 0.90 10*3/mm3 0.71    Eosinophils, Absolute 0.00 - 0.40 10*3/mm3 0.07    Basophils, Absolute 0.00 - 0.20 10*3/mm3 0.03    Immature Grans, Absolute 0.00 - 0.05 10*3/mm3 0.03    nRBC 0.0 - 0.2 /100 WBC 0.0                                Assessment/Plan      1. 1. This patient has longstanding history of Waldenstrom macroglobulinemia that has been treated in the past mainly with Rituxan. In the past, also she was treated with Velcade with very dramatic improvement in her monoclonal protein but very dramatic sensory peripheral neuropathy. This medication was discontinued altogether. The patient has not required any other intervention regarding Waldenstrom and her monoclonal protein IgM recently done shows stability in comparison with previous assessment as stated above. Her white count, hemoglobin and platelets remain stable.   2. 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   3. The patient has minimal calcium elevation; PTH in the past has been negative or normal and no evidence of hyperparathyroidism. The patient probably has a high calcium related to her monoclonal protein but has no implication at this time.   4. The patient has history of breast cancer, status post mastectomy. She had stage I disease. She has been taking letrozole and she is  done in the summer of 2019 in regard to 5 years of adjuvant therapy.  stopped her letrozole on 07/04/2019.     The patient is up-to-date in regard to her  mammogram and the recent report shows no abnormality.     RECOMMENDATIONS:   1. For her Waldenstrom, I will review her back in 3 months with a CBC, CMP and monoclonal protein studies done the week before.     I asked her to proceed with surgery for her carpal tunnel.  She is becoming more and more incapacitated as time goes by regarding use of the right hand given this fact and there is no other permanent solution for this short of releasing the nerve.  3.  Her hyponatremia is a chronic issue, present since the time of diagnosis of Waldenstrom's and called pseudohyponatremia.  This can be left alone. Recent TSH was normal as well.      Otherwise, I discussed with her the fact that she has had so many urinary tract infections that I agree with Dr. Sorensen that topical estrogen cream in the vagina can make a difference.  I asked her to use this a couple times a week.      Also, D-Mannose can have some effect regarding adhesional bacterial to the bladder wall decreasing the possibility of this phenomenon to happen.  I asked her to buy this and take 2 tablets daily.  This will have no interaction with any other medicine that she takes and neither bother any of the other medications.                         7/8/2019      CC:

## 2019-07-10 RX ORDER — LEVOTHYROXINE SODIUM 0.05 MG/1
TABLET ORAL
Qty: 90 TABLET | Refills: 1 | Status: SHIPPED | OUTPATIENT
Start: 2019-07-10 | End: 2019-10-03 | Stop reason: SDUPTHER

## 2019-07-11 NOTE — TELEPHONE ENCOUNTER
Please check in with pt. We will have to go up on her diltiazem if she is remained elevated. Also to have her come in for BP check. Thanks.

## 2019-07-12 NOTE — TELEPHONE ENCOUNTER
Pt returned phone call with BP readings.  The first one was at [facility] office visit on 7/9 134/78, 7/10 132/64, 7/11 138/62. Granddaughter is nurse and took last two readings. Please advise.

## 2019-07-17 DIAGNOSIS — E87.1 HYPONATREMIA: Primary | ICD-10-CM

## 2019-07-17 NOTE — TELEPHONE ENCOUNTER
Spoke with patient in detail. Pt states that headaches have gone away since the medication change.

## 2019-07-24 ENCOUNTER — RESULTS ENCOUNTER (OUTPATIENT)
Dept: FAMILY MEDICINE CLINIC | Facility: CLINIC | Age: 79
End: 2019-07-24

## 2019-07-24 DIAGNOSIS — E87.1 HYPONATREMIA: ICD-10-CM

## 2019-08-06 RX ORDER — GABAPENTIN 400 MG/1
CAPSULE ORAL
Qty: 120 CAPSULE | Refills: 0 | Status: SHIPPED | OUTPATIENT
Start: 2019-08-06 | End: 2019-09-07 | Stop reason: SDUPTHER

## 2019-08-07 ENCOUNTER — OFFICE VISIT (OUTPATIENT)
Dept: RETAIL CLINIC | Facility: CLINIC | Age: 79
End: 2019-08-07

## 2019-08-07 VITALS
TEMPERATURE: 98.7 F | RESPIRATION RATE: 16 BRPM | OXYGEN SATURATION: 96 % | DIASTOLIC BLOOD PRESSURE: 84 MMHG | HEART RATE: 68 BPM | SYSTOLIC BLOOD PRESSURE: 148 MMHG

## 2019-08-07 DIAGNOSIS — R35.0 FREQUENCY OF URINATION: Primary | ICD-10-CM

## 2019-08-07 LAB
BILIRUB BLD-MCNC: NEGATIVE MG/DL
CLARITY, POC: CLEAR
COLOR UR: YELLOW
GLUCOSE UR STRIP-MCNC: NEGATIVE MG/DL
KETONES UR QL: NEGATIVE
LEUKOCYTE EST, POC: ABNORMAL
NITRITE UR-MCNC: NEGATIVE MG/ML
PH UR: 6 [PH] (ref 5–8)
PROT UR STRIP-MCNC: NEGATIVE MG/DL
RBC # UR STRIP: NEGATIVE /UL
SP GR UR: 1.01 (ref 1–1.03)
UROBILINOGEN UR QL: NORMAL

## 2019-08-07 PROCEDURE — 99213 OFFICE O/P EST LOW 20 MIN: CPT | Performed by: NURSE PRACTITIONER

## 2019-08-07 NOTE — PROGRESS NOTES
Subjective   Patient ID: Karli Loomis is a 79 y.o. female presents with   Chief Complaint   Patient presents with   • Urinary Frequency     c/o urinary frequency, and odor. She says that Macrobid is the antibiotic given to her that doesn't cause diarrhea.        Urinary Frequency    This is a new problem. The current episode started 1 to 4 weeks ago (1w). The problem occurs every urination. The problem has been gradually worsening. The patient is experiencing no pain. There has been no fever. She is not sexually active. There is no history of pyelonephritis. Associated symptoms include frequency and urgency. Pertinent negatives include no flank pain or hematuria. Treatments tried: cranberry. The treatment provided no relief. Her past medical history is significant for kidney stones and recurrent UTIs. There is no history of a single kidney.   Frequency increasing in evening.    Allergies   Allergen Reactions   • Cortisone Other (See Comments)     Reports having a shot years ago, and wonders if he hit a vein. She was told by another doctor that it probably went in her blood stream.    • Darvon  [Propoxyphene] Palpitations       The following portions of the patient's history were reviewed and updated as appropriate: allergies, current medications, past family history, past medical history, past social history, past surgical history and problem list.      Review of Systems   Constitutional: Negative.    Cardiovascular: Negative.    Gastrointestinal: Negative.    Genitourinary: Positive for frequency and urgency. Negative for decreased urine volume, difficulty urinating, dyspareunia, dysuria, enuresis, flank pain, genital sores, hematuria, menstrual problem, pelvic pain, vaginal bleeding, vaginal discharge and vaginal pain.        Foul odor to urine       Objective     Vitals:    08/07/19 1527   BP: 148/84   Pulse:    Resp:    Temp:    SpO2:          Physical Exam   Constitutional: She is oriented to person, place,  and time. She appears well-developed and well-nourished. She does not appear ill. No distress.   HENT:   Head: Normocephalic.   Right Ear: Hearing and external ear normal.   Left Ear: Hearing and external ear normal.   Eyes: Conjunctivae are normal.   Sclera white.   Neck: No tracheal deviation present.   Cardiovascular: Normal rate, regular rhythm, S1 normal, S2 normal and normal heart sounds.   Pulmonary/Chest: Effort normal and breath sounds normal. No accessory muscle usage. No respiratory distress.   Abdominal: Soft. Bowel sounds are normal. She exhibits no distension. There is no hepatosplenomegaly. There is no tenderness. There is no rigidity, no rebound, no guarding and no CVA tenderness. No hernia.   Lymphadenopathy:     She has no cervical adenopathy.   Neurological: She is alert and oriented to person, place, and time.   Skin: Skin is warm and dry.   Vitals reviewed.    Brief Urine Lab Results  (Last result in the past 365 days)      Color   Clarity   Blood   Leuk Est   Nitrite   Protein   CREAT   Urine HCG        08/07/19 1533 Yellow Clear Negative Moderate (2+) Negative Negative                 Karli was seen today for urinary frequency.    Diagnoses and all orders for this visit:    Frequency of urination  -     POC Urinalysis Dipstick, Automated        Stop fluids early in the evening. Patient understands possible side effects of all medications ordered. Follow-up with Primary Care Physician in 48-72 hours if these symptoms worsen or fail to improve as anticipated. Patient verbalizes understanding.    Urinary Frequency, Adult    Urinary frequency means urinating more often than usual. People with urinary frequency urinate at least 8 times in 24 hours, even if they drink a normal amount of fluid. Although they urinate more often than normal, the total amount of urine produced in a day may be normal. Urinary frequency is also called pollakiuria.  What are the causes?  This condition may be caused by:  · A  urinary tract infection.  · Obesity.  · Bladder problems, such as bladder stones.  · Caffeine or alcohol.  · Eating food or drinking fluids that irritate the bladder. These include coffee, tea, soda, artificial sweeteners, citrus, tomato-based foods, and chocolate.  · Certain medicines, such as medicines that help the body get rid of extra fluid (diuretics).  · Muscle or nerve weakness.  · Overactive bladder.  · Chronic diabetes.  · Interstitial cystitis.  · In men, problems with the prostate, such as an enlarged prostate.  · In women, pregnancy.  In some cases, the cause may not be known.  What increases the risk?  This condition is more likely to develop in:  · Women who have gone through menopause.  · Men with prostate problems.  · People with a disease or injury that affects the nerves or spinal cord.  · People who have or have had a condition that affects the brain, such as a stroke.  What are the signs or symptoms?  Symptoms of this condition include:  · Feeling an urgent need to urinate often. The stress and anxiety of needing to find a bathroom quickly can make this urge worse.  · Urinating 8 or more times in 24 hours.  · Urinating as often as every 1 to 2 hours.  How is this diagnosed?  This condition is diagnosed based on your symptoms, your medical history, and a physical exam. You may have tests, such as:  · Blood tests.  · Urine tests.  · Imaging tests, such as X-rays or ultrasounds.  · A bladder test.  · A test of your neurological system. This is the body system that senses the need to urinate.  · A test to check for problems in the urethra and bladder called cystoscopy.  You may also be asked to keep a bladder diary. A bladder diary is a record of what you eat and drink, how often you urinate, and how much you urinate. You may need to see a health care provider who specializes in conditions of the urinary tract (urologist) or kidneys (nephrologist).  How is this treated?  Treatment for this condition  depends on the cause. Sometimes the condition goes away on its own and treatment is not necessary. If treatment is needed, it may include:  · Taking medicine.  · Learning exercises that strengthen the muscles that help control urination.  · Following a bladder training program. This may include:  ? Learning to delay going to the bathroom.  ? Double urinating (voiding). This helps if you are not completely emptying your bladder.  ? Scheduled voiding.  · Making diet changes, such as:  ? Avoiding caffeine.  ? Drinking fewer fluids, especially alcohol.  ? Not drinking in the evening.  ? Not having foods or drinks that may irritate the bladder.  ? Eating foods that help prevent or ease constipation. Constipation can make this condition worse.  · Having the nerves in your bladder stimulated. There are two options for stimulating the nerves to your bladder:  ? Outpatient electrical nerve stimulation. This is done by your health care provider.  ? Surgery to implant a bladder pacemaker. The pacemaker helps to control the urge to urinate.  Follow these instructions at home:  · Keep a bladder diary if told to by your health care provider.  · Take over-the-counter and prescription medicines only as told by your health care provider.  · Do any exercises as told by your health care provider.  · Follow a bladder training program as told by your health care provider.  · Make any recommended diet changes.  · Keep all follow-up visits as told by your health care provider. This is important.  Contact a health care provider if:  · You start urinating more often.  · You feel pain or irritation when you urinate.  · You notice blood in your urine.  · Your urine looks cloudy.  · You develop a fever.  · You begin vomiting.  Get help right away if:  · You are unable to urinate.  This information is not intended to replace advice given to you by your health care provider. Make sure you discuss any questions you have with your health care  provider.  Document Released: 10/14/2010 Document Revised: 01/18/2017 Document Reviewed: 07/13/2016  [employer] Patient Education © 2019 [employer]

## 2019-08-07 NOTE — PATIENT INSTRUCTIONS
Urinary Frequency, Adult    Urinary frequency means urinating more often than usual. People with urinary frequency urinate at least 8 times in 24 hours, even if they drink a normal amount of fluid. Although they urinate more often than normal, the total amount of urine produced in a day may be normal. Urinary frequency is also called pollakiuria.  What are the causes?  This condition may be caused by:  · A urinary tract infection.  · Obesity.  · Bladder problems, such as bladder stones.  · Caffeine or alcohol.  · Eating food or drinking fluids that irritate the bladder. These include coffee, tea, soda, artificial sweeteners, citrus, tomato-based foods, and chocolate.  · Certain medicines, such as medicines that help the body get rid of extra fluid (diuretics).  · Muscle or nerve weakness.  · Overactive bladder.  · Chronic diabetes.  · Interstitial cystitis.  · In men, problems with the prostate, such as an enlarged prostate.  · In women, pregnancy.  In some cases, the cause may not be known.  What increases the risk?  This condition is more likely to develop in:  · Women who have gone through menopause.  · Men with prostate problems.  · People with a disease or injury that affects the nerves or spinal cord.  · People who have or have had a condition that affects the brain, such as a stroke.  What are the signs or symptoms?  Symptoms of this condition include:  · Feeling an urgent need to urinate often. The stress and anxiety of needing to find a bathroom quickly can make this urge worse.  · Urinating 8 or more times in 24 hours.  · Urinating as often as every 1 to 2 hours.  How is this diagnosed?  This condition is diagnosed based on your symptoms, your medical history, and a physical exam. You may have tests, such as:  · Blood tests.  · Urine tests.  · Imaging tests, such as X-rays or ultrasounds.  · A bladder test.  · A test of your neurological system. This is the body system that senses the need to urinate.  · A  test to check for problems in the urethra and bladder called cystoscopy.  You may also be asked to keep a bladder diary. A bladder diary is a record of what you eat and drink, how often you urinate, and how much you urinate. You may need to see a health care provider who specializes in conditions of the urinary tract (urologist) or kidneys (nephrologist).  How is this treated?  Treatment for this condition depends on the cause. Sometimes the condition goes away on its own and treatment is not necessary. If treatment is needed, it may include:  · Taking medicine.  · Learning exercises that strengthen the muscles that help control urination.  · Following a bladder training program. This may include:  ? Learning to delay going to the bathroom.  ? Double urinating (voiding). This helps if you are not completely emptying your bladder.  ? Scheduled voiding.  · Making diet changes, such as:  ? Avoiding caffeine.  ? Drinking fewer fluids, especially alcohol.  ? Not drinking in the evening.  ? Not having foods or drinks that may irritate the bladder.  ? Eating foods that help prevent or ease constipation. Constipation can make this condition worse.  · Having the nerves in your bladder stimulated. There are two options for stimulating the nerves to your bladder:  ? Outpatient electrical nerve stimulation. This is done by your health care provider.  ? Surgery to implant a bladder pacemaker. The pacemaker helps to control the urge to urinate.  Follow these instructions at home:  · Keep a bladder diary if told to by your health care provider.  · Take over-the-counter and prescription medicines only as told by your health care provider.  · Do any exercises as told by your health care provider.  · Follow a bladder training program as told by your health care provider.  · Make any recommended diet changes.  · Keep all follow-up visits as told by your health care provider. This is important.  Contact a health care provider  if:  · You start urinating more often.  · You feel pain or irritation when you urinate.  · You notice blood in your urine.  · Your urine looks cloudy.  · You develop a fever.  · You begin vomiting.  Get help right away if:  · You are unable to urinate.  This information is not intended to replace advice given to you by your health care provider. Make sure you discuss any questions you have with your health care provider.  Document Released: 10/14/2010 Document Revised: 01/18/2017 Document Reviewed: 07/13/2016  [employer] Interactive Patient Education © 2019 [employer]

## 2019-08-21 RX ORDER — LANSOPRAZOLE 30 MG/1
CAPSULE, DELAYED RELEASE ORAL
Qty: 90 CAPSULE | Refills: 0 | Status: SHIPPED | OUTPATIENT
Start: 2019-08-21 | End: 2021-08-09

## 2019-08-25 ENCOUNTER — HOSPITAL ENCOUNTER (OUTPATIENT)
Facility: HOSPITAL | Age: 79
Setting detail: OBSERVATION
Discharge: HOME | End: 2019-08-27
Attending: EMERGENCY MEDICINE | Admitting: HOSPITALIST

## 2019-08-25 ENCOUNTER — APPOINTMENT (OUTPATIENT)
Dept: CT IMAGING | Facility: HOSPITAL | Age: 79
End: 2019-08-25

## 2019-08-25 ENCOUNTER — APPOINTMENT (OUTPATIENT)
Dept: GENERAL RADIOLOGY | Facility: HOSPITAL | Age: 79
End: 2019-08-25

## 2019-08-25 DIAGNOSIS — G45.9 TIA (TRANSIENT ISCHEMIC ATTACK): Primary | ICD-10-CM

## 2019-08-25 DIAGNOSIS — G45.3 AMAUROSIS FUGAX OF LEFT EYE: ICD-10-CM

## 2019-08-25 PROBLEM — H54.7 VISION LOSS: Status: ACTIVE | Noted: 2019-08-25

## 2019-08-25 LAB
ALBUMIN SERPL-MCNC: 4.9 G/DL (ref 3.5–5.2)
ALBUMIN/GLOB SERPL: 2.1 G/DL
ALP SERPL-CCNC: 84 U/L (ref 39–117)
ALT SERPL W P-5'-P-CCNC: 17 U/L (ref 1–33)
ANION GAP SERPL CALCULATED.3IONS-SCNC: 17.6 MMOL/L (ref 5–15)
AST SERPL-CCNC: 26 U/L (ref 1–32)
BASOPHILS # BLD AUTO: 0.04 10*3/MM3 (ref 0–0.2)
BASOPHILS NFR BLD AUTO: 0.6 % (ref 0–1.5)
BILIRUB SERPL-MCNC: 0.8 MG/DL (ref 0.2–1.2)
BUN BLD-MCNC: 8 MG/DL (ref 8–23)
BUN/CREAT SERPL: 11.4 (ref 7–25)
CALCIUM SPEC-SCNC: 10.1 MG/DL (ref 8.6–10.5)
CHLORIDE SERPL-SCNC: 89 MMOL/L (ref 98–107)
CO2 SERPL-SCNC: 20.4 MMOL/L (ref 22–29)
CREAT BLD-MCNC: 0.7 MG/DL (ref 0.57–1)
CRP SERPL-MCNC: 0.67 MG/DL (ref 0–0.5)
DEPRECATED RDW RBC AUTO: 42.4 FL (ref 37–54)
EOSINOPHIL # BLD AUTO: 0.2 10*3/MM3 (ref 0–0.4)
EOSINOPHIL NFR BLD AUTO: 2.8 % (ref 0.3–6.2)
ERYTHROCYTE [DISTWIDTH] IN BLOOD BY AUTOMATED COUNT: 12.3 % (ref 12.3–15.4)
ERYTHROCYTE [SEDIMENTATION RATE] IN BLOOD: 7 MM/HR (ref 0–30)
GFR SERPL CREATININE-BSD FRML MDRD: 81 ML/MIN/1.73
GLOBULIN UR ELPH-MCNC: 2.3 GM/DL
GLUCOSE BLD-MCNC: 115 MG/DL (ref 65–99)
GLUCOSE BLDC GLUCOMTR-MCNC: 106 MG/DL (ref 70–130)
HCT VFR BLD AUTO: 43.9 % (ref 34–46.6)
HGB BLD-MCNC: 15.1 G/DL (ref 12–15.9)
IMM GRANULOCYTES # BLD AUTO: 0.02 10*3/MM3 (ref 0–0.05)
IMM GRANULOCYTES NFR BLD AUTO: 0.3 % (ref 0–0.5)
INR PPP: 0.94 (ref 0.9–1.1)
LYMPHOCYTES # BLD AUTO: 1.44 10*3/MM3 (ref 0.7–3.1)
LYMPHOCYTES NFR BLD AUTO: 19.9 % (ref 19.6–45.3)
MCH RBC QN AUTO: 32.3 PG (ref 26.6–33)
MCHC RBC AUTO-ENTMCNC: 34.4 G/DL (ref 31.5–35.7)
MCV RBC AUTO: 93.8 FL (ref 79–97)
MONOCYTES # BLD AUTO: 0.56 10*3/MM3 (ref 0.1–0.9)
MONOCYTES NFR BLD AUTO: 7.7 % (ref 5–12)
NEUTROPHILS # BLD AUTO: 4.98 10*3/MM3 (ref 1.7–7)
NEUTROPHILS NFR BLD AUTO: 68.7 % (ref 42.7–76)
NRBC BLD AUTO-RTO: 0 /100 WBC (ref 0–0.2)
PLATELET # BLD AUTO: 155 10*3/MM3 (ref 140–450)
PMV BLD AUTO: 9.3 FL (ref 6–12)
POTASSIUM BLD-SCNC: 4.3 MMOL/L (ref 3.5–5.2)
PROT SERPL-MCNC: 7.2 G/DL (ref 6–8.5)
PROTHROMBIN TIME: 12.2 SECONDS (ref 11.7–14.2)
RBC # BLD AUTO: 4.68 10*6/MM3 (ref 3.77–5.28)
SODIUM BLD-SCNC: 127 MMOL/L (ref 136–145)
TROPONIN T SERPL-MCNC: <0.01 NG/ML (ref 0–0.03)
WBC NRBC COR # BLD: 7.24 10*3/MM3 (ref 3.4–10.8)

## 2019-08-25 PROCEDURE — 86140 C-REACTIVE PROTEIN: CPT | Performed by: EMERGENCY MEDICINE

## 2019-08-25 PROCEDURE — 71046 X-RAY EXAM CHEST 2 VIEWS: CPT

## 2019-08-25 PROCEDURE — 99284 EMERGENCY DEPT VISIT MOD MDM: CPT

## 2019-08-25 PROCEDURE — G0378 HOSPITAL OBSERVATION PER HR: HCPCS

## 2019-08-25 PROCEDURE — 82962 GLUCOSE BLOOD TEST: CPT

## 2019-08-25 PROCEDURE — 85025 COMPLETE CBC W/AUTO DIFF WBC: CPT | Performed by: EMERGENCY MEDICINE

## 2019-08-25 PROCEDURE — 80053 COMPREHEN METABOLIC PANEL: CPT | Performed by: EMERGENCY MEDICINE

## 2019-08-25 PROCEDURE — 93010 ELECTROCARDIOGRAM REPORT: CPT | Performed by: INTERNAL MEDICINE

## 2019-08-25 PROCEDURE — 93005 ELECTROCARDIOGRAM TRACING: CPT | Performed by: EMERGENCY MEDICINE

## 2019-08-25 PROCEDURE — 85610 PROTHROMBIN TIME: CPT | Performed by: EMERGENCY MEDICINE

## 2019-08-25 PROCEDURE — 85652 RBC SED RATE AUTOMATED: CPT | Performed by: EMERGENCY MEDICINE

## 2019-08-25 PROCEDURE — 25010000002 PROCHLORPERAZINE EDISYLATE PER 10 MG: Performed by: EMERGENCY MEDICINE

## 2019-08-25 PROCEDURE — 84484 ASSAY OF TROPONIN QUANT: CPT | Performed by: EMERGENCY MEDICINE

## 2019-08-25 PROCEDURE — 96374 THER/PROPH/DIAG INJ IV PUSH: CPT

## 2019-08-25 PROCEDURE — 70450 CT HEAD/BRAIN W/O DYE: CPT

## 2019-08-25 RX ORDER — ASPIRIN 81 MG/1
81 TABLET ORAL DAILY
Status: DISCONTINUED | OUTPATIENT
Start: 2019-08-26 | End: 2019-08-27 | Stop reason: HOSPADM

## 2019-08-25 RX ORDER — GABAPENTIN 400 MG/1
400 CAPSULE ORAL 4 TIMES DAILY
Status: DISCONTINUED | OUTPATIENT
Start: 2019-08-25 | End: 2019-08-27 | Stop reason: HOSPADM

## 2019-08-25 RX ORDER — PROCHLORPERAZINE EDISYLATE 5 MG/ML
10 INJECTION INTRAMUSCULAR; INTRAVENOUS ONCE
Status: COMPLETED | OUTPATIENT
Start: 2019-08-25 | End: 2019-08-25

## 2019-08-25 RX ORDER — LOSARTAN POTASSIUM 100 MG/1
100 TABLET ORAL
Status: DISCONTINUED | OUTPATIENT
Start: 2019-08-26 | End: 2019-08-27 | Stop reason: HOSPADM

## 2019-08-25 RX ORDER — FLUTICASONE PROPIONATE 50 MCG
1 SPRAY, SUSPENSION (ML) NASAL DAILY
Status: DISCONTINUED | OUTPATIENT
Start: 2019-08-26 | End: 2019-08-27 | Stop reason: HOSPADM

## 2019-08-25 RX ORDER — BUDESONIDE AND FORMOTEROL FUMARATE DIHYDRATE 80; 4.5 UG/1; UG/1
2 AEROSOL RESPIRATORY (INHALATION)
Status: DISCONTINUED | OUTPATIENT
Start: 2019-08-25 | End: 2019-08-27 | Stop reason: HOSPADM

## 2019-08-25 RX ORDER — SODIUM CHLORIDE 0.9 % (FLUSH) 0.9 %
10 SYRINGE (ML) INJECTION AS NEEDED
Status: DISCONTINUED | OUTPATIENT
Start: 2019-08-25 | End: 2019-08-27 | Stop reason: HOSPADM

## 2019-08-25 RX ORDER — PANTOPRAZOLE SODIUM 40 MG/1
40 TABLET, DELAYED RELEASE ORAL EVERY MORNING
Status: DISCONTINUED | OUTPATIENT
Start: 2019-08-26 | End: 2019-08-27 | Stop reason: HOSPADM

## 2019-08-25 RX ORDER — LEVOTHYROXINE SODIUM 0.05 MG/1
50 TABLET ORAL DAILY
Status: DISCONTINUED | OUTPATIENT
Start: 2019-08-26 | End: 2019-08-27 | Stop reason: HOSPADM

## 2019-08-25 RX ORDER — SODIUM CHLORIDE 0.9 % (FLUSH) 0.9 %
3 SYRINGE (ML) INJECTION EVERY 12 HOURS SCHEDULED
Status: DISCONTINUED | OUTPATIENT
Start: 2019-08-25 | End: 2019-08-27 | Stop reason: HOSPADM

## 2019-08-25 RX ORDER — ALBUTEROL SULFATE 2.5 MG/3ML
0.62 SOLUTION RESPIRATORY (INHALATION) EVERY 6 HOURS PRN
Status: DISCONTINUED | OUTPATIENT
Start: 2019-08-25 | End: 2019-08-27 | Stop reason: HOSPADM

## 2019-08-25 RX ORDER — SODIUM CHLORIDE 0.9 % (FLUSH) 0.9 %
3-10 SYRINGE (ML) INJECTION AS NEEDED
Status: DISCONTINUED | OUTPATIENT
Start: 2019-08-25 | End: 2019-08-27 | Stop reason: HOSPADM

## 2019-08-25 RX ADMIN — PROCHLORPERAZINE EDISYLATE 10 MG: 5 INJECTION INTRAMUSCULAR; INTRAVENOUS at 16:05

## 2019-08-26 ENCOUNTER — APPOINTMENT (OUTPATIENT)
Dept: MRI IMAGING | Facility: HOSPITAL | Age: 79
End: 2019-08-26

## 2019-08-26 LAB
ANION GAP SERPL CALCULATED.3IONS-SCNC: 13.3 MMOL/L (ref 5–15)
BACTERIA UR QL AUTO: ABNORMAL /HPF
BASOPHILS # BLD AUTO: 0.04 10*3/MM3 (ref 0–0.2)
BASOPHILS NFR BLD AUTO: 0.7 % (ref 0–1.5)
BILIRUB UR QL STRIP: NEGATIVE
BUN BLD-MCNC: 7 MG/DL (ref 8–23)
BUN/CREAT SERPL: 11.7 (ref 7–25)
CALCIUM SPEC-SCNC: 9.2 MG/DL (ref 8.6–10.5)
CHLORIDE SERPL-SCNC: 91 MMOL/L (ref 98–107)
CLARITY UR: CLEAR
CO2 SERPL-SCNC: 26.7 MMOL/L (ref 22–29)
COLOR UR: YELLOW
CORTIS SERPL-MCNC: 4.47 MCG/DL
CREAT BLD-MCNC: 0.6 MG/DL (ref 0.57–1)
DEPRECATED RDW RBC AUTO: 41.9 FL (ref 37–54)
EOSINOPHIL # BLD AUTO: 0.31 10*3/MM3 (ref 0–0.4)
EOSINOPHIL NFR BLD AUTO: 5.8 % (ref 0.3–6.2)
ERYTHROCYTE [DISTWIDTH] IN BLOOD BY AUTOMATED COUNT: 12.4 % (ref 12.3–15.4)
GFR SERPL CREATININE-BSD FRML MDRD: 96 ML/MIN/1.73
GLUCOSE BLD-MCNC: 99 MG/DL (ref 65–99)
GLUCOSE UR STRIP-MCNC: NEGATIVE MG/DL
HCT VFR BLD AUTO: 40.5 % (ref 34–46.6)
HGB BLD-MCNC: 14 G/DL (ref 12–15.9)
HGB UR QL STRIP.AUTO: NEGATIVE
HYALINE CASTS UR QL AUTO: ABNORMAL /LPF
IMM GRANULOCYTES # BLD AUTO: 0.02 10*3/MM3 (ref 0–0.05)
IMM GRANULOCYTES NFR BLD AUTO: 0.4 % (ref 0–0.5)
KETONES UR QL STRIP: NEGATIVE
LEUKOCYTE ESTERASE UR QL STRIP.AUTO: ABNORMAL
LYMPHOCYTES # BLD AUTO: 1.95 10*3/MM3 (ref 0.7–3.1)
LYMPHOCYTES NFR BLD AUTO: 36.5 % (ref 19.6–45.3)
MCH RBC QN AUTO: 31.7 PG (ref 26.6–33)
MCHC RBC AUTO-ENTMCNC: 34.6 G/DL (ref 31.5–35.7)
MCV RBC AUTO: 91.8 FL (ref 79–97)
MONOCYTES # BLD AUTO: 0.55 10*3/MM3 (ref 0.1–0.9)
MONOCYTES NFR BLD AUTO: 10.3 % (ref 5–12)
NEUTROPHILS # BLD AUTO: 2.47 10*3/MM3 (ref 1.7–7)
NEUTROPHILS NFR BLD AUTO: 46.3 % (ref 42.7–76)
NITRITE UR QL STRIP: NEGATIVE
NRBC BLD AUTO-RTO: 0 /100 WBC (ref 0–0.2)
OSMOLALITY UR: 199 MOSM/KG
PH UR STRIP.AUTO: 7.5 [PH] (ref 5–8)
PLATELET # BLD AUTO: 169 10*3/MM3 (ref 140–450)
PMV BLD AUTO: 9.5 FL (ref 6–12)
POTASSIUM BLD-SCNC: 3.7 MMOL/L (ref 3.5–5.2)
PROT UR QL STRIP: NEGATIVE
RBC # BLD AUTO: 4.41 10*6/MM3 (ref 3.77–5.28)
RBC # UR: ABNORMAL /HPF
REF LAB TEST METHOD: ABNORMAL
SODIUM BLD-SCNC: 131 MMOL/L (ref 136–145)
SP GR UR STRIP: 1.01 (ref 1–1.03)
SQUAMOUS #/AREA URNS HPF: ABNORMAL /HPF
TSH SERPL DL<=0.05 MIU/L-ACNC: 4.03 MIU/ML (ref 0.27–4.2)
UROBILINOGEN UR QL STRIP: ABNORMAL
WBC NRBC COR # BLD: 5.34 10*3/MM3 (ref 3.4–10.8)
WBC UR QL AUTO: ABNORMAL /HPF

## 2019-08-26 PROCEDURE — 99214 OFFICE O/P EST MOD 30 MIN: CPT | Performed by: PSYCHIATRY & NEUROLOGY

## 2019-08-26 PROCEDURE — G0378 HOSPITAL OBSERVATION PER HR: HCPCS

## 2019-08-26 PROCEDURE — 85025 COMPLETE CBC W/AUTO DIFF WBC: CPT | Performed by: HOSPITALIST

## 2019-08-26 PROCEDURE — A9577 INJ MULTIHANCE: HCPCS | Performed by: HOSPITALIST

## 2019-08-26 PROCEDURE — 81001 URINALYSIS AUTO W/SCOPE: CPT | Performed by: HOSPITALIST

## 2019-08-26 PROCEDURE — 94640 AIRWAY INHALATION TREATMENT: CPT

## 2019-08-26 PROCEDURE — 84443 ASSAY THYROID STIM HORMONE: CPT | Performed by: HOSPITALIST

## 2019-08-26 PROCEDURE — 70553 MRI BRAIN STEM W/O & W/DYE: CPT

## 2019-08-26 PROCEDURE — 80048 BASIC METABOLIC PNL TOTAL CA: CPT | Performed by: HOSPITALIST

## 2019-08-26 PROCEDURE — 94799 UNLISTED PULMONARY SVC/PX: CPT

## 2019-08-26 PROCEDURE — 0 GADOBENATE DIMEGLUMINE 529 MG/ML SOLUTION: Performed by: HOSPITALIST

## 2019-08-26 PROCEDURE — 70549 MR ANGIOGRAPH NECK W/O&W/DYE: CPT

## 2019-08-26 PROCEDURE — 70544 MR ANGIOGRAPHY HEAD W/O DYE: CPT

## 2019-08-26 PROCEDURE — 36415 COLL VENOUS BLD VENIPUNCTURE: CPT | Performed by: HOSPITALIST

## 2019-08-26 PROCEDURE — 83935 ASSAY OF URINE OSMOLALITY: CPT | Performed by: HOSPITALIST

## 2019-08-26 PROCEDURE — 82533 TOTAL CORTISOL: CPT | Performed by: HOSPITALIST

## 2019-08-26 RX ORDER — FUROSEMIDE 20 MG/1
20 TABLET ORAL DAILY
Status: DISCONTINUED | OUTPATIENT
Start: 2019-08-26 | End: 2019-08-27 | Stop reason: HOSPADM

## 2019-08-26 RX ORDER — POTASSIUM CHLORIDE 750 MG/1
10 CAPSULE, EXTENDED RELEASE ORAL DAILY
Status: DISCONTINUED | OUTPATIENT
Start: 2019-08-26 | End: 2019-08-27 | Stop reason: HOSPADM

## 2019-08-26 RX ORDER — HYDRALAZINE HYDROCHLORIDE 10 MG/1
10 TABLET, FILM COATED ORAL EVERY 6 HOURS PRN
Status: DISCONTINUED | OUTPATIENT
Start: 2019-08-26 | End: 2019-08-27 | Stop reason: HOSPADM

## 2019-08-26 RX ADMIN — BUDESONIDE AND FORMOTEROL FUMARATE DIHYDRATE 2 PUFF: 80; 4.5 AEROSOL RESPIRATORY (INHALATION) at 11:55

## 2019-08-26 RX ADMIN — GABAPENTIN 400 MG: 400 CAPSULE ORAL at 21:36

## 2019-08-26 RX ADMIN — VERAPAMIL HYDROCHLORIDE 120 MG: 120 TABLET, FILM COATED, EXTENDED RELEASE ORAL at 21:36

## 2019-08-26 RX ADMIN — GABAPENTIN 400 MG: 400 CAPSULE ORAL at 08:17

## 2019-08-26 RX ADMIN — SODIUM CHLORIDE, PRESERVATIVE FREE 3 ML: 5 INJECTION INTRAVENOUS at 21:44

## 2019-08-26 RX ADMIN — GABAPENTIN 400 MG: 400 CAPSULE ORAL at 18:13

## 2019-08-26 RX ADMIN — GABAPENTIN 400 MG: 400 CAPSULE ORAL at 00:06

## 2019-08-26 RX ADMIN — ASPIRIN 81 MG: 81 TABLET, COATED ORAL at 08:18

## 2019-08-26 RX ADMIN — GADOBENATE DIMEGLUMINE 14 ML: 529 INJECTION, SOLUTION INTRAVENOUS at 20:28

## 2019-08-26 RX ADMIN — POTASSIUM CHLORIDE 10 MEQ: 750 CAPSULE, EXTENDED RELEASE ORAL at 15:02

## 2019-08-26 RX ADMIN — PANTOPRAZOLE SODIUM 40 MG: 40 TABLET, DELAYED RELEASE ORAL at 06:21

## 2019-08-26 RX ADMIN — SODIUM CHLORIDE, PRESERVATIVE FREE 3 ML: 5 INJECTION INTRAVENOUS at 00:06

## 2019-08-26 RX ADMIN — SODIUM CHLORIDE, PRESERVATIVE FREE 3 ML: 5 INJECTION INTRAVENOUS at 08:18

## 2019-08-26 RX ADMIN — HYDRALAZINE HYDROCHLORIDE 10 MG: 10 TABLET, FILM COATED ORAL at 23:46

## 2019-08-26 RX ADMIN — VERAPAMIL HYDROCHLORIDE 120 MG: 120 TABLET, FILM COATED, EXTENDED RELEASE ORAL at 00:05

## 2019-08-26 RX ADMIN — GABAPENTIN 400 MG: 400 CAPSULE ORAL at 12:19

## 2019-08-26 RX ADMIN — LOSARTAN POTASSIUM 100 MG: 100 TABLET, FILM COATED ORAL at 08:18

## 2019-08-26 RX ADMIN — FUROSEMIDE 20 MG: 20 TABLET ORAL at 15:01

## 2019-08-26 RX ADMIN — LEVOTHYROXINE SODIUM 50 MCG: 50 TABLET ORAL at 08:18

## 2019-08-27 VITALS
BODY MASS INDEX: 25.09 KG/M2 | SYSTOLIC BLOOD PRESSURE: 165 MMHG | HEIGHT: 66 IN | DIASTOLIC BLOOD PRESSURE: 84 MMHG | TEMPERATURE: 98 F | HEART RATE: 70 BPM | OXYGEN SATURATION: 95 % | WEIGHT: 156.09 LBS | RESPIRATION RATE: 16 BRPM

## 2019-08-27 LAB
ANION GAP SERPL CALCULATED.3IONS-SCNC: 12.3 MMOL/L (ref 5–15)
BUN BLD-MCNC: 9 MG/DL (ref 8–23)
BUN/CREAT SERPL: 12.5 (ref 7–25)
CALCIUM SPEC-SCNC: 9.2 MG/DL (ref 8.6–10.5)
CHLORIDE SERPL-SCNC: 90 MMOL/L (ref 98–107)
CO2 SERPL-SCNC: 27.7 MMOL/L (ref 22–29)
CREAT BLD-MCNC: 0.72 MG/DL (ref 0.57–1)
GFR SERPL CREATININE-BSD FRML MDRD: 78 ML/MIN/1.73
GLUCOSE BLD-MCNC: 117 MG/DL (ref 65–99)
POTASSIUM BLD-SCNC: 3.5 MMOL/L (ref 3.5–5.2)
SODIUM BLD-SCNC: 130 MMOL/L (ref 136–145)

## 2019-08-27 PROCEDURE — 80048 BASIC METABOLIC PNL TOTAL CA: CPT | Performed by: HOSPITALIST

## 2019-08-27 PROCEDURE — 99214 OFFICE O/P EST MOD 30 MIN: CPT | Performed by: NURSE PRACTITIONER

## 2019-08-27 PROCEDURE — G0378 HOSPITAL OBSERVATION PER HR: HCPCS

## 2019-08-27 RX ORDER — LOSARTAN POTASSIUM 100 MG/1
100 TABLET ORAL
Qty: 30 TABLET | Refills: 0 | Status: SHIPPED | OUTPATIENT
Start: 2019-08-28 | End: 2019-10-03

## 2019-08-27 RX ORDER — FUROSEMIDE 20 MG/1
20 TABLET ORAL DAILY
Qty: 30 TABLET | Refills: 0 | Status: SHIPPED | OUTPATIENT
Start: 2019-08-28 | End: 2020-02-17

## 2019-08-27 RX ADMIN — ASPIRIN 81 MG: 81 TABLET, COATED ORAL at 09:24

## 2019-08-27 RX ADMIN — PANTOPRAZOLE SODIUM 40 MG: 40 TABLET, DELAYED RELEASE ORAL at 06:53

## 2019-08-27 RX ADMIN — LOSARTAN POTASSIUM 100 MG: 100 TABLET, FILM COATED ORAL at 09:24

## 2019-08-27 RX ADMIN — FUROSEMIDE 20 MG: 20 TABLET ORAL at 09:24

## 2019-08-27 RX ADMIN — POTASSIUM CHLORIDE 10 MEQ: 750 CAPSULE, EXTENDED RELEASE ORAL at 09:24

## 2019-08-27 RX ADMIN — SODIUM CHLORIDE, PRESERVATIVE FREE 3 ML: 5 INJECTION INTRAVENOUS at 09:25

## 2019-08-27 RX ADMIN — GABAPENTIN 400 MG: 400 CAPSULE ORAL at 09:24

## 2019-08-27 RX ADMIN — GABAPENTIN 400 MG: 400 CAPSULE ORAL at 12:53

## 2019-08-27 RX ADMIN — LEVOTHYROXINE SODIUM 50 MCG: 50 TABLET ORAL at 09:27

## 2019-08-28 ENCOUNTER — READMISSION MANAGEMENT (OUTPATIENT)
Dept: CALL CENTER | Facility: HOSPITAL | Age: 79
End: 2019-08-28

## 2019-08-28 NOTE — OUTREACH NOTE
Prep Survey      Responses   Facility patient discharged from?  [facility]   Is patient eligible?  Yes   Discharge diagnosis  Headache, migraine,     Vision loss      Does the patient have one of the following disease processes/diagnoses(primary or secondary)?  Other   Does the patient have Home health ordered?  No   Is there a DME ordered?  No   Prep survey completed?  Yes          Gisela York RN

## 2019-08-29 ENCOUNTER — READMISSION MANAGEMENT (OUTPATIENT)
Dept: CALL CENTER | Facility: HOSPITAL | Age: 79
End: 2019-08-29

## 2019-08-29 NOTE — OUTREACH NOTE
Medical Week 1 Survey      Responses   Facility patient discharged from?  [facility]   Does the patient have one of the following disease processes/diagnoses(primary or secondary)?  Other   Is there a successful TCM telephone encounter documented?  No   Week 1 attempt successful?  No   Unsuccessful attempts  Attempt 1          Andi Leonard RN

## 2019-08-30 ENCOUNTER — READMISSION MANAGEMENT (OUTPATIENT)
Dept: CALL CENTER | Facility: HOSPITAL | Age: 79
End: 2019-08-30

## 2019-08-30 NOTE — OUTREACH NOTE
Medical Week 1 Survey      Responses   Facility patient discharged from?  [facility]   Does the patient have one of the following disease processes/diagnoses(primary or secondary)?  Other   Is there a successful TCM telephone encounter documented?  No   Week 1 attempt successful?  Yes   Call start time  0952   Call end time  0959   Is patient permission given to speak with other caregiver?  Yes   List who call center can speak with  son-Peter Rodriguez)   Meds reviewed with patient/caregiver?  Yes   Is the patient having any side effects they believe may be caused by any medication additions or changes?  No   Does the patient have all medications ordered at discharge?  Yes   Is the patient taking all medications as directed (includes completed medication regime)?  Yes   Comments regarding appointments  PCP appt 09/20/19   Does the patient have a primary care provider?   Yes   Does the patient have an appointment with their PCP within 7 days of discharge?  Greater than 7 days   Comments regarding PCP  Dr Sorensen   What is preventing the patient from scheduling follow up appointments within 7 days of discharge?  Earlier appointment not available   Nursing Interventions  Verified appointment date/time/provider   Has the patient kept scheduled appointments due by today?  N/A   Has home health visited the patient within 72 hours of discharge?  N/A   Psychosocial issues?  No   Psychosocial comments  Son lives with patient.   Did the patient receive a copy of their discharge instructions?  Yes   Nursing interventions  Reviewed instructions with patient [no internet]   What is the patient's perception of their health status since discharge?  Improving   Is the patient/caregiver able to teach back signs and symptoms related to disease process for when to call PCP?  Yes   Is the patient/caregiver able to teach back signs and symptoms related to disease process for when to call 911?  Yes   Is the patient/caregiver able to teach  back the hierarchy of who to call/visit for symptoms/problems? PCP, Specialist, Home health nurse, Urgent Care, ED, 911  Yes   If the patient is a current smoker, are they able to teach back resources for cessation?  -- [nonsmoker]   Additional teach back comments  Reviewed stroke/TIA s/s.   Week 1 call completed?  Yes   Wrap up additional comments  States is doing well-denies any problems today, but would like another follow up call next week.          Otilia Seymour, RN

## 2019-09-06 ENCOUNTER — READMISSION MANAGEMENT (OUTPATIENT)
Dept: CALL CENTER | Facility: HOSPITAL | Age: 79
End: 2019-09-06

## 2019-09-06 NOTE — OUTREACH NOTE
Medical Week 2 Survey      Responses   Facility patient discharged from?  [facility]   Does the patient have one of the following disease processes/diagnoses(primary or secondary)?  Other   Week 2 attempt successful?  Yes   Call start time  1552   Discharge diagnosis  Headache, migraine,     Vision loss      Call end time  1603   Meds reviewed with patient/caregiver?  Yes   Is the patient having any side effects they believe may be caused by any medication additions or changes?  No   Does the patient have all medications ordered at discharge?  Yes   Is the patient taking all medications as directed (includes completed medication regime)?  Yes   Does the patient have a primary care provider?   Yes   Does the patient have an appointment with their PCP within 7 days of discharge?  Yes   Comments regarding PCP  Dr Sorensen   Has the patient kept scheduled appointments due by today?  Yes   Has home health visited the patient within 72 hours of discharge?  N/A   Psychosocial issues?  No   Did the patient receive a copy of their discharge instructions?  Yes   Nursing interventions  Reviewed instructions with patient   What is the patient's perception of their health status since discharge?  Improving   Is the patient/caregiver able to teach back signs and symptoms related to disease process for when to call PCP?  Yes   Is the patient/caregiver able to teach back signs and symptoms related to disease process for when to call 911?  Yes   Is the patient/caregiver able to teach back the hierarchy of who to call/visit for symptoms/problems? PCP, Specialist, Home health nurse, Urgent Care, ED, 911  Yes   Week 2 Call Completed?  Yes          Logan Deleon RN

## 2019-09-09 RX ORDER — GABAPENTIN 400 MG/1
CAPSULE ORAL
Qty: 120 CAPSULE | Refills: 0 | Status: SHIPPED | OUTPATIENT
Start: 2019-09-09 | End: 2019-10-07 | Stop reason: SDUPTHER

## 2019-09-13 ENCOUNTER — READMISSION MANAGEMENT (OUTPATIENT)
Dept: CALL CENTER | Facility: HOSPITAL | Age: 79
End: 2019-09-13

## 2019-09-13 NOTE — OUTREACH NOTE
Medical Week 3 Survey      Responses   Facility patient discharged from?  [facility]   Does the patient have one of the following disease processes/diagnoses(primary or secondary)?  Other   Week 3 attempt successful?  Yes   Call start time  1729   Call end time  1732   Discharge diagnosis  Headache, migraine,     Vision loss      Meds reviewed with patient/caregiver?  Yes   Is the patient having any side effects they believe may be caused by any medication additions or changes?  No   Does the patient have all medications ordered at discharge?  Yes   Is the patient taking all medications as directed (includes completed medication regime)?  Yes   Does the patient have a primary care provider?   Yes   Comments regarding PCP  Dr Sorensen   Has home health visited the patient within 72 hours of discharge?  N/A   Psychosocial issues?  No   Did the patient receive a copy of their discharge instructions?  Yes   Nursing interventions  Reviewed instructions with patient   What is the patient's perception of their health status since discharge?  Improving   Is the patient/caregiver able to teach back signs and symptoms related to disease process for when to call PCP?  Yes   Is the patient/caregiver able to teach back signs and symptoms related to disease process for when to call 911?  Yes   Is the patient/caregiver able to teach back the hierarchy of who to call/visit for symptoms/problems? PCP, Specialist, Home health nurse, Urgent Care, ED, 911  Yes   Additional teach back comments  Denies headache, vision loss since she has been home.    Week 3 Call Completed?  Yes   Graduated  Yes   Did the patient feel the follow up calls were helpful during their recovery period?  Yes          Nicki Sesay RN

## 2019-09-20 ENCOUNTER — OFFICE VISIT (OUTPATIENT)
Dept: FAMILY MEDICINE CLINIC | Facility: CLINIC | Age: 79
End: 2019-09-20

## 2019-09-20 VITALS
DIASTOLIC BLOOD PRESSURE: 78 MMHG | TEMPERATURE: 98.4 F | RESPIRATION RATE: 14 BRPM | BODY MASS INDEX: 24.81 KG/M2 | WEIGHT: 154.4 LBS | HEIGHT: 66 IN | OXYGEN SATURATION: 98 % | HEART RATE: 77 BPM | SYSTOLIC BLOOD PRESSURE: 134 MMHG

## 2019-09-20 DIAGNOSIS — G43.109 MIGRAINE WITH AURA AND WITHOUT STATUS MIGRAINOSUS, NOT INTRACTABLE: ICD-10-CM

## 2019-09-20 DIAGNOSIS — I10 ESSENTIAL HYPERTENSION: Primary | ICD-10-CM

## 2019-09-20 PROCEDURE — 99213 OFFICE O/P EST LOW 20 MIN: CPT | Performed by: FAMILY MEDICINE

## 2019-09-20 NOTE — PROGRESS NOTES
Chief Complaint   Patient presents with   • Hypertension       Subjective   Karli Loomis is a 79 y.o. female.     History of Present Illness   Hospitalization for headache, migraine.  She was in the hospital about one month ago. She had severe headaches and vision loss out of her left eye. This was very scary with the vision loss. She had migraines but when put on the gabapentin for her post traumatic injury head through the [facility] in 1991 the migraines went away. Has not had one for years. Said she thought it was a TIA but told during admission was migraines. She does not have follow up with Dr. Torres but she does not want to go. She has not had another headache.   Wasn't like her prior migraines. They used last 2-3 days and she had to go into a dark room and could not think. Headache improved.     HTN  Looks good today. She has concerns that she is on medication that her oncologist does not want her to be on.   She says the verapamil has really helped. She is not longer having the daily persistent annoying headaches.  She is also on losartan and lasix. She had med changed in the hospital again. She is taking potassium as well. Lasix was added back in the hospital and she thought she was supposed to be on HCTZ. 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 Reviewed her oncologists note regarding recommendations for her BP meds from 1/2019. The medication that she was on that he recommended against was the spironolactone 2/2 concern for rise in K+.     The following portions of the patient's history were reviewed and updated as appropriate: allergies, current medications, past medical history, past social history and problem list.    Review of Systems   Respiratory: Positive for cough.    Cardiovascular: Negative.    Neurological: Negative.    not bad at this time, just allergies, her breathing is good she is using her inhalers regularly.     Vitals:    09/20/19 1152   BP: 134/78  "  BP Location: Left arm   Patient Position: Sitting   Cuff Size: Adult   Pulse: 77   Resp: 14   Temp: 98.4 °F (36.9 °C)   TempSrc: Oral   SpO2: 98%   Weight: 70 kg (154 lb 6.4 oz)   Height: 167.6 cm (65.98\")       Objective   Physical Exam   Constitutional: She is oriented to person, place, and time. She appears well-nourished. No distress.   Eyes: Conjunctivae are normal. Right eye exhibits no discharge. Left eye exhibits no discharge. No scleral icterus.   Cardiovascular: Normal rate, regular rhythm, normal heart sounds and intact distal pulses. Exam reveals no gallop and no friction rub.   No murmur heard.  Pulmonary/Chest: Effort normal and breath sounds normal. No respiratory distress. She has no wheezes.   Musculoskeletal: She exhibits no edema.   Neurological: She is alert and oriented to person, place, and time.   Psychiatric: She has a normal mood and affect. Her behavior is normal.   Vitals reviewed.      Assessment/Plan   Karli was seen today for hypertension.    Diagnoses and all orders for this visit:    Essential hypertension    Migraine with aura and without status migrainosus, not intractable    BP well controlled. Continue current regimen. She is taking potassium 2/2 the lasix regular dosing.   Her headache has improved. No more vision loss.   She does not want to follow up with neurology at this time.   She is trying to find a doctor closer to her home over by the hospital. She is going to see a new PCP in 10/2019.   Current outpatient and discharge medications have been reconciled for the patient.  Reviewed by: Teresa Sorensen MD             "

## 2019-09-23 ENCOUNTER — LAB (OUTPATIENT)
Dept: LAB | Facility: HOSPITAL | Age: 79
End: 2019-09-23

## 2019-09-23 DIAGNOSIS — C50.812 MALIGNANT NEOPLASM OF OVERLAPPING SITES OF LEFT BREAST IN FEMALE, ESTROGEN RECEPTOR POSITIVE (HCC): ICD-10-CM

## 2019-09-23 DIAGNOSIS — C88.0 MACROGLOBULINEMIA OF WALDENSTROM (HCC): ICD-10-CM

## 2019-09-23 DIAGNOSIS — Z17.0 MALIGNANT NEOPLASM OF OVERLAPPING SITES OF LEFT BREAST IN FEMALE, ESTROGEN RECEPTOR POSITIVE (HCC): ICD-10-CM

## 2019-09-23 DIAGNOSIS — G62.0 DRUG-INDUCED POLYNEUROPATHY (HCC): ICD-10-CM

## 2019-09-23 LAB
ALBUMIN SERPL-MCNC: 4.6 G/DL (ref 3.5–5.2)
ALBUMIN/GLOB SERPL: 1.9 G/DL (ref 1.1–2.4)
ALP SERPL-CCNC: 76 U/L (ref 38–116)
ALT SERPL W P-5'-P-CCNC: 14 U/L (ref 0–33)
ANION GAP SERPL CALCULATED.3IONS-SCNC: 12.3 MMOL/L (ref 5–15)
AST SERPL-CCNC: 18 U/L (ref 0–32)
BASOPHILS # BLD AUTO: 0.02 10*3/MM3 (ref 0–0.2)
BASOPHILS NFR BLD AUTO: 0.3 % (ref 0–1.5)
BILIRUB SERPL-MCNC: 0.7 MG/DL (ref 0.2–1.2)
BUN BLD-MCNC: 8 MG/DL (ref 6–20)
BUN/CREAT SERPL: 9.5 (ref 7.3–30)
CALCIUM SPEC-SCNC: 9.9 MG/DL (ref 8.5–10.2)
CHLORIDE SERPL-SCNC: 94 MMOL/L (ref 98–107)
CO2 SERPL-SCNC: 30.7 MMOL/L (ref 22–29)
CREAT BLD-MCNC: 0.84 MG/DL (ref 0.6–1.1)
DEPRECATED RDW RBC AUTO: 46.1 FL (ref 37–54)
EOSINOPHIL # BLD AUTO: 0.28 10*3/MM3 (ref 0–0.4)
EOSINOPHIL NFR BLD AUTO: 4.2 % (ref 0.3–6.2)
ERYTHROCYTE [DISTWIDTH] IN BLOOD BY AUTOMATED COUNT: 13 % (ref 12.3–15.4)
GFR SERPL CREATININE-BSD FRML MDRD: 65 ML/MIN/1.73
GLOBULIN UR ELPH-MCNC: 2.4 GM/DL (ref 1.8–3.5)
GLUCOSE BLD-MCNC: 112 MG/DL (ref 74–124)
HCT VFR BLD AUTO: 41.1 % (ref 34–46.6)
HGB BLD-MCNC: 14 G/DL (ref 12–15.9)
IMM GRANULOCYTES # BLD AUTO: 0.03 10*3/MM3 (ref 0–0.05)
IMM GRANULOCYTES NFR BLD AUTO: 0.5 % (ref 0–0.5)
LYMPHOCYTES # BLD AUTO: 1.61 10*3/MM3 (ref 0.7–3.1)
LYMPHOCYTES NFR BLD AUTO: 24.2 % (ref 19.6–45.3)
MCH RBC QN AUTO: 33 PG (ref 26.6–33)
MCHC RBC AUTO-ENTMCNC: 34.1 G/DL (ref 31.5–35.7)
MCV RBC AUTO: 96.9 FL (ref 79–97)
MONOCYTES # BLD AUTO: 0.55 10*3/MM3 (ref 0.1–0.9)
MONOCYTES NFR BLD AUTO: 8.3 % (ref 5–12)
NEUTROPHILS # BLD AUTO: 4.16 10*3/MM3 (ref 1.7–7)
NEUTROPHILS NFR BLD AUTO: 62.5 % (ref 42.7–76)
NRBC BLD AUTO-RTO: 0 /100 WBC (ref 0–0.2)
PLATELET # BLD AUTO: 129 10*3/MM3 (ref 140–450)
PMV BLD AUTO: 9 FL (ref 6–12)
POTASSIUM BLD-SCNC: 4.4 MMOL/L (ref 3.5–4.7)
PROT SERPL-MCNC: 7 G/DL (ref 6.3–8)
RBC # BLD AUTO: 4.24 10*6/MM3 (ref 3.77–5.28)
SODIUM BLD-SCNC: 137 MMOL/L (ref 134–145)
WBC NRBC COR # BLD: 6.65 10*3/MM3 (ref 3.4–10.8)

## 2019-09-23 PROCEDURE — 80053 COMPREHEN METABOLIC PANEL: CPT | Performed by: INTERNAL MEDICINE

## 2019-09-23 PROCEDURE — 36415 COLL VENOUS BLD VENIPUNCTURE: CPT | Performed by: INTERNAL MEDICINE

## 2019-09-23 PROCEDURE — 85025 COMPLETE CBC W/AUTO DIFF WBC: CPT | Performed by: INTERNAL MEDICINE

## 2019-09-24 LAB
ALBUMIN SERPL-MCNC: 3.7 G/DL (ref 2.9–4.4)
ALBUMIN/GLOB SERPL: 1.3 {RATIO} (ref 0.7–1.7)
ALPHA1 GLOB FLD ELPH-MCNC: 0.3 G/DL (ref 0–0.4)
ALPHA2 GLOB SERPL ELPH-MCNC: 0.8 G/DL (ref 0.4–1)
B-GLOBULIN SERPL ELPH-MCNC: 1 G/DL (ref 0.7–1.3)
GAMMA GLOB SERPL ELPH-MCNC: 0.8 G/DL (ref 0.4–1.8)
GLOBULIN SER CALC-MCNC: 2.9 G/DL (ref 2.2–3.9)
IGA SERPL-MCNC: 38 MG/DL (ref 64–422)
IGG SERPL-MCNC: 453 MG/DL (ref 700–1600)
IGM SERPL-MCNC: 330 MG/DL (ref 26–217)
INTERPRETATION SERPL IEP-IMP: ABNORMAL
KAPPA LC SERPL-MCNC: 16 MG/L (ref 3.3–19.4)
KAPPA LC/LAMBDA SER: 1.82 {RATIO} (ref 0.26–1.65)
LAMBDA LC FREE SERPL-MCNC: 8.8 MG/L (ref 5.7–26.3)
Lab: ABNORMAL
M-SPIKE: 0.4 G/DL
PROT SERPL-MCNC: 6.6 G/DL (ref 6–8.5)

## 2019-09-30 ENCOUNTER — APPOINTMENT (OUTPATIENT)
Dept: LAB | Facility: HOSPITAL | Age: 79
End: 2019-09-30

## 2019-09-30 ENCOUNTER — OFFICE VISIT (OUTPATIENT)
Dept: ONCOLOGY | Facility: CLINIC | Age: 79
End: 2019-09-30

## 2019-09-30 VITALS
BODY MASS INDEX: 26.41 KG/M2 | TEMPERATURE: 98.5 F | SYSTOLIC BLOOD PRESSURE: 172 MMHG | RESPIRATION RATE: 12 BRPM | OXYGEN SATURATION: 98 % | HEIGHT: 64 IN | DIASTOLIC BLOOD PRESSURE: 74 MMHG | WEIGHT: 154.7 LBS | HEART RATE: 70 BPM

## 2019-09-30 DIAGNOSIS — C50.812 MALIGNANT NEOPLASM OF OVERLAPPING SITES OF LEFT BREAST IN FEMALE, ESTROGEN RECEPTOR POSITIVE (HCC): ICD-10-CM

## 2019-09-30 DIAGNOSIS — E87.1 HYPONATREMIA WITH NORMAL EXTRACELLULAR FLUID VOLUME: ICD-10-CM

## 2019-09-30 DIAGNOSIS — C88.0 MACROGLOBULINEMIA OF WALDENSTROM (HCC): Primary | ICD-10-CM

## 2019-09-30 DIAGNOSIS — Z17.0 MALIGNANT NEOPLASM OF OVERLAPPING SITES OF LEFT BREAST IN FEMALE, ESTROGEN RECEPTOR POSITIVE (HCC): ICD-10-CM

## 2019-09-30 DIAGNOSIS — G62.0 DRUG-INDUCED POLYNEUROPATHY (HCC): ICD-10-CM

## 2019-09-30 PROCEDURE — 99215 OFFICE O/P EST HI 40 MIN: CPT | Performed by: INTERNAL MEDICINE

## 2019-09-30 PROCEDURE — G0463 HOSPITAL OUTPT CLINIC VISIT: HCPCS | Performed by: INTERNAL MEDICINE

## 2019-09-30 NOTE — PROGRESS NOTES
Subjective  REASONS FOR FOLLOWUP:     1. History of Waldenstrom macroglobulinemia. The patient  remains in remission about this condition with stable level of immunoglobulin M . Normal IgA. Normal IgG. Normal white count, hemoglobin, and platelets. No abnormal bleeding. No peripheral adenopathy. No hepatosplenomegaly. The patient will remain in observation.   2. History of breast cancer stage I on the left, status post mastectomy. The patient remains on aromatase inhibitor without any evidence of breast cancer recurrence and good tolerance to her aromatase inhibitor medicine. She will remain on the same issue for the time being.                History of Present Illness This patient returns today to the office for followup.  She is here today stating she has been in the hospital recently after developing a headache that was persistent for 2 days and thereafter she lost peripheral vision in the left eye for several hours.  She was brought to the emergency room, admitted and underwent MRI and MRA, not finding too much of anything to explain the symptoms. Her laboratory parameters were stable.  Her blood pressure medication was adjusted.  Since then she has discontinued losartan given the fear of having cancer related to this medication.  She is going to see her new primary physician, Anshu, in a few days.  The patient otherwise has had near complete resolution of the headache, occasional vertex headache but no temporal artery pain as far as I can tell. No new visual changes.  She has not had any numbness in the face, alteration in her speech and no alteration swallowing.  No pain in the neck.  No joint pain.  She has fatigue.  Weight and appetite remain normal.  Bowel function and urination remain normal.  No new sinus infections.  No asthma exacerbation. No urinary tract infections.  The numbness in her feet remains ongoing due to neuropathy associated with Waldenstrom's that was the first symptom that brought her to  me a long time ago.  These symptoms have not worsened.  She still has carpal tunnel probably related to the same process, but she has not made a decision if she wants to proceed with surgery for this.                      1. Past Medical History, Past Surgical HistoryHypertension.    2. Hypothyroidism many years ago and has not taken any thyroid medication in quite some time.   3. Thyroid nodule removed more than 35 years ago.    4. Cholecystectomy.    5. Tonsillectomy and adenoidectomy.    6. Appendectomy.  7. Partial hysterectomy many years ago.      She has mammogram and pelvic examination yearly that have been normal.  Colonoscopy 4 years ago was normal.   She also has had a leaky valve in her heart without any significance.      During the recent workup at [facility], the patient had a transesophageal echocardiogram that failed to document any vegetation.    Clostridium colitis requiring admission to [facility] in 09/2008.      Arthroscopy in 03/2009 of the right knee with finding of chondromalacia and appropriate cleanup of the joint was performed by Dr. Moraes.      On 07/24/2013 the patient has lost 14 pounds, just cutting down on junk food.  Her glucose intolerance has improved substantially and her glucose has normalized.      On 01/12/2014, we reviewed her blood pressure issues recently. She has had a very stable blood pressure for the last week, and today is normal. Her physical examination is otherwise unremarkable and normal feeling.     We advised her to remain in observation from this point of view.    As per 10/01/2014, the patient has undergone evaluation by neurology service at [facility] for consideration of gabapentin that she has been taking for so many years for possible seizure activity.   I am aware of an MRI scan of the brain that shows not too much, besides minimal vascular disease and no other lesions.  Her neurologist will be informing us in  regard how to proceed in this regard.      She wanted to have a right knee replaced in 2009 through her orthopedic surgeon. She developed delirium in the hospital and extensive ecchymosis and hematoma formation in the whole right lower extremity requiring transfusion of red cells for a hemoglobin of 7.     SOCIAL HISTORY:  .  Retired from [employer] where she worked for many years; she never was exposed to any chemicals.   She is a never smoker and nondrinker.   She lives with her oldest daughter.      FAMILY HISTORY: The patient’s father  of complications from heart disease at age 70.  Mother  after a psychiatric illness at age 67.  A brother  in a car accident at 43 and a sister, age 75, is in good health.   Her three daughters are in good health.  She has no FH of lymphoma or leukemia.    Review of Systems         General: no fever, no chills,  fatigue,no weight changes, no lack of appetite.  Eyes: no epiphora, xerophthalmia,conjunctivitis, pain, glaucoma, blurred vision, blindness, secretion, photophobia, proptosis, diplopia.  Ears: no otorrhea, tinnitus, otorrhagia, deafness, pain, vertigo.  Nose: no rhinorrhea, no epistaxis, no alteration in perception of odors, no sinuses pressure.  Mouth: no alteration in gums or teeth,  No ulcers, no difficulty with mastication or deglut ion, no odynophagia.  Neck: no masses or pain, no thyroid alterations, no pain in muscles or arteries, no carotid odynia, no crepitation.  Respiratory: no cough, no sputum production,no dyspnea,no trepopnea, no pleuritic pain,no hemoptysis.  Heart: no syncope, no irregularity, no palpitations, no angina,no orthopnea,no paroxysmal nocturnal dyspnea.  Vascular Venous: no tenderness,no edema,no palpable cords,no postphlebitic syndrome, no skin changes no ulcerations.  Vascular Arterial: no distal ischemia, noclaudication, no gangrene, no neuropathic ischemic pain, no skin ulcers, no paleness no cyanosis.  GI: no  "dysphagia, no odynophagia, no regurgitation, no heartburn,no indigestion,no nausea,no vomiting,no hematemesis ,no melena,no jaundice,no distention, no obstipation,no enterorrhagia,no proctalgia,no anal  lesions, no changes in bowel habits.  : no frequency, no hesitancy, no hematuria, no discharge,no  pain.  Musculoskeletal: no muscle or tendon pain or inflammation,no  joint pain, no edema, no functional limitation,no fasciculations, no mass.  Neurologic: no headache, no seizures, noalterations on Craneal nerves, no motor deficit, le and hands sensory deficit, normal coordination, no alteration in memory,normal orientation, calculation,normal writting, verbal and written language.  Skin: no rashes,no pruritus no localized lesions.  Psychiatric: no anxiety, no depression,no agitation, no delusions, proper insight.               Medications:  The current medication list was reviewed in the EMR    ALLERGIES:    Allergies   Allergen Reactions   • Cortisone Other (See Comments)     Reports having a shot years ago, and wonders if he hit a vein. She was told by another doctor that it probably went in her blood stream.    • Darvon  [Propoxyphene] Palpitations       Objective      Vitals:    09/30/19 1121   BP: 172/74   Pulse: 70   Resp: 12   Temp: 98.5 °F (36.9 °C)   TempSrc: Oral   SpO2: 98%   Weight: 70.2 kg (154 lb 11.2 oz)   Height: 162.5 cm (63.98\")   PainSc: 0-No pain     Current Status 9/30/2019   ECOG score 0       Physical Exam   GENERAL:  Well-developed, well-nourished  Patient  in no acute distress.   SKIN:  Warm, dry ,NO rashes,NO purpura ,NO petechiae.  HEENT:  Pupils were equal and reactive to light and accomodation, conjunctivas non injected, no pterigion, normal extraocular movements, normal visual acuity.   Mouth mucosa was moist, no exudates in oropharynx, normal gum line, normal roof of the mouth and pillars, normal papillations of the tongue.  NECK:  Supple with good range of motion; no thyromegaly or " masses, no JVD or bruits, no cervical adenopathies.No carotid arteries pain, no carotid abnormal pulsation , NO arterial dance.  LYMPHATICS:  No cervical, NO supraclavicular, NO axillary,NO epitrochlear , NO inguinal adenopathy.  CHEST:  Normal excursion of both michelet thoraces, normal voice fremitus, no subcutaneous emphysema, normal axillas, no rashes or acanthosis nigricans. Lungs clear to percussion and auscultation, normal breath sounds bilaterally, no wheezing,NO crackles NO ronchi, NO stridor, NO rubs.  CARDIAC AND VASCULAR:  normal rate and regular rhythm, without murmurs,NO rubs NO S3 NO S4 right or left . Normal femoral, popliteal, pedis, brachial and carotid pulses.  INSPECTION of  breast documented symmetry of the tissue per se and location and size of the nipple,no retractions or inversion of the nipple, normal skin without lesions, no erythema or nodules,no paud'orange, no prominence of superficial veins or chest wall collateral circulation.PALPATION of the breast documented normal skin turgor, no induration, alteration in local temperature, or pain, no palpable masses or nodules, normal mobility of the tissues,no fixation of the tissue or parenchyma to the chest wall, no alteration at the tail of the breasts or axillas, no adenopathies. Left mastectomy Surgical site was well healed.No lymphedema in either extremity  ABDOMEN:  Soft, nontender with no organomegaly or masses, no ascites, no collateral circulation,no distention,no Max sign, no abdominal pain, no inguinal hernias,no umbilical hernia, no abdominal bruits. Normal bowel sounds.  GENITAL: Not  Performed.  EXTREMITIES  AND SPINE:  No clubbing, cyanosis or edema, no deformities or pain .No kyphosis, scoliosis, deformities or pain in spine, ribs or pelvic bone.bilateral carpal tunnel syndrome  NEUROLOGICAL:  Patient was awake, alert, oriented to time, person and place.numbness in feet, normal vibratory sensation in 4  limbs                      RECENT LABS:  Component      Latest Ref Rng & Units 6/26/2019 8/25/2019 9/23/2019 9/23/2019          12:29 PM  11:25 AM 11:25 AM   IgG      700 - 1600 mg/dL 497 (L)   453 (L)   IgA      64 - 422 mg/dL 39 (L)   38 (L)   IgM      26 - 217 mg/dL 227 (H)   330 (H)   Total Protein      6.3 - 8.0 g/dL 6.8 7.2 7.0 6.6   Albumin      3.50 - 5.20 g/dL 4.1 4.90 4.60 3.7   Alpha-1-Globulin      0.0 - 0.4 g/dL 0.3   0.3   Alpha-2-Globulin      0.4 - 1.0 g/dL 0.8   0.8   Beta Globulin      0.7 - 1.3 g/dL 1.0   1.0   Gamma Globulin      0.4 - 1.8 g/dL 0.7   0.8   M-Pratik      Not Observed g/dL 0.3 (H)   0.4 (H)   Globulin      2.2 - 3.9 g/dL 2.7   2.9   A/G Ratio      1.1 - 2.4 g/dL 1.6 2.1 1.9 1.3   Immunofixation Reflex, Serum       Comment   Comment   Please note       Comment   Comment   Kappa FLC      3.3 - 19.4 mg/L 12.5   16.0   Free Lambda Light Chains      5.7 - 26.3 mg/L 7.8   8.8   Kappa/Lambda Ratio      0.26 - 1.65 1.60   1.82 (H)     MRI OF THE BRAIN WITH AND WITHOUT CONTRAST AND MRA OF THE HEAD WITHOUT  CONTRAST AND MRA OF THE NECK WITH AND WITHOUT CONTRAST 08/26/2019     CLINICAL HISTORY: This is an 79-year-old female with history of left eye  amaurosis fugax, possible stroke. Patient also complains of headache  with the associated episode of vision loss in left eye and has history  of tremors and more pronounced tremors that started yesterday and  reports a history of breast cancer.      MRI OF THE BRAIN TECHNIQUE: Axial T1, FLAIR, fat-suppressed T2, axial  diffusion and gradient echo T2, sagittal T1 and postcontrast axial  fat-suppressed T1, sagittal and coronal T1 and thin cut 1.5 mm thick  axial postcontrast spoiled gradient echo T1 weighted images were  obtained of the entire head.      COMPARISON: This Is correlated to a prior MRI of the brain from [facility] 09/25/2014 and prior noncontrast head CT  yesterday, 08/25/2019, at 3:30 PM.     FINDINGS: There are  some patchy and confluent areas of T2 signal  extending from the periventricular into the subcortical white matter of  the cerebral hemispheres. Scattered small nodular foci in the cerebral  white matter and central pontine white matter all of which is consistent  with mild/moderate small vessel disease. There is a tiny 3 mm round  focus of hemosiderin deposition with signal loss on the gradient echo  T2-weighted images in the medial aspect of the right middle cerebellar  peduncle that is unchanged since the prior MRI 09/25/2014 that is  compatible with a tiny old microhemorrhage at this site. The remainder  of the brain parenchyma is normal in signal intensities. Specifically no  diffusion-weighted abnormality is seen with no acute infarct identified  and on the gradient echo T2-weighted images no acute and no additional  old blood breakdown products are seen intracranially. The ventricles are  normal in size. I see no mass effect, no midline shift, no extra-axial  fluid collections are identified. No abnormal areas of enhancement are  seen. The paranasal sinuses are clear. There is a very tiny amount of  fluid in the right mastoid air cells and the left mastoid and middle ear  cavity are clear. Good flow voids are demonstrated in the cerebral  vessels and in the dural venous sinuses. The calvarium and skull base  demonstrate normal marrow signal intensity. The orbits are unremarkable.           IMPRESSION:  1. Since prior MRI of the brain 09/25/2014 there has been progression of  the mild/moderate small vessel disease in the cerebral and central  pontine white matter. Otherwise, there has been no change with no acute  abnormality seen. There is a chronic 3 mm focus of hemosiderin  deposition from an old microhemorrhage in the medial aspect of the right  middle cerebellar peduncle unchanged since 09/25/2014, etiology  uncertain possibly from a hypertensive old microhemorrhage.  2. The remainder of the MRI of the  brain is normal with no acute infarct  seen. The etiology of the loss of vision in the left eye and headaches  is not established on this exam.       Assessment/Plan      1. 1. This patient has longstanding history of Waldenstrom macroglobulinemia that has been treated in the past mainly with Rituxan. In the past, also she was treated with Velcade with very dramatic improvement in her monoclonal protein but very dramatic sensory peripheral neuropathy. This medication was discontinued altogether.Today some of the symptoms she complains of including carpel tunnel and numbness in her feet go in favor of Waldenstrom's mild degree of activity associated with fatigue.  On the other hand, her IgM protein is 330 and is a minor raise in comparison with the number 3 months ago.  Before I pull the trigger back into therapy I would like to have another number. The patient has had swings in her numbers before without the need of any intervention.  Therefore, we will review her back in 6 weeks with a CBC, CMP, monoclonal protein studies and sedimentation rate and review her back in 7 weeks.    I have tested today to be sure that she was not going to have temporal arteritis.  She has no tenderness in the temporal arteries, no tortuosity, no tenderness in the carotid arteries and no obvious neurological alteration besides sensory deficit in hands and feet related to neuropathy.  Therefore, we will watch in absence of any other intervention but I would like to review a sedimentation rate when she returns in 6 weeks.  A sedimentation rate has already been done in the past looking for temporal arteritis and this has been negative.      From the point of view of blood pressure, to me that is the most important issue to control.  She is going to see her new primary team in a few days and they can revamp her blood pressure medication control in the way they believe is necessary.  I asked the patient to let them take care of this issue.      From the point of view of her left breast cancer, the left-sided mastectomy remains normal, right breast remains normal, she is up-to-date on mammograms on the right side and I see nothing to suggest breast cancer recurrence.      I will review the patient back in 7 weeks with a CBC, CMP, monoclonal protein in 6 weeks.                              9/30/2019      CC:

## 2019-10-03 ENCOUNTER — OFFICE VISIT (OUTPATIENT)
Dept: INTERNAL MEDICINE | Facility: CLINIC | Age: 79
End: 2019-10-03

## 2019-10-03 VITALS
WEIGHT: 155.6 LBS | HEIGHT: 65 IN | BODY MASS INDEX: 25.92 KG/M2 | SYSTOLIC BLOOD PRESSURE: 160 MMHG | DIASTOLIC BLOOD PRESSURE: 82 MMHG | HEART RATE: 72 BPM

## 2019-10-03 DIAGNOSIS — I10 ESSENTIAL HYPERTENSION: Primary | ICD-10-CM

## 2019-10-03 DIAGNOSIS — R82.90 MALODOROUS URINE: ICD-10-CM

## 2019-10-03 PROCEDURE — 90653 IIV ADJUVANT VACCINE IM: CPT | Performed by: INTERNAL MEDICINE

## 2019-10-03 PROCEDURE — G0008 ADMIN INFLUENZA VIRUS VAC: HCPCS | Performed by: INTERNAL MEDICINE

## 2019-10-03 PROCEDURE — 99215 OFFICE O/P EST HI 40 MIN: CPT | Performed by: INTERNAL MEDICINE

## 2019-10-03 RX ORDER — LEVOTHYROXINE SODIUM 0.05 MG/1
50 TABLET ORAL DAILY
Qty: 90 TABLET | Refills: 1 | Status: SHIPPED | OUTPATIENT
Start: 2019-10-03 | End: 2020-06-08

## 2019-10-03 RX ORDER — POTASSIUM CHLORIDE 20 MEQ/1
20 TABLET, EXTENDED RELEASE ORAL DAILY
Qty: 90 TABLET | Refills: 1 | Status: SHIPPED | OUTPATIENT
Start: 2019-10-03 | End: 2019-10-03 | Stop reason: SDUPTHER

## 2019-10-03 RX ORDER — POTASSIUM CHLORIDE 20 MEQ/1
20 TABLET, EXTENDED RELEASE ORAL DAILY
Qty: 90 TABLET | Refills: 1 | Status: SHIPPED | OUTPATIENT
Start: 2019-10-03 | End: 2020-07-20

## 2019-10-03 RX ORDER — LOSARTAN POTASSIUM AND HYDROCHLOROTHIAZIDE 25; 100 MG/1; MG/1
1 TABLET ORAL DAILY
Qty: 90 TABLET | Refills: 1 | Status: SHIPPED | OUTPATIENT
Start: 2019-10-03 | End: 2020-03-12

## 2019-10-03 RX ORDER — VERAPAMIL HYDROCHLORIDE 240 MG/1
240 CAPSULE, EXTENDED RELEASE ORAL NIGHTLY
Qty: 30 CAPSULE | Refills: 2 | Status: SHIPPED | OUTPATIENT
Start: 2019-10-03 | End: 2019-11-18 | Stop reason: SDUPTHER

## 2019-10-03 RX ORDER — LEVOTHYROXINE SODIUM 0.05 MG/1
50 TABLET ORAL DAILY
Qty: 90 TABLET | Refills: 1 | Status: SHIPPED | OUTPATIENT
Start: 2019-10-03 | End: 2019-10-03 | Stop reason: SDUPTHER
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
## 2019-10-06 DIAGNOSIS — Z16.12 ESBL (EXTENDED SPECTRUM BETA-LACTAMASE) PRODUCING BACTERIA INFECTION: Primary | ICD-10-CM

## 2019-10-06 DIAGNOSIS — A49.9 ESBL (EXTENDED SPECTRUM BETA-LACTAMASE) PRODUCING BACTERIA INFECTION: Primary | ICD-10-CM

## 2019-10-06 LAB
APPEARANCE UR: CLEAR
BACTERIA #/AREA URNS HPF: ABNORMAL /HPF
BACTERIA UR CULT: ABNORMAL
BACTERIA UR CULT: ABNORMAL
BILIRUB UR QL STRIP: NEGATIVE
COLOR UR: YELLOW
EPI CELLS #/AREA URNS HPF: ABNORMAL /HPF
GLUCOSE UR QL: NEGATIVE
HGB UR QL STRIP: NEGATIVE
KETONES UR QL STRIP: NEGATIVE
LEUKOCYTE ESTERASE UR QL STRIP: ABNORMAL
MICRO URNS: ABNORMAL
MUCOUS THREADS URNS QL MICRO: PRESENT /HPF
NITRITE UR QL STRIP: POSITIVE
OTHER ANTIBIOTIC SUSC ISLT: ABNORMAL
PH UR STRIP: 6.5 [PH] (ref 5–7.5)
PROT UR QL STRIP: NEGATIVE
RBC #/AREA URNS HPF: ABNORMAL /HPF
SP GR UR: 1.01 (ref 1–1.03)
URINALYSIS REFLEX: ABNORMAL
UROBILINOGEN UR STRIP-MCNC: 0.2 MG/DL (ref 0.2–1)
WBC #/AREA URNS HPF: >30 /HPF

## 2019-10-08 RX ORDER — GABAPENTIN 400 MG/1
400 CAPSULE ORAL 4 TIMES DAILY
Qty: 120 CAPSULE | Refills: 3 | Status: SHIPPED | OUTPATIENT
Start: 2019-10-08 | End: 2020-02-03

## 2019-11-01 ENCOUNTER — OFFICE VISIT (OUTPATIENT)
Dept: INTERNAL MEDICINE | Facility: CLINIC | Age: 79
End: 2019-11-01

## 2019-11-01 VITALS
WEIGHT: 155 LBS | HEIGHT: 65 IN | DIASTOLIC BLOOD PRESSURE: 78 MMHG | BODY MASS INDEX: 25.83 KG/M2 | SYSTOLIC BLOOD PRESSURE: 132 MMHG

## 2019-11-01 DIAGNOSIS — I10 ESSENTIAL HYPERTENSION: ICD-10-CM

## 2019-11-01 DIAGNOSIS — R51.9 NONINTRACTABLE HEADACHE, UNSPECIFIED CHRONICITY PATTERN, UNSPECIFIED HEADACHE TYPE: Primary | ICD-10-CM

## 2019-11-01 PROCEDURE — 99213 OFFICE O/P EST LOW 20 MIN: CPT | Performed by: INTERNAL MEDICINE

## 2019-11-01 NOTE — PROGRESS NOTES
Subjective        Chief Complaint   Patient presents with   • Hypertension     f/u bp           Karli Loomis is a 79 y.o. female who presents for    Patient Active Problem List   Diagnosis   • Malignant neoplasm of overlapping sites of left breast in female, estrogen receptor positive (CMS/HCC)   • Macroglobulinemia of Waldenstrom (CMS/HCC)   • Adult hypothyroidism   • Primary osteoarthritis of left knee   • Drug-induced polyneuropathy (CMS/HCC)   • Epilepsy with simple partial seizures (CMS/HCC)   • Headache, migraine   • Hyponatremia with normal extracellular fluid volume   • Chronic pansinusitis   • TIA (transient ischemic attack)   • UTI due to extended-spectrum beta lactamase (ESBL) producing Escherichia coli   • Essential hypertension   • Vision loss     • Malodorous urine       History of Present Illness     She has a dull headache since last OV. She thinks it got worse when we doubled her Verapamil. Her BP has been 120s/60s. Her headache is present all day. Nothing makes it better or worse. She denies nausea or vomiting. It does not feel like her migraine. Her breast cancer was 5 years ago. She got off Letrozole in June. She saw Dr. Pal to possible ESBL and her urine was negative there so he did not start her on an abx. Has are not affected by chewing.  Allergies   Allergen Reactions   • Cortisone Other (See Comments)     Reports having a shot years ago, and wonders if he hit a vein. She was told by another doctor that it probably went in her blood stream.    • Darvon  [Propoxyphene] Palpitations       Current Outpatient Medications on File Prior to Visit   Medication Sig Dispense Refill   • aspirin 81 MG tablet Take 81 mg by mouth Daily.     • Budesonide (RHINOCORT ALLERGY NA) 2 sprays into the nostril(s) as directed by provider Daily As Needed.     • budesonide-formoterol (SYMBICORT) 80-4.5 MCG/ACT inhaler Inhale 2 puffs 2 (Two) Times a Day.     • cetirizine (zyrTEC) 10 MG tablet Take 10 mg by mouth  Daily.     • furosemide (LASIX) 20 MG tablet Take 1 tablet by mouth Daily. 30 tablet 0   • gabapentin (NEURONTIN) 400 MG capsule Take 1 capsule by mouth 4 (Four) Times a Day. 120 capsule 3   • hydrocortisone 2.5 % cream Apply  topically 2 (Two) Times a Day.     • lansoprazole (PREVACID) 30 MG capsule TAKE 1 CAPSULE EVERY DAY 90 capsule 0   • levalbuterol (XOPENEX) 0.63 MG/3ML nebulizer solution Take 1 ampule by nebulization Every 8 (Eight) Hours As Needed for Wheezing. 24 mL 3   • levothyroxine (SYNTHROID, LEVOTHROID) 50 MCG tablet Take 1 tablet by mouth Daily. 90 tablet 1   • losartan-hydrochlorothiazide (HYZAAR) 100-25 MG per tablet Take 1 tablet by mouth Daily. 90 tablet 1   • potassium chloride (K-DUR,KLOR-CON) 20 MEQ CR tablet Take 1 tablet by mouth Daily. 90 tablet 1   • verapamil ER (VERELAN) 240 MG 24 hr capsule Take 1 capsule by mouth Every Night. 30 capsule 2     No current facility-administered medications on file prior to visit.        Past Medical History:   Diagnosis Date   • Acid reflux    • Asthma    • Clostridium difficile colitis     Requiring admission to [facility] in 09/2008   • H/O transesophageal echocardiography (MARICRUZ)    • History of transfusion of packed red blood cells     R/T SURGERY   • History of Waldenstrom's macroglobulinemia    • Leaky heart valve    • Seasonal allergies    • Thyroid nodule     Removed more than 35 years ago       Past Surgical History:   Procedure Laterality Date   • ADENOIDECTOMY     • APPENDECTOMY     • CATARACT EXTRACTION Bilateral    • CHOLECYSTECTOMY     • COLONOSCOPY     • HYSTERECTOMY      Partial, many years ago, heavy bleeding, no cancer   • KNEE ARTHROSCOPY Right 03/2009    Finding of chondromalacia and appropriate cleanup of joint was performed by Dr. Moraes.   • MASTECTOMY Left 07/2014   • REPLACEMENT TOTAL KNEE Right 12/2015   • TONSILLECTOMY         Family History   Problem Relation Age of Onset   • Mental illness Mother    • Migraines  "Mother    • Dementia Mother    • Heart disease Father    • Hypertension Father    • Kidney disease Father    • Stroke Sister    • No Known Problems Daughter    • No Known Problems Daughter    • Breast cancer Other    • Heart disease Other    • Hypertension Other    • Diabetes Other    • Cancer Maternal Grandmother    • No Known Problems Maternal Grandfather    • No Known Problems Paternal Grandmother    • No Known Problems Paternal Grandfather    • Lymphoma Neg Hx    • Leukemia Neg Hx        Social History     Socioeconomic History   • Marital status:      Spouse name: Not on file   • Number of children: Not on file   • Years of education: Not on file   • Highest education level: Not on file   Occupational History     Employer: [employer]     Employer: RETIRED   Tobacco Use   • Smoking status: Never Smoker   • Smokeless tobacco: Never Used   Substance and Sexual Activity   • Alcohol use: No   • Drug use: No   • Sexual activity: Defer   Social History Narrative    She lives with her oldest daughter.           The following portions of the patient's history were reviewed and updated as appropriate: problem list, allergies, current medications, past medical history, past family history, past social history and past surgical history.    Review of Systems   Gastrointestinal: Negative for abdominal pain.   Neurological: Positive for headache.       Immunization History   Administered Date(s) Administered   • Flu Vaccine High Dose PF 65YR+ 10/30/2015, 10/11/2016, 10/18/2017, 10/06/2018   • Fluad Quad 10/03/2019   • Pneumococcal Conjugate 13-Valent (PCV13) 12/01/2016   • Pneumococcal Polysaccharide (PPSV23) 06/28/2019       Objective   Vitals:    11/01/19 1255   BP: 132/78   Weight: 70.3 kg (155 lb)   Height: 165.1 cm (65\")     Physical Exam   Constitutional: She appears well-developed and well-nourished.   HENT:   Head: Normocephalic and atraumatic.   Mouth/Throat: Uvula is midline and oropharynx is clear and " moist.   Cardiovascular: Normal rate, regular rhythm, S1 normal, S2 normal and normal heart sounds.   Pulmonary/Chest: Effort normal and breath sounds normal.   Neurological: She is alert.   CN II-XII intact except did not check VIII   Skin: Skin is warm.   Psychiatric: She has a normal mood and affect.   Vitals reviewed.      Procedures    Assessment/Plan   Karli was seen today for hypertension.    Diagnoses and all orders for this visit:    Nonintractable headache, unspecified chronicity pattern, unspecified headache type    Essential hypertension  -     Basic Metabolic Panel; Future  -     TSH; Future             BP is better. Monitor her headaches as she was imaged in August; she may have an allergy component. I do not think temporal arteritis.    Return in about 6 months (around 5/1/2020).

## 2019-11-11 ENCOUNTER — LAB (OUTPATIENT)
Dept: LAB | Facility: HOSPITAL | Age: 79
End: 2019-11-11

## 2019-11-11 DIAGNOSIS — C88.0 MACROGLOBULINEMIA OF WALDENSTROM (HCC): ICD-10-CM

## 2019-11-11 DIAGNOSIS — I10 ESSENTIAL HYPERTENSION: ICD-10-CM

## 2019-11-11 DIAGNOSIS — G62.0 DRUG-INDUCED POLYNEUROPATHY (HCC): ICD-10-CM

## 2019-11-11 DIAGNOSIS — Z17.0 MALIGNANT NEOPLASM OF OVERLAPPING SITES OF LEFT BREAST IN FEMALE, ESTROGEN RECEPTOR POSITIVE (HCC): ICD-10-CM

## 2019-11-11 DIAGNOSIS — E87.1 HYPONATREMIA WITH NORMAL EXTRACELLULAR FLUID VOLUME: ICD-10-CM

## 2019-11-11 DIAGNOSIS — C50.812 MALIGNANT NEOPLASM OF OVERLAPPING SITES OF LEFT BREAST IN FEMALE, ESTROGEN RECEPTOR POSITIVE (HCC): ICD-10-CM

## 2019-11-11 LAB
ALBUMIN SERPL-MCNC: 4.7 G/DL (ref 3.5–5.2)
ALBUMIN/GLOB SERPL: 1.9 G/DL (ref 1.1–2.4)
ALP SERPL-CCNC: 83 U/L (ref 38–116)
ALT SERPL W P-5'-P-CCNC: 13 U/L (ref 0–33)
ANION GAP SERPL CALCULATED.3IONS-SCNC: 12.7 MMOL/L (ref 5–15)
AST SERPL-CCNC: 19 U/L (ref 0–32)
BASOPHILS # BLD AUTO: 0.04 10*3/MM3 (ref 0–0.2)
BASOPHILS NFR BLD AUTO: 0.7 % (ref 0–1.5)
BILIRUB SERPL-MCNC: 0.7 MG/DL (ref 0.2–1.2)
BUN BLD-MCNC: 8 MG/DL (ref 6–20)
BUN/CREAT SERPL: 10.7 (ref 7.3–30)
CALCIUM SPEC-SCNC: 10.1 MG/DL (ref 8.5–10.2)
CHLORIDE SERPL-SCNC: 88 MMOL/L (ref 98–107)
CO2 SERPL-SCNC: 28.3 MMOL/L (ref 22–29)
CREAT BLD-MCNC: 0.75 MG/DL (ref 0.6–1.1)
DEPRECATED RDW RBC AUTO: 43.6 FL (ref 37–54)
EOSINOPHIL # BLD AUTO: 0.37 10*3/MM3 (ref 0–0.4)
EOSINOPHIL NFR BLD AUTO: 6.5 % (ref 0.3–6.2)
ERYTHROCYTE [DISTWIDTH] IN BLOOD BY AUTOMATED COUNT: 12.2 % (ref 12.3–15.4)
ERYTHROCYTE [SEDIMENTATION RATE] IN BLOOD: 9 MM/HR (ref 0–30)
GFR SERPL CREATININE-BSD FRML MDRD: 74 ML/MIN/1.73
GLOBULIN UR ELPH-MCNC: 2.5 GM/DL (ref 1.8–3.5)
GLUCOSE BLD-MCNC: 116 MG/DL (ref 74–124)
HCT VFR BLD AUTO: 41.7 % (ref 34–46.6)
HGB BLD-MCNC: 14.3 G/DL (ref 12–15.9)
IMM GRANULOCYTES # BLD AUTO: 0.03 10*3/MM3 (ref 0–0.05)
IMM GRANULOCYTES NFR BLD AUTO: 0.5 % (ref 0–0.5)
LYMPHOCYTES # BLD AUTO: 1.57 10*3/MM3 (ref 0.7–3.1)
LYMPHOCYTES NFR BLD AUTO: 27.6 % (ref 19.6–45.3)
MCH RBC QN AUTO: 32.9 PG (ref 26.6–33)
MCHC RBC AUTO-ENTMCNC: 34.3 G/DL (ref 31.5–35.7)
MCV RBC AUTO: 96.1 FL (ref 79–97)
MONOCYTES # BLD AUTO: 0.55 10*3/MM3 (ref 0.1–0.9)
MONOCYTES NFR BLD AUTO: 9.7 % (ref 5–12)
NEUTROPHILS # BLD AUTO: 3.12 10*3/MM3 (ref 1.7–7)
NEUTROPHILS NFR BLD AUTO: 55 % (ref 42.7–76)
NRBC BLD AUTO-RTO: 0 /100 WBC (ref 0–0.2)
PLATELET # BLD AUTO: 165 10*3/MM3 (ref 140–450)
PMV BLD AUTO: 8.6 FL (ref 6–12)
POTASSIUM BLD-SCNC: 3.9 MMOL/L (ref 3.5–4.7)
PROT SERPL-MCNC: 7.2 G/DL (ref 6.3–8)
RBC # BLD AUTO: 4.34 10*6/MM3 (ref 3.77–5.28)
SODIUM BLD-SCNC: 129 MMOL/L (ref 134–145)
WBC NRBC COR # BLD: 5.68 10*3/MM3 (ref 3.4–10.8)

## 2019-11-11 PROCEDURE — 85652 RBC SED RATE AUTOMATED: CPT | Performed by: INTERNAL MEDICINE

## 2019-11-11 PROCEDURE — 36415 COLL VENOUS BLD VENIPUNCTURE: CPT

## 2019-11-11 PROCEDURE — 85025 COMPLETE CBC W/AUTO DIFF WBC: CPT

## 2019-11-11 PROCEDURE — 80053 COMPREHEN METABOLIC PANEL: CPT

## 2019-11-12 LAB
ALBUMIN SERPL-MCNC: 3.6 G/DL (ref 2.9–4.4)
ALBUMIN/GLOB SERPL: 1.3 {RATIO} (ref 0.7–1.7)
ALPHA1 GLOB FLD ELPH-MCNC: 0.3 G/DL (ref 0–0.4)
ALPHA2 GLOB SERPL ELPH-MCNC: 0.8 G/DL (ref 0.4–1)
B-GLOBULIN SERPL ELPH-MCNC: 1 G/DL (ref 0.7–1.3)
GAMMA GLOB SERPL ELPH-MCNC: 0.8 G/DL (ref 0.4–1.8)
GLOBULIN SER CALC-MCNC: 2.9 G/DL (ref 2.2–3.9)
IGA SERPL-MCNC: 37 MG/DL (ref 64–422)
IGG SERPL-MCNC: 460 MG/DL (ref 700–1600)
IGM SERPL-MCNC: 345 MG/DL (ref 26–217)
INTERPRETATION SERPL IEP-IMP: ABNORMAL
KAPPA LC SERPL-MCNC: 13.7 MG/L (ref 3.3–19.4)
KAPPA LC/LAMBDA SER: 1.51 {RATIO} (ref 0.26–1.65)
LAMBDA LC FREE SERPL-MCNC: 9.1 MG/L (ref 5.7–26.3)
Lab: ABNORMAL
M-SPIKE: 0.4 G/DL
PROT SERPL-MCNC: 6.5 G/DL (ref 6–8.5)
TSH SERPL-ACNC: 3.25 UIU/ML (ref 0.45–4.5)

## 2019-11-18 ENCOUNTER — APPOINTMENT (OUTPATIENT)
Dept: LAB | Facility: HOSPITAL | Age: 79
End: 2019-11-18

## 2019-11-18 ENCOUNTER — OFFICE VISIT (OUTPATIENT)
Dept: ONCOLOGY | Facility: CLINIC | Age: 79
End: 2019-11-18

## 2019-11-18 VITALS
HEART RATE: 69 BPM | BODY MASS INDEX: 25.84 KG/M2 | HEIGHT: 65 IN | OXYGEN SATURATION: 97 % | SYSTOLIC BLOOD PRESSURE: 166 MMHG | RESPIRATION RATE: 16 BRPM | DIASTOLIC BLOOD PRESSURE: 71 MMHG | WEIGHT: 155.1 LBS | TEMPERATURE: 98 F

## 2019-11-18 DIAGNOSIS — C50.812 MALIGNANT NEOPLASM OF OVERLAPPING SITES OF LEFT BREAST IN FEMALE, ESTROGEN RECEPTOR POSITIVE (HCC): ICD-10-CM

## 2019-11-18 DIAGNOSIS — Z17.0 MALIGNANT NEOPLASM OF OVERLAPPING SITES OF LEFT BREAST IN FEMALE, ESTROGEN RECEPTOR POSITIVE (HCC): ICD-10-CM

## 2019-11-18 DIAGNOSIS — I10 ESSENTIAL HYPERTENSION: ICD-10-CM

## 2019-11-18 DIAGNOSIS — C88.0 MACROGLOBULINEMIA OF WALDENSTROM (HCC): Primary | ICD-10-CM

## 2019-11-18 DIAGNOSIS — G62.0 DRUG-INDUCED POLYNEUROPATHY (HCC): ICD-10-CM

## 2019-11-18 PROCEDURE — 99214 OFFICE O/P EST MOD 30 MIN: CPT | Performed by: INTERNAL MEDICINE

## 2019-11-18 PROCEDURE — G0463 HOSPITAL OUTPT CLINIC VISIT: HCPCS | Performed by: INTERNAL MEDICINE

## 2019-11-18 RX ORDER — VERAPAMIL HYDROCHLORIDE 240 MG/1
240 CAPSULE, EXTENDED RELEASE ORAL NIGHTLY
Qty: 90 CAPSULE | Refills: 0 | Status: SHIPPED | OUTPATIENT
Start: 2019-11-18 | End: 2020-02-20

## 2019-11-18 NOTE — PROGRESS NOTES
Subjective  REASONS FOR FOLLOWUP:     1. History of Waldenstrom macroglobulinemia. The patient  remains in remission about this condition with stable level of immunoglobulin M . Normal IgA. Normal IgG. Normal white count, hemoglobin, and platelets. No abnormal bleeding. No peripheral adenopathy. No hepatosplenomegaly. The patient will remain in observation.   2. History of breast cancer stage I on the left, status post mastectomy. The patient remains on aromatase inhibitor without any evidence of breast cancer recurrence and good tolerance to her aromatase inhibitor medicine. She will remain on the same issue for the time being.                History of Present Illness This patient returns today to the office for followup.  She is here today stating she has been in the hospital recently after developing a headache that was persistent for 2 days and thereafter she lost peripheral vision in the left eye for several hours.  She was brought to the emergency room, admitted and underwent MRI and MRA, not finding too much of anything to explain the symptoms. Her laboratory parameters were stable.  Her blood pressure medication was adjusted.  Since then she has discontinued losartan given the fear of having cancer related to this medication.  She is going to see her new primary physician, Anshu, in a few days.  The patient otherwise has had near complete resolution of the headache, occasional vertex headache but no temporal artery pain as far as I can tell. No new visual changes.  She has not had any numbness in the face, alteration in her speech and no alteration swallowing.  No pain in the neck.  No joint pain.  She has fatigue.  Weight and appetite remain normal.  Bowel function and urination remain normal.  No new sinus infections.  No asthma exacerbation. No urinary tract infections.  The numbness in her feet remains ongoing due to neuropathy associated with Waldenstrom's that was the first symptom that brought her to  me a long time ago.  These symptoms have not worsened.  She still has carpal tunnel probably related to the same process, but she has not made a decision if she wants to proceed with surgery for this.                      1. Past Medical History, Past Surgical HistoryHypertension.    2. Hypothyroidism many years ago and has not taken any thyroid medication in quite some time.   3. Thyroid nodule removed more than 35 years ago.    4. Cholecystectomy.    5. Tonsillectomy and adenoidectomy.    6. Appendectomy.  7. Partial hysterectomy many years ago.      She has mammogram and pelvic examination yearly that have been normal.  Colonoscopy 4 years ago was normal.   She also has had a leaky valve in her heart without any significance.      During the recent workup at [facility], the patient had a transesophageal echocardiogram that failed to document any vegetation.    Clostridium colitis requiring admission to [facility] in 09/2008.      Arthroscopy in 03/2009 of the right knee with finding of chondromalacia and appropriate cleanup of the joint was performed by Dr. Moraes.      On 07/24/2013 the patient has lost 14 pounds, just cutting down on junk food.  Her glucose intolerance has improved substantially and her glucose has normalized.      On 01/12/2014, we reviewed her blood pressure issues recently. She has had a very stable blood pressure for the last week, and today is normal. Her physical examination is otherwise unremarkable and normal feeling.     We advised her to remain in observation from this point of view.    As per 10/01/2014, the patient has undergone evaluation by neurology service at [facility] for consideration of gabapentin that she has been taking for so many years for possible seizure activity.   I am aware of an MRI scan of the brain that shows not too much, besides minimal vascular disease and no other lesions.  Her neurologist will be informing us in  regard how to proceed in this regard.      She wanted to have a right knee replaced in 2009 through her orthopedic surgeon. She developed delirium in the hospital and extensive ecchymosis and hematoma formation in the whole right lower extremity requiring transfusion of red cells for a hemoglobin of 7.     SOCIAL HISTORY:  .  Retired from [employer] where she worked for many years; she never was exposed to any chemicals.   She is a never smoker and nondrinker.   She lives with her oldest daughter.      FAMILY HISTORY: The patient’s father  of complications from heart disease at age 70.  Mother  after a psychiatric illness at age 67.  A brother  in a car accident at 43 and a sister, age 75, is in good health.   Her three daughters are in good health.  She has no FH of lymphoma or leukemia.    Review of Systems         General: no fever, no chills,  fatigue,no weight changes, no lack of appetite.  Eyes: no epiphora, xerophthalmia,conjunctivitis, pain, glaucoma, blurred vision, blindness, secretion, photophobia, proptosis, diplopia.  Ears: no otorrhea, tinnitus, otorrhagia, deafness, pain, vertigo.  Nose: no rhinorrhea, no epistaxis, no alteration in perception of odors, no sinuses pressure.  Mouth: no alteration in gums or teeth,  No ulcers, no difficulty with mastication or deglut ion, no odynophagia.  Neck: no masses or pain, no thyroid alterations, no pain in muscles or arteries, no carotid odynia, no crepitation.  Respiratory: no cough, no sputum production,no dyspnea,no trepopnea, no pleuritic pain,no hemoptysis.  Heart: no syncope, no irregularity, no palpitations, no angina,no orthopnea,no paroxysmal nocturnal dyspnea.  Vascular Venous: no tenderness,no edema,no palpable cords,no postphlebitic syndrome, no skin changes no ulcerations.  Vascular Arterial: no distal ischemia, noclaudication, no gangrene, no neuropathic ischemic pain, no skin ulcers, no paleness no cyanosis.  GI: no  dysphagia, no odynophagia, no regurgitation, no heartburn,no indigestion,no nausea,no vomiting,no hematemesis ,no melena,no jaundice,no distention, no obstipation,no enterorrhagia,no proctalgia,no anal  lesions, no changes in bowel habits.  : no frequency, no hesitancy, no hematuria, no discharge,no  pain.  Musculoskeletal: no muscle or tendon pain or inflammation,no  joint pain, no edema, no functional limitation,no fasciculations, no mass.  Neurologic: no headache, no seizures, noalterations on Craneal nerves, no motor deficit, le and hands sensory deficit, normal coordination, no alteration in memory,normal orientation, calculation,normal writting, verbal and written language.  Skin: no rashes,no pruritus no localized lesions.  Psychiatric: no anxiety, no depression,no agitation, no delusions, proper insight.               Medications:  The current medication list was reviewed in the EMR    ALLERGIES:    Allergies   Allergen Reactions   • Cortisone Other (See Comments)     Reports having a shot years ago, and wonders if he hit a vein. She was told by another doctor that it probably went in her blood stream.    • Darvon  [Propoxyphene] Palpitations       Objective      There were no vitals filed for this visit.  Current Status 9/30/2019   ECOG score 0       Physical Exam   GENERAL:  Well-developed, well-nourished  Patient  in no acute distress.   SKIN:  Warm, dry ,NO rashes,NO purpura ,NO petechiae.  HEENT:  Pupils were equal and reactive to light and accomodation, conjunctivas non injected, no pterigion, normal extraocular movements, normal visual acuity.   Mouth mucosa was moist, no exudates in oropharynx, normal gum line, normal roof of the mouth and pillars, normal papillations of the tongue.  NECK:  Supple with good range of motion; no thyromegaly or masses, no JVD or bruits, no cervical adenopathies.No carotid arteries pain, no carotid abnormal pulsation , NO arterial dance.  LYMPHATICS:  No cervical, NO  supraclavicular, NO axillary,NO epitrochlear , NO inguinal adenopathy.  CHEST:  Normal excursion of both michelet thoraces, normal voice fremitus, no subcutaneous emphysema, normal axillas, no rashes or acanthosis nigricans. Lungs clear to percussion and auscultation, normal breath sounds bilaterally, no wheezing,NO crackles NO ronchi, NO stridor, NO rubs.  CARDIAC AND VASCULAR:  normal rate and regular rhythm, without murmurs,NO rubs NO S3 NO S4 right or left . Normal femoral, popliteal, pedis, brachial and carotid pulses.  INSPECTION of  breast documented symmetry of the tissue per se and location and size of the nipple,no retractions or inversion of the nipple, normal skin without lesions, no erythema or nodules,no paud'orange, no prominence of superficial veins or chest wall collateral circulation.PALPATION of the breast documented normal skin turgor, no induration, alteration in local temperature, or pain, no palpable masses or nodules, normal mobility of the tissues,no fixation of the tissue or parenchyma to the chest wall, no alteration at the tail of the breasts or axillas, no adenopathies. Left mastectomy Surgical site was well healed.No lymphedema in either extremity  ABDOMEN:  Soft, nontender with no organomegaly or masses, no ascites, no collateral circulation,no distention,no Max sign, no abdominal pain, no inguinal hernias,no umbilical hernia, no abdominal bruits. Normal bowel sounds.  GENITAL: Not  Performed.  EXTREMITIES  AND SPINE:  No clubbing, cyanosis or edema, no deformities or pain .No kyphosis, scoliosis, deformities or pain in spine, ribs or pelvic bone.bilateral carpal tunnel syndrome  NEUROLOGICAL:  Patient was awake, alert, oriented to time, person and place.numbness in feet, normal vibratory sensation in 4 limbs                      RECENT LABS:  Component      Latest Ref Rng & Units 6/26/2019 8/25/2019 9/23/2019 9/23/2019          12:29 PM  11:25 AM 11:25 AM   IgG      700 - 1600 mg/dL  497 (L)   453 (L)   IgA      64 - 422 mg/dL 39 (L)   38 (L)   IgM      26 - 217 mg/dL 227 (H)   330 (H)   Total Protein      6.3 - 8.0 g/dL 6.8 7.2 7.0 6.6   Albumin      3.50 - 5.20 g/dL 4.1 4.90 4.60 3.7   Alpha-1-Globulin      0.0 - 0.4 g/dL 0.3   0.3   Alpha-2-Globulin      0.4 - 1.0 g/dL 0.8   0.8   Beta Globulin      0.7 - 1.3 g/dL 1.0   1.0   Gamma Globulin      0.4 - 1.8 g/dL 0.7   0.8   M-Pratik      Not Observed g/dL 0.3 (H)   0.4 (H)   Globulin      2.2 - 3.9 g/dL 2.7   2.9   A/G Ratio      1.1 - 2.4 g/dL 1.6 2.1 1.9 1.3   Immunofixation Reflex, Serum       Comment   Comment   Please note       Comment   Comment   Kappa FLC      3.3 - 19.4 mg/L 12.5   16.0   Free Lambda Light Chains      5.7 - 26.3 mg/L 7.8   8.8   Kappa/Lambda Ratio      0.26 - 1.65 1.60   1.82 (H)     MRI OF THE BRAIN WITH AND WITHOUT CONTRAST AND MRA OF THE HEAD WITHOUT  CONTRAST AND MRA OF THE NECK WITH AND WITHOUT CONTRAST 08/26/2019     CLINICAL HISTORY: This is an 79-year-old female with history of left eye  amaurosis fugax, possible stroke. Patient also complains of headache  with the associated episode of vision loss in left eye and has history  of tremors and more pronounced tremors that started yesterday and  reports a history of breast cancer.      MRI OF THE BRAIN TECHNIQUE: Axial T1, FLAIR, fat-suppressed T2, axial  diffusion and gradient echo T2, sagittal T1 and postcontrast axial  fat-suppressed T1, sagittal and coronal T1 and thin cut 1.5 mm thick  axial postcontrast spoiled gradient echo T1 weighted images were  obtained of the entire head.      COMPARISON: This Is correlated to a prior MRI of the brain from [facility] 09/25/2014 and prior noncontrast head CT  yesterday, 08/25/2019, at 3:30 PM.     FINDINGS: There are some patchy and confluent areas of T2 signal  extending from the periventricular into the subcortical white matter of  the cerebral hemispheres. Scattered small nodular foci in the  cerebral  white matter and central pontine white matter all of which is consistent  with mild/moderate small vessel disease. There is a tiny 3 mm round  focus of hemosiderin deposition with signal loss on the gradient echo  T2-weighted images in the medial aspect of the right middle cerebellar  peduncle that is unchanged since the prior MRI 09/25/2014 that is  compatible with a tiny old microhemorrhage at this site. The remainder  of the brain parenchyma is normal in signal intensities. Specifically no  diffusion-weighted abnormality is seen with no acute infarct identified  and on the gradient echo T2-weighted images no acute and no additional  old blood breakdown products are seen intracranially. The ventricles are  normal in size. I see no mass effect, no midline shift, no extra-axial  fluid collections are identified. No abnormal areas of enhancement are  seen. The paranasal sinuses are clear. There is a very tiny amount of  fluid in the right mastoid air cells and the left mastoid and middle ear  cavity are clear. Good flow voids are demonstrated in the cerebral  vessels and in the dural venous sinuses. The calvarium and skull base  demonstrate normal marrow signal intensity. The orbits are unremarkable.           IMPRESSION:  1. Since prior MRI of the brain 09/25/2014 there has been progression of  the mild/moderate small vessel disease in the cerebral and central  pontine white matter. Otherwise, there has been no change with no acute  abnormality seen. There is a chronic 3 mm focus of hemosiderin  deposition from an old microhemorrhage in the medial aspect of the right  middle cerebellar peduncle unchanged since 09/25/2014, etiology  uncertain possibly from a hypertensive old microhemorrhage.  2. The remainder of the MRI of the brain is normal with no acute infarct  seen. The etiology of the loss of vision in the left eye and headaches  is not established on this exam.       Assessment/Plan      1. 1. This  patient has longstanding history of Waldenstrom macroglobulinemia that has been treated in the past mainly with Rituxan. In the past, also she was treated with Velcade with very dramatic improvement in her monoclonal protein but very dramatic sensory peripheral neuropathy. This medication was discontinued altogether.Today some of the symptoms she complains of including carpel tunnel and numbness in her feet go in favor of Waldenstrom's mild degree of activity associated with fatigue.  On the other hand, her IgM protein is 330 and is a minor raise in comparison with the number 3 months ago.  Before I pull the trigger back into therapy I would like to have another number. The patient has had swings in her numbers before without the need of any intervention.  Therefore, we will review her back in 6 weeks with a CBC, CMP, monoclonal protein studies and sedimentation rate and review her back in 7 weeks.    I have tested today to be sure that she was not going to have temporal arteritis.  She has no tenderness in the temporal arteries, no tortuosity, no tenderness in the carotid arteries and no obvious neurological alteration besides sensory deficit in hands and feet related to neuropathy.  Therefore, we will watch in absence of any other intervention but I would like to review a sedimentation rate when she returns in 6 weeks.  A sedimentation rate has already been done in the past looking for temporal arteritis and this has been negative.      From the point of view of blood pressure, to me that is the most important issue to control.  She is going to see her new primary team in a few days and they can revamp her blood pressure medication control in the way they believe is necessary.  I asked the patient to let them take care of this issue.     From the point of view of her left breast cancer, the left-sided mastectomy remains normal, right breast remains normal, she is up-to-date on mammograms on the right side and I  see nothing to suggest breast cancer recurrence.      I will review the patient back in 7 weeks with a CBC, CMP, monoclonal protein in 6 weeks.                              11/18/2019      CC:

## 2019-11-18 NOTE — PROGRESS NOTES
Subjective  REASONS FOR FOLLOWUP:     1. History of Waldenstrom macroglobulinemia. The patient  remains in remission about this condition with stable level of immunoglobulin M . Normal IgA. Normal IgG. Normal white count, hemoglobin, and platelets. No abnormal bleeding. No peripheral adenopathy. No hepatosplenomegaly. The patient will remain in observation.   2. History of breast cancer stage I on the left, status post mastectomy. The patient completed aromatase inhibitor without any evidence of breast cancer recurrence                   History of Present Illness This patient returns today to the office for followup. She is here today having no new respiratory infections, no exacerbation of her asthma and no other new symptomatology besides her carpal tunnel syndrome.  Her appetite has remained stable.  Her bowel function and urination remain unchanged after she was treated for a urinary tract infection. She already has been seen by her new primary physician.  Otherwise the patient has no other bone pain, no abdominal bleeding, no blurred vision, no headache and no adenopathies.  No fevers, chills or sweats.                        1. Past Medical History, Past Surgical HistoryHypertension.    2. Hypothyroidism many years ago and has not taken any thyroid medication in quite some time.   3. Thyroid nodule removed more than 35 years ago.    4. Cholecystectomy.    5. Tonsillectomy and adenoidectomy.    6. Appendectomy.  7. Partial hysterectomy many years ago.      She has mammogram and pelvic examination yearly that have been normal.  Colonoscopy 4 years ago was normal.   She also has had a leaky valve in her heart without any significance.      During the recent workup at [facility], the patient had a transesophageal echocardiogram that failed to document any vegetation.    Clostridium colitis requiring admission to [facility] in 09/2008.      Arthroscopy in 03/2009 of the right knee  with finding of chondromalacia and appropriate cleanup of the joint was performed by Dr. Moraes.      On 2013 the patient has lost 14 pounds, just cutting down on junk food.  Her glucose intolerance has improved substantially and her glucose has normalized.      On 2014, we reviewed her blood pressure issues recently. She has had a very stable blood pressure for the last week, and today is normal. Her physical examination is otherwise unremarkable and normal feeling.     We advised her to remain in observation from this point of view.    As per 10/01/2014, the patient has undergone evaluation by neurology service at [facility] for consideration of gabapentin that she has been taking for so many years for possible seizure activity.   I am aware of an MRI scan of the brain that shows not too much, besides minimal vascular disease and no other lesions.  Her neurologist will be informing us in regard how to proceed in this regard.      She wanted to have a right knee replaced in 2009 through her orthopedic surgeon. She developed delirium in the hospital and extensive ecchymosis and hematoma formation in the whole right lower extremity requiring transfusion of red cells for a hemoglobin of 7.     SOCIAL HISTORY:  .  Retired from [employer] where she worked for many years; she never was exposed to any chemicals.   She is a never smoker and nondrinker.   She lives with her oldest daughter.      FAMILY HISTORY: The patient’s father  of complications from heart disease at age 70.  Mother  after a psychiatric illness at age 67.  A brother  in a car accident at 43 and a sister, age 75, is in good health.   Her three daughters are in good health.  She has no FH of lymphoma or leukemia.    Review of Systems             General: no fever, no chills, no fatigue,no weight changes, no lack of appetite.  Eyes: no epiphora, xerophthalmia,conjunctivitis, pain, glaucoma, blurred  vision, blindness, secretion, photophobia, proptosis, diplopia.  Ears: no otorrhea, tinnitus, otorrhagia, deafness, pain, vertigo.  Nose: no rhinorrhea, no epistaxis, no alteration in perception of odors, no sinuses pressure.  Mouth: no alteration in gums or teeth,  No ulcers, no difficulty with mastication or deglut ion, no odynophagia.  Neck: no masses or pain, no thyroid alterations, no pain in muscles or arteries, no carotid odynia, no crepitation.  Respiratory: no cough, no sputum production,no dyspnea,no trepopnea, no pleuritic pain,no hemoptysis.  Heart: no syncope, no irregularity, no palpitations, no angina,no orthopnea,no paroxysmal nocturnal dyspnea.  Vascular Venous: no tenderness,no edema,no palpable cords,no postphlebitic syndrome, no skin changes no ulcerations.  Vascular Arterial: no distal ischemia, noclaudication, no gangrene, no neuropathic ischemic pain, no skin ulcers, no paleness no cyanosis.  GI: no dysphagia, no odynophagia, no regurgitation, no heartburn,no indigestion,no nausea,no vomiting,no hematemesis ,no melena,no jaundice,no distention, no obstipation,no enterorrhagia,no proctalgia,no anal  lesions, no changes in bowel habits.  : no frequency, no hesitancy, no hematuria, no discharge,no  pain.  Musculoskeletal: no muscle or tendon pain or inflammation,no  joint pain, no edema, no functional limitation,no fasciculations, no mass.  Neurologic: no headache, no seizures, noalterations on Craneal nerves, no motor deficit, r hand sensory deficit, normal coordination, no alteration in memory,normal orientation, calculation,normal writting, verbal and written language.  Skin: no rashes,no pruritus no localized lesions.  Psychiatric: no anxiety, no depression,no agitation, no delusions, proper insight.           Medications:  The current medication list was reviewed in the EMR    ALLERGIES:    Allergies   Allergen Reactions   • Cortisone Other (See Comments)     Reports having a shot years  "ago, and wonders if he hit a vein. She was told by another doctor that it probably went in her blood stream.    • Darvon  [Propoxyphene] Palpitations       Objective      Vitals:    11/18/19 1250   BP: 166/71   Pulse: 69   Resp: 16   Temp: 98 °F (36.7 °C)   TempSrc: Oral   SpO2: 97%   Weight: 70.4 kg (155 lb 1.6 oz)   Height: 165.2 cm (65.04\")   PainSc: 0-No pain     Current Status 11/18/2019   ECOG score 0       Physical Exam       GENERAL:  Well-developed, well-nourished  Patient  in no acute distress.   SKIN:  Warm, dry ,NO rashes,NO purpura ,NO petechiae.  HEENT:  Pupils were equal and reactive to light and accomodation, conjunctivas non injected, no pterigion, normal extraocular movements, normal visual acuity.   Mouth mucosa was moist, no exudates in oropharynx, normal gum line, normal roof of the mouth and pillars, normal papillations of the tongue.  NECK:  Supple with good range of motion; no thyromegaly or masses, no JVD or bruits, no cervical adenopathies.No carotid arteries pain, no carotid abnormal pulsation , NO arterial dance.  LYMPHATICS:  No cervical, NO supraclavicular, NO axillary,NO epitrochlear , NO inguinal adenopathy.  CHEST:  Normal excursion of both michelet thoraces, normal voice fremitus, no subcutaneous emphysema, normal axillas, no rashes or acanthosis nigricans. Lungs clear to percussion and auscultation, normal breath sounds bilaterally, no wheezing,NO crackles NO ronchi, NO stridor, NO rubs.  CARDIAC AND VASCULAR:  normal rate and regular rhythm, without murmurs,NO rubs NO S3 NO S4 right or left . Normal femoral, popliteal, pedis, brachial and carotid pulses.   INSPECTION of  breast documented symmetry of the tissue per se and location and size of the nipple,no retractions or inversion of the nipple, normal skin without lesions, no erythema or nodules,no paud'orange, no prominence of superficial veins or chest wall collateral circulation.PALPATION of the breast documented normal skin " turgor, no induration, alteration in local temperature, or pain, no palpable masses or nodules, normal mobility of the tissues,no fixation of the tissue or parenchyma to the chest wall, no alteration at the tail of the breasts or axillas, no adenopathies.left mastectomy Surgical site was well healed.No lymphedema in either extremity.  ABDOMEN:  Soft, nontender with no organomegaly or masses, no ascites, no collateral circulation,no distention,no Melbourne sign, no abdominal pain, no inguinal hernias,no umbilical hernia, no abdominal bruits. Normal bowel sounds.  GENITAL: Not  Performed.  EXTREMITIES  AND SPINE:  No clubbing, cyanosis or edema, no deformities or pain .No kyphosis, scoliosis, deformities or pain in spine, ribs or pelvic bone.r hand carpal tunnel syndrome  NEUROLOGICAL:  Patient was awake, alert, oriented to time, person and place.                    RECENT LABS:      Notes recorded by Clemente Kaye MD on 11/13/2019 at 7:40 AM EST  Thyroid nl. Sodium remains low at 129   TSH   Order: [id number]   Collected:  11/11/2019 10:52   View Full Report  Component  Ref Range & Units 7d ago   TSH  0.450 - 4.500 uIU/mL 3.250       Narrative     Performed at:  [address] [address]  : Kiel Galvez PhD, Phone:  [phone]            Sedimentation Rate   Order: [id number]   Collected:  11/11/2019 10:52   View Full Report  Component  Ref Range & Units 7d ago   Sed Rate  0 - 30 mm/hr 9                Contains abnormal data ARON,PE and FLC, Serum   Order: [id number]   Collected:  11/11/2019 10:52   View Full Report  Component  Ref Range & Units 7d ago   IgG  700 - 1600 mg/dL 460 Abnormally low     IgA  64 - 422 mg/dL 37 Abnormally low     Comment: Result confirmed on concentration.   IgM  26 - 217 mg/dL 345 Abnormally high     Total Protein  6.0 - 8.5 g/dL 6.5    Albumin  2.9 - 4.4 g/dL 3.6    Alpha-1-Globulin  0.0 - 0.4 g/dL 0.3    Alpha-2-Globulin  0.4 - 1.0 g/dL  0.8    Beta Globulin  0.7 - 1.3 g/dL 1.0    Gamma Globulin  0.4 - 1.8 g/dL 0.8    M-Pratik  Not Observed g/dL 0.4 Abnormally high     Globulin  2.2 - 3.9 g/dL 2.9    A/G Ratio  0.7 - 1.7 1.3    Immunofixation Reflex, Serum Comment    Comment: Immunofixation shows IgM monoclonal protein with kappa light chain   specificity.   Please note Comment    Comment: Protein electrophoresis scan will follow via computer, mail, or    delivery.   Free Light Chain, Kappa  3.3 - 19.4 mg/L 13.7    Free Lambda Light Chains  5.7 - 26.3 mg/L 9.1    Kappa/Lambda Ratio  0.26 - 1.65 1.51       Narrative     Performed at:  [facility]  [id number]  : Kiel Galvez PhD, Phone:  [phone]            Result Notes for Comprehensive Metabolic Panel     Notes recorded by Nicki Lamar MA on 11/13/2019 at 8:05 AM EST  Labs mailed to pt unable to reach pt  ------    Notes recorded by Clemente Kaye MD on 11/13/2019 at 7:40 AM EST  Thyroid nl. Sodium remains low at 129   Contains critical data Comprehensive Metabolic Panel   Order: [id number]   Collected:  11/11/2019 10:52   View Full Report  Component  Ref Range & Units 7d ago   Glucose  74 - 124 mg/dL 116    BUN  6 - 20 mg/dL 8    Creatinine  0.60 - 1.10 mg/dL 0.75    Sodium  134 - 145 mmol/L 129 Critically low     Potassium  3.5 - 4.7 mmol/L 3.9    Chloride  98 - 107 mmol/L 88 Abnormally low     CO2  22.0 - 29.0 mmol/L 28.3    Calcium  8.5 - 10.2 mg/dL 10.1    Total Protein  6.3 - 8.0 g/dL 7.2    Albumin  3.50 - 5.20 g/dL 4.70    ALT (SGPT)  0 - 33 U/L 13    AST (SGOT)  0 - 32 U/L 19    Alkaline Phosphatase  38 - 116 U/L 83    Total Bilirubin  0.2 - 1.2 mg/dL 0.7    eGFR Non African Amer  >60 mL/min/1.73 74    Globulin  1.8 - 3.5 gm/dL 2.5    A/G Ratio  1.1 - 2.4 g/dL 1.9    BUN/Creatinine Ratio  7.3 - 30.0 10.7    Anion Gap  5.0 - 15.0 mmol/L 12.7       Narrative     The MDRD GFR formula is only valid for adults with stable  renal function between ages 18 and 70.            Contains abnormal data CBC & Differential   Order: [id number]   Collected:  11/11/2019 10:52   View Full Report              Contains abnormal data CBC Auto Differential   Order: [id number] - Part of Panel Order [id number]   Collected:  11/11/2019 10:52   View Full Report  Component  Ref Range & Units 7d ago   WBC  3.40 - 10.80 10*3/mm3 5.68    RBC  3.77 - 5.28 10*6/mm3 4.34    Hemoglobin  12.0 - 15.9 g/dL 14.3    Hematocrit  34.0 - 46.6 % 41.7    MCV  79.0 - 97.0 fL 96.1    MCH  26.6 - 33.0 pg 32.9    MCHC  31.5 - 35.7 g/dL 34.3    RDW  12.3 - 15.4 % 12.2 Abnormally low     RDW-SD  37.0 - 54.0 fl 43.6    MPV  6.0 - 12.0 fL 8.6    Platelets  140 - 450 10*3/mm3 165    Neutrophil %  42.7 - 76.0 % 55.0    Lymphocyte %  19.6 - 45.3 % 27.6    Monocyte %  5.0 - 12.0 % 9.7    Eosinophil %  0.3 - 6.2 % 6.5 Abnormally high     Basophil %  0.0 - 1.5 % 0.7    Immature Grans %  0.0 - 0.5 % 0.5    Neutrophils, Absolute  1.70 - 7.00 10*3/mm3 3.12    Lymphocytes, Absolute  0.70 - 3.10 10*3/mm3 1.57    Monocytes, Absolute  0.10 - 0.90 10*3/mm3 0.55    Eosinophils, Absolute  0.00 - 0.40 10*3/mm3 0.37    Basophils, Absolute  0.00 - 0.20 10*3/mm3 0.04    Immature Grans, Absolute  0.00 - 0.05 10*3/mm3 0.03    nRBC  0.0 - 0.2 /100 WBC 0.0                        Assessment/Plan      1. 1. This patient has longstanding history of Waldenstrom macroglobulinemia that has been treated in the past mainly with Rituxan. In the past, also she was treated with Velcade with very dramatic improvement in her monoclonal protein but very dramatic sensory peripheral neuropathy. This medication was discontinued altogether.Today some of the symptoms she complains of including carpel tunnel and numbness in her feet go in favor of Waldenstrom's mild degree of activity associated with fatigue.  On the other hand, her IgM protein is 345 and is a minor raise in comparison with the number 3 months ago.   The  patient was reviewed on 11/18/2019.  She actually was clinically very stable with no new urinary events besides a urinary tract infection that was very brief and treated.  She was seen subsequently by an Urologist with nothing new found. Her carpal tunnel syndrome in the right hand remains an issue and now that things are so quiet I do believe the patient should be a good candidate to proceed with surgery for this.  Regarding her previous history of left breast cancer there is nothing to suggest recurrence and she is up-to-date in her right-sided mammogram and right breast examination is normal.  Her left mastectomy site is normal.     From the point of view of her Waldenstrom's I do not see any symptomatology.  Her monoclonal protein IgM rise is minimal and I do not believe I need to go ahead and give her any treatment.      RECOMMENDATIONS:  1.  Return in 3 months with a CBC, CMP and monoclonal protein studies the week before MD appointment.   2.  It would okay for her to proceed with carpal tunnel surgery at this point.                              11/18/2019      CC:

## 2019-11-18 NOTE — PROGRESS NOTES
Subjective  REASONS FOR FOLLOWUP:     1. History of Waldenstrom macroglobulinemia. The patient  remains in remission about this condition with stable level of immunoglobulin M . Normal IgA. Normal IgG. Normal white count, hemoglobin, and platelets. No abnormal bleeding. No peripheral adenopathy. No hepatosplenomegaly. The patient will remain in observation.   2. History of breast cancer stage I on the left, status post mastectomy. The patient remains on aromatase inhibitor without any evidence of breast cancer recurrence and good tolerance to her aromatase inhibitor medicine. She will remain on the same issue for the time being.                History of Present Illness                     1. Past Medical History, Past Surgical HistoryHypertension.    2. Hypothyroidism many years ago and has not taken any thyroid medication in quite some time.   3. Thyroid nodule removed more than 35 years ago.    4. Cholecystectomy.    5. Tonsillectomy and adenoidectomy.    6. Appendectomy.  7. Partial hysterectomy many years ago.      She has mammogram and pelvic examination yearly that have been normal.  Colonoscopy 4 years ago was normal.   She also has had a leaky valve in her heart without any significance.      During the recent workup at [facility], the patient had a transesophageal echocardiogram that failed to document any vegetation.    Clostridium colitis requiring admission to [facility] in 09/2008.      Arthroscopy in 03/2009 of the right knee with finding of chondromalacia and appropriate cleanup of the joint was performed by Dr. Moraes.      On 07/24/2013 the patient has lost 14 pounds, just cutting down on junk food.  Her glucose intolerance has improved substantially and her glucose has normalized.      On 01/12/2014, we reviewed her blood pressure issues recently. She has had a very stable blood pressure for the last week, and today is normal. Her physical examination is otherwise  unremarkable and normal feeling.     We advised her to remain in observation from this point of view.    As per 10/01/2014, the patient has undergone evaluation by neurology service at [facility] for consideration of gabapentin that she has been taking for so many years for possible seizure activity.   I am aware of an MRI scan of the brain that shows not too much, besides minimal vascular disease and no other lesions.  Her neurologist will be informing us in regard how to proceed in this regard.      She wanted to have a right knee replaced in 2009 through her orthopedic surgeon. She developed delirium in the hospital and extensive ecchymosis and hematoma formation in the whole right lower extremity requiring transfusion of red cells for a hemoglobin of 7.     SOCIAL HISTORY:  .  Retired from [employer] where she worked for many years; she never was exposed to any chemicals.   She is a never smoker and nondrinker.   She lives with her oldest daughter.      FAMILY HISTORY: The patient’s father  of complications from heart disease at age 70.  Mother  after a psychiatric illness at age 67.  A brother  in a car accident at 43 and a sister, age 75, is in good health.   Her three daughters are in good health.  She has no FH of lymphoma or leukemia.    Review of Systems       General: no fever, no chills, no fatigue,no weight changes, no lack of appetite.  Eyes: no epiphora, xerophthalmia,conjunctivitis, pain, glaucoma, blurred vision, blindness, secretion, photophobia, proptosis, diplopia.  Ears: no otorrhea, tinnitus, otorrhagia, deafness, pain, vertigo.  Nose: no rhinorrhea, no epistaxis, no alteration in perception of odors, no sinuses pressure.  Mouth: no alteration in gums or teeth,  No ulcers, no difficulty with mastication or deglut ion, no odynophagia.  Neck: no masses or pain, no thyroid alterations, no pain in muscles or arteries, no carotid odynia, no  "crepitation.  Respiratory: no cough, no sputum production,no dyspnea,no trepopnea, no pleuritic pain,no hemoptysis.  Heart: no syncope, no irregularity, no palpitations, no angina,no orthopnea,no paroxysmal nocturnal dyspnea.  Vascular Venous: no tenderness,no edema,no palpable cords,no postphlebitic syndrome, no skin changes no ulcerations.  Vascular Arterial: no distal ischemia, noclaudication, no gangrene, no neuropathic ischemic pain, no skin ulcers, no paleness no cyanosis.  GI: no dysphagia, no odynophagia, no regurgitation, no heartburn,no indigestion,no nausea,no vomiting,no hematemesis ,no melena,no jaundice,no distention, no obstipation,no enterorrhagia,no proctalgia,no anal  lesions, no changes in bowel habits.  : no frequency, no hesitancy, no hematuria, no discharge,no  pain.  Musculoskeletal: no muscle or tendon pain or inflammation,no  joint pain, no edema, no functional limitation,no fasciculations, no mass.  Neurologic: no headache, no seizures, noalterations on Craneal nerves, no motor deficit, no sensory deficit, normal coordination, no alteration in memory,normal orientation, calculation,normal writting, verbal and written language.  Skin: no rashes,no pruritus no localized lesions.  Psychiatric: no anxiety, no depression,no agitation, no delusions, proper insight.                 Medications:  The current medication list was reviewed in the EMR    ALLERGIES:    Allergies   Allergen Reactions   • Cortisone Other (See Comments)     Reports having a shot years ago, and wonders if he hit a vein. She was told by another doctor that it probably went in her blood stream.    • Darvon  [Propoxyphene] Palpitations       Objective      Vitals:    11/18/19 1250   BP: 166/71   Pulse: 69   Resp: 16   Temp: 98 °F (36.7 °C)   TempSrc: Oral   SpO2: 97%   Weight: 70.4 kg (155 lb 1.6 oz)   Height: 165.2 cm (65.04\")   PainSc: 0-No pain     Current Status 11/18/2019   ECOG score 0       Physical Exam "                         RECENT LABS:  Component      Latest Ref Rng & Units 6/26/2019 8/25/2019 9/23/2019 9/23/2019          12:29 PM  11:25 AM 11:25 AM   IgG      700 - 1600 mg/dL 497 (L)   453 (L)   IgA      64 - 422 mg/dL 39 (L)   38 (L)   IgM      26 - 217 mg/dL 227 (H)   330 (H)   Total Protein      6.3 - 8.0 g/dL 6.8 7.2 7.0 6.6   Albumin      3.50 - 5.20 g/dL 4.1 4.90 4.60 3.7   Alpha-1-Globulin      0.0 - 0.4 g/dL 0.3   0.3   Alpha-2-Globulin      0.4 - 1.0 g/dL 0.8   0.8   Beta Globulin      0.7 - 1.3 g/dL 1.0   1.0   Gamma Globulin      0.4 - 1.8 g/dL 0.7   0.8   M-Pratik      Not Observed g/dL 0.3 (H)   0.4 (H)   Globulin      2.2 - 3.9 g/dL 2.7   2.9   A/G Ratio      1.1 - 2.4 g/dL 1.6 2.1 1.9 1.3   Immunofixation Reflex, Serum       Comment   Comment   Please note       Comment   Comment   Kappa FLC      3.3 - 19.4 mg/L 12.5   16.0   Free Lambda Light Chains      5.7 - 26.3 mg/L 7.8   8.8   Kappa/Lambda Ratio      0.26 - 1.65 1.60   1.82 (H)     MRI OF THE BRAIN WITH AND WITHOUT CONTRAST AND MRA OF THE HEAD WITHOUT  CONTRAST AND MRA OF THE NECK WITH AND WITHOUT CONTRAST 08/26/2019     CLINICAL HISTORY: This is an 79-year-old female with history of left eye  amaurosis fugax, possible stroke. Patient also complains of headache  with the associated episode of vision loss in left eye and has history  of tremors and more pronounced tremors that started yesterday and  reports a history of breast cancer.      MRI OF THE BRAIN TECHNIQUE: Axial T1, FLAIR, fat-suppressed T2, axial  diffusion and gradient echo T2, sagittal T1 and postcontrast axial  fat-suppressed T1, sagittal and coronal T1 and thin cut 1.5 mm thick  axial postcontrast spoiled gradient echo T1 weighted images were  obtained of the entire head.      COMPARISON: This Is correlated to a prior MRI of the brain from [facility] 09/25/2014 and prior noncontrast head CT  yesterday, 08/25/2019, at 3:30 PM.     FINDINGS: There are some  patchy and confluent areas of T2 signal  extending from the periventricular into the subcortical white matter of  the cerebral hemispheres. Scattered small nodular foci in the cerebral  white matter and central pontine white matter all of which is consistent  with mild/moderate small vessel disease. There is a tiny 3 mm round  focus of hemosiderin deposition with signal loss on the gradient echo  T2-weighted images in the medial aspect of the right middle cerebellar  peduncle that is unchanged since the prior MRI 09/25/2014 that is  compatible with a tiny old microhemorrhage at this site. The remainder  of the brain parenchyma is normal in signal intensities. Specifically no  diffusion-weighted abnormality is seen with no acute infarct identified  and on the gradient echo T2-weighted images no acute and no additional  old blood breakdown products are seen intracranially. The ventricles are  normal in size. I see no mass effect, no midline shift, no extra-axial  fluid collections are identified. No abnormal areas of enhancement are  seen. The paranasal sinuses are clear. There is a very tiny amount of  fluid in the right mastoid air cells and the left mastoid and middle ear  cavity are clear. Good flow voids are demonstrated in the cerebral  vessels and in the dural venous sinuses. The calvarium and skull base  demonstrate normal marrow signal intensity. The orbits are unremarkable.           IMPRESSION:  1. Since prior MRI of the brain 09/25/2014 there has been progression of  the mild/moderate small vessel disease in the cerebral and central  pontine white matter. Otherwise, there has been no change with no acute  abnormality seen. There is a chronic 3 mm focus of hemosiderin  deposition from an old microhemorrhage in the medial aspect of the right  middle cerebellar peduncle unchanged since 09/25/2014, etiology  uncertain possibly from a hypertensive old microhemorrhage.  2. The remainder of the MRI of the brain  is normal with no acute infarct  seen. The etiology of the loss of vision in the left eye and headaches  is not established on this exam.       Assessment/Plan      1. 1. This patient has longstanding history of Waldenstrom macroglobulinemia that has been treated in the past mainly with Rituxan. In the past, also she was treated with Velcade with very dramatic improvement in her monoclonal protein but very dramatic sensory peripheral neuropathy. This medication was discontinued altogether.Today some of the symptoms she complains of including carpel tunnel and numbness in her feet go in favor of Waldenstrom's mild degree of activity associated with fatigue.  On the other hand, her IgM protein is 330 and is a minor raise in comparison with the number 3 months ago.  Before I pull the trigger back into therapy I would like to have another number. The patient has had swings in her numbers before without the need of any intervention.  Therefore, we will review her back in 6 weeks with a CBC, CMP, monoclonal protein studies and sedimentation rate and review her back in 7 weeks.    I have tested today to be sure that she was not going to have temporal arteritis.  She has no tenderness in the temporal arteries, no tortuosity, no tenderness in the carotid arteries and no obvious neurological alteration besides sensory deficit in hands and feet related to neuropathy.  Therefore, we will watch in absence of any other intervention but I would like to review a sedimentation rate when she returns in 6 weeks.  A sedimentation rate has already been done in the past looking for temporal arteritis and this has been negative.      From the point of view of blood pressure, to me that is the most important issue to control.  She is going to see her new primary team in a few days and they can revamp her blood pressure medication control in the way they believe is necessary.  I asked the patient to let them take care of this issue.      From the point of view of her left breast cancer, the left-sided mastectomy remains normal, right breast remains normal, she is up-to-date on mammograms on the right side and I see nothing to suggest breast cancer recurrence.      I will review the patient back in 7 weeks with a CBC, CMP, monoclonal protein in 6 weeks.                              11/18/2019      CC:

## 2020-01-02 ENCOUNTER — TELEPHONE (OUTPATIENT)
Dept: INTERNAL MEDICINE | Facility: CLINIC | Age: 80
End: 2020-01-02

## 2020-01-02 NOTE — TELEPHONE ENCOUNTER
Patient is complaining of possible UTI, she is wanting to make an appointment for one day next week, please call [phone].

## 2020-01-06 NOTE — PROGRESS NOTES
Subjective   Chief Complaint   Patient presents with   • Urinary Tract Infection     foul odor       History of Present Illness     78 yo wf presents with cc as above ongoing times several weeks. Denies fever or chills. Reports fishy smell. Denies dysuria but notes frequency. Denies hematuria. Notes she was in hospital last year with this.      Patient Active Problem List   Diagnosis   • Malignant neoplasm of overlapping sites of left breast in female, estrogen receptor positive (CMS/HCC)   • Macroglobulinemia of Waldenstrom (CMS/HCC)   • Adult hypothyroidism   • Primary osteoarthritis of left knee   • Drug-induced polyneuropathy (CMS/HCC)   • Epilepsy with simple partial seizures (CMS/HCC)   • Headache, migraine   • Hyponatremia with normal extracellular fluid volume   • Chronic pansinusitis   • TIA (transient ischemic attack)   • UTI due to extended-spectrum beta lactamase (ESBL) producing Escherichia coli   • Essential hypertension   • Vision loss     • Malodorous urine       Allergies   Allergen Reactions   • Cortisone Other (See Comments)     Reports having a shot years ago, and wonders if he hit a vein. She was told by another doctor that it probably went in her blood stream.    • Darvon  [Propoxyphene] Palpitations       Current Outpatient Medications on File Prior to Visit   Medication Sig Dispense Refill   • aspirin 81 MG tablet Take 81 mg by mouth Daily.     • Budesonide (RHINOCORT ALLERGY NA) 2 sprays into the nostril(s) as directed by provider Daily As Needed.     • budesonide-formoterol (SYMBICORT) 80-4.5 MCG/ACT inhaler Inhale 2 puffs 2 (Two) Times a Day.     • cetirizine (zyrTEC) 10 MG tablet Take 10 mg by mouth Daily.     • gabapentin (NEURONTIN) 400 MG capsule Take 1 capsule by mouth 4 (Four) Times a Day. 120 capsule 3   • hydrocortisone 2.5 % cream Apply  topically 2 (Two) Times a Day.     • lansoprazole (PREVACID) 30 MG capsule TAKE 1 CAPSULE EVERY DAY 90 capsule 0   • levalbuterol (XOPENEX) 0.63  MG/3ML nebulizer solution Take 1 ampule by nebulization Every 8 (Eight) Hours As Needed for Wheezing. 24 mL 3   • levothyroxine (SYNTHROID, LEVOTHROID) 50 MCG tablet Take 1 tablet by mouth Daily. 90 tablet 1   • losartan-hydrochlorothiazide (HYZAAR) 100-25 MG per tablet Take 1 tablet by mouth Daily. 90 tablet 1   • potassium chloride (K-DUR,KLOR-CON) 20 MEQ CR tablet Take 1 tablet by mouth Daily. 90 tablet 1   • verapamil ER (VERELAN) 240 MG 24 hr capsule Take 1 capsule by mouth Every Night. 90 capsule 0   • furosemide (LASIX) 20 MG tablet Take 1 tablet by mouth Daily. 30 tablet 0     No current facility-administered medications on file prior to visit.        Past Medical History:   Diagnosis Date   • Acid reflux    • Asthma    • Clostridium difficile colitis     Requiring admission to [facility] in 09/2008   • H/O transesophageal echocardiography (MARICRUZ)    • History of transfusion of packed red blood cells     R/T SURGERY   • History of Waldenstrom's macroglobulinemia    • Leaky heart valve    • Seasonal allergies    • Thyroid nodule     Removed more than 35 years ago       Family History   Problem Relation Age of Onset   • Mental illness Mother    • Migraines Mother    • Dementia Mother    • Heart disease Father    • Hypertension Father    • Kidney disease Father    • Stroke Sister    • No Known Problems Daughter    • No Known Problems Daughter    • Breast cancer Other    • Heart disease Other    • Hypertension Other    • Diabetes Other    • Cancer Maternal Grandmother    • No Known Problems Maternal Grandfather    • No Known Problems Paternal Grandmother    • No Known Problems Paternal Grandfather    • Lymphoma Neg Hx    • Leukemia Neg Hx        Social History     Socioeconomic History   • Marital status:      Spouse name: Not on file   • Number of children: Not on file   • Years of education: Not on file   • Highest education level: Not on file   Occupational History     Employer: [employer]     Employer: RETIRED   Tobacco Use   • Smoking status: Never Smoker   • Smokeless tobacco: Never Used   Substance and Sexual Activity   • Alcohol use: No   • Drug use: No   • Sexual activity: Defer   Social History Narrative    She lives with her oldest daughter.       Past Surgical History:   Procedure Laterality Date   • ADENOIDECTOMY     • APPENDECTOMY     • CATARACT EXTRACTION Bilateral    • CHOLECYSTECTOMY     • COLONOSCOPY     • HYSTERECTOMY      Partial, many years ago, heavy bleeding, no cancer   • KNEE ARTHROSCOPY Right 03/2009    Finding of chondromalacia and appropriate cleanup of joint was performed by Dr. Moraes.   • MASTECTOMY Left 07/2014   • REPLACEMENT TOTAL KNEE Right 12/2015   • TONSILLECTOMY         The following portions of the patient's history were reviewed and updated as appropriate: problem list, allergies, current medications, past medical history and past social history.    Review of Systems   Constitutional: Negative for chills and fever.   Gastrointestinal: Negative for nausea and vomiting.   Genitourinary: Positive for frequency. Negative for dysuria and hematuria.       Immunization History   Administered Date(s) Administered   • Fluad Quad 10/03/2019   • Fluzone High Dose =>65 Years ([employer] ONLY) 10/30/2015, 10/11/2016, 10/18/2017, 10/06/2018   • Pneumococcal Conjugate 13-Valent (PCV13) 12/01/2016   • Pneumococcal Polysaccharide (PPSV23) 06/28/2019       Objective   Vitals:    01/07/20 0934 01/07/20 0950   BP:  126/62   Pulse:  72   Resp:  12   Weight: 69.9 kg (154 lb)    Height: 165.1 cm (65\")      Body mass index is 25.63 kg/m².  Physical Exam   Constitutional: She is oriented to person, place, and time. She appears well-developed and well-nourished.   HENT:   Head: Normocephalic and atraumatic.   Cardiovascular: Normal rate, regular rhythm, S1 normal, S2 normal and normal heart sounds.   Pulmonary/Chest: Effort normal and breath sounds normal.   Neurological: She is " alert and oriented to person, place, and time.   Skin: Skin is warm and dry.   Psychiatric: She has a normal mood and affect.   Vitals reviewed.      Procedures    Assessment/Plan   Karli was seen today for urinary tract infection.    Diagnoses and all orders for this visit:    Acute cystitis without hematuria  Comments:  UA shows 3+ leuk's & nitrate positive. Culture pending.   Orders:  -     Urine Culture - Urine, Urine, Clean Catch  -     nitrofurantoin, macrocrystal-monohydrate, (MACROBID) 100 MG capsule; Take 1 capsule by mouth 2 (Two) Times a Day.    Foul smelling urine  -     POCT urinalysis dipstick, automated  -     Urine Culture - Urine, Urine, Clean Catch        Records reviewed include previous OV with Dr. Kaye and Dr. Pal.     No follow-ups on file.

## 2020-01-07 ENCOUNTER — OFFICE VISIT (OUTPATIENT)
Dept: INTERNAL MEDICINE | Facility: CLINIC | Age: 80
End: 2020-01-07

## 2020-01-07 VITALS
SYSTOLIC BLOOD PRESSURE: 126 MMHG | RESPIRATION RATE: 12 BRPM | HEART RATE: 72 BPM | BODY MASS INDEX: 25.66 KG/M2 | DIASTOLIC BLOOD PRESSURE: 62 MMHG | WEIGHT: 154 LBS | HEIGHT: 65 IN

## 2020-01-07 DIAGNOSIS — N30.00 ACUTE CYSTITIS WITHOUT HEMATURIA: Primary | ICD-10-CM

## 2020-01-07 DIAGNOSIS — R82.90 FOUL SMELLING URINE: ICD-10-CM

## 2020-01-07 LAB
BILIRUB BLD-MCNC: NEGATIVE MG/DL
CLARITY, POC: CLEAR
COLOR UR: YELLOW
GLUCOSE UR STRIP-MCNC: NEGATIVE MG/DL
KETONES UR QL: NEGATIVE
LEUKOCYTE EST, POC: ABNORMAL
NITRITE UR-MCNC: POSITIVE MG/ML
PH UR: 6 [PH] (ref 5–8)
PROT UR STRIP-MCNC: NEGATIVE MG/DL
RBC # UR STRIP: NEGATIVE /UL
SP GR UR: 1.01 (ref 1–1.03)
UROBILINOGEN UR QL: NORMAL

## 2020-01-07 PROCEDURE — 99213 OFFICE O/P EST LOW 20 MIN: CPT | Performed by: NURSE PRACTITIONER

## 2020-01-07 PROCEDURE — 81003 URINALYSIS AUTO W/O SCOPE: CPT | Performed by: NURSE PRACTITIONER

## 2020-01-07 RX ORDER — NITROFURANTOIN 25; 75 MG/1; MG/1
100 CAPSULE ORAL 2 TIMES DAILY
Qty: 10 CAPSULE | Refills: 0 | Status: SHIPPED | OUTPATIENT
Start: 2020-01-07 | End: 2020-02-17

## 2020-01-10 LAB
BACTERIA UR CULT: ABNORMAL
BACTERIA UR CULT: ABNORMAL
OTHER ANTIBIOTIC SUSC ISLT: ABNORMAL

## 2020-02-02 DIAGNOSIS — G62.0 DRUG-INDUCED POLYNEUROPATHY (HCC): Primary | ICD-10-CM

## 2020-02-04 RX ORDER — GABAPENTIN 400 MG/1
CAPSULE ORAL
Qty: 120 CAPSULE | Refills: 5 | Status: SHIPPED | OUTPATIENT
Start: 2020-02-04 | End: 2020-08-06

## 2020-02-10 ENCOUNTER — LAB (OUTPATIENT)
Dept: LAB | Facility: HOSPITAL | Age: 80
End: 2020-02-10

## 2020-02-10 DIAGNOSIS — G62.0 DRUG-INDUCED POLYNEUROPATHY (HCC): ICD-10-CM

## 2020-02-10 DIAGNOSIS — Z17.0 MALIGNANT NEOPLASM OF OVERLAPPING SITES OF LEFT BREAST IN FEMALE, ESTROGEN RECEPTOR POSITIVE (HCC): ICD-10-CM

## 2020-02-10 DIAGNOSIS — C88.0 MACROGLOBULINEMIA OF WALDENSTROM (HCC): ICD-10-CM

## 2020-02-10 DIAGNOSIS — C50.812 MALIGNANT NEOPLASM OF OVERLAPPING SITES OF LEFT BREAST IN FEMALE, ESTROGEN RECEPTOR POSITIVE (HCC): ICD-10-CM

## 2020-02-10 LAB
ALBUMIN SERPL-MCNC: 4.6 G/DL (ref 3.5–5.2)
ALBUMIN/GLOB SERPL: 2.1 G/DL (ref 1.1–2.4)
ALP SERPL-CCNC: 75 U/L (ref 38–116)
ALT SERPL W P-5'-P-CCNC: 13 U/L (ref 0–33)
ANION GAP SERPL CALCULATED.3IONS-SCNC: 12.3 MMOL/L (ref 5–15)
AST SERPL-CCNC: 19 U/L (ref 0–32)
BASOPHILS # BLD AUTO: 0.04 10*3/MM3 (ref 0–0.2)
BASOPHILS NFR BLD AUTO: 0.7 % (ref 0–1.5)
BILIRUB SERPL-MCNC: 0.6 MG/DL (ref 0.2–1.2)
BUN BLD-MCNC: 8 MG/DL (ref 6–20)
BUN/CREAT SERPL: 10.8 (ref 7.3–30)
CALCIUM SPEC-SCNC: 9.8 MG/DL (ref 8.5–10.2)
CHLORIDE SERPL-SCNC: 88 MMOL/L (ref 98–107)
CO2 SERPL-SCNC: 27.7 MMOL/L (ref 22–29)
CREAT BLD-MCNC: 0.74 MG/DL (ref 0.6–1.1)
DEPRECATED RDW RBC AUTO: 42.5 FL (ref 37–54)
EOSINOPHIL # BLD AUTO: 0.58 10*3/MM3 (ref 0–0.4)
EOSINOPHIL NFR BLD AUTO: 9.9 % (ref 0.3–6.2)
ERYTHROCYTE [DISTWIDTH] IN BLOOD BY AUTOMATED COUNT: 12.4 % (ref 12.3–15.4)
GFR SERPL CREATININE-BSD FRML MDRD: 76 ML/MIN/1.73
GLOBULIN UR ELPH-MCNC: 2.2 GM/DL (ref 1.8–3.5)
GLUCOSE BLD-MCNC: 123 MG/DL (ref 74–124)
HCT VFR BLD AUTO: 39.1 % (ref 34–46.6)
HGB BLD-MCNC: 13.8 G/DL (ref 12–15.9)
IMM GRANULOCYTES # BLD AUTO: 0.03 10*3/MM3 (ref 0–0.05)
IMM GRANULOCYTES NFR BLD AUTO: 0.5 % (ref 0–0.5)
LYMPHOCYTES # BLD AUTO: 1.65 10*3/MM3 (ref 0.7–3.1)
LYMPHOCYTES NFR BLD AUTO: 28.1 % (ref 19.6–45.3)
MCH RBC QN AUTO: 32.9 PG (ref 26.6–33)
MCHC RBC AUTO-ENTMCNC: 35.3 G/DL (ref 31.5–35.7)
MCV RBC AUTO: 93.3 FL (ref 79–97)
MONOCYTES # BLD AUTO: 0.5 10*3/MM3 (ref 0.1–0.9)
MONOCYTES NFR BLD AUTO: 8.5 % (ref 5–12)
NEUTROPHILS # BLD AUTO: 3.07 10*3/MM3 (ref 1.7–7)
NEUTROPHILS NFR BLD AUTO: 52.3 % (ref 42.7–76)
NRBC BLD AUTO-RTO: 0 /100 WBC (ref 0–0.2)
PLATELET # BLD AUTO: 156 10*3/MM3 (ref 140–450)
PMV BLD AUTO: 8.8 FL (ref 6–12)
POTASSIUM BLD-SCNC: 4 MMOL/L (ref 3.5–4.7)
PROT SERPL-MCNC: 6.8 G/DL (ref 6.3–8)
RBC # BLD AUTO: 4.19 10*6/MM3 (ref 3.77–5.28)
SODIUM BLD-SCNC: 128 MMOL/L (ref 134–145)
WBC NRBC COR # BLD: 5.87 10*3/MM3 (ref 3.4–10.8)

## 2020-02-10 PROCEDURE — 85025 COMPLETE CBC W/AUTO DIFF WBC: CPT

## 2020-02-10 PROCEDURE — 80053 COMPREHEN METABOLIC PANEL: CPT

## 2020-02-10 PROCEDURE — 36415 COLL VENOUS BLD VENIPUNCTURE: CPT

## 2020-02-11 LAB
ALBUMIN SERPL-MCNC: 3.8 G/DL (ref 2.9–4.4)
ALBUMIN/GLOB SERPL: 1.5 {RATIO} (ref 0.7–1.7)
ALPHA1 GLOB FLD ELPH-MCNC: 0.3 G/DL (ref 0–0.4)
ALPHA2 GLOB SERPL ELPH-MCNC: 0.8 G/DL (ref 0.4–1)
B-GLOBULIN SERPL ELPH-MCNC: 0.9 G/DL (ref 0.7–1.3)
GAMMA GLOB SERPL ELPH-MCNC: 0.7 G/DL (ref 0.4–1.8)
GLOBULIN SER CALC-MCNC: 2.7 G/DL (ref 2.2–3.9)
IGA SERPL-MCNC: 39 MG/DL (ref 64–422)
IGG SERPL-MCNC: 454 MG/DL (ref 700–1600)
IGM SERPL-MCNC: 425 MG/DL (ref 26–217)
INTERPRETATION SERPL IEP-IMP: ABNORMAL
KAPPA LC SERPL-MCNC: 11.5 MG/L (ref 3.3–19.4)
KAPPA LC/LAMBDA SER: 1.92 {RATIO} (ref 0.26–1.65)
LAMBDA LC FREE SERPL-MCNC: 6 MG/L (ref 5.7–26.3)
Lab: ABNORMAL
M-SPIKE: 0.4 G/DL
PROT SERPL-MCNC: 6.5 G/DL (ref 6–8.5)

## 2020-02-17 ENCOUNTER — OFFICE VISIT (OUTPATIENT)
Dept: ONCOLOGY | Facility: CLINIC | Age: 80
End: 2020-02-17

## 2020-02-17 ENCOUNTER — APPOINTMENT (OUTPATIENT)
Dept: LAB | Facility: HOSPITAL | Age: 80
End: 2020-02-17

## 2020-02-17 VITALS
HEIGHT: 64 IN | TEMPERATURE: 98.8 F | OXYGEN SATURATION: 95 % | HEART RATE: 74 BPM | SYSTOLIC BLOOD PRESSURE: 156 MMHG | WEIGHT: 151.1 LBS | RESPIRATION RATE: 16 BRPM | DIASTOLIC BLOOD PRESSURE: 88 MMHG | BODY MASS INDEX: 25.8 KG/M2

## 2020-02-17 DIAGNOSIS — Z17.0 MALIGNANT NEOPLASM OF OVERLAPPING SITES OF LEFT BREAST IN FEMALE, ESTROGEN RECEPTOR POSITIVE (HCC): Primary | ICD-10-CM

## 2020-02-17 DIAGNOSIS — C88.0 MACROGLOBULINEMIA OF WALDENSTROM (HCC): ICD-10-CM

## 2020-02-17 DIAGNOSIS — C50.812 MALIGNANT NEOPLASM OF OVERLAPPING SITES OF LEFT BREAST IN FEMALE, ESTROGEN RECEPTOR POSITIVE (HCC): Primary | ICD-10-CM

## 2020-02-17 DIAGNOSIS — E03.9 ACQUIRED HYPOTHYROIDISM: ICD-10-CM

## 2020-02-17 LAB — TSH SERPL DL<=0.05 MIU/L-ACNC: 1.53 UIU/ML (ref 0.27–4.2)

## 2020-02-17 PROCEDURE — 99214 OFFICE O/P EST MOD 30 MIN: CPT | Performed by: INTERNAL MEDICINE

## 2020-02-17 PROCEDURE — 36415 COLL VENOUS BLD VENIPUNCTURE: CPT | Performed by: INTERNAL MEDICINE

## 2020-02-17 PROCEDURE — 84443 ASSAY THYROID STIM HORMONE: CPT | Performed by: INTERNAL MEDICINE

## 2020-02-17 RX ORDER — ALBUTEROL SULFATE 90 UG/1
AEROSOL, METERED RESPIRATORY (INHALATION) AS NEEDED
COMMUNITY
Start: 2019-12-09 | End: 2022-10-20

## 2020-02-17 NOTE — PROGRESS NOTES
Subjective  REASONS FOR FOLLOWUP:     1. History of Waldenstrom macroglobulinemia. The patient  remains in remission about this condition with stable level of immunoglobulin M . Normal IgA. Normal IgG. Normal white count, hemoglobin, and platelets. No abnormal bleeding. No peripheral adenopathy. No hepatosplenomegaly. The patient will remain in observation.   2. History of breast cancer stage I on the left, status post mastectomy. The patient completed aromatase inhibitor without any evidence of breast cancer recurrence                   History of Present Illness This patient returns today to the office for followup. She is here today stating that she has had her carpal tunnel surgery on the right hand 3 weeks ago. She is recovering but the pain has dramatically decreased in intensity in the first 3 digits of the right hand. She has been doing therapy on her own and she is improving in regard to strength and mobility. Her appetite has been stable, her weight is the same. She has not had any infections. Her bowel activity and urination remain normal. She has not had any clinical bleeding. No neuropathy. No heart issues.     She has had a little bit of asthma activity and she is using her nebulizer as well as her inhalers.                           1. Past Medical History, Past Surgical HistoryHypertension.    2. Hypothyroidism many years ago and has not taken any thyroid medication in quite some time.   3. Thyroid nodule removed more than 35 years ago.    4. Cholecystectomy.    5. Tonsillectomy and adenoidectomy.    6. Appendectomy.  7. Partial hysterectomy many years ago.      She has mammogram and pelvic examination yearly that have been normal.  Colonoscopy 4 years ago was normal.   She also has had a leaky valve in her heart without any significance.      During the recent workup at [facility], the patient had a transesophageal echocardiogram that failed to document any  vegetation.    Clostridium colitis requiring admission to [facility] in 2008.      Arthroscopy in 2009 of the right knee with finding of chondromalacia and appropriate cleanup of the joint was performed by Dr. Moraes.      On 2013 the patient has lost 14 pounds, just cutting down on junk food.  Her glucose intolerance has improved substantially and her glucose has normalized.      On 2014, we reviewed her blood pressure issues recently. She has had a very stable blood pressure for the last week, and today is normal. Her physical examination is otherwise unremarkable and normal feeling.     We advised her to remain in observation from this point of view.    As per 10/01/2014, the patient has undergone evaluation by neurology service at [facility] for consideration of gabapentin that she has been taking for so many years for possible seizure activity.   I am aware of an MRI scan of the brain that shows not too much, besides minimal vascular disease and no other lesions.  Her neurologist will be informing us in regard how to proceed in this regard.      She wanted to have a right knee replaced in 2009 through her orthopedic surgeon. She developed delirium in the hospital and extensive ecchymosis and hematoma formation in the whole right lower extremity requiring transfusion of red cells for a hemoglobin of 7.     SOCIAL HISTORY:  .  Retired from [employer] where she worked for many years; she never was exposed to any chemicals.   She is a never smoker and nondrinker.   She lives with her oldest daughter.      FAMILY HISTORY: The patient’s father  of complications from heart disease at age 70.  Mother  after a psychiatric illness at age 67.  A brother  in a car accident at 43 and a sister, age 75, is in good health.   Her three daughters are in good health.  She has no FH of lymphoma or leukemia.    Review of Systems         General: no fever, no  chills, no fatigue,no weight changes, no lack of appetite.  Eyes: no epiphora, xerophthalmia,conjunctivitis, pain, glaucoma, blurred vision, blindness, secretion, photophobia, proptosis, diplopia.  Ears: no otorrhea, tinnitus, otorrhagia, deafness, pain, vertigo.  Nose: no rhinorrhea, no epistaxis, no alteration in perception of odors, no sinuses pressure.  Mouth: no alteration in gums or teeth,  No ulcers, no difficulty with mastication or deglut ion, no odynophagia.  Neck: no masses or pain, no thyroid alterations, no pain in muscles or arteries, no carotid odynia, no crepitation.  Respiratory:  cough, no sputum production, dyspnea,no trepopnea, no pleuritic pain,no hemoptysis.  Heart: no syncope, no irregularity, no palpitations, no angina,no orthopnea,no paroxysmal nocturnal dyspnea.  Vascular Venous: no tenderness,no edema,no palpable cords,no postphlebitic syndrome, no skin changes no ulcerations.  Vascular Arterial: no distal ischemia, noclaudication, no gangrene, no neuropathic ischemic pain, no skin ulcers, no paleness no cyanosis.  GI: no dysphagia, no odynophagia, no regurgitation, no heartburn,no indigestion,no nausea,no vomiting,no hematemesis ,no melena,no jaundice,no distention, no obstipation,no enterorrhagia,no proctalgia,no anal  lesions, no changes in bowel habits.  : no frequency, no hesitancy, no hematuria, no discharge,no  pain.  Musculoskeletal: no muscle or tendon pain or inflammation,no  joint pain, no edema, no functional limitation,no fasciculations, no mass.  Neurologic: no headache, no seizures, noalterations on Craneal nerves, no motor deficit, no sensory deficit, normal coordination, no alteration in memory,normal orientation, calculation,normal writting, verbal and written language.  Skin: no rashes,no pruritus no localized lesions.  Psychiatric: no anxiety, no depression,no agitation, no delusions, proper insight.           Medications:  The current medication list was reviewed in  "the EMR    ALLERGIES:    Allergies   Allergen Reactions   • Cortisone Unknown - High Severity     Reports having a shot years ago, and wonders if he hit a vein. She was told by another doctor that it probably went in her blood stream.    • Darvon  [Propoxyphene] Palpitations       Objective      Vitals:    02/17/20 1413   BP: 156/88   Pulse: 74   Resp: 16   Temp: 98.8 °F (37.1 °C)   TempSrc: Oral   SpO2: 95%   Weight: 68.5 kg (151 lb 1.6 oz)   Height: 162.5 cm (63.98\")  Comment: new height   PainSc: 0-No pain     Current Status 2/17/2020   ECOG score 0       Physical Exam   GENERAL:  Well-developed, well-nourished  Patient  in no acute distress.   SKIN:  Warm, dry ,NO rashes,NO purpura ,NO petechiae.  HEENT:  Pupils were equal and reactive to light and accomodation, conjunctivas non injected, no pterigion, normal extraocular movements, normal visual acuity.   Mouth mucosa was moist, no exudates in oropharynx, normal gum line, normal roof of the mouth and pillars, normal papillations of the tongue.  NECK:  Supple with good range of motion; no thyromegaly or masses, no JVD or bruits, no cervical adenopathies.No carotid arteries pain, no carotid abnormal pulsation , NO arterial dance.  LYMPHATICS:  No cervical, NO supraclavicular, NO axillary,NO epitrochlear , NO inguinal adenopathy.  CHEST:  Normal excursion of both michelet thoraces, normal voice fremitus, no subcutaneous emphysema, normal axillas, no rashes or acanthosis nigricans. Lungs clear to percussion and auscultation, normal breath sounds bilaterally, no wheezing,NO crackles NO ronchi, NO stridor, NO rubs.  INSPECTION of  breast documented symmetry of the tissue per se and location and size of the nipple,no retractions or inversion of the nipple, normal skin without lesions, no erythema or nodules,no paud'orange, no prominence of superficial veins or chest wall collateral circulation.PALPATION of the breast documented normal skin turgor, no induration, alteration " in local temperature, or pain, no palpable masses or nodules, normal mobility of the tissues,no fixation of the tissue or parenchyma to the chest wall, no alteration at the tail of the breasts or axillas, no adenopathies. Left mastectomy Surgical site was well healed.No lymphedema in either extremity.  CARDIAC AND VASCULAR:  normal rate and regular rhythm, without murmurs,NO rubs NO S3 NO S4 right or left . Normal femoral, popliteal, pedis, brachial and carotid pulses.  ABDOMEN:  Soft, nontender with no organomegaly or masses, no ascites, no collateral circulation,no distention,no Louise sign, no abdominal pain, no inguinal hernias,no umbilical hernia, no abdominal bruits. Normal bowel sounds.  GENITAL: Not  Performed.  EXTREMITIES  AND SPINE:  No clubbing, cyanosis or edema, no deformities or pain .No kyphosis, scoliosis, deformities or pain in spine, ribs or pelvic bone.  NEUROLOGICAL:  Patient was awake, alert, oriented to time, person and place.                              RECENT LABS:  Component      Latest Ref Rng & Units 4/3/2019 6/26/2019 6/26/2019 8/25/2019          10:40 AM 12:29 PM 12:29 PM    IgG      700 - 1600 mg/dL 509 (L)  497 (L)    IgA      64 - 422 mg/dL 40 (L)  39 (L)    IgM      26 - 217 mg/dL 254 (H)  227 (H)    Total Protein      6.3 - 8.0 g/dL 6.9 7.3 6.8 7.2   Albumin      3.50 - 5.20 g/dL 3.8 5.00 4.1 4.90   Alpha-1-Globulin      0.0 - 0.4 g/dL 0.3  0.3    Alpha-2-Globulin      0.4 - 1.0 g/dL 0.8  0.8    Beta Globulin      0.7 - 1.3 g/dL 1.1  1.0    Gamma Globulin      0.4 - 1.8 g/dL 0.8  0.7    M-Pratik      Not Observed g/dL 0.4 (H)  0.3 (H)    Globulin      2.2 - 3.9 g/dL 3.1  2.7    A/G Ratio      1.1 - 2.4 g/dL 1.3 2.2 1.6 2.1   Immunofixation Reflex, Serum       Comment  Comment    Please note       Comment  Comment    Kappa FLC      3.3 - 19.4 mg/L 13.1  12.5    Free Lambda Light Chains      5.7 - 26.3 mg/L 7.0  7.8    Kappa/Lambda Ratio      0.26 - 1.65 1.87 (H)  1.60       Component      Latest Ref Rng & Units 9/23/2019 9/23/2019 11/11/2019 11/11/2019          11:25 AM 11:25 AM 10:52 AM 10:52 AM   IgG      700 - 1600 mg/dL  453 (L)  460 (L)   IgA      64 - 422 mg/dL  38 (L)  37 (L)   IgM      26 - 217 mg/dL  330 (H)  345 (H)   Total Protein      6.3 - 8.0 g/dL 7.0 6.6 7.2 6.5   Albumin      3.50 - 5.20 g/dL 4.60 3.7 4.70 3.6   Alpha-1-Globulin      0.0 - 0.4 g/dL  0.3  0.3   Alpha-2-Globulin      0.4 - 1.0 g/dL  0.8  0.8   Beta Globulin      0.7 - 1.3 g/dL  1.0  1.0   Gamma Globulin      0.4 - 1.8 g/dL  0.8  0.8   M-Pratik      Not Observed g/dL  0.4 (H)  0.4 (H)   Globulin      2.2 - 3.9 g/dL  2.9  2.9   A/G Ratio      1.1 - 2.4 g/dL 1.9 1.3 1.9 1.3   Immunofixation Reflex, Serum        Comment  Comment   Please note        Comment  Comment   Kappa FLC      3.3 - 19.4 mg/L  16.0  13.7   Free Lambda Light Chains      5.7 - 26.3 mg/L  8.8  9.1   Kappa/Lambda Ratio      0.26 - 1.65  1.82 (H)  1.51     Component      Latest Ref Rng & Units 2/10/2020 2/10/2020          10:50 AM 10:50 AM   IgG      700 - 1600 mg/dL  454 (L)   IgA      64 - 422 mg/dL  39 (L)   IgM      26 - 217 mg/dL  425 (H)   Total Protein      6.3 - 8.0 g/dL 6.8 6.5   Albumin      3.50 - 5.20 g/dL 4.60 3.8   Alpha-1-Globulin      0.0 - 0.4 g/dL  0.3   Alpha-2-Globulin      0.4 - 1.0 g/dL  0.8   Beta Globulin      0.7 - 1.3 g/dL  0.9   Gamma Globulin      0.4 - 1.8 g/dL  0.7   M-Pratik      Not Observed g/dL  0.4 (H)   Globulin      2.2 - 3.9 g/dL  2.7   A/G Ratio      1.1 - 2.4 g/dL 2.1 1.5   Immunofixation Reflex, Serum        Comment   Please note        Comment   Kappa FLC      3.3 - 19.4 mg/L  11.5   Free Lambda Light Chains      5.7 - 26.3 mg/L  6.0   Kappa/Lambda Ratio      0.26 - 1.65  1.92 (H)        Component  Ref Range & Units 7d ago   WBC  3.40 - 10.80 10*3/mm3 5.87    RBC  3.77 - 5.28 10*6/mm3 4.19    Hemoglobin  12.0 - 15.9 g/dL 13.8    Hematocrit  34.0 - 46.6 % 39.1    MCV  79.0 - 97.0 fL 93.3     MCH  26.6 - 33.0 pg 32.9    MCHC  31.5 - 35.7 g/dL 35.3    RDW  12.3 - 15.4 % 12.4    RDW-SD  37.0 - 54.0 fl 42.5    MPV  6.0 - 12.0 fL 8.8    Platelets  140 - 450 10*3/mm3 156    Neutrophil %  42.7 - 76.0 % 52.3    Lymphocyte %  19.6 - 45.3 % 28.1    Monocyte %  5.0 - 12.0 % 8.5    Eosinophil %  0.3 - 6.2 % 9.9High     Basophil %  0.0 - 1.5 % 0.7    Immature Grans %  0.0 - 0.5 % 0.5            Comprehensive Metabolic Panel   Order: [id number]   Status:  Final result   Visible to patient:  No (Not Released) Next appt:  04/24/2020 at 10:30 AM in Internal Medicine ([facility]   [place]) Dx:  Drug-induced polyneuropathy (CMS/HCC)...   Specimen Information: Blood        Component  Ref Range & Units 7d ago  (2/10/20) 3mo ago  (11/11/19) 3mo ago  (11/11/19)   Glucose  74 - 124 mg/dL 123   116    BUN  6 - 20 mg/dL 8   8    Creatinine  0.60 - 1.10 mg/dL 0.74   0.75    Sodium  134 - 145 mmol/L 128Low Critical    129Low Critical     Potassium  3.5 - 4.7 mmol/L 4.0   3.9    Chloride  98 - 107 mmol/L 88Low    88Low     CO2  22.0 - 29.0 mmol/L 27.7   28.3    Calcium  8.5 - 10.2 mg/dL 9.8   10.1    Total Protein  6.3 - 8.0 g/dL 6.8  6.5 R 7.2    Albumin  3.50 - 5.20 g/dL 4.60  3.6 R 4.70    ALT (SGPT)  0 - 33 U/L 13   13    AST (SGOT)  0 - 32 U/L 19   19    Alkaline Phosphatase  38 - 116 U/L 75   83    Total Bilirubin  0.2 - 1.2 mg/dL 0.6   0.7    eGFR Non African Amer  >60 mL/min/1.73 76   74    Globulin  1.8 - 3.5 gm/dL 2.2   2.5    A/G Ratio  1.1 - 2.4 g/dL 2.1  1.3 R 1.9    BUN/Creatinine Ratio  7.3 - 30.0 10.8   10.7    Anion Gap  5.0 - 15.0 mmol/L 12.3   12.                   Assessment/Plan      1. 1. This patient has longstanding history of Waldenstrom macroglobulinemia that has been treated in the past mainly with Rituxan. In the past, also she was treated with Velcade with very dramatic improvement in her monoclonal protein but very dramatic sensory peripheral neuropathy. This medication was discontinued  altogether.  The patient was further reviewed on 02/17/2020. On this occasion she has not had any infections, no clinical bleeding, no new neurological symptomatology and no symptoms pertinent to her Waldenstrom's. Her white count, hemoglobin and platelets remain stable. Her chronic pseudohyponatremia related to Waldenstrom's remains stable. Her chemistry profile was otherwise normal.     Her monoclonal protein studies disclose a minimal increase in her IgA level in comparison with previous assessment. The rest of the numbers look very stable.     Her new primary physician Clemente Kaye MD, has requested a TSH. She will be sent back to the lab for this.     RECOMMENDATIONS: I do believe that the patient's Waldenstrom's is very quiet clinically at this time. I will not recommend for her to immerse into any therapy of any kind for this at this point.     On the other hand given her primary physician requesting in regard to TSH, we sent her back to the lab and this report will be sent to him.     I have advised her to return to see me back in 4 months with a CBC, CMP and monoclonal protein studies done the week before MD appointment.     Otherwise I will review her back in 4 months.    I suggested exercises for carpal tunnel post surgery. Hopefully this will improve further and minimize more pain and give her more benefit in regard range of motion in the right hand as well as strength improvement.                         2/17/2020      CC:

## 2020-02-18 ENCOUNTER — TELEPHONE (OUTPATIENT)
Dept: ONCOLOGY | Facility: CLINIC | Age: 80
End: 2020-02-18

## 2020-02-18 NOTE — TELEPHONE ENCOUNTER
I spoke with the pt and informed her that her thyroid test was normal, it was good. I went over the numbers with her. I also advised her I sent the lab results to her pcp Dr. Kaye. Pt v/u.

## 2020-02-19 DIAGNOSIS — I10 ESSENTIAL HYPERTENSION: ICD-10-CM

## 2020-02-20 ENCOUNTER — LAB (OUTPATIENT)
Dept: LAB | Facility: HOSPITAL | Age: 80
End: 2020-02-20

## 2020-02-20 ENCOUNTER — TRANSCRIBE ORDERS (OUTPATIENT)
Dept: PULMONOLOGY | Facility: HOSPITAL | Age: 80
End: 2020-02-20

## 2020-02-20 DIAGNOSIS — J44.9 CHRONIC OBSTRUCTIVE PULMONARY DISEASE, UNSPECIFIED COPD TYPE (HCC): Primary | ICD-10-CM

## 2020-02-20 DIAGNOSIS — J06.9 ACUTE UPPER RESPIRATORY INFECTION, UNSPECIFIED: ICD-10-CM

## 2020-02-20 DIAGNOSIS — J44.9 CHRONIC OBSTRUCTIVE PULMONARY DISEASE, UNSPECIFIED COPD TYPE (HCC): ICD-10-CM

## 2020-02-20 LAB

## 2020-02-20 PROCEDURE — 0100U HC BIOFIRE FILMARRAY RESP PANEL 2: CPT

## 2020-02-20 RX ORDER — VERAPAMIL HYDROCHLORIDE 240 MG/1
240 CAPSULE, EXTENDED RELEASE ORAL NIGHTLY
Qty: 90 CAPSULE | Refills: 0 | Status: SHIPPED | OUTPATIENT
Start: 2020-02-20 | End: 2020-05-01 | Stop reason: SDUPTHER

## 2020-03-12 DIAGNOSIS — I10 ESSENTIAL HYPERTENSION: ICD-10-CM

## 2020-03-12 RX ORDER — LOSARTAN POTASSIUM AND HYDROCHLOROTHIAZIDE 25; 100 MG/1; MG/1
TABLET ORAL
Qty: 90 TABLET | Refills: 1 | Status: SHIPPED | OUTPATIENT
Start: 2020-03-12 | End: 2020-07-28

## 2020-04-10 ENCOUNTER — OFFICE VISIT (OUTPATIENT)
Dept: INTERNAL MEDICINE | Facility: CLINIC | Age: 80
End: 2020-04-10

## 2020-04-10 ENCOUNTER — HOSPITAL ENCOUNTER (OUTPATIENT)
Dept: CARDIOLOGY | Facility: HOSPITAL | Age: 80
Discharge: HOME | End: 2020-04-10
Admitting: INTERNAL MEDICINE

## 2020-04-10 DIAGNOSIS — M79.662 PAIN OF LEFT CALF: Primary | ICD-10-CM

## 2020-04-10 LAB
BH CV LOWER VASCULAR LEFT COMMON FEMORAL AUGMENT: NORMAL
BH CV LOWER VASCULAR LEFT COMMON FEMORAL COMPETENT: NORMAL
BH CV LOWER VASCULAR LEFT COMMON FEMORAL COMPRESS: NORMAL
BH CV LOWER VASCULAR LEFT COMMON FEMORAL PHASIC: NORMAL
BH CV LOWER VASCULAR LEFT COMMON FEMORAL SPONT: NORMAL
BH CV LOWER VASCULAR LEFT DISTAL FEMORAL COMPRESS: NORMAL
BH CV LOWER VASCULAR LEFT GASTRONEMIUS COMPRESS: NORMAL
BH CV LOWER VASCULAR LEFT GREATER SAPH AK COMPRESS: NORMAL
BH CV LOWER VASCULAR LEFT GREATER SAPH BK COMPRESS: NORMAL
BH CV LOWER VASCULAR LEFT MID FEMORAL AUGMENT: NORMAL
BH CV LOWER VASCULAR LEFT MID FEMORAL COMPETENT: NORMAL
BH CV LOWER VASCULAR LEFT MID FEMORAL COMPRESS: NORMAL
BH CV LOWER VASCULAR LEFT MID FEMORAL PHASIC: NORMAL
BH CV LOWER VASCULAR LEFT MID FEMORAL SPONT: NORMAL
BH CV LOWER VASCULAR LEFT PERONEAL COMPRESS: NORMAL
BH CV LOWER VASCULAR LEFT POPLITEAL AUGMENT: NORMAL
BH CV LOWER VASCULAR LEFT POPLITEAL COMPETENT: NORMAL
BH CV LOWER VASCULAR LEFT POPLITEAL COMPRESS: NORMAL
BH CV LOWER VASCULAR LEFT POPLITEAL PHASIC: NORMAL
BH CV LOWER VASCULAR LEFT POPLITEAL SPONT: NORMAL
BH CV LOWER VASCULAR LEFT POSTERIOR TIBIAL COMPRESS: NORMAL
BH CV LOWER VASCULAR LEFT PROFUNDA FEMORAL COMPRESS: NORMAL
BH CV LOWER VASCULAR LEFT PROXIMAL FEMORAL COMPRESS: NORMAL
BH CV LOWER VASCULAR LEFT SAPHENOFEMORAL JUNCTION COMPRESS: NORMAL
BH CV LOWER VASCULAR LEFT SOLEAL COMPRESS: NORMAL
BH CV LOWER VASCULAR RIGHT COMMON FEMORAL AUGMENT: NORMAL
BH CV LOWER VASCULAR RIGHT COMMON FEMORAL COMPETENT: NORMAL
BH CV LOWER VASCULAR RIGHT COMMON FEMORAL COMPRESS: NORMAL
BH CV LOWER VASCULAR RIGHT COMMON FEMORAL PHASIC: NORMAL
BH CV LOWER VASCULAR RIGHT COMMON FEMORAL SPONT: NORMAL

## 2020-04-10 PROCEDURE — 99441 PR PHYS/QHP TELEPHONE EVALUATION 5-10 MIN: CPT | Performed by: INTERNAL MEDICINE

## 2020-04-10 PROCEDURE — 93971 EXTREMITY STUDY: CPT

## 2020-04-10 RX ORDER — MELOXICAM 7.5 MG/1
7.5 TABLET ORAL DAILY
Qty: 10 TABLET | Refills: 0 | Status: SHIPPED | OUTPATIENT
Start: 2020-04-10 | End: 2020-05-01

## 2020-04-10 NOTE — PROGRESS NOTES
Subjective        Chief Complaint   Patient presents with   • Leg Pain     left           Karli Loomis is a 79 y.o. female who presents for    Patient Active Problem List   Diagnosis   • Malignant neoplasm of overlapping sites of left breast in female, estrogen receptor positive (CMS/HCC)   • Macroglobulinemia of Waldenstrom (CMS/HCC)   • Adult hypothyroidism   • Primary osteoarthritis of left knee   • Drug-induced polyneuropathy (CMS/HCC)   • Epilepsy with simple partial seizures (CMS/HCC)   • Headache, migraine   • Hyponatremia with normal extracellular fluid volume   • Chronic pansinusitis   • TIA (transient ischemic attack)   • UTI due to extended-spectrum beta lactamase (ESBL) producing Escherichia coli   • Essential hypertension   • Vision loss     • Malodorous urine   • Pain of left calf       History of Present Illness     She has left leg pain since Tuesday. It is worse with bending or getting up out of a chair. The pain started in her calf and it has worked down to the ankle. She denies swelling or redness. She denies prior episode or trauma. She has tried APAP and a sports cream without relief. She had a skin cancer removed from her left calf last August; it was not a melanoma.She does not feel any swelling. Her grandddaughter who is an RN looked at it yesterday. She denies h/o blood clots.  Allergies   Allergen Reactions   • Cortisone Unknown - High Severity     Reports having a shot years ago, and wonders if he hit a vein. She was told by another doctor that it probably went in her blood stream.    • Darvon  [Propoxyphene] Palpitations       Current Outpatient Medications on File Prior to Visit   Medication Sig Dispense Refill   • aspirin 81 MG tablet Take 81 mg by mouth Daily.     • Budesonide (RHINOCORT ALLERGY NA) 2 sprays into the nostril(s) as directed by provider Daily As Needed.     • budesonide-formoterol (SYMBICORT) 80-4.5 MCG/ACT inhaler Inhale 2 puffs 2 (Two) Times a Day.     • cetirizine  (zyrTEC) 10 MG tablet Take 10 mg by mouth Daily.     • gabapentin (NEURONTIN) 400 MG capsule TAKE ONE CAPSULE BY MOUTH FOUR TIMES A  capsule 5   • hydrocortisone 2.5 % cream Apply  topically 2 (Two) Times a Day.     • lansoprazole (PREVACID) 30 MG capsule TAKE 1 CAPSULE EVERY DAY 90 capsule 0   • levalbuterol (XOPENEX) 0.63 MG/3ML nebulizer solution Take 1 ampule by nebulization Every 8 (Eight) Hours As Needed for Wheezing. 24 mL 3   • levothyroxine (SYNTHROID, LEVOTHROID) 50 MCG tablet Take 1 tablet by mouth Daily. 90 tablet 1   • losartan-hydrochlorothiazide (HYZAAR) 100-25 MG per tablet TAKE 1 TABLET EVERY DAY 90 tablet 1   • potassium chloride (K-DUR,KLOR-CON) 20 MEQ CR tablet Take 1 tablet by mouth Daily. 90 tablet 1   • VENTOLIN  (90 Base) MCG/ACT inhaler      • verapamil ER (VERELAN) 240 MG 24 hr capsule TAKE 1 CAPSULE BY MOUTH EVERY NIGHT. 90 capsule 0     No current facility-administered medications on file prior to visit.        Past Medical History:   Diagnosis Date   • Acid reflux    • Asthma    • Clostridium difficile colitis     Requiring admission to [facility] in 09/2008   • H/O transesophageal echocardiography (MARICRUZ)    • History of transfusion of packed red blood cells     R/T SURGERY   • History of Waldenstrom's macroglobulinemia    • Leaky heart valve    • Seasonal allergies    • Thyroid nodule     Removed more than 35 years ago       Past Surgical History:   Procedure Laterality Date   • ADENOIDECTOMY     • APPENDECTOMY     • CARPAL TUNNEL RELEASE Right    • CATARACT EXTRACTION Bilateral    • CHOLECYSTECTOMY     • COLONOSCOPY     • HYSTERECTOMY      Partial, many years ago, heavy bleeding, no cancer   • KNEE ARTHROSCOPY Right 03/2009    Finding of chondromalacia and appropriate cleanup of joint was performed by Dr. Moraes.   • MASTECTOMY Left 07/2014   • REPLACEMENT TOTAL KNEE Right 12/2015   • TONSILLECTOMY         Family History   Problem Relation Age of Onset   •  Mental illness Mother    • Migraines Mother    • Dementia Mother    • Heart disease Father    • Hypertension Father    • Kidney disease Father    • Stroke Sister    • No Known Problems Daughter    • No Known Problems Daughter    • Breast cancer Other    • Heart disease Other    • Hypertension Other    • Diabetes Other    • Cancer Maternal Grandmother    • No Known Problems Maternal Grandfather    • No Known Problems Paternal Grandmother    • No Known Problems Paternal Grandfather    • Lymphoma Neg Hx    • Leukemia Neg Hx        Social History     Socioeconomic History   • Marital status:      Spouse name: Not on file   • Number of children: Not on file   • Years of education: Not on file   • Highest education level: Not on file   Occupational History     Employer: [employer]     Employer: RETIRED   Tobacco Use   • Smoking status: Never Smoker   • Smokeless tobacco: Never Used   Substance and Sexual Activity   • Alcohol use: No   • Drug use: No   • Sexual activity: Defer   Social History Narrative    She lives with her oldest daughter.           The following portions of the patient's history were reviewed and updated as appropriate: problem list, allergies, current medications, past medical history, past family history, past social history and past surgical history.    Review of Systems   Musculoskeletal:        Left leg pain       Immunization History   Administered Date(s) Administered   • Fluad Quad 10/03/2019   • Fluzone High Dose =>65 Years ([employer] ONLY) 10/30/2015, 10/11/2016, 10/18/2017, 10/06/2018   • Pneumococcal Conjugate 13-Valent (PCV13) 12/01/2016   • Pneumococcal Polysaccharide (PPSV23) 06/28/2019       Objective   There were no vitals filed for this visit.  There is no height or weight on file to calculate BMI.  Physical Exam   Neurological: She is alert.   Psychiatric: She has a normal mood and affect.       Procedures    Assessment/Plan   Karli was seen today for leg pain.    Diagnoses  and all orders for this visit:    Pain of left calf  -     meloxicam (MOBIC) 7.5 MG tablet; Take 1 tablet by mouth Daily.  -     Duplex Venous Lower Extremity - Left CAR             Discussed options. She is hesitant to go out to get a doppler. Offered Meloxicam and monitor vs doppler today; she is worried about a blood clot so she is willing to get doppler today. Risk are sitting a lot recently and past malignancy. Send in Meloxicam as well for 10 days and she will take Prevacid with it.    Visit was done via telephone.  No follow-ups on file.

## 2020-04-27 ENCOUNTER — TELEPHONE (OUTPATIENT)
Dept: INTERNAL MEDICINE | Facility: CLINIC | Age: 80
End: 2020-04-27

## 2020-04-27 NOTE — TELEPHONE ENCOUNTER
Patient called in requesting to change her office visit to a Telephone Visit. Please call patient to re-[facility].    Patient call back [phone]

## 2020-05-01 ENCOUNTER — OFFICE VISIT (OUTPATIENT)
Dept: INTERNAL MEDICINE | Facility: CLINIC | Age: 80
End: 2020-05-01

## 2020-05-01 DIAGNOSIS — E03.9 ADULT HYPOTHYROIDISM: ICD-10-CM

## 2020-05-01 DIAGNOSIS — I10 ESSENTIAL HYPERTENSION: Primary | ICD-10-CM

## 2020-05-01 PROCEDURE — 99442 PR PHYS/QHP TELEPHONE EVALUATION 11-20 MIN: CPT | Performed by: INTERNAL MEDICINE

## 2020-05-01 RX ORDER — VERAPAMIL HYDROCHLORIDE 240 MG/1
240 CAPSULE, EXTENDED RELEASE ORAL NIGHTLY
Qty: 90 CAPSULE | Refills: 3 | Status: SHIPPED | OUTPATIENT
Start: 2020-05-01 | End: 2020-07-01

## 2020-05-01 NOTE — PROGRESS NOTES
Subjective        Chief Complaint   Patient presents with   • Hypertension           Karli Loomis is a 79 y.o. female who presents for    Patient Active Problem List   Diagnosis   • Malignant neoplasm of overlapping sites of left breast in female, estrogen receptor positive (CMS/HCC)   • Macroglobulinemia of Waldenstrom (CMS/HCC)   • Adult hypothyroidism   • Primary osteoarthritis of left knee   • Drug-induced polyneuropathy (CMS/HCC)   • Epilepsy with simple partial seizures (CMS/HCC)   • Headache, migraine   • Hyponatremia with normal extracellular fluid volume   • Chronic pansinusitis   • TIA (transient ischemic attack)   • UTI due to extended-spectrum beta lactamase (ESBL) producing Escherichia coli   • Essential hypertension   • Vision loss         History of Present Illness     Her BP has been 130s/58. She checks her BP daily. Her SBP runs 132-143. She denies slurred speech, blindness or chest pain. Her breathing fluctuates with the pollen. She has not been wheezing. She saw Dr. Ogden in March and he told her to stay on Symbicort. She sees Dr. Larose in June. She has not gotten an allergy shot with Dr. Travis Morley since February.  Allergies   Allergen Reactions   • Cortisone Unknown - High Severity     Reports having a shot years ago, and wonders if he hit a vein. She was told by another doctor that it probably went in her blood stream.    • Darvon  [Propoxyphene] Palpitations       Current Outpatient Medications on File Prior to Visit   Medication Sig Dispense Refill   • aspirin 81 MG tablet Take 81 mg by mouth Daily.     • Budesonide (RHINOCORT ALLERGY NA) 2 sprays into the nostril(s) as directed by provider Daily As Needed.     • budesonide-formoterol (SYMBICORT) 80-4.5 MCG/ACT inhaler Inhale 2 puffs 2 (Two) Times a Day.     • cetirizine (zyrTEC) 10 MG tablet Take 10 mg by mouth Daily.     • gabapentin (NEURONTIN) 400 MG capsule TAKE ONE CAPSULE BY MOUTH FOUR TIMES A  capsule 5   • hydrocortisone  2.5 % cream Apply  topically 2 (Two) Times a Day.     • lansoprazole (PREVACID) 30 MG capsule TAKE 1 CAPSULE EVERY DAY 90 capsule 0   • levalbuterol (XOPENEX) 0.63 MG/3ML nebulizer solution Take 1 ampule by nebulization Every 8 (Eight) Hours As Needed for Wheezing. 24 mL 3   • levothyroxine (SYNTHROID, LEVOTHROID) 50 MCG tablet Take 1 tablet by mouth Daily. 90 tablet 1   • losartan-hydrochlorothiazide (HYZAAR) 100-25 MG per tablet TAKE 1 TABLET EVERY DAY 90 tablet 1   • potassium chloride (K-DUR,KLOR-CON) 20 MEQ CR tablet Take 1 tablet by mouth Daily. 90 tablet 1   • VENTOLIN  (90 Base) MCG/ACT inhaler      • [DISCONTINUED] verapamil ER (VERELAN) 240 MG 24 hr capsule TAKE 1 CAPSULE BY MOUTH EVERY NIGHT. 90 capsule 0   • [DISCONTINUED] meloxicam (MOBIC) 7.5 MG tablet Take 1 tablet by mouth Daily. 10 tablet 0     No current facility-administered medications on file prior to visit.        Past Medical History:   Diagnosis Date   • Acid reflux    • Asthma    • Clostridium difficile colitis     Requiring admission to [facility] in 09/2008   • H/O transesophageal echocardiography (MARICRUZ)    • History of transfusion of packed red blood cells     R/T SURGERY   • History of Waldenstrom's macroglobulinemia    • Leaky heart valve    • Seasonal allergies    • Thyroid nodule     Removed more than 35 years ago       Past Surgical History:   Procedure Laterality Date   • ADENOIDECTOMY     • APPENDECTOMY     • CARPAL TUNNEL RELEASE Right    • CATARACT EXTRACTION Bilateral    • CHOLECYSTECTOMY     • COLONOSCOPY     • HYSTERECTOMY      Partial, many years ago, heavy bleeding, no cancer   • KNEE ARTHROSCOPY Right 03/2009    Finding of chondromalacia and appropriate cleanup of joint was performed by Dr. Moraes.   • MASTECTOMY Left 07/2014   • REPLACEMENT TOTAL KNEE Right 12/2015   • TONSILLECTOMY         Family History   Problem Relation Age of Onset   • Mental illness Mother    • Migraines Mother    • Dementia Mother     • Heart disease Father    • Hypertension Father    • Kidney disease Father    • Stroke Sister    • No Known Problems Daughter    • No Known Problems Daughter    • Breast cancer Other    • Heart disease Other    • Hypertension Other    • Diabetes Other    • Cancer Maternal Grandmother    • No Known Problems Maternal Grandfather    • No Known Problems Paternal Grandmother    • No Known Problems Paternal Grandfather    • Lymphoma Neg Hx    • Leukemia Neg Hx        Social History     Socioeconomic History   • Marital status:      Spouse name: Not on file   • Number of children: Not on file   • Years of education: Not on file   • Highest education level: Not on file   Occupational History     Employer: [employer]     Employer: RETIRED   Tobacco Use   • Smoking status: Never Smoker   • Smokeless tobacco: Never Used   Substance and Sexual Activity   • Alcohol use: No   • Drug use: No   • Sexual activity: Defer   Social History Narrative    She lives with her oldest daughter.           The following portions of the patient's history were reviewed and updated as appropriate: problem list, allergies, current medications, past medical history, past family history, past social history and past surgical history.    Review of Systems   Respiratory: Negative for wheezing.    Cardiovascular: Negative for chest pain.       Immunization History   Administered Date(s) Administered   • Fluad Quad 10/03/2019   • Fluzone High Dose =>65 Years ([employer] ONLY) 10/30/2015, 10/11/2016, 10/18/2017, 10/06/2018   • Pneumococcal Conjugate 13-Valent (PCV13) 12/01/2016   • Pneumococcal Polysaccharide (PPSV23) 06/28/2019       Objective   There were no vitals filed for this visit.  There is no height or weight on file to calculate BMI.  Physical Exam   Constitutional: She appears well-developed and well-nourished.   Neurological: She is alert.   Psychiatric: She has a normal mood and affect.       Procedures    Assessment/Plan   Karli  was seen today for hypertension.    Diagnoses and all orders for this visit:    Essential hypertension  -     verapamil ER (VERELAN) 240 MG 24 hr capsule; Take 1 capsule by mouth Every Night.  -     Comprehensive Metabolic Panel; Future  -     Lipid Panel With / Chol / HDL Ratio; Future    Adult hypothyroidism  -     TSH; Future             Got permission for phone visit. 11 minutes spent. No change in BP meds today. She has follow up with Dr. Larose in June. Labs next time.    Return in about 3 months (around 8/1/2020) for Medicare Wellness.

## 2020-06-06 DIAGNOSIS — E03.9 ADULT HYPOTHYROIDISM: Primary | ICD-10-CM

## 2020-06-08 RX ORDER — LEVOTHYROXINE SODIUM 0.05 MG/1
TABLET ORAL
Qty: 90 TABLET | Refills: 1 | Status: SHIPPED | OUTPATIENT
Start: 2020-06-08 | End: 2020-12-01

## 2020-06-22 ENCOUNTER — LAB (OUTPATIENT)
Dept: LAB | Facility: HOSPITAL | Age: 80
End: 2020-06-22

## 2020-06-22 DIAGNOSIS — Z17.0 MALIGNANT NEOPLASM OF OVERLAPPING SITES OF LEFT BREAST IN FEMALE, ESTROGEN RECEPTOR POSITIVE (HCC): ICD-10-CM

## 2020-06-22 DIAGNOSIS — C88.0 MACROGLOBULINEMIA OF WALDENSTROM (HCC): ICD-10-CM

## 2020-06-22 DIAGNOSIS — C50.812 MALIGNANT NEOPLASM OF OVERLAPPING SITES OF LEFT BREAST IN FEMALE, ESTROGEN RECEPTOR POSITIVE (HCC): ICD-10-CM

## 2020-06-22 LAB
ALBUMIN SERPL-MCNC: 4.7 G/DL (ref 3.5–5.2)
ALBUMIN/GLOB SERPL: 1.9 G/DL (ref 1.1–2.4)
ALP SERPL-CCNC: 64 U/L (ref 38–116)
ALT SERPL W P-5'-P-CCNC: 15 U/L (ref 0–33)
ANION GAP SERPL CALCULATED.3IONS-SCNC: 10.6 MMOL/L (ref 5–15)
AST SERPL-CCNC: 21 U/L (ref 0–32)
BASOPHILS # BLD AUTO: 0.04 10*3/MM3 (ref 0–0.2)
BASOPHILS NFR BLD AUTO: 0.7 % (ref 0–1.5)
BILIRUB SERPL-MCNC: 0.7 MG/DL (ref 0.2–1.2)
BUN BLD-MCNC: 10 MG/DL (ref 6–20)
BUN/CREAT SERPL: 13.2 (ref 7.3–30)
CALCIUM SPEC-SCNC: 10.3 MG/DL (ref 8.5–10.2)
CHLORIDE SERPL-SCNC: 88 MMOL/L (ref 98–107)
CO2 SERPL-SCNC: 29.4 MMOL/L (ref 22–29)
CREAT BLD-MCNC: 0.76 MG/DL (ref 0.6–1.1)
DEPRECATED RDW RBC AUTO: 42.7 FL (ref 37–54)
EOSINOPHIL # BLD AUTO: 0.27 10*3/MM3 (ref 0–0.4)
EOSINOPHIL NFR BLD AUTO: 4.5 % (ref 0.3–6.2)
ERYTHROCYTE [DISTWIDTH] IN BLOOD BY AUTOMATED COUNT: 12.2 % (ref 12.3–15.4)
GFR SERPL CREATININE-BSD FRML MDRD: 73 ML/MIN/1.73
GLOBULIN UR ELPH-MCNC: 2.5 GM/DL (ref 1.8–3.5)
GLUCOSE BLD-MCNC: 117 MG/DL (ref 74–124)
HCT VFR BLD AUTO: 41.5 % (ref 34–46.6)
HGB BLD-MCNC: 14.4 G/DL (ref 12–15.9)
IMM GRANULOCYTES # BLD AUTO: 0.01 10*3/MM3 (ref 0–0.05)
IMM GRANULOCYTES NFR BLD AUTO: 0.2 % (ref 0–0.5)
LYMPHOCYTES # BLD AUTO: 1.8 10*3/MM3 (ref 0.7–3.1)
LYMPHOCYTES NFR BLD AUTO: 29.9 % (ref 19.6–45.3)
MCH RBC QN AUTO: 32.8 PG (ref 26.6–33)
MCHC RBC AUTO-ENTMCNC: 34.7 G/DL (ref 31.5–35.7)
MCV RBC AUTO: 94.5 FL (ref 79–97)
MONOCYTES # BLD AUTO: 0.56 10*3/MM3 (ref 0.1–0.9)
MONOCYTES NFR BLD AUTO: 9.3 % (ref 5–12)
NEUTROPHILS # BLD AUTO: 3.35 10*3/MM3 (ref 1.7–7)
NEUTROPHILS NFR BLD AUTO: 55.4 % (ref 42.7–76)
NRBC BLD AUTO-RTO: 0 /100 WBC (ref 0–0.2)
PLATELET # BLD AUTO: 162 10*3/MM3 (ref 140–450)
PMV BLD AUTO: 9.1 FL (ref 6–12)
POTASSIUM BLD-SCNC: 4.7 MMOL/L (ref 3.5–4.7)
PROT SERPL-MCNC: 7.2 G/DL (ref 6.3–8)
RBC # BLD AUTO: 4.39 10*6/MM3 (ref 3.77–5.28)
SODIUM BLD-SCNC: 128 MMOL/L (ref 134–145)
WBC NRBC COR # BLD: 6.03 10*3/MM3 (ref 3.4–10.8)

## 2020-06-22 PROCEDURE — 36415 COLL VENOUS BLD VENIPUNCTURE: CPT

## 2020-06-22 PROCEDURE — 80053 COMPREHEN METABOLIC PANEL: CPT

## 2020-06-22 PROCEDURE — 85025 COMPLETE CBC W/AUTO DIFF WBC: CPT

## 2020-06-23 LAB
ALBUMIN SERPL-MCNC: 4 G/DL (ref 2.9–4.4)
ALBUMIN/GLOB SERPL: 1.4 {RATIO} (ref 0.7–1.7)
ALPHA1 GLOB FLD ELPH-MCNC: 0.3 G/DL (ref 0–0.4)
ALPHA2 GLOB SERPL ELPH-MCNC: 0.8 G/DL (ref 0.4–1)
B-GLOBULIN SERPL ELPH-MCNC: 1 G/DL (ref 0.7–1.3)
GAMMA GLOB SERPL ELPH-MCNC: 0.9 G/DL (ref 0.4–1.8)
GLOBULIN SER CALC-MCNC: 3 G/DL (ref 2.2–3.9)
IGA SERPL-MCNC: 37 MG/DL (ref 64–422)
IGG SERPL-MCNC: 461 MG/DL (ref 586–1602)
IGM SERPL-MCNC: 574 MG/DL (ref 26–217)
INTERPRETATION SERPL IEP-IMP: ABNORMAL
KAPPA LC SERPL-MCNC: 12.7 MG/L (ref 3.3–19.4)
KAPPA LC/LAMBDA SER: 2.23 {RATIO} (ref 0.26–1.65)
LAMBDA LC FREE SERPL-MCNC: 5.7 MG/L (ref 5.7–26.3)
Lab: ABNORMAL
M-SPIKE: 0.5 G/DL
PROT SERPL-MCNC: 7 G/DL (ref 6–8.5)

## 2020-06-29 ENCOUNTER — OFFICE VISIT (OUTPATIENT)
Dept: ONCOLOGY | Facility: CLINIC | Age: 80
End: 2020-06-29

## 2020-06-29 ENCOUNTER — DOCUMENTATION (OUTPATIENT)
Dept: ONCOLOGY | Facility: CLINIC | Age: 80
End: 2020-06-29

## 2020-06-29 ENCOUNTER — APPOINTMENT (OUTPATIENT)
Dept: LAB | Facility: HOSPITAL | Age: 80
End: 2020-06-29

## 2020-06-29 VITALS
BODY MASS INDEX: 24.46 KG/M2 | HEART RATE: 71 BPM | RESPIRATION RATE: 18 BRPM | HEIGHT: 65 IN | SYSTOLIC BLOOD PRESSURE: 190 MMHG | DIASTOLIC BLOOD PRESSURE: 77 MMHG | TEMPERATURE: 97.5 F | OXYGEN SATURATION: 97 % | WEIGHT: 146.8 LBS

## 2020-06-29 DIAGNOSIS — C88.0 MACROGLOBULINEMIA OF WALDENSTROM (HCC): ICD-10-CM

## 2020-06-29 DIAGNOSIS — Z17.0 MALIGNANT NEOPLASM OF OVERLAPPING SITES OF LEFT BREAST IN FEMALE, ESTROGEN RECEPTOR POSITIVE (HCC): Primary | ICD-10-CM

## 2020-06-29 DIAGNOSIS — C50.812 MALIGNANT NEOPLASM OF OVERLAPPING SITES OF LEFT BREAST IN FEMALE, ESTROGEN RECEPTOR POSITIVE (HCC): Primary | ICD-10-CM

## 2020-06-29 PROCEDURE — 99215 OFFICE O/P EST HI 40 MIN: CPT | Performed by: INTERNAL MEDICINE

## 2020-06-29 NOTE — PROGRESS NOTES
Staff message rec from Dr Larose-he would like to start pt on Venclexta usual dosing and schedule. I have submitted the PA To [employer] through covermymeds.    Waiting for a decision.    Florencio Larose MD sent to Karol Ramos; Nicole Pretty RN             VENETOCLAX USUAL DOSES AND AINSLEY TO START AT HOME NP OR PHARM ED ,

## 2020-06-29 NOTE — PROGRESS NOTES
Subjective  REASONS FOR FOLLOWUP:     1. History of Waldenstrom macroglobulinemia. The patient  remains in remission about this condition with stable level of immunoglobulin M . Normal IgA. Normal IgG. Normal white count, hemoglobin, and platelets. No abnormal bleeding. No peripheral adenopathy. No hepatosplenomegaly. The patient will remain in observation.   2. History of breast cancer stage I on the left, status post mastectomy. The patient completed aromatase inhibitor without any evidence of breast cancer recurrence                   History of Present Illness   PATIENT WAS CALLED THE DAY BEFORE BY THE OFFICE TO ASK FOR SYMPTOMS THAT COULD BE CONSISTENT WITH CORONAVIRUS INFECTION, AND BEING NEGATIVE WAS SCHEDULED TO BE SEEN IN THE OFFICE TODAY. SIMILAR QUESTIONING TODAY INCLUDING, CHILLS, FEVER, NEW COUGH, SHORTNESS OF BREATH, DIARRHEA,DIFFUSE BODY ACHES  AND CHANGES IN SMELL OR TASTE WERE NEGATIVE.DURING THE VISIT WITH THE PATIENT TODAY , PATIENT HAD FACE MASK, I HAD PROPPER PROTECTIVE EQUIPMENT, AND I DID HAND HYGIENE WITH SOAP AND WATER BEFORE AND AFTER THE VISIT.      This patient returns today to the office complaining of significant degree of fatigue and progressive neuropathy in her feet. She has not had any blurred vision, epistaxis or clinical bleeding. No fevers, no chills, no sweats. She is very depressed with what is happening to the planet and social distancing. Still her granddaughter is able to come to her house and bring the 2 kids 8 and 7 and they have been safe and sound. They have been secluded at home as well. The patient's appetite is acceptable, bowel function is normal, urination is normal. Asthma with some hard days given the poor quality of air in [place]. She is staying inside and she has no need for oxygen. Her knee is not giving her any difficulties. Her previous left mastectomy has not had any new problems and she is due to have her new mammogram on the right side.                    1. Past Medical History, Past Surgical HistoryHypertension.    2. Hypothyroidism many years ago and has not taken any thyroid medication in quite some time.   3. Thyroid nodule removed more than 35 years ago.    4. Cholecystectomy.    5. Tonsillectomy and adenoidectomy.    6. Appendectomy.  7. Partial hysterectomy many years ago.      She has mammogram and pelvic examination yearly that have been normal.  Colonoscopy 4 years ago was normal.   She also has had a leaky valve in her heart without any significance.      During the recent workup at [facility], the patient had a transesophageal echocardiogram that failed to document any vegetation.    Clostridium colitis requiring admission to [facility] in 09/2008.      Arthroscopy in 03/2009 of the right knee with finding of chondromalacia and appropriate cleanup of the joint was performed by Dr. Moraes.      On 07/24/2013 the patient has lost 14 pounds, just cutting down on junk food.  Her glucose intolerance has improved substantially and her glucose has normalized.      On 01/12/2014, we reviewed her blood pressure issues recently. She has had a very stable blood pressure for the last week, and today is normal. Her physical examination is otherwise unremarkable and normal feeling.     We advised her to remain in observation from this point of view.    As per 10/01/2014, the patient has undergone evaluation by neurology service at [facility] for consideration of gabapentin that she has been taking for so many years for possible seizure activity.   I am aware of an MRI scan of the brain that shows not too much, besides minimal vascular disease and no other lesions.  Her neurologist will be informing us in regard how to proceed in this regard.      She wanted to have a right knee replaced in 12/2009 through her orthopedic surgeon. She developed delirium in the hospital and extensive ecchymosis and hematoma  formation in the whole right lower extremity requiring transfusion of red cells for a hemoglobin of 7.     SOCIAL HISTORY:  .  Retired from [employer] where she worked for many years; she never was exposed to any chemicals.   She is a never smoker and nondrinker.   She lives with her oldest daughter.      FAMILY HISTORY: The patient’s father  of complications from heart disease at age 70.  Mother  after a psychiatric illness at age 67.  A brother  in a car accident at 43 and a sister, age 75, is in good health.   Her three daughters are in good health.  She has no FH of lymphoma or leukemia.    Review of Systems             General: no fever, no chills,  fatigue,no weight changes, no lack of appetite.  Eyes: no epiphora, xerophthalmia,conjunctivitis, pain, glaucoma, blurred vision, blindness, secretion, photophobia, proptosis, diplopia.  Ears: no otorrhea, tinnitus, otorrhagia, deafness, pain, vertigo.  Nose: no rhinorrhea, no epistaxis, no alteration in perception of odors, no sinuses pressure.  Mouth: no alteration in gums or teeth,  No ulcers, no difficulty with mastication or deglut ion, no odynophagia.  Neck: no masses or pain, no thyroid alterations, no pain in muscles or arteries, no carotid odynia, no crepitation.  Respiratory: no cough, no sputum production,no dyspnea,no trepopnea, no pleuritic pain,no hemoptysis.  Heart: no syncope, no irregularity, no palpitations, no angina,no orthopnea,no paroxysmal nocturnal dyspnea.  Vascular Venous: no tenderness,no edema,no palpable cords,no postphlebitic syndrome, no skin changes no ulcerations.  Vascular Arterial: no distal ischemia, noclaudication, no gangrene, no neuropathic ischemic pain, no skin ulcers, no paleness no cyanosis.  GI: no dysphagia, no odynophagia, no regurgitation, no heartburn,no indigestion,no nausea,no vomiting,no hematemesis ,no melena,no jaundice,no distention, no obstipation,no enterorrhagia,no proctalgia,no anal   "lesions, no changes in bowel habits.  : no frequency, no hesitancy, no hematuria, no discharge,no  pain.  Musculoskeletal: no muscle or tendon pain or inflammation,no  joint pain, no edema, no functional limitation,no fasciculations, no mass.  Neurologic: no headache, no seizures, noalterations on Craneal nerves, no motor deficit, le sensory deficit, normal coordination, no alteration in memory,normal orientation, calculation,normal writting, verbal and written language.  Skin: no rashes,no pruritus no localized lesions.  Psychiatric:  anxiety, no depression,no agitation, no delusions, proper insight.           Medications:  The current medication list was reviewed in the EMR    ALLERGIES:    Allergies   Allergen Reactions   • Cortisone Unknown - High Severity     Reports having a shot years ago, and wonders if he hit a vein. She was told by another doctor that it probably went in her blood stream.    • Darvon  [Propoxyphene] Palpitations       Objective      Vitals:    06/29/20 1124   BP: (!) 190/77  Comment: 172/98   Pulse: 71   Resp: 18   Temp: 97.5 °F (36.4 °C)   TempSrc: Skin   SpO2: 97%   Weight: 66.6 kg (146 lb 12.8 oz)   Height: 165.1 cm (65\")   PainSc: 0-No pain     Current Status 6/29/2020   ECOG score 0       Physical Exam     Patient screened  for depression based on a PHQ-9 score of 0 on 6/29/2020.     This patient's ACP documentation is up to date, and there's nothing further left to document.    GENERAL ASPECT: IN GOOD HEALTH WALKING THROUGH TE OFFICE NO DIFFICULTIES, NO PAIN.PROPER NUTRITION.WELL KEPT PHYSICALLY.  EYES : NOT JAUNDICED, PUPILS WERE EQUAL.  HEART: REGULAR NO MURMURS , NO GALLOPS, NO RUBS.  ABDOMEN: NOT DISTENDED, NO PAIN, NO LIVER OR SPLEEN ENLARGEMENT, NO ASCITES, NO COLLATERAL CIRCULATION.  EXTREMITIES: NO EDEMA, NO PAIN, NO CLUBBING, NO DEFORMITIES.  NEUROLOGIC: NORMAL CONVERSATION, MEMORY , SPEECH AND GAIT.  SKIN: NO LESIONS.                      RECENT LABS:           View Full " Report  Component  Ref Range & Units 7d ago   IgG  586 - 1602 mg/dL 461Low     IgA  64 - 422 mg/dL 37Low     Comment: Result confirmed on concentration.   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   Please note Comment    Comment: Protein electrophoresis scan will follow via computer, mail, or    delivery.   Free Light Chain, Kappa  3.3 - 19.4 mg/L 12.7    Free Lambda Light Chains  5.7 - 26.3 mg/L 5.7    Kappa/Lambda Ratio  0.26 - 1.65 2.23High        Narrative     Performed at:  [address] [place] [address]  [id number]  : Kiel Galvez PhD, Phone:  [phone]            Contains critical data Comprehensive Metabolic Panel   Order: [id number]   Collected:  6/22/2020 11:47   Specimen Information: Blood        View Full Report  Component  Ref Range & Units 7d ago   Glucose  74 - 124 mg/dL 117    BUN  6 - 20 mg/dL 10    Creatinine  0.60 - 1.10 mg/dL 0.76    Sodium  134 - 145 mmol/L 128Low Critical     Potassium  3.5 - 4.7 mmol/L 4.7    Chloride  98 - 107 mmol/L 88Low     CO2  22.0 - 29.0 mmol/L 29.4High     Calcium  8.5 - 10.2 mg/dL 10.3High     Total Protein  6.3 - 8.0 g/dL 7.2    Albumin  3.50 - 5.20 g/dL 4.70    ALT (SGPT)  0 - 33 U/L 15    AST (SGOT)  0 - 32 U/L 21    Alkaline Phosphatase  38 - 116 U/L 64    Total Bilirubin  0.2 - 1.2 mg/dL 0.7    eGFR Non African Amer  >60 mL/min/1.73 73    Globulin  1.8 - 3.5 gm/dL 2.5    A/G Ratio  1.1 - 2.4 g/dL 1.9    BUN/Creatinine Ratio  7.3 - 30.0 13.2    Anion Gap  5.0 - 15.0 mmol/L 10.6        Narrative     The MDRD GFR formula is only valid for adults with stable renal function between ages 18 and 70.            Contains abnormal data CBC & Differential   Order: [id number]   Collected:  6/22/2020 11:47   Specimen Information: Blood        View Full Report              Contains abnormal data CBC Auto Differential   Order: [id number] - Part of Panel Order [id number]   Collected:  6/22/2020 11:47   Specimen Information: Blood        View Full Report  Component  Ref Range & Units 7d ago   WBC  3.40 - 10.80 10*3/mm3 6.03    RBC  3.77 - 5.28 10*6/mm3 4.39    Hemoglobin  12.0 - 15.9 g/dL 14.4    Hematocrit  34.0 - 46.6 % 41.5    MCV  79.0 - 97.0 fL 94.5    MCH  26.6 - 33.0 pg 32.8    MCHC  31.5 - 35.7 g/dL 34.7    RDW  12.3 - 15.4 % 12.2Low     RDW-SD  37.0 - 54.0 fl 42.7    MPV  6.0 - 12.0 fL 9.1    Platelets  140 - 450 10*3/mm3 162    Neutrophil %  42.7 - 76.0 % 55.4    Lymphocyte %  19.6 - 45.3 % 29.9    Monocyte %  5.0 - 12.0 % 9.3    Eosinophil %  0.3 - 6.2 % 4.5    Basophil %  0.0 - 1.5 % 0.7    Immature Grans %  0.0 - 0.5 % 0.2    Neutrophils, Absolute  1.70 - 7.00 10*3/mm3 3.35    Lymphocytes, Absolute  0.70 - 3.10 10*3/mm3 1.80    Monocytes, Absolute  0.10 - 0.90 10*3/mm3 0.56    Eosinophils, Absolute  0.00 - 0.40 10*3/mm3 0.27    Basophils, Absolute  0.00 - 0.20 10*3/mm3 0.04    Immature Grans, Absolute  0.00 - 0.05 10*3/mm3 0.01    nRBC  0.0 - 0.2 /100 WBC 0.0                            Assessment/Plan      1. 1. This patient has longstanding history of Waldenstrom macroglobulinemia that has been treated in the past mainly with Rituxan. In the past, also she was treated with Velcade with very dramatic improvement in her monoclonal protein but very dramatic sensory peripheral neuropathy. This medication was discontinued altogether.Upon her review with me on 06/29/2020 her fatigue is progressive, her sensory neuropathy in her feet is progressive, this is the original symptom that she had when she first  presented with Waldenstrom. The patient is not having any blurred vision, clinical bleeding, no headache but she is very fatigued and tired. No fevers, no infections. She has no peripheral adenopathy, no hepatosplenomegaly. Her monoclonal protein IgM has gone up by 150 points. The white count, hemoglobin and platelets remain stable. Her calcium level is up 10.5 and her serum sodium is low. This is called pseudohyponatremia associated with monoclonal protein.     Given these findings I advised the patient the followin. I do believe that there is a need to pursue therapy for her Waldenstrom. One option will be to go back to Rituxan that was giving her very poor benefit during the previous infusions with very marginal drop in her monoclonal protein. The other medication that could be useful is Imbruvica but the problem with this patient is significant asthma and her significant hypertension that will make her prone to develop atrial fibrillation. I think venetoclax to my eyes is probably the best option and I have advised her to initiate this medicine as soon as she has proper education, consent and the medication is provided to her. I pointed out to her that she will be educated by pharmacist in this regard and she will have the medicine delivered to her house. She will require on a monthly basis CBC, CMP and monoclonal protein studies as well as doctor visit. Given the volume bulk of tumor is minimal I think the patient will not be a risk to develop tumor lysis syndrome.     The patient was advised about the side effects of this medicine including the peculiar schedule when the medicine is started, the dryness in the skin that will require a moisturizer like Cetaphil and other than that not too much in regard to side effects.     The patient was ready to proceed.     2. In regard to her previous history of breast cancer on the left her left mastectomy site is fine, her right breast feels normal, right axilla is  normal and she will proceed with her mammogram on the right side in days. She already completed her adjuvant therapy for her breast cancer years back and she has not had any issues in her right breast.     I DISCUSSED WITH PATIENT IN DETAIL FORMS TO DECREASE CHANCES OF CORONAVIRUS INFECTION INCLUDING ISOLATION, PROPER HAND HIGIENE, AVOID PUBLIC PLACES  WITH CROWDS, FOLLOW  CDC RECOMENDATIONS, AND KEEP PERSONAL AND SOCIAL RESPONSIBILITY, WARE A MASK IN PUBLIC PLACES.  PATIENT IS AWARE THIS INFECTION COULD HAVE SEVERE CONSEQUENCES TO PERSONAL HEALTH AND FAMILY RAMIFICATIONS OF THIS.                      6/29/2020      CC:

## 2020-06-30 ENCOUNTER — APPOINTMENT (OUTPATIENT)
Dept: WOMENS IMAGING | Facility: HOSPITAL | Age: 80
End: 2020-06-30

## 2020-06-30 ENCOUNTER — DOCUMENTATION (OUTPATIENT)
Dept: ONCOLOGY | Facility: CLINIC | Age: 80
End: 2020-06-30

## 2020-06-30 PROCEDURE — 77063 BREAST TOMOSYNTHESIS BI: CPT | Performed by: RADIOLOGY

## 2020-06-30 PROCEDURE — 77067 SCR MAMMO BI INCL CAD: CPT | Performed by: RADIOLOGY

## 2020-06-30 NOTE — PROGRESS NOTES
I have been notified that pts Venclexta copay is $969.50. Funding is open however, it does not cover the Venclexta (Venclexta is not approved for Waldenstroms). I have asked Kory to screen.

## 2020-07-01 ENCOUNTER — TELEPHONE (OUTPATIENT)
Dept: INTERNAL MEDICINE | Facility: CLINIC | Age: 80
End: 2020-07-01

## 2020-07-01 DIAGNOSIS — I10 ESSENTIAL HYPERTENSION: Primary | ICD-10-CM

## 2020-07-01 RX ORDER — AMLODIPINE BESYLATE 5 MG/1
5 TABLET ORAL DAILY
Qty: 30 TABLET | Refills: 4 | Status: SHIPPED | OUTPATIENT
Start: 2020-07-01 | End: 2020-07-09 | Stop reason: ALTCHOICE

## 2020-07-01 NOTE — TELEPHONE ENCOUNTER
Tell her that Verapamil needs to be stopped since it can interact with one of meds from Dr. Larose and that I sent in generic amlodipine to take in its place    She needs a Medicare Wellness scheduled for August.

## 2020-07-08 ENCOUNTER — DOCUMENTATION (OUTPATIENT)
Dept: ONCOLOGY | Facility: CLINIC | Age: 80
End: 2020-07-08

## 2020-07-08 NOTE — PROGRESS NOTES
Venclexta starter pack to be delivered to the office tomorrow, 7/9/2020. Pt has appt with [facility] pharmacist for education.

## 2020-07-09 ENCOUNTER — APPOINTMENT (OUTPATIENT)
Dept: LAB | Facility: HOSPITAL | Age: 80
End: 2020-07-09

## 2020-07-09 ENCOUNTER — SPECIALTY PHARMACY (OUTPATIENT)
Dept: ONCOLOGY | Facility: CLINIC | Age: 80
End: 2020-07-09

## 2020-07-09 VITALS — BODY MASS INDEX: 24.21 KG/M2 | WEIGHT: 145.5 LBS

## 2020-07-09 NOTE — PROGRESS NOTES
Oral Chemotherapy Teaching      Patient Name/:  Karli Loomis   1940  Oral Chemotherapy Regimen:  Venclexta starter pack  Date Started Medication: 07/10/20    Initial Teaching Follow Up Comments     Safety     Storage instructions (away from children; away from heat/cold, sunlight, or moisture), handling - use of gloves (caregivers), washing hands after touching pills, managing waste     “How are you storing your medications?”, reminders on storage, proper handling (caregivers using gloves, washing hands, away from children, managing waste, etc.), disposal of medication with D/C or dosage change    Please store your medication in a cool dry place, out of direct sunlight. Keep out of reach of any children or pets. Karli will wash her hands before and after medication administration. She will bring any unused medication back to the office for proper disposal.     Adherence      patient and/or caregiver on how to take medication, take with/without food, assess their adherence potential, stress importance of adherence, ways to manage adherence (pill boxes, phone reminders, calendars), what to do if miss a dose   “How are you taking your medication?” “How are you remembering to take your medication?”, “How many doses have you missed?”, determine reasons for non-adherence (not remembering, side effects, etc), ways to improve, overadherence? Remind patient of ways to improve/maintain adherence   Take your Venclexta starter pack per package directions. Take your medication at the same time everyday with food and plenty of water. If you miss a dose, take within 6 hrs of your normal dosing time. If >6 hrs please skip that dose and continue taking your medication the following day as you normally would.     Side Effects/Adverse Reactions      patient on potential side effects, s/s, ways to manage, when to call MD/seek help     Determine if patient experiencing side effects, ways to manage  We discussed the  following side effects:   - fatigue  - low blood counts  -diarrhea  -nausea  -swelling  -rash  Karli verbalizes understanding of side effects and how to manage.     Miscellaneous     Food interactions, DDIs, financial issues Determine if patient started any new medications since being placed on oral chemo (analyze for DDI) No DDI. Avoid grapefruit, seville oranges, and star fruit.     Additional Notes:  Education provided face-to-face to Karli this afternoon. I handed her the medication dispensed by [facility]. We discussed her medication regimen and she understands the ramp up schedule. Karli verbalizes understanding of her medication. She will increase her fluid intake while she is on the medication. She is nervous about nausea, so I will reach out to MD to see if he would like to prescribe something for her to have at home. I updated Karli's medication list. CCA and consents were signed today. Karli plans to start her medication tomorrow. She has no additional questions. Pharmacy will continue to follow.     Thanks,  Michelle Haney, PharmD

## 2020-07-10 DIAGNOSIS — Z17.0 MALIGNANT NEOPLASM OF OVERLAPPING SITES OF LEFT BREAST IN FEMALE, ESTROGEN RECEPTOR POSITIVE (HCC): Primary | ICD-10-CM

## 2020-07-10 DIAGNOSIS — C50.812 MALIGNANT NEOPLASM OF OVERLAPPING SITES OF LEFT BREAST IN FEMALE, ESTROGEN RECEPTOR POSITIVE (HCC): Primary | ICD-10-CM

## 2020-07-10 RX ORDER — ONDANSETRON 4 MG/1
4 TABLET, FILM COATED ORAL EVERY 8 HOURS PRN
Qty: 30 TABLET | Refills: 5 | Status: SHIPPED | OUTPATIENT
Start: 2020-07-10 | End: 2021-05-07

## 2020-07-17 ENCOUNTER — MEDICATION THERAPY MANAGEMENT (OUTPATIENT)
Dept: PHARMACY | Facility: HOSPITAL | Age: 80
End: 2020-07-17

## 2020-07-17 NOTE — PROGRESS NOTES
MTM telephone follow up re side effects and adherence- Venclexta    Karli is doing okay. She reports significant fatigue with her Venclexta thus far. She denies any nausea or diarrhea. Karli is drinking plenty of water with her medication. She started her Venclexta on 7/13. I will plan to follow up with her on Monday to see how she is doing. In the meantime, Karli will call with any questions or concerns. Pharmacy will continue to follow.    Thanks,  Michelle Haney, PharmD

## 2020-07-20 ENCOUNTER — MEDICATION THERAPY MANAGEMENT (OUTPATIENT)
Dept: PHARMACY | Facility: HOSPITAL | Age: 80
End: 2020-07-20

## 2020-07-20 RX ORDER — POTASSIUM CHLORIDE 20 MEQ/1
TABLET, EXTENDED RELEASE ORAL
Qty: 90 TABLET | Refills: 1 | Status: SHIPPED | OUTPATIENT
Start: 2020-07-20 | End: 2021-01-28

## 2020-07-20 NOTE — PROGRESS NOTES
MTM telephone follow up re adherence and side effects- Venclexta    Karli reports quite a bit of fatigue and weakness since starting her Venclexta. I have reached out to MD via in-basket message regarding this issue. I will follow up with Karli when I have a response.    Thanks,  Michelle Haney, GaryD

## 2020-07-22 ENCOUNTER — MEDICATION THERAPY MANAGEMENT (OUTPATIENT)
Dept: PHARMACY | Facility: HOSPITAL | Age: 80
End: 2020-07-22

## 2020-07-27 ENCOUNTER — OFFICE VISIT (OUTPATIENT)
Dept: ONCOLOGY | Facility: CLINIC | Age: 80
End: 2020-07-27

## 2020-07-27 ENCOUNTER — LAB (OUTPATIENT)
Dept: LAB | Facility: HOSPITAL | Age: 80
End: 2020-07-27

## 2020-07-27 VITALS
HEART RATE: 72 BPM | SYSTOLIC BLOOD PRESSURE: 150 MMHG | TEMPERATURE: 97.3 F | OXYGEN SATURATION: 98 % | HEIGHT: 65 IN | RESPIRATION RATE: 16 BRPM | BODY MASS INDEX: 24.16 KG/M2 | WEIGHT: 145 LBS | DIASTOLIC BLOOD PRESSURE: 74 MMHG

## 2020-07-27 DIAGNOSIS — C50.812 MALIGNANT NEOPLASM OF OVERLAPPING SITES OF LEFT BREAST IN FEMALE, ESTROGEN RECEPTOR POSITIVE (HCC): ICD-10-CM

## 2020-07-27 DIAGNOSIS — C88.0 MACROGLOBULINEMIA OF WALDENSTROM (HCC): Primary | ICD-10-CM

## 2020-07-27 DIAGNOSIS — R53.0 NEOPLASTIC (MALIGNANT) RELATED FATIGUE: ICD-10-CM

## 2020-07-27 DIAGNOSIS — Z17.0 MALIGNANT NEOPLASM OF OVERLAPPING SITES OF LEFT BREAST IN FEMALE, ESTROGEN RECEPTOR POSITIVE (HCC): ICD-10-CM

## 2020-07-27 DIAGNOSIS — C88.0 MACROGLOBULINEMIA OF WALDENSTROM (HCC): ICD-10-CM

## 2020-07-27 LAB
ALBUMIN SERPL-MCNC: 4.6 G/DL (ref 3.5–5.2)
ALBUMIN/GLOB SERPL: 1.9 G/DL (ref 1.1–2.4)
ALP SERPL-CCNC: 81 U/L (ref 38–116)
ALT SERPL W P-5'-P-CCNC: 14 U/L (ref 0–33)
ANION GAP SERPL CALCULATED.3IONS-SCNC: 14.2 MMOL/L (ref 5–15)
AST SERPL-CCNC: 22 U/L (ref 0–32)
BASOPHILS # BLD AUTO: 0.02 10*3/MM3 (ref 0–0.2)
BASOPHILS NFR BLD AUTO: 0.4 % (ref 0–1.5)
BILIRUB SERPL-MCNC: 0.8 MG/DL (ref 0.2–1.2)
BUN SERPL-MCNC: 10 MG/DL (ref 6–20)
BUN/CREAT SERPL: 11.5 (ref 7.3–30)
CALCIUM SPEC-SCNC: 10.1 MG/DL (ref 8.5–10.2)
CHLORIDE SERPL-SCNC: 86 MMOL/L (ref 98–107)
CO2 SERPL-SCNC: 24.8 MMOL/L (ref 22–29)
CREAT SERPL-MCNC: 0.87 MG/DL (ref 0.6–1.1)
DEPRECATED RDW RBC AUTO: 42 FL (ref 37–54)
EOSINOPHIL # BLD AUTO: 0.15 10*3/MM3 (ref 0–0.4)
EOSINOPHIL NFR BLD AUTO: 2.7 % (ref 0.3–6.2)
ERYTHROCYTE [DISTWIDTH] IN BLOOD BY AUTOMATED COUNT: 12.1 % (ref 12.3–15.4)
GFR SERPL CREATININE-BSD FRML MDRD: 63 ML/MIN/1.73
GLOBULIN UR ELPH-MCNC: 2.4 GM/DL (ref 1.8–3.5)
GLUCOSE SERPL-MCNC: 85 MG/DL (ref 74–124)
HCT VFR BLD AUTO: 40.4 % (ref 34–46.6)
HGB BLD-MCNC: 14.2 G/DL (ref 12–15.9)
IMM GRANULOCYTES # BLD AUTO: 0.02 10*3/MM3 (ref 0–0.05)
IMM GRANULOCYTES NFR BLD AUTO: 0.4 % (ref 0–0.5)
LYMPHOCYTES # BLD AUTO: 1.56 10*3/MM3 (ref 0.7–3.1)
LYMPHOCYTES NFR BLD AUTO: 28.2 % (ref 19.6–45.3)
MCH RBC QN AUTO: 33.3 PG (ref 26.6–33)
MCHC RBC AUTO-ENTMCNC: 35.1 G/DL (ref 31.5–35.7)
MCV RBC AUTO: 94.6 FL (ref 79–97)
MONOCYTES # BLD AUTO: 0.71 10*3/MM3 (ref 0.1–0.9)
MONOCYTES NFR BLD AUTO: 12.8 % (ref 5–12)
NEUTROPHILS NFR BLD AUTO: 3.08 10*3/MM3 (ref 1.7–7)
NEUTROPHILS NFR BLD AUTO: 55.5 % (ref 42.7–76)
NRBC BLD AUTO-RTO: 0 /100 WBC (ref 0–0.2)
PLATELET # BLD AUTO: 168 10*3/MM3 (ref 140–450)
PMV BLD AUTO: 8.7 FL (ref 6–12)
POTASSIUM SERPL-SCNC: 4 MMOL/L (ref 3.5–4.7)
PROT SERPL-MCNC: 7 G/DL (ref 6.3–8)
RBC # BLD AUTO: 4.27 10*6/MM3 (ref 3.77–5.28)
SODIUM SERPL-SCNC: 125 MMOL/L (ref 134–145)
WBC # BLD AUTO: 5.54 10*3/MM3 (ref 3.4–10.8)

## 2020-07-27 PROCEDURE — 85025 COMPLETE CBC W/AUTO DIFF WBC: CPT

## 2020-07-27 PROCEDURE — 36415 COLL VENOUS BLD VENIPUNCTURE: CPT

## 2020-07-27 PROCEDURE — 99213 OFFICE O/P EST LOW 20 MIN: CPT | Performed by: NURSE PRACTITIONER

## 2020-07-27 PROCEDURE — 80053 COMPREHEN METABOLIC PANEL: CPT

## 2020-07-27 NOTE — PROGRESS NOTES
Subjective    REASONS FOR FOLLOWUP:     1. History of Waldenstrom macroglobulinemia.    2. History of breast cancer stage I on the left, status post mastectomy. The patient completed aromatase inhibitor without any evidence of breast cancer recurrence        History of Present Illness    The patient is a 79 y.o. female with the above-mentioned history, who returns to the office today for one-month follow-up and review.  When seen last by Dr. Larose, she was having progressive fatigue related to her Waldenstrom's therefore she initiated venetoclax approximately 2 weeks ago.  She has been escalating the dose.  She reports she has tolerated this well so far.  She has had no skin changes.  She denies diarrhea.  Her only struggle is the large amount of fluid required with the initiation of venetoclax.  She is drinking 56 ounces daily which has resulted in frequent urination particularly at night.  She does think her energy is worsened though she attributes this to being up multiple times throughout the night to go to the bathroom.  She denies any new pain.  She denies signs or symptoms of infection, fevers or chills.    Past Medical History:   Diagnosis Date   • Acid reflux    • Asthma    • Clostridium difficile colitis     Requiring admission to [facility] in 09/2008   • H/O transesophageal echocardiography (MARICRUZ)    • History of transfusion of packed red blood cells     R/T SURGERY   • History of Waldenstrom's macroglobulinemia    • Leaky heart valve    • Seasonal allergies    • Thyroid nodule     Removed more than 35 years ago     Past Surgical History:   Procedure Laterality Date   • ADENOIDECTOMY     • APPENDECTOMY     • CARPAL TUNNEL RELEASE Right    • CATARACT EXTRACTION Bilateral    • CHOLECYSTECTOMY     • COLONOSCOPY     • HYSTERECTOMY      Partial, many years ago, heavy bleeding, no cancer   • KNEE ARTHROSCOPY Right 03/2009    Finding of chondromalacia and appropriate cleanup of joint was  performed by Dr. Moraes.   • MASTECTOMY Left 07/2014   • REPLACEMENT TOTAL KNEE Right 12/2015   • TONSILLECTOMY         1. Past Medical History, Past Surgical HistoryHypertension.    2. Hypothyroidism many years ago and has not taken any thyroid medication in quite some time.   3. Thyroid nodule removed more than 35 years ago.    4. Cholecystectomy.    5. Tonsillectomy and adenoidectomy.    6. Appendectomy.  7. Partial hysterectomy many years ago.      She has mammogram and pelvic examination yearly that have been normal.  Colonoscopy 4 years ago was normal.   She also has had a leaky valve in her heart without any significance.      During the recent workup at [facility], the patient had a transesophageal echocardiogram that failed to document any vegetation.    Clostridium colitis requiring admission to [facility] in 09/2008.      Arthroscopy in 03/2009 of the right knee with finding of chondromalacia and appropriate cleanup of the joint was performed by Dr. Moraes.      On 07/24/2013 the patient has lost 14 pounds, just cutting down on junk food.  Her glucose intolerance has improved substantially and her glucose has normalized.      On 01/12/2014, we reviewed her blood pressure issues recently. She has had a very stable blood pressure for the last week, and today is normal. Her physical examination is otherwise unremarkable and normal feeling.     We advised her to remain in observation from this point of view.    As per 10/01/2014, the patient has undergone evaluation by neurology service at [facility] for consideration of gabapentin that she has been taking for so many years for possible seizure activity.   I am aware of an MRI scan of the brain that shows not too much, besides minimal vascular disease and no other lesions.  Her neurologist will be informing us in regard how to proceed in this regard.      She wanted to have a right knee replaced in 12/2009 through  her orthopedic surgeon. She developed delirium in the hospital and extensive ecchymosis and hematoma formation in the whole right lower extremity requiring transfusion of red cells for a hemoglobin of 7.     SOCIAL HISTORY:  .  Retired from [employer] where she worked for many years; she never was exposed to any chemicals.   She is a never smoker and nondrinker.   She lives with her oldest daughter.      FAMILY HISTORY: The patient’s father  of complications from heart disease at age 70.  Mother  after a psychiatric illness at age 67.  A brother  in a car accident at 43 and a sister, age 75, is in good health.   Her three daughters are in good health.  She has no FH of lymphoma or leukemia.    I have reviewed the patient's medical history in detail and updated the computerized patient record.    Review of Systems   Constitutional: Positive for activity change and fatigue. Negative for chills and fever.   HENT: Negative for mouth sores and sore throat.    Eyes: Negative for visual disturbance.   Respiratory: Negative for cough and shortness of breath.    Gastrointestinal: Negative for abdominal pain, diarrhea, nausea and vomiting.   Genitourinary: Negative for dysuria and frequency.   Neurological: Negative for dizziness and weakness.   Hematological: Does not bruise/bleed easily.   Psychiatric/Behavioral: The patient is not nervous/anxious.    All other systems reviewed and are negative.  Review of systems 2020 unchanged from previous office visit except as updated.       Medications:  The current medication list was reviewed in the EMR    ALLERGIES:    Allergies   Allergen Reactions   • Cortisone Unknown - High Severity     Reports having a shot years ago, and wonders if he hit a vein. She was told by another doctor that it probably went in her blood stream.    • Darvon  [Propoxyphene] Palpitations       Objective      Vitals:    20 1037   BP: 150/74   Pulse: 72   Resp: 16  "  Temp: 97.3 °F (36.3 °C)   TempSrc: Skin   SpO2: 98%   Weight: 65.8 kg (145 lb)   Height: 165.1 cm (65\")   PainSc: 0-No pain     Current Status 7/27/2020   ECOG score 0       Physical Exam     Patient screened  for depression based on a PHQ-9 score of 0 on 6/29/2020.     This patient's ACP documentation is up to date, and there's nothing further left to document.    GENERAL ASPECT: IN GOOD HEALTH WALKING THROUGH TE OFFICE NO DIFFICULTIES, NO PAIN.PROPER NUTRITION.WELL KEPT PHYSICALLY.  EYES : NOT JAUNDICED, PUPILS WERE EQUAL.  CHEST: Lungs clear to auscultation bilaterally  HEART: REGULAR NO MURMURS , NO GALLOPS, NO RUBS.  ABDOMEN: NOT DISTENDED, NO PAIN, NO LIVER OR SPLEEN ENLARGEMENT, NO ASCITES, NO COLLATERAL CIRCULATION.  EXTREMITIES: NO EDEMA, NO PAIN, NO CLUBBING, NO DEFORMITIES.  NEUROLOGIC: NORMAL CONVERSATION, MEMORY , SPEECH AND GAIT.  SKIN: NO LESIONS.  Physical exam 07/27/2020 unchanged from previous office visit except as updated.      RECENT LABS:  Results from last 7 days   Lab Units 07/27/20  1027   WBC 10*3/mm3 5.54   NEUTROS ABS 10*3/mm3 3.08   HEMOGLOBIN g/dL 14.2   HEMATOCRIT % 40.4   PLATELETS 10*3/mm3 168               Assessment/Plan      1. Waldenstrom macroglobulinemia that has been treated in the past mainly with Rituxan. In the past, also she was treated with Velcade with very dramatic improvement in her monoclonal protein but very dramatic sensory peripheral neuropathy. This medication was discontinued altogether.    6/29/2020, she developed progressive fatigue with worsening neuropathy in her feet.  These were her original symptoms at diagnosis of Waldenstrom's.  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  Calcium also elevated at 10.5 with decreased sodium related to pseudohyponatremia associated with monoclonal protein.  Previously, she did not receive improvement from Rituxan, she is not thought to be a candidate for Imbruvica, therefore she went on to initiate venetoclax " 7/13/2020.    2.  History of left breast cancer, status post mastectomy.  She completed adjuvant therapy.  Mammogram 6/30/2020 benign    3.  Significant fatigue related to disease state.  Currently exacerbated by increased fluid intake and nighttime urination.  We discussed decreasing intake to 48 ounces daily as she is low risk for tumor lysis syndrome    PLAN;  1. Continue venetoclax starter dose as instructed  2. Return in 2 weeks for CBC, CMP, nurse practitioner follow-up and toxicity check  3. MD follow-up in 1 month at which time we can repeat paraprotein studies          Glory Santamaria, APRN   7/27/2020      CC:

## 2020-07-28 ENCOUNTER — MEDICATION THERAPY MANAGEMENT (OUTPATIENT)
Dept: PHARMACY | Facility: HOSPITAL | Age: 80
End: 2020-07-28

## 2020-07-28 ENCOUNTER — TELEPHONE (OUTPATIENT)
Dept: INTERNAL MEDICINE | Facility: CLINIC | Age: 80
End: 2020-07-28

## 2020-07-28 DIAGNOSIS — I10 ESSENTIAL HYPERTENSION: Primary | ICD-10-CM

## 2020-07-28 LAB
ALBUMIN SERPL-MCNC: 4 G/DL (ref 2.9–4.4)
ALBUMIN/GLOB SERPL: 1.7 {RATIO} (ref 0.7–1.7)
ALPHA1 GLOB FLD ELPH-MCNC: 0.3 G/DL (ref 0–0.4)
ALPHA2 GLOB SERPL ELPH-MCNC: 0.7 G/DL (ref 0.4–1)
B-GLOBULIN SERPL ELPH-MCNC: 0.8 G/DL (ref 0.7–1.3)
GAMMA GLOB SERPL ELPH-MCNC: 0.7 G/DL (ref 0.4–1.8)
GLOBULIN SER CALC-MCNC: 2.5 G/DL (ref 2.2–3.9)
IGA SERPL-MCNC: 33 MG/DL (ref 64–422)
IGG SERPL-MCNC: 437 MG/DL (ref 586–1602)
IGM SERPL-MCNC: 523 MG/DL (ref 26–217)
INTERPRETATION SERPL IEP-IMP: ABNORMAL
KAPPA LC SERPL-MCNC: 13.9 MG/L (ref 3.3–19.4)
KAPPA LC/LAMBDA SER: 2.17 {RATIO} (ref 0.26–1.65)
LAMBDA LC FREE SERPL-MCNC: 6.4 MG/L (ref 5.7–26.3)
Lab: ABNORMAL
M-SPIKE: 0.5 G/DL
PROT SERPL-MCNC: 6.5 G/DL (ref 6–8.5)

## 2020-07-28 RX ORDER — TERAZOSIN 5 MG/1
5 CAPSULE ORAL NIGHTLY
Qty: 30 CAPSULE | Refills: 3 | Status: SHIPPED | OUTPATIENT
Start: 2020-07-28 | End: 2020-09-21

## 2020-07-28 RX ORDER — LOSARTAN POTASSIUM 100 MG/1
100 TABLET ORAL DAILY
Qty: 30 TABLET | Refills: 3 | Status: SHIPPED | OUTPATIENT
Start: 2020-07-28 | End: 2020-10-02

## 2020-07-28 NOTE — TELEPHONE ENCOUNTER
Patient calling and states she is on a new chemo and her sodium is very low. Dr Larose would like to know if her BP medication can be changed to one that has no diuretic in it.    Please call and advise at [phone].

## 2020-07-28 NOTE — TELEPHONE ENCOUNTER
I changed Losartan/HCZT to plain Losartan    Stop calan    I sent in Terazosin in its place    Schedule 30 minute OV in 5-6 weeks

## 2020-07-28 NOTE — PROGRESS NOTES
MT telephone follow up- Manuela    Karli is doing okay today. She continues to complain of fatigue and frequent urination. Her sodium level yesterday was 125 mEq/L and APRN has instructed her to cut back on her water intake. Karli continues to take losartan-HCTZ 100-25 mg for blood pressure. We discussed following up with her PCP to see if they would consider an alternate blood pressure medication without a diuretic that may be better for her. Karli verbalizes understanding and she will follow up with her PCP. See chart below depicting sodium trend for the past year. Pharmacy will continue to follow. Thanks, Michelle Haney, PharmD

## 2020-08-04 ENCOUNTER — MEDICATION THERAPY MANAGEMENT (OUTPATIENT)
Dept: PHARMACY | Facility: HOSPITAL | Age: 80
End: 2020-08-04

## 2020-08-04 NOTE — PROGRESS NOTES
MTM telephone follow up re adherence and side effects- Venclexta    Karli is doing well today. She has started her new blood pressure medication and she reports feeling tired and groggy. Her PCP has discussed with her that it can take a week or so to adjust to a change in blood pressure medications. Karli's granddaughter will take her blood pressure at home today. She reports that she has no additional issues or side effects with Venclexta. [facility] Ramos has sent MD an in-basket regarding refills of this medication. Pharmacy will continue to follow.     Thanks,  Michelle Haney, PharmD

## 2020-08-05 ENCOUNTER — SPECIALTY PHARMACY (OUTPATIENT)
Dept: PHARMACY | Facility: HOSPITAL | Age: 80
End: 2020-08-05

## 2020-08-05 ENCOUNTER — RESULTS ENCOUNTER (OUTPATIENT)
Dept: INTERNAL MEDICINE | Facility: CLINIC | Age: 80
End: 2020-08-05

## 2020-08-05 DIAGNOSIS — I10 ESSENTIAL HYPERTENSION: ICD-10-CM

## 2020-08-05 DIAGNOSIS — E03.9 ADULT HYPOTHYROIDISM: ICD-10-CM

## 2020-08-05 DIAGNOSIS — G62.0 DRUG-INDUCED POLYNEUROPATHY (HCC): ICD-10-CM

## 2020-08-05 NOTE — TELEPHONE ENCOUNTER
[facility] was needing a new rx for pt Venclexta. Per Dr Larose-pt will stay at 200 mg daily. I have escribed a new rx to [facility].    Florencio Larose MD sent to [facility]ol Ramos             STAY  MG A DAY    Previous Messages      ----- Message -----   From: Karol Ramos   Sent: 8/4/2020   1:45 PM EDT   To: Florencio Larose MD   Subject: RE: Venclexta                                     Yes, she is on the last week of the starter pack.     ----- Message -----   From: Florencio Larose MD   Sent: 8/4/2020   1:38 PM EDT   To: Karol Raoms   Subject: FW: Venclexta                                     HOW MUC  IS SHE TAKING ?   ----- Message -----   From: Karol Ramos   Sent: 8/4/2020   9:48 AM EDT   To: Florencio Larose MD, *   Subject: Venclexta                                         [facility] is needing a new rx for Karli's Venclexta. She is finishing up the starter pack. Can we send a new rx for the 400 mg daily dose?     Thank you,   Karol

## 2020-08-06 DIAGNOSIS — G62.0 DRUG-INDUCED POLYNEUROPATHY (HCC): ICD-10-CM

## 2020-08-06 RX ORDER — GABAPENTIN 400 MG/1
400 CAPSULE ORAL 4 TIMES DAILY
Qty: 120 CAPSULE | Refills: 5 | OUTPATIENT
Start: 2020-08-06

## 2020-08-06 RX ORDER — GABAPENTIN 400 MG/1
CAPSULE ORAL
Qty: 120 CAPSULE | Refills: 4 | Status: SHIPPED | OUTPATIENT
Start: 2020-08-06 | End: 2020-12-30

## 2020-08-06 NOTE — TELEPHONE ENCOUNTER
Caller: Karli Loomis    Relationship: Self    Best call back number: [phone]    Medication needed:   Requested Prescriptions     Pending Prescriptions Disp Refills   • gabapentin (NEURONTIN) 400 MG capsule 120 capsule 5     Sig: Take 1 capsule by mouth 4 (Four) Times a Day.       When do you need the refill by: ASAP    What details did the patient provide when requesting the medication: PT IS OUT OF MEDICATION     Does the patient have less than a 3 day supply:  [x] Yes  [] No    What is the patient's [facility] [facility] [place] [address] [address] [facility] [address] [place] - [phone]  - [phone] FX

## 2020-08-11 ENCOUNTER — MEDICATION THERAPY MANAGEMENT (OUTPATIENT)
Dept: PHARMACY | Facility: HOSPITAL | Age: 80
End: 2020-08-11

## 2020-08-11 ENCOUNTER — DOCUMENTATION (OUTPATIENT)
Dept: ONCOLOGY | Facility: CLINIC | Age: 80
End: 2020-08-11

## 2020-08-11 NOTE — PROGRESS NOTES
MTM telephone follow up- Venclexta    Karli reports that she finished her starter pack of Venclexta - last dose was Sunday 8/9/20; she is supposed to start on 200 mg daily per in-basket message from MD. I have reached out to [facility]ol Ramos to see if we can get this filled for her ASAP. Karli still complains of weakness and fatigue. I have asked her if this continues to get worse by the end of this week to give us a call. She has labs and follow up with APRN next week and a brief MTM follow up. Karli tells me that her blood pressure has been doing well and she is considering buying a BP cuff for home use. Pharmacy will continue to follow.    Thanks,  Michelle Haney, PharmD

## 2020-08-11 NOTE — PROGRESS NOTES
I was notified by Michelle [facility] Pharmacist that pt reports being out of Venclexta. Her last dose was Sunday. New rx was sent to [facility] on 8/5/2020.    I inquired with [facility] on status and per Kory, Pharmacist-he spoke with pt and she will rec her delivery tomorrow.

## 2020-08-19 NOTE — PROGRESS NOTES
Subjective    REASONS FOR FOLLOWUP:     1. History of Waldenstrom macroglobulinemia.    2. History of breast cancer stage I on the left, status post mastectomy. The patient completed aromatase inhibitor without any evidence of breast cancer recurrence        History of Present Illness    The patient is a 80 y.o. female with the above-mentioned history, who returns the office today for 2-week follow-up and review.  She currently continues on venetoclax she has reached her full dose of 200 mg daily.  Unfortunately, the patient reports today she has been quite poorly with significant fatigue.  Venetoclax was initiated due to progressive fatigue and she feels her fatigue has even worsened since initiation of venetoclax.  Additionally, she is not sleeping well as she is up most of the night urinating.  She has been trying to drink 60 ounces of water daily as instructed from the venetoclax paperwork.  She is therefore up every 90 minutes to go to the bathroom.  She denies burning with urination.  She denies fevers or chills.  Her appetite is adequate.  She denies any new pain.     Past Medical History:   Diagnosis Date   • Acid reflux    • Asthma    • Clostridium difficile colitis     Requiring admission to [facility] in 09/2008   • H/O transesophageal echocardiography (MARICRUZ)    • History of transfusion of packed red blood cells     R/T SURGERY   • History of Waldenstrom's macroglobulinemia    • Leaky heart valve    • Seasonal allergies    • Thyroid nodule     Removed more than 35 years ago     Past Surgical History:   Procedure Laterality Date   • ADENOIDECTOMY     • APPENDECTOMY     • CARPAL TUNNEL RELEASE Right    • CATARACT EXTRACTION Bilateral    • CHOLECYSTECTOMY     • COLONOSCOPY     • HYSTERECTOMY      Partial, many years ago, heavy bleeding, no cancer   • KNEE ARTHROSCOPY Right 03/2009    Finding of chondromalacia and appropriate cleanup of joint was performed by Dr. Moraes.   • MASTECTOMY Left  07/2014   • REPLACEMENT TOTAL KNEE Right 12/2015   • TONSILLECTOMY         1. Past Medical History, Past Surgical HistoryHypertension.    2. Hypothyroidism many years ago and has not taken any thyroid medication in quite some time.   3. Thyroid nodule removed more than 35 years ago.    4. Cholecystectomy.    5. Tonsillectomy and adenoidectomy.    6. Appendectomy.  7. Partial hysterectomy many years ago.      She has mammogram and pelvic examination yearly that have been normal.  Colonoscopy 4 years ago was normal.   She also has had a leaky valve in her heart without any significance.      During the recent workup at [facility], the patient had a transesophageal echocardiogram that failed to document any vegetation.    Clostridium colitis requiring admission to [facility] in 09/2008.      Arthroscopy in 03/2009 of the right knee with finding of chondromalacia and appropriate cleanup of the joint was performed by Dr. Moraes.      On 07/24/2013 the patient has lost 14 pounds, just cutting down on junk food.  Her glucose intolerance has improved substantially and her glucose has normalized.      On 01/12/2014, we reviewed her blood pressure issues recently. She has had a very stable blood pressure for the last week, and today is normal. Her physical examination is otherwise unremarkable and normal feeling.     We advised her to remain in observation from this point of view.    As per 10/01/2014, the patient has undergone evaluation by neurology service at [facility] for consideration of gabapentin that she has been taking for so many years for possible seizure activity.   I am aware of an MRI scan of the brain that shows not too much, besides minimal vascular disease and no other lesions.  Her neurologist will be informing us in regard how to proceed in this regard.      She wanted to have a right knee replaced in 12/2009 through her orthopedic surgeon. She developed  delirium in the hospital and extensive ecchymosis and hematoma formation in the whole right lower extremity requiring transfusion of red cells for a hemoglobin of 7.     SOCIAL HISTORY:  .  Retired from [employer] where she worked for many years; she never was exposed to any chemicals.   She is a never smoker and nondrinker.   She lives with her oldest daughter.      FAMILY HISTORY: The patient’s father  of complications from heart disease at age 70.  Mother  after a psychiatric illness at age 67.  A brother  in a car accident at 43 and a sister, age 75, is in good health.   Her three daughters are in good health.  She has no FH of lymphoma or leukemia.    I have reviewed the patient's medical history in detail and updated the computerized patient record.    Review of Systems   Constitutional: Positive for activity change and fatigue (worsened\). Negative for chills and fever.   HENT: Negative for mouth sores and sore throat.    Eyes: Negative for visual disturbance.   Respiratory: Negative for cough and shortness of breath.    Gastrointestinal: Negative for abdominal pain, diarrhea, nausea and vomiting.   Genitourinary: Positive for frequency. Negative for dysuria.   Neurological: Negative for dizziness and weakness.   Hematological: Does not bruise/bleed easily.   Psychiatric/Behavioral: Positive for sleep disturbance. The patient is not nervous/anxious.    All other systems reviewed and are negative.  Review of systems 2020 unchanged from previous office visit except as updated.       Medications:  The current medication list was reviewed in the EMR    ALLERGIES:    Allergies   Allergen Reactions   • Cortisone Unknown - High Severity     Reports having a shot years ago, and wonders if he hit a vein. She was told by another doctor that it probably went in her blood stream.    • Darvon  [Propoxyphene] Palpitations       Objective      Vitals:    20 1051   BP: 163/70   Pulse: 82  "  Resp: 12   Temp: 97.3 °F (36.3 °C)   TempSrc: Tympanic   SpO2: 97%   Weight: 65.3 kg (144 lb)   Height: 165.1 cm (65\")   PainSc: 0-No pain     Current Status 8/20/2020   ECOG score 0       Physical Exam     Patient screened  for depression based on a PHQ-9 score of 0 on 8/20/2020.     This patient's ACP documentation is up to date, and there's nothing further left to document.    GENERAL ASPECT: IN GOOD HEALTH WALKING THROUGH TE OFFICE NO DIFFICULTIES, NO PAIN.PROPER NUTRITION.WELL KEPT PHYSICALLY.  EYES : NOT JAUNDICED, PUPILS WERE EQUAL.  CHEST: Lungs clear to auscultation bilaterally  HEART: REGULAR NO MURMURS , NO GALLOPS, NO RUBS.  ABDOMEN: NOT DISTENDED, NO PAIN, NO LIVER OR SPLEEN ENLARGEMENT, NO ASCITES, NO COLLATERAL CIRCULATION.  EXTREMITIES: NO EDEMA, NO PAIN, NO CLUBBING, NO DEFORMITIES.  NEUROLOGIC: NORMAL CONVERSATION, MEMORY , SPEECH AND GAIT.  SKIN: NO LESIONS.  Physical exam 08/20/2020 unchanged from previous office visit except as updated.      RECENT LABS:  Results from last 7 days   Lab Units 08/20/20  1037   WBC 10*3/mm3 2.81*   NEUTROS ABS 10*3/mm3 1.72   HEMOGLOBIN g/dL 13.4   HEMATOCRIT % 38.5   PLATELETS 10*3/mm3 106*               Assessment/Plan      1. Waldenstrom macroglobulinemia that has been treated in the past mainly with Rituxan. In the past, also she was treated with Velcade with very dramatic improvement in her monoclonal protein but very dramatic sensory peripheral neuropathy. This medication was discontinued altogether.    6/29/2020, she developed progressive fatigue with worsening neuropathy in her feet.  These were her original symptoms at diagnosis of Waldenstrom's.  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  Calcium also elevated at 10.5 with decreased sodium related to pseudohyponatremia associated with monoclonal protein.  Previously, she did not receive improvement from Rituxan, she is not thought to be a candidate for Imbruvica, therefore she went on to initiate " venetoclax 7/13/2020.  She is currently on her next planned dose of 200 mg daily with poor tolerance as outlined below    2.  History of left breast cancer, status post mastectomy.  She completed adjuvant therapy.  Mammogram 6/30/2020 benign    3.  Significant fatigue related to disease state.  Her fatigue has exacerbated with the initiation of venetoclax, particularly at maximum dose of 200 mg.  She is also not sleeping due to frequent urination throughout the night as she is drinking 60 ounces of water daily as instructed for the prevention of tumor lysis syndrome.  Her venetoclax dose will be decreased.    4.  Hyponatremia.  This is a pseudohyponatremia related to her monoclonal protein.  Her sodium level is pending today.    PLAN;  1. Decrease venetoclax to 100 mg daily  2. The patient was instructed to resume her normal fluid intake prior to the initiation of venetoclax  3. Paraprotein studies are pending today  4. MD follow-up with Dr. Larose in 2 weeks with CBC, CMP and review of paraprotein studies    Dose adjustment to venetoclax was discussed and reviewed with Dr. Simon (#2) who is in agreement.        Glory Santamaria, APRN   8/20/2020      CC:

## 2020-08-20 ENCOUNTER — LAB (OUTPATIENT)
Dept: LAB | Facility: HOSPITAL | Age: 80
End: 2020-08-20

## 2020-08-20 ENCOUNTER — MEDICATION THERAPY MANAGEMENT (OUTPATIENT)
Dept: PHARMACY | Facility: HOSPITAL | Age: 80
End: 2020-08-20

## 2020-08-20 ENCOUNTER — OFFICE VISIT (OUTPATIENT)
Dept: ONCOLOGY | Facility: CLINIC | Age: 80
End: 2020-08-20

## 2020-08-20 VITALS
WEIGHT: 144 LBS | HEART RATE: 82 BPM | DIASTOLIC BLOOD PRESSURE: 70 MMHG | HEIGHT: 65 IN | RESPIRATION RATE: 12 BRPM | BODY MASS INDEX: 23.99 KG/M2 | OXYGEN SATURATION: 97 % | SYSTOLIC BLOOD PRESSURE: 163 MMHG | TEMPERATURE: 97.3 F

## 2020-08-20 DIAGNOSIS — C88.0 MACROGLOBULINEMIA OF WALDENSTROM (HCC): ICD-10-CM

## 2020-08-20 DIAGNOSIS — C88.0 MACROGLOBULINEMIA OF WALDENSTROM (HCC): Primary | ICD-10-CM

## 2020-08-20 DIAGNOSIS — Z17.0 MALIGNANT NEOPLASM OF OVERLAPPING SITES OF LEFT BREAST IN FEMALE, ESTROGEN RECEPTOR POSITIVE (HCC): ICD-10-CM

## 2020-08-20 DIAGNOSIS — C50.812 MALIGNANT NEOPLASM OF OVERLAPPING SITES OF LEFT BREAST IN FEMALE, ESTROGEN RECEPTOR POSITIVE (HCC): ICD-10-CM

## 2020-08-20 DIAGNOSIS — R53.0 NEOPLASTIC (MALIGNANT) RELATED FATIGUE: ICD-10-CM

## 2020-08-20 LAB
ALBUMIN SERPL-MCNC: 4.3 G/DL (ref 3.5–5.2)
ALBUMIN/GLOB SERPL: 2 G/DL (ref 1.1–2.4)
ALP SERPL-CCNC: 63 U/L (ref 38–116)
ALT SERPL W P-5'-P-CCNC: 14 U/L (ref 0–33)
ANION GAP SERPL CALCULATED.3IONS-SCNC: 10.6 MMOL/L (ref 5–15)
AST SERPL-CCNC: 24 U/L (ref 0–32)
BASOPHILS # BLD AUTO: 0.01 10*3/MM3 (ref 0–0.2)
BASOPHILS NFR BLD AUTO: 0.4 % (ref 0–1.5)
BILIRUB SERPL-MCNC: 0.9 MG/DL (ref 0.2–1.2)
BUN SERPL-MCNC: 9 MG/DL (ref 6–20)
BUN/CREAT SERPL: 10.5 (ref 7.3–30)
CALCIUM SPEC-SCNC: 9.8 MG/DL (ref 8.5–10.2)
CHLORIDE SERPL-SCNC: 96 MMOL/L (ref 98–107)
CO2 SERPL-SCNC: 25.4 MMOL/L (ref 22–29)
CREAT SERPL-MCNC: 0.86 MG/DL (ref 0.6–1.1)
DEPRECATED RDW RBC AUTO: 43.4 FL (ref 37–54)
EOSINOPHIL # BLD AUTO: 0.06 10*3/MM3 (ref 0–0.4)
EOSINOPHIL NFR BLD AUTO: 2.1 % (ref 0.3–6.2)
ERYTHROCYTE [DISTWIDTH] IN BLOOD BY AUTOMATED COUNT: 12.2 % (ref 12.3–15.4)
GFR SERPL CREATININE-BSD FRML MDRD: 63 ML/MIN/1.73
GLOBULIN UR ELPH-MCNC: 2.2 GM/DL (ref 1.8–3.5)
GLUCOSE SERPL-MCNC: 119 MG/DL (ref 74–124)
HCT VFR BLD AUTO: 38.5 % (ref 34–46.6)
HGB BLD-MCNC: 13.4 G/DL (ref 12–15.9)
IMM GRANULOCYTES # BLD AUTO: 0.01 10*3/MM3 (ref 0–0.05)
IMM GRANULOCYTES NFR BLD AUTO: 0.4 % (ref 0–0.5)
LYMPHOCYTES # BLD AUTO: 0.73 10*3/MM3 (ref 0.7–3.1)
LYMPHOCYTES NFR BLD AUTO: 26 % (ref 19.6–45.3)
MCH RBC QN AUTO: 33.6 PG (ref 26.6–33)
MCHC RBC AUTO-ENTMCNC: 34.8 G/DL (ref 31.5–35.7)
MCV RBC AUTO: 96.5 FL (ref 79–97)
MONOCYTES # BLD AUTO: 0.28 10*3/MM3 (ref 0.1–0.9)
MONOCYTES NFR BLD AUTO: 10 % (ref 5–12)
NEUTROPHILS NFR BLD AUTO: 1.72 10*3/MM3 (ref 1.7–7)
NEUTROPHILS NFR BLD AUTO: 61.1 % (ref 42.7–76)
NRBC BLD AUTO-RTO: 0 /100 WBC (ref 0–0.2)
PLATELET # BLD AUTO: 106 10*3/MM3 (ref 140–450)
PMV BLD AUTO: 9.3 FL (ref 6–12)
POTASSIUM SERPL-SCNC: 4.2 MMOL/L (ref 3.5–4.7)
PROT SERPL-MCNC: 6.5 G/DL (ref 6.3–8)
RBC # BLD AUTO: 3.99 10*6/MM3 (ref 3.77–5.28)
SODIUM SERPL-SCNC: 132 MMOL/L (ref 134–145)
WBC # BLD AUTO: 2.81 10*3/MM3 (ref 3.4–10.8)

## 2020-08-20 PROCEDURE — 85025 COMPLETE CBC W/AUTO DIFF WBC: CPT

## 2020-08-20 PROCEDURE — 99214 OFFICE O/P EST MOD 30 MIN: CPT | Performed by: NURSE PRACTITIONER

## 2020-08-20 PROCEDURE — 80053 COMPREHEN METABOLIC PANEL: CPT

## 2020-08-20 PROCEDURE — 36415 COLL VENOUS BLD VENIPUNCTURE: CPT

## 2020-08-20 NOTE — PROGRESS NOTES
MTM encounter- Manuela Calvillo left before seeing the MTM pharmacist today. I have requested a quick, in person session at her next scheduled appointment on 9/1/20.     Thanks,  Michelle Haney, PharmD

## 2020-08-21 ENCOUNTER — MEDICATION THERAPY MANAGEMENT (OUTPATIENT)
Dept: PHARMACY | Facility: HOSPITAL | Age: 80
End: 2020-08-21

## 2020-08-21 LAB
ALBUMIN SERPL-MCNC: 3.8 G/DL (ref 2.9–4.4)
ALBUMIN/GLOB SERPL: 1.6 {RATIO} (ref 0.7–1.7)
ALPHA1 GLOB FLD ELPH-MCNC: 0.3 G/DL (ref 0–0.4)
ALPHA2 GLOB SERPL ELPH-MCNC: 0.6 G/DL (ref 0.4–1)
B-GLOBULIN SERPL ELPH-MCNC: 0.8 G/DL (ref 0.7–1.3)
GAMMA GLOB SERPL ELPH-MCNC: 0.7 G/DL (ref 0.4–1.8)
GLOBULIN SER CALC-MCNC: 2.4 G/DL (ref 2.2–3.9)
IGA SERPL-MCNC: 26 MG/DL (ref 64–422)
IGG SERPL-MCNC: 380 MG/DL (ref 586–1602)
IGM SERPL-MCNC: 392 MG/DL (ref 26–217)
INTERPRETATION SERPL IEP-IMP: ABNORMAL
KAPPA LC SERPL-MCNC: 12.2 MG/L (ref 3.3–19.4)
KAPPA LC/LAMBDA SER: 1.97 {RATIO} (ref 0.26–1.65)
LAMBDA LC FREE SERPL-MCNC: 6.2 MG/L (ref 5.7–26.3)
Lab: ABNORMAL
M-SPIKE: 0.4 G/DL
PROT SERPL-MCNC: 6.2 G/DL (ref 6–8.5)

## 2020-08-21 NOTE — PROGRESS NOTES
[facility] Lab Review- Venetoclax      8/20/2020   WBC 3.40 - 10.80 10*3/mm3 2.81 (A)   Neutrophils Absolute 1.70 - 7.00 10*3/mm3 1.72   Hemoglobin 12.0 - 15.9 g/dL 13.4   Hematocrit 34.0 - 46.6 % 38.5   Platelets 140 - 450 10*3/mm3 106 (A)   Creatinine 0.60 - 1.10 mg/dL 0.86   eGFR Non African Am >60 mL/min/1.73 63   BUN 6 - 20 mg/dL 9   Sodium 134 - 145 mmol/L 132 (A)   Potassium 3.5 - 4.7 mmol/L 4.2   Glucose 74 - 124 mg/dL 119   Calcium 8.5 - 10.2 mg/dL 9.8   Albumin 3.50 - 5.20 g/dL 4.30   Total Protein 6.3 - 8.0 g/dL 6.5   AST (SGOT) 0 - 32 U/L 24   ALT (SGPT) 0 - 33 U/L 14   Alkaline Phosphatase 38 - 116 U/L 63   Total Bilirubin 0.2 - 1.2 mg/dL 0.9     APRN dictation noted. Venetoclax reduced to 100 mg daily due to fatigue. Pharmacy will continue to follow.    Thanks,   Michelle Haney, PharmD

## 2020-08-28 ENCOUNTER — MEDICATION THERAPY MANAGEMENT (OUTPATIENT)
Dept: PHARMACY | Facility: HOSPITAL | Age: 80
End: 2020-08-28

## 2020-08-28 NOTE — PROGRESS NOTES
MTM telephone follow up- Venclexta    Karli is doing well today. She states that weakness and fatigue have improved on the lower dose of Venclexta. Karli's main complaint is bowel issues she complains of small hard stools and we have discussed trying a stool softener at this time. I have asked Karli to stop the stool softener if she gets diarrhea; she verbalized understanding. Medication administration and adherence seem appropriate. Karli denies any recent medication changes. Pharmacy will continue to follow.    Thanks,   Michelle Haney, PharmD

## 2020-09-01 ENCOUNTER — SPECIALTY PHARMACY (OUTPATIENT)
Dept: ONCOLOGY | Facility: CLINIC | Age: 80
End: 2020-09-01

## 2020-09-01 ENCOUNTER — OFFICE VISIT (OUTPATIENT)
Dept: ONCOLOGY | Facility: CLINIC | Age: 80
End: 2020-09-01

## 2020-09-01 ENCOUNTER — LAB (OUTPATIENT)
Dept: LAB | Facility: HOSPITAL | Age: 80
End: 2020-09-01

## 2020-09-01 VITALS
BODY MASS INDEX: 24.11 KG/M2 | SYSTOLIC BLOOD PRESSURE: 200 MMHG | HEIGHT: 65 IN | WEIGHT: 144.7 LBS | HEART RATE: 63 BPM | DIASTOLIC BLOOD PRESSURE: 81 MMHG | RESPIRATION RATE: 14 BRPM | OXYGEN SATURATION: 98 % | TEMPERATURE: 97.1 F

## 2020-09-01 DIAGNOSIS — C88.0 MACROGLOBULINEMIA OF WALDENSTROM (HCC): ICD-10-CM

## 2020-09-01 DIAGNOSIS — C88.0 MACROGLOBULINEMIA OF WALDENSTROM (HCC): Primary | ICD-10-CM

## 2020-09-01 DIAGNOSIS — Z17.0 MALIGNANT NEOPLASM OF OVERLAPPING SITES OF LEFT BREAST IN FEMALE, ESTROGEN RECEPTOR POSITIVE (HCC): ICD-10-CM

## 2020-09-01 DIAGNOSIS — C50.812 MALIGNANT NEOPLASM OF OVERLAPPING SITES OF LEFT BREAST IN FEMALE, ESTROGEN RECEPTOR POSITIVE (HCC): ICD-10-CM

## 2020-09-01 DIAGNOSIS — E87.1 HYPONATREMIA WITH NORMAL EXTRACELLULAR FLUID VOLUME: ICD-10-CM

## 2020-09-01 LAB
ALBUMIN SERPL-MCNC: 4.4 G/DL (ref 3.5–5.2)
ALBUMIN/GLOB SERPL: 2 G/DL (ref 1.1–2.4)
ALP SERPL-CCNC: 65 U/L (ref 38–116)
ALT SERPL W P-5'-P-CCNC: 10 U/L (ref 0–33)
ANION GAP SERPL CALCULATED.3IONS-SCNC: 8.2 MMOL/L (ref 5–15)
AST SERPL-CCNC: 18 U/L (ref 0–32)
BASOPHILS # BLD AUTO: 0.01 10*3/MM3 (ref 0–0.2)
BASOPHILS NFR BLD AUTO: 0.4 % (ref 0–1.5)
BILIRUB SERPL-MCNC: 0.5 MG/DL (ref 0.2–1.2)
BUN SERPL-MCNC: 7 MG/DL (ref 6–20)
BUN/CREAT SERPL: 9 (ref 7.3–30)
CALCIUM SPEC-SCNC: 9.9 MG/DL (ref 8.5–10.2)
CHLORIDE SERPL-SCNC: 94 MMOL/L (ref 98–107)
CO2 SERPL-SCNC: 26.8 MMOL/L (ref 22–29)
CREAT SERPL-MCNC: 0.78 MG/DL (ref 0.6–1.1)
DEPRECATED RDW RBC AUTO: 43.6 FL (ref 37–54)
EOSINOPHIL # BLD AUTO: 0.07 10*3/MM3 (ref 0–0.4)
EOSINOPHIL NFR BLD AUTO: 2.9 % (ref 0.3–6.2)
ERYTHROCYTE [DISTWIDTH] IN BLOOD BY AUTOMATED COUNT: 12.2 % (ref 12.3–15.4)
GFR SERPL CREATININE-BSD FRML MDRD: 71 ML/MIN/1.73
GLOBULIN UR ELPH-MCNC: 2.2 GM/DL (ref 1.8–3.5)
GLUCOSE SERPL-MCNC: 108 MG/DL (ref 74–124)
HCT VFR BLD AUTO: 39.4 % (ref 34–46.6)
HGB BLD-MCNC: 13.6 G/DL (ref 12–15.9)
IMM GRANULOCYTES # BLD AUTO: 0 10*3/MM3 (ref 0–0.05)
IMM GRANULOCYTES NFR BLD AUTO: 0 % (ref 0–0.5)
LYMPHOCYTES # BLD AUTO: 0.99 10*3/MM3 (ref 0.7–3.1)
LYMPHOCYTES NFR BLD AUTO: 41.4 % (ref 19.6–45.3)
MCH RBC QN AUTO: 33.4 PG (ref 26.6–33)
MCHC RBC AUTO-ENTMCNC: 34.5 G/DL (ref 31.5–35.7)
MCV RBC AUTO: 96.8 FL (ref 79–97)
MONOCYTES # BLD AUTO: 0.33 10*3/MM3 (ref 0.1–0.9)
MONOCYTES NFR BLD AUTO: 13.8 % (ref 5–12)
NEUTROPHILS NFR BLD AUTO: 0.99 10*3/MM3 (ref 1.7–7)
NEUTROPHILS NFR BLD AUTO: 41.5 % (ref 42.7–76)
NRBC BLD AUTO-RTO: 0 /100 WBC (ref 0–0.2)
PLATELET # BLD AUTO: 123 10*3/MM3 (ref 140–450)
PMV BLD AUTO: 9.4 FL (ref 6–12)
POTASSIUM SERPL-SCNC: 4.9 MMOL/L (ref 3.5–4.7)
PROT SERPL-MCNC: 6.6 G/DL (ref 6.3–8)
RBC # BLD AUTO: 4.07 10*6/MM3 (ref 3.77–5.28)
SODIUM SERPL-SCNC: 129 MMOL/L (ref 134–145)
WBC # BLD AUTO: 2.39 10*3/MM3 (ref 3.4–10.8)

## 2020-09-01 PROCEDURE — 80053 COMPREHEN METABOLIC PANEL: CPT

## 2020-09-01 PROCEDURE — 99214 OFFICE O/P EST MOD 30 MIN: CPT | Performed by: INTERNAL MEDICINE

## 2020-09-01 PROCEDURE — 36415 COLL VENOUS BLD VENIPUNCTURE: CPT

## 2020-09-01 PROCEDURE — 85025 COMPLETE CBC W/AUTO DIFF WBC: CPT

## 2020-09-01 NOTE — PROGRESS NOTES
MTM in person follow up- Manuela    Karli is doing well today. She states that her energy level has improved on the lower dose of venetoclax. She had some issues with constipation; but, this was relieved with stool softeners. Medication administration and adherence seem appropriate; she reports no missed doses. Karli denies any recent medication changes. She has no additional questions or concerns today. Pharmacy will continue to follow.    Thanks,  Michelle Haney, PharmD

## 2020-09-01 NOTE — PROGRESS NOTES
Subjective    REASONS FOR FOLLOWUP:     1. History of Waldenstrom macroglobulinemia.    2. History of breast cancer stage I on the left, status post mastectomy. The patient completed aromatase inhibitor without any evidence of breast cancer recurrence        History of Present Illness        PATIENT WAS CALLED THE DAY BEFORE BY THE OFFICE TO ASK FOR SYMPTOMS THAT COULD BE CONSISTENT WITH CORONAVIRUS INFECTION, AND BEING NEGATIVE WAS SCHEDULED TO BE SEEN IN THE OFFICE TODAY. SIMILAR QUESTIONING TODAY INCLUDING, CHILLS, FEVER, NEW COUGH, SHORTNESS OF BREATH, DIARRHEA,DIFFUSE BODY ACHES  AND CHANGES IN SMELL OR TASTE WERE NEGATIVE.THE PATIENT DENIED ANY CONTACT WITH PERSONS WHO WERE POSITIVE FOR COVID, AND PATIENT IS NOT IN CATEGORY OF HIGH RISK BEHAVIOR TO ACQUIRE COVID.    DURING THE VISIT WITH THE PATIENT TODAY , PATIENT HAD FACE MASK, MY MEDICAL ASSISTANT AND I  HAD PROPPER PROTECTIVE EQUIPMENT, AND I DID HAND HYGIENE WITH SOAP AND WATER BEFORE AND AFTER THE VISIT.  This patient returns today to the office to continue monitoring the dose of venetoclax in the background of Waldenstrom macroglobulinemia. The dose was adjusted recently to 100 mg a day given severe fatigue and leukopenia associated with the use of medication. On the other hand, the patient’s neuropathy in her feet is a little bit better. Her appetite is acceptable. Her weight is stable and her bowel function and urination are normal. She has no new cardiovascular or respiratory issues. No fever, infections or bleeding.        Past Medical History:   Diagnosis Date   • Acid reflux    • Asthma    • Clostridium difficile colitis     Requiring admission to [facility] in 09/2008   • H/O transesophageal echocardiography (MARICRUZ)    • History of transfusion of packed red blood cells     R/T SURGERY   • History of Waldenstrom's macroglobulinemia    • Leaky heart valve    • Seasonal allergies    • Thyroid nodule     Removed more than 35 years ago      Past Surgical History:   Procedure Laterality Date   • ADENOIDECTOMY     • APPENDECTOMY     • CARPAL TUNNEL RELEASE Right    • CATARACT EXTRACTION Bilateral    • CHOLECYSTECTOMY     • COLONOSCOPY     • HYSTERECTOMY      Partial, many years ago, heavy bleeding, no cancer   • KNEE ARTHROSCOPY Right 03/2009    Finding of chondromalacia and appropriate cleanup of joint was performed by Dr. Moraes.   • MASTECTOMY Left 07/2014   • REPLACEMENT TOTAL KNEE Right 12/2015   • TONSILLECTOMY           Social History     Socioeconomic History   • Marital status:      Spouse name: Not on file   • Number of children: Not on file   • Years of education: Not on file   • Highest education level: Not on file   Occupational History     Employer: [employer]     Employer: RETIRED   Tobacco Use   • Smoking status: Never Smoker   • Smokeless tobacco: Never Used   Substance and Sexual Activity   • Alcohol use: No   • Drug use: No   • Sexual activity: Defer   Social History Narrative    She lives with her oldest daughter.     Family History   Problem Relation Age of Onset   • Mental illness Mother    • Migraines Mother    • Dementia Mother    • Heart disease Father    • Hypertension Father    • Kidney disease Father    • Stroke Sister    • No Known Problems Daughter    • No Known Problems Daughter    • Breast cancer Other    • Heart disease Other    • Hypertension Other    • Diabetes Other    • Cancer Maternal Grandmother    • No Known Problems Maternal Grandfather    • No Known Problems Paternal Grandmother    • No Known Problems Paternal Grandfather    • Lymphoma Neg Hx    • Leukemia Neg Hx         Medications:  The current medication list was reviewed in the EMR    ALLERGIES:    Allergies   Allergen Reactions   • Cortisone Unknown - High Severity     Reports having a shot years ago, and wonders if he hit a vein. She was told by another doctor that it probably went in her blood stream.    • Darvon  [Propoxyphene]  Palpitations     ROS:  General: no fever, no chills, no fatigue,no weight changes, no lack of appetite.  Eyes: no epiphora, xerophthalmia,conjunctivitis, pain, glaucoma, blurred vision, blindness, secretion, photophobia, proptosis, diplopia.  Ears: no otorrhea, tinnitus, otorrhagia, deafness, pain, vertigo.  Nose: no rhinorrhea, no epistaxis, no alteration in perception of odors, no sinuses pressure.  Mouth: no alteration in gums or teeth,  No ulcers, no difficulty with mastication or deglut ion, no odynophagia.  Neck: no masses or pain, no thyroid alterations, no pain in muscles or arteries, no carotid odynia, no crepitation.  Respiratory: no cough, no sputum production,no dyspnea,no trepopnea, no pleuritic pain,no hemoptysis.  Heart: no syncope, no irregularity, no palpitations, no angina,no orthopnea,no paroxysmal nocturnal dyspnea.  Vascular Venous: no tenderness,no edema,no palpable cords,no postphlebitic syndrome, no skin changes no ulcerations.  Vascular Arterial: no distal ischemia, noclaudication, no gangrene, no neuropathic ischemic pain, no skin ulcers, no paleness no cyanosis.  GI: no dysphagia, no odynophagia, no regurgitation, no heartburn,no indigestion,no nausea,no vomiting,no hematemesis ,no melena,no jaundice,no distention, no obstipation,no enterorrhagia,no proctalgia,no anal  lesions, no changes in bowel habits.  : no frequency, no hesitancy, no hematuria, no discharge,no  pain.  Musculoskeletal: no muscle or tendon pain or inflammation,no  joint pain, no edema, no functional limitation,no fasciculations, no mass.  Neurologic: no headache, no seizures, noalterations on Craneal nerves, no motor deficit, no sensory deficit, normal coordination, no alteration in memory,normal orientation, calculation,normal writting, verbal and written language.  Skin: no rashes,no pruritus no localized lesions.  Psychiatric: no anxiety, no depression,no agitation, no delusions, proper insight.      Objective   "    Vitals:    09/01/20 1412   BP: (!) 200/81   Pulse: 63   Resp: 14   Temp: 97.1 °F (36.2 °C)   TempSrc: Skin   SpO2: 98%   Weight: 65.6 kg (144 lb 11.2 oz)   Height: 165.1 cm (65\")   PainSc: 0-No pain     Current Status 9/1/2020   ECOG score 0   RETAKE BP BY DR BURNETTE 160/60    Physical Exam     I HAVE PERSONALLY REVIEWED THE HISTORY OF THE PRESENT ILLNESS, PAST MEDICAL HISTORY, FAMILY HISTORY, SOCIAL HISTORY, ALLERGIES, MEDICATIONS STATED ABOVE IN THE OFFICE NOTE FROM TODAY.        GENERAL:  Well-developed, well-nourished  Patient  in no acute distress.   SKIN:  Warm, dry ,NO rashes,NO purpura ,NO petechiae.  HEENT:  Pupils were equal and reactive to light and accomodation, conjunctivas non injected, no pterigion, normal extraocular movements, normal visual acuity.   NECK:  Supple with good range of motion; no thyromegaly or masses, no JVD or bruits, no cervical adenopathies.No carotid arteries pain, no carotid abnormal pulsation , NO arterial dance.  LYMPHATICS:  No cervical, NO supraclavicular, NO axillary,NO epitrochlear , NO inguinal adenopathy.  CARDIAC   normal rate and regular rhythm, without murmur,NO rubs NO S3 NO S4 right or left . Normal femoral, popliteal, pedis, brachial and carotid pulses.LEFT CHEST WALL NORMAL R BREAST NORMAL  VASCULAR ARTERIAL: normal carotids,brachial,radial,femoral,popliteal, pedis pulses , no bruits.no paleness or cyanosis, no pain, no edema, no numbness, no gangrene.  VASCULAR VENOUS: no cyanosis, collateral circulation, varicosities, edema, palpable cords, pain, erythema.  ABDOMEN:  Soft, nontender with no hepatomegaly, no splenomegaly,no masses, no ascites, no collateral circulation,no distention,no Max sign, no abdominal pain, no inguinal hernias,no umbilical hernia, no abdominal bruits. Normal bowel sounds.  GENITAL: Not  Performed.  EXTREMITIES  AND SPINE:  No clubbing, cyanosis or edema, no deformities or pain .No kyphosis, scoliosis, deformities or pain in spine, " ribs or pelvic bone.  NEUROLOGICAL:  Patient was awake, alert, oriented to time, person and place.          RECENT LABS:  Results from last 7 days   Lab Units 09/01/20  1408   WBC 10*3/mm3 2.39*   NEUTROS ABS 10*3/mm3 0.99*   HEMOGLOBIN g/dL 13.6   HEMATOCRIT % 39.4   PLATELETS 10*3/mm3 123*              Component      Latest Ref Rng & Units 7/27/2020 8/20/2020 8/20/2020          10:27 AM 10:37 AM 10:37 AM   IgG      586 - 1602 mg/dL 437 (L)  380 (L)   IgA      64 - 422 mg/dL 33 (L)  26 (L)   IgM      26 - 217 mg/dL 523 (H)  392 (H)   Total Protein      6.3 - 8.0 g/dL 6.5 6.5 6.2   Albumin      3.50 - 5.20 g/dL 4.0 4.30 3.8   Alpha-1-Globulin      0.0 - 0.4 g/dL 0.3  0.3   Alpha-2-Globulin      0.4 - 1.0 g/dL 0.7  0.6   Beta Globulin      0.7 - 1.3 g/dL 0.8  0.8   Gamma Globulin      0.4 - 1.8 g/dL 0.7  0.7   M-Pratik      Not Observed g/dL 0.5 (H)  0.4 (H)   Globulin      2.2 - 3.9 g/dL 2.5  2.4   A/G Ratio      1.1 - 2.4 g/dL 1.7 2.0 1.6   Immunofixation Reflex, Serum       Comment  Comment   Please note       Comment  Comment   Kappa FLC      3.3 - 19.4 mg/L 13.9  12.2   Free Lambda Light Chains      5.7 - 26.3 mg/L 6.4  6.2   Kappa/Lambda Ratio      0.26 - 1.65 2.17 (H)  1.97 (H)       Assessment/Plan      1. Waldenstrom macroglobulinemia that has been treated in the past mainly with Rituxan. In the past, also she was treated with Velcade with very dramatic improvement in her monoclonal protein but very dramatic sensory peripheral neuropathy. This medication was discontinued altogether.    6/29/2020, she developed progressive fatigue with worsening neuropathy in her feet.  These were her original symptoms at diagnosis of Waldenstrom's.  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  Calcium also elevated at 10.5 with decreased sodium related to pseudohyponatremia associated with monoclonal protein.  Previously, she did not receive improvement from Rituxan, she is not thought to be a candidate for Imbruvica,  therefore she went on to initiate venetoclax 7/13/2020.  She is currently on her next planned dose of 200 mg daily with poor tolerance as outlined below    2.  History of left breast cancer, status post mastectomy.  She completed adjuvant therapy.  Mammogram 6/30/2020 benign    3.  Significant fatigue related to disease state.  Her fatigue has exacerbated with the initiation of venetoclax, particularly at maximum dose of 200 mg.Since the previous visit, nurse practitioner under my advice decreased the dose of venetoclax to 100 mg a day. She still has fatigue but not as severe as she had when she was taking a larger amount of the medicine. On the other hand her sensory neuropathy in her feet is a little bit better. It happens mainly at night and she has no need to take any medicine. Her appetite is good. Her weight is stable. Bowel function and urination are normal. She has no rash, no fever, no infection, no bleeding. Her white count is normal. Leukopenia associated with venetoclax. Hemoglobin and platelet count are stable. Her right breast exam is normal. Her left chest wall is normal.     RECOMMENDATIONS:   1. For her Waldenstrom, I have advised the patient to further decrease her venetoclax to 50 mg p.o. daily and report was sent to Karol Ramos and Amara Apple, Pharmacist. They will adjust the medicine and act upon. Alternative will be to give her 100 mg every other day but I think we can get 50 mg tablets and that should be enough. I think with that dose and knowing that her monoclonal protein has very dramatically decreased, the patient should be able to continue her therapy and remain on therapy ideally for 6 months or so.     From the point of view of her history of left breast cancer, she already completed her adjuvant therapy with aromatase inhibitor a long time ago and her right breast remains normal. She is up to date in her mammogram.     She has leukopenia associated with chemotherapy medicine and  this will require observation. I think with dose adjustment will take care of this issue altogether.     I will review the patient back in 6 weeks with a CBC, CMP and ARON at that time.            Florencio Larose MD   9/1/2020      CC:

## 2020-09-02 ENCOUNTER — DOCUMENTATION (OUTPATIENT)
Dept: ONCOLOGY | Facility: CLINIC | Age: 80
End: 2020-09-02

## 2020-09-02 ENCOUNTER — MEDICATION THERAPY MANAGEMENT (OUTPATIENT)
Dept: PHARMACY | Facility: HOSPITAL | Age: 80
End: 2020-09-02

## 2020-09-02 ENCOUNTER — TELEPHONE (OUTPATIENT)
Dept: ONCOLOGY | Facility: CLINIC | Age: 80
End: 2020-09-02

## 2020-09-02 LAB
ALBUMIN SERPL-MCNC: 3.8 G/DL (ref 2.9–4.4)
ALBUMIN/GLOB SERPL: 1.6 {RATIO} (ref 0.7–1.7)
ALPHA1 GLOB FLD ELPH-MCNC: 0.3 G/DL (ref 0–0.4)
ALPHA2 GLOB SERPL ELPH-MCNC: 0.7 G/DL (ref 0.4–1)
B-GLOBULIN SERPL ELPH-MCNC: 0.9 G/DL (ref 0.7–1.3)
GAMMA GLOB SERPL ELPH-MCNC: 0.6 G/DL (ref 0.4–1.8)
GLOBULIN SER CALC-MCNC: 2.5 G/DL (ref 2.2–3.9)
IGA SERPL-MCNC: 27 MG/DL (ref 64–422)
IGG SERPL-MCNC: 403 MG/DL (ref 586–1602)
IGM SERPL-MCNC: 341 MG/DL (ref 26–217)
INTERPRETATION SERPL IEP-IMP: ABNORMAL
KAPPA LC SERPL-MCNC: 12.5 MG/L (ref 3.3–19.4)
KAPPA LC/LAMBDA SER: 2.84 {RATIO} (ref 0.26–1.65)
LAMBDA LC FREE SERPL-MCNC: 4.4 MG/L (ref 5.7–26.3)
Lab: ABNORMAL
M-SPIKE: 0.3 G/DL
PROT SERPL-MCNC: 6.3 G/DL (ref 6–8.5)

## 2020-09-02 NOTE — PROGRESS NOTES
[facility] Lab Review- Venetoclax      9/1/2020   WBC 3.40 - 10.80 10*3/mm3 2.39 (A)   Neutrophils Absolute 1.70 - 7.00 10*3/mm3 0.99 (A)   Hemoglobin 12.0 - 15.9 g/dL 13.6   Hematocrit 34.0 - 46.6 % 39.4   Platelets 140 - 450 10*3/mm3 123 (A)   Creatinine 0.60 - 1.10 mg/dL 0.78   eGFR Non African Am >60 mL/min/1.73 71   BUN 6 - 20 mg/dL 7   Sodium 134 - 145 mmol/L 129 (A)   Potassium 3.5 - 4.7 mmol/L 4.9 (A)   Glucose 74 - 124 mg/dL 108   Calcium 8.5 - 10.2 mg/dL 9.9   Albumin 3.50 - 5.20 g/dL 4.40   Total Protein 6.3 - 8.0 g/dL 6.6   AST (SGOT) 0 - 32 U/L 18   ALT (SGPT) 0 - 33 U/L 10   Alkaline Phosphatase 38 - 116 U/L 65   Total Bilirubin 0.2 - 1.2 mg/dL 0.5     MD dictation noted. Venetoclax dose reduced to 50 mg daily. Pharmacy will continue to follow.    Thanks,  Michelle Haney, PharmD

## 2020-09-02 NOTE — TELEPHONE ENCOUNTER
----- Message from Michelle Haney [facility] sent at 9/2/2020  8:31 AM EDT -----  Regarding: oral chemo education  Please schedule oral chemo education for Karli on 10/02 when she comes into the office.     Thanks,  Michelle

## 2020-09-02 NOTE — PROGRESS NOTES
Staff message rec from Dr Larose yesterday-pts Venclexta dose is decreased to 50 mg daily.     I have escribed a new rx to [facility] for processing.    Florencio Larose MD sent to Karol Ramos; Jelena Little, [facility]             VENETOCLAX DOWN TO 50 MG A DAY PLEASE

## 2020-09-03 ENCOUNTER — SPECIALTY PHARMACY (OUTPATIENT)
Dept: PHARMACY | Facility: HOSPITAL | Age: 80
End: 2020-09-03

## 2020-09-08 ENCOUNTER — DOCUMENTATION (OUTPATIENT)
Dept: ONCOLOGY | Facility: CLINIC | Age: 80
End: 2020-09-08

## 2020-09-09 ENCOUNTER — SPECIALTY PHARMACY (OUTPATIENT)
Dept: PHARMACY | Facility: HOSPITAL | Age: 80
End: 2020-09-09

## 2020-09-11 ENCOUNTER — MEDICATION THERAPY MANAGEMENT (OUTPATIENT)
Dept: PHARMACY | Facility: HOSPITAL | Age: 80
End: 2020-09-11

## 2020-09-11 NOTE — PROGRESS NOTES
MTM telephone follow up- Venclexta    Karli reports that she still has quite a bit of fatigue and weakness even with the decrease in her Venclexta to 50 mg daily. She tells me that her bowels are doing fine and she has no other issues. I asked her if she would like me to contact MD and she wants to give this dosing regimen another week. She denies any changes to her other medications. I will plan to follow up with Karli in one week; however, I have asked her to call if she feels like her fatigue is worsening. Karli verbalized understanding.     Thanks,   Michelle Haney, GaryD

## 2020-09-18 ENCOUNTER — MEDICATION THERAPY MANAGEMENT (OUTPATIENT)
Dept: PHARMACY | Facility: HOSPITAL | Age: 80
End: 2020-09-18

## 2020-09-18 NOTE — PROGRESS NOTES
MTM telephone follow up- Robbyveronakayley Calvillo is doing okay today. She still has quite a bit of fatigue on venetoclax 50 mg daily. Additionally, she is having some trouble with her blood pressure as well; she has follow up with PCP on 9/21/20 and will bring her BP log to discuss with him. Karli will notify us of any worsening fatigue. She has no additional questions or concerns today. Pharmacy will continue to follow.     Thanks,   Michelle Haney, PharmD

## 2020-09-21 ENCOUNTER — OFFICE VISIT (OUTPATIENT)
Dept: INTERNAL MEDICINE | Facility: CLINIC | Age: 80
End: 2020-09-21

## 2020-09-21 ENCOUNTER — MEDICATION THERAPY MANAGEMENT (OUTPATIENT)
Dept: PHARMACY | Facility: HOSPITAL | Age: 80
End: 2020-09-21

## 2020-09-21 ENCOUNTER — TELEPHONE (OUTPATIENT)
Dept: ONCOLOGY | Facility: CLINIC | Age: 80
End: 2020-09-21

## 2020-09-21 VITALS
HEIGHT: 65 IN | SYSTOLIC BLOOD PRESSURE: 160 MMHG | HEART RATE: 72 BPM | WEIGHT: 142 LBS | BODY MASS INDEX: 23.66 KG/M2 | DIASTOLIC BLOOD PRESSURE: 80 MMHG | TEMPERATURE: 98.1 F

## 2020-09-21 DIAGNOSIS — E03.9 ADULT HYPOTHYROIDISM: ICD-10-CM

## 2020-09-21 DIAGNOSIS — I10 ESSENTIAL HYPERTENSION: Primary | ICD-10-CM

## 2020-09-21 DIAGNOSIS — J45.20 MILD INTERMITTENT ASTHMA WITHOUT COMPLICATION: ICD-10-CM

## 2020-09-21 LAB — TSH SERPL DL<=0.005 MIU/L-ACNC: 1.94 UIU/ML (ref 0.27–4.2)

## 2020-09-21 PROCEDURE — 90694 VACC AIIV4 NO PRSRV 0.5ML IM: CPT | Performed by: INTERNAL MEDICINE

## 2020-09-21 PROCEDURE — G0008 ADMIN INFLUENZA VIRUS VAC: HCPCS | Performed by: INTERNAL MEDICINE

## 2020-09-21 PROCEDURE — 99214 OFFICE O/P EST MOD 30 MIN: CPT | Performed by: INTERNAL MEDICINE

## 2020-09-21 RX ORDER — TERAZOSIN 10 MG/1
10 CAPSULE ORAL NIGHTLY
Qty: 30 CAPSULE | Refills: 2 | Status: SHIPPED | OUTPATIENT
Start: 2020-09-21 | End: 2020-12-10

## 2020-09-21 RX ORDER — METOPROLOL SUCCINATE 50 MG/1
50 TABLET, EXTENDED RELEASE ORAL DAILY
Qty: 30 TABLET | Refills: 2 | Status: SHIPPED | OUTPATIENT
Start: 2020-09-21 | End: 2020-10-23

## 2020-09-21 NOTE — PROGRESS NOTES
MTM telephone follow up- Venetoclax    Karli is doing okay today. She tells me that her PCP would like to add metoprolol and increase her terazosin; there are no DDIs with venclexta. She still has quite a bit of fatigue and I will follow up with her by the end of the week to check in. Karli has no other questions or concerns today. Pharmacy will continue to follow.     Thanks,   Michelle Haney, PharmD

## 2020-09-21 NOTE — TELEPHONE ENCOUNTER
PATIENT CALLED, SAW HER PCP DR. LAY TODAY, HE WOULD LIKE TO ADD TOPROL XL 50 MG A DAY AND CHANGE HER TERAZOSIN FROM 5 MG TO 10 MG A DAY FOR HIGH BLOOD PRESSURE, SHE WANTS TO MAKE SURE THIS IS OK TO DO, PLEASE ADVISE?    PT CALL BACK #: [phone]

## 2020-09-21 NOTE — TELEPHONE ENCOUNTER
Pt calling to review some changes to her BP medications w/ Dr. Larose. Per maryanne Montenegro for pt to make changes recommended by her PCP. Pt v/u.

## 2020-09-21 NOTE — PROGRESS NOTES
Subjective        Chief Complaint   Patient presents with   • Hypertension           Karli Loomis is a 80 y.o. female who presents for    Patient Active Problem List   Diagnosis   • Malignant neoplasm of overlapping sites of left breast in female, estrogen receptor positive (CMS/HCC)   • Macroglobulinemia of Waldenstrom (CMS/HCC)   • Adult hypothyroidism   • Primary osteoarthritis of left knee   • Drug-induced polyneuropathy (CMS/HCC)   • Epilepsy with simple partial seizures (CMS/HCC)   • Headache, migraine   • Hyponatremia with normal extracellular fluid volume   • Chronic pansinusitis   • TIA (transient ischemic attack)   • UTI due to extended-spectrum beta lactamase (ESBL) producing Escherichia coli   • Essential hypertension   • Vision loss     • Asthma       History of Present Illness     She has been seeing Dr. Larose for Waldenstrom and he decreased her Venetoclax. Her SBP has been up to 190. She gets headaches at the back of her head and they come up to the top. She denies slurred speech or blindness. She had her losartan hctz changed to losartan. She was taken off Calan. Her sodium had been low. She has been taking Terazosin. She has not had her TSH checked recently. Her asthma has been stable. She has not been wheezing.  Allergies   Allergen Reactions   • Cortisone Unknown - High Severity     Reports having a shot years ago, and wonders if he hit a vein. She was told by another doctor that it probably went in her blood stream.    • Darvon  [Propoxyphene] Palpitations       Current Outpatient Medications on File Prior to Visit   Medication Sig Dispense Refill   • aspirin 81 MG tablet Take 81 mg by mouth Daily.     • Budesonide (RHINOCORT ALLERGY NA) 2 sprays into the nostril(s) as directed by provider Daily As Needed.     • budesonide-formoterol (SYMBICORT) 80-4.5 MCG/ACT inhaler Inhale 2 puffs 2 (Two) Times a Day.     • cetirizine (zyrTEC) 10 MG tablet Take 10 mg by mouth Daily.     • gabapentin  (NEURONTIN) 400 MG capsule TAKE ONE CAPSULE BY MOUTH FOUR TIMES A  capsule 4   • lansoprazole (PREVACID) 30 MG capsule TAKE 1 CAPSULE EVERY DAY 90 capsule 0   • levothyroxine (SYNTHROID, LEVOTHROID) 50 MCG tablet TAKE 1 TABLET EVERY DAY 90 tablet 1   • losartan (COZAAR) 100 MG tablet Take 1 tablet by mouth Daily. 30 tablet 3   • ondansetron (ZOFRAN) 4 MG tablet Take 1 tablet by mouth Every 8 (Eight) Hours As Needed for Nausea or Vomiting. 30 tablet 5   • potassium chloride (K-DUR,KLOR-CON) 20 MEQ CR tablet TAKE 1 TABLET EVERY DAY 90 tablet 1   • Venetoclax (Venclexta) 50 MG tablet Take 1 tablet by mouth Daily. 30 tablet 3   • VENTOLIN  (90 Base) MCG/ACT inhaler      • [DISCONTINUED] terazosin (HYTRIN) 5 MG capsule Take 1 capsule by mouth Every Night. 30 capsule 3     No current facility-administered medications on file prior to visit.        Past Medical History:   Diagnosis Date   • Acid reflux    • Asthma    • Clostridium difficile colitis     Requiring admission to [facility] in 09/2008   • H/O transesophageal echocardiography (MARICRUZ)    • History of transfusion of packed red blood cells     R/T SURGERY   • History of Waldenstrom's macroglobulinemia    • Leaky heart valve    • Seasonal allergies    • Thyroid nodule     Removed more than 35 years ago       Past Surgical History:   Procedure Laterality Date   • ADENOIDECTOMY     • APPENDECTOMY     • CARPAL TUNNEL RELEASE Right    • CATARACT EXTRACTION Bilateral    • CHOLECYSTECTOMY     • COLONOSCOPY     • HYSTERECTOMY      Partial, many years ago, heavy bleeding, no cancer   • KNEE ARTHROSCOPY Right 03/2009    Finding of chondromalacia and appropriate cleanup of joint was performed by Dr. Moraes.   • MASTECTOMY Left 07/2014   • REPLACEMENT TOTAL KNEE Right 12/2015   • TONSILLECTOMY         Family History   Problem Relation Age of Onset   • Mental illness Mother    • Migraines Mother    • Dementia Mother    • Heart disease Father    •  "Hypertension Father    • Kidney disease Father    • Stroke Sister    • No Known Problems Daughter    • No Known Problems Daughter    • Breast cancer Other    • Heart disease Other    • Hypertension Other    • Diabetes Other    • Cancer Maternal Grandmother    • No Known Problems Maternal Grandfather    • No Known Problems Paternal Grandmother    • No Known Problems Paternal Grandfather    • Lymphoma Neg Hx    • Leukemia Neg Hx        Social History     Socioeconomic History   • Marital status:      Spouse name: Not on file   • Number of children: Not on file   • Years of education: Not on file   • Highest education level: Not on file   Occupational History     Employer: [employer]     Employer: RETIRED   Tobacco Use   • Smoking status: Never Smoker   • Smokeless tobacco: Never Used   Substance and Sexual Activity   • Alcohol use: No   • Drug use: No   • Sexual activity: Defer   Social History Narrative    She lives with her oldest daughter.           The following portions of the patient's history were reviewed and updated as appropriate: problem list, allergies, current medications, past medical history, past family history, past social history and past surgical history.    Review of Systems   Neurological: Positive for headache.       Immunization History   Administered Date(s) Administered   • Fluad Quad 65+ 10/03/2019, 09/21/2020   • Fluzone High Dose =>65 Years ([employer] ONLY) 10/30/2015, 10/11/2016, 10/18/2017, 10/06/2018   • Pneumococcal Conjugate 13-Valent (PCV13) 12/01/2016   • Pneumococcal Polysaccharide (PPSV23) 06/28/2019       Objective   Vitals:    09/21/20 1006   BP: 160/80   Pulse: 72   Temp: 98.1 °F (36.7 °C)   Weight: 64.4 kg (142 lb)   Height: 165.1 cm (65\")     Body mass index is 23.63 kg/m².  Physical Exam  Vitals signs reviewed.   Constitutional:       Appearance: She is well-developed.   HENT:      Head: Normocephalic and atraumatic.   Eyes:      Extraocular Movements: Extraocular " movements intact.      Conjunctiva/sclera: Conjunctivae normal.      Pupils: Pupils are equal, round, and reactive to light.   Cardiovascular:      Rate and Rhythm: Normal rate and regular rhythm.      Heart sounds: Normal heart sounds, S1 normal and S2 normal.   Pulmonary:      Effort: Pulmonary effort is normal.      Breath sounds: Normal breath sounds.   Skin:     General: Skin is warm.   Neurological:      Mental Status: She is alert.   Psychiatric:         Mood and Affect: Mood normal.         Procedures    Assessment/Plan   Karli was seen today for hypertension.    Diagnoses and all orders for this visit:    Essential hypertension  -     terazosin (HYTRIN) 10 MG capsule; Take 1 capsule by mouth Every Night.  -     metoprolol succinate XL (Toprol XL) 50 MG 24 hr tablet; Take 1 tablet by mouth Daily.    Adult hypothyroidism  -     TSH    Mild intermittent asthma without complication    Other orders  -     Cancel: FluLaval Quad >6 Months ([id number])  -     Fluad Quad 65+ yrs ([id number])             Increase Terazosin to 10 mg daily and add Toprol. Check TSH. Flu shot today. States breathing is stable. Discussed potential risk wheezing with beta blocker. She will check with Dr. Larose about the changes in her BP meds.    Return in about 4 weeks (around 10/19/2020), or 30 minutes.

## 2020-09-24 ENCOUNTER — TELEPHONE (OUTPATIENT)
Dept: INTERNAL MEDICINE | Facility: CLINIC | Age: 80
End: 2020-09-24

## 2020-09-24 NOTE — TELEPHONE ENCOUNTER
PATIENT STATES SHE WAS IN FOR VISIT ON Monday. SHE WANTS TO KNOW IF SHE IS SUPPOSED TO TAKE LOSARTAN, AND TOPROL.     PLEASE ADVISE     [phone]

## 2020-09-25 ENCOUNTER — OFFICE VISIT (OUTPATIENT)
Dept: ONCOLOGY | Facility: CLINIC | Age: 80
End: 2020-09-25

## 2020-09-25 ENCOUNTER — LAB (OUTPATIENT)
Dept: LAB | Facility: HOSPITAL | Age: 80
End: 2020-09-25

## 2020-09-25 VITALS
BODY MASS INDEX: 23.88 KG/M2 | HEIGHT: 65 IN | WEIGHT: 143.3 LBS | SYSTOLIC BLOOD PRESSURE: 150 MMHG | RESPIRATION RATE: 19 BRPM | HEART RATE: 74 BPM | DIASTOLIC BLOOD PRESSURE: 70 MMHG | OXYGEN SATURATION: 98 % | TEMPERATURE: 98 F

## 2020-09-25 DIAGNOSIS — C88.0 MACROGLOBULINEMIA OF WALDENSTROM (HCC): ICD-10-CM

## 2020-09-25 DIAGNOSIS — Z17.0 MALIGNANT NEOPLASM OF OVERLAPPING SITES OF LEFT BREAST IN FEMALE, ESTROGEN RECEPTOR POSITIVE (HCC): ICD-10-CM

## 2020-09-25 DIAGNOSIS — C50.812 MALIGNANT NEOPLASM OF OVERLAPPING SITES OF LEFT BREAST IN FEMALE, ESTROGEN RECEPTOR POSITIVE (HCC): ICD-10-CM

## 2020-09-25 DIAGNOSIS — C50.812 MALIGNANT NEOPLASM OF OVERLAPPING SITES OF LEFT BREAST IN FEMALE, ESTROGEN RECEPTOR POSITIVE (HCC): Primary | ICD-10-CM

## 2020-09-25 DIAGNOSIS — G62.0 DRUG-INDUCED POLYNEUROPATHY (HCC): ICD-10-CM

## 2020-09-25 DIAGNOSIS — Z17.0 MALIGNANT NEOPLASM OF OVERLAPPING SITES OF LEFT BREAST IN FEMALE, ESTROGEN RECEPTOR POSITIVE (HCC): Primary | ICD-10-CM

## 2020-09-25 LAB
ALBUMIN SERPL-MCNC: 4.5 G/DL (ref 3.5–5.2)
ALBUMIN/GLOB SERPL: 2.1 G/DL (ref 1.1–2.4)
ALP SERPL-CCNC: 64 U/L (ref 38–116)
ALT SERPL W P-5'-P-CCNC: 12 U/L (ref 0–33)
ANION GAP SERPL CALCULATED.3IONS-SCNC: 10.9 MMOL/L (ref 5–15)
AST SERPL-CCNC: 20 U/L (ref 0–32)
BASOPHILS # BLD AUTO: 0.03 10*3/MM3 (ref 0–0.2)
BASOPHILS NFR BLD AUTO: 0.6 % (ref 0–1.5)
BILIRUB SERPL-MCNC: 0.6 MG/DL (ref 0.2–1.2)
BUN SERPL-MCNC: 7 MG/DL (ref 6–20)
BUN/CREAT SERPL: 9.2 (ref 7.3–30)
CALCIUM SPEC-SCNC: 9.9 MG/DL (ref 8.5–10.2)
CHLORIDE SERPL-SCNC: 94 MMOL/L (ref 98–107)
CO2 SERPL-SCNC: 25.1 MMOL/L (ref 22–29)
CREAT SERPL-MCNC: 0.76 MG/DL (ref 0.6–1.1)
DEPRECATED RDW RBC AUTO: 43.3 FL (ref 37–54)
EOSINOPHIL # BLD AUTO: 0.3 10*3/MM3 (ref 0–0.4)
EOSINOPHIL NFR BLD AUTO: 6.4 % (ref 0.3–6.2)
ERYTHROCYTE [DISTWIDTH] IN BLOOD BY AUTOMATED COUNT: 12.3 % (ref 12.3–15.4)
GFR SERPL CREATININE-BSD FRML MDRD: 73 ML/MIN/1.73
GLOBULIN UR ELPH-MCNC: 2.1 GM/DL (ref 1.8–3.5)
GLUCOSE SERPL-MCNC: 107 MG/DL (ref 74–124)
HCT VFR BLD AUTO: 39.1 % (ref 34–46.6)
HGB BLD-MCNC: 13.7 G/DL (ref 12–15.9)
IMM GRANULOCYTES # BLD AUTO: 0.01 10*3/MM3 (ref 0–0.05)
IMM GRANULOCYTES NFR BLD AUTO: 0.2 % (ref 0–0.5)
LYMPHOCYTES # BLD AUTO: 1.41 10*3/MM3 (ref 0.7–3.1)
LYMPHOCYTES NFR BLD AUTO: 30.1 % (ref 19.6–45.3)
MCH RBC QN AUTO: 33.3 PG (ref 26.6–33)
MCHC RBC AUTO-ENTMCNC: 35 G/DL (ref 31.5–35.7)
MCV RBC AUTO: 94.9 FL (ref 79–97)
MONOCYTES # BLD AUTO: 0.46 10*3/MM3 (ref 0.1–0.9)
MONOCYTES NFR BLD AUTO: 9.8 % (ref 5–12)
NEUTROPHILS NFR BLD AUTO: 2.47 10*3/MM3 (ref 1.7–7)
NEUTROPHILS NFR BLD AUTO: 52.9 % (ref 42.7–76)
NRBC BLD AUTO-RTO: 0 /100 WBC (ref 0–0.2)
PLATELET # BLD AUTO: 127 10*3/MM3 (ref 140–450)
PMV BLD AUTO: 9.6 FL (ref 6–12)
POTASSIUM SERPL-SCNC: 4.3 MMOL/L (ref 3.5–4.7)
PROT SERPL-MCNC: 6.6 G/DL (ref 6.3–8)
RBC # BLD AUTO: 4.12 10*6/MM3 (ref 3.77–5.28)
SODIUM SERPL-SCNC: 130 MMOL/L (ref 134–145)
WBC # BLD AUTO: 4.68 10*3/MM3 (ref 3.4–10.8)

## 2020-09-25 PROCEDURE — 80053 COMPREHEN METABOLIC PANEL: CPT

## 2020-09-25 PROCEDURE — 99215 OFFICE O/P EST HI 40 MIN: CPT | Performed by: INTERNAL MEDICINE

## 2020-09-25 PROCEDURE — 85025 COMPLETE CBC W/AUTO DIFF WBC: CPT

## 2020-09-25 PROCEDURE — 36415 COLL VENOUS BLD VENIPUNCTURE: CPT

## 2020-09-25 NOTE — PROGRESS NOTES
Subjective    REASONS FOR FOLLOWUP:     1. History of Waldenstrom macroglobulinemia.    2. History of breast cancer stage I on the left, status post mastectomy. The patient completed aromatase inhibitor without any evidence of breast cancer recurrence        History of Present Illness      PATIENT WAS CALLED THE DAY BEFORE BY THE OFFICE TO ASK FOR SYMPTOMS THAT COULD BE CONSISTENT WITH CORONAVIRUS INFECTION, AND BEING NEGATIVE WAS SCHEDULED TO BE SEEN IN THE OFFICE TODAY. SIMILAR QUESTIONING TODAY INCLUDING, CHILLS, FEVER, NEW COUGH, SHORTNESS OF BREATH, DIARRHEA,DIFFUSE BODY ACHES  AND CHANGES IN SMELL OR TASTE WERE NEGATIVE.THE PATIENT DENIED ANY CONTACT WITH PERSONS WHO WERE POSITIVE FOR COVID, AND PATIENT IS NOT IN CATEGORY OF HIGH RISK BEHAVIOR TO ACQUIRE COVID.    DURING THE VISIT WITH THE PATIENT TODAY , PATIENT HAD FACE MASK, MY MEDICAL ASSISTANT AND I  HAD PROPPER PROTECTIVE EQUIPMENT, AND I DID HAND HYGIENE WITH SOAP AND WATER BEFORE AND AFTER THE VISIT.    This patient returns today to the office for followup. She is here today still complaining of tremendous degree of somnolence through the day through the afternoon no matter what she tries to do. She is still able to do all her work through the day but happens that the patient is taking Neurontin 400 mg q.i.d. and I wonder if this is part of the problem. We adjusted her venetoclax down to 50 mg a day and still with this the patient is sleepy. As far as we know there is no reaction between venetoclax and Neurontin. Besides this the patient is not having any fevers or chills. Her appetite is good. She has no bone pain, no clinical bleeding. Her bowel function and urination remain normal. She is tired and fatigued of being in the house doing nothing and unable to see her family. She is worried about everything and everybody. She does not think she is depressed. She is not crying. She is sleeping excessively as I mentioned. The patient has had new  adjustment in blood pressure medication and I discussed this with her internal medicine physician, Dr. Kaye.                Past Medical History:   Diagnosis Date   • Acid reflux    • Asthma    • Clostridium difficile colitis     Requiring admission to [facility] in 09/2008   • H/O transesophageal echocardiography (MARICRUZ)    • History of transfusion of packed red blood cells     R/T SURGERY   • History of Waldenstrom's macroglobulinemia    • Leaky heart valve    • Seasonal allergies    • Thyroid nodule     Removed more than 35 years ago     Past Surgical History:   Procedure Laterality Date   • ADENOIDECTOMY     • APPENDECTOMY     • CARPAL TUNNEL RELEASE Right    • CATARACT EXTRACTION Bilateral    • CHOLECYSTECTOMY     • COLONOSCOPY     • HYSTERECTOMY      Partial, many years ago, heavy bleeding, no cancer   • KNEE ARTHROSCOPY Right 03/2009    Finding of chondromalacia and appropriate cleanup of joint was performed by Dr. Moraes.   • MASTECTOMY Left 07/2014   • REPLACEMENT TOTAL KNEE Right 12/2015   • TONSILLECTOMY           Social History     Socioeconomic History   • Marital status:      Spouse name: Not on file   • Number of children: Not on file   • Years of education: Not on file   • Highest education level: Not on file   Occupational History     Employer: [employer]     Employer: RETIRED   Tobacco Use   • Smoking status: Never Smoker   • Smokeless tobacco: Never Used   Substance and Sexual Activity   • Alcohol use: No   • Drug use: No   • Sexual activity: Defer   Social History Narrative    She lives with her oldest daughter.     Family History   Problem Relation Age of Onset   • Mental illness Mother    • Migraines Mother    • Dementia Mother    • Heart disease Father    • Hypertension Father    • Kidney disease Father    • Stroke Sister    • No Known Problems Daughter    • No Known Problems Daughter    • Breast cancer Other    • Heart disease Other    • Hypertension Other    •  Diabetes Other    • Cancer Maternal Grandmother    • No Known Problems Maternal Grandfather    • No Known Problems Paternal Grandmother    • No Known Problems Paternal Grandfather    • Lymphoma Neg Hx    • Leukemia Neg Hx      Current Outpatient Medications on File Prior to Visit   Medication Sig Dispense Refill   • aspirin 81 MG tablet Take 81 mg by mouth Daily.     • Budesonide (RHINOCORT ALLERGY NA) 2 sprays into the nostril(s) as directed by provider Daily As Needed.     • budesonide-formoterol (SYMBICORT) 80-4.5 MCG/ACT inhaler Inhale 2 puffs 2 (Two) Times a Day.     • cetirizine (zyrTEC) 10 MG tablet Take 10 mg by mouth Daily.     • gabapentin (NEURONTIN) 400 MG capsule TAKE ONE CAPSULE BY MOUTH FOUR TIMES A  capsule 4   • lansoprazole (PREVACID) 30 MG capsule TAKE 1 CAPSULE EVERY DAY 90 capsule 0   • levothyroxine (SYNTHROID, LEVOTHROID) 50 MCG tablet TAKE 1 TABLET EVERY DAY 90 tablet 1   • losartan (COZAAR) 100 MG tablet Take 1 tablet by mouth Daily. 30 tablet 3   • metoprolol succinate XL (Toprol XL) 50 MG 24 hr tablet Take 1 tablet by mouth Daily. 30 tablet 2   • ondansetron (ZOFRAN) 4 MG tablet Take 1 tablet by mouth Every 8 (Eight) Hours As Needed for Nausea or Vomiting. 30 tablet 5   • potassium chloride (K-DUR,KLOR-CON) 20 MEQ CR tablet TAKE 1 TABLET EVERY DAY 90 tablet 1   • terazosin (HYTRIN) 10 MG capsule Take 1 capsule by mouth Every Night. 30 capsule 2   • Venetoclax (Venclexta) 50 MG tablet Take 1 tablet by mouth Daily. 30 tablet 3   • VENTOLIN  (90 Base) MCG/ACT inhaler        No current facility-administered medications on file prior to visit.        ALLERGIES:    Allergies   Allergen Reactions   • Cortisone Unknown - High Severity     Reports having a shot years ago, and wonders if he hit a vein. She was told by another doctor that it probably went in her blood stream.    • Darvon  [Propoxyphene] Palpitations     ROS:  General: no fever, no chills,  fatigue,no weight changes, no  lack of appetite.  Eyes: no epiphora, xerophthalmia,conjunctivitis, pain, glaucoma, blurred vision, blindness, secretion, photophobia, proptosis, diplopia.  Ears: no otorrhea, tinnitus, otorrhagia, deafness, pain, vertigo.  Nose: no rhinorrhea, no epistaxis, no alteration in perception of odors, no sinuses pressure.  Mouth: no alteration in gums or teeth,  No ulcers, no difficulty with mastication or deglut ion, no odynophagia.  Neck: no masses or pain, no thyroid alterations, no pain in muscles or arteries, no carotid odynia, no crepitation.  Respiratory: no cough, no sputum production,no dyspnea,no trepopnea, no pleuritic pain,no hemoptysis.  Heart: no syncope, no irregularity, no palpitations, no angina,no orthopnea,no paroxysmal nocturnal dyspnea.  Vascular Venous: no tenderness,no edema,no palpable cords,no postphlebitic syndrome, no skin changes no ulcerations.  Vascular Arterial: no distal ischemia, noclaudication, no gangrene, no neuropathic ischemic pain, no skin ulcers, no paleness no cyanosis.  GI: no dysphagia, no odynophagia, no regurgitation, no heartburn,no indigestion,no nausea,no vomiting,no hematemesis ,no melena,no jaundice,no distention, no obstipation,no enterorrhagia,no proctalgia,no anal  lesions, no changes in bowel habits.  : no frequency, no hesitancy, no hematuria, no discharge,no  pain.  Musculoskeletal: no muscle or tendon pain or inflammation,no  joint pain, no edema, no functional limitation,no fasciculations, no mass.  Neurologic: no headache, no seizures, noalterations on Craneal nerves, no motor deficit, FEET sensory deficit, normal coordination, no alteration in memory,normal orientation, calculation,normal writting, verbal and written language.SEVERE SOMNOLENCE  Skin: no rashes,no pruritus no localized lesions.  Psychiatric: no anxiety, no depression,no agitation, no delusions, proper insight.        Objective      Vitals:    09/25/20 1531   BP: 150/70   Pulse: 74   Resp: 19  "  Temp: 98 °F (36.7 °C)   TempSrc: Temporal   SpO2: 98%   Weight: 65 kg (143 lb 4.8 oz)   Height: 165.1 cm (65\")   PainSc:   3   PainLoc: Head  Comment: headache     Current Status 9/25/2020   ECOG score 0   RETAKE BP BY DR BURNETTE 160/60    Physical Exam     I HAVE PERSONALLY REVIEWED THE HISTORY OF THE PRESENT ILLNESS, PAST MEDICAL HISTORY, FAMILY HISTORY, SOCIAL HISTORY, ALLERGIES, MEDICATIONS STATED ABOVE IN THE OFFICE NOTE FROM TODAY.        GENERAL:  Well-developed, well-nourished  Patient  in no acute distress.   SKIN:  Warm, dry ,NO rashes,NO purpura ,NO petechiae.  HEENT:  Pupils were equal and reactive to light and accomodation, conjunctivae noninjected, no pterigion, normal extraocular movements, normal visual acuity.   NECK:  Supple with good range of motion; no thyromegaly or masses, no JVD or bruits, no cervical adenopathies.No carotid artery pain, no carotid abnormal pulsation , NO arterial dance.  LYMPHATICS:  No cervical, NO supraclavicular, NO axillary,NO epitrochlear , NO inguinal adenopathy.  CARDIAC   normal rate and regular rhythm, without murmur,NO rubs NO S3 NO S4 right or left . Normal femoral, popliteal, pedis, brachial and carotid pulses.  VASCULAR ARTERIAL: normal carotids,brachial,radial,femoral,popliteal, pedis pulses , no bruits.no paleness or cyanosis, no pain, no edema, no numbness, no gangrene.  VASCULAR VENOUS: no cyanosis, collateral circulation, varicosities, edema, palpable cords, pain, erythema.  ABDOMEN:  Soft, nontender with no hepatomegaly, no splenomegaly,no masses, no ascites, no collateral circulation,no distention,no [place] sign, no abdominal pain, no inguinal hernias,no umbilical hernia, no abdominal bruits. Normal bowel sounds.  GENITAL: Not  Performed.  EXTREMITIES  AND SPINE:  No clubbing, cyanosis or edema, no deformities or pain .No kyphosis, scoliosis, deformities or pain in spine, ribs or pelvic bone.  NEUROLOGICAL:  Patient was awake, alert, oriented to time, " person and place.              RECENT LABS:  Results from last 7 days   Lab Units 09/25/20  1510   WBC 10*3/mm3 4.68   NEUTROS ABS 10*3/mm3 2.47   HEMOGLOBIN g/dL 13.7   HEMATOCRIT % 39.1   PLATELETS 10*3/mm3 127*     Results from last 7 days   Lab Units 09/25/20  1510   SODIUM mmol/L 130*   POTASSIUM mmol/L 4.3   CHLORIDE mmol/L 94*   CO2 mmol/L 25.1   BUN mg/dL 7   CREATININE mg/dL 0.76   CALCIUM mg/dL 9.9   ALBUMIN g/dL 4.50   BILIRUBIN mg/dL 0.6   ALK PHOS U/L 64   ALT (SGPT) U/L 12   AST (SGOT) U/L 20   GLUCOSE mg/dL 107                  Assessment/Plan      1. Waldenstrom macroglobulinemia that has been treated in the past mainly with Rituxan. In the past, also she was treated with Velcade with very dramatic improvement in her monoclonal protein but very dramatic sensory peripheral neuropathy. This medication was discontinued altogether.    6/29/2020, she developed progressive fatigue with worsening neuropathy in her feet.  These were her original symptoms at diagnosis of Waldenstrom's.  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  Calcium also elevated at 10.5 with decreased sodium related to pseudohyponatremia associated with monoclonal protein.  Previously, she did not receive improvement from Rituxan, she is not thought to be a candidate for Imbruvica, therefore she went on to initiate venetoclax 7/13/2020.  She is currently on her next planned dose of 200 mg daily with poor tolerance as outlined below    2.  History of left breast cancer, status post mastectomy.  She completed adjuvant therapy.  Mammogram 6/30/2020 benign    3.  Significant fatigue related to disease state.  Her fatigue has exacerbated with the initiation of venetoclax, particularly at maximum dose of 200 mg.Since the previous visit, nurse practitioner under my advice decreased the dose of venetoclax to 100 mg a day. She still has fatigue but not as severe as she had when she was taking a larger amount of the medicine. On the other  hand her sensory neuropathy in her feet is a little bit better. It happens mainly at night and she has no need to take any medicine. Her appetite is good. Her weight is stable. Bowel function and urination are normal. She has no rash, no fever, no infection, no bleeding. Her white count is normal. Leukopenia associated with venetoclax. Hemoglobin and platelet count are stable. Her right breast exam is normal. Her left chest wall is normal.       The patient was reviewed again on 09/25/2020 after adjusting her venetoclax down to 50 mg a day. The complaint that she has is somnolence. I went through her medication list. As far as we know, she has no obvious reaction between venetoclax and gabapentin and the other issue that caught my attention is the frequency of the dose of gabapentin. She is taking 400 mg q.i.d. That has been her dose for a long time. I wonder if this is excessive amount of this medicine and excessive amount of frequency of the medicine through the day. I wonder if by the end of the day the somnolence and the fatigue is produced by this medication more than anything else.     We have advised her to wait until next week when we get the report of her monoclonal protein studies to see if we need to drop off the venetoclax for a month or so and see how things evolve from the point of view of her symptoms.     I asked our pharmacist to review the timing and the half-life of Neurontin in the pharmacology text book in order to see if the dose and the frequency of this medicine could be modified in a positive way that maybe the patient’s somnolence could be modified and related to this medication more than anything else.     Other than that, her white count is better than the time before. The hemoglobin is stable and the platelet count is minimally low, effect of venetoclax. Her monoclonal protein studies and her chemistry profile are pending. As we have mentioned in the past this patient has had  pseudohyponatremia for a long time related to her monoclonal protein and this has nothing to do with her somnolence symptoms.     Her thyroid medicine has been stable. Her TSH by Dr. Kaye has been stable; therefore thyroid disease is not the cause of her somnolence at all.     I encouraged her to see if she could out of town for a few days with her family in a safe situation to see if she can change a little bit from the last 6 months that she has been in her house not going anywhere. The only time that she goes out is when she goes to see the doctor. This is catching up with her mind and her soul.    I will review her back in 6 weeks with a CBC, CMP and ARON.            Florencio Larose MD   9/25/2020      CC:

## 2020-09-28 ENCOUNTER — TELEPHONE (OUTPATIENT)
Dept: ONCOLOGY | Facility: CLINIC | Age: 80
End: 2020-09-28

## 2020-09-28 LAB
ALBUMIN SERPL ELPH-MCNC: 4 G/DL (ref 2.9–4.4)
ALBUMIN/GLOB SERPL: 1.7 {RATIO} (ref 0.7–1.7)
ALPHA1 GLOB SERPL ELPH-MCNC: 0.3 G/DL (ref 0–0.4)
ALPHA2 GLOB SERPL ELPH-MCNC: 0.8 G/DL (ref 0.4–1)
B-GLOBULIN SERPL ELPH-MCNC: 0.8 G/DL (ref 0.7–1.3)
GAMMA GLOB SERPL ELPH-MCNC: 0.6 G/DL (ref 0.4–1.8)
GLOBULIN SER-MCNC: 2.4 G/DL (ref 2.2–3.9)
IGA SERPL-MCNC: 27 MG/DL (ref 64–422)
IGG SERPL-MCNC: 421 MG/DL (ref 586–1602)
IGM SERPL-MCNC: 283 MG/DL (ref 26–217)
INTERPRETATION SERPL IEP-IMP: ABNORMAL
KAPPA LC FREE SER-MCNC: 11.5 MG/L (ref 3.3–19.4)
KAPPA LC FREE/LAMBDA FREE SER: 1.8 {RATIO} (ref 0.26–1.65)
LABORATORY COMMENT REPORT: ABNORMAL
LAMBDA LC FREE SERPL-MCNC: 6.4 MG/L (ref 5.7–26.3)
M PROTEIN SERPL ELPH-MCNC: 0.4 G/DL
PROT SERPL-MCNC: 6.4 G/DL (ref 6–8.5)

## 2020-09-28 NOTE — TELEPHONE ENCOUNTER
After discussing the case with our pharmacist and knowing that the half-life of Neurontin is 7 hours or so, I think with the venetoclax in the system this medicine is probably interacting with the gabapentin making the level of gabapentin to rise. That is why she is so sleepy through most of the day. I sent a message to Dr. Kaye to review this. He is not available but I am ready to proceed. I called the patient today and I asked her to take only 1 gabapentin in the morning and 2 gabapentin at bedtime and give us a call next week to see how she is doing from the point of view of her somnolence. If she feels better and she is able to functional better, we have the answer. She has been taking this medicine this way since 1991. I know that she takes this for seizures but I think at this point if the somnolence is so bad that the patient is not able to function and she stays like a zombie most of the day, it is time to do something about it or maybe eventually considering sending her to see a neurologist to see if the medicine can be changed to something different. She agrees to proceed and she will give us a call next week.

## 2020-09-29 ENCOUNTER — SPECIALTY PHARMACY (OUTPATIENT)
Dept: PHARMACY | Facility: HOSPITAL | Age: 80
End: 2020-09-29

## 2020-09-29 ENCOUNTER — TELEPHONE (OUTPATIENT)
Dept: ONCOLOGY | Facility: CLINIC | Age: 80
End: 2020-09-29

## 2020-09-29 NOTE — TELEPHONE ENCOUNTER
and [facility], Serum  Order: [id number]  Status:  Final result   Visible to patient:  No (not released) Dx:  Macroglobulinemia of Waldenstrom (CMS...  Specimen Information: Blood        Component   Ref Range & Units 4d ago  (9/25/20) 4d ago  (9/25/20) 4wk ago  (9/1/20) 4wk ago  (9/1/20)   IgG   586 - 1602 mg/dL 421Low    403Low      IgA   64 - 422 mg/dL 27Low    27Low  CM     Comment: Result confirmed on concentration.   IgM   26 - 217 mg/dL 283High    341High      Total Protein   6.0 - 8.5 g/dL 6.4  6.6 R  6.3  6.6 R    Albumin   2.9 - 4.4 g/dL 4.0  4.50 R  3.8  4.40 R    Alpha-1-Globulin   0.0 - 0.4 g/dL 0.3   0.3     Alpha-2-Globulin   0.4 - 1.0 g/dL 0.8   0.7     Beta Globulin   0.7 - 1.3 g/dL 0.8   0.9     Gamma Globulin   0.4 - 1.8 g/dL 0.6   0.6     M-Pratik   Not Observed g/dL 0.4High    0.3High      Globulin   2.2 - 3.9 g/dL 2.4   2.5     A/G Ratio   0.7 - 1.7 1.7  2.1 R  1.6  2.0 R    Immunofixation Reflex, Serum  Comment   Comment CM     Comment: Immunofixation shows IgM monoclonal protein with kappa light chain   specificity.   Please note  Comment   Comment CM     Comment: Protein electrophoresis scan will follow via computer, mail, or    delivery.   Free Light Chain, Kappa   3.3 - 19.4 mg/L 11.5   12.5     Free Lambda Light Chains   5.7 - 26.3 mg/L 6.4   4.4Low      Kappa/Lambda Ratio   0.26 - 1.65 1.80High    2.84High              I called the patient today to ask her to remain on the venetoclax for the time being. Her monoclonal protein level, IgM keeps dropping and this is beneficial to her in the long run in regard to neuropathy, fatigue and other symptoms. She will call us next week in regard to how she feels after modifying her dose of Neurontin.

## 2020-10-02 ENCOUNTER — APPOINTMENT (OUTPATIENT)
Dept: LAB | Facility: HOSPITAL | Age: 80
End: 2020-10-02

## 2020-10-02 ENCOUNTER — MEDICATION THERAPY MANAGEMENT (OUTPATIENT)
Dept: PHARMACY | Facility: HOSPITAL | Age: 80
End: 2020-10-02

## 2020-10-02 DIAGNOSIS — I10 ESSENTIAL HYPERTENSION: ICD-10-CM

## 2020-10-02 RX ORDER — LOSARTAN POTASSIUM 100 MG/1
TABLET ORAL
Qty: 30 TABLET | Refills: 2 | Status: SHIPPED | OUTPATIENT
Start: 2020-10-02 | End: 2021-02-24

## 2020-10-02 NOTE — PROGRESS NOTES
[facility] telephone follow up- Manuela    Karli is not feeling great today. She tells me that she had an increase in neuropathy and jerking in her extremities on lower dose of gabapentin. Today, Karli has increased her gabapentin to her usual dose of 400 mg QID. She reports no other medication changes. Karli asks that I let Dr. Larose know if this; I sent him an in-basket message. Karli has no complaints with Venclexta today. Pharmacy will continue to follow.     Thanks,   Michelle Haney, PharmD

## 2020-10-03 ENCOUNTER — TELEPHONE (OUTPATIENT)
Dept: INTERNAL MEDICINE | Facility: CLINIC | Age: 80
End: 2020-10-03

## 2020-10-03 NOTE — TELEPHONE ENCOUNTER
Please tell her to decrease Neurontin to BID to see if helps with fatigue. Let her know Dr. Larose contacted me about it.

## 2020-10-05 ENCOUNTER — MEDICATION THERAPY MANAGEMENT (OUTPATIENT)
Dept: PHARMACY | Facility: HOSPITAL | Age: 80
End: 2020-10-05

## 2020-10-05 ENCOUNTER — TELEPHONE (OUTPATIENT)
Dept: ONCOLOGY | Facility: CLINIC | Age: 80
End: 2020-10-05

## 2020-10-05 NOTE — TELEPHONE ENCOUNTER
Patient called to let Dr. Larose that she tried the new time for her Gabapentin, 1 in the am and 2 in the pm/bedtime, she could not continue with this schedule her neck felt stressed and her head was shaken.  Best call back number [phone]

## 2020-10-05 NOTE — TELEPHONE ENCOUNTER
Pt called to let you know that she tried taking the gabapentin on the new schedule Dr. Larose gave her, 1 in AM and 2 in PM. She tried this for about 3 nights. After the 3rd night, her head has shaking very badly and her neck was stiff. She has since gone back to taking it 4 times a day and this has resolved symptoms. She does not feel her fatigue is related to the gabapentin but rather the chemo. She wanted to let Dr. Larose know and see what he advises. Message sent to Dr. Larose.

## 2020-10-05 NOTE — TELEPHONE ENCOUNTER
She said Dr. Larose told her to do this and it did not work for her, after 3 days she was shaking so she went back up to taking 4x daily. She is supposed to speak to Dr. Larose this evening about what to do.

## 2020-10-05 NOTE — PROGRESS NOTES
[facility] telephone follow up- Venclexta/gabapentin dose    Karli is doing well today. She is feeling better after going back up on her gabapentin dose. Karli will call Dr. Larose today and discuss.     Thank you,   Gary CunhaD

## 2020-10-06 ENCOUNTER — TELEPHONE (OUTPATIENT)
Dept: ONCOLOGY | Facility: CLINIC | Age: 80
End: 2020-10-06

## 2020-10-06 NOTE — TELEPHONE ENCOUNTER
I have called the patient today to see how she was doing after we adjusted the dose of the gabapentin a few days ago. Unfortunately she had some shakiness in her head and she had to go back to her usual dose of gabapentin, 4 tablets a day. She had no obvious seizure activity. It is more tremor-like process than anything else to me. In any event, she remains fatigued and sleepy and I asked her to hold off on the venetoclax for a week starting tomorrow and give me a call next Wednesday and see how she is doing. She will do such.

## 2020-10-15 ENCOUNTER — TELEPHONE (OUTPATIENT)
Dept: ONCOLOGY | Facility: CLINIC | Age: 80
End: 2020-10-15

## 2020-10-15 NOTE — TELEPHONE ENCOUNTER
PATIENT WAS SUPPOSE TO CALL YOU IN A WEEK TO LET YOU KNOW HOW SHE IS DOING WITH BEING OFF THE VENCLEXTA FOR A WEEK, SHE FEELS BETTER OFF OF IT THAN ON, SHE HAS MORE ENERGY.    PT CALL BACK #[phone]

## 2020-10-15 NOTE — TELEPHONE ENCOUNTER
Patient called to tell us that she has felt better the past week from stopping her Venetoclax. More energy. I sent msg to  letting him know this update and to see when he wants her to resume her dose again.

## 2020-10-16 ENCOUNTER — DOCUMENTATION (OUTPATIENT)
Dept: ONCOLOGY | Facility: CLINIC | Age: 80
End: 2020-10-16

## 2020-10-16 ENCOUNTER — TELEPHONE (OUTPATIENT)
Dept: ONCOLOGY | Facility: HOSPITAL | Age: 80
End: 2020-10-16

## 2020-10-16 NOTE — TELEPHONE ENCOUNTER
Called pt to let her know Dr. Larose wants her to stay off of the venetoclax until next visit. Pt v/u. No other questions at this time.

## 2020-10-16 NOTE — PROGRESS NOTES
Per staff message from Dr Larose to clinical pool-pts Venclexta is held until her next visit. She sees Dr Larose on 11/10/20.

## 2020-10-16 NOTE — TELEPHONE ENCOUNTER
----- Message from Florencio Larose MD sent at 10/15/2020  5:13 PM EDT -----  Stop venetoclax until next visit  ----- Message -----  From: Natalie Martinez RN  Sent: 10/15/2020   8:19 AM EDT  To: Florencio Larose MD    Patient called to let you know that since stopping her Venetoclax she feels better, she has more energy and was able to get out of the house more with her daughter. She has been off a week. Do you want her to resume it back now?

## 2020-10-23 ENCOUNTER — OFFICE VISIT (OUTPATIENT)
Dept: INTERNAL MEDICINE | Facility: CLINIC | Age: 80
End: 2020-10-23

## 2020-10-23 VITALS
HEART RATE: 60 BPM | DIASTOLIC BLOOD PRESSURE: 80 MMHG | WEIGHT: 141.2 LBS | TEMPERATURE: 98.2 F | HEIGHT: 65 IN | BODY MASS INDEX: 23.53 KG/M2 | SYSTOLIC BLOOD PRESSURE: 152 MMHG

## 2020-10-23 DIAGNOSIS — I10 ESSENTIAL HYPERTENSION: Primary | ICD-10-CM

## 2020-10-23 DIAGNOSIS — E03.9 ADULT HYPOTHYROIDISM: ICD-10-CM

## 2020-10-23 PROCEDURE — 99213 OFFICE O/P EST LOW 20 MIN: CPT | Performed by: INTERNAL MEDICINE

## 2020-10-23 RX ORDER — METOPROLOL SUCCINATE 100 MG/1
100 TABLET, EXTENDED RELEASE ORAL DAILY
Qty: 30 TABLET | Refills: 3 | Status: SHIPPED | OUTPATIENT
Start: 2020-10-23 | End: 2021-02-24

## 2020-10-23 NOTE — PROGRESS NOTES
Subjective        Chief Complaint   Patient presents with   • Hypertension           Karli Loomis is a 80 y.o. female who presents for    Patient Active Problem List   Diagnosis   • Malignant neoplasm of overlapping sites of left breast in female, estrogen receptor positive (CMS/HCC)   • Macroglobulinemia of Waldenstrom (CMS/HCC)   • Adult hypothyroidism   • Primary osteoarthritis of left knee   • Drug-induced polyneuropathy (CMS/HCC)   • Epilepsy with simple partial seizures (CMS/HCC)   • Headache, migraine   • Hyponatremia with normal extracellular fluid volume   • Chronic pansinusitis   • TIA (transient ischemic attack)   • UTI due to extended-spectrum beta lactamase (ESBL) producing Escherichia coli   • Essential hypertension   • Vision loss     • Asthma       History of Present Illness     Her BP fluctuates. It has been 160-180/70s. She denies headaches, chest pain or dyspnea. Dr. Larose took her off Venetoclax because it took away her energy.  Allergies   Allergen Reactions   • Cortisone Unknown - High Severity     Reports having a shot years ago, and wonders if he hit a vein. She was told by another doctor that it probably went in her blood stream.    • Darvon  [Propoxyphene] Palpitations       Current Outpatient Medications on File Prior to Visit   Medication Sig Dispense Refill   • aspirin 81 MG tablet Take 81 mg by mouth Daily.     • Budesonide (RHINOCORT ALLERGY NA) 2 sprays into the nostril(s) as directed by provider Daily As Needed.     • budesonide-formoterol (SYMBICORT) 80-4.5 MCG/ACT inhaler Inhale 2 puffs 2 (Two) Times a Day.     • cetirizine (zyrTEC) 10 MG tablet Take 10 mg by mouth Daily.     • gabapentin (NEURONTIN) 400 MG capsule TAKE ONE CAPSULE BY MOUTH FOUR TIMES A  capsule 4   • lansoprazole (PREVACID) 30 MG capsule TAKE 1 CAPSULE EVERY DAY 90 capsule 0   • levothyroxine (SYNTHROID, LEVOTHROID) 50 MCG tablet TAKE 1 TABLET EVERY DAY 90 tablet 1   • losartan (COZAAR) 100 MG tablet  TAKE ONE TABLET BY MOUTH DAILY 30 tablet 2   • ondansetron (ZOFRAN) 4 MG tablet Take 1 tablet by mouth Every 8 (Eight) Hours As Needed for Nausea or Vomiting. 30 tablet 5   • potassium chloride (K-DUR,KLOR-CON) 20 MEQ CR tablet TAKE 1 TABLET EVERY DAY 90 tablet 1   • terazosin (HYTRIN) 10 MG capsule Take 1 capsule by mouth Every Night. 30 capsule 2   • VENTOLIN  (90 Base) MCG/ACT inhaler      • [DISCONTINUED] metoprolol succinate XL (Toprol XL) 50 MG 24 hr tablet Take 1 tablet by mouth Daily. 30 tablet 2   • Venetoclax (Venclexta) 50 MG tablet Take 1 tablet by mouth Daily. 30 tablet 3     No current facility-administered medications on file prior to visit.        Past Medical History:   Diagnosis Date   • Acid reflux    • Asthma    • Clostridium difficile colitis     Requiring admission to [facility] in 09/2008   • H/O transesophageal echocardiography (MARICRUZ)    • History of transfusion of packed red blood cells     R/T SURGERY   • History of Waldenstrom's macroglobulinemia    • Leaky heart valve    • Seasonal allergies    • Thyroid nodule     Removed more than 35 years ago       Past Surgical History:   Procedure Laterality Date   • ADENOIDECTOMY     • APPENDECTOMY     • CARPAL TUNNEL RELEASE Right    • CATARACT EXTRACTION Bilateral    • CHOLECYSTECTOMY     • COLONOSCOPY     • HYSTERECTOMY      Partial, many years ago, heavy bleeding, no cancer   • KNEE ARTHROSCOPY Right 03/2009    Finding of chondromalacia and appropriate cleanup of joint was performed by Dr. Moraes.   • MASTECTOMY Left 07/2014   • REPLACEMENT TOTAL KNEE Right 12/2015   • TONSILLECTOMY         Family History   Problem Relation Age of Onset   • Mental illness Mother    • Migraines Mother    • Dementia Mother    • Heart disease Father    • Hypertension Father    • Kidney disease Father    • Stroke Sister    • No Known Problems Daughter    • No Known Problems Daughter    • Breast cancer Other    • Heart disease Other    •  "Hypertension Other    • Diabetes Other    • Cancer Maternal Grandmother    • No Known Problems Maternal Grandfather    • No Known Problems Paternal Grandmother    • No Known Problems Paternal Grandfather    • Lymphoma Neg Hx    • Leukemia Neg Hx        Social History     Socioeconomic History   • Marital status:      Spouse name: Not on file   • Number of children: Not on file   • Years of education: Not on file   • Highest education level: Not on file   Occupational History     Employer: [employer]     Employer: RETIRED   Tobacco Use   • Smoking status: Never Smoker   • Smokeless tobacco: Never Used   Substance and Sexual Activity   • Alcohol use: No   • Drug use: No   • Sexual activity: Defer   Social History Narrative    She lives with her oldest daughter.           The following portions of the patient's history were reviewed and updated as appropriate: problem list, allergies, current medications, past medical history, past family history, past social history and past surgical history.    Review of Systems   Respiratory: Negative for shortness of breath.    Cardiovascular: Negative for chest pain.       Immunization History   Administered Date(s) Administered   • Fluad Quad 65+ 10/03/2019, 09/21/2020   • Fluzone High Dose =>65 Years ([employer] ONLY) 10/30/2015, 10/11/2016, 10/18/2017, 10/06/2018   • Pneumococcal Conjugate 13-Valent (PCV13) 12/01/2016   • Pneumococcal Polysaccharide (PPSV23) 06/28/2019       Objective   Vitals:    10/23/20 1258   BP: 152/80   Pulse: 60   Temp: 98.2 °F (36.8 °C)   Weight: 64 kg (141 lb 3.2 oz)   Height: 165.1 cm (65\")     Body mass index is 23.5 kg/m².  Physical Exam  Vitals signs reviewed.   Constitutional:       Appearance: She is well-developed.   HENT:      Head: Normocephalic and atraumatic.   Cardiovascular:      Rate and Rhythm: Normal rate and regular rhythm.      Heart sounds: Normal heart sounds, S1 normal and S2 normal.   Pulmonary:      Effort: Pulmonary " effort is normal.      Breath sounds: Normal breath sounds.   Skin:     General: Skin is warm.   Neurological:      Mental Status: She is alert.   Psychiatric:         Behavior: Behavior normal.         Procedures    Assessment/Plan   Diagnoses and all orders for this visit:    1. Essential hypertension (Primary)  -     metoprolol succinate XL (Toprol XL) 100 MG 24 hr tablet; Take 1 tablet by mouth Daily.  Dispense: 30 tablet; Refill: 3    2. Adult hypothyroidism             TSH was at goal one month ago. Increase Toprol. Sees Dr. Larose on 11/10. Her BP is probably higher off the diuretic.    Return in about 4 weeks (around 11/20/2020) for Medicare Wellness.

## 2020-11-10 ENCOUNTER — LAB (OUTPATIENT)
Dept: LAB | Facility: HOSPITAL | Age: 80
End: 2020-11-10

## 2020-11-10 ENCOUNTER — APPOINTMENT (OUTPATIENT)
Dept: LAB | Facility: HOSPITAL | Age: 80
End: 2020-11-10

## 2020-11-10 ENCOUNTER — OFFICE VISIT (OUTPATIENT)
Dept: ONCOLOGY | Facility: CLINIC | Age: 80
End: 2020-11-10

## 2020-11-10 VITALS
OXYGEN SATURATION: 97 % | HEART RATE: 56 BPM | HEIGHT: 65 IN | BODY MASS INDEX: 23.51 KG/M2 | TEMPERATURE: 97.5 F | RESPIRATION RATE: 20 BRPM | WEIGHT: 141.1 LBS

## 2020-11-10 DIAGNOSIS — C88.0 MACROGLOBULINEMIA OF WALDENSTROM (HCC): ICD-10-CM

## 2020-11-10 DIAGNOSIS — C50.812 MALIGNANT NEOPLASM OF OVERLAPPING SITES OF LEFT BREAST IN FEMALE, ESTROGEN RECEPTOR POSITIVE (HCC): Primary | ICD-10-CM

## 2020-11-10 DIAGNOSIS — Z17.0 MALIGNANT NEOPLASM OF OVERLAPPING SITES OF LEFT BREAST IN FEMALE, ESTROGEN RECEPTOR POSITIVE (HCC): ICD-10-CM

## 2020-11-10 DIAGNOSIS — G62.0 DRUG-INDUCED POLYNEUROPATHY (HCC): ICD-10-CM

## 2020-11-10 DIAGNOSIS — E87.1 HYPONATREMIA WITH NORMAL EXTRACELLULAR FLUID VOLUME: ICD-10-CM

## 2020-11-10 DIAGNOSIS — C50.812 MALIGNANT NEOPLASM OF OVERLAPPING SITES OF LEFT BREAST IN FEMALE, ESTROGEN RECEPTOR POSITIVE (HCC): ICD-10-CM

## 2020-11-10 DIAGNOSIS — Z17.0 MALIGNANT NEOPLASM OF OVERLAPPING SITES OF LEFT BREAST IN FEMALE, ESTROGEN RECEPTOR POSITIVE (HCC): Primary | ICD-10-CM

## 2020-11-10 LAB
ALBUMIN SERPL-MCNC: 4.5 G/DL (ref 3.5–5.2)
ALBUMIN/GLOB SERPL: 2.4 G/DL (ref 1.1–2.4)
ALP SERPL-CCNC: 69 U/L (ref 38–116)
ALT SERPL W P-5'-P-CCNC: 11 U/L (ref 0–33)
ANION GAP SERPL CALCULATED.3IONS-SCNC: 8.7 MMOL/L (ref 5–15)
AST SERPL-CCNC: 19 U/L (ref 0–32)
BASOPHILS # BLD AUTO: 0.03 10*3/MM3 (ref 0–0.2)
BASOPHILS NFR BLD AUTO: 0.7 % (ref 0–1.5)
BILIRUB SERPL-MCNC: 0.6 MG/DL (ref 0.2–1.2)
BUN SERPL-MCNC: 8 MG/DL (ref 6–20)
BUN/CREAT SERPL: 10.3 (ref 7.3–30)
CALCIUM SPEC-SCNC: 9.8 MG/DL (ref 8.5–10.2)
CHLORIDE SERPL-SCNC: 98 MMOL/L (ref 98–107)
CO2 SERPL-SCNC: 28.3 MMOL/L (ref 22–29)
CREAT SERPL-MCNC: 0.78 MG/DL (ref 0.6–1.1)
DEPRECATED RDW RBC AUTO: 42.9 FL (ref 37–54)
EOSINOPHIL # BLD AUTO: 0.34 10*3/MM3 (ref 0–0.4)
EOSINOPHIL NFR BLD AUTO: 8 % (ref 0.3–6.2)
ERYTHROCYTE [DISTWIDTH] IN BLOOD BY AUTOMATED COUNT: 12.2 % (ref 12.3–15.4)
GFR SERPL CREATININE-BSD FRML MDRD: 71 ML/MIN/1.73
GLOBULIN UR ELPH-MCNC: 1.9 GM/DL (ref 1.8–3.5)
GLUCOSE SERPL-MCNC: 95 MG/DL (ref 74–124)
HCT VFR BLD AUTO: 40.1 % (ref 34–46.6)
HGB BLD-MCNC: 13.7 G/DL (ref 12–15.9)
IMM GRANULOCYTES # BLD AUTO: 0.01 10*3/MM3 (ref 0–0.05)
IMM GRANULOCYTES NFR BLD AUTO: 0.2 % (ref 0–0.5)
LYMPHOCYTES # BLD AUTO: 1.16 10*3/MM3 (ref 0.7–3.1)
LYMPHOCYTES NFR BLD AUTO: 27.4 % (ref 19.6–45.3)
MCH RBC QN AUTO: 32.8 PG (ref 26.6–33)
MCHC RBC AUTO-ENTMCNC: 34.2 G/DL (ref 31.5–35.7)
MCV RBC AUTO: 95.9 FL (ref 79–97)
MONOCYTES # BLD AUTO: 0.36 10*3/MM3 (ref 0.1–0.9)
MONOCYTES NFR BLD AUTO: 8.5 % (ref 5–12)
NEUTROPHILS NFR BLD AUTO: 2.33 10*3/MM3 (ref 1.7–7)
NEUTROPHILS NFR BLD AUTO: 55.2 % (ref 42.7–76)
NRBC BLD AUTO-RTO: 0 /100 WBC (ref 0–0.2)
PLATELET # BLD AUTO: 131 10*3/MM3 (ref 140–450)
PMV BLD AUTO: 9.3 FL (ref 6–12)
POTASSIUM SERPL-SCNC: 4.1 MMOL/L (ref 3.5–4.7)
PROT SERPL-MCNC: 6.4 G/DL (ref 6.3–8)
RBC # BLD AUTO: 4.18 10*6/MM3 (ref 3.77–5.28)
SODIUM SERPL-SCNC: 135 MMOL/L (ref 134–145)
WBC # BLD AUTO: 4.23 10*3/MM3 (ref 3.4–10.8)

## 2020-11-10 PROCEDURE — 80053 COMPREHEN METABOLIC PANEL: CPT

## 2020-11-10 PROCEDURE — 36415 COLL VENOUS BLD VENIPUNCTURE: CPT

## 2020-11-10 PROCEDURE — 85025 COMPLETE CBC W/AUTO DIFF WBC: CPT

## 2020-11-10 PROCEDURE — 99215 OFFICE O/P EST HI 40 MIN: CPT | Performed by: INTERNAL MEDICINE

## 2020-11-10 NOTE — PROGRESS NOTES
Subjective    REASONS FOR FOLLOWUP:     1. History of Waldenstrom macroglobulinemia.    2. History of breast cancer stage I on the left, status post mastectomy. The patient completed aromatase inhibitor without any evidence of breast cancer recurrence        History of Present Illness        PATIENT WAS CALLED THE DAY BEFORE BY THE OFFICE TO ASK FOR SYMPTOMS THAT COULD BE CONSISTENT WITH CORONAVIRUS INFECTION, AND BEING NEGATIVE WAS SCHEDULED TO BE SEEN IN THE OFFICE TODAY. SIMILAR QUESTIONING TODAY INCLUDING, CHILLS, FEVER, NEW COUGH, SHORTNESS OF BREATH, DIARRHEA,DIFFUSE BODY ACHES  AND CHANGES IN SMELL OR TASTE WERE NEGATIVE.THE PATIENT DENIED ANY CONTACT WITH PERSONS WHO WERE POSITIVE FOR COVID, AND PATIENT IS NOT IN CATEGORY OF HIGH RISK BEHAVIOR TO ACQUIRE COVID.    DURING THE VISIT WITH THE PATIENT TODAY , PATIENT HAD FACE MASK, MY MEDICAL ASSISTANT AND I  HAD PROPPER PROTECTIVE EQUIPMENT, AND I DID HAND HYGIENE WITH SOAP AND WATER BEFORE AND AFTER THE VISIT.  This patient returns today to the office for follow up. She is here today stating that since the discontinuation of the venetoclax, she has felt like a new person. She has had energy to clean house, to work in the yard, to get around, to drive her car and do things that she was not doing since the time that she was taking the medicine. She has not developed any other symptoms pertinent to her Waldenstrom's with the exception of minimal neuropathy in her feet especially through the day that goes away once that she removes her shoes. She has not had any fever, chills, night sweats, pruritus, bleeding of infection. Appetite and weight remain stable. Bowel function and urination are normal. She has lesser activity of asthma and she is not receiving her allergy shots at this time. The patient otherwise feels better.               Past Medical History:   Diagnosis Date   • Acid reflux    • Asthma    • Clostridium difficile colitis     Requiring admission to  [facility] in 09/2008   • H/O transesophageal echocardiography (MARICRUZ)    • History of transfusion of packed red blood cells     R/T SURGERY   • History of Waldenstrom's macroglobulinemia    • Leaky heart valve    • Seasonal allergies    • Thyroid nodule     Removed more than 35 years ago     Past Surgical History:   Procedure Laterality Date   • ADENOIDECTOMY     • APPENDECTOMY     • CARPAL TUNNEL RELEASE Right    • CATARACT EXTRACTION Bilateral    • CHOLECYSTECTOMY     • COLONOSCOPY     • HYSTERECTOMY      Partial, many years ago, heavy bleeding, no cancer   • KNEE ARTHROSCOPY Right 03/2009    Finding of chondromalacia and appropriate cleanup of joint was performed by Dr. Moraes.   • MASTECTOMY Left 07/2014   • REPLACEMENT TOTAL KNEE Right 12/2015   • TONSILLECTOMY           Social History     Socioeconomic History   • Marital status:      Spouse name: Not on file   • Number of children: Not on file   • Years of education: Not on file   • Highest education level: Not on file   Occupational History     Employer: [employer]     Employer: RETIRED   Tobacco Use   • Smoking status: Never Smoker   • Smokeless tobacco: Never Used   Substance and Sexual Activity   • Alcohol use: No   • Drug use: No   • Sexual activity: Defer   Social History Narrative    She lives with her oldest daughter.     Family History   Problem Relation Age of Onset   • Mental illness Mother    • Migraines Mother    • Dementia Mother    • Heart disease Father    • Hypertension Father    • Kidney disease Father    • Stroke Sister    • No Known Problems Daughter    • No Known Problems Daughter    • Breast cancer Other    • Heart disease Other    • Hypertension Other    • Diabetes Other    • Cancer Maternal Grandmother    • No Known Problems Maternal Grandfather    • No Known Problems Paternal Grandmother    • No Known Problems Paternal Grandfather    • Lymphoma Neg Hx    • Leukemia Neg Hx      Current Outpatient Medications  on File Prior to Visit   Medication Sig Dispense Refill   • aspirin 81 MG tablet Take 81 mg by mouth Daily.     • Budesonide (RHINOCORT ALLERGY NA) 2 sprays into the nostril(s) as directed by provider Daily As Needed.     • budesonide-formoterol (SYMBICORT) 80-4.5 MCG/ACT inhaler Inhale 2 puffs 2 (Two) Times a Day.     • cetirizine (zyrTEC) 10 MG tablet Take 10 mg by mouth Daily.     • gabapentin (NEURONTIN) 400 MG capsule TAKE ONE CAPSULE BY MOUTH FOUR TIMES A  capsule 4   • lansoprazole (PREVACID) 30 MG capsule TAKE 1 CAPSULE EVERY DAY 90 capsule 0   • levothyroxine (SYNTHROID, LEVOTHROID) 50 MCG tablet TAKE 1 TABLET EVERY DAY 90 tablet 1   • losartan (COZAAR) 100 MG tablet TAKE ONE TABLET BY MOUTH DAILY 30 tablet 2   • metoprolol succinate XL (Toprol XL) 100 MG 24 hr tablet Take 1 tablet by mouth Daily. 30 tablet 3   • ondansetron (ZOFRAN) 4 MG tablet Take 1 tablet by mouth Every 8 (Eight) Hours As Needed for Nausea or Vomiting. 30 tablet 5   • potassium chloride (K-DUR,KLOR-CON) 20 MEQ CR tablet TAKE 1 TABLET EVERY DAY 90 tablet 1   • terazosin (HYTRIN) 10 MG capsule Take 1 capsule by mouth Every Night. 30 capsule 2   • VENTOLIN  (90 Base) MCG/ACT inhaler      • Venetoclax (Venclexta) 50 MG tablet Take 1 tablet by mouth Daily. 30 tablet 3     No current facility-administered medications on file prior to visit.        ALLERGIES:    Allergies   Allergen Reactions   • Cortisone Unknown - High Severity     Reports having a shot years ago, and wonders if he hit a vein. She was told by another doctor that it probably went in her blood stream.    • Darvon  [Propoxyphene] Palpitations     ROS:      General: no fever, no chills, resolution of fatigue,no weight changes, no lack of appetite.  Eyes: no epiphora, xerophthalmia,conjunctivitis, pain, glaucoma, blurred vision, blindness, secretion, photophobia, proptosis, diplopia.  Ears: no otorrhea, tinnitus, otorrhagia, deafness, pain, vertigo.  Nose: no  "rhinorrhea, no epistaxis, no alteration in perception of odors, no sinuses pressure.  Mouth: no alteration in gums or teeth,  No ulcers, no difficulty with mastication or deglut ion, no odynophagia.  Neck: no masses or pain, no thyroid alterations, no pain in muscles or arteries, no carotid odynia, no crepitation.  Respiratory: no cough, no sputum production,no dyspnea,no trepopnea, no pleuritic pain,no hemoptysis.  Heart: no syncope, no irregularity, no palpitations, no angina,no orthopnea,no paroxysmal nocturnal dyspnea.  Vascular Venous: no tenderness,no edema,no palpable cords,no postphlebitic syndrome, no skin changes no ulcerations.  Vascular Arterial: no distal ischemia, noclaudication, no gangrene, no neuropathic ischemic pain, no skin ulcers, no paleness no cyanosis.  GI: no dysphagia, no odynophagia, no regurgitation, no heartburn,no indigestion,no nausea,no vomiting,no hematemesis ,no melena,no jaundice,no distention, no obstipation,no enterorrhagia,no proctalgia,no anal  lesions, no changes in bowel habits.  : no frequency, no hesitancy, no hematuria, no discharge,no  pain.  Musculoskeletal: no muscle or tendon pain or inflammation,no  joint pain, no edema, no functional limitation,no fasciculations, no mass.  Neurologic: no headache, no seizures, noalterations on Craneal nerves, no motor deficit, feet grade 1 sensory deficit, normal coordination, no alteration in memory,normal orientation, calculation,normal writting, verbal and written language.  Skin: no rashes,no pruritus no localized lesions.  Psychiatric: no anxiety, no depression,no agitation, no delusions, proper insight.        Objective      Vitals:    11/10/20 1007   Pulse: 56   Resp: 20   Temp: 97.5 °F (36.4 °C)   TempSrc: Temporal   SpO2: 97%   Weight: 64 kg (141 lb 1.6 oz)   Height: 165.1 cm (65\")   PainSc: 0-No pain     Current Status 11/10/2020   ECOG score 0         Physical Exam       I HAVE PERSONALLY REVIEWED THE HISTORY OF THE " PRESENT ILLNESS, PAST MEDICAL HISTORY, FAMILY HISTORY, SOCIAL HISTORY, ALLERGIES, MEDICATIONS STATED ABOVE IN THE OFFICE NOTE FROM TODAY.    Patient screened  for depression based on a PHQ-9 score of 0 on 8/20/2020.  GENERAL:  Well-developed, well-nourished  Patient  in no acute distress.   SKIN:  Warm, dry ,NO rashes,NO purpura ,NO petechiae.  HEENT:  Pupils were equal and reactive to light and accomodation, conjunctivae noninjected, no pterigion, normal extraocular movements, normal visual acuity.   NECK:  Supple with good range of motion; no thyromegaly or masses, no JVD or bruits, no cervical adenopathies.No carotid artery pain, no carotid abnormal pulsation , NO arterial dance.  LYMPHATICS:  No cervical, NO supraclavicular, NO axillary,NO epitrochlear , NO inguinal adenopathy.  CARDIAC   normal rate and regular rhythm, without murmur,NO rubs NO S3 NO S4 right or left . Normal femoral, popliteal, pedis, brachial and carotid pulses.  INSPECTION of  breast documented symmetry of the tissue per se and location and size of the nipple,no retractions or inversion of the nipple, normal skin without lesions, no erythema or nodules,no paud'orange, no prominence of superficial veins or chest wall collateral circulation.PALPATION of the breast documented normal skin turgor, no induration, alteration in local temperature, or pain, no palpable masses or nodules, normal mobility of the tissues,no fixation of the tissue or parenchyma to the chest wall, no alteration at the tail of the breasts or axillas, no adenopathies. Left mastectomy Surgical site was well healed.No lymphedema in either extremity.  VASCULAR ARTERIAL: normal carotids,brachial,radial,femoral,popliteal, pedis pulses , no bruits.no paleness or cyanosis, no pain, no edema, no numbness, no gangrene.  VASCULAR VENOUS: no cyanosis, collateral circulation, varicosities, edema, palpable cords, pain, erythema.  ABDOMEN:  Soft, nontender with no hepatomegaly, no  splenomegaly,no masses, no ascites, no collateral circulation,no distention,no MacArthur sign, no abdominal pain, no inguinal hernias,no umbilical hernia, no abdominal bruits. Normal bowel sounds.  GENITAL: Not  Performed.  EXTREMITIES  AND SPINE:  No clubbing, cyanosis or edema, no deformities or pain .No kyphosis, scoliosis, deformities or pain in spine, ribs or pelvic bone.  NEUROLOGICAL:  Patient was awake, alert, oriented to time, person and place.grade 1 sensory neuropathy in feet              RECENT LABS:  Results from last 7 days   Lab Units 11/10/20  0952   WBC 10*3/mm3 4.23   NEUTROS ABS 10*3/mm3 2.33   HEMOGLOBIN g/dL 13.7   HEMATOCRIT % 40.1   PLATELETS 10*3/mm3 131*                      Assessment/Plan      1. Waldenstrom macroglobulinemia that has been treated in the past mainly with Rituxan. In the past, also she was treated with Velcade with very dramatic improvement in her monoclonal protein but very dramatic sensory peripheral neuropathy. This medication was discontinued altogether.    6/29/2020, she developed progressive fatigue with worsening neuropathy in her feet.  These were her original symptoms at diagnosis of Waldenstrom's.  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  Calcium also elevated at 10.5 with decreased sodium related to pseudohyponatremia associated with monoclonal protein.  Previously, she did not receive improvement from Rituxan, she is not thought to be a candidate for Imbruvica, therefore she went on to initiate venetoclax 7/13/2020.  She is currently on her next planned dose of 200 mg daily with poor tolerance as outlined below    2.  History of left breast cancer, status post mastectomy.  She completed adjuvant therapy.  Mammogram 6/30/2020 benign    3.  Significant fatigue related to disease state.  Her fatigue has exacerbated with the initiation of venetoclax, particularly at maximum dose of 200 mg.Since the previous visit, nurse practitioner under my advice decreased the  dose of venetoclax to 100 mg a day. She still has fatigue but not as severe as she had when she was taking a larger amount of the medicine. On the other hand her sensory neuropathy in her feet is a little bit better. It happens mainly at night and she has no need to take any medicine. Her appetite is good. Her weight is stable. Bowel function and urination are normal. She has no rash, no fever, no infection, no bleeding. Her white count is normal. Leukopenia associated with venetoclax. Hemoglobin and platelet count are stable. Her right breast exam is normal. Her left chest wall is normal.         The patient was reviewed back on 11/10/2020. Since the previous visit the patient has stopped the venetoclax after I talked with her on the telephone several weeks ago. This has produced dramatic rebound in regard to her energy level and her activity to the point that she is doing housecleaning and cooking, driving the car and working in the yard. She has not had any symptoms related to the Waldenstrom's with the exception of sensory grade 1 neuropathy in her feet that is not functionally limiting to her and goes away when she removes her shoes. The patient has not had any fever or infections, no clinical bleeding, no adenopathies.    In regard to her previous history of breast cancer her left side mastectomy remains normal, her right breast remains normal and she has completed in the past her adjuvant hormonal therapy. There is no evidence of breast cancer recurrence.     In regard to her pseudohyponatremia associated with her Waldenstrom's we are waiting to review the chemistry profile. I expect that this number is not going to be different than that. I will be interested in seeing the monoclonal protein results and see if this has had an impact with the venetoclax in spite of not receiving the medicine for almost 6 weeks.     I would like for the patient to return to see me back in 2 months with a CBC, CMP and ARON. I  will talk with the patient on the telephone once that the monoclonal protein level is available.     Finally I have asked the pharmacist to rerun an interaction report between venetoclax and the medicines that the patient is taking to see if there is something that makes things to produce so many side effects on a low dose of this medicine. Maybe in the long run the patient will be able to handle 20 mg every other day or something of that nature and she is open for negotiation in this regard.             Florencio Larose MD   11/10/2020      CC:

## 2020-11-12 ENCOUNTER — TELEPHONE (OUTPATIENT)
Dept: ONCOLOGY | Facility: CLINIC | Age: 80
End: 2020-11-12

## 2020-11-12 LAB
ALBUMIN SERPL ELPH-MCNC: 3.9 G/DL (ref 2.9–4.4)
ALBUMIN/GLOB SERPL: 1.7 {RATIO} (ref 0.7–1.7)
ALPHA1 GLOB SERPL ELPH-MCNC: 0.3 G/DL (ref 0–0.4)
ALPHA2 GLOB SERPL ELPH-MCNC: 0.7 G/DL (ref 0.4–1)
B-GLOBULIN SERPL ELPH-MCNC: 0.8 G/DL (ref 0.7–1.3)
GAMMA GLOB SERPL ELPH-MCNC: 0.6 G/DL (ref 0.4–1.8)
GLOBULIN SER-MCNC: 2.4 G/DL (ref 2.2–3.9)
IGA SERPL-MCNC: 28 MG/DL (ref 64–422)
IGG SERPL-MCNC: 490 MG/DL (ref 586–1602)
IGM SERPL-MCNC: 208 MG/DL (ref 26–217)
INTERPRETATION SERPL IEP-IMP: ABNORMAL
KAPPA LC FREE SER-MCNC: 11.9 MG/L (ref 3.3–19.4)
KAPPA LC FREE/LAMBDA FREE SER: 1.55 {RATIO} (ref 0.26–1.65)
LABORATORY COMMENT REPORT: ABNORMAL
LAMBDA LC FREE SERPL-MCNC: 7.7 MG/L (ref 5.7–26.3)
M PROTEIN SERPL ELPH-MCNC: ABNORMAL G/DL
PROT SERPL-MCNC: 6.3 G/DL (ref 6–8.5)

## 2020-11-12 NOTE — TELEPHONE ENCOUNTER
I called the patient today to notify her that her IgM monoclonal protein has come down from 283 to 208 in spite of holding the Imbruvica for almost 6 weeks. That tells us that this medicine is extremely active on her disease process and for now my advice is to continue holding off the Imbruvica until I review her back in 6 weeks or so. I have in mind to give her a 20 mg dose of Imbruvica maybe every other day and maybe she will be able to handle that and if the medicine is so effective that should be enough to control her disease process. She agrees with that.

## 2020-11-13 ENCOUNTER — DOCUMENTATION (OUTPATIENT)
Dept: ONCOLOGY | Facility: CLINIC | Age: 80
End: 2020-11-13

## 2020-11-13 DIAGNOSIS — C88.0 MACROGLOBULINEMIA OF WALDENSTROM (HCC): Primary | ICD-10-CM

## 2020-11-13 NOTE — PROGRESS NOTES
Staff message rec from Dr Larose-No Venclexta for pt for now.    Florencio Larose MD sent to [facility] RegSched Rep; Karol Ramos; Romana Huntley, [employer]             READ MY PHONE NOTE: CBC CMP ARON IN 6 WEEK, NO VENETOCLAX FOR NOW

## 2020-12-01 DIAGNOSIS — E03.9 ADULT HYPOTHYROIDISM: ICD-10-CM

## 2020-12-01 RX ORDER — LEVOTHYROXINE SODIUM 0.05 MG/1
TABLET ORAL
Qty: 90 TABLET | Refills: 1 | Status: SHIPPED | OUTPATIENT
Start: 2020-12-01 | End: 2021-08-05

## 2020-12-10 ENCOUNTER — OFFICE VISIT (OUTPATIENT)
Dept: INTERNAL MEDICINE | Facility: CLINIC | Age: 80
End: 2020-12-10

## 2020-12-10 VITALS
WEIGHT: 142 LBS | BODY MASS INDEX: 23.66 KG/M2 | HEIGHT: 65 IN | TEMPERATURE: 97.4 F | DIASTOLIC BLOOD PRESSURE: 84 MMHG | HEART RATE: 54 BPM | SYSTOLIC BLOOD PRESSURE: 160 MMHG

## 2020-12-10 DIAGNOSIS — G47.09 OTHER INSOMNIA: ICD-10-CM

## 2020-12-10 DIAGNOSIS — Z00.00 WELLNESS EXAMINATION: Primary | ICD-10-CM

## 2020-12-10 DIAGNOSIS — I10 ESSENTIAL HYPERTENSION: ICD-10-CM

## 2020-12-10 PROCEDURE — 99213 OFFICE O/P EST LOW 20 MIN: CPT | Performed by: INTERNAL MEDICINE

## 2020-12-10 PROCEDURE — G0439 PPPS, SUBSEQ VISIT: HCPCS | Performed by: INTERNAL MEDICINE

## 2020-12-10 RX ORDER — TERAZOSIN 10 MG/1
10 CAPSULE ORAL 2 TIMES DAILY
Qty: 60 CAPSULE | Refills: 3 | Status: SHIPPED | OUTPATIENT
Start: 2020-12-10 | End: 2021-04-16

## 2020-12-10 NOTE — PROGRESS NOTES
The ABCs of the Annual Wellness Visit  Subsequent Medicare Wellness Visit    Chief Complaint   Patient presents with   • Medicare Wellness-subsequent       Subjective   History of Present Illness:  Karli Loomis is a 80 y.o. female who presents for a Subsequent Medicare Wellness Visit.  Her BP has been 138-163/62-83. She feels just a little better since being off chemo. She does not sleep as well. She looks at the clock a lot at night. She is not depressed. She has not tried anything to help her sleep. She does not currently drive.  She denies chest pain, dyspnea or edema. She sleeps on one pillow.   HEALTH RISK ASSESSMENT    Recent Hospitalizations:  No hospitalization(s) within the last year.    Current Medical Providers:  Patient Care Team:  Clemente Kaye MD as PCP - General (Internal Medicine)  Florencio Larose MD as Consulting Physician (Hematology and Oncology)  Abebe Barbosa MD as Referring Physician (Hospitalist)    Smoking Status:  Social History     Tobacco Use   Smoking Status Never Smoker   Smokeless Tobacco Never Used       Alcohol Consumption:  Social History     Substance and Sexual Activity   Alcohol Use No       Depression Screen:   PHQ-2/PHQ-9 Depression Screening 12/10/2020   Little interest or pleasure in doing things 0   Feeling down, depressed, or hopeless 0   Trouble falling or staying asleep, or sleeping too much -   Feeling tired or having little energy -   Poor appetite or overeating -   Feeling bad about yourself - or that you are a failure or have let yourself or your family down -   Trouble concentrating on things, such as reading the newspaper or watching television -   Moving or speaking so slowly that other people could have noticed. Or the opposite - being so fidgety or restless that you have been moving around a lot more than usual -   Thoughts that you would be better off dead, or of hurting yourself in some way -   Total Score 0   If you checked off any problems, how  difficult have these problems made it for you to do your work, take care of things at home, or get along with other people? -       Fall Risk Screen:  TOBI Fall Risk Assessment was completed, and patient is at LOW risk for falls.Assessment completed on:12/10/2020    Health Habits and Functional and Cognitive Screening:  Functional & Cognitive Status 12/10/2020   Do you have difficulty preparing food and eating? No   Do you have difficulty bathing yourself, getting dressed or grooming yourself? No   Do you have difficulty using the toilet? No   Do you have difficulty moving around from place to place? No   Do you have trouble with steps or getting out of a bed or a chair? No   Current Diet Well Balanced Diet   Dental Exam Not up to date   Eye Exam Up to date   Exercise (times per week) 3 times per week   Current Exercise Activities Include Walking   Do you need help using the phone?  No   Are you deaf or do you have serious difficulty hearing?  No   Do you need help with transportation? No   Do you need help shopping? No   Do you need help preparing meals?  No   Do you need help with housework?  No   Do you need help with laundry? No   Do you need help taking your medications? No   Do you need help managing money? No   Do you ever drive or ride in a car without wearing a seat belt? No   Have you felt unusual stress, anger or loneliness in the last month? No   Who do you live with? Child   If you need help, do you have trouble finding someone available to you? No   Have you been bothered in the last four weeks by sexual problems? No   Do you have difficulty concentrating, remembering or making decisions? No         Does the patient have evidence of cognitive impairment? No    Asprin use counseling:Taking ASA appropriately as indicated    Age-appropriate Screening Schedule:  Refer to the list below for future screening recommendations based on patient's age, sex and/or medical conditions. Orders for these recommended  tests are listed in the plan section. The patient has been provided with a written plan.    Health Maintenance   Topic Date Due   • TDAP/TD VACCINES (1 - Tdap) 08/04/1959   • ZOSTER VACCINE (1 of 2) 08/04/1989   • DXA SCAN  04/18/2021   • MAMMOGRAM  06/30/2022   • INFLUENZA VACCINE  Completed          The following portions of the patient's history were reviewed and updated as appropriate: allergies, current medications, past family history, past medical history, past social history, past surgical history and problem list.    Outpatient Medications Prior to Visit   Medication Sig Dispense Refill   • aspirin 81 MG tablet Take 81 mg by mouth Daily.     • Budesonide (RHINOCORT ALLERGY NA) 2 sprays into the nostril(s) as directed by provider Daily As Needed.     • budesonide-formoterol (SYMBICORT) 80-4.5 MCG/ACT inhaler Inhale 2 puffs 2 (Two) Times a Day.     • cetirizine (zyrTEC) 10 MG tablet Take 10 mg by mouth Daily.     • gabapentin (NEURONTIN) 400 MG capsule TAKE ONE CAPSULE BY MOUTH FOUR TIMES A  capsule 4   • lansoprazole (PREVACID) 30 MG capsule TAKE 1 CAPSULE EVERY DAY 90 capsule 0   • levothyroxine (SYNTHROID, LEVOTHROID) 50 MCG tablet TAKE 1 TABLET EVERY DAY 90 tablet 1   • losartan (COZAAR) 100 MG tablet TAKE ONE TABLET BY MOUTH DAILY 30 tablet 2   • metoprolol succinate XL (Toprol XL) 100 MG 24 hr tablet Take 1 tablet by mouth Daily. 30 tablet 3   • ondansetron (ZOFRAN) 4 MG tablet Take 1 tablet by mouth Every 8 (Eight) Hours As Needed for Nausea or Vomiting. 30 tablet 5   • potassium chloride (K-DUR,KLOR-CON) 20 MEQ CR tablet TAKE 1 TABLET EVERY DAY 90 tablet 1   • VENTOLIN  (90 Base) MCG/ACT inhaler      • terazosin (HYTRIN) 10 MG capsule Take 1 capsule by mouth Every Night. 30 capsule 2   • Venetoclax (Venclexta) 50 MG tablet Take 1 tablet by mouth Daily. 30 tablet 3     No facility-administered medications prior to visit.        Patient Active Problem List   Diagnosis   • Malignant  "neoplasm of overlapping sites of left breast in female, estrogen receptor positive (CMS/HCC)   • Macroglobulinemia of [place] (CMS/HCC)   • Adult hypothyroidism   • Primary osteoarthritis of left knee   • Drug-induced polyneuropathy (CMS/HCC)   • Epilepsy with simple partial seizures (CMS/HCC)   • Headache, migraine   • Hyponatremia with normal extracellular fluid volume   • Chronic pansinusitis   • TIA (transient ischemic attack)   • UTI due to extended-spectrum beta lactamase (ESBL) producing Escherichia coli   • Essential hypertension   • Vision loss     • Asthma   • Wellness examination   • Other insomnia       Advanced Care Planning:  ACP discussion was held with the patient during this visit. Patient does not have an advance directive, information provided.    Review of Systems   Constitutional: Positive for fatigue.       Compared to one year ago, the patient feels her physical health is the same.  Compared to one year ago, the patient feels her mental health is the same.    Reviewed chart for potential of high risk medication in the elderly: yes  Reviewed chart for potential of harmful drug interactions in the elderly:yes    Objective         Vitals:    12/10/20 1442   BP: 160/84   Pulse: 54   Temp: 97.4 °F (36.3 °C)   Weight: 64.4 kg (142 lb)   Height: 165.1 cm (65\")       Body mass index is 23.63 kg/m².  Discussed the patient's BMI with her. The BMI is above average; BMI management plan is completed.    Physical Exam  Vitals signs reviewed.   Constitutional:       Appearance: She is well-developed.   HENT:      Head: Normocephalic and atraumatic.   Cardiovascular:      Rate and Rhythm: Normal rate and regular rhythm.      Heart sounds: Normal heart sounds, S1 normal and S2 normal.   Pulmonary:      Effort: Pulmonary effort is normal.      Breath sounds: Normal breath sounds.   Skin:     General: Skin is warm.   Neurological:      Mental Status: She is alert.   Psychiatric:         Behavior: Behavior " normal.               Assessment/Plan   Medicare Risks and Personalized Health Plan  CMS Preventative Services Quick Reference  Advance Directive Discussion  Immunizations Discussed/Encouraged (specific immunizations; Shingrix )    The above risks/problems have been discussed with the patient.  Pertinent information has been shared with the patient in the After Visit Summary.  Follow up plans and orders are seen below in the Assessment/Plan Section.    Diagnoses and all orders for this visit:    1. Wellness examination (Primary)    2. Essential hypertension  Comments:  Increase Terazosin to 10 mg BID. Take rest BP meds at night  Orders:  -     terazosin (HYTRIN) 10 MG capsule; Take 1 capsule by mouth 2 (two) times a day.  Dispense: 60 capsule; Refill: 3    3. Other insomnia  Comments:  I encouraged her to try melatonin 1 mg hs. Discussed decreasing Gabapentin to TID. She says that she thinks she did this earlier in the year      Follow Up:  Return in about 5 weeks (around 1/14/2021), or 30 minutes.     An After Visit Summary and PPPS were given to the patient.

## 2020-12-18 ENCOUNTER — TELEPHONE (OUTPATIENT)
Dept: INTERNAL MEDICINE | Facility: CLINIC | Age: 80
End: 2020-12-18

## 2020-12-18 NOTE — TELEPHONE ENCOUNTER
Swelling is a side effect of Terazosin. Given high how her numbers have been, I think we see how she does over the next month    The itch is probably not related

## 2020-12-29 ENCOUNTER — LAB (OUTPATIENT)
Dept: LAB | Facility: HOSPITAL | Age: 80
End: 2020-12-29

## 2020-12-29 ENCOUNTER — OFFICE VISIT (OUTPATIENT)
Dept: ONCOLOGY | Facility: CLINIC | Age: 80
End: 2020-12-29

## 2020-12-29 VITALS
HEIGHT: 65 IN | BODY MASS INDEX: 23.44 KG/M2 | HEART RATE: 68 BPM | DIASTOLIC BLOOD PRESSURE: 68 MMHG | WEIGHT: 140.7 LBS | RESPIRATION RATE: 20 BRPM | SYSTOLIC BLOOD PRESSURE: 134 MMHG | OXYGEN SATURATION: 97 % | TEMPERATURE: 97.9 F

## 2020-12-29 DIAGNOSIS — C88.0 MACROGLOBULINEMIA OF WALDENSTROM (HCC): ICD-10-CM

## 2020-12-29 DIAGNOSIS — Z17.0 MALIGNANT NEOPLASM OF OVERLAPPING SITES OF LEFT BREAST IN FEMALE, ESTROGEN RECEPTOR POSITIVE (HCC): Primary | ICD-10-CM

## 2020-12-29 DIAGNOSIS — C50.812 MALIGNANT NEOPLASM OF OVERLAPPING SITES OF LEFT BREAST IN FEMALE, ESTROGEN RECEPTOR POSITIVE (HCC): Primary | ICD-10-CM

## 2020-12-29 LAB
ALBUMIN SERPL-MCNC: 4.4 G/DL (ref 3.5–5.2)
ALBUMIN/GLOB SERPL: 1.9 G/DL (ref 1.1–2.4)
ALP SERPL-CCNC: 69 U/L (ref 38–116)
ALT SERPL W P-5'-P-CCNC: 11 U/L (ref 0–33)
ANION GAP SERPL CALCULATED.3IONS-SCNC: 9.5 MMOL/L (ref 5–15)
AST SERPL-CCNC: 19 U/L (ref 0–32)
BASOPHILS # BLD AUTO: 0.02 10*3/MM3 (ref 0–0.2)
BASOPHILS NFR BLD AUTO: 0.5 % (ref 0–1.5)
BILIRUB SERPL-MCNC: 0.5 MG/DL (ref 0.2–1.2)
BUN SERPL-MCNC: 8 MG/DL (ref 6–20)
BUN/CREAT SERPL: 9.2 (ref 7.3–30)
CALCIUM SPEC-SCNC: 10 MG/DL (ref 8.5–10.2)
CHLORIDE SERPL-SCNC: 96 MMOL/L (ref 98–107)
CO2 SERPL-SCNC: 28.5 MMOL/L (ref 22–29)
CREAT SERPL-MCNC: 0.87 MG/DL (ref 0.6–1.1)
DEPRECATED RDW RBC AUTO: 44.3 FL (ref 37–54)
EOSINOPHIL # BLD AUTO: 0.4 10*3/MM3 (ref 0–0.4)
EOSINOPHIL NFR BLD AUTO: 9.6 % (ref 0.3–6.2)
ERYTHROCYTE [DISTWIDTH] IN BLOOD BY AUTOMATED COUNT: 12.5 % (ref 12.3–15.4)
GFR SERPL CREATININE-BSD FRML MDRD: 62 ML/MIN/1.73
GLOBULIN UR ELPH-MCNC: 2.3 GM/DL (ref 1.8–3.5)
GLUCOSE SERPL-MCNC: 114 MG/DL (ref 74–124)
HCT VFR BLD AUTO: 41.7 % (ref 34–46.6)
HGB BLD-MCNC: 14.2 G/DL (ref 12–15.9)
IMM GRANULOCYTES # BLD AUTO: 0.01 10*3/MM3 (ref 0–0.05)
IMM GRANULOCYTES NFR BLD AUTO: 0.2 % (ref 0–0.5)
LYMPHOCYTES # BLD AUTO: 1.21 10*3/MM3 (ref 0.7–3.1)
LYMPHOCYTES NFR BLD AUTO: 28.9 % (ref 19.6–45.3)
MCH RBC QN AUTO: 32.6 PG (ref 26.6–33)
MCHC RBC AUTO-ENTMCNC: 34.1 G/DL (ref 31.5–35.7)
MCV RBC AUTO: 95.6 FL (ref 79–97)
MONOCYTES # BLD AUTO: 0.32 10*3/MM3 (ref 0.1–0.9)
MONOCYTES NFR BLD AUTO: 7.7 % (ref 5–12)
NEUTROPHILS NFR BLD AUTO: 2.22 10*3/MM3 (ref 1.7–7)
NEUTROPHILS NFR BLD AUTO: 53.1 % (ref 42.7–76)
NRBC BLD AUTO-RTO: 0 /100 WBC (ref 0–0.2)
PLATELET # BLD AUTO: 109 10*3/MM3 (ref 140–450)
PMV BLD AUTO: 9 FL (ref 6–12)
POTASSIUM SERPL-SCNC: 4.3 MMOL/L (ref 3.5–4.7)
PROT SERPL-MCNC: 6.7 G/DL (ref 6.3–8)
RBC # BLD AUTO: 4.36 10*6/MM3 (ref 3.77–5.28)
SODIUM SERPL-SCNC: 134 MMOL/L (ref 134–145)
WBC # BLD AUTO: 4.18 10*3/MM3 (ref 3.4–10.8)

## 2020-12-29 PROCEDURE — 80053 COMPREHEN METABOLIC PANEL: CPT

## 2020-12-29 PROCEDURE — 99215 OFFICE O/P EST HI 40 MIN: CPT | Performed by: INTERNAL MEDICINE

## 2020-12-29 PROCEDURE — 85025 COMPLETE CBC W/AUTO DIFF WBC: CPT

## 2020-12-29 PROCEDURE — 36415 COLL VENOUS BLD VENIPUNCTURE: CPT

## 2020-12-29 NOTE — PROGRESS NOTES
Subjective    REASONS FOR FOLLOWUP:     1. History of Waldenstrom macroglobulinemia.    2. History of breast cancer stage I on the left, status post mastectomy. The patient completed aromatase inhibitor  X 5 YEARS IN 2019 without any evidence of breast cancer recurrence        History of Present Illness        PATIENT WAS CALLED THE DAY BEFORE BY THE OFFICE TO ASK FOR SYMPTOMS THAT COULD BE CONSISTENT WITH CORONAVIRUS INFECTION, AND BEING NEGATIVE WAS SCHEDULED TO BE SEEN IN THE OFFICE TODAY. SIMILAR QUESTIONING TODAY INCLUDING, CHILLS, FEVER, NEW COUGH, SHORTNESS OF BREATH, DIARRHEA,DIFFUSE BODY ACHES  AND CHANGES IN SMELL OR TASTE WERE NEGATIVE.THE PATIENT DENIED ANY CONTACT WITH PERSONS WHO WERE POSITIVE FOR COVID, AND PATIENT IS NOT IN CATEGORY OF HIGH RISK BEHAVIOR TO ACQUIRE COVID.    DURING THE VISIT WITH THE PATIENT TODAY , PATIENT HAD FACE MASK, MY MEDICAL ASSISTANT AND I  HAD PROPPER PROTECTIVE EQUIPMENT, AND I DID HAND HYGIENE WITH SOAP AND WATER BEFORE AND AFTER THE VISIT.    This patient returns today to the office complaining of significant degree of fatigue but less than when she was taking venetoclax. She has been able to do some house chores but mostly she remains depressed because of inability to see her children and grandchildren and to stay with family. She is isolated at home. She lives with a son who takes care of most of the issues in the house. She has no fever or chills. No infections. No cardiovascular or respiratory issues at this time. Her blood pressure is under good control and she had multiple questions in this regard. The patient's appetite is acceptable. Her weight is stable. Her bowel function and urination are normal. Still some neuropathic symptomatology at bedtime that is not completely controlled with Neurontin. No motor deficit in her feet or upper extremities through the day. She has not had any falls. She denies any fever, chills or bleeding. In regard to her previous  history of breast cancer, her left side mastectomy remains unchanged and her right breast remains unchanged.            Past Medical History:   Diagnosis Date   • Acid reflux    • Asthma    • Clostridium difficile colitis     Requiring admission to [facility] in 09/2008   • H/O transesophageal echocardiography (MARICRUZ)    • History of transfusion of packed red blood cells     R/T SURGERY   • History of Waldenstrom's macroglobulinemia    • Leaky heart valve    • Seasonal allergies    • Thyroid nodule     Removed more than 35 years ago     Past Surgical History:   Procedure Laterality Date   • ADENOIDECTOMY     • APPENDECTOMY     • CARPAL TUNNEL RELEASE Right    • CATARACT EXTRACTION Bilateral    • CHOLECYSTECTOMY     • COLONOSCOPY     • HYSTERECTOMY      Partial, many years ago, heavy bleeding, no cancer   • KNEE ARTHROSCOPY Right 03/2009    Finding of chondromalacia and appropriate cleanup of joint was performed by Dr. Moraes.   • MASTECTOMY Left 07/2014   • REPLACEMENT TOTAL KNEE Right 12/2015   • TONSILLECTOMY           Social History     Socioeconomic History   • Marital status:      Spouse name: Not on file   • Number of children: Not on file   • Years of education: Not on file   • Highest education level: Not on file   Occupational History     Employer: [employer]     Employer: RETIRED   Tobacco Use   • Smoking status: Never Smoker   • Smokeless tobacco: Never Used   Substance and Sexual Activity   • Alcohol use: No   • Drug use: No   • Sexual activity: Defer   Social History Narrative    She lives with her oldest daughter.     Family History   Problem Relation Age of Onset   • Mental illness Mother    • Migraines Mother    • Dementia Mother    • Heart disease Father    • Hypertension Father    • Kidney disease Father    • No Known Problems Daughter    • No Known Problems Daughter    • Breast cancer Other    • Heart disease Other    • Hypertension Other    • Diabetes Other    • Cancer  Maternal Grandmother    • No Known Problems Maternal Grandfather    • No Known Problems Paternal Grandmother    • No Known Problems Paternal Grandfather    • Lymphoma Neg Hx    • Leukemia Neg Hx      Current Outpatient Medications on File Prior to Visit   Medication Sig Dispense Refill   • aspirin 81 MG tablet Take 81 mg by mouth Daily.     • Budesonide (RHINOCORT ALLERGY NA) 2 sprays into the nostril(s) as directed by provider Daily As Needed.     • budesonide-formoterol (SYMBICORT) 80-4.5 MCG/ACT inhaler Inhale 2 puffs 2 (Two) Times a Day.     • cetirizine (zyrTEC) 10 MG tablet Take 10 mg by mouth Daily.     • gabapentin (NEURONTIN) 400 MG capsule TAKE ONE CAPSULE BY MOUTH FOUR TIMES A  capsule 4   • lansoprazole (PREVACID) 30 MG capsule TAKE 1 CAPSULE EVERY DAY 90 capsule 0   • levothyroxine (SYNTHROID, LEVOTHROID) 50 MCG tablet TAKE 1 TABLET EVERY DAY 90 tablet 1   • losartan (COZAAR) 100 MG tablet TAKE ONE TABLET BY MOUTH DAILY 30 tablet 2   • metoprolol succinate XL (Toprol XL) 100 MG 24 hr tablet Take 1 tablet by mouth Daily. 30 tablet 3   • ondansetron (ZOFRAN) 4 MG tablet Take 1 tablet by mouth Every 8 (Eight) Hours As Needed for Nausea or Vomiting. 30 tablet 5   • potassium chloride (K-DUR,KLOR-CON) 20 MEQ CR tablet TAKE 1 TABLET EVERY DAY 90 tablet 1   • terazosin (HYTRIN) 10 MG capsule Take 1 capsule by mouth 2 (two) times a day. 60 capsule 3   • VENTOLIN  (90 Base) MCG/ACT inhaler        No current facility-administered medications on file prior to visit.        ALLERGIES:    Allergies   Allergen Reactions   • Cortisone Unknown - High Severity     Reports having a shot years ago, and wonders if he hit a vein. She was told by another doctor that it probably went in her blood stream.    • Darvon  [Propoxyphene] Palpitations     ROS:  General: no fever, no chills,  fatigue,no weight changes, no lack of appetite.  Eyes: no epiphora, xerophthalmia,conjunctivitis, pain, glaucoma, blurred vision,  blindness, secretion, photophobia, proptosis, diplopia.  Ears: no otorrhea, tinnitus, otorrhagia, deafness, pain, vertigo.  Nose: no rhinorrhea, no epistaxis, no alteration in perception of odors, no sinuses pressure.  Mouth: no alteration in gums or teeth,  No ulcers, no difficulty with mastication or deglut ion, no odynophagia.  Neck: no masses or pain, no thyroid alterations, no pain in muscles or arteries, no carotid odynia, no crepitation.  Respiratory: no cough, no sputum production,no dyspnea,no trepopnea, no pleuritic pain,no hemoptysis.  Heart: no syncope, no irregularity, no palpitations, no angina,no orthopnea,no paroxysmal nocturnal dyspnea.  Vascular Venous: no tenderness,no edema,no palpable cords,no postphlebitic syndrome, no skin changes no ulcerations.  Vascular Arterial: no distal ischemia, noclaudication, no gangrene, no neuropathic ischemic pain, no skin ulcers, no paleness no cyanosis.  GI: no dysphagia, no odynophagia, no regurgitation, no heartburn,no indigestion,no nausea,no vomiting,no hematemesis ,no melena,no jaundice,no distention, no obstipation,no enterorrhagia,no proctalgia,no anal  lesions, no changes in bowel habits.  : no frequency, no hesitancy, no hematuria, no discharge,no  pain.  Musculoskeletal: no muscle or tendon pain or inflammation,no  joint pain, no edema, no functional limitation,no fasciculations, no mass.  Neurologic: no headache, no seizures, noalterations on Craneal nerves, no motor deficit, FEET NOCTURNAL sensory deficit, normal coordination, no alteration in memory,normal orientation, calculation,normal writting, verbal and written language.  Skin: no rashes,no pruritus no localized lesions.  Psychiatric:  anxiety, no depression,no agitation, no delusions, proper insight.            Objective      Vitals:    12/29/20 1049   BP: 134/68   Pulse: 68   Resp: 20   Temp: 97.9 °F (36.6 °C)   TempSrc: Temporal   SpO2: 97%   Weight: 63.8 kg (140 lb 11.2 oz)   Height: 165.1  "cm (65\")   PainSc: 0-No pain     Current Status 12/29/2020   ECOG score 0         Physical Exam         I HAVE PERSONALLY REVIEWED THE HISTORY OF THE PRESENT ILLNESS, PAST MEDICAL HISTORY, FAMILY HISTORY, SOCIAL HISTORY, ALLERGIES, MEDICATIONS STATED ABOVE IN THE OFFICE NOTE FROM TODAY.        GENERAL:  Well-developed, well-nourished  Patient  in no acute distress.   SKIN:  Warm, dry ,NO rashes,NO purpura ,NO petechiae.  HEENT:  Pupils were equal and reactive to light and accomodation, conjunctivae noninjected, no pterygium, normal extraocular movements, normal visual acuity.   NECK:  Supple with good range of motion; no thyromegaly or masses, no JVD or bruits, no cervical adenopathies.No carotid artery pain, no carotid abnormal pulsation , NO arterial dance.  LYMPHATICS:  No cervical, NO supraclavicular, NO axillary,NO epitrochlear , NO inguinal adenopathy.  CARDIAC   normal rate and regular rhythm, without murmur,NO rubs NO S3 NO S4 right or left . Normal femoral, popliteal, pedis, brachial and carotid pulses.  INSPECTION of  breast documented symmetry of the tissue per se and location and size of the nipple,no retractions or inversion of the nipple, normal skin without lesions, no erythema or nodules,no peau d'orange, no prominence of superficial veins or chest wall collateral circulation.PALPATION of the breast documented normal skin turgor, no induration, alteration in local temperature, or pain, no palpable masses or nodules, normal mobility of the tissues,no fixation of the tissue or parenchyma to the chest wall, no alteration at the tail of the breasts or axillas, no adenopathies.LEFT MASTECTOMY  Surgical site was well healed.No lymphedema in either extremity.  VASCULAR ARTERIAL: normal carotids,brachial,radial,femoral,popliteal, pedis pulses , no bruits.no paleness or cyanosis, no pain, no edema, no numbness, no gangrene.  VASCULAR VENOUS: no cyanosis, collateral circulation, varicosities, edema, palpable " cords, pain, erythema.  ABDOMEN:  Soft, nontender with no hepatomegaly, no splenomegaly,no masses, no ascites, no collateral circulation,no distention,no Boston sign, no abdominal pain, no inguinal hernias,no umbilical hernia, no abdominal bruits. Normal bowel sounds.  GENITAL: Not  Performed.  EXTREMITIES  AND SPINE:  No clubbing, cyanosis or edema, no deformities or pain .No kyphosis, scoliosis, deformities or pain in spine, ribs or pelvic bone.  NEUROLOGICAL:  Patient was awake, alert, oriented to time, person and place.SENSORY DEFICIT IN TOES        RECENT LABS:  Results from last 7 days   Lab Units 12/29/20  1018   WBC 10*3/mm3 4.18   NEUTROS ABS 10*3/mm3 2.22   HEMOGLOBIN g/dL 14.2   HEMATOCRIT % 41.7   PLATELETS 10*3/mm3 109*              CBC Auto Differential  Order: [id number] - Part of Panel Order [id number]  Status:  Final result   Visible to patient:  No (not released)   Dx:  Macroglobulinemia of Waldenstrom (CMS...  Specimen Information: Blood        Component   Ref Range & Units 10:18   WBC   3.40 - 10.80 10*3/mm3 4.18    RBC   3.77 - 5.28 10*6/mm3 4.36    Hemoglobin   12.0 - 15.9 g/dL 14.2    Hematocrit   34.0 - 46.6 % 41.7    MCV   79.0 - 97.0 fL 95.6    MCH   26.6 - 33.0 pg 32.6    MCHC   31.5 - 35.7 g/dL 34.1    RDW   12.3 - 15.4 % 12.5    RDW-SD   37.0 - 54.0 fl 44.3    MPV   6.0 - 12.0 fL 9.0    Platelets   140 - 450 10*3/mm3 109Low     Neutrophil %   42.7 - 76.0 % 53.1    Lymphocyte %   19.6 - 45.3 % 28.9    Monocyte %   5.0 - 12.0 % 7.7                    Assessment/Plan      1. Waldenstrom macroglobulinemia that has been treated in the past mainly with Rituxan. In the past, also she was treated with Velcade with very dramatic improvement in her monoclonal protein but very dramatic sensory peripheral neuropathy. This medication was discontinued altogether.    6/29/2020, she developed progressive fatigue with worsening neuropathy in her feet.  These were her original symptoms at diagnosis of  Waldenstrom's.  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  Calcium also elevated at 10.5 with decreased sodium related to pseudohyponatremia associated with monoclonal protein.  Previously, she did not receive improvement from Rituxan, she is not thought to be a candidate for Imbruvica, therefore she went on to initiate venetoclax 7/13/2020.  She is currently on her next planned dose of 200 mg daily with poor tolerance as outlined below    2.  History of left breast cancer, status post mastectomy.  She completed adjuvant therapy.  Mammogram 6/30/2020 benign    3.  Significant fatigue related to disease state.  Her fatigue has exacerbated with the initiation of venetoclax, particularly at maximum dose of 200 mg.Since the previous visit, nurse practitioner under my advice decreased the dose of venetoclax to 100 mg a day. She still has fatigue but not as severe as she had when she was taking a larger amount of the medicine. On the other hand her sensory neuropathy in her feet is a little bit better. It happens mainly at night and she has no need to take any medicine. Her appetite is good. Her weight is stable. Bowel function and urination are normal. She has no rash, no fever, no infection, no bleeding. Her white count is normal. Leukopenia associated with venetoclax. Hemoglobin and platelet count are stable. Her right breast exam is normal. Her left chest wall is normal.         The patient was reviewed back on 11/10/2020. Since the previous visit the patient has stopped the venetoclax after I talked with her on the telephone several weeks ago. This has produced dramatic rebound in regard to her energy level and her activity to the point that she is doing housecleaning and cooking, driving the car and working in the yard. She has not had any symptoms related to the Waldenstrom's with the exception of sensory grade 1 neuropathy in her feet that is not functionally limiting to her and goes away when she removes her  shoes. The patient has not had any fever or infections, no clinical bleeding, no adenopathies.      The patient returned to the office on 12/29/2020. She has significant fatigue but I think the fatigue is related more to the tiredness of the pandemia and the inability to be with her family than to the disease process. Her family has been the center of her life since she has been my patient for almost 12 years or so. She lives with a son and he has been extremely useful to her and receptive in regard to her needs. Nevertheless, I think the patient still has many comorbidities that continue requiring attention and she has seen Dr. Kaye recently. She raised the question in regard to why she has to take 3 blood pressure medications. I pointed out to her that in order to control her blood pressure that has been disguised before, multiple medicines with different mechanism of action need to be utilized combined and that way it is minimized in regard to side effects and increases efficiency in regard to blood pressure control. She understood this clearly.     From the point of view of her Waldenstrom, we have her monoclonal protein studies pending today and she will be called at home with the report of this whenever it becomes available. My intention will be eventually to resume the venetoclax and take the medicine in a low dose every other day to see if she is able to handle this. Otherwise, I do not have a lot of other choices in regard to medications that could be utilized for her condition with the exception of go back and repeat Rituxan therapy.     In the past she had terrible neuropathy related to the use of Velcade and actually Velcade did a dramatic improvement in the monoclonal protein measurement.     Therefore, I think we are getting to the point between a rock and a right place. If we do something things get bad and she feels bad and if we do not do something also she remains bad. I do not think she needs  to be under Hospice care and I find no reason for this to be the case but this will be further discussed with her in future appointments. I will follow with her on the telephone once that the monoclonal protein level is available. In the meantime I asked her to try to get around the house, walk as much as possible and contact her family by telephone as many times as possible and that way she overcomes the sensation of abandonment and solitude that she has at this time.     She does not want to take any medication for depression at this time.     I asked her to take a couple of Tylenols before she goes to bed along with the Neurontin to try to overcome the discomfort in her feet due to sensory neuropathy.        I DISCUSSED WITH PATIENT IN DETAIL FORMS TO DECREASE CHANCES OF CORONAVIRUS INFECTION INCLUDING ISOLATION, PROPER HAND HIGIENE, AVOID PUBLIC PLACES  WITH CROWDS, FOLLOW  CDC RECOMENDATIONS, AND KEEP PERSONAL AND SOCIAL RESPONSIBILITY, WARE A MASK IN PUBLIC PLACES.  PATIENT IS AWARE THIS INFECTION COULD HAVE SEVERE CONSEQUENCES TO PERSONAL HEALTH AND FAMILY RAMIFICATIONS OF THIS.      Keli Crowe MA   12/29/2020      CC:

## 2020-12-30 ENCOUNTER — TELEPHONE (OUTPATIENT)
Dept: ONCOLOGY | Facility: CLINIC | Age: 80
End: 2020-12-30

## 2020-12-30 DIAGNOSIS — G62.0 DRUG-INDUCED POLYNEUROPATHY (HCC): ICD-10-CM

## 2020-12-30 LAB
ALBUMIN SERPL ELPH-MCNC: 3.8 G/DL (ref 2.9–4.4)
ALBUMIN/GLOB SERPL: 1.6 {RATIO} (ref 0.7–1.7)
ALPHA1 GLOB SERPL ELPH-MCNC: 0.3 G/DL (ref 0–0.4)
ALPHA2 GLOB SERPL ELPH-MCNC: 0.7 G/DL (ref 0.4–1)
B-GLOBULIN SERPL ELPH-MCNC: 0.9 G/DL (ref 0.7–1.3)
GAMMA GLOB SERPL ELPH-MCNC: 0.6 G/DL (ref 0.4–1.8)
GLOBULIN SER-MCNC: 2.4 G/DL (ref 2.2–3.9)
IGA SERPL-MCNC: 29 MG/DL (ref 64–422)
IGG SERPL-MCNC: 485 MG/DL (ref 586–1602)
IGM SERPL-MCNC: 203 MG/DL (ref 26–217)
INTERPRETATION SERPL IEP-IMP: ABNORMAL
KAPPA LC FREE SER-MCNC: 11.7 MG/L (ref 3.3–19.4)
KAPPA LC FREE/LAMBDA FREE SER: 1.77 {RATIO} (ref 0.26–1.65)
LABORATORY COMMENT REPORT: ABNORMAL
LAMBDA LC FREE SERPL-MCNC: 6.6 MG/L (ref 5.7–26.3)
M PROTEIN SERPL ELPH-MCNC: 0.3 G/DL
PROT SERPL-MCNC: 6.2 G/DL (ref 6–8.5)

## 2020-12-30 RX ORDER — GABAPENTIN 400 MG/1
CAPSULE ORAL
Qty: 120 CAPSULE | Refills: 5 | Status: SHIPPED | OUTPATIENT
Start: 2020-12-30 | End: 2021-06-30 | Stop reason: SDUPTHER

## 2020-12-30 NOTE — TELEPHONE ENCOUNTER
Component      Latest Ref Rng & Units 8/20/2020 9/1/2020 9/1/2020 9/25/2020          10:37 AM  2:08 PM  2:08 PM  3:10 PM   IgG      586 - 1602 mg/dL 380 (L)  403 (L)    IgA      64 - 422 mg/dL 26 (L)  27 (L)    IgM      26 - 217 mg/dL 392 (H)  341 (H)    Total Protein      6.3 - 8.0 g/dL 6.2 6.6 6.3 6.6   Albumin      3.50 - 5.20 g/dL 3.8 4.40 3.8 4.50   Alpha-1-Globulin      0.0 - 0.4 g/dL 0.3  0.3    Alpha-2-Globulin      0.4 - 1.0 g/dL 0.6  0.7    Beta Globulin      0.7 - 1.3 g/dL 0.8  0.9    Gamma Globulin      0.4 - 1.8 g/dL 0.7  0.6    M-Pratik      Not Observed g/dL 0.4 (H)  0.3 (H)    Globulin      2.2 - 3.9 g/dL 2.4  2.5    A/G Ratio      1.1 - 2.4 g/dL 1.6 2.0 1.6 2.1   Immunofixation Reflex, Serum       Comment  Comment    Please note       Comment  Comment    Kappa FLC      3.3 - 19.4 mg/L 12.2  12.5    Free Lambda Light Chains      5.7 - 26.3 mg/L 6.2  4.4 (L)    Kappa/Lambda Ratio      0.26 - 1.65 1.97 (H)  2.84 (H)      Component      Latest Ref Rng & Units 9/25/2020 11/10/2020 11/10/2020 12/29/2020           3:10 PM  9:52 AM  9:52 AM 10:18 AM   IgG      586 - 1602 mg/dL 421 (L)  490 (L)    IgA      64 - 422 mg/dL 27 (L)  28 (L)    IgM      26 - 217 mg/dL 283 (H)  208    Total Protein      6.3 - 8.0 g/dL 6.4 6.4 6.3 6.7   Albumin      3.50 - 5.20 g/dL 4.0 4.50 3.9 4.40   Alpha-1-Globulin      0.0 - 0.4 g/dL 0.3  0.3    Alpha-2-Globulin      0.4 - 1.0 g/dL 0.8  0.7    Beta Globulin      0.7 - 1.3 g/dL 0.8  0.8    Gamma Globulin      0.4 - 1.8 g/dL 0.6  0.6    M-Pratik      Not Observed g/dL 0.4 (H)  Comment:    Globulin      2.2 - 3.9 g/dL 2.4  2.4    A/G Ratio      1.1 - 2.4 g/dL 1.7 2.4 1.7 1.9   Immunofixation Reflex, Serum       Comment  Comment (A)    Please note       Comment  Comment    Kappa FLC      3.3 - 19.4 mg/L 11.5  11.9    Free Lambda Light Chains      5.7 - 26.3 mg/L 6.4  7.7    Kappa/Lambda Ratio      0.26 - 1.65 1.80 (H)  1.55      Component      Latest Ref Rng & Units 12/29/2020           10:18 AM   IgG      586 - 1602 mg/dL 485 (L)   IgA      64 - 422 mg/dL 29 (L)   IgM      26 - 217 mg/dL 203   Total Protein      6.3 - 8.0 g/dL 6.2   Albumin      3.50 - 5.20 g/dL 3.8   Alpha-1-Globulin      0.0 - 0.4 g/dL 0.3   Alpha-2-Globulin      0.4 - 1.0 g/dL 0.7   Beta Globulin      0.7 - 1.3 g/dL 0.9   Gamma Globulin      0.4 - 1.8 g/dL 0.6   M-Pratik      Not Observed g/dL 0.3 (H)   Globulin      2.2 - 3.9 g/dL 2.4   A/G Ratio      1.1 - 2.4 g/dL 1.6   Immunofixation Reflex, Serum       Comment (A)   Please note       Comment   Kappa FLC      3.3 - 19.4 mg/L 11.7   Free Lambda Light Chains      5.7 - 26.3 mg/L 6.6   Kappa/Lambda Ratio      0.26 - 1.65 1.77 (H)   I have called this patient today to notify that her monoclonal protein IgM actually has further dropped even in absences of taking venetoclax for several weeks. Therefore in my opinion right now we do not need to go back on venetoclax. We just keep holding horses and see her back in her appointment in 3 months. She was placid that she does not need to go back on the medicine that gave her so much fatigue but gave her so much benefit as well in regard to the control of her disease. Maybe in the future as stated in my note from yesterday the patient will require to take the medicine every other day or something in a low dose and maybe that will be sufficient.

## 2021-01-28 RX ORDER — POTASSIUM CHLORIDE 20 MEQ/1
TABLET, EXTENDED RELEASE ORAL
Qty: 90 TABLET | Refills: 1 | Status: SHIPPED | OUTPATIENT
Start: 2021-01-28 | End: 2021-04-28

## 2021-02-02 ENCOUNTER — TELEPHONE (OUTPATIENT)
Dept: INTERNAL MEDICINE | Facility: CLINIC | Age: 81
End: 2021-02-02

## 2021-02-02 NOTE — TELEPHONE ENCOUNTER
Needs to be changed to in person another day as we are checking BP and that cannot be done over internet. 30 minute please

## 2021-02-02 NOTE — TELEPHONE ENCOUNTER
Caller: Karli Loomis    Relationship to patient: Self    Best call back number: [phone]    Patient is needing: PATIENT CALLED TO STATE HER SON'S SCHEDULE HAS CHANGED AT WORK AND HE IS NOT AVAILABLE TO DRIVE HER TO THE APPOINTMENT ON 2.9.21 AT 3:15P WITH DR LAY/PATIENT IS REQUESTING VISIT BE CONVERTED TO PHONE VISIT/ADVISED COULD NOT TRANSFER BUT TO CONSIDER APPOINTMENT A PHONE VISIT AND IF NOT POSSIBLE  WILL CALL PATIENT TO ADVISE

## 2021-02-09 ENCOUNTER — OFFICE VISIT (OUTPATIENT)
Dept: INTERNAL MEDICINE | Facility: CLINIC | Age: 81
End: 2021-02-09

## 2021-02-09 VITALS
BODY MASS INDEX: 23.49 KG/M2 | SYSTOLIC BLOOD PRESSURE: 138 MMHG | TEMPERATURE: 97.8 F | HEIGHT: 65 IN | WEIGHT: 141 LBS | DIASTOLIC BLOOD PRESSURE: 78 MMHG

## 2021-02-09 DIAGNOSIS — E03.9 ADULT HYPOTHYROIDISM: Primary | Chronic | ICD-10-CM

## 2021-02-09 DIAGNOSIS — I10 ESSENTIAL HYPERTENSION: Chronic | ICD-10-CM

## 2021-02-09 PROCEDURE — 99214 OFFICE O/P EST MOD 30 MIN: CPT | Performed by: INTERNAL MEDICINE

## 2021-02-09 NOTE — PROGRESS NOTES
"Subjective        Chief Complaint   Patient presents with   • Hypertension           Karli Loomis is a 80 y.o. female who presents for    Patient Active Problem List   Diagnosis   • Malignant neoplasm of overlapping sites of left breast in female, estrogen receptor positive (CMS/HCC)   • Macroglobulinemia of Waldenstrom (CMS/HCC)   • Adult hypothyroidism   • Primary osteoarthritis of left knee   • Drug-induced polyneuropathy (CMS/HCC)   • Epilepsy with simple partial seizures (CMS/HCC)   • Headache, migraine   • Hyponatremia with normal extracellular fluid volume   • Chronic pansinusitis   • TIA (transient ischemic attack)   • UTI due to extended-spectrum beta lactamase (ESBL) producing Escherichia coli   • Essential hypertension   • Vision loss     • Asthma   • Other insomnia       History of Present Illness     Her BP has been fluctuating. It ranges from 101/65 to 150/60s.  She says she has felt \"lousy\" since being on chemo. She is not taking it now. She is tired. She sleeps 10-11 hours per night. She wakes up rested. She is not depressed. Dr. Larose thinks some of her issues stem from being isolated with the pandemic.  Allergies   Allergen Reactions   • Cortisone Unknown - High Severity     Reports having a shot years ago, and wonders if he hit a vein. She was told by another doctor that it probably went in her blood stream.    • Darvon  [Propoxyphene] Palpitations       Current Outpatient Medications on File Prior to Visit   Medication Sig Dispense Refill   • aspirin 81 MG tablet Take 81 mg by mouth Daily.     • Budesonide (RHINOCORT ALLERGY NA) 2 sprays into the nostril(s) as directed by provider Daily As Needed.     • budesonide-formoterol (SYMBICORT) 80-4.5 MCG/ACT inhaler Inhale 2 puffs 2 (Two) Times a Day.     • cetirizine (zyrTEC) 10 MG tablet Take 10 mg by mouth Daily.     • gabapentin (NEURONTIN) 400 MG capsule TAKE 1 CAPSULE BY MOUTH FOUR TIMES A DAY  120 capsule 5   • lansoprazole (PREVACID) 30 MG " capsule TAKE 1 CAPSULE EVERY DAY 90 capsule 0   • levothyroxine (SYNTHROID, LEVOTHROID) 50 MCG tablet TAKE 1 TABLET EVERY DAY 90 tablet 1   • losartan (COZAAR) 100 MG tablet TAKE ONE TABLET BY MOUTH DAILY 30 tablet 2   • metoprolol succinate XL (Toprol XL) 100 MG 24 hr tablet Take 1 tablet by mouth Daily. 30 tablet 3   • ondansetron (ZOFRAN) 4 MG tablet Take 1 tablet by mouth Every 8 (Eight) Hours As Needed for Nausea or Vomiting. 30 tablet 5   • potassium chloride (K-DUR,KLOR-CON) 20 MEQ CR tablet TAKE 1 TABLET EVERY DAY 90 tablet 1   • terazosin (HYTRIN) 10 MG capsule Take 1 capsule by mouth 2 (two) times a day. 60 capsule 3   • VENTOLIN  (90 Base) MCG/ACT inhaler        No current facility-administered medications on file prior to visit.        Past Medical History:   Diagnosis Date   • Acid reflux    • Asthma    • Clostridium difficile colitis     Requiring admission to [facility] in 09/2008   • H/O transesophageal echocardiography (MARICRUZ)    • History of transfusion of packed red blood cells     R/T SURGERY   • History of Waldenstrom's macroglobulinemia    • Leaky heart valve    • Seasonal allergies    • Thyroid nodule     Removed more than 35 years ago       Past Surgical History:   Procedure Laterality Date   • ADENOIDECTOMY     • APPENDECTOMY     • CARPAL TUNNEL RELEASE Right    • CATARACT EXTRACTION Bilateral    • CHOLECYSTECTOMY     • COLONOSCOPY     • HYSTERECTOMY      Partial, many years ago, heavy bleeding, no cancer   • KNEE ARTHROSCOPY Right 03/2009    Finding of chondromalacia and appropriate cleanup of joint was performed by Dr. Moraes.   • MASTECTOMY Left 07/2014   • REPLACEMENT TOTAL KNEE Right 12/2015   • TONSILLECTOMY         Family History   Problem Relation Age of Onset   • Mental illness Mother    • Migraines Mother    • Dementia Mother    • Heart disease Father    • Hypertension Father    • Kidney disease Father    • No Known Problems Daughter    • No Known Problems  "Daughter    • Breast cancer Other    • Heart disease Other    • Hypertension Other    • Diabetes Other    • Cancer Maternal Grandmother    • No Known Problems Maternal Grandfather    • No Known Problems Paternal Grandmother    • No Known Problems Paternal Grandfather    • Lymphoma Neg Hx    • Leukemia Neg Hx        Social History     Socioeconomic History   • Marital status:      Spouse name: Not on file   • Number of children: Not on file   • Years of education: Not on file   • Highest education level: Not on file   Occupational History     Employer: [employer]     Employer: RETIRED   Tobacco Use   • Smoking status: Never Smoker   • Smokeless tobacco: Never Used   Substance and Sexual Activity   • Alcohol use: No   • Drug use: No   • Sexual activity: Defer   Social History Narrative    She lives with her oldest daughter.           The following portions of the patient's history were reviewed and updated as appropriate: problem list, allergies, current medications, past medical history, past family history, past social history and past surgical history.    Review of Systems    Immunization History   Administered Date(s) Administered   • COVID-19 (PFIZER) 01/22/2021   • Fluad Quad 65+ 10/03/2019, 09/21/2020   • Fluzone High Dose =>65 Years ([employer] ONLY) 10/30/2015, 10/11/2016, 10/18/2017, 10/06/2018   • Pneumococcal Conjugate 13-Valent (PCV13) 12/01/2016   • Pneumococcal Polysaccharide (PPSV23) 06/28/2019       Objective   Vitals:    02/09/21 1050 02/09/21 1117   BP: 150/70 138/78   Temp: 97.8 °F (36.6 °C)    Weight: 64 kg (141 lb)    Height: 165.1 cm (65\")      Body mass index is 23.46 kg/m².  Physical Exam  Vitals signs reviewed.   Constitutional:       Appearance: She is well-developed.   HENT:      Head: Normocephalic and atraumatic.   Cardiovascular:      Rate and Rhythm: Normal rate and regular rhythm.      Heart sounds: Normal heart sounds, S1 normal and S2 normal.   Pulmonary:      Effort: " Pulmonary effort is normal.      Breath sounds: Normal breath sounds.   Skin:     General: Skin is warm.   Neurological:      Mental Status: She is alert.   Psychiatric:         Behavior: Behavior normal.         Procedures    Assessment/Plan   Diagnoses and all orders for this visit:    1. Adult hypothyroidism (Primary)  -     TSH; Future    2. Essential hypertension               Reviewed her last cbc and cmp from Dr. Larose. Filled out FMLA for son to bring her to [place]. Discussed changing back to her CCB given that it was changed for chemo which she is not taking now. She wants to continue her current meds. I am not making any changes given she has some dizziness at home and her SBP can be in the 110s. She does not want me to draw a TSH today.  Return in about 6 months (around 8/9/2021), or 30 minutes.

## 2021-02-12 ENCOUNTER — TELEPHONE (OUTPATIENT)
Dept: INTERNAL MEDICINE | Facility: CLINIC | Age: 81
End: 2021-02-12

## 2021-02-12 NOTE — TELEPHONE ENCOUNTER
Patient's son stated he received a call about five minutes ago from the office to verify his address for [facility] paperwork. When I tried to warm transfer the call the woman who answered informed me that since he is not on the HIPPA release form she could not talk to him. Patient cant remember the name of the person from the office who called.    no

## 2021-02-24 DIAGNOSIS — I10 ESSENTIAL HYPERTENSION: ICD-10-CM

## 2021-02-24 RX ORDER — LOSARTAN POTASSIUM 100 MG/1
TABLET ORAL
Qty: 30 TABLET | Refills: 11 | Status: SHIPPED | OUTPATIENT
Start: 2021-02-24 | End: 2021-09-15 | Stop reason: SDUPTHER

## 2021-02-24 RX ORDER — METOPROLOL SUCCINATE 100 MG/1
TABLET, EXTENDED RELEASE ORAL
Qty: 30 TABLET | Refills: 11 | Status: SHIPPED | OUTPATIENT
Start: 2021-02-24 | End: 2021-06-15 | Stop reason: SDUPTHER

## 2021-04-08 ENCOUNTER — LAB (OUTPATIENT)
Dept: LAB | Facility: HOSPITAL | Age: 81
End: 2021-04-08

## 2021-04-08 ENCOUNTER — TELEPHONE (OUTPATIENT)
Dept: ONCOLOGY | Facility: CLINIC | Age: 81
End: 2021-04-08

## 2021-04-08 ENCOUNTER — OFFICE VISIT (OUTPATIENT)
Dept: ONCOLOGY | Facility: CLINIC | Age: 81
End: 2021-04-08

## 2021-04-08 VITALS
OXYGEN SATURATION: 98 % | BODY MASS INDEX: 23.84 KG/M2 | RESPIRATION RATE: 19 BRPM | TEMPERATURE: 97.5 F | WEIGHT: 143.1 LBS | SYSTOLIC BLOOD PRESSURE: 187 MMHG | HEART RATE: 56 BPM | HEIGHT: 65 IN | DIASTOLIC BLOOD PRESSURE: 62 MMHG

## 2021-04-08 DIAGNOSIS — R53.0 NEOPLASTIC MALIGNANT RELATED FATIGUE: ICD-10-CM

## 2021-04-08 DIAGNOSIS — C50.812 MALIGNANT NEOPLASM OF OVERLAPPING SITES OF LEFT BREAST IN FEMALE, ESTROGEN RECEPTOR POSITIVE (HCC): Primary | ICD-10-CM

## 2021-04-08 DIAGNOSIS — C88.0 MACROGLOBULINEMIA OF WALDENSTROM (HCC): ICD-10-CM

## 2021-04-08 DIAGNOSIS — Z17.0 MALIGNANT NEOPLASM OF OVERLAPPING SITES OF LEFT BREAST IN FEMALE, ESTROGEN RECEPTOR POSITIVE (HCC): Primary | ICD-10-CM

## 2021-04-08 DIAGNOSIS — E87.1 HYPONATREMIA WITH NORMAL EXTRACELLULAR FLUID VOLUME: ICD-10-CM

## 2021-04-08 DIAGNOSIS — C50.812 MALIGNANT NEOPLASM OF OVERLAPPING SITES OF LEFT BREAST IN FEMALE, ESTROGEN RECEPTOR POSITIVE (HCC): ICD-10-CM

## 2021-04-08 DIAGNOSIS — Z17.0 MALIGNANT NEOPLASM OF OVERLAPPING SITES OF LEFT BREAST IN FEMALE, ESTROGEN RECEPTOR POSITIVE (HCC): ICD-10-CM

## 2021-04-08 LAB
ALBUMIN SERPL-MCNC: 4.4 G/DL (ref 3.5–5.2)
ALBUMIN/GLOB SERPL: 1.9 G/DL (ref 1.1–2.4)
ALP SERPL-CCNC: 69 U/L (ref 38–116)
ALT SERPL W P-5'-P-CCNC: 13 U/L (ref 0–33)
ANION GAP SERPL CALCULATED.3IONS-SCNC: 9.3 MMOL/L (ref 5–15)
AST SERPL-CCNC: 20 U/L (ref 0–32)
BASOPHILS # BLD AUTO: 0.02 10*3/MM3 (ref 0–0.2)
BASOPHILS NFR BLD AUTO: 0.4 % (ref 0–1.5)
BILIRUB SERPL-MCNC: 0.6 MG/DL (ref 0.2–1.2)
BUN SERPL-MCNC: 8 MG/DL (ref 6–20)
BUN/CREAT SERPL: 9.8 (ref 7.3–30)
CALCIUM SPEC-SCNC: 10 MG/DL (ref 8.5–10.2)
CHLORIDE SERPL-SCNC: 96 MMOL/L (ref 98–107)
CO2 SERPL-SCNC: 28.7 MMOL/L (ref 22–29)
CREAT SERPL-MCNC: 0.82 MG/DL (ref 0.6–1.1)
DEPRECATED RDW RBC AUTO: 44.1 FL (ref 37–54)
EOSINOPHIL # BLD AUTO: 0.41 10*3/MM3 (ref 0–0.4)
EOSINOPHIL NFR BLD AUTO: 8 % (ref 0.3–6.2)
ERYTHROCYTE [DISTWIDTH] IN BLOOD BY AUTOMATED COUNT: 12.4 % (ref 12.3–15.4)
ERYTHROCYTE [SEDIMENTATION RATE] IN BLOOD: 4 MM/HR (ref 0–30)
FERRITIN SERPL-MCNC: 92.9 NG/ML (ref 13–150)
FOLATE SERPL-MCNC: 8.17 NG/ML (ref 4.78–24.2)
GFR SERPL CREATININE-BSD FRML MDRD: 67 ML/MIN/1.73
GLOBULIN UR ELPH-MCNC: 2.3 GM/DL (ref 1.8–3.5)
GLUCOSE SERPL-MCNC: 103 MG/DL (ref 74–124)
HCT VFR BLD AUTO: 42.2 % (ref 34–46.6)
HGB BLD-MCNC: 14.8 G/DL (ref 12–15.9)
HGB RETIC QN AUTO: 37.6 PG (ref 29.8–36.1)
IMM GRANULOCYTES # BLD AUTO: 0.02 10*3/MM3 (ref 0–0.05)
IMM GRANULOCYTES NFR BLD AUTO: 0.4 % (ref 0–0.5)
IMM RETICS NFR: 3.8 % (ref 3–15.8)
IRON 24H UR-MRATE: 105 MCG/DL (ref 37–145)
IRON SATN MFR SERPL: 27 % (ref 14–48)
LDH SERPL-CCNC: 174 U/L (ref 99–259)
LYMPHOCYTES # BLD AUTO: 1.59 10*3/MM3 (ref 0.7–3.1)
LYMPHOCYTES NFR BLD AUTO: 30.9 % (ref 19.6–45.3)
MCH RBC QN AUTO: 33.7 PG (ref 26.6–33)
MCHC RBC AUTO-ENTMCNC: 35.1 G/DL (ref 31.5–35.7)
MCV RBC AUTO: 96.1 FL (ref 79–97)
MONOCYTES # BLD AUTO: 0.41 10*3/MM3 (ref 0.1–0.9)
MONOCYTES NFR BLD AUTO: 8 % (ref 5–12)
NEUTROPHILS NFR BLD AUTO: 2.7 10*3/MM3 (ref 1.7–7)
NEUTROPHILS NFR BLD AUTO: 52.3 % (ref 42.7–76)
NRBC BLD AUTO-RTO: 0 /100 WBC (ref 0–0.2)
PLATELET # BLD AUTO: 111 10*3/MM3 (ref 140–450)
PMV BLD AUTO: 9.3 FL (ref 6–12)
POTASSIUM SERPL-SCNC: 4.3 MMOL/L (ref 3.5–4.7)
PROT SERPL-MCNC: 6.7 G/DL (ref 6.3–8)
RBC # BLD AUTO: 4.39 10*6/MM3 (ref 3.77–5.28)
RETICS # AUTO: 0.05 10*6/MM3 (ref 0.02–0.13)
RETICS/RBC NFR AUTO: 1.23 % (ref 0.7–1.9)
SODIUM SERPL-SCNC: 134 MMOL/L (ref 134–145)
TIBC SERPL-MCNC: 384 MCG/DL (ref 249–505)
TRANSFERRIN SERPL-MCNC: 274 MG/DL (ref 200–360)
VIT B12 BLD-MCNC: 622 PG/ML (ref 211–946)
WBC # BLD AUTO: 5.15 10*3/MM3 (ref 3.4–10.8)

## 2021-04-08 PROCEDURE — 82746 ASSAY OF FOLIC ACID SERUM: CPT | Performed by: INTERNAL MEDICINE

## 2021-04-08 PROCEDURE — 84466 ASSAY OF TRANSFERRIN: CPT | Performed by: INTERNAL MEDICINE

## 2021-04-08 PROCEDURE — 82728 ASSAY OF FERRITIN: CPT | Performed by: INTERNAL MEDICINE

## 2021-04-08 PROCEDURE — 82607 VITAMIN B-12: CPT | Performed by: INTERNAL MEDICINE

## 2021-04-08 PROCEDURE — 85025 COMPLETE CBC W/AUTO DIFF WBC: CPT

## 2021-04-08 PROCEDURE — 83615 LACTATE (LD) (LDH) ENZYME: CPT | Performed by: INTERNAL MEDICINE

## 2021-04-08 PROCEDURE — 36415 COLL VENOUS BLD VENIPUNCTURE: CPT

## 2021-04-08 PROCEDURE — 85652 RBC SED RATE AUTOMATED: CPT | Performed by: INTERNAL MEDICINE

## 2021-04-08 PROCEDURE — 83540 ASSAY OF IRON: CPT | Performed by: INTERNAL MEDICINE

## 2021-04-08 PROCEDURE — 99214 OFFICE O/P EST MOD 30 MIN: CPT | Performed by: INTERNAL MEDICINE

## 2021-04-08 PROCEDURE — 80053 COMPREHEN METABOLIC PANEL: CPT

## 2021-04-08 PROCEDURE — 85046 RETICYTE/HGB CONCENTRATE: CPT | Performed by: INTERNAL MEDICINE

## 2021-04-08 NOTE — TELEPHONE ENCOUNTER
Provider: YOMAIRA  Caller: MANDIE DUQUE   Relationship to Patient: SELF    Phone Number: [phone]  Reason for Call: PT WAS WANTING TO LET DR BURNETTE KNOW THAT SHE CANT DO HER  PT SCAN ON THE April 15, 2021, WILL NEED TO BE SCHEDULED ON ANOTHER DAY

## 2021-04-08 NOTE — PROGRESS NOTES
Subjective    REASONS FOR FOLLOWUP:     1. History of Waldenstrom macroglobulinemia.    2. History of breast cancer stage I on the left, status post mastectomy. The patient completed aromatase inhibitor  X 5 YEARS IN 2019 without any evidence of breast cancer recurrence        History of Present Illness            DURING THE VISIT WITH THE PATIENT TODAY , PATIENT HAD FACE MASK, MY MEDICAL ASSISTANT AND I  HAD PROPPER PROTECTIVE EQUIPMENT, AND I DID HAND HYGIENE WITH SOAP AND WATER BEFORE AND AFTER THE VISIT.      This patient returns today to the office for follow up. In the interim of time she has had her COVID vaccination with no difficulties and completion of this. Her appetite has improved and her main complaint today is profound fatigue associated with neuropathic symptomatology in the lower extremities. She has not had any fever, chills, night sweats, pruritus, abnormal bleeding, blurred vision or abnormal lymph node growth anywhere. Her bowel function is normal, urination is normal, she has no diarrhea. She has no cardiovascular symptoms besides issues in regard to blood pressure control. She has no cough or shortness of breath. She has no skeletal pain, no joint pain and she has had evaluation by dermatologist, multiple skin lesions have been frozen and one on her right lower extremity is going to require removal by surgeon given the deep penetration of this lesion into the skin harboring a squamous cell carcinoma. The patient denies any other new issues. She is happy because she has been able to see family that she has not seen in a year after all of her family has completed vaccination for COVID.           Past Medical History:   Diagnosis Date   • Acid reflux    • Asthma    • Clostridium difficile colitis     Requiring admission to [facility] in 09/2008   • H/O transesophageal echocardiography (MARICRUZ)    • History of transfusion of packed red blood cells     R/T SURGERY   • History of  Waldenstrom's macroglobulinemia    • Leaky heart valve    • Seasonal allergies    • Thyroid nodule     Removed more than 35 years ago     Past Surgical History:   Procedure Laterality Date   • ADENOIDECTOMY     • APPENDECTOMY     • CARPAL TUNNEL RELEASE Right    • CATARACT EXTRACTION Bilateral    • CHOLECYSTECTOMY     • COLONOSCOPY     • HYSTERECTOMY      Partial, many years ago, heavy bleeding, no cancer   • KNEE ARTHROSCOPY Right 03/2009    Finding of chondromalacia and appropriate cleanup of joint was performed by Dr. Moraes.   • MASTECTOMY Left 07/2014   • REPLACEMENT TOTAL KNEE Right 12/2015   • TONSILLECTOMY           Social History     Socioeconomic History   • Marital status:      Spouse name: Not on file   • Number of children: Not on file   • Years of education: Not on file   • Highest education level: Not on file   Tobacco Use   • Smoking status: Never Smoker   • Smokeless tobacco: Never Used   Substance and Sexual Activity   • Alcohol use: No   • Drug use: No   • Sexual activity: Defer     Family History   Problem Relation Age of Onset   • Mental illness Mother    • Migraines Mother    • Dementia Mother    • Heart disease Father    • Hypertension Father    • Kidney disease Father    • No Known Problems Daughter    • No Known Problems Daughter    • Breast cancer Other    • Heart disease Other    • Hypertension Other    • Diabetes Other    • Cancer Maternal Grandmother    • No Known Problems Maternal Grandfather    • No Known Problems Paternal Grandmother    • No Known Problems Paternal Grandfather    • Lymphoma Neg Hx    • Leukemia Neg Hx      Current Outpatient Medications on File Prior to Visit   Medication Sig Dispense Refill   • aspirin 81 MG tablet Take 81 mg by mouth Daily.     • Budesonide (RHINOCORT ALLERGY NA) 2 sprays into the nostril(s) as directed by provider Daily As Needed.     • budesonide-formoterol (SYMBICORT) 80-4.5 MCG/ACT inhaler Inhale 2 puffs 2 (Two) Times a Day.     •  "cetirizine (zyrTEC) 10 MG tablet Take 10 mg by mouth Daily.     • gabapentin (NEURONTIN) 400 MG capsule TAKE 1 CAPSULE BY MOUTH FOUR TIMES A DAY  120 capsule 5   • lansoprazole (PREVACID) 30 MG capsule TAKE 1 CAPSULE EVERY DAY 90 capsule 0   • levothyroxine (SYNTHROID, LEVOTHROID) 50 MCG tablet TAKE 1 TABLET EVERY DAY 90 tablet 1   • losartan (COZAAR) 100 MG tablet TAKE 1 TABLET BY MOUTH EVERY DAY  30 tablet 11   • metoprolol succinate XL (TOPROL-XL) 100 MG 24 hr tablet TAKE 1 TABLET BY MOUTH EVERY DAY  30 tablet 11   • ondansetron (ZOFRAN) 4 MG tablet Take 1 tablet by mouth Every 8 (Eight) Hours As Needed for Nausea or Vomiting. 30 tablet 5   • potassium chloride (K-DUR,KLOR-CON) 20 MEQ CR tablet TAKE 1 TABLET EVERY DAY 90 tablet 1   • terazosin (HYTRIN) 10 MG capsule Take 1 capsule by mouth 2 (two) times a day. 60 capsule 3   • VENTOLIN  (90 Base) MCG/ACT inhaler        No current facility-administered medications on file prior to visit.       ALLERGIES:    Allergies   Allergen Reactions   • Cortisone Unknown - High Severity     Reports having a shot years ago, and wonders if he hit a vein. She was told by another doctor that it probably went in her blood stream.    • Darvon  [Propoxyphene] Palpitations             Objective      Vitals:    04/08/21 1145   BP: (!) 187/62   Pulse: 56   Resp: 19   Temp: 97.5 °F (36.4 °C)   TempSrc: Temporal   SpO2: 98%   Weight: 64.9 kg (143 lb 1.6 oz)   Height: 165.1 cm (65\")   PainSc: 0-No pain     Current Status 4/8/2021   ECOG score 0         Physical Exam     I HAVE PERSONALLY REVIEWED THE HISTORY OF THE PRESENT ILLNESS, PAST MEDICAL HISTORY, FAMILY HISTORY, SOCIAL HISTORY, ALLERGIES, MEDICATIONS STATED ABOVE IN THE OFFICE NOTE FROM TODAY.        GENERAL:  Well-developed, well-nourished  Patient  in no acute distress.   SKIN:  Warm, dry ,NO rashes,NO purpura ,NO petechiae.ULCERATED 1.2 CM LESION R LEG CONSISTENT WITH SQ CELL CA  HEENT:  Pupils were equal and reactive to " light and accomodation, conjunctivae noninjected, no pterygium, normal extraocular movements, normal visual acuity.   NECK:  Supple with good range of motion; no thyromegaly or masses, no JVD or bruits, no cervical adenopathies.No carotid artery pain, no carotid abnormal pulsation , NO arterial dance.  LYMPHATICS:  No cervical, NO supraclavicular, NO axillary,NO epitrochlear , NO inguinal adenopathy.  CARDIAC   normal rate and regular rhythm, without murmur,NO rubs NO S3 NO S4 right or left . Normal femoral, popliteal, pedis, brachial and carotid pulses.  INSPECTION of  breast documented symmetry of the tissue per se and location and size of the nipple,no retractions or inversion of the nipple, normal skin without lesions, no erythema or nodules,no peau d'orange, no prominence of superficial veins or chest wall collateral circulation.PALPATION of the breast documented normal skin turgor, no induration, alteration in local temperature, or pain, no palpable masses or nodules, normal mobility of the tissues,no fixation of the tissue or parenchyma to the chest wall, no alteration at the tail of the breasts or axillas, no adenopathies. LEFT MASTECTOMY Surgical site was well healed.No lymphedema in either extremity.  VASCULAR ARTERIAL: normal carotids,brachial,radial,femoral,popliteal, pedis pulses , no bruits.no paleness or cyanosis, no pain, no edema, no numbness, no gangrene.  VASCULAR VENOUS: no cyanosis, collateral circulation, varicosities, edema, palpable cords, pain, erythema.  ABDOMEN:  Soft, nontender with no hepatomegaly, no splenomegaly,no masses, no ascites, no collateral circulation,no distention,no Max sign, no abdominal pain, no inguinal hernias,no umbilical hernia, no abdominal bruits. Normal bowel sounds.  GENITAL: Not  Performed.  EXTREMITIES  AND SPINE:  No clubbing, cyanosis or edema, no deformities or pain .No kyphosis, scoliosis, deformities or pain in spine, ribs or pelvic bone.  NEUROLOGICAL:   Patient was awake, alert, oriented to time, person and place.SENSORY NEUROPATHY IN FEET                  RECENT LABS:  Results from last 7 days   Lab Units 04/08/21  1126   WBC 10*3/mm3 5.15   NEUTROS ABS 10*3/mm3 2.70   HEMOGLOBIN g/dL 14.8   HEMATOCRIT % 42.2   PLATELETS 10*3/mm3 111*                Component      Latest Ref Rng & Units 11/10/2020 12/29/2020 4/8/2021   WBC      3.40 - 10.80 10*3/mm3 4.23 4.18 5.15   RBC      3.77 - 5.28 10*6/mm3 4.18 4.36 4.39   Hemoglobin      12.0 - 15.9 g/dL 13.7 14.2 14.8   Hematocrit      34.0 - 46.6 % 40.1 41.7 42.2   MCV      79.0 - 97.0 fL 95.9 95.6 96.1   MCH      26.6 - 33.0 pg 32.8 32.6 33.7 (H)   MCHC      31.5 - 35.7 g/dL 34.2 34.1 35.1   RDW      12.3 - 15.4 % 12.2 (L) 12.5 12.4   RDW-SD      37.0 - 54.0 fl 42.9 44.3 44.1   MPV      6.0 - 12.0 fL 9.3 9.0 9.3   Platelets      140 - 450 10*3/mm3 131 (L) 109 (L) 111 (L)   Neutrophil Rel %      42.7 - 76.0 % 55.2 53.1 52.3   Lymphocyte Rel %      19.6 - 45.3 % 27.4 28.9 30.9   Monocyte Rel %      5.0 - 12.0 % 8.5 7.7 8.0   Eosinophil Rel %      0.3 - 6.2 % 8.0 (H) 9.6 (H) 8.0 (H)   Basophil Rel %      0.0 - 1.5 % 0.7 0.5 0.4   Immature Granulocyte Rel %      0.0 - 0.5 % 0.2 0.2 0.4   Neutrophils Absolute      1.70 - 7.00 10*3/mm3 2.33 2.22 2.70   Lymphocytes Absolute      0.70 - 3.10 10*3/mm3 1.16 1.21 1.59   Monocytes Absolute      0.10 - 0.90 10*3/mm3 0.36 0.32 0.41   Eosinophils Absolute      0.00 - 0.40 10*3/mm3 0.34 0.40 0.41 (H)   Basophils Absolute      0.00 - 0.20 10*3/mm3 0.03 0.02 0.02   Immature Grans, Absolute      0.00 - 0.05 10*3/mm3 0.01 0.01 0.02   nRBC      0.0 - 0.2 /100 WBC 0.0 0.0 0.0               Assessment/Plan      1. Waldenstrom macroglobulinemia that has been treated in the past mainly with Rituxan. In the past, also she was treated with Velcade with very dramatic improvement in her monoclonal protein but very dramatic sensory peripheral neuropathy. This medication was discontinued altogether.     6/29/2020, she developed progressive fatigue with worsening neuropathy in her feet.  These were her original symptoms at diagnosis of Waldenstrom's.  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  Calcium also elevated at 10.5 with decreased sodium related to pseudohyponatremia associated with monoclonal protein.  Previously, she did not receive improvement from Rituxan, she is not thought to be a candidate for Imbruvica, therefore she went on to initiate venetoclax 7/13/2020.  She is currently on her next planned dose of 200 mg daily with poor tolerance as outlined below    2.  History of left breast cancer, status post mastectomy.  She completed adjuvant therapy.  Mammogram 6/30/2020 benign    3.  Significant fatigue related to disease state.  Her fatigue has exacerbated with the initiation of venetoclax, particularly at maximum dose of 200 mg.Since the previous visit, nurse practitioner under my advice decreased the dose of venetoclax to 100 mg a day. She still has fatigue but not as severe as she had when she was taking a larger amount of the medicine. On the other hand her sensory neuropathy in her feet is a little bit better. It happens mainly at night and she has no need to take any medicine. Her appetite is good. Her weight is stable. Bowel function and urination are normal. She has no rash, no fever, no infection, no bleeding. Her white count is normal. Leukopenia associated with venetoclax. Hemoglobin and platelet count are stable. Her right breast exam is normal. Her left chest wall is normal.         The patient was reviewed back on 11/10/2020. Since the previous visit the patient has stopped the venetoclax after I talked with her on the telephone several weeks ago. This has produced dramatic rebound in regard to her energy level and her activity to the point that she is doing housecleaning and cooking, driving the car and working in the yard. She has not had any symptoms related to the Waldenstrom's  with the exception of sensory grade 1 neuropathy in her feet that is not functionally limiting to her and goes away when she removes her shoes. The patient has not had any fever or infections, no clinical bleeding, no adenopathies.      The patient returned to the office on 12/29/2020. She has significant fatigue but I think the fatigue is related more to the tiredness of the pandemia and the inability to be with her family than to the disease process. Her family has been the center of her life since she has been my patient for almost 12 years or so. She lives with a son and he has been extremely useful to her and receptive in regard to her needs. Nevertheless, I think the patient still has many comorbidities that continue requiring attention and she has seen Dr. Kaye recently. She raised the question in regard to why she has to take 3 blood pressure medications. I pointed out to her that in order to control her blood pressure that has been disguised before, multiple medicines with different mechanism of action need to be utilized combined and that way it is minimized in regard to side effects and increases efficiency in regard to blood pressure control. She understood this clearly.     The patient was further reviewed on 04/08/2021. At this time her main issue is profound fatigue and neuropathic sensory alterations in her feet especially at nighttime with no motor deficit. Her hands have not been compromised. She is not having any fever, chills, alterations in vision or obvious adenopathies. Her appetite has improved. Her sense of well being is not good because of the fatigue. She gets tired doing very few things and then she has to sit down and wait. This raises the question if we are missing something. It is very likely that this is manifestation of her Waldenstrom's macroglobulinemia, finally this is a non-Hodgkin's lymphoma entity. I think the neuropathy goes along with this as it has been before even at the  time of the original diagnosis. The patient had a terrible time taking the venetoclax even though the medicine had a dramatic improvement in the monoclonal protein. She only has been treated in the past with Rituxan successfully but the last treatments with Rituxan close to 2 years ago did not trigger too much of decrease in monoclonal protein. Then she was treated with Velcade with dramatic improvement in the monoclonal protein but terrible sensory neuropathy in her feet. This form of neuropathy subsided after stopping the Velcade that she received for a few weeks.     It raises the question if we are missing something else. The chemistry profile is pending. The white count is normal, the hemoglobin is normal, the platelet count is borderline 111 and not too much different of what it was before. The white count differential remains normal.     RECOMMENDATIONS: I think it is time to do a reassessment of her condition given the profound fatigue that she has. She will proceed today with a chemistry profile, monoclonal protein studies and we will run a B12 level and a ferritin level as well.     I am going to go ahead and schedule her to have a CT scan of the chest, abdomen and pelvis with no IV and no oral contrast in a few days and then she will be called at home with a report of all of this and decide how to proceed. I would not be surprised if we have to put her back on treatment using Rituxan as a single agent given the fact that I cannot utilize any other medication with the exception that maybe we could utilize a medicine that is called Calquence. Maybe that will be another possibility and this will require some dissertation with her and conversation in more detail.     In regard to her previous history of breast cancer there is nothing to suggest breast cancer recurrence. Her right breast exam is normal. She is up to date in her mammogram and her left mastectomy site is normal.                   Florencio Larose,  MD   4/8/2021      CC:

## 2021-04-09 ENCOUNTER — TELEPHONE (OUTPATIENT)
Dept: ONCOLOGY | Facility: CLINIC | Age: 81
End: 2021-04-09

## 2021-04-09 LAB
ALBUMIN SERPL ELPH-MCNC: 4.1 G/DL (ref 2.9–4.4)
ALBUMIN/GLOB SERPL: 1.8 {RATIO} (ref 0.7–1.7)
ALPHA1 GLOB SERPL ELPH-MCNC: 0.3 G/DL (ref 0–0.4)
ALPHA2 GLOB SERPL ELPH-MCNC: 0.6 G/DL (ref 0.4–1)
B-GLOBULIN SERPL ELPH-MCNC: 0.9 G/DL (ref 0.7–1.3)
GAMMA GLOB SERPL ELPH-MCNC: 0.6 G/DL (ref 0.4–1.8)
GLOBULIN SER-MCNC: 2.3 G/DL (ref 2.2–3.9)
IGA SERPL-MCNC: 31 MG/DL (ref 64–422)
IGG SERPL-MCNC: 491 MG/DL (ref 586–1602)
IGM SERPL-MCNC: 202 MG/DL (ref 26–217)
INTERPRETATION SERPL IEP-IMP: ABNORMAL
KAPPA LC FREE SER-MCNC: 13 MG/L (ref 3.3–19.4)
KAPPA LC FREE/LAMBDA FREE SER: 1.91 {RATIO} (ref 0.26–1.65)
LABORATORY COMMENT REPORT: ABNORMAL
LAMBDA LC FREE SERPL-MCNC: 6.8 MG/L (ref 5.7–26.3)
M PROTEIN SERPL ELPH-MCNC: 0.3 G/DL
PROT SERPL-MCNC: 6.4 G/DL (ref 6–8.5)

## 2021-04-15 DIAGNOSIS — I10 ESSENTIAL HYPERTENSION: ICD-10-CM

## 2021-04-16 ENCOUNTER — HOSPITAL ENCOUNTER (OUTPATIENT)
Dept: PET IMAGING | Facility: HOSPITAL | Age: 81
Discharge: HOME | End: 2021-04-16
Admitting: INTERNAL MEDICINE

## 2021-04-16 DIAGNOSIS — C88.0 MACROGLOBULINEMIA OF WALDENSTROM (HCC): ICD-10-CM

## 2021-04-16 DIAGNOSIS — Z17.0 MALIGNANT NEOPLASM OF OVERLAPPING SITES OF LEFT BREAST IN FEMALE, ESTROGEN RECEPTOR POSITIVE (HCC): ICD-10-CM

## 2021-04-16 DIAGNOSIS — C50.812 MALIGNANT NEOPLASM OF OVERLAPPING SITES OF LEFT BREAST IN FEMALE, ESTROGEN RECEPTOR POSITIVE (HCC): ICD-10-CM

## 2021-04-16 DIAGNOSIS — E87.1 HYPONATREMIA WITH NORMAL EXTRACELLULAR FLUID VOLUME: ICD-10-CM

## 2021-04-16 PROCEDURE — 74176 CT ABD & PELVIS W/O CONTRAST: CPT

## 2021-04-16 PROCEDURE — 71250 CT THORAX DX C-: CPT

## 2021-04-16 RX ORDER — TERAZOSIN 10 MG/1
CAPSULE ORAL
Qty: 60 CAPSULE | Refills: 11 | Status: SHIPPED | OUTPATIENT
Start: 2021-04-16 | End: 2021-09-15 | Stop reason: SDUPTHER

## 2021-04-19 ENCOUNTER — TELEPHONE (OUTPATIENT)
Dept: ONCOLOGY | Facility: CLINIC | Age: 81
End: 2021-04-19

## 2021-04-19 NOTE — TELEPHONE ENCOUNTER
I have called this patient with the good news that her CT scans of the chest, abdomen and pelvis failed to document anything that suggests breast cancer recurrence or progressive Waldenstrom's macroglobulinemia. On the other hand there is heavy calcification of the vasculature in the chest and abdomen and this suggests to me that maybe a lot of her fatigue could be related to heart disease and things of this nature. For this reason I would like to review this patient in the office in a couple of weeks and eventually made a referral for cardiology.     She agrees to proceed with that.     I also informed her that her IgM monoclonal protein is not getting any higher and actually the number looks very stable and that is another portion of good news.

## 2021-04-28 ENCOUNTER — LAB (OUTPATIENT)
Dept: LAB | Facility: HOSPITAL | Age: 81
End: 2021-04-28

## 2021-04-28 ENCOUNTER — OFFICE VISIT (OUTPATIENT)
Dept: ONCOLOGY | Facility: CLINIC | Age: 81
End: 2021-04-28

## 2021-04-28 VITALS
HEIGHT: 65 IN | SYSTOLIC BLOOD PRESSURE: 160 MMHG | RESPIRATION RATE: 20 BRPM | WEIGHT: 142.1 LBS | OXYGEN SATURATION: 96 % | HEART RATE: 65 BPM | TEMPERATURE: 97.5 F | BODY MASS INDEX: 23.68 KG/M2 | DIASTOLIC BLOOD PRESSURE: 72 MMHG

## 2021-04-28 DIAGNOSIS — Z17.0 MALIGNANT NEOPLASM OF OVERLAPPING SITES OF LEFT BREAST IN FEMALE, ESTROGEN RECEPTOR POSITIVE (HCC): Primary | ICD-10-CM

## 2021-04-28 DIAGNOSIS — C50.812 MALIGNANT NEOPLASM OF OVERLAPPING SITES OF LEFT BREAST IN FEMALE, ESTROGEN RECEPTOR POSITIVE (HCC): Primary | ICD-10-CM

## 2021-04-28 DIAGNOSIS — C88.0 MACROGLOBULINEMIA OF WALDENSTROM (HCC): ICD-10-CM

## 2021-04-28 DIAGNOSIS — I70.0 ATHEROSCLEROSIS OF AORTA (HCC): ICD-10-CM

## 2021-04-28 DIAGNOSIS — R53.0 NEOPLASTIC MALIGNANT RELATED FATIGUE: ICD-10-CM

## 2021-04-28 DIAGNOSIS — R06.02 SOB (SHORTNESS OF BREATH) ON EXERTION: ICD-10-CM

## 2021-04-28 LAB
BASOPHILS # BLD AUTO: 0.03 10*3/MM3 (ref 0–0.2)
BASOPHILS NFR BLD AUTO: 0.5 % (ref 0–1.5)
CHOLEST SERPL-MCNC: 200 MG/DL (ref 0–200)
DEPRECATED RDW RBC AUTO: 40.6 FL (ref 37–54)
EOSINOPHIL # BLD AUTO: 0.37 10*3/MM3 (ref 0–0.4)
EOSINOPHIL NFR BLD AUTO: 6.7 % (ref 0.3–6.2)
ERYTHROCYTE [DISTWIDTH] IN BLOOD BY AUTOMATED COUNT: 12.1 % (ref 12.3–15.4)
HCT VFR BLD AUTO: 39.3 % (ref 34–46.6)
HDLC SERPL-MCNC: 78 MG/DL (ref 40–60)
HGB BLD-MCNC: 14.2 G/DL (ref 12–15.9)
IMM GRANULOCYTES # BLD AUTO: 0.02 10*3/MM3 (ref 0–0.05)
IMM GRANULOCYTES NFR BLD AUTO: 0.4 % (ref 0–0.5)
LDLC SERPL CALC-MCNC: 102 MG/DL (ref 0–100)
LDLC/HDLC SERPL: 1.27 {RATIO}
LYMPHOCYTES # BLD AUTO: 1.49 10*3/MM3 (ref 0.7–3.1)
LYMPHOCYTES NFR BLD AUTO: 26.8 % (ref 19.6–45.3)
MCH RBC QN AUTO: 33.1 PG (ref 26.6–33)
MCHC RBC AUTO-ENTMCNC: 36.1 G/DL (ref 31.5–35.7)
MCV RBC AUTO: 91.6 FL (ref 79–97)
MONOCYTES # BLD AUTO: 0.46 10*3/MM3 (ref 0.1–0.9)
MONOCYTES NFR BLD AUTO: 8.3 % (ref 5–12)
NEUTROPHILS NFR BLD AUTO: 3.18 10*3/MM3 (ref 1.7–7)
NEUTROPHILS NFR BLD AUTO: 57.3 % (ref 42.7–76)
NRBC BLD AUTO-RTO: 0 /100 WBC (ref 0–0.2)
PLATELET # BLD AUTO: 117 10*3/MM3 (ref 140–450)
PMV BLD AUTO: 9.5 FL (ref 6–12)
RBC # BLD AUTO: 4.29 10*6/MM3 (ref 3.77–5.28)
T4 FREE SERPL-MCNC: 1.65 NG/DL (ref 0.93–1.7)
TRIGL SERPL-MCNC: 115 MG/DL (ref 0–150)
TSH SERPL DL<=0.05 MIU/L-ACNC: 1.64 UIU/ML (ref 0.27–4.2)
VLDLC SERPL-MCNC: 20 MG/DL (ref 5–40)
WBC # BLD AUTO: 5.55 10*3/MM3 (ref 3.4–10.8)

## 2021-04-28 PROCEDURE — 80061 LIPID PANEL: CPT | Performed by: INTERNAL MEDICINE

## 2021-04-28 PROCEDURE — 84439 ASSAY OF FREE THYROXINE: CPT | Performed by: INTERNAL MEDICINE

## 2021-04-28 PROCEDURE — 84443 ASSAY THYROID STIM HORMONE: CPT | Performed by: INTERNAL MEDICINE

## 2021-04-28 PROCEDURE — 99215 OFFICE O/P EST HI 40 MIN: CPT | Performed by: INTERNAL MEDICINE

## 2021-04-28 PROCEDURE — 85025 COMPLETE CBC W/AUTO DIFF WBC: CPT

## 2021-04-28 PROCEDURE — 36415 COLL VENOUS BLD VENIPUNCTURE: CPT

## 2021-04-28 NOTE — PROGRESS NOTES
Subjective    REASONS FOR FOLLOWUP:     1. History of Waldenstrom macroglobulinemia.    2. History of breast cancer stage I on the left, status post mastectomy. The patient completed aromatase inhibitor  X 5 YEARS IN 2019 without any evidence of breast cancer recurrence        History of Present Illness            DURING THE VISIT WITH THE PATIENT TODAY , PATIENT HAD FACE MASK, MY MEDICAL ASSISTANT AND I  HAD PROPPER PROTECTIVE EQUIPMENT, AND I DID HAND HYGIENE WITH SOAP AND WATER BEFORE AND AFTER THE VISIT.    This patient returns today to the office for followup still complaining of very significant fatigue for any activity. She only wants to sit at home on the couch or lie in bed and do nothing else. She does not believe that she is depressed. She has some shortness of breath upon exercise. She has no chest pain or palpitation. She denies any cognitive changes. Her appetite is acceptable. Her weight is stable. Her bowel function is normal. Urination is normal. She has not had any asthma exacerbations. She has not had any recent infections.     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     We reviewed with her a few days ago the monoclonal protein studies that were available and documented the stability of the IgM protein at 202. Actually this has been one of the lowest levels ever on her.          Past Medical History:   Diagnosis Date   • Acid reflux    • Asthma    • Clostridium difficile colitis     Requiring admission to [facility] in 09/2008   • H/O transesophageal echocardiography (MARICRUZ)    • History of transfusion of packed red blood cells     R/T SURGERY   • History of Waldenstrom's macroglobulinemia    • Leaky heart valve    • Seasonal allergies    • Thyroid nodule     Removed more than 35 years ago     Past Surgical History:   Procedure Laterality Date   • ADENOIDECTOMY     • APPENDECTOMY     • CARPAL TUNNEL  RELEASE Right    • CATARACT EXTRACTION Bilateral    • CHOLECYSTECTOMY     • COLONOSCOPY     • HYSTERECTOMY      Partial, many years ago, heavy bleeding, no cancer   • KNEE ARTHROSCOPY Right 03/2009    Finding of chondromalacia and appropriate cleanup of joint was performed by Dr. Moraes.   • MASTECTOMY Left 07/2014   • REPLACEMENT TOTAL KNEE Right 12/2015   • TONSILLECTOMY           Social History     Socioeconomic History   • Marital status:      Spouse name: Not on file   • Number of children: Not on file   • Years of education: Not on file   • Highest education level: Not on file   Tobacco Use   • Smoking status: Never Smoker   • Smokeless tobacco: Never Used   Substance and Sexual Activity   • Alcohol use: No   • Drug use: No   • Sexual activity: Defer     Family History   Problem Relation Age of Onset   • Mental illness Mother    • Migraines Mother    • Dementia Mother    • Heart disease Father    • Hypertension Father    • Kidney disease Father    • No Known Problems Daughter    • No Known Problems Daughter    • Breast cancer Other    • Heart disease Other    • Hypertension Other    • Diabetes Other    • Cancer Maternal Grandmother    • No Known Problems Maternal Grandfather    • No Known Problems Paternal Grandmother    • No Known Problems Paternal Grandfather    • Lymphoma Neg Hx    • Leukemia Neg Hx      Current Outpatient Medications on File Prior to Visit   Medication Sig Dispense Refill   • aspirin 81 MG tablet Take 81 mg by mouth Daily.     • Budesonide (RHINOCORT ALLERGY NA) 2 sprays into the nostril(s) as directed by provider Daily As Needed.     • budesonide-formoterol (SYMBICORT) 80-4.5 MCG/ACT inhaler Inhale 2 puffs 2 (Two) Times a Day.     • cetirizine (zyrTEC) 10 MG tablet Take 10 mg by mouth Daily.     • gabapentin (NEURONTIN) 400 MG capsule TAKE 1 CAPSULE BY MOUTH FOUR TIMES A DAY  120 capsule 5   • lansoprazole (PREVACID) 30 MG capsule TAKE 1 CAPSULE EVERY DAY 90 capsule 0   •  "levothyroxine (SYNTHROID, LEVOTHROID) 50 MCG tablet TAKE 1 TABLET EVERY DAY 90 tablet 1   • losartan (COZAAR) 100 MG tablet TAKE 1 TABLET BY MOUTH EVERY DAY  30 tablet 11   • metoprolol succinate XL (TOPROL-XL) 100 MG 24 hr tablet TAKE 1 TABLET BY MOUTH EVERY DAY  30 tablet 11   • ondansetron (ZOFRAN) 4 MG tablet Take 1 tablet by mouth Every 8 (Eight) Hours As Needed for Nausea or Vomiting. 30 tablet 5   • potassium chloride (K-DUR,KLOR-CON) 20 MEQ CR tablet TAKE 1 TABLET EVERY DAY 90 tablet 1   • terazosin (HYTRIN) 10 MG capsule TAKE 1 CAPSULE BY MOUTH TWO TIMES A DAY  60 capsule 11   • VENTOLIN  (90 Base) MCG/ACT inhaler        No current facility-administered medications on file prior to visit.       ALLERGIES:    Allergies   Allergen Reactions   • Cortisone Unknown - High Severity     Reports having a shot years ago, and wonders if he hit a vein. She was told by another doctor that it probably went in her blood stream.    • Darvon  [Propoxyphene] Palpitations             Objective      Vitals:    04/28/21 1140   BP: 160/72   Pulse: 65   Resp: 20   Temp: 97.5 °F (36.4 °C)   TempSrc: Temporal   SpO2: 96%   Weight: 64.5 kg (142 lb 1.6 oz)   Height: 165.1 cm (65\")   PainSc: 0-No pain     Current Status 4/28/2021   ECOG score 0         Physical Exam I HAVE PERSONALLY REVIEWED THE HISTORY OF THE PRESENT ILLNESS, PAST MEDICAL HISTORY, FAMILY HISTORY, SOCIAL HISTORY, ALLERGIES, MEDICATIONS STATED ABOVE IN THE OFFICE NOTE FROM TODAY.        GENERAL:  Well-developed, well-nourished  Patient  in no acute distress. VERY FATIGUED  SKIN:  Warm, dry ,NO rashes,NO purpura ,NO petechiae.  HEENT:  Pupils were equal and reactive to light and accomodation, conjunctivae noninjected, no pterygium, normal extraocular movements, normal visual acuity.   NECK:  Supple with good range of motion; no thyromegaly or masses, no JVD or bruits, no cervical adenopathies.No carotid artery pain, no carotid abnormal pulsation , NO arterial " dance.  LYMPHATICS:  No cervical, NO supraclavicular, NO axillary,NO epitrochlear , NO inguinal adenopathy.  CARDIAC   normal rate and regular rhythm, without murmur,NO rubs NO S3 NO S4 right or left . Normal femoral, popliteal, pedis, brachial and carotid pulses.  VASCULAR ARTERIAL: normal carotids,brachial,radial,femoral,popliteal, pedis pulses , no bruits.no paleness or cyanosis, no pain, no edema, no numbness, no gangrene.  VASCULAR VENOUS: no cyanosis, collateral circulation, varicosities, edema, palpable cords, pain, erythema.  ABDOMEN:  Soft, nontender with no hepatomegaly, no splenomegaly,no masses, no ascites, no collateral circulation,no distention,no Max sign, no abdominal pain, no inguinal hernias,no umbilical hernia, no abdominal bruits. Normal bowel sounds.  GENITAL: Not  Performed.  EXTREMITIES  AND SPINE:  No clubbing, cyanosis or edema, no deformities or pain .No kyphosis, scoliosis, deformities or pain in spine, ribs or pelvic bone.  NEUROLOGICAL:  Patient was awake, alert, oriented to time, person and place.                        RECENT LABS:  Results from last 7 days   Lab Units 04/28/21  1122   WBC 10*3/mm3 5.55   NEUTROS ABS 10*3/mm3 3.18   HEMOGLOBIN g/dL 14.2   HEMATOCRIT % 39.3   PLATELETS 10*3/mm3 117*              CT CHEST WO CONTRAST DIAGNOSTIC-, CT ABDOMEN PELVIS WO CONTRAST-     HISTORY:  Breast cancer, follow-up.     TECHNIQUE: CT of the chest, abdomen and pelvis was performed without  non-ionic intravenous contrast. Reformatted images were reviewed.  Radiation dose reduction techniques were utilized, including automated  exposure control and exposure modulation based on body size.     COMPARISON:  CT chest without contrast 04/03/2019. CT abdomen and pelvis  with contrast 09/17/2012.     FINDINGS CHEST:  No pathologically enlarged thoracic lymph nodes are identified. Heart  size is normal. There is no pericardial effusion. There is moderate  calcific aortic atherosclerosis. There  is moderate calcific aortic and  branch vessel atherosclerosis. Great vessels are normal in size. Pleural  spaces are clear.  There are postsurgical changes from left mastectomy. There is multilevel  degenerative disc disease. There is diffuse osseous demineralization.  There is exaggeration of the normal thoracic kyphosis.  The trachea and central bronchi are clear. There is no focal  consolidation. There is a coarse calcification along the pleura  overlying the anterior right upper lobe. Scattered bilateral pulmonary  nodules in both lungs are not significant changed dating back to at  least 07/17/2018 and are likely benign.     FINDINGS ABDOMEN/PELVIS:   The liver is normal in size. The gallbladder is surgically absent. There  are no pancreatic calcifications. The spleen, adrenal glands, and  kidneys are within normal limits. There is extensive calcific aortoiliac  atherosclerosis. No pathologically enlarged abdominal or pelvic lymph  nodes are identified.  There is a tiny hiatal hernia. There is no bowel obstruction. There is  colonic diverticulosis without CT evidence of acute diverticulitis. The  bladder is poorly distended and all evaluated. The uterus is not seen.  There is no adnexal mass.  There is multilevel degenerative disc disease. There is diffuse osseous  demineralization.     Limitations: Evaluation of the solid parenchymal organs and vasculature  is limited due to lack of intravenous contrast.        IMPRESSION:  COMBINED IMPRESSION: No convincing CT evidence of metastatic disease in  the chest, abdomen, or pelvis within limitations of this noncontrast  enhanced exam.     This report was finalized on 4/19/2021 7:47 AM by Dr. Ritika Brown M.D.           Assessment/Plan      1. Waldenstrom macroglobulinemia that has been treated in the past mainly with Rituxan. In the past, also she was treated with Velcade with very dramatic improvement in her monoclonal protein but very dramatic sensory peripheral  neuropathy. This medication was discontinued altogether.    6/29/2020, she developed progressive fatigue with worsening neuropathy in her feet.  These were her original symptoms at diagnosis of Waldenstrom's.  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  Calcium also elevated at 10.5 with decreased sodium related to pseudohyponatremia associated with monoclonal protein.  Previously, she did not receive improvement from Rituxan, she is not thought to be a candidate for Imbruvica, therefore she went on to initiate venetoclax 7/13/2020.  She is currently on her next planned dose of 200 mg daily with poor tolerance as outlined below  I discussed with her on 04/28/2021 that I do not believe given the information that we have with a sedimentation rate that was normal and a stable monoclonal protein IgM that the Waldenstrom could be the most focal reason to explain her fatigue. On the other hand it is impossible given her thrombocytopenia to be sure that she still has some component of lymphoma in her bone marrow and the only way to prove this will be to pursue in a bone marrow test. She does not believe that she is ready for this at this time.     Radiologically speaking the CT scan of the chest, abdomen and pelvis failed to document any lymphadenopathy, masses, splenomegaly or any other issue pertinent to her Waldenstrom.    ·   2.  History of left breast cancer, status post mastectomy.  She completed adjuvant therapy.  Mammogram 6/30/2020 benign  The right breast examination today remains normal. Her left-sided mastectomy remains unchanged. Breast cancer is not an issue in this patient at this time.    ·   3.  Significant fatigue related to disease state.  Her fatigue has exacerbated with the initiation of venetoclax, particularly at maximum dose of 200 mg.Since the previous visit, nurse practitioner under my advice decreased the dose of venetoclax to 100 mg a day. She still has fatigue but not as severe as she had  when she was taking a larger amount of the medicine. On the other hand her sensory neuropathy in her feet is a little bit better. It happens mainly at night and she has no need to take any medicine. Her appetite is good. Her weight is stable. Bowel function and urination are normal. She has no rash, no fever, no infection, no bleeding. Her white count is normal. Leukopenia associated with venetoclax. Hemoglobin and platelet count are stable. Her right breast exam is normal. Her left chest wall is normal.     The patient was further reviewed on 04/28/2021 in regard to fatigue. The only things that we have not tested in this regard is her thyroid function and I do believe that cardiac disease can show up in women with just fatigue and shortness of breath. She has a very heavy calcified aorta in the abdomen and calcification of the iliac arteries. If this is the case there very likely her coronary output is in similar shape and I wonder if the fatigue could be a reflection of heart disease. The only way to know would be to pursue proper assessment by Cardiology and I have advised her to pursue a visit to see them.     In the meantime today I asked her to go ahead and proceed with a lipid profile and a thyroid profile. The lipid profile was unremarkable. The total cholesterol at the age, normal triglycerides and very good HDL cholesterol.     I also asked her to pursue with her TSH and T3 and T4. These numbers were perfectly fine on her today.     Therefore, we will wait and see what the referral to Cardiology is going to show. If that does not give us the answer I think the patient will require bone marrow testing.     She raised the question about reviewing her medications. I do not believe that she needs to take anymore potassium supplement because she is not taking diuretics anymore and this medicine will be discontinued at this time. Also the patient raised the question in regard to how we are going to follow up on  her Waldenstrom. This will require monoclonal protein in 4 weeks or so.     The patient is aware that if the cardiological assessment does not give us the clue about her fatigue I think we need to eventually pursue bone marrow testing.       I reviewed the medication list on this patient. The metoprolol that she takes is in a high dose, XL metoprolol 100 mg q.24 h. She raised the question if this could have something to do with her teeth. I pointed out to her that this could be the case and I advised her to consider this and discuss this with the cardiologist in the upcoming visit.      The lipid profile and the thyroid profile were reported to the patient by telephonic conversation by the end of the day on 04/28/2021 at 6 p.m. in the evening. Nothing needs to be changed until she is seen again by Cardiology.                    Keli Crowe MA   4/28/2021      CC:

## 2021-05-03 NOTE — PROGRESS NOTES
RM:________    Referral Provider: Florencio Larose MD Bratton, Timothy A, MD    NEW PATIENT/ CONSULT  PREVIOUS CARDIOLOGIST:   CARDIAC TESTING:     : 1940   AGE: 80 y.o.    2021  REASON FOR VISIT/  CC:      WT: ____________ BP: __________L __________R/ HR_______________    CHEST PAIN: _____________    SOA: ____________PALPS: __________      LIGHTHEADED: ___________ FATIGUE: _______________ EDEMA______________  ALLERGIES:  Cortisone and Darvon  [propoxyphene]  SMOKING HISTORY  Social History     Tobacco Use   • Smoking status: Never Smoker   • Smokeless tobacco: Never Used   Substance Use Topics   • Alcohol use: No   • Drug use: No          CHILDREN: _______________________       CAFFEINE USE________  ALCOHOL _____________  OCCUPATION_____________  Past Surgical History:   Procedure Laterality Date   • ADENOIDECTOMY     • APPENDECTOMY     • CARPAL TUNNEL RELEASE Right    • CATARACT EXTRACTION Bilateral    • CHOLECYSTECTOMY     • COLONOSCOPY     • HYSTERECTOMY      Partial, many years ago, heavy bleeding, no cancer   • KNEE ARTHROSCOPY Right 2009    Finding of chondromalacia and appropriate cleanup of joint was performed by Dr. Moraes.   • MASTECTOMY Left 2014   • REPLACEMENT TOTAL KNEE Right 2015   • TONSILLECTOMY                  FAMILY HISTORY  HEART DISEASE  CHF  DIABETES  CARDIAC ARREST  STROKE  CANCER  ANEURYSM                                                             H/O: MI_____   STROKE________   GOUT_____   ANEMIA______     CAROTID________ HIV____ CAD_______ HYPERCHOL _____    H/O: CHF _____   RF____ DM___ HTN_______PVD____THYROID DISEASE_______    PMH: GI ____   HEPATITIS ___ KIDNEY DISEASE ___ LUNG DISEASE _______     SLEEP APNEA ____ BLOOD CLOTS ____ DVT ____ VEIN STRIPPING ___________     CANCER _________________________________ CHEMO/ RADIATION__________

## 2021-05-07 ENCOUNTER — OFFICE VISIT (OUTPATIENT)
Dept: CARDIOLOGY | Facility: CLINIC | Age: 81
End: 2021-05-07

## 2021-05-07 ENCOUNTER — HOSPITAL ENCOUNTER (OUTPATIENT)
Dept: CARDIOLOGY | Facility: HOSPITAL | Age: 81
Discharge: HOME | End: 2021-05-07
Admitting: INTERNAL MEDICINE

## 2021-05-07 VITALS
RESPIRATION RATE: 16 BRPM | HEIGHT: 65 IN | OXYGEN SATURATION: 96 % | WEIGHT: 145 LBS | BODY MASS INDEX: 24.16 KG/M2 | HEART RATE: 56 BPM | DIASTOLIC BLOOD PRESSURE: 62 MMHG | SYSTOLIC BLOOD PRESSURE: 110 MMHG

## 2021-05-07 DIAGNOSIS — R06.02 SHORTNESS OF BREATH: ICD-10-CM

## 2021-05-07 DIAGNOSIS — C88.0 MACROGLOBULINEMIA OF WALDENSTROM (HCC): ICD-10-CM

## 2021-05-07 DIAGNOSIS — Z92.21 HISTORY OF AROMATASE INHIBITOR THERAPY: ICD-10-CM

## 2021-05-07 DIAGNOSIS — Z17.0 MALIGNANT NEOPLASM OF OVERLAPPING SITES OF LEFT BREAST IN FEMALE, ESTROGEN RECEPTOR POSITIVE (HCC): ICD-10-CM

## 2021-05-07 DIAGNOSIS — R53.83 FATIGUE, UNSPECIFIED TYPE: Primary | ICD-10-CM

## 2021-05-07 DIAGNOSIS — I10 ESSENTIAL HYPERTENSION: Chronic | ICD-10-CM

## 2021-05-07 DIAGNOSIS — C50.812 MALIGNANT NEOPLASM OF OVERLAPPING SITES OF LEFT BREAST IN FEMALE, ESTROGEN RECEPTOR POSITIVE (HCC): ICD-10-CM

## 2021-05-07 LAB
BH CV STRESS BP STAGE 1: NORMAL
BH CV STRESS DURATION MIN STAGE 1: 2
BH CV STRESS DURATION SEC STAGE 1: 30
BH CV STRESS GRADE STAGE 1: 0
BH CV STRESS HR STAGE 1: 82
BH CV STRESS METS STAGE 1: 2
BH CV STRESS PROTOCOL 1: NORMAL
BH CV STRESS RECOVERY BP: NORMAL MMHG
BH CV STRESS RECOVERY HR: 52 BPM
BH CV STRESS SPEED STAGE 1: 1.7
BH CV STRESS STAGE 1: 1
MAXIMAL PREDICTED HEART RATE: 139 BPM
PERCENT MAX PREDICTED HR: 58.99 %
STRESS BASELINE BP: NORMAL MMHG
STRESS BASELINE HR: 71 BPM
STRESS PERCENT HR: 69 %
STRESS POST ESTIMATED WORKLOAD: 2 METS
STRESS POST EXERCISE DUR MIN: 2 MIN
STRESS POST EXERCISE DUR SEC: 30 SEC
STRESS POST PEAK BP: NORMAL MMHG
STRESS POST PEAK HR: 82 BPM
STRESS TARGET HR: 118 BPM

## 2021-05-07 PROCEDURE — 99204 OFFICE O/P NEW MOD 45 MIN: CPT | Performed by: INTERNAL MEDICINE

## 2021-05-07 PROCEDURE — 93017 CV STRESS TEST TRACING ONLY: CPT

## 2021-05-07 PROCEDURE — 93018 CV STRESS TEST I&R ONLY: CPT | Performed by: INTERNAL MEDICINE

## 2021-05-07 PROCEDURE — 93016 CV STRESS TEST SUPVJ ONLY: CPT | Performed by: INTERNAL MEDICINE

## 2021-05-07 PROCEDURE — 93000 ELECTROCARDIOGRAM COMPLETE: CPT | Performed by: INTERNAL MEDICINE

## 2021-05-07 NOTE — PROGRESS NOTES
Date of Office Visit: 21  Encounter Provider: Bacilio Hernández MD  Place of Service: [facility] CARDIOLOGY  Patient Name: Karli Loomis  :1940    Chief Complaint   Patient presents with   • Fatigue   :     HPI:     Ms. Loomis is 80 y.o. and presents today in cardio-oncology consultation for fatigue at the request of Dr Larose.    She has a history of right sided breast cancer treated. She underwent mastectomy and took an aromatase inhibitor for five years, which was completed in 2019. She did not receive chemotherapy or radiation. She has Waldenstrom's macroglobulinemia and has been on rituximab, bortezomib, and venetoclax. She has hypertension, treated with losartan and metoprolol succinate. I can see in Epic that she has been on minoxidil and amlodipine in the past. She does not have diabetes or hyperlipidemia.     She reports severe exertional fatigue and weakness. She doesn't feel near syncopal, but feels that if she doesn't stop walking, her legs will just give out on her due to weakness.  She does get mildly winded with exertion but has no chest pain.     Past Medical History:   Diagnosis Date   • Acid reflux    • Adult hypothyroidism 2017   • Asthma    • Chronic pansinusitis 2018   • Clostridium difficile colitis     Requiring admission to [facility] in 2008   • Drug-induced polyneuropathy (CMS/HCC) 2017   • Epilepsy with simple partial seizures (CMS/HCC) 2017   • Essential hypertension 2019   • Headache, migraine 2017   • History of transfusion of packed red blood cells     R/T SURGERY   • History of Waldenstrom's macroglobulinemia    • Malignant neoplasm of overlapping sites of left breast in female, estrogen receptor positive (CMS/HCC) 2016   • Primary osteoarthritis of left knee 2015   • Seasonal allergies    • Thyroid nodule     Removed more than 35 years ago   • TIA (transient ischemic attack)  10/18/2018   • UTI due to extended-spectrum beta lactamase (ESBL) producing Escherichia coli 1/6/2019       Past Surgical History:   Procedure Laterality Date   • ADENOIDECTOMY     • APPENDECTOMY     • CARPAL TUNNEL RELEASE Right    • CATARACT EXTRACTION Bilateral    • CHOLECYSTECTOMY     • COLONOSCOPY     • HYSTERECTOMY      Partial, many years ago, heavy bleeding, no cancer   • KNEE ARTHROSCOPY Right 03/2009    Finding of chondromalacia and appropriate cleanup of joint was performed by Dr. Moraes.   • MASTECTOMY Left 07/2014   • REPLACEMENT TOTAL KNEE Right 12/2015   • TONSILLECTOMY         Social History     Socioeconomic History   • Marital status:      Spouse name: Not on file   • Number of children: Not on file   • Years of education: Not on file   • Highest education level: Not on file   Tobacco Use   • Smoking status: Never Smoker   • Smokeless tobacco: Never Used   Vaping Use   • Vaping Use: Never used   Substance and Sexual Activity   • Alcohol use: No   • Drug use: No   • Sexual activity: Defer       Family History   Problem Relation Age of Onset   • Mental illness Mother    • Migraines Mother    • Dementia Mother    • Heart disease Father    • Hypertension Father    • Kidney disease Father    • No Known Problems Daughter    • No Known Problems Daughter    • Breast cancer Other    • Heart disease Other    • Hypertension Other    • Diabetes Other    • Cancer Maternal Grandmother    • No Known Problems Maternal Grandfather    • No Known Problems Paternal Grandmother    • No Known Problems Paternal Grandfather    • Lymphoma Neg Hx    • Leukemia Neg Hx        Review of Systems   Constitutional: Positive for malaise/fatigue.   Neurological: Positive for weakness.   All other systems reviewed and are negative.      Allergies   Allergen Reactions   • Cortisone Unknown - High Severity     Reports having a shot years ago, and wonders if he hit a vein. She was told by another doctor that it probably went in  "her blood stream.    • Darvon  [Propoxyphene] Palpitations         Current Outpatient Medications:   •  aspirin 81 MG tablet, Take 81 mg by mouth Daily., Disp: , Rfl:   •  Budesonide (RHINOCORT ALLERGY NA), 2 sprays into the nostril(s) as directed by provider Daily As Needed., Disp: , Rfl:   •  budesonide-formoterol (SYMBICORT) 80-4.5 MCG/ACT inhaler, Inhale 2 puffs 2 (Two) Times a Day., Disp: , Rfl:   •  cetirizine (zyrTEC) 10 MG tablet, Take 10 mg by mouth Daily., Disp: , Rfl:   •  gabapentin (NEURONTIN) 400 MG capsule, TAKE 1 CAPSULE BY MOUTH FOUR TIMES A DAY , Disp: 120 capsule, Rfl: 5  •  lansoprazole (PREVACID) 30 MG capsule, TAKE 1 CAPSULE EVERY DAY, Disp: 90 capsule, Rfl: 0  •  levothyroxine (SYNTHROID, LEVOTHROID) 50 MCG tablet, TAKE 1 TABLET EVERY DAY, Disp: 90 tablet, Rfl: 1  •  losartan (COZAAR) 100 MG tablet, TAKE 1 TABLET BY MOUTH EVERY DAY , Disp: 30 tablet, Rfl: 11  •  metoprolol succinate XL (TOPROL-XL) 100 MG 24 hr tablet, TAKE 1 TABLET BY MOUTH EVERY DAY , Disp: 30 tablet, Rfl: 11  •  terazosin (HYTRIN) 10 MG capsule, TAKE 1 CAPSULE BY MOUTH TWO TIMES A DAY , Disp: 60 capsule, Rfl: 11  •  VENTOLIN  (90 Base) MCG/ACT inhaler, , Disp: , Rfl:       Objective:     Vitals:    05/07/21 1310   BP: 110/62   BP Location: Right arm   Pulse: 56   Resp: 16   SpO2: 96%   Weight: 65.8 kg (145 lb)   Height: 165.1 cm (65\")     Body mass index is 24.13 kg/m².    Constitutional:       Appearance: Healthy appearance. Not in distress.   Eyes:      Conjunctiva/sclera: Conjunctivae normal.   HENT:      Nose: Nose normal.         Comments: Masked  Pulmonary:      Effort: Pulmonary effort is normal.      Breath sounds: Normal breath sounds.   Cardiovascular:      Normal rate. Regular rhythm.      Murmurs: There is no murmur.   Pulses:     Intact distal pulses.   Edema:     Peripheral edema absent.   Abdominal:      Palpations: Abdomen is soft.      Tenderness: There is no abdominal tenderness.   Musculoskeletal: " Normal range of motion.      Cervical back: Normal range of motion. Skin:     General: Skin is warm and dry.   Neurological:      General: No focal deficit present.      Mental Status: Alert and oriented to person, place and time.           ECG 12 Lead    Date/Time: 5/7/2021 1:33 PM  Performed by: Bacilio Hernández MD  Authorized by: Bacilio Hernández MD   Comparison: compared with previous ECG   Similar to previous ECG  Rhythm: sinus bradycardia  Conduction: conduction normal  ST Segments: ST segments normal  T Waves: T waves normal  QRS axis: normal  Other findings: left ventricular hypertrophy    Clinical impression: non-specific ECG              Assessment:       Diagnosis Plan   1. Fatigue, unspecified type  ECG 12 Lead    Treadmill Stress Test    Stress Test With Myocardial Perfusion One Day   2. Shortness of breath  Treadmill Stress Test    Stress Test With Myocardial Perfusion One Day   3. Essential hypertension     4. Malignant neoplasm of overlapping sites of left breast in female, estrogen receptor positive (CMS/HCC)     5. Macroglobulinemia of Waldenstrom (CMS/HCC)     6. History of aromatase inhibitor therapy            Plan:       Ms. Loomis reports profound exertional fatigue.  It is so severe that she feels like her legs will give out when she walks.  She gets minimally winded with exertion.  She does not have chest pain.  Her cardiac exam is unremarkable.  I did note that her blood pressure and heart rate were both on the low side and that her metoprolol dose was increased in October of last year.  She feels that her symptoms really ramped up around that time.  I was able to do a modified Kareem treadmill on her today.  Her heart rate went from 70 to 80 bpm with exercise, and immediately dropped to 50 in recovery.  Her oxygen saturation remained normal, and her blood pressure stayed the same throughout.  I asked her to cut metoprolol in half to 50 mg daily for now. She will remain on losartan.  She is  also on terazosin for her blood pressure.  I think a reasonable systolic blood pressure goal at her age would be 130 to 140 mmHg.  I definitely think she needs more of a chronotropic response.  As her symptoms could be an anginal equivalent, I have recommended a nonwalking perfusion stress test to exclude ischemia.  That will also let me re-evaluate her heart rate and blood pressure and further make adjustments to her medications.    Sincerely,       Bacilio Hernández MD

## 2021-06-02 ENCOUNTER — LAB (OUTPATIENT)
Dept: LAB | Facility: HOSPITAL | Age: 81
End: 2021-06-02

## 2021-06-02 ENCOUNTER — OFFICE VISIT (OUTPATIENT)
Dept: ONCOLOGY | Facility: CLINIC | Age: 81
End: 2021-06-02

## 2021-06-02 VITALS
OXYGEN SATURATION: 96 % | DIASTOLIC BLOOD PRESSURE: 71 MMHG | BODY MASS INDEX: 24.04 KG/M2 | TEMPERATURE: 96 F | HEART RATE: 64 BPM | WEIGHT: 144.3 LBS | HEIGHT: 65 IN | SYSTOLIC BLOOD PRESSURE: 179 MMHG | RESPIRATION RATE: 17 BRPM

## 2021-06-02 DIAGNOSIS — R53.0 NEOPLASTIC MALIGNANT RELATED FATIGUE: ICD-10-CM

## 2021-06-02 DIAGNOSIS — Z17.0 MALIGNANT NEOPLASM OF OVERLAPPING SITES OF LEFT BREAST IN FEMALE, ESTROGEN RECEPTOR POSITIVE (HCC): Primary | ICD-10-CM

## 2021-06-02 DIAGNOSIS — C88.0 MACROGLOBULINEMIA OF WALDENSTROM (HCC): ICD-10-CM

## 2021-06-02 DIAGNOSIS — C50.812 MALIGNANT NEOPLASM OF OVERLAPPING SITES OF LEFT BREAST IN FEMALE, ESTROGEN RECEPTOR POSITIVE (HCC): Primary | ICD-10-CM

## 2021-06-02 DIAGNOSIS — R06.02 SOB (SHORTNESS OF BREATH) ON EXERTION: ICD-10-CM

## 2021-06-02 DIAGNOSIS — E87.1 HYPONATREMIA WITH NORMAL EXTRACELLULAR FLUID VOLUME: ICD-10-CM

## 2021-06-02 DIAGNOSIS — C50.812 MALIGNANT NEOPLASM OF OVERLAPPING SITES OF LEFT BREAST IN FEMALE, ESTROGEN RECEPTOR POSITIVE (HCC): ICD-10-CM

## 2021-06-02 DIAGNOSIS — Z17.0 MALIGNANT NEOPLASM OF OVERLAPPING SITES OF LEFT BREAST IN FEMALE, ESTROGEN RECEPTOR POSITIVE (HCC): ICD-10-CM

## 2021-06-02 LAB
ALBUMIN SERPL-MCNC: 4.4 G/DL (ref 3.5–5.2)
ALBUMIN/GLOB SERPL: 2.1 G/DL (ref 1.1–2.4)
ALP SERPL-CCNC: 69 U/L (ref 38–116)
ALT SERPL W P-5'-P-CCNC: 8 U/L (ref 0–33)
ANION GAP SERPL CALCULATED.3IONS-SCNC: 9.2 MMOL/L (ref 5–15)
AST SERPL-CCNC: 17 U/L (ref 0–32)
BASOPHILS # BLD AUTO: 0.03 10*3/MM3 (ref 0–0.2)
BASOPHILS NFR BLD AUTO: 0.7 % (ref 0–1.5)
BILIRUB SERPL-MCNC: 0.7 MG/DL (ref 0.2–1.2)
BUN SERPL-MCNC: 9 MG/DL (ref 6–20)
BUN/CREAT SERPL: 10.2 (ref 7.3–30)
CALCIUM SPEC-SCNC: 9.8 MG/DL (ref 8.5–10.2)
CHLORIDE SERPL-SCNC: 96 MMOL/L (ref 98–107)
CO2 SERPL-SCNC: 29.8 MMOL/L (ref 22–29)
CREAT SERPL-MCNC: 0.88 MG/DL (ref 0.6–1.1)
DEPRECATED RDW RBC AUTO: 43.3 FL (ref 37–54)
EOSINOPHIL # BLD AUTO: 0.25 10*3/MM3 (ref 0–0.4)
EOSINOPHIL NFR BLD AUTO: 5.5 % (ref 0.3–6.2)
ERYTHROCYTE [DISTWIDTH] IN BLOOD BY AUTOMATED COUNT: 12.4 % (ref 12.3–15.4)
GFR SERPL CREATININE-BSD FRML MDRD: 62 ML/MIN/1.73
GLOBULIN UR ELPH-MCNC: 2.1 GM/DL (ref 1.8–3.5)
GLUCOSE SERPL-MCNC: 122 MG/DL (ref 74–124)
HCT VFR BLD AUTO: 41.6 % (ref 34–46.6)
HGB BLD-MCNC: 14.4 G/DL (ref 12–15.9)
IMM GRANULOCYTES # BLD AUTO: 0.01 10*3/MM3 (ref 0–0.05)
IMM GRANULOCYTES NFR BLD AUTO: 0.2 % (ref 0–0.5)
LYMPHOCYTES # BLD AUTO: 1.4 10*3/MM3 (ref 0.7–3.1)
LYMPHOCYTES NFR BLD AUTO: 30.6 % (ref 19.6–45.3)
MCH RBC QN AUTO: 32.8 PG (ref 26.6–33)
MCHC RBC AUTO-ENTMCNC: 34.6 G/DL (ref 31.5–35.7)
MCV RBC AUTO: 94.8 FL (ref 79–97)
MONOCYTES # BLD AUTO: 0.32 10*3/MM3 (ref 0.1–0.9)
MONOCYTES NFR BLD AUTO: 7 % (ref 5–12)
NEUTROPHILS NFR BLD AUTO: 2.57 10*3/MM3 (ref 1.7–7)
NEUTROPHILS NFR BLD AUTO: 56 % (ref 42.7–76)
NRBC BLD AUTO-RTO: 0 /100 WBC (ref 0–0.2)
PLATELET # BLD AUTO: 115 10*3/MM3 (ref 140–450)
PMV BLD AUTO: 10 FL (ref 6–12)
POTASSIUM SERPL-SCNC: 4 MMOL/L (ref 3.5–4.7)
PROT SERPL-MCNC: 6.5 G/DL (ref 6.3–8)
RBC # BLD AUTO: 4.39 10*6/MM3 (ref 3.77–5.28)
SODIUM SERPL-SCNC: 135 MMOL/L (ref 134–145)
WBC # BLD AUTO: 4.58 10*3/MM3 (ref 3.4–10.8)

## 2021-06-02 PROCEDURE — 36415 COLL VENOUS BLD VENIPUNCTURE: CPT

## 2021-06-02 PROCEDURE — 99214 OFFICE O/P EST MOD 30 MIN: CPT | Performed by: INTERNAL MEDICINE

## 2021-06-02 PROCEDURE — 85025 COMPLETE CBC W/AUTO DIFF WBC: CPT

## 2021-06-02 PROCEDURE — 80053 COMPREHEN METABOLIC PANEL: CPT

## 2021-06-02 RX ORDER — HYDROCODONE BITARTRATE AND ACETAMINOPHEN 5; 325 MG/1; MG/1
TABLET ORAL
COMMUNITY
Start: 2021-05-10 | End: 2021-08-09

## 2021-06-02 NOTE — PROGRESS NOTES
Subjective    REASONS FOR FOLLOWUP:     1. History of Waldenstrom macroglobulinemia.    2. History of breast cancer stage I on the left, status post mastectomy. The patient completed aromatase inhibitor  X 5 YEARS IN 2019 without any evidence of breast cancer recurrence        History of Present Illness      DURING THE VISIT WITH THE PATIENT TODAY , PATIENT HAD FACE MASK, I HAD PROPER PROTECTIVE EQUIPMENT, AND I DID HAND HYGIENE WITH SOAP AND WATER BEFORE AND AFTER THE VISIT.      This patient returns today to the office for follow up of her history of Waldenstrom's macroglobulinemia undergoing observation. The previous monoclonal protein study a couple of months ago was actually very good and we did not recommend any other intervention. The patient returns today still complaining of profound fatigue for any activity, not too much of an appetite. Her weight remains stable. She has not had any external clinical bleeding, bone pain, joint pain, nocturnal sweats, chills, fever, night sweats or any other new problem. She has had a skin cancer removed from the right lower extremity. She needs to go back to see her dermatologist to remove another one in the left upper extremity. She denies any cough, wheezing or asthma exacerbation in spite of so much pollen. No chest pain, no palpitations, no swelling. No bone pain, no joint pain, no rashes.     She also was advised by Bacilio Hernández MD, to decrease her blood pressure medicine metoprolol to half of the dose. She did that for a few days, her blood pressure has risen and she went back to the usual dose. She did not feel in any way different when she was taking half of the dose for a week or so.         Past Medical History:   Diagnosis Date   • Acid reflux    • Adult hypothyroidism 12/14/2017   • Asthma    • Chronic pansinusitis 7/12/2018   • Clostridium difficile colitis     Requiring admission to [facility] in 09/2008   • Drug-induced polyneuropathy (CMS/HCC)  12/14/2017   • Epilepsy with simple partial seizures (CMS/HCC) 12/14/2017   • Essential hypertension 1/6/2019   • Headache, migraine 12/14/2017   • History of transfusion of packed red blood cells     R/T SURGERY   • History of Waldenstrom's macroglobulinemia    • Malignant neoplasm of overlapping sites of left breast in female, estrogen receptor positive (CMS/HCC) 4/13/2016   • Primary osteoarthritis of left knee 7/22/2015   • Seasonal allergies    • Thyroid nodule     Removed more than 35 years ago   • TIA (transient ischemic attack) 10/18/2018   • UTI due to extended-spectrum beta lactamase (ESBL) producing Escherichia coli 1/6/2019     Past Surgical History:   Procedure Laterality Date   • ADENOIDECTOMY     • APPENDECTOMY     • CARPAL TUNNEL RELEASE Right    • CATARACT EXTRACTION Bilateral    • CHOLECYSTECTOMY     • COLONOSCOPY     • HYSTERECTOMY      Partial, many years ago, heavy bleeding, no cancer   • KNEE ARTHROSCOPY Right 03/2009    Finding of chondromalacia and appropriate cleanup of joint was performed by Dr. Moraes.   • MASTECTOMY Left 07/2014   • REPLACEMENT TOTAL KNEE Right 12/2015   • TONSILLECTOMY           Social History     Socioeconomic History   • Marital status:      Spouse name: Not on file   • Number of children: Not on file   • Years of education: Not on file   • Highest education level: Not on file   Tobacco Use   • Smoking status: Never Smoker   • Smokeless tobacco: Never Used   Vaping Use   • Vaping Use: Never used   Substance and Sexual Activity   • Alcohol use: No   • Drug use: No   • Sexual activity: Defer     Family History   Problem Relation Age of Onset   • Mental illness Mother    • Migraines Mother    • Dementia Mother    • Heart disease Father    • Hypertension Father    • Kidney disease Father    • No Known Problems Daughter    • No Known Problems Daughter    • Breast cancer Other    • Heart disease Other    • Hypertension Other    • Diabetes Other    • Cancer Maternal  "Grandmother    • No Known Problems Maternal Grandfather    • No Known Problems Paternal Grandmother    • No Known Problems Paternal Grandfather    • Lymphoma Neg Hx    • Leukemia Neg Hx      Current Outpatient Medications on File Prior to Visit   Medication Sig Dispense Refill   • aspirin 81 MG tablet Take 81 mg by mouth Daily.     • Budesonide (RHINOCORT ALLERGY NA) 2 sprays into the nostril(s) as directed by provider Daily As Needed.     • budesonide-formoterol (SYMBICORT) 80-4.5 MCG/ACT inhaler Inhale 2 puffs 2 (Two) Times a Day.     • cetirizine (zyrTEC) 10 MG tablet Take 10 mg by mouth Daily.     • gabapentin (NEURONTIN) 400 MG capsule TAKE 1 CAPSULE BY MOUTH FOUR TIMES A DAY  120 capsule 5   • HYDROcodone-acetaminophen (NORCO) 5-325 MG per tablet TAKE 1 TO 2 TABLETS BY MOUTH EVERY SIX HOURS AS NEEDED FOR PAIN     • lansoprazole (PREVACID) 30 MG capsule TAKE 1 CAPSULE EVERY DAY 90 capsule 0   • levothyroxine (SYNTHROID, LEVOTHROID) 50 MCG tablet TAKE 1 TABLET EVERY DAY 90 tablet 1   • losartan (COZAAR) 100 MG tablet TAKE 1 TABLET BY MOUTH EVERY DAY  30 tablet 11   • metoprolol succinate XL (TOPROL-XL) 100 MG 24 hr tablet TAKE 1 TABLET BY MOUTH EVERY DAY  30 tablet 11   • terazosin (HYTRIN) 10 MG capsule TAKE 1 CAPSULE BY MOUTH TWO TIMES A DAY  60 capsule 11   • VENTOLIN  (90 Base) MCG/ACT inhaler        No current facility-administered medications on file prior to visit.       ALLERGIES:    Allergies   Allergen Reactions   • Cortisone Unknown - High Severity     Reports having a shot years ago, and wonders if he hit a vein. She was told by another doctor that it probably went in her blood stream.    • Darvon  [Propoxyphene] Palpitations             Objective      Vitals:    06/02/21 1237   Height: 165.1 cm (65\")     Current Status 6/2/2021   ECOG score 0         Physical Exam I HAVE PERSONALLY REVIEWED THE HISTORY OF THE PRESENT ILLNESS, PAST MEDICAL HISTORY, FAMILY HISTORY, SOCIAL HISTORY, ALLERGIES, " MEDICATIONS STATED ABOVE IN THE OFFICE NOTE FROM TODAY.        GENERAL:  Well-developed, well-nourished  Patient  in no acute distress.   SKIN:  Warm, dry ,NO rashes,NO purpura ,NO petechiae.skin cancer left arm. Scar skin cancer removal rle  HEENT:  Pupils were equal and reactive to light and accomodation, conjunctivae noninjected, no pterygium, normal extraocular movements, normal visual acuity.   NECK:  Supple with good range of motion; no thyromegaly or masses, no JVD or bruits, no cervical adenopathies.No carotid artery pain, no carotid abnormal pulsation , NO arterial dance.  LYMPHATICS:  No cervical, NO supraclavicular, NO axillary,NO epitrochlear , NO inguinal adenopathy.  CARDIAC   normal rate and regular rhythm, without murmur,NO rubs NO S3 NO S4 right or left .   VASCULAR VENOUS: no cyanosis, collateral circulation, varicosities, edema, palpable cords, pain, erythema.  ABDOMEN:  Soft, nontender with no hepatomegaly, no splenomegaly,no masses, no ascites, no collateral circulation,no distention,no [place] sign, no abdominal pain, no inguinal hernias,no umbilical hernia, no abdominal bruits. Normal bowel sounds.  GENITAL: Not  Performed.  EXTREMITIES  AND SPINE:  No clubbing, cyanosis or edema, no deformities or pain .No kyphosis, scoliosis, deformities or pain in spine, ribs or pelvic bone.  NEUROLOGICAL:  Patient was awake, alert, oriented to time, person and place.                                RECENT LABS:                 Auto Differential  Order: [id number] - Part of Panel Order [id number]  Status:  Final result   Visible to patient:  No (not released)   Dx:  Macroglobulinemia of Waldenstrom (CMS...  Specimen Information: Blood        Component   Ref Range & Units 12:27   WBC   3.40 - 10.80 10*3/mm3 4.58    RBC   3.77 - 5.28 10*6/mm3 4.39    Hemoglobin   12.0 - 15.9 g/dL 14.4    Hematocrit   34.0 - 46.6 % 41.6    MCV   79.0 - 97.0 fL 94.8    MCH   26.6 - 33.0 pg 32.8    MCHC   31.5 - 35.7 g/dL 34.6     RDW   12.3 - 15.4 % 12.4    RDW-SD   37.0 - 54.0 fl 43.3    MPV   6.0 - 12.0 fL 10.0    Platelets   140 - 450 10*3/mm3 115Low     Neutrophil %   42.7 - 76.0 % 56.0    Lymphocyte %   19.6 - 45.3 % 30.6    Monocyte %   5.0 - 12.0 % 7.0    Eosinophil %   0.3 - 6.2 % 5.5    Basophil %   0.0 - 1.5 % 0.7    Immature Grans %   0.0 - 0.5 % 0.2    Neutrophils, Absolute   1.70 - 7.00 10*3/mm3 2.57    Lymphocytes, Absolute   0.70 - 3.10 10*3/mm3 1.40    Monocytes, Absolute   0.10 - 0.90 10*3/mm3 0.32                Assessment/Plan      1. Waldenstrom macroglobulinemia that has been treated in the past mainly with Rituxan. In the past, also she was treated with Velcade with very dramatic improvement in her monoclonal protein but very dramatic sensory peripheral neuropathy. This medication was discontinued altogether.    6/29/2020, she developed progressive fatigue with worsening neuropathy in her feet.  These were her original symptoms at diagnosis of Waldenstrom's.  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  Calcium also elevated at 10.5 with decreased sodium related to pseudohyponatremia associated with monoclonal protein.  Previously, she did not receive improvement from Rituxan, she is not thought to be a candidate for Imbruvica, therefore she went on to initiate venetoclax 7/13/2020.  She is currently on her next planned dose of 200 mg daily with poor tolerance as outlined below  I discussed with her on 04/28/2021 that I do not believe given the information that we have with a sedimentation rate that was normal and a stable monoclonal protein IgM that the Waldenstrom could be the most focal reason to explain her fatigue. On the other hand it is impossible given her thrombocytopenia to be sure that she still has some component of lymphoma in her bone marrow and the only way to prove this will be to pursue in a bone marrow test. She does not believe that she is ready for this at this time.     Radiologically speaking  the CT scan of the chest, abdomen and pelvis failed to document any lymphadenopathy, masses, splenomegaly or any other issue pertinent to her Waldenstrom.  I discussed with her on 06/02/2021 that I do not feel that the symptoms of fatigue are related to her Waldenström's at this time. Her monoclonal protein study has remained completely quiet. Her white count and hemoglobin are normal. Her B12, folic acid, ferritin, iron profile remain normal and TSH hs remained into the normal limits. Her sed rate was normal at 4 mm/hr and she has no obvious infection or inflammatory process.     We have a new monoclonal protein study today.    From the point of view of her breast cancer status post mastectomy on the left she completed her adjuvant therapy, mammogram is coming up in the end of 06/2021 on the right side. The left chest wall has remained unremarkable.    The patient has had very significant fatigue for almost 1-1/2 years. I never have been able to find the reason for this. I have performed radiological analysis, laboratory testing not finding an obvious organic reason for this process. Her blood pressure has not been under good control. Metoprolol has been modified and gone back into the baseline number and the patient is still seeing Bacilio Hernández MD, to see if she could have some other kind of stress testing for her heart and be sure that there is no cardiac disease involved this process.    The only test that I have not done on her is bone marrow test to see if there is anything different in the bone marrow that we would like to know about. Maybe in the long run that will become a necessity. The further cardiac assessment by Dr. Hernández shows no other abnormality.     She will be called at home with the report of her monoclonal protein studies next week whenever they become available.     Otherwise I want to see her back in 3 months with a CBC, CMP and ARON.     I did not prescribe any new medicines or modify any of the  medicines that she is receiving at this time.     I sent a note to Dr. Hernández in regard all of these issues about her blood pressure medication , fatigue and so forth.                     Clarence Cedeno MA   6/2/2021      CC:

## 2021-06-03 LAB
ALBUMIN SERPL ELPH-MCNC: 3.8 G/DL (ref 2.9–4.4)
ALBUMIN/GLOB SERPL: 1.6 {RATIO} (ref 0.7–1.7)
ALPHA1 GLOB SERPL ELPH-MCNC: 0.3 G/DL (ref 0–0.4)
ALPHA2 GLOB SERPL ELPH-MCNC: 0.7 G/DL (ref 0.4–1)
B-GLOBULIN SERPL ELPH-MCNC: 0.9 G/DL (ref 0.7–1.3)
GAMMA GLOB SERPL ELPH-MCNC: 0.6 G/DL (ref 0.4–1.8)
GLOBULIN SER-MCNC: 2.5 G/DL (ref 2.2–3.9)
IGA SERPL-MCNC: 30 MG/DL (ref 64–422)
IGG SERPL-MCNC: 475 MG/DL (ref 586–1602)
IGM SERPL-MCNC: 196 MG/DL (ref 26–217)
INTERPRETATION SERPL IEP-IMP: ABNORMAL
KAPPA LC FREE SER-MCNC: 12 MG/L (ref 3.3–19.4)
KAPPA LC FREE/LAMBDA FREE SER: 2.55 {RATIO} (ref 0.26–1.65)
LABORATORY COMMENT REPORT: ABNORMAL
LAMBDA LC FREE SERPL-MCNC: 4.7 MG/L (ref 5.7–26.3)
M PROTEIN SERPL ELPH-MCNC: 0.2 G/DL
PROT SERPL-MCNC: 6.3 G/DL (ref 6–8.5)

## 2021-06-07 ENCOUNTER — TELEPHONE (OUTPATIENT)
Dept: ONCOLOGY | Facility: CLINIC | Age: 81
End: 2021-06-07

## 2021-06-07 NOTE — TELEPHONE ENCOUNTER
Caller: PT    Relationship: PT    Best call back number: [phone]    What is the best time to reach you:     Who are you requesting to speak with (clinical staff, provider,  specific staff member): CLINICAL    Do you know the name of the person who called:     What was the call regarding: RETURNING CALL REGARDING TEST RESULTS FROM 6/2.    NO MESSAGES IN CHART    Do you require a callback: YES

## 2021-06-07 NOTE — TELEPHONE ENCOUNTER
Spoke to patient to let her know that Dr. Larose did try to call her and he will call again shortly. Patient v/u.

## 2021-06-08 ENCOUNTER — TELEPHONE (OUTPATIENT)
Dept: ONCOLOGY | Facility: CLINIC | Age: 81
End: 2021-06-08

## 2021-06-08 NOTE — TELEPHONE ENCOUNTER
I have called this patient today to notify that her monoclonal protein IgM has further dropped in comparison with previous assessment. The rest of her other laboratory parameters remain stable. Given the persistency of the fatigue and tiredness of this patient, I have sent a message to Dr. Hernández. She is out of the office this week but I think we need to be sure that this patient is not going to end up with amyloid disease. I will perform an echocardiogram. I will do a troponin and a proBNP and she will have the stress test as proposed per Dr. Hernández that is not physical exercise because the patient cannot handle it. I will send a message to Dr. Llamas who was the surgeon who did her breast surgery to see if he is willing to do an abdominal fat pad biopsy for her looking for amyloid. If he is, this will be scheduled. Then the patient will be reviewed after all this is completed and then we will go from there.

## 2021-06-09 ENCOUNTER — TELEPHONE (OUTPATIENT)
Dept: ONCOLOGY | Facility: CLINIC | Age: 81
End: 2021-06-09

## 2021-06-09 DIAGNOSIS — C50.812 MALIGNANT NEOPLASM OF OVERLAPPING SITES OF LEFT BREAST IN FEMALE, ESTROGEN RECEPTOR POSITIVE (HCC): Primary | ICD-10-CM

## 2021-06-09 DIAGNOSIS — R68.89 OTHER GENERAL SYMPTOMS AND SIGNS: ICD-10-CM

## 2021-06-09 DIAGNOSIS — C88.0 MACROGLOBULINEMIA OF WALDENSTROM (HCC): ICD-10-CM

## 2021-06-09 DIAGNOSIS — Z17.0 MALIGNANT NEOPLASM OF OVERLAPPING SITES OF LEFT BREAST IN FEMALE, ESTROGEN RECEPTOR POSITIVE (HCC): Primary | ICD-10-CM

## 2021-06-09 DIAGNOSIS — R06.9 UNSPECIFIED ABNORMALITIES OF BREATHING: ICD-10-CM

## 2021-06-09 NOTE — TELEPHONE ENCOUNTER
Spoke with Jenna at Dr. Caraballo office who stated that Dr. Llamas is in surgery all day today but she will send him a message to call Dr. Larose on his cell phone asap. V/u. Will check in tomorrow if no call.

## 2021-06-10 ENCOUNTER — APPOINTMENT (OUTPATIENT)
Dept: LAB | Facility: HOSPITAL | Age: 81
End: 2021-06-10

## 2021-06-10 ENCOUNTER — TELEPHONE (OUTPATIENT)
Dept: CARDIOLOGY | Facility: CLINIC | Age: 81
End: 2021-06-10

## 2021-06-10 ENCOUNTER — LAB (OUTPATIENT)
Dept: LAB | Facility: HOSPITAL | Age: 81
End: 2021-06-10

## 2021-06-10 DIAGNOSIS — C50.812 MALIGNANT NEOPLASM OF OVERLAPPING SITES OF LEFT BREAST IN FEMALE, ESTROGEN RECEPTOR POSITIVE (HCC): ICD-10-CM

## 2021-06-10 DIAGNOSIS — C88.0 MACROGLOBULINEMIA OF WALDENSTROM (HCC): ICD-10-CM

## 2021-06-10 DIAGNOSIS — Z17.0 MALIGNANT NEOPLASM OF OVERLAPPING SITES OF LEFT BREAST IN FEMALE, ESTROGEN RECEPTOR POSITIVE (HCC): ICD-10-CM

## 2021-06-10 DIAGNOSIS — R06.9 UNSPECIFIED ABNORMALITIES OF BREATHING: ICD-10-CM

## 2021-06-10 LAB
NT-PROBNP SERPL-MCNC: 538.5 PG/ML (ref 0–1800)
TROPONIN T SERPL-MCNC: <0.01 NG/ML (ref 0–0.03)

## 2021-06-10 PROCEDURE — 36415 COLL VENOUS BLD VENIPUNCTURE: CPT

## 2021-06-10 PROCEDURE — 84484 ASSAY OF TROPONIN QUANT: CPT | Performed by: INTERNAL MEDICINE

## 2021-06-10 PROCEDURE — 83880 ASSAY OF NATRIURETIC PEPTIDE: CPT | Performed by: INTERNAL MEDICINE

## 2021-06-10 NOTE — TELEPHONE ENCOUNTER
We certainly can do amyloid testing, but I really want to get the echo and perfusion stress test done first.  These are scheduled for 6/14 and then I will go from there.     TK, no med changes until I have those results.    jennifer

## 2021-06-10 NOTE — TELEPHONE ENCOUNTER
----- Message from LENKA Nieves sent at 6/7/2021 10:05 AM EDT -----    Dr. Hernández let me know what you want me to order.   ----- Message -----  From: Florencio Larose MD  Sent: 6/7/2021   9:35 AM EDT  To: Bacilio Hernández MD    Can we do and echo to see if she has anything that could be related to amyloid? And probnp?

## 2021-06-14 ENCOUNTER — HOSPITAL ENCOUNTER (OUTPATIENT)
Dept: CARDIOLOGY | Facility: HOSPITAL | Age: 81
Discharge: HOME | End: 2021-06-14

## 2021-06-14 VITALS
BODY MASS INDEX: 23.99 KG/M2 | WEIGHT: 144 LBS | HEART RATE: 56 BPM | DIASTOLIC BLOOD PRESSURE: 80 MMHG | OXYGEN SATURATION: 95 % | SYSTOLIC BLOOD PRESSURE: 188 MMHG | HEIGHT: 65 IN

## 2021-06-14 VITALS — WEIGHT: 143.96 LBS | HEIGHT: 65 IN | BODY MASS INDEX: 23.99 KG/M2

## 2021-06-14 DIAGNOSIS — C88.0 MACROGLOBULINEMIA OF WALDENSTROM (HCC): ICD-10-CM

## 2021-06-14 DIAGNOSIS — C50.812 MALIGNANT NEOPLASM OF OVERLAPPING SITES OF LEFT BREAST IN FEMALE, ESTROGEN RECEPTOR POSITIVE (HCC): ICD-10-CM

## 2021-06-14 DIAGNOSIS — R68.89 OTHER GENERAL SYMPTOMS AND SIGNS: ICD-10-CM

## 2021-06-14 DIAGNOSIS — R06.02 SHORTNESS OF BREATH: ICD-10-CM

## 2021-06-14 DIAGNOSIS — R53.83 FATIGUE, UNSPECIFIED TYPE: ICD-10-CM

## 2021-06-14 DIAGNOSIS — Z17.0 MALIGNANT NEOPLASM OF OVERLAPPING SITES OF LEFT BREAST IN FEMALE, ESTROGEN RECEPTOR POSITIVE (HCC): ICD-10-CM

## 2021-06-14 LAB
BH CV NUCLEAR PRIOR STUDY: 3
BH CV REST NUCLEAR ISOTOPE DOSE: 52 MCI
BH CV STRESS BP STAGE 1: NORMAL
BH CV STRESS COMMENTS STAGE 1: NORMAL
BH CV STRESS DOSE REGADENOSON STAGE 1: 0.4
BH CV STRESS DURATION MIN STAGE 1: 0
BH CV STRESS DURATION SEC STAGE 1: 10
BH CV STRESS HR STAGE 1: 70
BH CV STRESS NUCLEAR ISOTOPE DOSE: 52 MCI
BH CV STRESS PROTOCOL 1: NORMAL
BH CV STRESS RECOVERY BP: NORMAL MMHG
BH CV STRESS RECOVERY HR: 56 BPM
BH CV STRESS STAGE 1: 1
LV EF NUC BP: 68 %
MAXIMAL PREDICTED HEART RATE: 139 BPM
PERCENT MAX PREDICTED HR: 50.36 %
STRESS BASELINE BP: NORMAL MMHG
STRESS BASELINE HR: 52 BPM
STRESS PERCENT HR: 59 %
STRESS POST EXERCISE DUR SEC: 10 SEC
STRESS POST PEAK BP: NORMAL MMHG
STRESS POST PEAK HR: 70 BPM
STRESS TARGET HR: 118 BPM

## 2021-06-14 PROCEDURE — 93356 MYOCRD STRAIN IMG SPCKL TRCK: CPT | Performed by: INTERNAL MEDICINE

## 2021-06-14 PROCEDURE — 25010000002 REGADENOSON 0.4 MG/5ML SOLUTION: Performed by: INTERNAL MEDICINE

## 2021-06-14 PROCEDURE — 78492 MYOCRD IMG PET MLT RST&STRS: CPT | Performed by: INTERNAL MEDICINE

## 2021-06-14 PROCEDURE — A9555 RB82 RUBIDIUM: HCPCS | Performed by: INTERNAL MEDICINE

## 2021-06-14 PROCEDURE — 78492 MYOCRD IMG PET MLT RST&STRS: CPT

## 2021-06-14 PROCEDURE — 25010000002 PERFLUTREN (DEFINITY) 8.476 MG IN SODIUM CHLORIDE (PF) 0.9 % 10 ML INJECTION: Performed by: INTERNAL MEDICINE

## 2021-06-14 PROCEDURE — 0 RUBIDIUM CHLORIDE: Performed by: INTERNAL MEDICINE

## 2021-06-14 PROCEDURE — 93017 CV STRESS TEST TRACING ONLY: CPT

## 2021-06-14 PROCEDURE — 93356 MYOCRD STRAIN IMG SPCKL TRCK: CPT

## 2021-06-14 PROCEDURE — 93016 CV STRESS TEST SUPVJ ONLY: CPT | Performed by: INTERNAL MEDICINE

## 2021-06-14 PROCEDURE — 93306 TTE W/DOPPLER COMPLETE: CPT

## 2021-06-14 PROCEDURE — 93018 CV STRESS TEST I&R ONLY: CPT | Performed by: INTERNAL MEDICINE

## 2021-06-14 PROCEDURE — 93306 TTE W/DOPPLER COMPLETE: CPT | Performed by: INTERNAL MEDICINE

## 2021-06-14 RX ADMIN — PERFLUTREN 1.5 ML: 6.52 INJECTION, SUSPENSION INTRAVENOUS at 11:03

## 2021-06-14 RX ADMIN — REGADENOSON 0.4 MG: 0.08 INJECTION, SOLUTION INTRAVENOUS at 11:35

## 2021-06-15 DIAGNOSIS — I10 ESSENTIAL HYPERTENSION: ICD-10-CM

## 2021-06-15 LAB
AORTIC ARCH: 2.3 CM
ASCENDING AORTA: 3 CM
BH CV ECHO MEAS - ACS: 2.2 CM
BH CV ECHO MEAS - AI DEC SLOPE: 227.8 CM/SEC^2
BH CV ECHO MEAS - AI MAX PG: 85.2 MMHG
BH CV ECHO MEAS - AI MAX VEL: 461.5 CM/SEC
BH CV ECHO MEAS - AI P1/2T: 593.3 MSEC
BH CV ECHO MEAS - AO MAX PG (FULL): 4.4 MMHG
BH CV ECHO MEAS - AO MAX PG: 7.9 MMHG
BH CV ECHO MEAS - AO MEAN PG (FULL): 2.2 MMHG
BH CV ECHO MEAS - AO MEAN PG: 4.3 MMHG
BH CV ECHO MEAS - AO ROOT AREA (BSA CORRECTED): 1.7
BH CV ECHO MEAS - AO ROOT AREA: 6.7 CM^2
BH CV ECHO MEAS - AO ROOT DIAM: 2.9 CM
BH CV ECHO MEAS - AO V2 MAX: 140.7 CM/SEC
BH CV ECHO MEAS - AO V2 MEAN: 97.2 CM/SEC
BH CV ECHO MEAS - AO V2 VTI: 35.9 CM
BH CV ECHO MEAS - ASC AORTA: 3 CM
BH CV ECHO MEAS - AVA(I,A): 2 CM^2
BH CV ECHO MEAS - AVA(I,D): 2 CM^2
BH CV ECHO MEAS - AVA(V,A): 1.8 CM^2
BH CV ECHO MEAS - AVA(V,D): 1.8 CM^2
BH CV ECHO MEAS - BSA(HAYCOCK): 1.7 M^2
BH CV ECHO MEAS - BSA: 1.7 M^2
BH CV ECHO MEAS - BZI_BMI: 24 KILOGRAMS/M^2
BH CV ECHO MEAS - BZI_METRIC_HEIGHT: 165.1 CM
BH CV ECHO MEAS - BZI_METRIC_WEIGHT: 65.3 KG
BH CV ECHO MEAS - EDV(MOD-SP2): 87 ML
BH CV ECHO MEAS - EDV(MOD-SP4): 89 ML
BH CV ECHO MEAS - EDV(TEICH): 115.1 ML
BH CV ECHO MEAS - EF(CUBED): 82.3 %
BH CV ECHO MEAS - EF(MOD-BP): 67 %
BH CV ECHO MEAS - EF(MOD-SP2): 66.7 %
BH CV ECHO MEAS - EF(MOD-SP4): 66.3 %
BH CV ECHO MEAS - EF(TEICH): 74.9 %
BH CV ECHO MEAS - ESV(MOD-SP2): 29 ML
BH CV ECHO MEAS - ESV(MOD-SP4): 30 ML
BH CV ECHO MEAS - ESV(TEICH): 28.9 ML
BH CV ECHO MEAS - FS: 43.9 %
BH CV ECHO MEAS - IVS/LVPW: 1
BH CV ECHO MEAS - IVSD: 0.96 CM
BH CV ECHO MEAS - LA 3D VOL INDEX: 41
BH CV ECHO MEAS - LAT PEAK E' VEL: 7.2 CM/SEC
BH CV ECHO MEAS - LV DIASTOLIC VOL/BSA (35-75): 51.7 ML/M^2
BH CV ECHO MEAS - LV MASS(C)D: 164.7 GRAMS
BH CV ECHO MEAS - LV MASS(C)DI: 95.8 GRAMS/M^2
BH CV ECHO MEAS - LV MAX PG: 3.5 MMHG
BH CV ECHO MEAS - LV MEAN PG: 2.1 MMHG
BH CV ECHO MEAS - LV SYSTOLIC VOL/BSA (12-30): 17.4 ML/M^2
BH CV ECHO MEAS - LV V1 MAX: 93.8 CM/SEC
BH CV ECHO MEAS - LV V1 MEAN: 69.1 CM/SEC
BH CV ECHO MEAS - LV V1 VTI: 25.7 CM
BH CV ECHO MEAS - LVIDD: 4.9 CM
BH CV ECHO MEAS - LVIDS: 2.8 CM
BH CV ECHO MEAS - LVLD AP2: 6.9 CM
BH CV ECHO MEAS - LVLD AP4: 7.2 CM
BH CV ECHO MEAS - LVLS AP2: 5.7 CM
BH CV ECHO MEAS - LVLS AP4: 5.9 CM
BH CV ECHO MEAS - LVOT AREA (M): 2.8 CM^2
BH CV ECHO MEAS - LVOT AREA: 2.8 CM^2
BH CV ECHO MEAS - LVOT DIAM: 1.9 CM
BH CV ECHO MEAS - LVPWD: 0.93 CM
BH CV ECHO MEAS - MED PEAK E' VEL: 5.7 CM/SEC
BH CV ECHO MEAS - MR MAX PG: 99.8 MMHG
BH CV ECHO MEAS - MR MAX VEL: 499.5 CM/SEC
BH CV ECHO MEAS - MV A DUR: 0.13 SEC
BH CV ECHO MEAS - MV A MAX VEL: 77.6 CM/SEC
BH CV ECHO MEAS - MV DEC SLOPE: 221.8 CM/SEC^2
BH CV ECHO MEAS - MV DEC TIME: 0.27 SEC
BH CV ECHO MEAS - MV E MAX VEL: 102 CM/SEC
BH CV ECHO MEAS - MV E/A: 1.3
BH CV ECHO MEAS - MV MAX PG: 6.1 MMHG
BH CV ECHO MEAS - MV MEAN PG: 1.7 MMHG
BH CV ECHO MEAS - MV P1/2T MAX VEL: 117 CM/SEC
BH CV ECHO MEAS - MV P1/2T: 154.6 MSEC
BH CV ECHO MEAS - MV V2 MAX: 123.1 CM/SEC
BH CV ECHO MEAS - MV V2 MEAN: 60 CM/SEC
BH CV ECHO MEAS - MV V2 VTI: 51.2 CM
BH CV ECHO MEAS - MVA P1/2T LCG: 1.9 CM^2
BH CV ECHO MEAS - MVA(P1/2T): 1.4 CM^2
BH CV ECHO MEAS - MVA(VTI): 1.4 CM^2
BH CV ECHO MEAS - PA ACC TIME: 0.19 SEC
BH CV ECHO MEAS - PA MAX PG (FULL): 1.4 MMHG
BH CV ECHO MEAS - PA MAX PG: 3.1 MMHG
BH CV ECHO MEAS - PA PR(ACCEL): -6.6 MMHG
BH CV ECHO MEAS - PA V2 MAX: 88.2 CM/SEC
BH CV ECHO MEAS - PULM A REVS DUR: 0.11 SEC
BH CV ECHO MEAS - PULM A REVS VEL: 25.3 CM/SEC
BH CV ECHO MEAS - PULM DIAS VEL: 47.1 CM/SEC
BH CV ECHO MEAS - PULM S/D: 1.4
BH CV ECHO MEAS - PULM SYS VEL: 67.7 CM/SEC
BH CV ECHO MEAS - PVA(V,A): 3.1 CM^2
BH CV ECHO MEAS - PVA(V,D): 3.1 CM^2
BH CV ECHO MEAS - QP/QS: 0.99
BH CV ECHO MEAS - RAP SYSTOLE: 3 MMHG
BH CV ECHO MEAS - RV MAX PG: 1.7 MMHG
BH CV ECHO MEAS - RV MEAN PG: 1.1 MMHG
BH CV ECHO MEAS - RV V1 MAX: 65.6 CM/SEC
BH CV ECHO MEAS - RV V1 MEAN: 50.6 CM/SEC
BH CV ECHO MEAS - RV V1 VTI: 17.1 CM
BH CV ECHO MEAS - RVOT AREA: 4.1 CM^2
BH CV ECHO MEAS - RVOT DIAM: 2.3 CM
BH CV ECHO MEAS - RVSP: 33 MMHG
BH CV ECHO MEAS - SI(AO): 139.7 ML/M^2
BH CV ECHO MEAS - SI(CUBED): 57.7 ML/M^2
BH CV ECHO MEAS - SI(LVOT): 41.2 ML/M^2
BH CV ECHO MEAS - SI(MOD-SP2): 33.7 ML/M^2
BH CV ECHO MEAS - SI(MOD-SP4): 34.3 ML/M^2
BH CV ECHO MEAS - SI(TEICH): 50.1 ML/M^2
BH CV ECHO MEAS - SUP REN AO DIAM: 1.5 CM
BH CV ECHO MEAS - SV(AO): 240.4 ML
BH CV ECHO MEAS - SV(CUBED): 99.3 ML
BH CV ECHO MEAS - SV(LVOT): 70.9 ML
BH CV ECHO MEAS - SV(MOD-SP2): 58 ML
BH CV ECHO MEAS - SV(MOD-SP4): 59 ML
BH CV ECHO MEAS - SV(RVOT): 70.5 ML
BH CV ECHO MEAS - SV(TEICH): 86.2 ML
BH CV ECHO MEAS - TAPSE (>1.6): 2.3 CM
BH CV ECHO MEAS - TR MAX VEL: 272.7 CM/SEC
BH CV ECHO MEASUREMENTS AVERAGE E/E' RATIO: 15.81
BH CV XLRA - RV BASE: 2.8 CM
BH CV XLRA - RV LENGTH: 6.3 CM
BH CV XLRA - RV MID: 2.4 CM
BH CV XLRA - TDI S': 13.8 CM/SEC
LEFT ATRIUM VOLUME INDEX: 31 ML/M2
LV EF 2D ECHO EST: 67 %
MAXIMAL PREDICTED HEART RATE: 139 BPM
SINUS: 2.6 CM
STJ: 2.6 CM
STRESS TARGET HR: 118 BPM

## 2021-06-15 RX ORDER — AMLODIPINE BESYLATE 5 MG/1
5 TABLET ORAL DAILY
Qty: 90 TABLET | Refills: 1 | Status: SHIPPED | OUTPATIENT
Start: 2021-06-15 | End: 2021-07-13 | Stop reason: SDUPTHER

## 2021-06-15 RX ORDER — METOPROLOL SUCCINATE 50 MG/1
50 TABLET, EXTENDED RELEASE ORAL DAILY
Qty: 90 TABLET | Refills: 1 | Status: SHIPPED | OUTPATIENT
Start: 2021-06-15 | End: 2021-07-13 | Stop reason: SDUPTHER

## 2021-06-15 NOTE — PROGRESS NOTES
I called and spoke with her.  Her stress test is normal.  Her echo was really quite unremarkable for age.    At our last visit, I had cut metoprolol in half, because I felt that her heart rate was too low and that she did not have sufficient chronotropic response to exertion.  However, her systolic blood pressure steadily crept up so she went back to 100 mg daily.    We will go back to 50 mg daily of metoprolol, continue losartan and Sky in, and I will add amlodipine 5 mg daily.    I did note that she had a pro BNP and a troponin which were normal.    Massiel, please schedule a 4-week follow-up with Vivien.

## 2021-06-17 ENCOUNTER — OFFICE VISIT (OUTPATIENT)
Dept: MAMMOGRAPHY | Facility: CLINIC | Age: 81
End: 2021-06-17

## 2021-06-17 VITALS
DIASTOLIC BLOOD PRESSURE: 70 MMHG | HEIGHT: 65 IN | BODY MASS INDEX: 24.16 KG/M2 | SYSTOLIC BLOOD PRESSURE: 144 MMHG | WEIGHT: 145 LBS

## 2021-06-17 DIAGNOSIS — C88.0 MACROGLOBULINEMIA OF WALDENSTROM (HCC): Primary | ICD-10-CM

## 2021-06-17 DIAGNOSIS — Z17.0 MALIGNANT NEOPLASM OF OVERLAPPING SITES OF LEFT BREAST IN FEMALE, ESTROGEN RECEPTOR POSITIVE (HCC): ICD-10-CM

## 2021-06-17 DIAGNOSIS — C50.812 MALIGNANT NEOPLASM OF OVERLAPPING SITES OF LEFT BREAST IN FEMALE, ESTROGEN RECEPTOR POSITIVE (HCC): ICD-10-CM

## 2021-06-17 PROCEDURE — 99203 OFFICE O/P NEW LOW 30 MIN: CPT | Performed by: SURGERY

## 2021-06-17 RX ORDER — AZELASTINE 1 MG/ML
SPRAY, METERED NASAL
COMMUNITY
Start: 2021-06-07 | End: 2022-01-24

## 2021-06-17 NOTE — PROGRESS NOTES
Chief Complaint: Karli Loomis is a 80 y.o.. female here today for Skin Lesion        History of Present Illness:  Patient presents with consult for surgery. She is a nice 80-year-old white female well-known to me. She underwent a left mastectomy for the breast cancer in 2014. I really not seen her since but in the interim, the patient has been diagnosed with Waldenstroms macroglobulinemia. As part of her ongoing work-up, Dr. Larose would like an abdominal fat pad biopsy to evaluate for amyloidosis.    Her main symptoms at this point are fatigue and neuropathy.      Review of Systems:  Review of Systems   Respiratory: Positive for shortness of breath.    Genitourinary: Positive for frequency.    Musculoskeletal: Positive for arthralgias and gait problem.   Skin: Positive for itching.        The patient denies any noticeable changes to the skin of the breast.    Neurological: Positive for gait problem and headaches.   Hematological: Bruises/bleeds easily.   All other systems reviewed and are negative.     I have reviewed the ROS as documented by the MA/LPN/RN Deric Llamas MD      Past Medical and Surgical History:  Breast Biopsy History:  Patient has had the following breast biopsies:2014 Left-malignant  Breast Cancer HIstory:  Patient has the following past medical history of breast cancer treatment: Left breast cancer treated with mastectomy.   Breast Operations, and year:  Left mastectomy 2014  Social History     Tobacco Use   Smoking Status Never Smoker   Smokeless Tobacco Never Used     Obstetric History:  Patient is postmenopausal due to removal of her uterus in the following year:1971   Number of pregnancies:3  Number of live births: 3  Number of abortions or miscarriages: 0  Age of delivery of first child: 15  Patient breast fed, for the following lenth of time: 1 year  Length of time taking birth control pills:Does not remember.  Patient took hormone replacement during the following dates: Does not  remember how long.    Past Surgical History:   Procedure Laterality Date   • ADENOIDECTOMY     • APPENDECTOMY     • CARPAL TUNNEL RELEASE Right    • CATARACT EXTRACTION Bilateral    • CHOLECYSTECTOMY     • COLONOSCOPY     • HYSTERECTOMY      Partial, many years ago, heavy bleeding, no cancer   • KNEE ARTHROSCOPY Right 03/2009    Finding of chondromalacia and appropriate cleanup of joint was performed by Dr. Moraes.   • MASTECTOMY Left 07/2014   • REPLACEMENT TOTAL KNEE Right 12/2015   • TONSILLECTOMY         Past Medical History:   Diagnosis Date   • Acid reflux    • Adult hypothyroidism 12/14/2017   • Asthma    • Chronic pansinusitis 7/12/2018   • Clostridium difficile colitis     Requiring admission to [facility] in 09/2008   • Drug-induced polyneuropathy (CMS/HCC) 12/14/2017   • Epilepsy with simple partial seizures (CMS/HCC) 12/14/2017   • Essential hypertension 1/6/2019   • Headache, migraine 12/14/2017   • History of transfusion of packed red blood cells     R/T SURGERY   • History of Waldenstrom's macroglobulinemia    • Malignant neoplasm of overlapping sites of left breast in female, estrogen receptor positive (CMS/HCC) 4/13/2016   • Primary osteoarthritis of left knee 7/22/2015   • Seasonal allergies    • Thyroid nodule     Removed more than 35 years ago   • TIA (transient ischemic attack) 10/18/2018   • UTI due to extended-spectrum beta lactamase (ESBL) producing Escherichia coli 1/6/2019       Prior Hospitalizations, other than for surgery or childbirth, and year:  Sepsis and UTI. Does not know years.     Social History:  Patient is single.  Patient has two daughters. and Patient has one sons.    Family History:  Family History   Problem Relation Age of Onset   • Mental illness Mother    • Migraines Mother    • Dementia Mother    • Heart disease Father    • Hypertension Father    • Kidney disease Father    • No Known Problems Daughter    • No Known Problems Daughter    • Breast cancer Other     • Heart disease Other    • Hypertension Other    • Diabetes Other    • Cancer Maternal Grandmother    • Breast cancer Maternal Grandmother    • No Known Problems Maternal Grandfather    • No Known Problems Paternal Grandmother    • No Known Problems Paternal Grandfather    • Lymphoma Neg Hx    • Leukemia Neg Hx        Vital Signs:  Vitals:    06/17/21 1002   BP: 144/70       Medications:    Current Outpatient Prescriptions:     Current Outpatient Medications:   •  amLODIPine (NORVASC) 5 MG tablet, Take 1 tablet by mouth Daily., Disp: 90 tablet, Rfl: 1  •  aspirin 81 MG tablet, Take 81 mg by mouth Daily., Disp: , Rfl:   •  azelastine (ASTELIN) 0.1 % nasal spray, , Disp: , Rfl:   •  Budesonide (RHINOCORT ALLERGY NA), 2 sprays into the nostril(s) as directed by provider Daily As Needed., Disp: , Rfl:   •  budesonide-formoterol (SYMBICORT) 80-4.5 MCG/ACT inhaler, Inhale 2 puffs 2 (Two) Times a Day., Disp: , Rfl:   •  cetirizine (zyrTEC) 10 MG tablet, Take 10 mg by mouth Daily., Disp: , Rfl:   •  gabapentin (NEURONTIN) 400 MG capsule, TAKE 1 CAPSULE BY MOUTH FOUR TIMES A DAY , Disp: 120 capsule, Rfl: 5  •  HYDROcodone-acetaminophen (NORCO) 5-325 MG per tablet, TAKE 1 TO 2 TABLETS BY MOUTH EVERY SIX HOURS AS NEEDED FOR PAIN, Disp: , Rfl:   •  lansoprazole (PREVACID) 30 MG capsule, TAKE 1 CAPSULE EVERY DAY, Disp: 90 capsule, Rfl: 0  •  levothyroxine (SYNTHROID, LEVOTHROID) 50 MCG tablet, TAKE 1 TABLET EVERY DAY, Disp: 90 tablet, Rfl: 1  •  losartan (COZAAR) 100 MG tablet, TAKE 1 TABLET BY MOUTH EVERY DAY , Disp: 30 tablet, Rfl: 11  •  metoprolol succinate XL (TOPROL-XL) 50 MG 24 hr tablet, Take 1 tablet by mouth Daily., Disp: 90 tablet, Rfl: 1  •  terazosin (HYTRIN) 10 MG capsule, TAKE 1 CAPSULE BY MOUTH TWO TIMES A DAY , Disp: 60 capsule, Rfl: 11  •  VENTOLIN  (90 Base) MCG/ACT inhaler, , Disp: , Rfl:     Physical Examination:  General Appearance:   Patient is in no distress.  She is well kept and has an average  build.   Psychiatric:  Patient with appropriate mood and affect. Alert and oriented to self, time, and place.    Breast, RIGHT:  large sized, symmetric with the contralateral side.  Breast skin is without erythema, edema, rashes.  There are no visible abnormalities upon inspection during the arm-raising maneuver or with hands on hips in the sitting position. There is no nipple retraction, discharge or nipple/areolar skin changes.There are no masses palpable in the sitting or supine positions.    Breast, LEFT:  Status post mastectomy without reconstruction. I do not feel any masses beneath the skin flaps. She does have 1 superficial skin lesion measuring 8 mm that is somewhat rough and irritated looking.    Lymphatic:  There is no axillary, cervical, infraclavicular, or supraclavicular adenopathy bilaterally.    Gastrointestinal:  The patient has an appendectomy scar in the right lower quadrant. Otherwise I do not detect any masses or evidence of hepatosplenomegaly.      Skin:  The patient has a scar on her right lower extremity from recent skin cancer excision.    Assessment:  1. Macroglobulinemia of Waldenstrom (CMS/HCC)    2. Malignant neoplasm of overlapping sites of left breast in female, estrogen receptor positive (CMS/HCC)          Plan:  The patient would appear to be a candidate for an abdominal fat pad biopsy. We can do this under local anesthesia in the ACU area. The patient is aware of the small risk of infection or bleeding. I explained the details of the procedure to her and she agrees to proceed.  I have spoken with pathology and the specimen can be placed in formalin at the time of the procedure.      CPT coding:    Next Appointment:  No follow-ups on file.            EMR Dragon/transcription disclaimer:    Much of this encounter note is an electronic transcription/translocation of spoken language to printed text.  The electronic translation of spoken language may permit erroneous, or at times,  nonsensical words or phrases to be inadvertently transcribed.  Although I have reviewed the note from such areas, some may still exist.

## 2021-06-21 ENCOUNTER — TELEPHONE (OUTPATIENT)
Dept: MAMMOGRAPHY | Facility: CLINIC | Age: 81
End: 2021-06-21

## 2021-06-21 NOTE — TELEPHONE ENCOUNTER
Pt notified that her fat pad biopsy has been scheduled for 06/29/2021 arrival time is 8:45 int he ACU. Direction were given, pt verbalized understanding.     CMA

## 2021-06-24 ENCOUNTER — TELEPHONE (OUTPATIENT)
Dept: MAMMOGRAPHY | Facility: CLINIC | Age: 81
End: 2021-06-24

## 2021-06-29 ENCOUNTER — HOSPITAL ENCOUNTER (OUTPATIENT)
Dept: INFUSION THERAPY | Facility: HOSPITAL | Age: 81
Discharge: HOME | End: 2021-06-29
Admitting: SURGERY

## 2021-06-29 VITALS
HEART RATE: 66 BPM | SYSTOLIC BLOOD PRESSURE: 122 MMHG | WEIGHT: 145 LBS | RESPIRATION RATE: 20 BRPM | HEIGHT: 66 IN | OXYGEN SATURATION: 99 % | TEMPERATURE: 97 F | BODY MASS INDEX: 23.3 KG/M2 | DIASTOLIC BLOOD PRESSURE: 59 MMHG

## 2021-06-29 DIAGNOSIS — C88.0 MACROGLOBULINEMIA (HCC): Primary | ICD-10-CM

## 2021-06-29 PROCEDURE — 88313 SPECIAL STAINS GROUP 2: CPT

## 2021-06-29 PROCEDURE — 88313 SPECIAL STAINS GROUP 2: CPT | Performed by: SURGERY

## 2021-06-29 PROCEDURE — 11106 INCAL BX SKN SINGLE LES: CPT | Performed by: SURGERY

## 2021-06-29 PROCEDURE — 88305 TISSUE EXAM BY PATHOLOGIST: CPT | Performed by: SURGERY

## 2021-06-29 RX ORDER — LIDOCAINE HYDROCHLORIDE AND EPINEPHRINE 10; 10 MG/ML; UG/ML
20 INJECTION, SOLUTION INFILTRATION; PERINEURAL ONCE
Status: COMPLETED | OUTPATIENT
Start: 2021-06-29 | End: 2021-06-29

## 2021-06-29 RX ADMIN — LIDOCAINE HYDROCHLORIDE,EPINEPHRINE BITARTRATE 17 ML: 10; .01 INJECTION, SOLUTION INFILTRATION; PERINEURAL at 09:24

## 2021-06-30 DIAGNOSIS — G62.0 DRUG-INDUCED POLYNEUROPATHY (HCC): ICD-10-CM

## 2021-07-01 ENCOUNTER — APPOINTMENT (OUTPATIENT)
Dept: WOMENS IMAGING | Facility: HOSPITAL | Age: 81
End: 2021-07-01

## 2021-07-01 PROCEDURE — 77063 BREAST TOMOSYNTHESIS BI: CPT | Performed by: RADIOLOGY

## 2021-07-01 PROCEDURE — 77067 SCR MAMMO BI INCL CAD: CPT | Performed by: RADIOLOGY

## 2021-07-01 RX ORDER — GABAPENTIN 400 MG/1
400 CAPSULE ORAL 4 TIMES DAILY
Qty: 120 CAPSULE | Refills: 5 | Status: SHIPPED | OUTPATIENT
Start: 2021-07-01 | End: 2021-12-28

## 2021-07-01 NOTE — OP NOTE
Date of procedure-6/29/2021    Preoperative diagnosis-possible amyloidosis    Postoperative diagnosis-same    Procedure performed-abdominal fat pad biopsy    Surgeon-Deric Llamas MD    Anesthesia-local    Estimated blood loss 2 cc    Indications-the patient is an 80-year-old white female well-known to me because of a history of breast cancer.  Her oncologist is concerned about the possibility of amyloidosis and has requested an abdominal fat pad biopsy.  The patient is here for that.    Procedure-the patient was taken to the ACU where the skin in the right lower quadrant was prepped with Betadine.  We then anesthetized the involved area with 20 cc of 1% Xylocaine with epinephrine.  A skin incision was then made with a 15 blade knife.  We then undermined the skin all around for short distance.  We then began dissecting down into the subcutaneous tissue and removed a section of adipose tissue that measured 3 x 2.5 cm.  After discussing with pathology, the specimen was placed in formalin and sent for permanent pathology.  No orienting sutures were required.  We then irrigated out the operative site.  Hemostasis was obtained with the Hyfrecator and the incision was closed in 2 layers of absorbable suture.  3-0 Vicryl suture was used close the deeper layers and a running 4-0 Vicryl stitch was used to close the skin.  Steri-Strips, gauze, and a Tegaderm dressing were applied.  The patient tolerated all this well and was allowed to go home in stable condition.  Sponge and needle counts were correct and there were no complications.    Specimen-abdominal fat pad biopsy from the right lower quadrant of the abdomen

## 2021-07-13 ENCOUNTER — OFFICE VISIT (OUTPATIENT)
Dept: CARDIOLOGY | Facility: CLINIC | Age: 81
End: 2021-07-13

## 2021-07-13 ENCOUNTER — OFFICE VISIT (OUTPATIENT)
Dept: MAMMOGRAPHY | Facility: CLINIC | Age: 81
End: 2021-07-13

## 2021-07-13 VITALS
HEIGHT: 66 IN | RESPIRATION RATE: 16 BRPM | WEIGHT: 145 LBS | DIASTOLIC BLOOD PRESSURE: 64 MMHG | SYSTOLIC BLOOD PRESSURE: 116 MMHG | OXYGEN SATURATION: 97 % | TEMPERATURE: 97.5 F | HEART RATE: 64 BPM | BODY MASS INDEX: 23.3 KG/M2

## 2021-07-13 VITALS
SYSTOLIC BLOOD PRESSURE: 118 MMHG | WEIGHT: 145.8 LBS | HEIGHT: 66 IN | HEART RATE: 61 BPM | DIASTOLIC BLOOD PRESSURE: 68 MMHG | BODY MASS INDEX: 23.43 KG/M2

## 2021-07-13 DIAGNOSIS — R53.0 NEOPLASTIC MALIGNANT RELATED FATIGUE: Primary | ICD-10-CM

## 2021-07-13 DIAGNOSIS — I35.1 NONRHEUMATIC AORTIC VALVE INSUFFICIENCY: ICD-10-CM

## 2021-07-13 DIAGNOSIS — I51.89 GRADE II DIASTOLIC DYSFUNCTION: ICD-10-CM

## 2021-07-13 DIAGNOSIS — I10 ESSENTIAL HYPERTENSION: Chronic | ICD-10-CM

## 2021-07-13 DIAGNOSIS — R53.83 OTHER FATIGUE: ICD-10-CM

## 2021-07-13 DIAGNOSIS — C88.0 MACROGLOBULINEMIA OF WALDENSTROM (HCC): ICD-10-CM

## 2021-07-13 DIAGNOSIS — R42 DIZZINESS: Primary | ICD-10-CM

## 2021-07-13 PROCEDURE — 99214 OFFICE O/P EST MOD 30 MIN: CPT | Performed by: NURSE PRACTITIONER

## 2021-07-13 PROCEDURE — 93000 ELECTROCARDIOGRAM COMPLETE: CPT | Performed by: NURSE PRACTITIONER

## 2021-07-13 PROCEDURE — 99024 POSTOP FOLLOW-UP VISIT: CPT | Performed by: SURGERY

## 2021-07-13 RX ORDER — AMLODIPINE BESYLATE 5 MG/1
5 TABLET ORAL DAILY
Qty: 90 TABLET | Refills: 3 | Status: SHIPPED | OUTPATIENT
Start: 2021-07-13 | End: 2022-06-02

## 2021-07-13 RX ORDER — METOPROLOL SUCCINATE 50 MG/1
50 TABLET, EXTENDED RELEASE ORAL DAILY
Qty: 90 TABLET | Refills: 3 | Status: SHIPPED | OUTPATIENT
Start: 2021-07-13 | End: 2022-10-20

## 2021-07-13 NOTE — PROGRESS NOTES
Date of Office Visit: 2021  Encounter Provider: LENKA Zeng  Place of Service: [facility] CARDIOLOGY  Patient Name: Karli Loomis  :1940  Primary Cardiologist: Dr. Bacilio Hernández     Chief Complaint   Patient presents with   • Hypertension   • Follow-up   :     HPI: Karli Loomis is a pleasant 80 y.o. female who presents today for on hypertension and recent symptoms.  She is a new patient to me and I reviewed her medical records.    In May 2021, she saw Dr. Bacilio Hernández for cardio oncology consultation for fatigue at the request of Dr. Larose.  She has a history of right-sided breast cancer treated and then underwent mastectomy.  She took aromatase inhibitor for 5 years and this was completed in 2019.  She did not receive chemotherapy or radiation.  She has Waldenstrom's macroglobulinemia and has been on rituximab, bortezomib, and venetoclax.  She has hypertension treated with losartan and metoprolol succinate.  At one point she was on minoxidil and amlodipine in the past.    When she saw Dr. Hernández she reported severe exertional fatigue and weakness.  She said her legs will just give out due to weakness.  She also was dyspneic with exertion.  Dr. Hernández had a treadmill stress test with modified Kareem protocol completed.  Her heart rate went from 70-80 bpm with exercise and dropped to 50 bpm in recovery.  Her oxygen saturation and blood pressure were both normal.  She was recommended to decrease her metoprolol from 100 mg to 50 mg daily.  Dr. Hernández recommended a reasonable blood pressure goal of 130/140 mmHg as she felt like she needed more blood pressure because of chronotropic response.    A myocardial perfusion study was normal.  Echocardiogram showed the following: LVEF 67%, normal global longitudinal LV strain -21.4%, mild basal septal hypertrophy without outflow obstruction, grade 2 diastolic dysfunction, left atrial volume mildly increased, nodular thickening on  "the left coronary cusp of the aortic valve, moderate aortic valve regurgitation, trace mitral valve regurgitation, and trace tricuspid regurgitation.  Dr. Hernández discussed the results with the patient.  Her systolic blood pressure had increased so the patient went back to metoprolol succinate 100 mg daily on her own.  Once again Dr. Hernández recommended that she decrease metoprolol to 50 mg daily, continue losartan, continue Terazosin, and added amlodipine 5 mg daily.  Her proBNP and troponin were both normal.  She was recommended to follow-up with me in 4 weeks.    She presents today for her 4-week follow-up visit.  Her blood pressure and heart rate are both normal today.  She is tolerating her medications without any adverse effects.  She describes this feeling that \"washes over me\" when she is standing.  She says it makes her feel sick and dizzy.  She feels the need to sit down immediately and after 10 minutes her symptoms resolved.  When these episodes occur, she denies chest pain, shortness of breath, palpitations, and has never passed out.  She said she is afraid to drive because she does not want to have 1 of these episodes.  She reports some mild dyspnea with exertion that improves with her inhaler therapy.  She has trace ankle edema.    Past Medical History:   Diagnosis Date   • Acid reflux    • Adult hypothyroidism 12/14/2017   • Anemia    • Asthma    • Asthma    • Chronic pansinusitis 7/12/2018   • Clostridium difficile colitis     Requiring admission to [facility] in 09/2008   • Drug-induced polyneuropathy (CMS/[facility]) 12/14/2017   • Epilepsy with simple partial seizures (CMS/[facility]) 12/14/2017   • Essential hypertension 1/6/2019   • Grade II diastolic dysfunction     Per echocardiogram    • Headache, migraine 12/14/2017   • History of transfusion of packed red blood cells     R/T SURGERY   • History of Waldenstrom's macroglobulinemia    • Malignant neoplasm of overlapping sites of left breast in " female, estrogen receptor positive (CMS/HCC) 4/13/2016   • Nonrheumatic aortic valve insufficiency    • Primary osteoarthritis of left knee 7/22/2015   • Seasonal allergies    • Skin cancer    • Thyroid nodule     Removed more than 35 years ago   • TIA (transient ischemic attack) 10/18/2018   • UTI due to extended-spectrum beta lactamase (ESBL) producing Escherichia coli 1/6/2019       Past Surgical History:   Procedure Laterality Date   • ADENOIDECTOMY     • APPENDECTOMY     • CARPAL TUNNEL RELEASE Right    • CATARACT EXTRACTION Bilateral    • CHOLECYSTECTOMY     • COLONOSCOPY     • HYSTERECTOMY      Partial, many years ago, heavy bleeding, no cancer   • KNEE ARTHROSCOPY Right 03/2009    Finding of chondromalacia and appropriate cleanup of joint was performed by Dr. Moraes.   • MASTECTOMY Left 07/2014   • REPLACEMENT TOTAL KNEE Right 12/2015   • SKIN CANCER EXCISION      several   • TONSILLECTOMY         Social History     Socioeconomic History   • Marital status:      Spouse name: Not on file   • Number of children: Not on file   • Years of education: Not on file   • Highest education level: Not on file   Tobacco Use   • Smoking status: Never Smoker   • Smokeless tobacco: Never Used   Vaping Use   • Vaping Use: Never used   Substance and Sexual Activity   • Alcohol use: No     Comment: 2 cups caffeine/coffee daily   • Drug use: No   • Sexual activity: Defer       Family History   Problem Relation Age of Onset   • Mental illness Mother    • Migraines Mother    • Dementia Mother    • Heart disease Father    • Hypertension Father    • Kidney disease Father    • No Known Problems Daughter    • No Known Problems Daughter    • Breast cancer Other    • Heart disease Other    • Hypertension Other    • Diabetes Other    • Cancer Maternal Grandmother    • Breast cancer Maternal Grandmother    • No Known Problems Maternal Grandfather    • No Known Problems Paternal Grandmother    • No Known Problems Paternal  Grandfather    • Lymphoma Neg Hx    • Leukemia Neg Hx        The following portion of the patient's history were reviewed and updated as appropriate: past medical history, past surgical history, past social history, past family history, allergies, current medications, and problem list.    Review of Systems   Constitutional: Positive for malaise/fatigue.   Cardiovascular: Positive for dyspnea on exertion and leg swelling.   Respiratory: Positive for wheezing.    Hematologic/Lymphatic: Bruises/bleeds easily.   Neurological: Positive for dizziness.       Allergies   Allergen Reactions   • Cortisone Unknown - High Severity     Reports having a shot years ago, and wonders if he hit a vein. She was told by another doctor that it probably went in her blood stream.    • Darvon  [Propoxyphene] Palpitations         Current Outpatient Medications:   •  amLODIPine (NORVASC) 5 MG tablet, Take 1 tablet by mouth Daily., Disp: 90 tablet, Rfl: 3  •  aspirin 81 MG tablet, Take 81 mg by mouth Daily., Disp: , Rfl:   •  Budesonide (RHINOCORT ALLERGY NA), 2 sprays into the nostril(s) as directed by provider Daily As Needed., Disp: , Rfl:   •  budesonide-formoterol (SYMBICORT) 80-4.5 MCG/ACT inhaler, Inhale 2 puffs 2 (Two) Times a Day., Disp: , Rfl:   •  cetirizine (zyrTEC) 10 MG tablet, Take 10 mg by mouth Daily., Disp: , Rfl:   •  gabapentin (NEURONTIN) 400 MG capsule, Take 1 capsule by mouth 4 (Four) Times a Day., Disp: 120 capsule, Rfl: 5  •  lansoprazole (PREVACID) 30 MG capsule, TAKE 1 CAPSULE EVERY DAY, Disp: 90 capsule, Rfl: 0  •  levothyroxine (SYNTHROID, LEVOTHROID) 50 MCG tablet, TAKE 1 TABLET EVERY DAY, Disp: 90 tablet, Rfl: 1  •  losartan (COZAAR) 100 MG tablet, TAKE 1 TABLET BY MOUTH EVERY DAY , Disp: 30 tablet, Rfl: 11  •  metoprolol succinate XL (TOPROL-XL) 50 MG 24 hr tablet, Take 1 tablet by mouth Daily., Disp: 90 tablet, Rfl: 3  •  terazosin (HYTRIN) 10 MG capsule, TAKE 1 CAPSULE BY MOUTH TWO TIMES A DAY , Disp: 60  "capsule, Rfl: 11  •  VENTOLIN  (90 Base) MCG/ACT inhaler, , Disp: , Rfl:   •  azelastine (ASTELIN) 0.1 % nasal spray, , Disp: , Rfl:   •  HYDROcodone-acetaminophen (NORCO) 5-325 MG per tablet, TAKE 1 TO 2 TABLETS BY MOUTH EVERY SIX HOURS AS NEEDED FOR PAIN, Disp: , Rfl:         Objective:     Vitals:    07/13/21 1126 07/13/21 1131   BP: 118/68 118/68   BP Location: Left arm Right arm   Pulse: 61 61   Weight: 66.1 kg (145 lb 12.8 oz)    Height: 167.6 cm (66\")      Body mass index is 23.53 kg/m².    PHYSICAL EXAM:    Vitals Reviewed.   General Appearance: No acute distress, well developed and well nourished. Obese.   Eyes: Conjunctiva and lids: No erythema, swelling, or discharge. Sclera non-icteric.   HENT: Atraumatic, normocephalic. External eyes, ears, and nose normal. No hearing loss noted. Mucous membranes normal. Lips not cyanotic. Neck supple with no tenderness.  Respiratory: No signs of respiratory distress. Respiration rhythm and depth normal.   Clear to auscultation. No rales, crackles, rhonchi, or wheezing auscultated.   Cardiovascular:  Jugular Venous Pressure: Normal  Heart Rate and Rhythm: Normal, Heart Sounds: Normal S1 and S2. No S3 or S4 noted.  Murmurs: No murmurs noted. No rubs, thrills, or gallops.   Arterial Pulses: Carotid pulses normal. No carotid bruit noted. Posterior tibialis and dorsalis pedis pulses normal.   Lower Extremities: No edema noted.  Gastrointestinal:  Abdomen soft, non-distended, non-tender. Normal bowel sounds.    Musculoskeletal: Normal movement of extremities  Skin and Nails: General appearance normal. No pallor, cyanosis, diaphoresis. Skin temperature normal. No clubbing of fingernails.   Psychiatric: Patient alert and oriented to person, place, and time. Speech and behavior appropriate. Normal mood and affect.       ECG 12 Lead    Date/Time: 7/13/2021 11:26 AM  Performed by: Vivien Pina APRN  Authorized by: Vivien Pina APRN   Comparison: compared with " "previous ECG from 5/7/2021  Similar to previous ECG  Rhythm: sinus rhythm  Rate: normal  BPM: 61  Conduction: conduction normal  ST Segments: ST segments normal  T Waves: T waves normal  QRS axis: normal  Other: no other findings    Clinical impression: normal ECG              Assessment:       Diagnosis Plan   1. Dizziness  Holter Monitor - 72 Hour Up To 15 Days   2. Other fatigue  Holter Monitor - 72 Hour Up To 15 Days   3. Essential hypertension  metoprolol succinate XL (TOPROL-XL) 50 MG 24 hr tablet    Holter Monitor - 72 Hour Up To 15 Days   4. Nonrheumatic aortic valve insufficiency     5. Grade II diastolic dysfunction            Plan:       1.  Dizziness: She has any sporadic episodes at least once every couple of weeks where she feels dizzy, fatigued, and instantly weak.  She denies any other symptoms, but feels the need to sit down and her symptoms resolved within 10 minutes.  I am concerned that she may be having a cardiac arrhythmia, sinus pause, or potentially severe bradycardia.  I recommended a 14-day Holter monitor and for her to document the event when this occurs.    2.  Fatigue: Of unknown etiology.  Thus far her LVEF was normal and echocardiogram and stress test was normal.    3.  Hypertension: Her blood pressure goal recommended is 130-140s systolic per Dr. Hernández.  Blood pressure is under excellent control today.    4.  Aortic Valve Insufficiency: Moderate per recent echocardiogram.    5.  Grade 2 Diastolic Dysfunction: Her proBNP was recently normal.  She appears euvolemic today.  She has some trace ankle edema which I feel is most likely due to her calcium channel blocker.    6.  Further recommendations to follow after review the 14-day Holter monitor.    ADDENDUM:  · Holter monitor was normal.  Dr. Hernández called the patient.  The patient had a \"few of these spells while it was on, but there is nothing to explain it.  Blood pressures or heart rates are fine.  Dr. Hernández did not feel that there was " anything further to do.  She will follow up with Dr. Hernández in 6 months.    As always, it has been a pleasure to participate in your patient's care. Thank you.       Sincerely,       LENKA Garcia  [facility]      · COVID-19 Precautions - Patient was compliant in wearing a mask. When I saw the patient, I used appropriate personal protective equipment (PPE) including mask and eye shield (standard procedure).  Additionally, I used gown and gloves if indicated.  Hand hygiene was completed before and after seeing the patient.  · Dictated utilizing Dragon Dictation  · I spent 30 minutes reviewing her medical records/testing/previous office notes/labs, face-to-face interaction with patient, physical examination, formulating the plan of care, and discussion of plan of care with patient.

## 2021-07-13 NOTE — PROGRESS NOTES
Chief Complaint: Karli Loomis is a  80 y.o. female, initially referred by No ref. provider found , who is here today for a postoperative visit.    History of Present Illness:  In the interim,Karli Loomis has had the following procedure and resultant pathology report: She is undergone an abdominal fat pad biopsy.  The path report did not reveal any evidence of amyloid.    She has noted no redness, warmth,drainage at the incision site. Denies fever or chills.      Current Outpatient Medications:   •  amLODIPine (NORVASC) 5 MG tablet, Take 1 tablet by mouth Daily., Disp: 90 tablet, Rfl: 1  •  aspirin 81 MG tablet, Take 81 mg by mouth Daily., Disp: , Rfl:   •  azelastine (ASTELIN) 0.1 % nasal spray, , Disp: , Rfl:   •  Budesonide (RHINOCORT ALLERGY NA), 2 sprays into the nostril(s) as directed by provider Daily As Needed., Disp: , Rfl:   •  budesonide-formoterol (SYMBICORT) 80-4.5 MCG/ACT inhaler, Inhale 2 puffs 2 (Two) Times a Day., Disp: , Rfl:   •  cetirizine (zyrTEC) 10 MG tablet, Take 10 mg by mouth Daily., Disp: , Rfl:   •  gabapentin (NEURONTIN) 400 MG capsule, Take 1 capsule by mouth 4 (Four) Times a Day., Disp: 120 capsule, Rfl: 5  •  lansoprazole (PREVACID) 30 MG capsule, TAKE 1 CAPSULE EVERY DAY, Disp: 90 capsule, Rfl: 0  •  levothyroxine (SYNTHROID, LEVOTHROID) 50 MCG tablet, TAKE 1 TABLET EVERY DAY, Disp: 90 tablet, Rfl: 1  •  losartan (COZAAR) 100 MG tablet, TAKE 1 TABLET BY MOUTH EVERY DAY , Disp: 30 tablet, Rfl: 11  •  metoprolol succinate XL (TOPROL-XL) 50 MG 24 hr tablet, Take 1 tablet by mouth Daily., Disp: 90 tablet, Rfl: 1  •  terazosin (HYTRIN) 10 MG capsule, TAKE 1 CAPSULE BY MOUTH TWO TIMES A DAY , Disp: 60 capsule, Rfl: 11  •  VENTOLIN  (90 Base) MCG/ACT inhaler, , Disp: , Rfl:   •  HYDROcodone-acetaminophen (NORCO) 5-325 MG per tablet, TAKE 1 TO 2 TABLETS BY MOUTH EVERY SIX HOURS AS NEEDED FOR PAIN, Disp: , Rfl:   Physical examination  Abdomen-she has an incision in the right lower  quadrant and the Steri-Strips have been removed.  There is some firmness immediately beneath the incision representing a small hematoma.  There is also some bruising inferior to the incision.  Assessment:  Symptoms suggestive of amyloidosis.  She has undergone an abdominal fat pad biopsy which was negative for amyloid.    Plan:  She sees Dr. Larose next week and he will follow up on the symptoms.          EMR Dragon/transcription disclaimer:    Much of this encounter note is an electronic transcription/translocation of spoken language to printed text.  The electronic translation of spoken language may permit erroneous, or at times, nonsensical words or phrases to be inadvertently transcribed.  Although I have reviewed the note from such areas, some may still exist.

## 2021-07-16 LAB
CYTO UR: NORMAL
LAB AP CASE REPORT: NORMAL
LAB AP CLINICAL INFORMATION: NORMAL
PATH REPORT.ADDENDUM SPEC: NORMAL
PATH REPORT.FINAL DX SPEC: NORMAL
PATH REPORT.GROSS SPEC: NORMAL

## 2021-08-02 ENCOUNTER — TELEPHONE (OUTPATIENT)
Dept: INTERNAL MEDICINE | Facility: CLINIC | Age: 81
End: 2021-08-02

## 2021-08-02 NOTE — TELEPHONE ENCOUNTER
Caller: Karli Loomis    Relationship to patient: Self    Best call back number: [phone]    Date of exposure: 07/28    Date of positive COVID19 test: N/A    Date if possible COVID19 exposure: 07/28    COVID19 symptoms: NONE    Date of initial quarantine: N/A    Additional information or concerns: PATIENT HAS APPT ON 08/05 FOR LABS AND DR. LAY 08/09     PLEASE CALL AND ADVISE.

## 2021-08-03 DIAGNOSIS — E03.9 ADULT HYPOTHYROIDISM: ICD-10-CM

## 2021-08-05 RX ORDER — LEVOTHYROXINE SODIUM 0.05 MG/1
TABLET ORAL
Qty: 90 TABLET | Refills: 1 | Status: SHIPPED | OUTPATIENT
Start: 2021-08-05 | End: 2021-12-23

## 2021-08-09 ENCOUNTER — OFFICE VISIT (OUTPATIENT)
Dept: INTERNAL MEDICINE | Facility: CLINIC | Age: 81
End: 2021-08-09

## 2021-08-09 VITALS
DIASTOLIC BLOOD PRESSURE: 78 MMHG | SYSTOLIC BLOOD PRESSURE: 122 MMHG | HEIGHT: 66 IN | BODY MASS INDEX: 23.48 KG/M2 | TEMPERATURE: 97.1 F | WEIGHT: 146.1 LBS

## 2021-08-09 DIAGNOSIS — E03.9 ADULT HYPOTHYROIDISM: Chronic | ICD-10-CM

## 2021-08-09 DIAGNOSIS — R35.1 NOCTURIA: ICD-10-CM

## 2021-08-09 DIAGNOSIS — R53.83 FATIGUE, UNSPECIFIED TYPE: ICD-10-CM

## 2021-08-09 DIAGNOSIS — R06.83 SNORING: ICD-10-CM

## 2021-08-09 DIAGNOSIS — I10 ESSENTIAL HYPERTENSION: Primary | Chronic | ICD-10-CM

## 2021-08-09 PROBLEM — G40.109: Status: RESOLVED | Noted: 2017-12-14 | Resolved: 2021-08-09

## 2021-08-09 PROBLEM — G62.0 DRUG-INDUCED POLYNEUROPATHY: Status: RESOLVED | Noted: 2017-12-14 | Resolved: 2021-08-09

## 2021-08-09 PROCEDURE — 99214 OFFICE O/P EST MOD 30 MIN: CPT | Performed by: INTERNAL MEDICINE

## 2021-08-09 NOTE — PROGRESS NOTES
Subjective        Chief Complaint   Patient presents with   • Hypertension   • Fatigue           Karli Loomis is a 81 y.o. female who presents for    Patient Active Problem List   Diagnosis   • Malignant neoplasm of overlapping sites of left breast in female, estrogen receptor positive (CMS/HCC)   • Macroglobulinemia of [place] (CMS/HCC)   • Adult hypothyroidism   • Primary osteoarthritis of left knee   • Headache, migraine   • Hyponatremia with normal extracellular fluid volume   • TIA (transient ischemic attack)   • Essential hypertension   • Vision loss     • Asthma   • Other insomnia   • Nonrheumatic aortic valve insufficiency   • Grade II diastolic dysfunction       History of Present Illness     She saw Dr. Hernández and wore a monitor. She had an echo with an EF of 67 percent and a negative nuclear stress test. She has not heart the results of her holter monitor. Dr. Larose does not feel that Waldenstroms is causing her fatigue. She lays in bed 10 hours per night. She does not wake up rested. She does not snore. She can fall asleep but she does wake up to urinate a few times per night. Her BP has been 130/?? She denies depression. She has been fatigued over one year. She denies hematuria or dysuria. She does not feel any different with the lower dose of Metoprolol. She sees Dr. Ogden in Sept for her asthma.  Allergies   Allergen Reactions   • Cortisone Unknown - High Severity     Reports having a shot years ago, and wonders if he hit a vein. She was told by another doctor that it probably went in her blood stream.    • Darvon  [Propoxyphene] Palpitations       Current Outpatient Medications on File Prior to Visit   Medication Sig Dispense Refill   • amLODIPine (NORVASC) 5 MG tablet Take 1 tablet by mouth Daily. 90 tablet 3   • aspirin 81 MG tablet Take 81 mg by mouth Daily.     • azelastine (ASTELIN) 0.1 % nasal spray      • Budesonide (RHINOCORT ALLERGY NA) 2 sprays into the nostril(s) as directed by  provider Daily As Needed.     • budesonide-formoterol (SYMBICORT) 80-4.5 MCG/ACT inhaler Inhale 2 puffs 2 (Two) Times a Day.     • cetirizine (zyrTEC) 10 MG tablet Take 10 mg by mouth Daily.     • gabapentin (NEURONTIN) 400 MG capsule Take 1 capsule by mouth 4 (Four) Times a Day. 120 capsule 5   • levothyroxine (SYNTHROID, LEVOTHROID) 50 MCG tablet TAKE 1 TABLET EVERY DAY 90 tablet 1   • losartan (COZAAR) 100 MG tablet TAKE 1 TABLET BY MOUTH EVERY DAY  30 tablet 11   • metoprolol succinate XL (TOPROL-XL) 50 MG 24 hr tablet Take 1 tablet by mouth Daily. 90 tablet 3   • terazosin (HYTRIN) 10 MG capsule TAKE 1 CAPSULE BY MOUTH TWO TIMES A DAY  60 capsule 11   • VENTOLIN  (90 Base) MCG/ACT inhaler      • [DISCONTINUED] HYDROcodone-acetaminophen (NORCO) 5-325 MG per tablet TAKE 1 TO 2 TABLETS BY MOUTH EVERY SIX HOURS AS NEEDED FOR PAIN     • [DISCONTINUED] lansoprazole (PREVACID) 30 MG capsule TAKE 1 CAPSULE EVERY DAY 90 capsule 0     No current facility-administered medications on file prior to visit.       Past Medical History:   Diagnosis Date   • Acid reflux    • Chronic pansinusitis 7/12/2018   • Clostridium difficile colitis     Requiring admission to [facility] in 09/2008   • Drug-induced polyneuropathy (CMS/HCC) 12/14/2017   • Epilepsy with simple partial seizures (CMS/HCC) 12/14/2017   • Headache, migraine 12/14/2017   • History of transfusion of packed red blood cells     R/T SURGERY   • Malignant neoplasm of overlapping sites of left breast in female, estrogen receptor positive (CMS/HCC) 4/13/2016   • Nonrheumatic aortic valve insufficiency    • Primary osteoarthritis of left knee 7/22/2015   • Skin cancer    • Thyroid nodule     Removed more than 35 years ago   • TIA (transient ischemic attack) 10/18/2018   • UTI due to extended-spectrum beta lactamase (ESBL) producing Escherichia coli 1/6/2019       Past Surgical History:   Procedure Laterality Date   • ADENOIDECTOMY     • APPENDECTOMY      • CARPAL TUNNEL RELEASE Right    • CATARACT EXTRACTION Bilateral    • CHOLECYSTECTOMY     • COLONOSCOPY     • HYSTERECTOMY      Partial, many years ago, heavy bleeding, no cancer   • KNEE ARTHROSCOPY Right 03/2009    Finding of chondromalacia and appropriate cleanup of joint was performed by Dr. Moraes.   • MASTECTOMY Left 07/2014   • REPLACEMENT TOTAL KNEE Right 12/2015   • SKIN CANCER EXCISION      several   • TONSILLECTOMY         Family History   Problem Relation Age of Onset   • Mental illness Mother    • Migraines Mother    • Dementia Mother    • Heart disease Father    • Hypertension Father    • Kidney disease Father    • No Known Problems Daughter    • No Known Problems Daughter    • Breast cancer Other    • Heart disease Other    • Hypertension Other    • Diabetes Other    • Cancer Maternal Grandmother    • Breast cancer Maternal Grandmother    • No Known Problems Maternal Grandfather    • No Known Problems Paternal Grandmother    • No Known Problems Paternal Grandfather    • Lymphoma Neg Hx    • Leukemia Neg Hx        Social History     Socioeconomic History   • Marital status:      Spouse name: Not on file   • Number of children: Not on file   • Years of education: Not on file   • Highest education level: Not on file   Tobacco Use   • Smoking status: Never Smoker   • Smokeless tobacco: Never Used   Vaping Use   • Vaping Use: Never used   Substance and Sexual Activity   • Alcohol use: No     Comment: 2 cups caffeine/coffee daily   • Drug use: No   • Sexual activity: Defer           The following portions of the patient's history were reviewed and updated as appropriate: problem list, allergies, current medications, past medical history, past family history, past social history and past surgical history.    Review of Systems    Immunization History   Administered Date(s) Administered   • COVID-19 (PFIZER) 01/22/2021, 02/12/2021   • Fluad Quad 65+ 10/03/2019, 09/21/2020   • Fluzone High Dose =>65  "Years ([facility] ONLY) 10/30/2015, 10/11/2016, 10/18/2017, 10/06/2018   • Pneumococcal Conjugate 13-Valent (PCV13) 12/01/2016   • Pneumococcal Polysaccharide (PPSV23) 06/28/2019       Objective   Vitals:    08/09/21 1024   BP: 122/78   Temp: 97.1 °F (36.2 °C)   Weight: 66.3 kg (146 lb 1.6 oz)   Height: 167.6 cm (66\")     Body mass index is 23.58 kg/m².  Physical Exam  Vitals reviewed.   Constitutional:       Appearance: She is well-developed.   HENT:      Head: Normocephalic and atraumatic.   Cardiovascular:      Rate and Rhythm: Normal rate and regular rhythm.      Heart sounds: Normal heart sounds, S1 normal and S2 normal.   Pulmonary:      Effort: Pulmonary effort is normal.      Breath sounds: Normal breath sounds.   Skin:     General: Skin is warm.   Neurological:      Mental Status: She is alert.   Psychiatric:         Behavior: Behavior normal.         Procedures    Assessment/Plan   Diagnoses and all orders for this visit:    1. Essential hypertension (Primary)  Comments:  excellent    2. Fatigue, unspecified type  Comments:  No different with decreased Toprol. Declines sleep study. She has had thorough work up    3. Snoring  Comments:  Declines sleep study    4. Nocturia  Comments:  Declines urology work up    5. Adult hypothyroidism  Comments:  TSH is at goal  Orders:  -     Comprehensive Metabolic Panel; Future  -     Lipid Panel With / Chol / HDL Ratio; Future  -     TSH; Future               Reviewed nl TSH as well as labs and CTs from Dr. Larose and stress test and echo from Dr. Hernández. [facility] Tdap and Shingrix.  Return in about 6 months (around 2/9/2022) for Medicare Wellness, Lab Before FUP.  "

## 2021-08-13 ENCOUNTER — TELEPHONE (OUTPATIENT)
Dept: CARDIOLOGY | Facility: CLINIC | Age: 81
End: 2021-08-13

## 2021-08-25 ENCOUNTER — LAB (OUTPATIENT)
Dept: LAB | Facility: HOSPITAL | Age: 81
End: 2021-08-25

## 2021-08-25 ENCOUNTER — OFFICE VISIT (OUTPATIENT)
Dept: ONCOLOGY | Facility: CLINIC | Age: 81
End: 2021-08-25

## 2021-08-25 VITALS
TEMPERATURE: 97.3 F | SYSTOLIC BLOOD PRESSURE: 143 MMHG | WEIGHT: 146.3 LBS | HEIGHT: 66 IN | HEART RATE: 87 BPM | RESPIRATION RATE: 16 BRPM | DIASTOLIC BLOOD PRESSURE: 76 MMHG | OXYGEN SATURATION: 97 % | BODY MASS INDEX: 23.51 KG/M2

## 2021-08-25 DIAGNOSIS — E87.1 HYPONATREMIA WITH NORMAL EXTRACELLULAR FLUID VOLUME: ICD-10-CM

## 2021-08-25 DIAGNOSIS — Z17.0 MALIGNANT NEOPLASM OF OVERLAPPING SITES OF LEFT BREAST IN FEMALE, ESTROGEN RECEPTOR POSITIVE (HCC): Primary | ICD-10-CM

## 2021-08-25 DIAGNOSIS — C50.812 MALIGNANT NEOPLASM OF OVERLAPPING SITES OF LEFT BREAST IN FEMALE, ESTROGEN RECEPTOR POSITIVE (HCC): ICD-10-CM

## 2021-08-25 DIAGNOSIS — C88.0 MACROGLOBULINEMIA OF WALDENSTROM (HCC): ICD-10-CM

## 2021-08-25 DIAGNOSIS — C50.812 MALIGNANT NEOPLASM OF OVERLAPPING SITES OF LEFT BREAST IN FEMALE, ESTROGEN RECEPTOR POSITIVE (HCC): Primary | ICD-10-CM

## 2021-08-25 DIAGNOSIS — R53.0 NEOPLASTIC MALIGNANT RELATED FATIGUE: ICD-10-CM

## 2021-08-25 DIAGNOSIS — Z17.0 MALIGNANT NEOPLASM OF OVERLAPPING SITES OF LEFT BREAST IN FEMALE, ESTROGEN RECEPTOR POSITIVE (HCC): ICD-10-CM

## 2021-08-25 LAB
ALBUMIN SERPL-MCNC: 4.4 G/DL (ref 3.5–5.2)
ALBUMIN/GLOB SERPL: 2.3 G/DL (ref 1.1–2.4)
ALP SERPL-CCNC: 66 U/L (ref 38–116)
ALT SERPL W P-5'-P-CCNC: 11 U/L (ref 0–33)
ANION GAP SERPL CALCULATED.3IONS-SCNC: 8.6 MMOL/L (ref 5–15)
AST SERPL-CCNC: 19 U/L (ref 0–32)
BASOPHILS # BLD AUTO: 0.03 10*3/MM3 (ref 0–0.2)
BASOPHILS NFR BLD AUTO: 0.6 % (ref 0–1.5)
BILIRUB SERPL-MCNC: 0.6 MG/DL (ref 0.2–1.2)
BUN SERPL-MCNC: 10 MG/DL (ref 6–20)
BUN/CREAT SERPL: 10.8 (ref 7.3–30)
CALCIUM SPEC-SCNC: 9.8 MG/DL (ref 8.5–10.2)
CHLORIDE SERPL-SCNC: 96 MMOL/L (ref 98–107)
CO2 SERPL-SCNC: 29.4 MMOL/L (ref 22–29)
CREAT SERPL-MCNC: 0.93 MG/DL (ref 0.6–1.1)
DEPRECATED RDW RBC AUTO: 42.8 FL (ref 37–54)
EOSINOPHIL # BLD AUTO: 0.32 10*3/MM3 (ref 0–0.4)
EOSINOPHIL NFR BLD AUTO: 6.3 % (ref 0.3–6.2)
ERYTHROCYTE [DISTWIDTH] IN BLOOD BY AUTOMATED COUNT: 12.4 % (ref 12.3–15.4)
GFR SERPL CREATININE-BSD FRML MDRD: 58 ML/MIN/1.73
GLOBULIN UR ELPH-MCNC: 1.9 GM/DL (ref 1.8–3.5)
GLUCOSE SERPL-MCNC: 87 MG/DL (ref 74–124)
HCT VFR BLD AUTO: 39 % (ref 34–46.6)
HGB BLD-MCNC: 13.6 G/DL (ref 12–15.9)
IMM GRANULOCYTES # BLD AUTO: 0.01 10*3/MM3 (ref 0–0.05)
IMM GRANULOCYTES NFR BLD AUTO: 0.2 % (ref 0–0.5)
LYMPHOCYTES # BLD AUTO: 1.7 10*3/MM3 (ref 0.7–3.1)
LYMPHOCYTES NFR BLD AUTO: 33.6 % (ref 19.6–45.3)
MCH RBC QN AUTO: 32.8 PG (ref 26.6–33)
MCHC RBC AUTO-ENTMCNC: 34.9 G/DL (ref 31.5–35.7)
MCV RBC AUTO: 94 FL (ref 79–97)
MONOCYTES # BLD AUTO: 0.42 10*3/MM3 (ref 0.1–0.9)
MONOCYTES NFR BLD AUTO: 8.3 % (ref 5–12)
NEUTROPHILS NFR BLD AUTO: 2.58 10*3/MM3 (ref 1.7–7)
NEUTROPHILS NFR BLD AUTO: 51 % (ref 42.7–76)
NRBC BLD AUTO-RTO: 0 /100 WBC (ref 0–0.2)
PLATELET # BLD AUTO: 127 10*3/MM3 (ref 140–450)
PMV BLD AUTO: 8.9 FL (ref 6–12)
POTASSIUM SERPL-SCNC: 4.8 MMOL/L (ref 3.5–4.7)
PROT SERPL-MCNC: 6.3 G/DL (ref 6.3–8)
RBC # BLD AUTO: 4.15 10*6/MM3 (ref 3.77–5.28)
SODIUM SERPL-SCNC: 134 MMOL/L (ref 134–145)
WBC # BLD AUTO: 5.06 10*3/MM3 (ref 3.4–10.8)

## 2021-08-25 PROCEDURE — 85025 COMPLETE CBC W/AUTO DIFF WBC: CPT

## 2021-08-25 PROCEDURE — 36415 COLL VENOUS BLD VENIPUNCTURE: CPT

## 2021-08-25 PROCEDURE — 80053 COMPREHEN METABOLIC PANEL: CPT

## 2021-08-25 PROCEDURE — 99214 OFFICE O/P EST MOD 30 MIN: CPT | Performed by: INTERNAL MEDICINE

## 2021-08-25 NOTE — PROGRESS NOTES
Subjective    REASONS FOR FOLLOWUP:     1. History of Waldenstrom macroglobulinemia.    2. History of breast cancer stage I on the left, status post mastectomy. The patient completed aromatase inhibitor  X 5 YEARS IN 2019 without any evidence of breast cancer recurrence        History of Present Illness      DURING THE VISIT WITH THE PATIENT TODAY , PATIENT HAD FACE MASK, I HAD PROPER PROTECTIVE EQUIPMENT, AND I DID HAND HYGIENE WITH SOAP AND WATER BEFORE AND AFTER THE VISIT.  This patient returns today to the office for follow up of her Waldenstrom's macroglobulinemia that has been watched in absence of any other intervention given the stability of her monoclonal protein. The other symptom that she has had is profound fatigue for any activity and we have been looking and looking for causes for this including endocrinological disorders, cardiac disease, pulmonary problems, inflammatory problems, infections and so forth and later on for amyloidosis. In fact she underwent abdominal fat pad biopsy by Deric Llamas MD, not too long ago, report of this from [facility] was negative with no Congo red material found in this area. The patient's symptoms remain about the same. She has decided to buy a treadmill device that will be placed in her house today to start to do some physical activity to see if this changes her overall outcome. Her appetite remains good, her weight is stable, her bowel function and urination remain unchanged. No asthma symptoms, no cardiac problems. Blood pressure under good control. No fever, chills, night sweats, no abnormal bleeding. 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 She has no motor deficit. No neuropathy in her hands. This neuropathy is part of her Waldenstrom's.                Past Medical History:   Diagnosis Date   • Acid reflux    • Chronic pansinusitis 7/12/2018   • Clostridium difficile colitis     Requiring admission to [facility] in 09/2008   • Drug-induced polyneuropathy (CMS/HCC) 12/14/2017   • Epilepsy with simple partial seizures (CMS/HCC) 12/14/2017   • Headache, migraine 12/14/2017   • History of transfusion of packed red blood cells     R/T SURGERY   • Malignant neoplasm of overlapping sites of left breast in female, estrogen receptor positive (CMS/HCC) 4/13/2016   • Nonrheumatic aortic valve insufficiency    • Primary osteoarthritis of left knee 7/22/2015   • Skin cancer    • Thyroid nodule     Removed more than 35 years ago   • TIA (transient ischemic attack) 10/18/2018   • UTI due to extended-spectrum beta lactamase (ESBL) producing Escherichia coli 1/6/2019     Past Surgical History:   Procedure Laterality Date   • ADENOIDECTOMY     • APPENDECTOMY     • CARPAL TUNNEL RELEASE Right    • CATARACT EXTRACTION Bilateral    • CHOLECYSTECTOMY     • COLONOSCOPY     • HYSTERECTOMY      Partial, many years ago, heavy bleeding, no cancer   • KNEE ARTHROSCOPY Right 03/2009    Finding of chondromalacia and appropriate cleanup of joint was performed by Dr. Moraes.   • MASTECTOMY Left 07/2014   • REPLACEMENT TOTAL KNEE Right 12/2015   • SKIN CANCER EXCISION      several   • TONSILLECTOMY           Social History     Socioeconomic History   • Marital status:      Spouse name: Not on file   • Number of children: Not on file   • Years of education: Not on file   • Highest education level: Not on file   Tobacco Use   • Smoking status: Never Smoker   • Smokeless tobacco: Never Used   Vaping Use   • Vaping Use: Never used   Substance and Sexual Activity   • Alcohol use: No     Comment: 2 cups caffeine/coffee daily   • Drug use: No   • Sexual activity: Defer     Family History   Problem Relation Age of Onset   • Mental illness Mother    • Migraines Mother    • Dementia Mother    • Heart disease Father    • Hypertension Father    • Kidney disease Father    • No Known Problems Daughter    • No Known Problems Daughter    • Breast cancer  "Other    • Heart disease Other    • Hypertension Other    • Diabetes Other    • Cancer Maternal Grandmother    • Breast cancer Maternal Grandmother    • No Known Problems Maternal Grandfather    • No Known Problems Paternal Grandmother    • No Known Problems Paternal Grandfather    • Lymphoma Neg Hx    • Leukemia Neg Hx      Current Outpatient Medications on File Prior to Visit   Medication Sig Dispense Refill   • amLODIPine (NORVASC) 5 MG tablet Take 1 tablet by mouth Daily. 90 tablet 3   • aspirin 81 MG tablet Take 81 mg by mouth Daily.     • azelastine (ASTELIN) 0.1 % nasal spray      • Budesonide (RHINOCORT ALLERGY NA) 2 sprays into the nostril(s) as directed by provider Daily As Needed.     • budesonide-formoterol (SYMBICORT) 80-4.5 MCG/ACT inhaler Inhale 2 puffs 2 (Two) Times a Day.     • cetirizine (zyrTEC) 10 MG tablet Take 10 mg by mouth Daily.     • gabapentin (NEURONTIN) 400 MG capsule Take 1 capsule by mouth 4 (Four) Times a Day. 120 capsule 5   • levothyroxine (SYNTHROID, LEVOTHROID) 50 MCG tablet TAKE 1 TABLET EVERY DAY 90 tablet 1   • losartan (COZAAR) 100 MG tablet TAKE 1 TABLET BY MOUTH EVERY DAY  30 tablet 11   • metoprolol succinate XL (TOPROL-XL) 50 MG 24 hr tablet Take 1 tablet by mouth Daily. 90 tablet 3   • terazosin (HYTRIN) 10 MG capsule TAKE 1 CAPSULE BY MOUTH TWO TIMES A DAY  60 capsule 11   • VENTOLIN  (90 Base) MCG/ACT inhaler        No current facility-administered medications on file prior to visit.       ALLERGIES:    Allergies   Allergen Reactions   • Cortisone Unknown - High Severity     Reports having a shot years ago, and wonders if he hit a vein. She was told by another doctor that it probably went in her blood stream.    • Darvon  [Propoxyphene] Palpitations             Objective      Vitals:    08/25/21 1340   BP: 143/76   Pulse: 87   Resp: 16   Temp: 97.3 °F (36.3 °C)   TempSrc: Temporal   SpO2: 97%   Weight: 66.4 kg (146 lb 4.8 oz)   Height: 167.6 cm (65.98\")   PainSc: " 0-No pain     Current Status 8/25/2021   ECOG score 1         Physical Exam   I HAVE PERSONALLY REVIEWED THE HISTORY OF THE PRESENT ILLNESS, PAST MEDICAL HISTORY, FAMILY HISTORY, SOCIAL HISTORY, ALLERGIES, MEDICATIONS STATED ABOVE IN THE  NOTE FROM TODAY.        GENERAL:  Well-developed, well-nourished  Patient  in no acute distress.   SKIN:  Warm, dry ,NO rashes,NO purpura ,NO petechiae.  HEENT:  Pupils were equal and reactive to light and accomodation, conjunctivae noninjected, no pterygium, normal extraocular movements, normal visual acuity.   NECK:  Supple with good range of motion; no thyromegaly , no other masses, no JVD or bruits, no cervical adenopathies.No carotid artery pain, no carotid abnormal pulsation , NO arterial dance.  LYMPHATICS:  No cervical, NO supraclavicular, NO axillary,NO epitrochlear , NO inguinal adenopathy.  CARDIAC   normal rate and regular rhythm, without murmur,NO rubs NO S3 NO S4 right or left .  LUNGS: normal breath sounds bilateral, no wheezing, rhonchi, crackles or rubs.  INSPECTION of  breast documented symmetry of the tissue per se and location and size of the nipple,no retractions or inversion of the nipple, normal skin without lesions, no erythema or nodules,no peau d'orange, no prominence of superficial veins or chest wall collateral circulation.PALPATION of the breast documented normal skin turgor, no induration, alteration in local temperature, or pain, no palpable masses or nodules, normal mobility of the tissues,no fixation of the tissue or parenchyma to the chest wall, no alteration at the tail of the breasts or axillas, no adenopathies.left side mastectomy Surgical site was well healed.No lymphedema in either extremity.  VASCULAR VENOUS: no cyanosis, collateral circulation, varicosities, edema, palpable cords, pain, erythema.  ABDOMEN:  Soft, nontender with no hepatomegaly, no splenomegaly,no masses, no ascites, no collateral circulation,no distention,no [place]  sign.  EXTREMITIES  AND SPINE:  No clubbing, cyanosis or edema, no deformities , no pain .No kyphosis, scoliosis, no other deformities, no pain in spine, no pain in ribs , no pain inpelvic bone.  NEUROLOGICAL:  Patient was awake, alert, oriented to time, person and place.sensory neuropathy in feet                                  RECENT LABS:  Results from last 7 days   Lab Units 08/25/21  1328   WBC 10*3/mm3 5.06   NEUTROS ABS 10*3/mm3 2.58   HEMOGLOBIN g/dL 13.6   HEMATOCRIT % 39.0   PLATELETS 10*3/mm3 127*              Tissue Pathology Exam: [id number]  Order: [id number]  Status:  Edited Result - FINAL   Visible to patient:  No (not released)   Next appt:  02/10/2022 at 11:50 AM in Internal Medicine ([facility])   Dx:  Macroglobulinemia (CMS/HCC)  Specimen Information: Abdomen, Right; Tissue         0 Result Notes  Component    Addendum   Please see the completely scanned Amyloid Protein Identification report from [facility] below.    Addendum electronically signed by Bernard Zamudio MD on 7/16/2021 at 1125   Case Report   Surgical Pathology Report                         Case: [id number]                                   Authorizing Provider:  Deric Llamas MD   Collected:           06/29/2021 09:27 AM           Ordering Location:     [facility]  Received:            06/29/2021 10:18 AM                                  [facility]                                                                      Pathologist:           Bernard Zamudio MD                                                          Specimen:    Abdomen, Right, fat                                                                                             Assessment/Plan      1. Waldenstrom macroglobulinemia that has been treated in the past mainly with Rituxan. In the past, also she was treated with Velcade with very dramatic improvement in her monoclonal protein but very dramatic sensory peripheral  neuropathy. This medication was discontinued altogether.    6/29/2020, she developed progressive fatigue with worsening neuropathy in her feet.  These were her original symptoms at diagnosis of Waldenstrom's.  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  Calcium also elevated at 10.5 with decreased sodium related to pseudohyponatremia associated with monoclonal protein.  Previously, she did not receive improvement from Rituxan, she is not thought to be a candidate for Imbruvica, therefore she went on to initiate venetoclax 7/13/2020.  She is currently on her next planned dose of 200 mg daily with poor tolerance as outlined below  I discussed with her on 04/28/2021 that I do not believe given the information that we have with a sedimentation rate that was normal and a stable monoclonal protein IgM that the Waldenstrom could be the most focal reason to explain her fatigue. On the other hand it is impossible given her thrombocytopenia to be sure that she still has some component of lymphoma in her bone marrow and the only way to prove this will be to pursue in a bone marrow test. She does not believe that she is ready for this at this time.     Radiologically speaking the CT scan of the chest, abdomen and pelvis failed to document any lymphadenopathy, masses, splenomegaly or any other issue pertinent to her Waldenstrom.  I discussed with her on 06/02/2021 that I do not feel that the symptoms of fatigue are related to her Waldenström's at this time. Her monoclonal protein study has remained completely quiet. Her white count and hemoglobin are normal. Her B12, folic acid, ferritin, iron profile remain normal and TSH hs remained into the normal limits. Her sed rate was normal at 4 mm/hr and she has no obvious infection or inflammatory process.   On 08/25/2021 the patient's monoclonal protein has remained very stable as discussed with her during the previous weeks. We have another level pending today. One more analysis  that I decided to pursue was an amyloid analysis and abdominal fat pad biopsy. This was performed in late 06/2021 by Deric Llamas MD. Material was sent to [facility]. No Congo red material was found in this specimen. Therefore the possibility of amyloidosis is less likely under these circumstances.     We have run CT scans, laboratory testing and all sorts of things trying to explain her fatigue without having a definitive answer. I think a lot of this is lack of training.    My recommendation in regard to her Waldenstrom's is to continue monitoring her parameters with CBC, CMP and monoclonal protein studies in 3 months.     ·   2.From the point of view of her breast cancer status post mastectomy on the left she completed her adjuvant therapy, mammogram is coming up in the end of 06/2021 on the right side. The left chest wall has remained unremarkable.  On 08/25/2021 she has no symptoms or signs of breast cancer recurrence. Her mastectomy site on the left is completely healed. Her right breast examination is normal. She is up to date on her mammogram. This will be watched in absence of any other intervention.     ·   3  The next symptom is profound fatigue. We have looked for endocrinological diseases, medication side effects, infections, malignancies, Waldenstrom's, amyloid, cardiac disease and so forth not having any conclusion in regard to the nature of this. I do believe that the lack of bad news or not finding any other thing is good news to me and I pointed this out to the patient. Maybe by the end of the day her fatigue is lack of proper physical training. She has bought a treadmill device that will be in her house as per today and hopefully she will be able to do some walking on this that will be meaningful in regard to recovering in regard energy.     4.The patient raised the question in regard COVID vaccination booster. In my opinion she is immunosuppressed given her malignancy and I think she should  get a booster vaccination once that this becomes available in a few weeks. She already has had a booster shot for tetanus done yesterday.     I will review her back in 3 months with a CBC, CMP and monoclonal protein studies.    I suggested for her to take a Tylenol before she goes to bed to see if this makes any difference in the long run in regard to her neuropathic symptomatology in her feet.                   Florencio Laorse MD   8/25/2021      CC:

## 2021-08-26 LAB
ALBUMIN SERPL ELPH-MCNC: 4 G/DL (ref 2.9–4.4)
ALBUMIN/GLOB SERPL: 1.7 {RATIO} (ref 0.7–1.7)
ALPHA1 GLOB SERPL ELPH-MCNC: 0.3 G/DL (ref 0–0.4)
ALPHA2 GLOB SERPL ELPH-MCNC: 0.7 G/DL (ref 0.4–1)
B-GLOBULIN SERPL ELPH-MCNC: 0.9 G/DL (ref 0.7–1.3)
GAMMA GLOB SERPL ELPH-MCNC: 0.6 G/DL (ref 0.4–1.8)
GLOBULIN SER-MCNC: 2.5 G/DL (ref 2.2–3.9)
IGA SERPL-MCNC: 33 MG/DL (ref 64–422)
IGG SERPL-MCNC: 468 MG/DL (ref 586–1602)
IGM SERPL-MCNC: 201 MG/DL (ref 26–217)
INTERPRETATION SERPL IEP-IMP: ABNORMAL
KAPPA LC FREE SER-MCNC: 15.2 MG/L (ref 3.3–19.4)
KAPPA LC FREE/LAMBDA FREE SER: 2.41 {RATIO} (ref 0.26–1.65)
LABORATORY COMMENT REPORT: ABNORMAL
LAMBDA LC FREE SERPL-MCNC: 6.3 MG/L (ref 5.7–26.3)
M PROTEIN SERPL ELPH-MCNC: 0.3 G/DL
PROT SERPL-MCNC: 6.5 G/DL (ref 6–8.5)

## 2021-08-27 ENCOUNTER — TELEPHONE (OUTPATIENT)
Dept: ONCOLOGY | Facility: CLINIC | Age: 81
End: 2021-08-27

## 2021-09-15 DIAGNOSIS — I10 ESSENTIAL HYPERTENSION: ICD-10-CM

## 2021-09-15 RX ORDER — TERAZOSIN 10 MG/1
10 CAPSULE ORAL 2 TIMES DAILY
Qty: 180 CAPSULE | Refills: 3 | Status: SHIPPED | OUTPATIENT
Start: 2021-09-15 | End: 2021-11-17 | Stop reason: SINTOL

## 2021-09-15 RX ORDER — LOSARTAN POTASSIUM 100 MG/1
100 TABLET ORAL DAILY
Qty: 90 TABLET | Refills: 3 | Status: SHIPPED | OUTPATIENT
Start: 2021-09-15 | End: 2022-04-20 | Stop reason: SDUPTHER

## 2021-09-15 NOTE — TELEPHONE ENCOUNTER
Caller: Karli Loomis    Relationship: Self    Best call back number: [phone]    Medication needed:   Requested Prescriptions     Pending Prescriptions Disp Refills   • losartan (COZAAR) 100 MG tablet 30 tablet 11     Sig: Take 1 tablet by mouth Daily.   • terazosin (HYTRIN) 10 MG capsule 60 capsule 11     Sig: Take 1 capsule by mouth 2 (Two) Times a Day.       When do you need the refill by:  ASAP    PATIENT HAS 4 DAYS LEFT OF MEDICATIONS    SHE WOULD LIKE THESE IN 90 DAYS SUPPLY    Does the patient have less than a 3 day supply:  [] Yes  [x] No    What is the patient's preferred pharmacy:  [facility] - [facility] [address] - [phone]  - [phone]   [phone]

## 2021-11-17 ENCOUNTER — OFFICE VISIT (OUTPATIENT)
Dept: ONCOLOGY | Facility: CLINIC | Age: 81
End: 2021-11-17

## 2021-11-17 ENCOUNTER — LAB (OUTPATIENT)
Dept: LAB | Facility: HOSPITAL | Age: 81
End: 2021-11-17

## 2021-11-17 VITALS
RESPIRATION RATE: 16 BRPM | HEART RATE: 70 BPM | TEMPERATURE: 97.3 F | HEIGHT: 66 IN | BODY MASS INDEX: 23.56 KG/M2 | DIASTOLIC BLOOD PRESSURE: 79 MMHG | OXYGEN SATURATION: 98 % | WEIGHT: 146.6 LBS | SYSTOLIC BLOOD PRESSURE: 144 MMHG

## 2021-11-17 DIAGNOSIS — E87.1 HYPONATREMIA WITH NORMAL EXTRACELLULAR FLUID VOLUME: ICD-10-CM

## 2021-11-17 DIAGNOSIS — C88.0 MACROGLOBULINEMIA OF WALDENSTROM (HCC): ICD-10-CM

## 2021-11-17 DIAGNOSIS — Z17.0 MALIGNANT NEOPLASM OF OVERLAPPING SITES OF LEFT BREAST IN FEMALE, ESTROGEN RECEPTOR POSITIVE (HCC): Primary | ICD-10-CM

## 2021-11-17 DIAGNOSIS — C50.812 MALIGNANT NEOPLASM OF OVERLAPPING SITES OF LEFT BREAST IN FEMALE, ESTROGEN RECEPTOR POSITIVE (HCC): Primary | ICD-10-CM

## 2021-11-17 DIAGNOSIS — I10 ESSENTIAL HYPERTENSION: ICD-10-CM

## 2021-11-17 DIAGNOSIS — C50.812 MALIGNANT NEOPLASM OF OVERLAPPING SITES OF LEFT BREAST IN FEMALE, ESTROGEN RECEPTOR POSITIVE (HCC): ICD-10-CM

## 2021-11-17 DIAGNOSIS — Z17.0 MALIGNANT NEOPLASM OF OVERLAPPING SITES OF LEFT BREAST IN FEMALE, ESTROGEN RECEPTOR POSITIVE (HCC): ICD-10-CM

## 2021-11-17 PROBLEM — R21 RASH AND NONSPECIFIC SKIN ERUPTION: Status: ACTIVE | Noted: 2021-11-17

## 2021-11-17 LAB
ALBUMIN SERPL-MCNC: 4.3 G/DL (ref 3.5–5.2)
ALBUMIN/GLOB SERPL: 2 G/DL (ref 1.1–2.4)
ALP SERPL-CCNC: 62 U/L (ref 38–116)
ALT SERPL W P-5'-P-CCNC: 12 U/L (ref 0–33)
ANION GAP SERPL CALCULATED.3IONS-SCNC: 8.7 MMOL/L (ref 5–15)
AST SERPL-CCNC: 19 U/L (ref 0–32)
BASOPHILS # BLD AUTO: 0.04 10*3/MM3 (ref 0–0.2)
BASOPHILS NFR BLD AUTO: 0.8 % (ref 0–1.5)
BILIRUB SERPL-MCNC: 0.6 MG/DL (ref 0.2–1.2)
BUN SERPL-MCNC: 8 MG/DL (ref 6–20)
BUN/CREAT SERPL: 9.2 (ref 7.3–30)
CALCIUM SPEC-SCNC: 10.1 MG/DL (ref 8.5–10.2)
CHLORIDE SERPL-SCNC: 98 MMOL/L (ref 98–107)
CO2 SERPL-SCNC: 28.3 MMOL/L (ref 22–29)
CREAT SERPL-MCNC: 0.87 MG/DL (ref 0.6–1.1)
DEPRECATED RDW RBC AUTO: 43.1 FL (ref 37–54)
EOSINOPHIL # BLD AUTO: 0.4 10*3/MM3 (ref 0–0.4)
EOSINOPHIL NFR BLD AUTO: 7.8 % (ref 0.3–6.2)
ERYTHROCYTE [DISTWIDTH] IN BLOOD BY AUTOMATED COUNT: 12.3 % (ref 12.3–15.4)
GFR SERPL CREATININE-BSD FRML MDRD: 62 ML/MIN/1.73
GLOBULIN UR ELPH-MCNC: 2.1 GM/DL (ref 1.8–3.5)
GLUCOSE SERPL-MCNC: 102 MG/DL (ref 74–124)
HCT VFR BLD AUTO: 39.6 % (ref 34–46.6)
HGB BLD-MCNC: 13.7 G/DL (ref 12–15.9)
IMM GRANULOCYTES # BLD AUTO: 0.01 10*3/MM3 (ref 0–0.05)
IMM GRANULOCYTES NFR BLD AUTO: 0.2 % (ref 0–0.5)
LYMPHOCYTES # BLD AUTO: 1.53 10*3/MM3 (ref 0.7–3.1)
LYMPHOCYTES NFR BLD AUTO: 29.9 % (ref 19.6–45.3)
MCH RBC QN AUTO: 32.9 PG (ref 26.6–33)
MCHC RBC AUTO-ENTMCNC: 34.6 G/DL (ref 31.5–35.7)
MCV RBC AUTO: 95.2 FL (ref 79–97)
MONOCYTES # BLD AUTO: 0.41 10*3/MM3 (ref 0.1–0.9)
MONOCYTES NFR BLD AUTO: 8 % (ref 5–12)
NEUTROPHILS NFR BLD AUTO: 2.72 10*3/MM3 (ref 1.7–7)
NEUTROPHILS NFR BLD AUTO: 53.3 % (ref 42.7–76)
NRBC BLD AUTO-RTO: 0 /100 WBC (ref 0–0.2)
PLATELET # BLD AUTO: 128 10*3/MM3 (ref 140–450)
PMV BLD AUTO: 9.3 FL (ref 6–12)
POTASSIUM SERPL-SCNC: 4.4 MMOL/L (ref 3.5–4.7)
PROT SERPL-MCNC: 6.4 G/DL (ref 6.3–8)
RBC # BLD AUTO: 4.16 10*6/MM3 (ref 3.77–5.28)
SODIUM SERPL-SCNC: 135 MMOL/L (ref 134–145)
WBC NRBC COR # BLD: 5.11 10*3/MM3 (ref 3.4–10.8)

## 2021-11-17 PROCEDURE — 80053 COMPREHEN METABOLIC PANEL: CPT

## 2021-11-17 PROCEDURE — 99214 OFFICE O/P EST MOD 30 MIN: CPT | Performed by: INTERNAL MEDICINE

## 2021-11-17 PROCEDURE — 85025 COMPLETE CBC W/AUTO DIFF WBC: CPT

## 2021-11-17 PROCEDURE — 36415 COLL VENOUS BLD VENIPUNCTURE: CPT

## 2021-11-17 NOTE — PROGRESS NOTES
Subjective    REASONS FOR FOLLOWUP:     1. History of Waldenstrom macroglobulinemia.    2. History of breast cancer stage I on the left, status post mastectomy. The patient completed aromatase inhibitor  X 5 YEARS IN 2019 without any evidence of breast cancer recurrence        History of Present Illness      DURING THE VISIT WITH THE PATIENT TODAY , PATIENT HAD FACE MASK, I HAD PROPER PROTECTIVE EQUIPMENT, AND I DID HAND HYGIENE WITH SOAP AND WATER BEFORE AND AFTER THE VISIT.    This patient returns today to the office for follow up of the above diagnosis. She is here today stating that since she initiated Hytrin for blood pressure control she has developed a papular rash in the chest wall anteriorly and posteriorly that is driving her crazy with tremendous degree of pruritus. This is even extending into the abdominal wall and even into her lower extremities. She has not had any fever, chills, sweats, abnormal bleeding, blurred vision or alterations in her appetite. Her weight remains stable. Her bowel activity and urination remain normal. No wheezing. No respiratory or cardiovascular issues. She does not believe in the blood pressure devices that she uses at home to check on her blood pressure. She blames the Hytrin to be the culprit because the medicine was initiated and a few days later the phenomenon happened. She has not been exposed to any other chemicals, she has not changed laundry detergent or been in contact with anybody else with something similar.  She has not had any other problems. Specifically no fever, chills or night sweats.                 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   • Epilepsy with simple partial seizures (HCC) 12/14/2017   • Headache, migraine 12/14/2017   • History of transfusion of  packed red blood cells     R/T SURGERY   • Malignant neoplasm of overlapping sites of left breast in female, estrogen receptor positive (HCC) 4/13/2016   • Nonrheumatic aortic valve insufficiency    • Primary osteoarthritis of left knee 7/22/2015   • Skin cancer    • Thyroid nodule     Removed more than 35 years ago   • TIA (transient ischemic attack) 10/18/2018   • UTI due to extended-spectrum beta lactamase (ESBL) producing Escherichia coli 1/6/2019     Past Surgical History:   Procedure Laterality Date   • ADENOIDECTOMY     • APPENDECTOMY     • CARPAL TUNNEL RELEASE Right    • CATARACT EXTRACTION Bilateral    • CHOLECYSTECTOMY     • COLONOSCOPY     • HYSTERECTOMY      Partial, many years ago, heavy bleeding, no cancer   • KNEE ARTHROSCOPY Right 03/2009    Finding of chondromalacia and appropriate cleanup of joint was performed by Dr. Moraes.   • MASTECTOMY Left 07/2014   • REPLACEMENT TOTAL KNEE Right 12/2015   • SKIN CANCER EXCISION      several   • TONSILLECTOMY           Social History     Socioeconomic History   • Marital status:    Tobacco Use   • Smoking status: Never Smoker   • Smokeless tobacco: Never Used   Vaping Use   • Vaping Use: Never used   Substance and Sexual Activity   • Alcohol use: No     Comment: 2 cups caffeine/coffee daily   • Drug use: No   • Sexual activity: Defer     Family History   Problem Relation Age of Onset   • Mental illness Mother    • Migraines Mother    • Dementia Mother    • Heart disease Father    • Hypertension Father    • Kidney disease Father    • No Known Problems Daughter    • No Known Problems Daughter    • Breast cancer Other    • Heart disease Other    • Hypertension Other    • Diabetes Other    • Cancer Maternal Grandmother    • Breast cancer Maternal Grandmother    • No Known Problems Maternal Grandfather    • No Known Problems Paternal Grandmother    • No Known Problems Paternal Grandfather    • Lymphoma Neg Hx    • Leukemia Neg Hx      Current Outpatient  "Medications on File Prior to Visit   Medication Sig Dispense Refill   • amLODIPine (NORVASC) 5 MG tablet Take 1 tablet by mouth Daily. 90 tablet 3   • aspirin 81 MG tablet Take 81 mg by mouth Daily.     • azelastine (ASTELIN) 0.1 % nasal spray      • Budesonide (RHINOCORT ALLERGY NA) 2 sprays into the nostril(s) as directed by provider Daily As Needed.     • budesonide-formoterol (SYMBICORT) 80-4.5 MCG/ACT inhaler Inhale 2 puffs 2 (Two) Times a Day.     • cetirizine (zyrTEC) 10 MG tablet Take 10 mg by mouth Daily.     • gabapentin (NEURONTIN) 400 MG capsule Take 1 capsule by mouth 4 (Four) Times a Day. 120 capsule 5   • levothyroxine (SYNTHROID, LEVOTHROID) 50 MCG tablet TAKE 1 TABLET EVERY DAY 90 tablet 1   • losartan (COZAAR) 100 MG tablet Take 1 tablet by mouth Daily. 90 tablet 3   • metoprolol succinate XL (TOPROL-XL) 50 MG 24 hr tablet Take 1 tablet by mouth Daily. 90 tablet 3   • VENTOLIN  (90 Base) MCG/ACT inhaler      • [DISCONTINUED] terazosin (HYTRIN) 10 MG capsule Take 1 capsule by mouth 2 (Two) Times a Day. 180 capsule 3     No current facility-administered medications on file prior to visit.       ALLERGIES:    Allergies   Allergen Reactions   • Cortisone Unknown - High Severity     Reports having a shot years ago, and wonders if he hit a vein. She was told by another doctor that it probably went in her blood stream.    • Darvon  [Propoxyphene] Palpitations             Objective      Vitals:    11/17/21 1249   BP: 144/79   Pulse: 70   Resp: 16   Temp: 97.3 °F (36.3 °C)   TempSrc: Temporal   SpO2: 98%   Weight: 66.5 kg (146 lb 9.6 oz)   Height: 167.6 cm (65.98\")   PainSc: 0-No pain     Current Status 8/25/2021   ECOG score 1         Physical Exam     I HAVE PERSONALLY REVIEWED THE HISTORY OF THE PRESENT ILLNESS, PAST MEDICAL HISTORY, FAMILY HISTORY, SOCIAL HISTORY, ALLERGIES, MEDICATIONS STATED ABOVE IN THE  NOTE FROM TODAY.        GENERAL:  Well-developed, well-nourished  Patient  in no acute " distress.   SKIN:  Warm, dry ,NO purpura ,NO petechiae.The patient has multiple papular lesions with crust formation in the chest wall anteriorly and posteriorly, in the lumbar area as well as in the abdominal wall and a few in the upper extremities. A lot of them in the neck area. None of them on her hands, forearms, legs or feet. Her skin is extremely dry. There is no blister formation. There is no active edge or erythema formation. Signs of scratching are very obvious.      HEENT:  Pupils were equal and reactive to light and accomodation, conjunctivae noninjected, no pterygium, normal extraocular movements, normal visual acuity.   NECK:  Supple with good range of motion; no thyromegaly , no other masses, no JVD or bruits, no cervical adenopathies.No carotid artery pain, no carotid abnormal pulsation , NO arterial dance.  LYMPHATICS:  No cervical, NO supraclavicular, NO axillary,NO epitrochlear , NO inguinal adenopathy.  CARDIAC   normal rate and regular rhythm, without murmur,NO rubs NO S3 NO S4 right or left .  LUNGS: normal breath sounds bilateral, no wheezing, rhonchi, crackles or rubs.LEFT CHEST WALL MASTECTOMY SITE IS NORMAL  VASCULAR VENOUS: no cyanosis, collateral circulation, varicosities, edema, palpable cords, pain, erythema.  ABDOMEN:  Soft, nontender with no hepatomegaly, no splenomegaly,no masses, no ascites, no collateral circulation,no distention,no Max sign.  EXTREMITIES  AND SPINE:  No clubbing, cyanosis or edema, no deformities , no pain .No kyphosis, scoliosis, no other deformities, no pain in spine, no pain in ribs , no pain inpelvic bone.  NEUROLOGICAL:  Patient was awake, alert, oriented to time, person and place.                  RECENT LABS:  Results from last 7 days   Lab Units 11/17/21  1227   WBC 10*3/mm3 5.11   NEUTROS ABS 10*3/mm3 2.72   HEMOGLOBIN g/dL 13.7   HEMATOCRIT % 39.6   PLATELETS 10*3/mm3 128*                                    Assessment/Plan      1. [facility]  macroglobulinemia that has been treated in the past mainly with Rituxan. In the past, also she was treated with Velcade with very dramatic improvement in her monoclonal protein but very dramatic sensory peripheral neuropathy. This medication was discontinued altogether.    6/29/2020, she developed progressive fatigue with worsening neuropathy in her feet.  These were her original symptoms at diagnosis of Waldenstrom's.  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  Calcium also elevated at 10.5 with decreased sodium related to pseudohyponatremia associated with monoclonal protein.  Previously, she did not receive improvement from Rituxan, she is not thought to be a candidate for Imbruvica, therefore she went on to initiate venetoclax 7/13/2020.  She is currently on her next planned dose of 200 mg daily with poor tolerance as outlined below  I discussed with her on 04/28/2021 that I do not believe given the information that we have with a sedimentation rate that was normal and a stable monoclonal protein IgM that the Waldenstrom could be the most focal reason to explain her fatigue. On the other hand it is impossible given her thrombocytopenia to be sure that she still has some component of lymphoma in her bone marrow and the only way to prove this will be to pursue in a bone marrow test. She does not believe that she is ready for this at this time.     Radiologically speaking the CT scan of the chest, abdomen and pelvis failed to document any lymphadenopathy, masses, splenomegaly or any other issue pertinent to her Waldenstrom.  I discussed with her on 06/02/2021 that I do not feel that the symptoms of fatigue are related to her Waldenström's at this time. Her monoclonal protein study has remained completely quiet. Her white count and hemoglobin are normal. Her B12, folic acid, ferritin, iron profile remain normal and TSH hs remained into the normal limits. Her sed rate was normal at 4 mm/hr and she has no obvious  infection or inflammatory process.   On 08/25/2021 the patient's monoclonal protein has remained very stable as discussed with her during the previous weeks. We have another level pending today. One more analysis that I decided to pursue was an amyloid analysis and abdominal fat pad biopsy. This was performed in late 06/2021 by Deric Llamas MD. Material was sent to [facility]. No [place] red material was found in this specimen. Therefore the possibility of amyloidosis is less likely under these circumstances.     We have run CT scans, laboratory testing and all sorts of things trying to explain her fatigue without having a definitive answer. I think a lot of this is lack of training.    My recommendation in regard to her Waldenstrom's is to continue monitoring her parameters with CBC, CMP and monoclonal protein studies in 3 months.   The patient was further reviewed on 11/17/2021 in regard to her Waldenstrom's. She does not have any symptoms pertinent to this. There is no abnormal bleeding, there is no blurred vision, she has no peripheral adenopathy, there is no hepatosplenomegaly. Her fatigue is no different than before but she is able to do some housework. Her white count, hemoglobin and platelets are stable. We have pending monoclonal protein studies that will be called to her once that they become available. I find no need to pursue any intervention from this point of view at this time.       ·   2.From the point of view of her breast cancer status post mastectomy on the left she completed her adjuvant therapy, mammogram is coming up in the end of 06/2021 on the right side. The left chest wall has remained unremarkable.  On 08/25/2021 she has no symptoms or signs of breast cancer recurrence. Her mastectomy site on the left is completely healed. Her right breast examination is normal. She is up to date on her mammogram. This will be watched in absence of any other intervention.   On 11/17/2021 the patient's  left sided mastectomy is well healed, right breast exam is normal. There is no evidence of breast cancer recurrence clinically. She will remain in observation.       ·   3  The next symptom is profound fatigue. We have looked for endocrinological diseases, medication side effects, infections, malignancies, Waldenstrom's, amyloid, cardiac disease and so forth not having any conclusion in regard to the nature of this. I do believe that the lack of bad news or not finding any other thing is good news to me and I pointed this out to the patient. Maybe by the end of the day her fatigue is lack of proper physical training. She has bought a treadmill device that will be in her house as per today and hopefully she will be able to do some walking on this that will be meaningful in regard to recovering in regard energy.   The patient believes that the degree of fatigue that she was experiencing before is substantially better and now she is able to do some house activity. I encouraged her to continue this in the long run, is the only solution for fatigue is physical activity.       4.One week into starting Hytrin she developed a maculopapular extremely pruriginous rash on the chest wall that is now extending to her abdominal wall and the lumbar area. There is no rash in the lower extremities, there is no rash on the face. The rash morphology is not typical of anything in particular and probably could be related to a drug rash. I never have seen something like this in patients with Waldenstrom's, skin is not a target alteration in these kind of patients. Therefore raises the question if indeed Hytrin is the cause. The patient is going crazy and she begged me to stop this medicine. I think it will be okay to hold the Hytrin for a month and I asked her to modify her Norvasc to take 5 mg twice a day. On the other hand I asked her to take short baths with not too hot water and use Maida cream mixed with equal amount of  triamcinolone ointment 0.1% and apply to the skin of the chest wall anteriorly and lumbar area posteriorly and any other site 3 times a day.     Hopefully when she returns back in a month we will be able to review where we are in this regard and how to proceed. If she has no improvement I think she will require to be seen by dermatology and take a skin sample.     Photographs of the rash were obtained and placed in Epic.                       Florencio Larose MD   11/17/2021      CC:

## 2021-11-18 ENCOUNTER — TELEPHONE (OUTPATIENT)
Dept: ONCOLOGY | Facility: CLINIC | Age: 81
End: 2021-11-18

## 2021-11-18 NOTE — TELEPHONE ENCOUNTER
Caller: Karli Loomis    Relationship: Self    Best call back number: [phone]    Which medication are you concerned about: OINTMENT/CREAM    Who prescribed you this medication: PATIENT STATES THAT DR. BURNETTE WAS GOING TO CALL SOMETHING IN FOR HER TO HELP WITH HER ITCHY RASH. PLEASE INFORM PATIENT WHEN DONE    [id number] - [facility] [address] - [phone] [facility] [phone]

## 2021-11-20 LAB
ALBUMIN SERPL ELPH-MCNC: 4.2 G/DL (ref 2.9–4.4)
ALBUMIN/GLOB SERPL: 1.9 {RATIO} (ref 0.7–1.7)
ALPHA1 GLOB SERPL ELPH-MCNC: 0.3 G/DL (ref 0–0.4)
ALPHA2 GLOB SERPL ELPH-MCNC: 0.6 G/DL (ref 0.4–1)
B-GLOBULIN SERPL ELPH-MCNC: 0.9 G/DL (ref 0.7–1.3)
GAMMA GLOB SERPL ELPH-MCNC: 0.5 G/DL (ref 0.4–1.8)
GLOBULIN SER-MCNC: 2.3 G/DL (ref 2.2–3.9)
IGA SERPL-MCNC: 40 MG/DL (ref 64–422)
IGG SERPL-MCNC: 484 MG/DL (ref 586–1602)
IGM SERPL-MCNC: 184 MG/DL (ref 26–217)
INTERPRETATION SERPL IEP-IMP: ABNORMAL
KAPPA LC FREE SER-MCNC: 16 MG/L (ref 3.3–19.4)
KAPPA LC FREE/LAMBDA FREE SER: 2.13 {RATIO} (ref 0.26–1.65)
LABORATORY COMMENT REPORT: ABNORMAL
LAMBDA LC FREE SERPL-MCNC: 7.5 MG/L (ref 5.7–26.3)
M PROTEIN SERPL ELPH-MCNC: 0.3 G/DL
PROT SERPL-MCNC: 6.5 G/DL (ref 6–8.5)

## 2021-12-17 ENCOUNTER — OFFICE VISIT (OUTPATIENT)
Dept: ONCOLOGY | Facility: CLINIC | Age: 81
End: 2021-12-17

## 2021-12-17 ENCOUNTER — LAB (OUTPATIENT)
Dept: LAB | Facility: HOSPITAL | Age: 81
End: 2021-12-17

## 2021-12-17 VITALS
RESPIRATION RATE: 16 BRPM | WEIGHT: 143.5 LBS | DIASTOLIC BLOOD PRESSURE: 62 MMHG | OXYGEN SATURATION: 98 % | SYSTOLIC BLOOD PRESSURE: 128 MMHG | TEMPERATURE: 97.3 F | HEART RATE: 63 BPM | BODY MASS INDEX: 23.06 KG/M2 | HEIGHT: 66 IN

## 2021-12-17 DIAGNOSIS — Z17.0 MALIGNANT NEOPLASM OF OVERLAPPING SITES OF LEFT BREAST IN FEMALE, ESTROGEN RECEPTOR POSITIVE (HCC): ICD-10-CM

## 2021-12-17 DIAGNOSIS — C88.0 MACROGLOBULINEMIA OF WALDENSTROM (HCC): ICD-10-CM

## 2021-12-17 DIAGNOSIS — C50.812 MALIGNANT NEOPLASM OF OVERLAPPING SITES OF LEFT BREAST IN FEMALE, ESTROGEN RECEPTOR POSITIVE (HCC): Primary | ICD-10-CM

## 2021-12-17 DIAGNOSIS — Z17.0 MALIGNANT NEOPLASM OF OVERLAPPING SITES OF LEFT BREAST IN FEMALE, ESTROGEN RECEPTOR POSITIVE (HCC): Primary | ICD-10-CM

## 2021-12-17 DIAGNOSIS — C50.812 MALIGNANT NEOPLASM OF OVERLAPPING SITES OF LEFT BREAST IN FEMALE, ESTROGEN RECEPTOR POSITIVE (HCC): ICD-10-CM

## 2021-12-17 LAB
ALBUMIN SERPL-MCNC: 4.8 G/DL (ref 3.5–5.2)
ALBUMIN/GLOB SERPL: 2.1 G/DL (ref 1.1–2.4)
ALP SERPL-CCNC: 69 U/L (ref 38–116)
ALT SERPL W P-5'-P-CCNC: 11 U/L (ref 0–33)
ANION GAP SERPL CALCULATED.3IONS-SCNC: 11.6 MMOL/L (ref 5–15)
AST SERPL-CCNC: 20 U/L (ref 0–32)
BASOPHILS # BLD AUTO: 0.04 10*3/MM3 (ref 0–0.2)
BASOPHILS NFR BLD AUTO: 0.7 % (ref 0–1.5)
BILIRUB SERPL-MCNC: 0.6 MG/DL (ref 0.2–1.2)
BUN SERPL-MCNC: 9 MG/DL (ref 6–20)
BUN/CREAT SERPL: 10.8 (ref 7.3–30)
CALCIUM SPEC-SCNC: 10.4 MG/DL (ref 8.5–10.2)
CHLORIDE SERPL-SCNC: 98 MMOL/L (ref 98–107)
CO2 SERPL-SCNC: 28.4 MMOL/L (ref 22–29)
CREAT SERPL-MCNC: 0.83 MG/DL (ref 0.6–1.1)
DEPRECATED RDW RBC AUTO: 42.4 FL (ref 37–54)
EOSINOPHIL # BLD AUTO: 0.57 10*3/MM3 (ref 0–0.4)
EOSINOPHIL NFR BLD AUTO: 10.1 % (ref 0.3–6.2)
ERYTHROCYTE [DISTWIDTH] IN BLOOD BY AUTOMATED COUNT: 12 % (ref 12.3–15.4)
GFR SERPL CREATININE-BSD FRML MDRD: 66 ML/MIN/1.73
GLOBULIN UR ELPH-MCNC: 2.3 GM/DL (ref 1.8–3.5)
GLUCOSE SERPL-MCNC: 94 MG/DL (ref 74–124)
HCT VFR BLD AUTO: 45 % (ref 34–46.6)
HGB BLD-MCNC: 15.4 G/DL (ref 12–15.9)
IMM GRANULOCYTES # BLD AUTO: 0.01 10*3/MM3 (ref 0–0.05)
IMM GRANULOCYTES NFR BLD AUTO: 0.2 % (ref 0–0.5)
LYMPHOCYTES # BLD AUTO: 1.76 10*3/MM3 (ref 0.7–3.1)
LYMPHOCYTES NFR BLD AUTO: 31.3 % (ref 19.6–45.3)
MCH RBC QN AUTO: 32.5 PG (ref 26.6–33)
MCHC RBC AUTO-ENTMCNC: 34.2 G/DL (ref 31.5–35.7)
MCV RBC AUTO: 94.9 FL (ref 79–97)
MONOCYTES # BLD AUTO: 0.45 10*3/MM3 (ref 0.1–0.9)
MONOCYTES NFR BLD AUTO: 8 % (ref 5–12)
NEUTROPHILS NFR BLD AUTO: 2.8 10*3/MM3 (ref 1.7–7)
NEUTROPHILS NFR BLD AUTO: 49.7 % (ref 42.7–76)
NRBC BLD AUTO-RTO: 0 /100 WBC (ref 0–0.2)
PLATELET # BLD AUTO: 151 10*3/MM3 (ref 140–450)
PMV BLD AUTO: 8.8 FL (ref 6–12)
POTASSIUM SERPL-SCNC: 4.2 MMOL/L (ref 3.5–4.7)
PROT SERPL-MCNC: 7.1 G/DL (ref 6.3–8)
RBC # BLD AUTO: 4.74 10*6/MM3 (ref 3.77–5.28)
SODIUM SERPL-SCNC: 138 MMOL/L (ref 134–145)
WBC NRBC COR # BLD: 5.63 10*3/MM3 (ref 3.4–10.8)

## 2021-12-17 PROCEDURE — 36415 COLL VENOUS BLD VENIPUNCTURE: CPT

## 2021-12-17 PROCEDURE — 99214 OFFICE O/P EST MOD 30 MIN: CPT | Performed by: INTERNAL MEDICINE

## 2021-12-17 PROCEDURE — 85025 COMPLETE CBC W/AUTO DIFF WBC: CPT

## 2021-12-17 PROCEDURE — 80053 COMPREHEN METABOLIC PANEL: CPT

## 2021-12-17 RX ORDER — BUDESONIDE AND FORMOTEROL FUMARATE DIHYDRATE 160; 4.5 UG/1; UG/1
160 AEROSOL RESPIRATORY (INHALATION) 2 TIMES DAILY
COMMUNITY
Start: 2021-12-15

## 2021-12-17 NOTE — PROGRESS NOTES
Subjective    REASONS FOR FOLLOWUP:     1. History of Waldenstrom macroglobulinemia.    2. History of breast cancer stage I on the left, status post mastectomy. The patient completed aromatase inhibitor  X 5 YEARS IN 2019 without any evidence of breast cancer recurrence        History of Present Illness      DURING THE VISIT WITH THE PATIENT TODAY , PATIENT HAD FACE MASK, I HAD PROPER PROTECTIVE EQUIPMENT, AND I DID HAND HYGIENE WITH SOAP AND WATER BEFORE AND AFTER THE VISIT.      This patient returns today to the office for follow up. She is here today stating that her rash has disappeared almost 95% and she has minimal degree of itching. The xeroderma also has improved with the moisturizer. She has not had any fever or chills. She has finally jumped onto the treadmill device that was bought for her by the family and actually she has been able to do 5-10 minutes of walking once or twice a day and she feels substantially better. She lives less easy, she has not had any episodes of weakness or passing out spells, no pain, no fever or infection. She has minimal edema in her ankle related to the amlodipine. Her blood pressure is actually very good today. Her appetite has improved even though she has lost some weight and her children are telling her that they never have seen her eating so much, probably is the physical activity that is triggers the need for more calories.               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   • Epilepsy with simple partial seizures (HCC) 12/14/2017   • Headache, migraine 12/14/2017   • History of transfusion of packed red blood cells     R/T SURGERY   • Malignant neoplasm of overlapping sites of left breast in female, estrogen receptor positive (HCC) 4/13/2016   • Nonrheumatic aortic valve  insufficiency    • Primary osteoarthritis of left knee 7/22/2015   • Skin cancer    • Thyroid nodule     Removed more than 35 years ago   • TIA (transient ischemic attack) 10/18/2018   • UTI due to extended-spectrum beta lactamase (ESBL) producing Escherichia coli 1/6/2019     Past Surgical History:   Procedure Laterality Date   • ADENOIDECTOMY     • APPENDECTOMY     • CARPAL TUNNEL RELEASE Right    • CATARACT EXTRACTION Bilateral    • CHOLECYSTECTOMY     • COLONOSCOPY     • HYSTERECTOMY      Partial, many years ago, heavy bleeding, no cancer   • KNEE ARTHROSCOPY Right 03/2009    Finding of chondromalacia and appropriate cleanup of joint was performed by Dr. Moraes.   • MASTECTOMY Left 07/2014   • REPLACEMENT TOTAL KNEE Right 12/2015   • SKIN CANCER EXCISION      several   • TONSILLECTOMY           Social History     Socioeconomic History   • Marital status:    Tobacco Use   • Smoking status: Never Smoker   • Smokeless tobacco: Never Used   Vaping Use   • Vaping Use: Never used   Substance and Sexual Activity   • Alcohol use: No     Comment: 2 cups caffeine/coffee daily   • Drug use: No   • Sexual activity: Defer     Family History   Problem Relation Age of Onset   • Mental illness Mother    • Migraines Mother    • Dementia Mother    • Heart disease Father    • Hypertension Father    • Kidney disease Father    • No Known Problems Daughter    • No Known Problems Daughter    • Breast cancer Other    • Heart disease Other    • Hypertension Other    • Diabetes Other    • Cancer Maternal Grandmother    • Breast cancer Maternal Grandmother    • No Known Problems Maternal Grandfather    • No Known Problems Paternal Grandmother    • No Known Problems Paternal Grandfather    • Lymphoma Neg Hx    • Leukemia Neg Hx      Current Outpatient Medications on File Prior to Visit   Medication Sig Dispense Refill   • amLODIPine (NORVASC) 5 MG tablet Take 1 tablet by mouth Daily. 90 tablet 3   • aspirin 81 MG tablet Take 81 mg  "by mouth Daily.     • azelastine (ASTELIN) 0.1 % nasal spray      • Budesonide (RHINOCORT ALLERGY NA) 2 sprays into the nostril(s) as directed by provider Daily As Needed.     • cetirizine (zyrTEC) 10 MG tablet Take 10 mg by mouth Daily.     • gabapentin (NEURONTIN) 400 MG capsule Take 1 capsule by mouth 4 (Four) Times a Day. 120 capsule 5   • levothyroxine (SYNTHROID, LEVOTHROID) 50 MCG tablet TAKE 1 TABLET EVERY DAY 90 tablet 1   • losartan (COZAAR) 100 MG tablet Take 1 tablet by mouth Daily. 90 tablet 3   • metoprolol succinate XL (TOPROL-XL) 50 MG 24 hr tablet Take 1 tablet by mouth Daily. 90 tablet 3   • Symbicort 160-4.5 MCG/ACT inhaler Inhale 160 puffs 2 (Two) Times a Day. One in the morning and one in the evening     • triamcinolone (KENALOG) 0.1 % ointment Apply 1 application topically to the appropriate area as directed 2 (Two) Times a Day. 80 g 3   • VENTOLIN  (90 Base) MCG/ACT inhaler      • [DISCONTINUED] budesonide-formoterol (SYMBICORT) 80-4.5 MCG/ACT inhaler Inhale 2 puffs 2 (Two) Times a Day.       No current facility-administered medications on file prior to visit.       ALLERGIES:    Allergies   Allergen Reactions   • Cortisone Unknown - High Severity     Reports having a shot years ago, and wonders if he hit a vein. She was told by another doctor that it probably went in her blood stream.    • Darvon  [Propoxyphene] Palpitations             Objective      Vitals:    12/17/21 1028   BP: 128/62   Pulse: 63   Resp: 16   Temp: 97.3 °F (36.3 °C)   TempSrc: Infrared   SpO2: 98%   Weight: 65.1 kg (143 lb 8 oz)   Height: 167.6 cm (65.98\")   PainSc:   3   PainLoc: Comment: back     Current Status 12/17/2021   ECOG score 0         Physical Exam   I HAVE PERSONALLY REVIEWED THE HISTORY OF THE PRESENT ILLNESS, PAST MEDICAL HISTORY, FAMILY HISTORY, SOCIAL HISTORY, ALLERGIES, MEDICATIONS STATED ABOVE IN THE  NOTE FROM TODAY.        GENERAL:  Well-developed, well-nourished  Patient  in no acute distress. "   SKIN:  Warm, dry ,NO rashes,NO purpura ,NO petechiae.RESOLVED RASH COMPLETLY  HEENT:  Pupils were equal and reactive to light and accomodation, conjunctivae noninjected, no pterygium, normal extraocular movements, normal visual acuity.   NECK:  Supple with good range of motion; no thyromegaly , no other masses, no JVD or bruits, no cervical adenopathies.No carotid artery pain, no carotid abnormal pulsation , NO arterial dance.  LYMPHATICS:  No cervical, NO supraclavicular, NO axillary,NO epitrochlear , NO inguinal adenopathy.  CARDIAC   normal rate and regular rhythm, without murmur,NO rubs NO S3 NO S4 right or left .  LUNGS: normal breath sounds bilateral, no wheezing, rhonchi, crackles or rubs.  VASCULAR VENOUS: no cyanosis, collateral circulation, varicosities palpable cords, pain, erythema.  ABDOMEN:  Soft, nontender with no hepatomegaly, no splenomegaly,no masses, no ascites, no collateral circulation,no distention,no [place] sign.  EXTREMITIES  AND SPINE:  No clubbing, cyanosis , MINIMAL BOTH LE edema, no deformities , no pain .No kyphosis, scoliosis, no other deformities, no pain in spine, no pain in ribs , no pain inpelvic bone.  NEUROLOGICAL:  Patient was awake, alert, oriented to time, person and place.                    RECENT LABS:  Results from last 7 days   Lab Units 12/17/21  1032   WBC 10*3/mm3 5.63   NEUTROS ABS 10*3/mm3 2.80   HEMOGLOBIN g/dL 15.4   HEMATOCRIT % 45.0   PLATELETS 10*3/mm3 151              Assessment/Plan      1. Waldenstrom macroglobulinemia that has been treated in the past mainly with Rituxan. In the past, also she was treated with Velcade with very dramatic improvement in her monoclonal protein but very dramatic sensory peripheral neuropathy. This medication was discontinued altogether.    6/29/2020, she developed progressive fatigue with worsening neuropathy in her feet.  These were her original symptoms at diagnosis of Waldenstrom's.  Monoclonal protein IgM increased from  425-574.  Calcium also elevated at 10.5 with decreased sodium related to pseudohyponatremia associated with monoclonal protein.  Previously, she did not receive improvement from Rituxan, she is not thought to be a candidate for Imbruvica, therefore she went on to initiate venetoclax 7/13/2020.  She is currently on her next planned dose of 200 mg daily with poor tolerance as outlined below  I discussed with her on 04/28/2021 that I do not believe given the information that we have with a sedimentation rate that was normal and a stable monoclonal protein IgM that the Waldenstrom could be the most focal reason to explain her fatigue. On the other hand it is impossible given her thrombocytopenia to be sure that she still has some component of lymphoma in her bone marrow and the only way to prove this will be to pursue in a bone marrow test. She does not believe that she is ready for this at this time.     Radiologically speaking the CT scan of the chest, abdomen and pelvis failed to document any lymphadenopathy, masses, splenomegaly or any other issue pertinent to her Waldenstrom.  I discussed with her on 06/02/2021 that I do not feel that the symptoms of fatigue are related to her Waldenström's at this time. Her monoclonal protein study has remained completely quiet. Her white count and hemoglobin are normal. Her B12, folic acid, ferritin, iron profile remain normal and TSH hs remained into the normal limits. Her sed rate was normal at 4 mm/hr and she has no obvious infection or inflammatory process.   On 08/25/2021 the patient's monoclonal protein has remained very stable as discussed with her during the previous weeks. We have another level pending today. One more analysis that I decided to pursue was an amyloid analysis and abdominal fat pad biopsy. This was performed in late 06/2021 by Deric Llamas MD. Material was sent to [facility]. No [place] red material was found in this specimen. Therefore the possibility  of amyloidosis is less likely under these circumstances.     We have run CT scans, laboratory testing and all sorts of things trying to explain her fatigue without having a definitive answer. I think a lot of this is lack of training.    My recommendation in regard to her Waldenstrom's is to continue monitoring her parameters with CBC, CMP and monoclonal protein studies in 3 months.   The patient was further reviewed on 11/17/2021 in regard to her Waldenstrom's. She does not have any symptoms pertinent to this. There is no abnormal bleeding, there is no blurred vision, she has no peripheral adenopathy, there is no hepatosplenomegaly. Her fatigue is no different than before but she is able to do some housework. Her white count, hemoglobin and platelets are stable. We have pending monoclonal protein studies that will be called to her once that they become available. I find no need to pursue any intervention from this point of view at this time.   The patient was reviewed on 12/17/2021. I do not see any symptoms or signs of her Waldenstrom's. Her white count, hemoglobin, platelets, white count differential are normal. Her chemistry profile is pending. I find no reason to proceed with any other therapy for this condition. Her monoclonal protein has remained quiet, stable.         ·   2.From the point of view of her breast cancer status post mastectomy on the left she completed her adjuvant therapy, mammogram is coming up in the end of 06/2021 on the right side. The left chest wall has remained unremarkable.  On 08/25/2021 she has no symptoms or signs of breast cancer recurrence. Her mastectomy site on the left is completely healed. Her right breast examination is normal. She is up to date on her mammogram. This will be watched in absence of any other intervention.   On 11/17/2021 the patient's left sided mastectomy is well healed, right breast exam is normal. There is no evidence of breast cancer recurrence clinically.  She will remain in observation.   I find no symptoms or signs of breast cancer recurrence on her. This will be watched in absence of any other intervention as per 12/17/2021.         ·   3  The next symptom is profound fatigue. We have looked for endocrinological diseases, medication side effects, infections, malignancies, Waldenstrom's, amyloid, cardiac disease and so forth not having any conclusion in regard to the nature of this. I do believe that the lack of bad news or not finding any other thing is good news to me and I pointed this out to the patient. Maybe by the end of the day her fatigue is lack of proper physical training. She has bought a treadmill device that will be in her house as per today and hopefully she will be able to do some walking on this that will be meaningful in regard to recovering in regard energy.   The patient believes that the degree of fatigue that she was experiencing before is substantially better and now she is able to do some house activity. I encouraged her to continue this in the long run, is the only solution for fatigue is physical activity.   On 12/17/2021 the patient's fatigue has improved highly. She is now doing treadmill activity at home for 10 minutes once or twice a day. She has been eating better, she has lost some weight but she feels substantially better. I think the conditioning was probably the culprit of all of this related to the pandemia and I advised her to remain doing her treadmill activity twice a day if possible.         4.One week into starting Hytrin she developed a maculopapular extremely pruriginous rash on the chest wall that is now extending to her abdominal wall and the lumbar area. There is no rash in the lower extremities, there is no rash on the face. The rash morphology is not typical of anything in particular and probably could be related to a drug rash. I never have seen something like this in patients with Waldenstrom's, skin is not a target  alteration in these kind of patients. Therefore raises the question if indeed Hytrin is the cause. The patient is going crazy and she begged me to stop this medicine. I think it will be okay to hold the Hytrin for a month and I asked her to modify her Norvasc to take 5 mg twice a day. On the other hand I asked her to take short baths with not too hot water and use Maida cream mixed with equal amount of triamcinolone ointment 0.1% and apply to the skin of the chest wall anteriorly and lumbar area posteriorly and any other site 3 times a day.     Hopefully when she returns back in a month we will be able to review where we are in this regard and how to proceed. If she has no improvement I think she will require to be seen by dermatology and take a skin sample.      She returned on 12/17/2021 with complete resolution of the rash in her chest wall and back. Her xerodermia continues but it is not as bad as before. Obviously I do not have a real answer in regard Hytrin having anything to do with the rash or not. Nevertheless the rash is gone and I advised her to continue using her moisturizer cream on the skin 2-3 times a day not only upper and lower extremities but also abdominal wall, chest wall anteriorly and posteriorly.     5.Her blood pressure issues remain ongoing. Her blood pressure is wonderful today 128/62. She has minimal fluid retention in her ankles grade 1 edema at the most. It is probably effect of the Norvasc that she takes twice a day. I asked her to use the Norvasc 1 day 1 tablet a day, the next day 2 tablets to see if that balances out the fluid retention associated with this medicine. I do not want her to end up having to take a diuretic for this purpose. Probably the physical activity is helping her to control blood pressure as well.    I will review her back in 3 months with a CBC, and monoclonal protein studies. No new prescriptions today.                     Florencio Larose MD   12/17/2021      CC:

## 2021-12-22 DIAGNOSIS — E03.9 ADULT HYPOTHYROIDISM: ICD-10-CM

## 2021-12-23 RX ORDER — LEVOTHYROXINE SODIUM 0.05 MG/1
TABLET ORAL
Qty: 90 TABLET | Refills: 1 | Status: SHIPPED | OUTPATIENT
Start: 2021-12-23 | End: 2022-04-20 | Stop reason: SDUPTHER

## 2021-12-27 DIAGNOSIS — G62.0 DRUG-INDUCED POLYNEUROPATHY (HCC): ICD-10-CM

## 2021-12-28 RX ORDER — GABAPENTIN 400 MG/1
CAPSULE ORAL
Qty: 120 CAPSULE | Refills: 4 | Status: SHIPPED | OUTPATIENT
Start: 2021-12-28 | End: 2022-04-20 | Stop reason: SDUPTHER

## 2022-01-13 NOTE — PROGRESS NOTES
RM:________     PCP: Clemente Kaye MD    : 1940  AGE: 81 y.o.  EST PATIENT   REASON FOR VISIT/  CC:    BP Readings from Last 3 Encounters:   21 128/62   21 144/79   21 143/76        WT: ____________ BP: __________L __________R HR______    CHEST PAIN: _____________    SOA: _____________PALPS: _______________     LIGHTHEADED: ___________FATIGUE: ________________ EDEMA __________    ALLERGIES:Cortisone and Darvon  [propoxyphene] SMOKING HISTORY:  Social History     Tobacco Use   • Smoking status: Never Smoker   • Smokeless tobacco: Never Used   Vaping Use   • Vaping Use: Never used   Substance Use Topics   • Alcohol use: No     Comment: 2 cups caffeine/coffee daily   • Drug use: No     CAFFEINE USE_________________  ALCOHOL ______________________    Below is the patient's most recent value for Albumin, ALT, AST, BUN, Calcium, Chloride, Cholesterol, CO2, Creatinine, GFR, Glucose, HDL, Hematocrit, Hemoglobin, Hemoglobin A1C, LDL, Magnesium, Phosphorus, Platelets, Potassium, PSA, Sodium, Triglycerides, TSH and WBC.   Lab Results   Component Value Date    ALBUMIN 4.80 2021    ALT 11 2021    AST 20 2021    BUN 9 2021    CALCIUM 10.4 (H) 2021    CL 98 2021    CHOL 200 2021    CO2 28.4 2021    CREATININE 0.83 2021    HDL 78 (H) 2021    HCT 45.0 2021    HGB 15.4 2021     (H) 2021    MG 1.8 01/10/2019    PHOS 3.2 2019     2021    K 4.2 2021     2021    TRIG 115 2021    TSH 2.360 2021    WBC 5.63 2021          NEW DIAGNOSIS/ SURGERY/ HOSP OR ED VISITS: ______________________    __________________________________________________________________      RECENT LABS OR DIAGNOSTIC TESTING:  _____________________________    __________________________________________________________________      ASSESSMENT/ PLAN:  _______________________________________________    __________________________________________________________________

## 2022-01-24 ENCOUNTER — OFFICE VISIT (OUTPATIENT)
Dept: CARDIOLOGY | Facility: CLINIC | Age: 82
End: 2022-01-24

## 2022-01-24 VITALS
WEIGHT: 139.8 LBS | HEART RATE: 59 BPM | SYSTOLIC BLOOD PRESSURE: 126 MMHG | DIASTOLIC BLOOD PRESSURE: 60 MMHG | BODY MASS INDEX: 22.47 KG/M2 | HEIGHT: 66 IN

## 2022-01-24 DIAGNOSIS — I10 ESSENTIAL HYPERTENSION: Chronic | ICD-10-CM

## 2022-01-24 DIAGNOSIS — M79.89 LEG SWELLING: Primary | ICD-10-CM

## 2022-01-24 DIAGNOSIS — I35.1 NONRHEUMATIC AORTIC VALVE INSUFFICIENCY: ICD-10-CM

## 2022-01-24 PROCEDURE — 99214 OFFICE O/P EST MOD 30 MIN: CPT | Performed by: INTERNAL MEDICINE

## 2022-01-24 PROCEDURE — 93000 ELECTROCARDIOGRAM COMPLETE: CPT | Performed by: INTERNAL MEDICINE

## 2022-01-24 NOTE — PROGRESS NOTES
Date of Office Visit: 22  Encounter Provider: Bacilio Hernández MD  Place of Service: [facility] CARDIOLOGY  Patient Name: Karli Loomis  :1940    Chief Complaint   Patient presents with   • Fatigue   :   HPI:     Ms. Loomis is 81 y.o. and presents today to follow up. I have reviewed prior notes and there are no changes except for any new updates described below. I have also reviewed any information entered into the medical record by the patient or by ancillary staff.     She has a history of right sided breast cancer treated. She underwent mastectomy and took an aromatase inhibitor for five years, which was completed in 2019. She did not receive chemotherapy or radiation. She has Waldenstrom's macroglobulinemia and was treated with rituximab, bortezomib, and venetoclax. She has hypertension, treated with losartan and metoprolol succinate. I can see in Epic that she has been on minoxidil and amlodipine in the past. She does not have diabetes or hyperlipidemia.     She presented in May 2021 with severe exertional fatigue and weakness, as well as episodes of profound fatigue and some lightheadedness.  I wanted to see how her heart rate responded to exercise as she was heavily beta blocked.  She was able to walk on a treadmill for 2.5 minutes and her heart rate went from 70 to 80 bpm.  She had to stop due to weakness.  A nuclear perfusion stress test was negative for ischemia.  An echo revealed normal left ventricular systolic function, grade 2 diastolic dysfunction, and moderate aortic insufficiency.  A 2-week heart monitor was performed after her metoprolol dose was decreased and revealed no rhythm disturbances or pauses.  Her average heart rate was 62 bpm, with a range of 45 to 174 bpm.    The severely weak episode stopped when I decreased her metoprolol from 100 mg daily to 50 mg daily.  However, her blood pressure remained high, so terazosin was added.  This gave her a  severe rash, and last month, it was stopped.  At that same time, amlodipine was increased from 5 mg daily to 10 mg daily.  She presents today with a chief complaint of leg swelling.  She denies chest pain, orthopnea, or shortness of breath.  She does have generalized fatigue and exercise intolerance but no longer has lightheadedness.    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   • Epilepsy with simple partial seizures (HCC) 12/14/2017   • Headache, migraine 12/14/2017   • History of transfusion of packed red blood cells     R/T SURGERY   • Malignant neoplasm of overlapping sites of left breast in female, estrogen receptor positive (HCC) 4/13/2016   • Nonrheumatic aortic valve insufficiency    • Primary osteoarthritis of left knee 7/22/2015   • Skin cancer    • Thyroid nodule     Removed more than 35 years ago   • TIA (transient ischemic attack) 10/18/2018   • UTI due to extended-spectrum beta lactamase (ESBL) producing Escherichia coli 1/6/2019       Past Surgical History:   Procedure Laterality Date   • ADENOIDECTOMY     • APPENDECTOMY     • CARPAL TUNNEL RELEASE Right    • CATARACT EXTRACTION Bilateral    • CHOLECYSTECTOMY     • COLONOSCOPY     • HYSTERECTOMY      Partial, many years ago, heavy bleeding, no cancer   • KNEE ARTHROSCOPY Right 03/2009    Finding of chondromalacia and appropriate cleanup of joint was performed by Dr. Moraes.   • MASTECTOMY Left 07/2014   • REPLACEMENT TOTAL KNEE Right 12/2015   • SKIN CANCER EXCISION      several   • TONSILLECTOMY         Social History     Socioeconomic History   • Marital status:    Tobacco Use   • Smoking status: Never Smoker   • Smokeless tobacco: Never Used   Vaping Use   • Vaping Use: Never used   Substance and Sexual Activity   • Alcohol use: No     Comment: 2  cups caffeine/coffee daily   • Drug use: No   • Sexual activity: Defer       Family History   Problem Relation Age of Onset   • Mental illness Mother    • Migraines Mother    • Dementia Mother    • Heart disease Father    • Hypertension Father    • Kidney disease Father    • No Known Problems Daughter    • No Known Problems Daughter    • Breast cancer Other    • Heart disease Other    • Hypertension Other    • Diabetes Other    • Cancer Maternal Grandmother    • Breast cancer Maternal Grandmother    • No Known Problems Maternal Grandfather    • No Known Problems Paternal Grandmother    • No Known Problems Paternal Grandfather    • Lymphoma Neg Hx    • Leukemia Neg Hx        Review of Systems   Constitutional: Positive for malaise/fatigue.   Cardiovascular: Positive for leg swelling.   All other systems reviewed and are negative.      Allergies   Allergen Reactions   • Cortisone Unknown - High Severity     Reports having a shot years ago, and wonders if he hit a vein. She was told by another doctor that it probably went in her blood stream.    • Hytrin [Terazosin] Rash   • Darvon  [Propoxyphene] Palpitations         Current Outpatient Medications:   •  amLODIPine (NORVASC) 5 MG tablet, Take 1 tablet by mouth Daily. (Patient taking differently: Take 5 mg by mouth 2 (Two) Times a Day.), Disp: 90 tablet, Rfl: 3  •  aspirin 81 MG tablet, Take 81 mg by mouth Daily., Disp: , Rfl:   •  Budesonide (RHINOCORT ALLERGY NA), 2 sprays into the nostril(s) as directed by provider Daily As Needed., Disp: , Rfl:   •  cetirizine (zyrTEC) 10 MG tablet, Take 10 mg by mouth Daily., Disp: , Rfl:   •  gabapentin (NEURONTIN) 400 MG capsule, TAKE 1 CAPSULE BY MOUTH FOUR TIMES A DAY, Disp: 120 capsule, Rfl: 4  •  levothyroxine (SYNTHROID, LEVOTHROID) 50 MCG tablet, TAKE 1 TABLET EVERY DAY, Disp: 90 tablet, Rfl: 1  •  losartan (COZAAR) 100 MG tablet, Take 1 tablet by mouth Daily., Disp: 90 tablet, Rfl: 3  •  metoprolol succinate XL (TOPROL-XL)  "50 MG 24 hr tablet, Take 1 tablet by mouth Daily., Disp: 90 tablet, Rfl: 3  •  Symbicort 160-4.5 MCG/ACT inhaler, Inhale 160 puffs 2 (Two) Times a Day. One in the morning and one in the evening, Disp: , Rfl:   •  triamcinolone (KENALOG) 0.1 % ointment, Apply 1 application topically to the appropriate area as directed 2 (Two) Times a Day., Disp: 80 g, Rfl: 3  •  VENTOLIN  (90 Base) MCG/ACT inhaler, As Needed., Disp: , Rfl:       Objective:     Vitals:    01/24/22 1038   BP: 126/60   BP Location: Right arm   Pulse: 59   Weight: 63.4 kg (139 lb 12.8 oz)   Height: 167.6 cm (65.98\")     Body mass index is 22.58 kg/m².    Constitutional:       Appearance: Healthy appearance. Not in distress.   Eyes:      Conjunctiva/sclera: Conjunctivae normal.   HENT:      Nose: Nose normal.         Comments: Masked  Neck:      Vascular: JVD normal.      Lymphadenopathy: No cervical adenopathy.   Pulmonary:      Effort: Pulmonary effort is normal.      Breath sounds: Normal breath sounds.   Chest:      Comments: Left mastectomy  Cardiovascular:      Normal rate. Regular rhythm.      Murmurs: There is no murmur.   Pulses:     Intact distal pulses.   Edema:     Ankle: bilateral trace edema of the ankle.     Feet: bilateral trace edema of the feet.  Abdominal:      Palpations: Abdomen is soft.      Tenderness: There is no abdominal tenderness.   Musculoskeletal: Normal range of motion.      Cervical back: Normal range of motion. Skin:     General: Skin is warm and dry.   Neurological:      General: No focal deficit present.      Mental Status: Alert and oriented to person, place and time.           ECG 12 Lead    Date/Time: 1/24/2022 11:12 AM  Performed by: Bacilio Hernández MD  Authorized by: Bacilio Hernández MD   Comparison: compared with previous ECG   Similar to previous ECG  Rhythm: sinus rhythm  Conduction: conduction normal  ST Segments: ST segments normal  T Waves: T waves normal  QRS axis: normal  Other: no other " findings    Clinical impression: normal ECG              Assessment:       Diagnosis Plan   1. Leg swelling  ECG 12 Lead   2. Essential hypertension     3. Nonrheumatic aortic valve insufficiency            Plan:       1/2.  She was noted to have a rash with terazosin, so it was stopped and amlodipine was doubled.  This is surely the cause of her pedal edema that has developed since then.  She is going to decrease it back to 5 mg once per day.  She does not want to add any additional blood pressure medications at this time her beta-blocker dose cannot be increased because it drops her heart rate too low and blocks her chronotropic response to exercise.  She has not had any more of her severely dizzy spells since we cut back on the dose.  I do not want to use a diuretic in her as she has baseline hyponatremia.  We may simply have to tolerate a systolic pressure in the 140s.    3.  She had moderate aortic insufficiency in June 2021, and this should be reevaluated in June 2022.    We will see her back in a month to see how she is doing with the decreased amlodipine dose.  She has a treadmill now, and I encouraged her to start walking on it slowly and just for a few minutes.  I feel that her exercise intolerance is now predominantly due to to deconditioning.    Sincerely,       Bacilio Hernández MD

## 2022-02-17 ENCOUNTER — OFFICE VISIT (OUTPATIENT)
Dept: INTERNAL MEDICINE | Facility: CLINIC | Age: 82
End: 2022-02-17

## 2022-02-17 VITALS
HEIGHT: 66 IN | DIASTOLIC BLOOD PRESSURE: 72 MMHG | SYSTOLIC BLOOD PRESSURE: 150 MMHG | WEIGHT: 141.4 LBS | TEMPERATURE: 96.9 F | BODY MASS INDEX: 22.73 KG/M2

## 2022-02-17 DIAGNOSIS — E03.9 ADULT HYPOTHYROIDISM: Chronic | ICD-10-CM

## 2022-02-17 DIAGNOSIS — Z78.0 POST-MENOPAUSAL: ICD-10-CM

## 2022-02-17 DIAGNOSIS — I10 ESSENTIAL HYPERTENSION: Chronic | ICD-10-CM

## 2022-02-17 DIAGNOSIS — Z00.00 MEDICARE ANNUAL WELLNESS VISIT, SUBSEQUENT: Primary | ICD-10-CM

## 2022-02-17 PROCEDURE — G0439 PPPS, SUBSEQ VISIT: HCPCS | Performed by: INTERNAL MEDICINE

## 2022-02-17 PROCEDURE — 1159F MED LIST DOCD IN RCRD: CPT | Performed by: INTERNAL MEDICINE

## 2022-02-17 PROCEDURE — 99213 OFFICE O/P EST LOW 20 MIN: CPT | Performed by: INTERNAL MEDICINE

## 2022-02-17 RX ORDER — HYDRALAZINE HYDROCHLORIDE 25 MG/1
25 TABLET, FILM COATED ORAL 2 TIMES DAILY
Qty: 60 TABLET | Refills: 2 | Status: SHIPPED | OUTPATIENT
Start: 2022-02-17 | End: 2022-03-02

## 2022-02-17 NOTE — PROGRESS NOTES
The ABCs of the Annual Wellness Visit  Subsequent Medicare Wellness Visit    Chief Complaint   Patient presents with   • Medicare Wellness-subsequent      Subjective    History of Present Illness:  Karli Loomis is a 81 y.o. female who presents for a Subsequent Medicare Wellness Visit.  She says her health is stable. She has not been checking her BP. She had feeling weak and Dr. Hernández decreased her Metoprolol from 100 mg daily to 50 mg daily. Her Norvasc had been increased and she developed edema. She had a rash with Terazosin.  He dizzy spells have improved.  The following portions of the patient's history were reviewed and   updated as appropriate: allergies, current medications, past family history, past medical history, past social history, past surgical history and problem list.    Compared to one year ago, the patient feels her physical   health is the same.    Compared to one year ago, the patient feels her mental   health is the same.    Recent Hospitalizations:  She was not admitted to the hospital during the last year.       Current Medical Providers:  Patient Care Team:  Clemente Kaye MD as PCP - General (Internal Medicine)  Florencio Larose MD as Consulting Physician (Hematology and Oncology)  Abebe Barbosa MD as Referring Physician (Hospitalist)    Outpatient Medications Prior to Visit   Medication Sig Dispense Refill   • amLODIPine (NORVASC) 5 MG tablet Take 1 tablet by mouth Daily. 90 tablet 3   • aspirin 81 MG tablet Take 81 mg by mouth Daily.     • cetirizine (zyrTEC) 10 MG tablet Take 10 mg by mouth Daily.     • gabapentin (NEURONTIN) 400 MG capsule TAKE 1 CAPSULE BY MOUTH FOUR TIMES A  capsule 4   • levothyroxine (SYNTHROID, LEVOTHROID) 50 MCG tablet TAKE 1 TABLET EVERY DAY 90 tablet 1   • losartan (COZAAR) 100 MG tablet Take 1 tablet by mouth Daily. 90 tablet 3   • metoprolol succinate XL (TOPROL-XL) 50 MG 24 hr tablet Take 1 tablet by mouth Daily. 90 tablet 3   • Symbicort  "160-4.5 MCG/ACT inhaler Inhale 160 puffs 2 (Two) Times a Day. One in the morning and one in the evening     • triamcinolone (KENALOG) 0.1 % ointment Apply 1 application topically to the appropriate area as directed 2 (Two) Times a Day. 80 g 3   • VENTOLIN  (90 Base) MCG/ACT inhaler As Needed.     • Budesonide (RHINOCORT ALLERGY NA) 2 sprays into the nostril(s) as directed by provider Daily As Needed.       No facility-administered medications prior to visit.       No opioid medication identified on active medication list. I have reviewed chart for other potential  high risk medication/s and harmful drug interactions in the elderly.          Aspirin is on active medication list. Aspirin use is indicated based on review of current medical condition/s. Pros and cons of this therapy have been discussed today. Benefits of this medication outweigh potential harm.  Patient has been encouraged to continue taking this medication.  .      Patient Active Problem List   Diagnosis   • Malignant neoplasm of overlapping sites of left breast in female, estrogen receptor positive (HCC)   • Macroglobulinemia of Waldenstrom (HCC)   • Adult hypothyroidism   • Primary osteoarthritis of left knee   • Headache, migraine   • Hyponatremia with normal extracellular fluid volume   • TIA (transient ischemic attack)   • Essential hypertension   • Vision loss     • Asthma   • Other insomnia   • Nonrheumatic aortic valve insufficiency   • Grade II diastolic dysfunction     Advance Care Planning  Advance Directive is not on file.  ACP discussion was held with the patient during this visit. Patient does not have an advance directive, information provided.          Objective    Vitals:    02/17/22 1328   BP: 150/72   Temp: 96.9 °F (36.1 °C)   Weight: 64.1 kg (141 lb 6.4 oz)   Height: 167.6 cm (65.98\")     BMI Readings from Last 1 Encounters:   02/17/22 22.83 kg/m²   BMI is within normal parameters. No follow-up required.    Does the patient " have evidence of cognitive impairment? No    Physical Exam  Vitals reviewed.   Constitutional:       Appearance: She is well-developed.   HENT:      Head: Normocephalic and atraumatic.   Neck:      Vascular: No carotid bruit.   Cardiovascular:      Rate and Rhythm: Normal rate and regular rhythm.      Heart sounds: Normal heart sounds, S1 normal and S2 normal.   Pulmonary:      Effort: Pulmonary effort is normal.      Breath sounds: Normal breath sounds.   Skin:     General: Skin is warm.   Neurological:      Mental Status: She is alert.   Psychiatric:         Behavior: Behavior normal.       Lab Results   Component Value Date    CHLPL 197 02/10/2022    TRIG 101 02/10/2022    HDL 66 02/10/2022     (H) 02/10/2022    VLDL 18 02/10/2022            HEALTH RISK ASSESSMENT    Smoking Status:  Social History     Tobacco Use   Smoking Status Never Smoker   Smokeless Tobacco Never Used     Alcohol Consumption:  Social History     Substance and Sexual Activity   Alcohol Use No    Comment: 2 cups caffeine/coffee daily     Fall Risk Screen:    [facility] Fall Risk Assessment was completed, and patient is at LOW risk for falls.Assessment completed on:2/17/2022    Depression Screening:  PHQ-2/PHQ-9 Depression Screening 8/25/2021   Little interest or pleasure in doing things 0   Feeling down, depressed, or hopeless 0   Trouble falling or staying asleep, or sleeping too much 0   Feeling tired or having little energy 0   Poor appetite or overeating 0   Feeling bad about yourself - or that you are a failure or have let yourself or your family down 0   Trouble concentrating on things, such as reading the newspaper or watching television 0   Moving or speaking so slowly that other people could have noticed. Or the opposite - being so fidgety or restless that you have been moving around a lot more than usual 0   Thoughts that you would be better off dead, or of hurting yourself in some way 0   Total Score 0   If you checked off any  problems, how difficult have these problems made it for you to do your work, take care of things at home, or get along with other people? Not difficult at all       Health Habits and Functional and Cognitive Screening:  Functional & Cognitive Status 2/17/2022   Do you have difficulty preparing food and eating? -   Do you have difficulty bathing yourself, getting dressed or grooming yourself? -   Do you have difficulty using the toilet? -   Do you have difficulty moving around from place to place? -   Do you have trouble with steps or getting out of a bed or a chair? -   Current Diet Well Balanced Diet   Dental Exam Up to date   Eye Exam Up to date   Exercise (times per week) 7 times per week   Current Exercises Include Treadmill   Current Exercise Activities Include -   Do you need help using the phone?  No   Are you deaf or do you have serious difficulty hearing?  No   Do you need help with transportation? No   Do you need help shopping? No   Do you need help preparing meals?  No   Do you need help with housework?  No   Do you need help with laundry? No   Do you need help taking your medications? No   Do you need help managing money? No   Do you ever drive or ride in a car without wearing a seat belt? No   Have you felt unusual stress, anger or loneliness in the last month? No   Who do you live with? Child   If you need help, do you have trouble finding someone available to you? No   Have you been bothered in the last four weeks by sexual problems? No   Do you have difficulty concentrating, remembering or making decisions? No       Age-appropriate Screening Schedule:  Refer to the list below for future screening recommendations based on patient's age, sex and/or medical conditions. Orders for these recommended tests are listed in the plan section. The patient has been provided with a written plan.    Health Maintenance   Topic Date Due   • DXA SCAN  04/18/2021   • MAMMOGRAM  07/01/2023   • TDAP/TD VACCINES (2 - Td  or Tdap) 08/24/2031   • INFLUENZA VACCINE  Completed   • ZOSTER VACCINE  Completed              Assessment/Plan   CMS Preventative Services Quick Reference  Risk Factors Identified During Encounter  None Identified  The above risks/problems have been discussed with the patient.  Follow up actions/plans if indicated are seen below in the Assessment/Plan Section.  Pertinent information has been shared with the patient in the After Visit Summary.    Diagnoses and all orders for this visit:    1. Medicare annual wellness visit, subsequent (Primary)    2. Essential hypertension  -     hydrALAZINE (APRESOLINE) 25 MG tablet; Take 1 tablet by mouth 2 (Two) Times a Day.  Dispense: 60 tablet; Refill: 2    3. Adult hypothyroidism    4. Post-menopausal  -     DEXA Bone Density Axial        Follow Up:   Return in about 6 weeks (around 3/31/2022), or 30 minutes.     An After Visit Summary and PPPS were made available to the patient.                 Reviewed cmp, flp and TSH. She declines a statin. TSH is at goal. She is agreeable to another BP med;  I am adding on small dose of hydralazine.

## 2022-02-21 ENCOUNTER — OFFICE VISIT (OUTPATIENT)
Dept: CARDIOLOGY | Facility: CLINIC | Age: 82
End: 2022-02-21

## 2022-02-21 VITALS
HEIGHT: 65 IN | WEIGHT: 140 LBS | BODY MASS INDEX: 23.32 KG/M2 | SYSTOLIC BLOOD PRESSURE: 160 MMHG | DIASTOLIC BLOOD PRESSURE: 78 MMHG | HEART RATE: 62 BPM

## 2022-02-21 DIAGNOSIS — I35.1 NONRHEUMATIC AORTIC VALVE INSUFFICIENCY: ICD-10-CM

## 2022-02-21 DIAGNOSIS — I51.89 GRADE II DIASTOLIC DYSFUNCTION: ICD-10-CM

## 2022-02-21 DIAGNOSIS — I10 ESSENTIAL HYPERTENSION: Primary | ICD-10-CM

## 2022-02-21 DIAGNOSIS — R60.0 LOWER EXTREMITY EDEMA: ICD-10-CM

## 2022-02-21 PROCEDURE — 99214 OFFICE O/P EST MOD 30 MIN: CPT | Performed by: NURSE PRACTITIONER

## 2022-02-21 PROCEDURE — 93000 ELECTROCARDIOGRAM COMPLETE: CPT | Performed by: NURSE PRACTITIONER

## 2022-02-21 NOTE — PROGRESS NOTES
"  Date of Office Visit: 2022  Encounter Provider: LENKA Zeng  Place of Service: [facility] CARDIOLOGY  Patient Name: Karli Loomis  :1940  Primary Cardiologist: Dr. Bacilio Hernández     Chief Complaint   Patient presents with   • Edema   • Hypertension   • Follow-up   :     HPI: Karli Loomis is a pleasant 81 y.o. female who presents today for follow-up on leg swelling and hypertension.  I reviewed her medical records.    She has a history of right-sided breast cancer treated and then underwent right mastectomy.  She took aromatase inhibitor for 5 years and this was completed in 2019.  She did not receive chemotherapy or radiation.  She has Waldenstrom's macroglobulinemia and has been on rituximab, bortezomib, and venetoclax.      In May 2021, she saw Dr. Bacilio Hernández for cardiooncology consultation and evaluation of fatigue at the request of Dr. Larose.  She reported severe exertional fatigue, weakness, dyspnea with exertion, and her legs \"giving out\".  She had a modified Kareem protocol myocardial perfusion study which was normal.  Her heart rate was in the 70-80s with exercise and dropped to 50 bpm in recovery.  Dr. Hernández reduced her metoprolol from 100 mg daily to 50 mg daily and recommended a blood pressure goal of 130/140s systolic because she felt like she needed more blood pressure due to chronotropic response.  Echocardiogram showed EF 67%, GLS -21.4%, mild basal septal hypertrophy without outflow obstruction, grade 2 diastolic dysfunction, left atrial volume mildly increased, nodular thickening on the left coronary cusp of the aortic valve, moderate aortic valve regurgitation, trace mitral valve regurgitation, and trace tricuspid regurgitation.      She followed up with Dr. Hernández.  Her systolic blood pressure increased so she went back to the metoprolol 100 mg daily on her own.  She followed up with Dr. eHrnández and she decreased her metoprolol to 50 mg daily, " continue losartan, continue Terazosin, and added amlodipine 5 mg daily.  Her proBNP and troponin were both normal.     In January 2022, she followed up with Dr. Hernández.  Her weak episodes resolved after her metoprolol was decreased.  She had been placed on the Terazosin, but developed a rash so that was discontinued.  Her amlodipine was increased to 10 mg daily.  When she saw Dr. Hernández last month, she had worsening lower extremity edema from the increased amlodipine.  Dr. Hernández decreased her amlodipine to 5 mg daily and did not want any additional blood pressure medications at that time.  She said we would tolerate a systolic blood pressure in the 140s.  She wanted to avoid diuretic therapy because she has baseline hyponatremia.    She presents today for a follow-up visit.  Her blood pressure is 190/78!  She is upset about her cholesterol panel.  I reviewed her panel which showed her total cholesterol 197, triglycerides 101, HDL 66, and .  She also recently saw her PCP who recommended hydralazine 25 mg twice daily.  She was hesitant to start the medication without discussion with Dr. Hernández.  Her blood pressure is quite elevated today.  She reports shortness of breath from asthma, lower extremity edema has improved on amlodipine 5 mg daily, and she remains fatigued.  She denies chest pain, PND, orthopnea, palpitations, dizziness, syncope, or bleeding.  She is having pain in her lower back and left lateral hip that she has not been exercising.      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   • Epilepsy with simple partial seizures (HCC) 12/14/2017   • Headache, migraine 12/14/2017   • History of transfusion of packed red blood cells     R/T SURGERY   • Hyperlipidemia    • Malignant neoplasm of overlapping sites of left breast  in female, estrogen receptor positive (HCC) 4/13/2016   • Nonrheumatic aortic valve insufficiency    • Primary osteoarthritis of left knee 7/22/2015   • Skin cancer    • Thyroid nodule     Removed more than 35 years ago   • TIA (transient ischemic attack) 10/18/2018   • UTI due to extended-spectrum beta lactamase (ESBL) producing Escherichia coli 1/6/2019       Past Surgical History:   Procedure Laterality Date   • ADENOIDECTOMY     • APPENDECTOMY     • CARPAL TUNNEL RELEASE Right    • CATARACT EXTRACTION Bilateral    • CHOLECYSTECTOMY     • COLONOSCOPY     • HYSTERECTOMY      Partial, many years ago, heavy bleeding, no cancer   • KNEE ARTHROSCOPY Right 03/2009    Finding of chondromalacia and appropriate cleanup of joint was performed by Dr. Moraes.   • MASTECTOMY Left 07/2014   • REPLACEMENT TOTAL KNEE Right 12/2015   • SKIN CANCER EXCISION      several   • TONSILLECTOMY         Social History     Socioeconomic History   • Marital status:    Tobacco Use   • Smoking status: Never Smoker   • Smokeless tobacco: Never Used   Vaping Use   • Vaping Use: Never used   Substance and Sexual Activity   • Alcohol use: No     Comment: 2 cups caffeine/coffee daily   • Drug use: No   • Sexual activity: Defer       Family History   Problem Relation Age of Onset   • Mental illness Mother    • Migraines Mother    • Dementia Mother    • Heart disease Father    • Hypertension Father    • Kidney disease Father    • Alcohol abuse Father    • No Known Problems Daughter    • No Known Problems Daughter    • Breast cancer Other    • Heart disease Other    • Hypertension Other    • Diabetes Other    • Cancer Maternal Grandmother    • Breast cancer Maternal Grandmother    • No Known Problems Maternal Grandfather    • No Known Problems Paternal Grandmother    • No Known Problems Paternal Grandfather    • Hyperlipidemia Son    • Lymphoma Neg Hx    • Leukemia Neg Hx        The following portion of the patient's history were reviewed and  updated as appropriate: past medical history, past surgical history, past social history, past family history, allergies, current medications, and problem list.    Review of Systems   Constitutional: Positive for malaise/fatigue.   Cardiovascular: Positive for dyspnea on exertion and leg swelling.   Respiratory: Positive for wheezing.    Hematologic/Lymphatic: Bruises/bleeds easily.   Neurological: Negative.  Negative for dizziness.       Allergies   Allergen Reactions   • Cortisone Unknown - High Severity     Reports having a shot years ago, and wonders if he hit a vein. She was told by another doctor that it probably went in her blood stream.    • Hytrin [Terazosin] Rash   • Darvon  [Propoxyphene] Palpitations         Current Outpatient Medications:   •  amLODIPine (NORVASC) 5 MG tablet, Take 1 tablet by mouth Daily., Disp: 90 tablet, Rfl: 3  •  aspirin 81 MG tablet, Take 81 mg by mouth Daily., Disp: , Rfl:   •  cetirizine (zyrTEC) 10 MG tablet, Take 10 mg by mouth Daily., Disp: , Rfl:   •  gabapentin (NEURONTIN) 400 MG capsule, TAKE 1 CAPSULE BY MOUTH FOUR TIMES A DAY, Disp: 120 capsule, Rfl: 4  •  levothyroxine (SYNTHROID, LEVOTHROID) 50 MCG tablet, TAKE 1 TABLET EVERY DAY, Disp: 90 tablet, Rfl: 1  •  losartan (COZAAR) 100 MG tablet, Take 1 tablet by mouth Daily., Disp: 90 tablet, Rfl: 3  •  metoprolol succinate XL (TOPROL-XL) 50 MG 24 hr tablet, Take 1 tablet by mouth Daily., Disp: 90 tablet, Rfl: 3  •  Symbicort 160-4.5 MCG/ACT inhaler, Inhale 160 puffs 2 (Two) Times a Day. One in the morning and one in the evening, Disp: , Rfl:   •  triamcinolone (KENALOG) 0.1 % ointment, Apply 1 application topically to the appropriate area as directed 2 (Two) Times a Day., Disp: 80 g, Rfl: 3  •  VENTOLIN  (90 Base) MCG/ACT inhaler, As Needed., Disp: , Rfl:   •  hydrALAZINE (APRESOLINE) 25 MG tablet, Take 1 tablet by mouth 2 (Two) Times a Day., Disp: 60 tablet, Rfl: 2        Objective:     Vitals:    02/21/22 0848  "02/21/22 0938   BP: (!) 190/78 160/78   BP Location: Right arm Right arm   Patient Position:  Sitting   Pulse: 62    Weight: 63.5 kg (140 lb)    Height: 165.1 cm (65\")      Body mass index is 23.3 kg/m².    PHYSICAL EXAM:    Vitals Reviewed.   General Appearance: No acute distress, well developed and well nourished. Thin.   Eyes: Conjunctiva and lids: No erythema, swelling, or discharge. Sclera non-icteric.   HENT: Atraumatic, normocephalic. External eyes, ears, and nose normal. No hearing loss noted. Mucous membranes normal. Lips not cyanotic. Neck supple with no tenderness. Wearing a mask.   Respiratory: No signs of respiratory distress. Respiration rhythm and depth normal.   Clear to auscultation. No rales, crackles, rhonchi, or wheezing auscultated.   Cardiovascular:  Jugular Venous Pressure: Normal  Heart Rate and Rhythm: Normal, Heart Sounds: Normal S1 and S2. No S3 or S4 noted.  Murmurs: No murmurs noted. No rubs, thrills, or gallops.   Lower Extremities: No edema noted.  Gastrointestinal:  Abdomen soft, non-distended, non-tender. Normal bowel sounds.    Musculoskeletal: Normal movement of extremities  Skin and Nails: General appearance normal. No pallor, cyanosis, diaphoresis. Skin temperature normal. No clubbing of fingernails.   Psychiatric: Patient alert and oriented to person, place, and time. Speech and behavior appropriate. Normal mood and affect.       ECG 12 Lead    Date/Time: 2/21/2022 8:55 AM  Performed by: Vivien Pina APRN  Authorized by: Vivien Pina APRN   Comparison: compared with previous ECG from 1/24/2022  Similar to previous ECG  Rhythm: sinus rhythm  Rate: normal  BPM: 62  Conduction: conduction normal  ST Segments: ST segments normal  T Waves: T waves normal  QRS axis: normal  Other: no other findings    Clinical impression: normal ECG              Assessment:       Diagnosis Plan   1. Essential hypertension     2. Lower extremity edema     3. Grade II diastolic dysfunction   "   4. Nonrheumatic aortic valve insufficiency            Plan:       1.  Hypertension: Blood pressure elevated on current regimen-amlodipine 5 mg daily (unable to tolerate 10 mg due to edema), losartan 100 mg (maximum dose) and metoprolol 50 mg daily.  She developed a rash on Terazosin and we have avoided diuretics due to her chronic hyponatremia.  I agree with starting hydralazine 25 mg twice per day and I recommend a slow walking program.  She follows a low-sodium diet.  She will follow up with me in 2 to 3 weeks.    2.  Edema: Resolved off amlodipine 10 mg daily.  She is able to tolerate amlodipine 5 mg daily.    3.  Grade 2 Diastolic Dysfunction: Stable.    4.  Aortic Insufficiency: Moderate per echocardiogram in June 2021.  Repeat echocardiogram in June 2022.    5.  Fatigue: Of unknown etiology.  Thus far her LVEF was normal and echocardiogram and stress test was normal.    7.  I reviewed her cholesterol panel and I think it is fine.  She does not need statin medication.    8.  Left Hip/Back Pain: I recommended discussion with her PCP.    9.  She will follow up with me in 2 to 3 weeks. I asked her to call if she has any adverse effects or concerns with hydralazine.    As always, it has been a pleasure to participate in your patient's care. Thank you.       Sincerely,       LENKA Garcia  [facility]      · COVID-19 Precautions - Patient was compliant in wearing a mask. When I saw the patient, I used appropriate personal protective equipment (PPE) including mask and eye shield (standard procedure).  Additionally, I used gown and gloves if indicated.  Hand hygiene was completed before and after seeing the patient.  · Dictated utilizing Dragon Dictation  · I spent 30 minutes reviewing her medical records/testing/previous office notes/labs, face-to-face interaction with patient, physical examination, formulating the plan of care, and discussion of plan of care with patient.

## 2022-03-02 ENCOUNTER — TELEPHONE (OUTPATIENT)
Dept: CARDIOLOGY | Facility: CLINIC | Age: 82
End: 2022-03-02

## 2022-03-02 RX ORDER — SPIRONOLACTONE 25 MG/1
25 TABLET ORAL DAILY
Qty: 30 TABLET | Refills: 1 | Status: SHIPPED | OUTPATIENT
Start: 2022-03-02 | End: 2022-05-02 | Stop reason: SDUPTHER

## 2022-03-02 NOTE — TELEPHONE ENCOUNTER
I reviewed her medication list.  It does not appear that she is taking hydrochlorothiazide.  She was recently started on hydralazine so I think she means that medication.    Please stop hydralazine to see if the itching resolves.  I called in a prescription to her local [place] pharmacy for spironolactone 25 mg 1 tablet daily.  I would like her to come back and see me in 1 to 2 weeks.  We will need to recheck a blood test at that time.    Please let her know and arrange the appointment.  Thanks so much.

## 2022-03-02 NOTE — TELEPHONE ENCOUNTER
Vivien,     Can you address this or would you like her PCP to handle it?    Thank you,  Dana York RN  Triage Nurse [facility] [place] [facility]

## 2022-03-02 NOTE — TELEPHONE ENCOUNTER
Patient called and stated that the new BP med (HCTZ 25 mg) that her PCP started her on is causing her to itch and breakout.  She is wanting to know if you could change it to something else.    Please advise.    CB: [phone]    Christina

## 2022-03-02 NOTE — TELEPHONE ENCOUNTER
Reviewed recommendations with Karli Loomis and the patient verbalized understanding.    Patient is scheduled for appointment on 3/18.    Thank you,  Dana York RN  Triage Nurse [facility] [place]

## 2022-03-11 ENCOUNTER — LAB (OUTPATIENT)
Dept: LAB | Facility: HOSPITAL | Age: 82
End: 2022-03-11

## 2022-03-11 ENCOUNTER — OFFICE VISIT (OUTPATIENT)
Dept: ONCOLOGY | Facility: CLINIC | Age: 82
End: 2022-03-11

## 2022-03-11 ENCOUNTER — INFUSION (OUTPATIENT)
Dept: ONCOLOGY | Facility: HOSPITAL | Age: 82
End: 2022-03-11

## 2022-03-11 VITALS
DIASTOLIC BLOOD PRESSURE: 71 MMHG | OXYGEN SATURATION: 97 % | WEIGHT: 138.7 LBS | SYSTOLIC BLOOD PRESSURE: 146 MMHG | BODY MASS INDEX: 23.11 KG/M2 | RESPIRATION RATE: 16 BRPM | HEIGHT: 65 IN | HEART RATE: 60 BPM | TEMPERATURE: 96.8 F

## 2022-03-11 DIAGNOSIS — C50.812 MALIGNANT NEOPLASM OF OVERLAPPING SITES OF LEFT BREAST IN FEMALE, ESTROGEN RECEPTOR POSITIVE: ICD-10-CM

## 2022-03-11 DIAGNOSIS — Z17.0 MALIGNANT NEOPLASM OF OVERLAPPING SITES OF LEFT BREAST IN FEMALE, ESTROGEN RECEPTOR POSITIVE: ICD-10-CM

## 2022-03-11 DIAGNOSIS — Z17.0 MALIGNANT NEOPLASM OF OVERLAPPING SITES OF LEFT BREAST IN FEMALE, ESTROGEN RECEPTOR POSITIVE: Primary | ICD-10-CM

## 2022-03-11 DIAGNOSIS — C88.0 MACROGLOBULINEMIA OF WALDENSTROM: ICD-10-CM

## 2022-03-11 DIAGNOSIS — E53.8 VITAMIN B 12 DEFICIENCY: Primary | ICD-10-CM

## 2022-03-11 DIAGNOSIS — C50.812 MALIGNANT NEOPLASM OF OVERLAPPING SITES OF LEFT BREAST IN FEMALE, ESTROGEN RECEPTOR POSITIVE: Primary | ICD-10-CM

## 2022-03-11 LAB
ALBUMIN SERPL-MCNC: 4.6 G/DL (ref 3.5–5.2)
ALBUMIN/GLOB SERPL: 1.8 G/DL (ref 1.1–2.4)
ALP SERPL-CCNC: 74 U/L (ref 38–116)
ALT SERPL W P-5'-P-CCNC: 10 U/L (ref 0–33)
ANION GAP SERPL CALCULATED.3IONS-SCNC: 9.6 MMOL/L (ref 5–15)
AST SERPL-CCNC: 18 U/L (ref 0–32)
BASOPHILS # BLD AUTO: 0.06 10*3/MM3 (ref 0–0.2)
BASOPHILS NFR BLD AUTO: 0.9 % (ref 0–1.5)
BILIRUB SERPL-MCNC: 0.6 MG/DL (ref 0.2–1.2)
BUN SERPL-MCNC: 12 MG/DL (ref 6–20)
BUN/CREAT SERPL: 13.3 (ref 7.3–30)
CALCIUM SPEC-SCNC: 10.3 MG/DL (ref 8.5–10.2)
CHLORIDE SERPL-SCNC: 96 MMOL/L (ref 98–107)
CO2 SERPL-SCNC: 28.4 MMOL/L (ref 22–29)
CREAT SERPL-MCNC: 0.9 MG/DL (ref 0.6–1.1)
DEPRECATED RDW RBC AUTO: 44.6 FL (ref 37–54)
EGFRCR SERPLBLD CKD-EPI 2021: 64 ML/MIN/1.73
EOSINOPHIL # BLD AUTO: 0.67 10*3/MM3 (ref 0–0.4)
EOSINOPHIL NFR BLD AUTO: 10 % (ref 0.3–6.2)
ERYTHROCYTE [DISTWIDTH] IN BLOOD BY AUTOMATED COUNT: 12.4 % (ref 12.3–15.4)
GLOBULIN UR ELPH-MCNC: 2.5 GM/DL (ref 1.8–3.5)
GLUCOSE SERPL-MCNC: 93 MG/DL (ref 74–124)
HCT VFR BLD AUTO: 43.6 % (ref 34–46.6)
HGB BLD-MCNC: 14.7 G/DL (ref 12–15.9)
IMM GRANULOCYTES # BLD AUTO: 0.01 10*3/MM3 (ref 0–0.05)
IMM GRANULOCYTES NFR BLD AUTO: 0.1 % (ref 0–0.5)
LYMPHOCYTES # BLD AUTO: 1.99 10*3/MM3 (ref 0.7–3.1)
LYMPHOCYTES NFR BLD AUTO: 29.7 % (ref 19.6–45.3)
MCH RBC QN AUTO: 32.9 PG (ref 26.6–33)
MCHC RBC AUTO-ENTMCNC: 33.7 G/DL (ref 31.5–35.7)
MCV RBC AUTO: 97.5 FL (ref 79–97)
MONOCYTES # BLD AUTO: 0.55 10*3/MM3 (ref 0.1–0.9)
MONOCYTES NFR BLD AUTO: 8.2 % (ref 5–12)
NEUTROPHILS NFR BLD AUTO: 3.42 10*3/MM3 (ref 1.7–7)
NEUTROPHILS NFR BLD AUTO: 51.1 % (ref 42.7–76)
NRBC BLD AUTO-RTO: 0 /100 WBC (ref 0–0.2)
PLATELET # BLD AUTO: 171 10*3/MM3 (ref 140–450)
PMV BLD AUTO: 8.9 FL (ref 6–12)
POTASSIUM SERPL-SCNC: 4.3 MMOL/L (ref 3.5–4.7)
PROT SERPL-MCNC: 7.1 G/DL (ref 6.3–8)
RBC # BLD AUTO: 4.47 10*6/MM3 (ref 3.77–5.28)
SODIUM SERPL-SCNC: 134 MMOL/L (ref 134–145)
WBC NRBC COR # BLD: 6.7 10*3/MM3 (ref 3.4–10.8)

## 2022-03-11 PROCEDURE — 99213 OFFICE O/P EST LOW 20 MIN: CPT | Performed by: INTERNAL MEDICINE

## 2022-03-11 PROCEDURE — 85025 COMPLETE CBC W/AUTO DIFF WBC: CPT

## 2022-03-11 PROCEDURE — 80053 COMPREHEN METABOLIC PANEL: CPT

## 2022-03-11 PROCEDURE — 25010000002 CYANOCOBALAMIN PER 1000 MCG: Performed by: INTERNAL MEDICINE

## 2022-03-11 PROCEDURE — 96372 THER/PROPH/DIAG INJ SC/IM: CPT

## 2022-03-11 PROCEDURE — 36415 COLL VENOUS BLD VENIPUNCTURE: CPT

## 2022-03-11 RX ORDER — CYANOCOBALAMIN 1000 UG/ML
1000 INJECTION, SOLUTION INTRAMUSCULAR; SUBCUTANEOUS ONCE
Status: COMPLETED | OUTPATIENT
Start: 2022-03-11 | End: 2022-03-11

## 2022-03-11 RX ADMIN — CYANOCOBALAMIN 1000 MCG: 1000 INJECTION, SOLUTION INTRAMUSCULAR at 12:03

## 2022-03-11 NOTE — PROGRESS NOTES
Subjective    REASONS FOR FOLLOWUP:     1. History of Waldenstrom macroglobulinemia.    2. History of breast cancer stage I on the left, status post mastectomy. The patient completed aromatase inhibitor  X 5 YEARS IN 2019 without any evidence of breast cancer recurrence        History of Present Illness      DURING THE VISIT WITH THE PATIENT TODAY , PATIENT HAD FACE MASK, I HAD PROPER PROTECTIVE EQUIPMENT, AND I DID HAND HYGIENE WITH SOAP AND WATER BEFORE AND AFTER THE VISIT.      This patient returns today to the office for followup.  In the interim, she has had a new rash due to one of her blood pressure medications.  This was discontinued and modified by Dr. Hernández.  The rash is slowly improving. Her energy level is better than before and she really wants to know if she can go to the grocery and go have meals with family in a restaurant outside.  She also raised the question if she could go to the beach.  Her appetite is stable.  Her weight is stable.  She has no nausea, vomiting, abdominal pain, diarrhea or changes in bowel habits.  Urination is normal.  No fever, chills or infections.  No asthma exacerbation.  No new cardiovascular issues besides new blood pressure medications.              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   • Epilepsy with simple partial seizures (HCC) 12/14/2017   • Headache, migraine 12/14/2017   • History of transfusion of packed red blood cells     R/T SURGERY   • Hyperlipidemia    • Malignant neoplasm of overlapping sites of left breast in female, estrogen receptor positive (HCC) 4/13/2016   • Nonrheumatic aortic valve insufficiency    • Primary osteoarthritis of left knee 7/22/2015   • Skin cancer    • Thyroid nodule     Removed more than 35 years ago   • TIA (transient ischemic attack)  10/18/2018   • UTI due to extended-spectrum beta lactamase (ESBL) producing Escherichia coli 1/6/2019     Past Surgical History:   Procedure Laterality Date   • ADENOIDECTOMY     • APPENDECTOMY     • CARPAL TUNNEL RELEASE Right    • CATARACT EXTRACTION Bilateral    • CHOLECYSTECTOMY     • COLONOSCOPY     • HYSTERECTOMY      Partial, many years ago, heavy bleeding, no cancer   • KNEE ARTHROSCOPY Right 03/2009    Finding of chondromalacia and appropriate cleanup of joint was performed by Dr. Moraes.   • MASTECTOMY Left 07/2014   • REPLACEMENT TOTAL KNEE Right 12/2015   • SKIN CANCER EXCISION      several   • TONSILLECTOMY           Social History     Socioeconomic History   • Marital status:    Tobacco Use   • Smoking status: Never Smoker   • Smokeless tobacco: Never Used   Vaping Use   • Vaping Use: Never used   Substance and Sexual Activity   • Alcohol use: No     Comment: 2 cups caffeine/coffee daily   • Drug use: No   • Sexual activity: Defer     Family History   Problem Relation Age of Onset   • Mental illness Mother    • Migraines Mother    • Dementia Mother    • Heart disease Father    • Hypertension Father    • Kidney disease Father    • Alcohol abuse Father    • No Known Problems Daughter    • No Known Problems Daughter    • Breast cancer Other    • Heart disease Other    • Hypertension Other    • Diabetes Other    • Cancer Maternal Grandmother    • Breast cancer Maternal Grandmother    • No Known Problems Maternal Grandfather    • No Known Problems Paternal Grandmother    • No Known Problems Paternal Grandfather    • Hyperlipidemia Son    • Lymphoma Neg Hx    • Leukemia Neg Hx      Current Outpatient Medications on File Prior to Visit   Medication Sig Dispense Refill   • amLODIPine (NORVASC) 5 MG tablet Take 1 tablet by mouth Daily. 90 tablet 3   • aspirin 81 MG tablet Take 81 mg by mouth Daily.     • cetirizine (zyrTEC) 10 MG tablet Take 10 mg by mouth Daily.     • gabapentin (NEURONTIN) 400 MG  "capsule TAKE 1 CAPSULE BY MOUTH FOUR TIMES A  capsule 4   • levothyroxine (SYNTHROID, LEVOTHROID) 50 MCG tablet TAKE 1 TABLET EVERY DAY 90 tablet 1   • losartan (COZAAR) 100 MG tablet Take 1 tablet by mouth Daily. 90 tablet 3   • metoprolol succinate XL (TOPROL-XL) 50 MG 24 hr tablet Take 1 tablet by mouth Daily. 90 tablet 3   • spironolactone (ALDACTONE) 25 MG tablet Take 1 tablet by mouth Daily. 30 tablet 1   • Symbicort 160-4.5 MCG/ACT inhaler Inhale 160 puffs 2 (Two) Times a Day. One in the morning and one in the evening     • triamcinolone (KENALOG) 0.1 % ointment Apply 1 application topically to the appropriate area as directed 2 (Two) Times a Day. 80 g 3   • VENTOLIN  (90 Base) MCG/ACT inhaler As Needed.       No current facility-administered medications on file prior to visit.       ALLERGIES:    Allergies   Allergen Reactions   • Cortisone Unknown - High Severity     Reports having a shot years ago, and wonders if he hit a vein. She was told by another doctor that it probably went in her blood stream.    • Hytrin [Terazosin] Rash   • Darvon  [Propoxyphene] Palpitations             Objective      Vitals:    03/11/22 1108   BP: 146/71   Pulse: 60   Resp: 16   Temp: 96.8 °F (36 °C)   TempSrc: Temporal   SpO2: 97%   Weight: 62.9 kg (138 lb 11.2 oz)   Height: 165.1 cm (65\")   PainSc: 0-No pain     Current Status 3/11/2022   ECOG score 0         Physical Exam   I HAVE PERSONALLY REVIEWED THE HISTORY OF THE PRESENT ILLNESS, PAST MEDICAL HISTORY, FAMILY HISTORY, SOCIAL HISTORY, ALLERGIES, MEDICATIONS STATED ABOVE IN THE  NOTE FROM TODAY.        GENERAL:  Well-developed, well-nourished  Patient  in no acute distress.   SKIN:  Warm, dry ,NO purpura ,NO petechiae, no rash.  HEENT:  Pupils were equal and reactive to light and accomodation, conjunctivae noninjected, no pterygium, normal extraocular movements, normal visual acuity.   NECK:  Supple with good range of motion; no thyromegaly , no other masses, " no JVD or bruits,.No carotid artery pain, no carotid abnormal pulsation , NO arterial dance.  LYMPHATICS:  No cervical, NO supraclavicular, NO axillary,NO epitrochlear , NO inguinal adenopathies.  CARDIAC   normal rate , regular rhythm, without murmur,NO rubs NO S3 NO S4   LUNGS: normal breath sounds bilateral, no wheezing, NO rhonchi, NO crackles ,NO rubs.  INSPECTION of  breast documented symmetry of the tissue per se and location and size of the nipple,no retractions or inversion of the nipple, normal skin without lesions, no erythema or nodules,no peau d'orange, no prominence of superficial veins or chest wall collateral circulation.PALPATION of the breast documented normal skin turgor, no induration, alteration in local temperature, or pain, no palpable masses or nodules, normal mobility of the tissues,no fixation of the tissue or parenchyma to the chest wall, no alteration at the tail of the breasts or axillas, no adenopathies. LEFT MASTECTOMY Surgical site was well healed.No lymphedema in either extremity.  VASCULAR VENOUS: no cyanosis, NO collateral circulation, NO varicosities, NO edema, NO palpable cords, NO pain,NO erythema, NO pigmentation of the skin.  ABDOMEN:  Soft, NO pain,no hepatomegaly, no splenomegaly,no masses, no ascites, no collateral circulation,no distention,no Max sign.  EXTREMITIES  AND SPINE:  No clubbing, no cyanosis OA HANDS deformities , no pain .No kyphosis,  no pain in spine, no pain in ribs , no pain in pelvic bone.  NEUROLOGICAL:  Patient was awake, alert, oriented to time, person and place.                        RECENT LABS:  Results from last 7 days   Lab Units 03/11/22  1049   WBC 10*3/mm3 6.70   NEUTROS ABS 10*3/mm3 3.42   HEMOGLOBIN g/dL 14.7   HEMATOCRIT % 43.6   PLATELETS 10*3/mm3 171             Assessment/Plan      1. Waldenstrom macroglobulinemia that has been treated in the past mainly with Rituxan. In the past, also she was treated with Velcade with very dramatic  improvement in her monoclonal protein but very dramatic sensory peripheral neuropathy. This medication was discontinued altogether.    6/29/2020, she developed progressive fatigue with worsening neuropathy in her feet.  These were her original symptoms at diagnosis of Waldenstrom's.  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  Calcium also elevated at 10.5 with decreased sodium related to pseudohyponatremia associated with monoclonal protein.  Previously, she did not receive improvement from Rituxan, she is not thought to be a candidate for Imbruvica, therefore she went on to initiate venetoclax 7/13/2020.  She is currently on her next planned dose of 200 mg daily with poor tolerance as outlined below  I discussed with her on 04/28/2021 that I do not believe given the information that we have with a sedimentation rate that was normal and a stable monoclonal protein IgM that the Waldenstrom could be the most focal reason to explain her fatigue. On the other hand it is impossible given her thrombocytopenia to be sure that she still has some component of lymphoma in her bone marrow and the only way to prove this will be to pursue in a bone marrow test. She does not believe that she is ready for this at this time.     Radiologically speaking the CT scan of the chest, abdomen and pelvis failed to document any lymphadenopathy, masses, splenomegaly or any other issue pertinent to her Waldenstrom.  I discussed with her on 06/02/2021 that I do not feel that the symptoms of fatigue are related to her Waldenström's at this time. Her monoclonal protein study has remained completely quiet. Her white count and hemoglobin are normal. Her B12, folic acid, ferritin, iron profile remain normal and TSH hs remained into the normal limits. Her sed rate was normal at 4 mm/hr and she has no obvious infection or inflammatory process.   On 08/25/2021 the patient's monoclonal protein has remained very stable as discussed with her during  the previous weeks. We have another level pending today. One more analysis that I decided to pursue was an amyloid analysis and abdominal fat pad biopsy. This was performed in late 06/2021 by Deric Llamas MD. Material was sent to [facility]. No [place] red material was found in this specimen. Therefore the possibility of amyloidosis is less likely under these circumstances.     We have run CT scans, laboratory testing and all sorts of things trying to explain her fatigue without having a definitive answer. I think a lot of this is lack of training.    My recommendation in regard to her Waldenstrom's is to continue monitoring her parameters with CBC, CMP and monoclonal protein studies in 3 months.   The patient was further reviewed on 11/17/2021 in regard to her Waldenstrom's. She does not have any symptoms pertinent to this. There is no abnormal bleeding, there is no blurred vision, she has no peripheral adenopathy, there is no hepatosplenomegaly. Her fatigue is no different than before but she is able to do some housework. Her white count, hemoglobin and platelets are stable. We have pending monoclonal protein studies that will be called to her once that they become available. I find no need to pursue any intervention from this point of view at this time.   The patient was reviewed on 12/17/2021. I do not see any symptoms or signs of her Waldenstrom's. Her white count, hemoglobin, platelets, white count differential are normal. Her chemistry profile is pending. I find no reason to proceed with any other therapy for this condition. Her monoclonal protein has remained quiet, stable.   On 03/11/2022 the patient’s white count, hemoglobin and platelets are normal. She has minimal sensory neuropathy in her feet that is no worse than before. She has no fever, chills or infections. No clinical bleeding. Her monoclonal protein IgM has remained very stable. She has not required any other intervention from my side of the  story and she will be watched in absence of any other intervention. She will be called at home with the report of her monoclonal protein study next week.          ·   2.From the point of view of her breast cancer status post mastectomy on the left she completed her adjuvant therapy, mammogram is coming up in the end of 06/2021 on the right side. The left chest wall has remained unremarkable.  On 08/25/2021 she has no symptoms or signs of breast cancer recurrence. Her mastectomy site on the left is completely healed. Her right breast examination is normal. She is up to date on her mammogram. This will be watched in absence of any other intervention.   On 11/17/2021 the patient's left sided mastectomy is well healed, right breast exam is normal. There is no evidence of breast cancer recurrence clinically. She will remain in observation.   I find no symptoms or signs of breast cancer recurrence on her. This will be watched in absence of any other intervention as per 12/17/2021.   On 03/11/2022 the patient has no symptoms or signs of breast cancer recurrence. Her right breast exam is normal. The left mastectomy site is normal. She will be watched in absence of any other intervention. She is up-to-date in right-sided mammogram.          ·   3  The next symptom is profound fatigue. We have looked for endocrinological diseases, medication side effects, infections, malignancies, Waldenstrom's, amyloid, cardiac disease and so forth not having any conclusion in regard to the nature of this. I do believe that the lack of bad news or not finding any other thing is good news to me and I pointed this out to the patient. Maybe by the end of the day her fatigue is lack of proper physical training. She has bought a treadmill device that will be in her house as per today and hopefully she will be able to do some walking on this that will be meaningful in regard to recovering in regard energy.   The patient believes that the degree of  fatigue that she was experiencing before is substantially better and now she is able to do some house activity. I encouraged her to continue this in the long run, is the only solution for fatigue is physical activity.   On 12/17/2021 the patient's fatigue has improved highly. She is now doing treadmill activity at home for 10 minutes once or twice a day. She has been eating better, she has lost some weight but she feels substantially better. I think the conditioning was probably the culprit of all of this related to the pandemia and I advised her to remain doing her treadmill activity twice a day if possible.   On 03/11/2022 her fatigue is better since the previous visit.  She is doing some exercise on her treadmill and actually she believes that this has made a big difference. I encouraged her to remain doing this.  I also encouraged her to  her car, go and buy some groceries and that she can go to restaurants with her family to be outside if possible, and she can go to the beach if she can arrange this with her family.  It is time for her to leave her house, now 2 years after pandemia and immunization. I think a lot of her fatigue is being house confined.              The patient raised the question that she always raises in regard if she is taking too many medicines for blood pressure control. I pointed out to her that in my opinion it is not the case. I think she needs to have blood pressure under good control to minimize the complications of hypertension, including stroke, cardiac disease, kidney disease and so forth. She realizes that.     I will review her back in 3 months with a CBC, CMP and monoclonal protein studies. No medications prescribed today besides that she requested a B12 injection that we provided for her given her minimal macrocytosis, MCV of 97. Also she requested a vitamin to help her hair and her nails to grow better. I asked her to buy [employer] Made Multivitamin for skin, hair and nails  to take 1 a day.                  Florencio Larose MD   3/11/2022      CC:

## 2022-03-15 LAB
ALBUMIN SERPL ELPH-MCNC: 4.1 G/DL (ref 2.9–4.4)
ALBUMIN/GLOB SERPL: 1.6 {RATIO} (ref 0.7–1.7)
ALPHA1 GLOB SERPL ELPH-MCNC: 0.3 G/DL (ref 0–0.4)
ALPHA2 GLOB SERPL ELPH-MCNC: 0.8 G/DL (ref 0.4–1)
B-GLOBULIN SERPL ELPH-MCNC: 0.9 G/DL (ref 0.7–1.3)
GAMMA GLOB SERPL ELPH-MCNC: 0.7 G/DL (ref 0.4–1.8)
GLOBULIN SER-MCNC: 2.7 G/DL (ref 2.2–3.9)
IGA SERPL-MCNC: 43 MG/DL (ref 64–422)
IGG SERPL-MCNC: 506 MG/DL (ref 586–1602)
IGM SERPL-MCNC: 215 MG/DL (ref 26–217)
INTERPRETATION SERPL IEP-IMP: ABNORMAL
KAPPA LC FREE SER-MCNC: 14 MG/L (ref 3.3–19.4)
KAPPA LC FREE/LAMBDA FREE SER: 2.15 {RATIO} (ref 0.26–1.65)
LABORATORY COMMENT REPORT: ABNORMAL
LAMBDA LC FREE SERPL-MCNC: 6.5 MG/L (ref 5.7–26.3)
M PROTEIN SERPL ELPH-MCNC: 0.3 G/DL
PROT SERPL-MCNC: 6.8 G/DL (ref 6–8.5)

## 2022-03-16 ENCOUNTER — APPOINTMENT (OUTPATIENT)
Dept: GENERAL RADIOLOGY | Facility: HOSPITAL | Age: 82
End: 2022-03-16

## 2022-03-16 ENCOUNTER — HOSPITAL ENCOUNTER (EMERGENCY)
Facility: HOSPITAL | Age: 82
Discharge: HOME | End: 2022-03-16
Attending: EMERGENCY MEDICINE | Admitting: EMERGENCY MEDICINE

## 2022-03-16 VITALS
HEIGHT: 66 IN | WEIGHT: 148 LBS | TEMPERATURE: 98.8 F | HEART RATE: 60 BPM | OXYGEN SATURATION: 96 % | RESPIRATION RATE: 16 BRPM | SYSTOLIC BLOOD PRESSURE: 145 MMHG | BODY MASS INDEX: 23.78 KG/M2 | DIASTOLIC BLOOD PRESSURE: 73 MMHG

## 2022-03-16 DIAGNOSIS — S92.355A NONDISPLACED FRACTURE OF FIFTH METATARSAL BONE, LEFT FOOT, INITIAL ENCOUNTER FOR CLOSED FRACTURE: Primary | ICD-10-CM

## 2022-03-16 DIAGNOSIS — S93.492A SPRAIN OF ANTERIOR TALOFIBULAR LIGAMENT OF LEFT ANKLE, INITIAL ENCOUNTER: ICD-10-CM

## 2022-03-16 PROCEDURE — 73630 X-RAY EXAM OF FOOT: CPT

## 2022-03-16 PROCEDURE — 73610 X-RAY EXAM OF ANKLE: CPT

## 2022-03-16 PROCEDURE — 99284 EMERGENCY DEPT VISIT MOD MDM: CPT

## 2022-03-16 RX ORDER — ACETAMINOPHEN 500 MG
1000 TABLET ORAL ONCE
Status: COMPLETED | OUTPATIENT
Start: 2022-03-16 | End: 2022-03-16

## 2022-03-16 RX ADMIN — ACETAMINOPHEN 1000 MG: 500 TABLET ORAL at 21:22

## 2022-03-16 NOTE — ED NOTES
"Pt to ED from home with c/o L ankle pain and swelling starting at 1300.  Pt reports she was sitting in the glider and when she went to get up felt like her foot was \"asleep\", as pt stood, heard a pop.  +pedal pulses noted. Pt denies falling or hitting head.    Pt wearing mask, staff wearing appropriate PPE.  "

## 2022-03-17 NOTE — DISCHARGE INSTRUCTIONS
Recommend use of the walker boot along with your walker.  Avoid bearing weight on the affected injury as much as possible.  It is okay to take the boot off in the evening when nonweightbearing and icing the affected area.  Elevating the foot above the heart will help with swelling.  Follow-up with the orthopedist to follow-up.      Recommend 500-1000 mg of Tylenol every 6-8 hours as needed for pain.    Return to the emergency department with any further concerns or new symptoms.

## 2022-03-17 NOTE — ED PROVIDER NOTES
EMERGENCY DEPARTMENT ENCOUNTER    Room Number:  B01/01  Date of encounter:  3/17/2022  PCP: Clemente Kaye MD  Historian: Patient      HPI:  Chief Complaint: Left foot and ankle pain  A complete HPI/ROS/PMH/PSH/SH/FH are unobtainable due to: Nothing    Context: Karli Loomis is a 81 y.o. female who presents to the ED c/o left ankle and foot pain.  Patient states she was enjoying the weather on a glider and her left foot fell asleep.  She went to stand up and felt a pop and immediately noticed swelling.  She has been unable to bear weight on the left foot and ankle since the injury.  The injury occurred J PTA.  The pain does not radiate is localized to the left ankle and left lateral foot.  Attempting to bear weight makes it worse and elevation slightly improves the pain.  She denies sustaining any other injury and is here for further evaluation.    The patient is not on anticoagulants but does appear to take 81 mg of ASA daily.    PAST MEDICAL HISTORY  Active Ambulatory Problems     Diagnosis Date Noted   • Malignant neoplasm of overlapping sites of left breast in female, estrogen receptor positive (HCC) 04/13/2016   • Macroglobulinemia of Waldenstrom (HCC) 11/09/2016   • Adult hypothyroidism 12/14/2017   • Primary osteoarthritis of left knee 07/22/2015   • Headache, migraine 12/14/2017   • Hyponatremia with normal extracellular fluid volume 02/26/2018   • TIA (transient ischemic attack) 10/18/2018   • Essential hypertension 01/06/2019   • Vision loss   08/25/2019   • Asthma    • Other insomnia 12/10/2020   • Nonrheumatic aortic valve insufficiency    • Grade II diastolic dysfunction    • Vitamin B 12 deficiency 03/11/2022     Resolved Ambulatory Problems     Diagnosis Date Noted   • Lymphoma malignant, nodular, lymphocytic (HCC) 04/13/2016   • Drug-induced polyneuropathy (HCC) 12/14/2017   • Epilepsy with simple partial seizures (HCC) 12/14/2017   • Pneumonia due to infectious organism 01/06/2019   • Sepsis  ([facility]) 01/06/2019   • Left hip pain      Past Medical History:   Diagnosis Date   • Acid reflux    • Chronic pansinusitis 7/12/2018   • Clostridium difficile colitis    • History of transfusion of packed red blood cells    • Hyperlipidemia    • Skin cancer    • Thyroid nodule    • UTI due to extended-spectrum beta lactamase (ESBL) producing Escherichia coli 1/6/2019         PAST SURGICAL HISTORY  Past Surgical History:   Procedure Laterality Date   • ADENOIDECTOMY     • APPENDECTOMY     • CARPAL TUNNEL RELEASE Right    • CATARACT EXTRACTION Bilateral    • CHOLECYSTECTOMY     • COLONOSCOPY     • HYSTERECTOMY      Partial, many years ago, heavy bleeding, no cancer   • KNEE ARTHROSCOPY Right 03/2009    Finding of chondromalacia and appropriate cleanup of joint was performed by Dr. Moraes.   • MASTECTOMY Left 07/2014   • REPLACEMENT TOTAL KNEE Right 12/2015   • SKIN CANCER EXCISION      several   • TONSILLECTOMY           FAMILY HISTORY  Family History   Problem Relation Age of Onset   • Mental illness Mother    • Migraines Mother    • Dementia Mother    • Heart disease Father    • Hypertension Father    • Kidney disease Father    • Alcohol abuse Father    • No Known Problems Daughter    • No Known Problems Daughter    • Breast cancer Other    • Heart disease Other    • Hypertension Other    • Diabetes Other    • Cancer Maternal Grandmother    • Breast cancer Maternal Grandmother    • No Known Problems Maternal Grandfather    • No Known Problems Paternal Grandmother    • No Known Problems Paternal Grandfather    • Hyperlipidemia Son    • Lymphoma Neg Hx    • Leukemia Neg Hx          SOCIAL HISTORY  Social History     Socioeconomic History   • Marital status:    Tobacco Use   • Smoking status: Never Smoker   • Smokeless tobacco: Never Used   Vaping Use   • Vaping Use: Never used   Substance and Sexual Activity   • Alcohol use: No     Comment: 2 cups caffeine/coffee daily   • Drug use: No   • Sexual activity: Defer          ALLERGIES  Cortisone, Hytrin [terazosin], and Darvon  [propoxyphene]        REVIEW OF SYSTEMS  Review of Systems   Constitutional: Negative for chills and fever.   HENT: Negative.    Respiratory: Negative for cough and shortness of breath.    Cardiovascular: Negative for chest pain and palpitations.   Genitourinary: Negative.    Musculoskeletal: Negative.    Skin:        Left foot and ankle pain   Neurological: Negative for dizziness and light-headedness.        All systems reviewed and negative except for those discussed in HPI.       PHYSICAL EXAM    I have reviewed the triage vital signs and nursing notes.    ED Triage Vitals [03/16/22 1833]   Temp Heart Rate Resp BP SpO2   98.8 °F (37.1 °C) 64 16 133/93 96 %      Temp src Heart Rate Source Patient Position BP Location FiO2 (%)   Tympanic -- Sitting Right arm --       Physical Exam  GENERAL: Appearance, nontoxic, does appear slightly comfortable   HENT: nares patent  EYES: no scleral icterus  CV: regular rhythm, regular rate/DP and PT pulses +2 bilaterally  RESPIRATORY: normal effort  ABDOMEN: soft  MUSCULOSKELETAL: Soft tissue swelling over the lateral malleolus/TTP over the tip of the lateral malleolus.  TTP fourth and fifth metatarsals of the left foot.  Left lower extremity compartments remain soft.    NEURO: alert, moves all extremities, follows commands  SKIN: warm, dry        LAB RESULTS  No results found for this or any previous visit (from the past 24 hour(s)).    Ordered the above labs and independently reviewed the results.        RADIOLOGY  XR Ankle 3+ View Left    Result Date: 3/16/2022  XR ANKLE 3+ VW LEFT-  03/16/2022  HISTORY: Left ankle pain.  There is moderate lateral soft tissue swelling. Tiny osseous density seen adjacent to the tip of the medial malleolus which is relatively smoothly marginated and may represent tiny accessory ossicle or old chip fracture fragment. Does not appear acute.  Calcaneal spurs are seen.  There is a lucency  coursing across the base of the fifth metatarsal which may represent nondisplaced acute or subacute fifth metatarsal fracture. Please correlate.      1. Lateral ankle swelling. 2. Lucency at the base of the fifth metatarsal may represent a fracture of the base of the fifth metatarsal. Left foot radiographs suggested if clinically indicated. 3. No other fractures of the ankle are seen.  This report was finalized on 3/16/2022 6:51 PM by Dr. Clemente Che M.D.      XR Foot 3+ View Left    Result Date: 3/16/2022  XR FOOT 3+ VW LEFT-  03/16/2022  HISTORY: Ankle injury. Left foot pain. There is a transverse nondisplaced fracture of the base of the left fifth metatarsal. One or 2 small smoothly marginated osseous densities are seen adjacent to the base of the fifth metatarsal which may represent old chip fracture fragments and/or accessory ossicles. These do not appear to represent acute fracture fragment no other fractures or dislocations are seen.      1. Nondisplaced transverse fracture of the proximal aspect of the left fifth metatarsal.  This report was finalized on 3/16/2022 9:36 PM by Dr. Clemente Che M.D.        I ordered the above noted radiological studies. Reviewed by me and discussed with radiologist.  See dictation for official radiology interpretation.      PROCEDURES    Procedures      MEDICATIONS GIVEN IN ER    Medications   acetaminophen (TYLENOL) tablet 1,000 mg (1,000 mg Oral Given 3/16/22 2122)         PROGRESS, DATA ANALYSIS, CONSULTS, AND MEDICAL DECISION MAKING    All labs have been independently reviewed by me.  All radiology studies have been reviewed by me and discussed with radiologist dictating the report.   EKG's independently viewed and interpreted by me.  Discussion below represents my analysis of pertinent findings related to patient's condition, differential diagnosis, treatment plan and final disposition.    DDx includes but is not limited to: Left foot sprain, left foot fracture,  left ankle sprain, left ankle fracture.  Will obtain x-rays of the left foot and left ankle to further evaluate.    ED Course as of 03/17/22 0553   Wed Mar 16, 2022   2106 1 gram of Tylenol and ice ordered for the patient. [RC]   2129 X-ray of the left ankle shows a questionable fifth metatarsal fracture.  Clinically I suspect this is real.  I have already ordered an x-ray of the left foot to further evaluate [RC]   2222 Offered admission versus discharge due to concerns about the patient's balance and ambulation.  She was ambulated by the RN and it was reported that she had no difficulties with the walker in the boot.  Will DC home with conservative measures of care and have her to follow-up with an orthopedist for definitive evaluation. [RC]      ED Course User Index  [RC] Tarik Guzman III, PA           PPE: The patient wore a surgical mask throughout the entire patient encounter. I wore an N95.    AS OF 05:53 EDT VITALS:    BP - 145/73  HR - 60  TEMP - 98.8 °F (37.1 °C) (Tympanic)  O2 SATS - 96%        DIAGNOSIS  Final diagnoses:   Nondisplaced fracture of fifth metatarsal bone, left foot, initial encounter for closed fracture   Sprain of anterior talofibular ligament of left ankle, initial encounter         DISPOSITION  DISCHARGE    Patient discharged in stable condition.    Reviewed implications of results, diagnosis, meds, responsibility to follow up, warning signs and symptoms of possible worsening, potential complications and reasons to return to ER.    Patient/Family voiced understanding of above instructions.    Discussed plan for discharge, as there is no emergent indication for admission. Patient referred to primary care provider for BP management due to today's BP. Pt/family is agreeable and understands need for follow up and repeat testing.  Pt is aware that discharge does not mean that nothing is wrong but it indicates no emergency is present that requires admission and they must continue  care with follow-up as given below or physician of their choice.     FOLLOW-UP  Andi Baer MD  [id number] [id number] [phone]    Schedule an appointment as soon as possible for a visit   For further evaluation and treatment         Medication List      No changes were made to your prescriptions during this visit.                Tarik Guzman III, PA  03/17/22 0553       Tarik Guzman III, PA  03/17/22 0554

## 2022-03-17 NOTE — ED NOTES
Walking boot applied, pt w/increased pain with ace wrap so ace wrap was removed. Pt reports boot more tolerable w/out ace wrap, is able to ambulate but with pain.

## 2022-03-21 ENCOUNTER — PATIENT OUTREACH (OUTPATIENT)
Dept: CASE MANAGEMENT | Facility: OTHER | Age: 82
End: 2022-03-21

## 2022-03-21 NOTE — OUTREACH NOTE
AMBULATORY CASE MANAGEMENT NOTE    Name and Relationship of Patient/Support Person: Karli Loomis - Self    Adult Patient Profile  Questions/Answers    Flowsheet Row Most Recent Value   Symptoms/Conditions Managed at Home other (see comments)  [foot fracture]   Barriers to Managing Health none   Identifying Life Goals Patient plans to wear boot and use walker for ambulation for 4-5 weeks per orthopedic MD      Patient Outreach    Introduced self, explained ACM RN role and provided contact information. Spoke with patient regarding health and wellness after ED visit for foot fracture. Patient states she has followed up with ortho. She is wearing boot and using walker for ambulation. She has daughters and son to assist with basic needs and light house work. No concerns at this time. Reports the use of walker assists with ensure her gait is steady with her boot. Follow up review scheduled in 1 month.     Education Documentation  No documentation found.        ORESTES SARMIENTO  Ambulatory Case Management    3/21/2022, 17:05 EDT

## 2022-04-11 ENCOUNTER — TELEPHONE (OUTPATIENT)
Dept: INTERNAL MEDICINE | Facility: CLINIC | Age: 82
End: 2022-04-11

## 2022-04-11 NOTE — TELEPHONE ENCOUNTER
Per Tanya she wants patient to be moved to Dr. Pascal schedule. I have moved her to same day but different time (2:00pm). I will call patient to let her know.

## 2022-04-11 NOTE — TELEPHONE ENCOUNTER
She could only come at 11:15 -the time of org. Appt due to only time having a ride. If something changes she will call

## 2022-04-20 ENCOUNTER — OFFICE VISIT (OUTPATIENT)
Dept: FAMILY MEDICINE CLINIC | Facility: CLINIC | Age: 82
End: 2022-04-20

## 2022-04-20 VITALS
TEMPERATURE: 96.6 F | HEIGHT: 66 IN | SYSTOLIC BLOOD PRESSURE: 124 MMHG | WEIGHT: 139.9 LBS | RESPIRATION RATE: 16 BRPM | OXYGEN SATURATION: 99 % | BODY MASS INDEX: 22.48 KG/M2 | DIASTOLIC BLOOD PRESSURE: 60 MMHG | HEART RATE: 59 BPM

## 2022-04-20 DIAGNOSIS — R21 RASH: Primary | ICD-10-CM

## 2022-04-20 DIAGNOSIS — I10 ESSENTIAL HYPERTENSION: ICD-10-CM

## 2022-04-20 DIAGNOSIS — E03.9 ADULT HYPOTHYROIDISM: ICD-10-CM

## 2022-04-20 DIAGNOSIS — G62.0 DRUG-INDUCED POLYNEUROPATHY: ICD-10-CM

## 2022-04-20 PROCEDURE — 99214 OFFICE O/P EST MOD 30 MIN: CPT | Performed by: NURSE PRACTITIONER

## 2022-04-20 RX ORDER — LOSARTAN POTASSIUM 100 MG/1
100 TABLET ORAL DAILY
Qty: 90 TABLET | Refills: 3 | Status: SHIPPED | OUTPATIENT
Start: 2022-04-20

## 2022-04-20 RX ORDER — GABAPENTIN 400 MG/1
400 CAPSULE ORAL 4 TIMES DAILY
Qty: 360 CAPSULE | Refills: 1 | Status: SHIPPED | OUTPATIENT
Start: 2022-04-20 | End: 2022-10-14

## 2022-04-20 RX ORDER — LEVOTHYROXINE SODIUM 0.05 MG/1
50 TABLET ORAL DAILY
Qty: 90 TABLET | Refills: 1 | Status: SHIPPED | OUTPATIENT
Start: 2022-04-20 | End: 2022-09-07

## 2022-04-20 NOTE — PROGRESS NOTES
"Chief Complaint  Annual Exam    Subjective          Karli Loomis presents to [facility]  [facility] She was previously established with Dr. Sorensen. She recently had AWV completed in February, with labs. Reviewed with patient at time of visit. She is requiring refills of medications.     Fell from standing after sitting on glider, stood up and her foot was asleep and broke her left foot, seeing ortho, states it is healing well, she is transitioning from boot and walker. Denies pain, but does report ankle pain related to wearing a boot. She is not sure if this is rubbing.    Last visit with Dr. Sorensen, prescribed blood pressure medication twice daily, she is now taking four different blood pressure medications. States started a new one and developed a rash afterwards. PCP did not suspect drug rash. [facility] followed up and her rash was worsening. Afterward had a follow up with oncologist, and he recommended to stop medication (terazosin). [facility] rash is present on arms, legs, buttock, and back. Prescribed triamcinolone. [facility] has been using this since November, uses as needed. Never has gone away.           Objective   Vital Signs:   /60 (BP Location: Right arm, Patient Position: Sitting, Cuff Size: Adult)   Pulse 59   Temp 96.6 °F (35.9 °C) (Temporal)   Resp 16   Ht 167.6 cm (65.98\")   Wt 63.5 kg (139 lb 14.4 oz)   SpO2 99%   BMI 22.59 kg/m²     BMI is within normal parameters. No follow-up required.      Physical Exam  Vitals reviewed.   Constitutional:       Appearance: Normal appearance.   HENT:      Head: Normocephalic.      Right Ear: Tympanic membrane and ear canal normal.      Left Ear: Tympanic membrane and ear canal normal.      Mouth/Throat:      Mouth: Mucous membranes are moist.   Eyes:      Conjunctiva/sclera: Conjunctivae normal.      Pupils: Pupils are equal, round, and reactive to light.   Cardiovascular:      Rate and Rhythm: " Normal rate and regular rhythm.      Heart sounds: No murmur heard.    No friction rub. No gallop.   Pulmonary:      Effort: Pulmonary effort is normal. No respiratory distress.      Breath sounds: Normal breath sounds. No wheezing, rhonchi or rales.   Abdominal:      General: Bowel sounds are normal. There is no distension.      Palpations: Abdomen is soft. There is no mass.      Tenderness: There is no abdominal tenderness.      Hernia: No hernia is present.   Skin:     General: Skin is warm and dry.      Findings: Rash present. Rash is macular (scattered, back, pruritic, bilateral arms). Rash is not crusting, pustular or urticarial.   Neurological:      Mental Status: She is alert and oriented to person, place, and time.   Psychiatric:         Mood and Affect: Mood normal.        Result Review :                 Assessment and Plan    Diagnoses and all orders for this visit:    1. Drug-induced polyneuropathy (HCC)  -     gabapentin (NEURONTIN) 400 MG capsule; Take 1 capsule by mouth 4 (Four) Times a Day.  Dispense: 360 capsule; Refill: 1    2. Essential hypertension  -     losartan (COZAAR) 100 MG tablet; Take 1 tablet by mouth Daily.  Dispense: 90 tablet; Refill: 3    3. Adult hypothyroidism  -     levothyroxine (SYNTHROID, LEVOTHROID) 50 MCG tablet; Take 1 tablet by mouth Daily.  Dispense: 90 tablet; Refill: 1        Follow Up   No follow-ups on file.  Patient was given instructions and counseling regarding her condition or for health maintenance advice. Please see specific information pulled into the AVS if appropriate.     Rash: currently on back, nonvesicular, ? Drug rash, started after terazosin, discussed steroid injection, however held secondary to reported allergy. She is not sure of reaction, however she has tolerated topical triamcinolone and inhaled symbicort without issue. Recommend derm follow up to eval and treat. She is established with [facility] and will self schedule a follow  up.    Hypertension: chronic, stable, currently taking losartan, metoprolol and spironolactone. Recent labs completed and reviewed at time of visit, will hold on labs today, recommend repeat labs at next visit.    Hypothyroid: chronic, stable, no recent dose changes, therapeutic, repeat labs at next visit    Neuropathy/seizures: she reports history of seizure, she is taking gabapentin for that, however also noted in chart gabapentin secondary to neuropathy. She is currently taking 4 tablets daily, tried to reduce to three times daily and was not able to tolerate. Will continue this dose

## 2022-04-20 NOTE — PROGRESS NOTES
"The ABCs of the Annual Wellness Visit  Subsequent Medicare Wellness Visit    Chief Complaint   Patient presents with   • Annual Exam      Subjective    History of Present Illness:  Karli Loomis is a 81 y.o. female who presents for a Subsequent Medicare Wellness Visit.    The following portions of the patient's history were reviewed and   updated as appropriate: {history reviewed:20406::\"allergies\",\"current medications\",\"past family history\",\"past medical history\",\"past social history\",\"past surgical history\",\"problem list\"}.    Compared to one year ago, the patient feels her physical   health is {better worse same:94448}.    Compared to one year ago, the patient feels her mental   health is {better worse same:84525}.    Recent Hospitalizations:  {Hospital Admission Status in the last 365 days:77739}      Current Medical Providers:  Patient Care Team:  Luisa Prasad APRN as PCP - General (Family Medicine)  Florencio Larose MD as Consulting Physician (Hematology and Oncology)  Abebe Barbosa MD as Referring Physician (Hospitalist)  Dania Rodriguez RN as Ambulatory  (Population Health)  Ila Johnson RN as Registered Nurse    Outpatient Medications Prior to Visit   Medication Sig Dispense Refill   • amLODIPine (NORVASC) 5 MG tablet Take 1 tablet by mouth Daily. 90 tablet 3   • aspirin 81 MG tablet Take 81 mg by mouth Daily.     • cetirizine (zyrTEC) 10 MG tablet Take 10 mg by mouth Daily.     • gabapentin (NEURONTIN) 400 MG capsule TAKE 1 CAPSULE BY MOUTH FOUR TIMES A  capsule 4   • levothyroxine (SYNTHROID, LEVOTHROID) 50 MCG tablet TAKE 1 TABLET EVERY DAY 90 tablet 1   • losartan (COZAAR) 100 MG tablet Take 1 tablet by mouth Daily. 90 tablet 3   • metoprolol succinate XL (TOPROL-XL) 50 MG 24 hr tablet Take 1 tablet by mouth Daily. 90 tablet 3   • spironolactone (ALDACTONE) 25 MG tablet Take 1 tablet by mouth Daily. 30 tablet 1   • Symbicort 160-4.5 MCG/ACT inhaler Inhale 160 " "puffs 2 (Two) Times a Day. One in the morning and one in the evening     • triamcinolone (KENALOG) 0.1 % ointment Apply 1 application topically to the appropriate area as directed 2 (Two) Times a Day. 80 g 3   • VENTOLIN  (90 Base) MCG/ACT inhaler As Needed.       No facility-administered medications prior to visit.       No opioid medication identified on active medication list. I have reviewed chart for other potential  high risk medication/s and harmful drug interactions in the elderly.          Aspirin is on active medication list. {ASPIRIN ON MEDICATION LIST INDICATED/NOT INDICATED:19079}.      Patient Active Problem List   Diagnosis   • Malignant neoplasm of overlapping sites of left breast in female, estrogen receptor positive (HCC)   • Macroglobulinemia of Waldenstrom (HCC)   • Adult hypothyroidism   • Primary osteoarthritis of left knee   • Headache, migraine   • Hyponatremia with normal extracellular fluid volume   • TIA (transient ischemic attack)   • Essential hypertension   • Vision loss     • Asthma   • Other insomnia   • Nonrheumatic aortic valve insufficiency   • Grade II diastolic dysfunction   • Vitamin B 12 deficiency     Advance Care Planning  Advance Directive is not on file.  {ACP Discussion, Advance Directive not in EMR:92913}          Objective    Vitals:    04/20/22 1054   BP: 124/60   BP Location: Right arm   Patient Position: Sitting   Cuff Size: Adult   Pulse: 59   Resp: 16   Temp: 96.6 °F (35.9 °C)   TempSrc: Temporal   SpO2: 99%   Weight: 63.5 kg (139 lb 14.4 oz)   Height: 167.6 cm (65.98\")     BMI Readings from Last 1 Encounters:   04/20/22 22.59 kg/m²   BMI is within normal parameters. No follow-up required.    Does the patient have evidence of cognitive impairment? {Yes/No:43626}    Physical Exam  Lab Results   Component Value Date    CHLPL 197 02/10/2022    TRIG 101 02/10/2022    HDL 66 02/10/2022     (H) 02/10/2022    VLDL 18 02/10/2022            HEALTH RISK " ASSESSMENT    Smoking Status:  Social History     Tobacco Use   Smoking Status Never Smoker   Smokeless Tobacco Never Used     Alcohol Consumption:  Social History     Substance and Sexual Activity   Alcohol Use No    Comment: 2 cups caffeine/coffee daily     Fall Risk Screen:    TOBI Fall Risk Assessment was completed, and patient is at LOW risk for falls.Assessment completed on:2/17/2022    Depression Screening:  PHQ-2/PHQ-9 Depression Screening 4/20/2022   Retired PHQ-9 Total Score -   Retired Total Score -   Little Interest or Pleasure in Doing Things 0-->not at all   Feeling Down, Depressed or Hopeless 0-->not at all   PHQ-9: Brief Depression Severity Measure Score 0       Health Habits and Functional and Cognitive Screening:  Functional & Cognitive Status 4/20/2022   Do you have difficulty preparing food and eating? No   Do you have difficulty bathing yourself, getting dressed or grooming yourself? No   Do you have difficulty using the toilet? No   Do you have difficulty moving around from place to place? No   Do you have trouble with steps or getting out of a bed or a chair? No   Current Diet Well Balanced Diet   Dental Exam Up to date   Eye Exam Up to date   Exercise (times per week) 0 times per week   Current Exercises Include -   Current Exercise Activities Include -   Do you need help using the phone?  No   Are you deaf or do you have serious difficulty hearing?  No   Do you need help with transportation? Yes   Do you need help shopping? No   Do you need help preparing meals?  No   Do you need help with housework?  No   Do you need help with laundry? No   Do you need help taking your medications? No   Do you need help managing money? No   Do you ever drive or ride in a car without wearing a seat belt? No   Have you felt unusual stress, anger or loneliness in the last month? No   Who do you live with? Child   If you need help, do you have trouble finding someone available to you? No   Have you been  bothered in the last four weeks by sexual problems? No   Do you have difficulty concentrating, remembering or making decisions? No       Age-appropriate Screening Schedule:  Refer to the list below for future screening recommendations based on patient's age, sex and/or medical conditions. Orders for these recommended tests are listed in the plan section. The patient has been provided with a written plan.    Health Maintenance   Topic Date Due   • DXA SCAN  04/18/2021   • INFLUENZA VACCINE  08/01/2022   • LIPID PANEL  02/10/2023   • MAMMOGRAM  07/01/2023   • TDAP/TD VACCINES (2 - Td or Tdap) 08/24/2031   • ZOSTER VACCINE  Completed              Assessment/Plan   CMS Preventative Services Quick Reference  Risk Factors Identified During Encounter  {Medicare Wellness Risk Factors:23065}  The above risks/problems have been discussed with the patient.  Follow up actions/plans if indicated are seen below in the Assessment/Plan Section.  Pertinent information has been shared with the patient in the After Visit Summary.    There are no diagnoses linked to this encounter.    Follow Up:   No follow-ups on file.     An After Visit Summary and PPPS were made available to the patient.    {Optional Chart Navigation Links Wrapup  Review (Popup)  Advance Care Planning  Labs  CC  Problem List  Visit Diagnosis  Medications  Result Review  Imaging  Health Maintenance  Quality  BestPractice  SmartSets  SnapShot  Encounters  Notes  Media  Procedures :23}    {Time Spent-Use for E/M Coding (Optional):02998}

## 2022-05-02 RX ORDER — SPIRONOLACTONE 25 MG/1
25 TABLET ORAL DAILY
Qty: 30 TABLET | Refills: 1 | Status: SHIPPED | OUTPATIENT
Start: 2022-05-02 | End: 2022-06-14

## 2022-05-11 ENCOUNTER — TELEPHONE (OUTPATIENT)
Dept: ONCOLOGY | Facility: CLINIC | Age: 82
End: 2022-05-11

## 2022-05-11 NOTE — TELEPHONE ENCOUNTER
Caller: Karli Loomis    Relationship: Self    Best call back number: [phone]    What is the best time to reach you: ASAP    Who are you requesting to speak with (clinical staff, provider,  specific staff member): NURSE    Do you know the name of the person who called:     What was the call regarding: PT WANTED TO DISCUSS COVID BOOSTER    Do you require a callback: YES

## 2022-05-11 NOTE — TELEPHONE ENCOUNTER
Returned call to patient who had her last Covid booster in 9/2021.  She wants to know if ok to get the next booster.  Reviewed MD notes and no contraindication given, patient given approval to obtain her booster.

## 2022-05-24 ENCOUNTER — OFFICE VISIT (OUTPATIENT)
Dept: CARDIOLOGY | Facility: CLINIC | Age: 82
End: 2022-05-24

## 2022-05-24 VITALS
HEART RATE: 62 BPM | HEIGHT: 65 IN | BODY MASS INDEX: 22.99 KG/M2 | WEIGHT: 138 LBS | SYSTOLIC BLOOD PRESSURE: 122 MMHG | DIASTOLIC BLOOD PRESSURE: 62 MMHG

## 2022-05-24 DIAGNOSIS — R21 RASH: ICD-10-CM

## 2022-05-24 DIAGNOSIS — I49.1 APC (ATRIAL PREMATURE CONTRACTIONS): ICD-10-CM

## 2022-05-24 DIAGNOSIS — I35.1 NONRHEUMATIC AORTIC VALVE INSUFFICIENCY: ICD-10-CM

## 2022-05-24 DIAGNOSIS — I10 ESSENTIAL HYPERTENSION: Primary | Chronic | ICD-10-CM

## 2022-05-24 DIAGNOSIS — I51.89 GRADE II DIASTOLIC DYSFUNCTION: ICD-10-CM

## 2022-05-24 PROCEDURE — 93000 ELECTROCARDIOGRAM COMPLETE: CPT | Performed by: NURSE PRACTITIONER

## 2022-05-24 PROCEDURE — 99214 OFFICE O/P EST MOD 30 MIN: CPT | Performed by: NURSE PRACTITIONER

## 2022-05-25 ENCOUNTER — TELEPHONE (OUTPATIENT)
Dept: CARDIOLOGY | Facility: CLINIC | Age: 82
End: 2022-05-25

## 2022-05-25 PROBLEM — I49.1 APC (ATRIAL PREMATURE CONTRACTIONS): Status: ACTIVE | Noted: 2022-05-25

## 2022-05-25 NOTE — TELEPHONE ENCOUNTER
Pt called to clarify medication changes from yesterdays appt. Reviewed note and relayed plan, pt expressed understanding.      dd

## 2022-05-26 DIAGNOSIS — I10 ESSENTIAL HYPERTENSION: Chronic | ICD-10-CM

## 2022-05-26 RX ORDER — METOPROLOL SUCCINATE 50 MG/1
50 TABLET, EXTENDED RELEASE ORAL DAILY
Qty: 90 TABLET | Refills: 3 | OUTPATIENT
Start: 2022-05-26

## 2022-05-26 NOTE — TELEPHONE ENCOUNTER
Rx Refill Note  Requested Prescriptions     Pending Prescriptions Disp Refills   • metoprolol succinate XL (TOPROL-XL) 50 MG 24 hr tablet 90 tablet 3     Sig: Take 1 tablet by mouth Daily.      Last office visit with prescribing clinician: 4/20/2022      Next office visit with prescribing clinician: 10/20/2022       {TIP  Please add Last Relevant Lab Date if appropriate: 03/11/2022    Shannon Núñez MA  05/26/22, 13:20 EDT

## 2022-05-26 NOTE — TELEPHONE ENCOUNTER
Caller: Karli Loomis    Relationship: Self    Best call back number:[phone]     Requested Prescriptions:   Requested Prescriptions     Pending Prescriptions Disp Refills   • metoprolol succinate XL (TOPROL-XL) 50 MG 24 hr tablet 90 tablet 3     Sig: Take 1 tablet by mouth Daily.        Pharmacy where request should be sent: [facility] - [facility] [address] - [phone]  - [phone] SHAHIDA Arriaza   05/26/22 11:44 EDT

## 2022-05-26 NOTE — PROGRESS NOTES
Date of Office Visit: 2022  Encounter Provider: LENKA Zeng  Place of Service: [facility] CARDIOLOGY  Patient Name: Karli Loomis  :1940  Primary Cardiologist: Dr. Bacilio Hernández     Chief Complaint   Patient presents with   • Hypertension   • Follow-up   :     HPI: Karli Loomis is a pleasant 81 y.o. female who presents today for follow-up on hypertension.  I have reviewed her medical records.    She has a history of right-sided breast cancer that was treated with hammertoes inhibitor for 5 years and completed in 2019.  She underwent right mastectomy and did not require chemotherapy or radiation.  She has [place] Gokul's macroglobulinemia and has been on rituximab, bortezomib, and venetoclax.    In May 2021, she saw Dr. Hernández for cardio-oncology consultation at the request of Dr. Larose.  She reported severe exertional fatigue, weakness, dyspnea with exertion, and her legs were giving out.  She had a myocardial perfusion study where she walked on the treadmill with a modified Kareem protocol and this was normal.  Her heart rate dropped in recovery to 50 and her metoprolol was reduced from 100 to 50 mg daily.  She was recommended to have a systolic blood pressure of 130-140s because of chronotropic response.  Echocardiogram showed the following: LVEF 67%, normal global longitudinal LV strain -21.4%, mild basal septal hypertrophy without outflow obstruction, grade 2 diastolic dysfunction, left atrial volume mildly increased, nodular thickening on the left coronary cusp of the aortic valve, moderate aortic valve regurgitation, trace mitral valve regurgitation, and trace tricuspid regurgitation.     Her weak spells resolved with decreasing the metoprolol.  Her PCP placed her on terazosin, but she developed a rash so that was discontinued.  Her amlodipine was increased to 10 mg daily and she developed worsening lower extremity edema.  Dr. Hernández wanted to avoid diuretic  therapy because she has been hyponatremic.    In February 2022, she saw me and her blood pressure was 190/78.  Recheck was 160/78.  I started hydralazine 25 mg twice per day.    In March 2022, she fell and broke her left foot.  She did not require surgery and the foot healed appropriately in the boot.  She also called me and felt like the hydralazine was making her rash and itching worse.  I stopped the hydralazine and put her on spironolactone 25 mg daily.  Follow-up CMP showed normal kidney function and potassium.    She presents today for follow-up visit.  Her blood pressure was elevated on the first check and normal on the second check.  She is taking her spironolactone in the early evening and finds that she is having nocturia.  I asked her to start taking the spironolactone first thing in the morning.  She denies chest pain, shortness of air, palpitations, edema, dizziness, syncope, or bleeding.      For the past 9 months, she has been dealing with a rash and has seen dermatology.  She says no one knows what is causing the rash and she wonders if it is medication related. She said the rash definitely started after taking terazosin, but did not resolve when she stopped the medication.      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   • Epilepsy with simple partial seizures (HCC) 12/14/2017   • Headache, migraine 12/14/2017   • History of transfusion of packed red blood cells     R/T SURGERY   • Hyperlipidemia    • Malignant neoplasm of overlapping sites of left breast in female, estrogen receptor positive (HCC) 4/13/2016   • Nonrheumatic aortic valve insufficiency    • Primary osteoarthritis of left knee 7/22/2015   • Skin cancer    • Thyroid nodule     Removed more than 35 years ago   • TIA (transient ischemic attack)  10/18/2018   • UTI due to extended-spectrum beta lactamase (ESBL) producing Escherichia coli 1/6/2019       Past Surgical History:   Procedure Laterality Date   • ADENOIDECTOMY     • APPENDECTOMY     • CARPAL TUNNEL RELEASE Right    • CATARACT EXTRACTION Bilateral    • CHOLECYSTECTOMY     • COLONOSCOPY     • HYSTERECTOMY      Partial, many years ago, heavy bleeding, no cancer   • KNEE ARTHROSCOPY Right 03/2009    Finding of chondromalacia and appropriate cleanup of joint was performed by Dr. Moraes.   • MASTECTOMY Left 07/2014   • REPLACEMENT TOTAL KNEE Right 12/2015   • SKIN CANCER EXCISION      several   • TONSILLECTOMY         Social History     Socioeconomic History   • Marital status:    Tobacco Use   • Smoking status: Never Smoker   • Smokeless tobacco: Never Used   Vaping Use   • Vaping Use: Never used   Substance and Sexual Activity   • Alcohol use: No     Comment: 2 cups caffeine/coffee daily   • Drug use: No   • Sexual activity: Defer       Family History   Problem Relation Age of Onset   • Mental illness Mother    • Migraines Mother    • Dementia Mother    • Heart disease Father    • Hypertension Father    • Kidney disease Father    • Alcohol abuse Father    • No Known Problems Daughter    • No Known Problems Daughter    • Breast cancer Other    • Heart disease Other    • Hypertension Other    • Diabetes Other    • Cancer Maternal Grandmother    • Breast cancer Maternal Grandmother    • No Known Problems Maternal Grandfather    • No Known Problems Paternal Grandmother    • No Known Problems Paternal Grandfather    • Hyperlipidemia Son    • Lymphoma Neg Hx    • Leukemia Neg Hx        The following portion of the patient's history were reviewed and updated as appropriate: past medical history, past surgical history, past social history, past family history, allergies, current medications, and problem list.    Review of Systems   Constitutional: Negative.   Cardiovascular: Negative.    Respiratory:  "Negative.    Hematologic/Lymphatic: Bruises/bleeds easily.   Skin: Positive for rash.   Neurological: Negative.        Allergies   Allergen Reactions   • Cortisone Unknown - High Severity     Reports having a shot years ago, and wonders if he hit a vein. She was told by another doctor that it probably went in her blood stream.    • Hytrin [Terazosin] Rash   • Darvon  [Propoxyphene] Palpitations         Current Outpatient Medications:   •  amLODIPine (NORVASC) 5 MG tablet, Take 1 tablet by mouth Daily., Disp: 90 tablet, Rfl: 3  •  aspirin 81 MG tablet, Take 81 mg by mouth Daily., Disp: , Rfl:   •  cetirizine (zyrTEC) 10 MG tablet, Take 10 mg by mouth Daily., Disp: , Rfl:   •  gabapentin (NEURONTIN) 400 MG capsule, Take 1 capsule by mouth 4 (Four) Times a Day., Disp: 360 capsule, Rfl: 1  •  levothyroxine (SYNTHROID, LEVOTHROID) 50 MCG tablet, Take 1 tablet by mouth Daily., Disp: 90 tablet, Rfl: 1  •  losartan (COZAAR) 100 MG tablet, Take 1 tablet by mouth Daily., Disp: 90 tablet, Rfl: 3  •  metoprolol succinate XL (TOPROL-XL) 50 MG 24 hr tablet, Take 1 tablet by mouth Daily., Disp: 90 tablet, Rfl: 3  •  spironolactone (ALDACTONE) 25 MG tablet, Take 1 tablet by mouth Daily., Disp: 30 tablet, Rfl: 1  •  Symbicort 160-4.5 MCG/ACT inhaler, Inhale 160 puffs 2 (Two) Times a Day. One in the morning and one in the evening, Disp: , Rfl:   •  triamcinolone (KENALOG) 0.1 % ointment, Apply 1 application topically to the appropriate area as directed 2 (Two) Times a Day., Disp: 80 g, Rfl: 3  •  VENTOLIN  (90 Base) MCG/ACT inhaler, As Needed., Disp: , Rfl:         Objective:     Vitals:    05/24/22 1057 05/24/22 1133   BP: 152/70 122/62   BP Location: Right arm Right arm   Patient Position:  Sitting   Pulse: 62    Weight: 62.6 kg (138 lb)    Height: 165.1 cm (65\")      Body mass index is 22.96 kg/m².    PHYSICAL EXAM:    Vitals Reviewed.   General Appearance: No acute distress, well developed and well nourished. Thin.   Eyes: " Conjunctiva and lids: No erythema, swelling, or discharge. Sclera non-icteric. Glasses.   HENT: Atraumatic, normocephalic. External eyes, ears, and nose normal. No hearing loss noted. Mucous membranes normal. Lips not cyanotic. Neck supple with no tenderness. Wearing mask.   Respiratory: No signs of respiratory distress. Respiration rhythm and depth normal.   Clear to auscultation. No rales, crackles, rhonchi, or wheezing auscultated.   Cardiovascular:  Jugular Venous Pressure: Normal  Heart Rate and Rhythm: Normal, Heart Sounds: Normal S1 and S2. No S3 or S4 noted.  Murmurs: No murmurs noted. No rubs, thrills, or gallops.   Lower Extremities: No edema noted.  Gastrointestinal:  Abdomen soft, non-distended, non-tender.    Musculoskeletal: Normal movement of extremities.  Skin and Nails: General appearance normal. No pallor, cyanosis, diaphoresis. Skin temperature normal. No clubbing of fingernails.  Generalized rash noted and scabs.  Psychiatric: Patient alert and oriented to person, place, and time. Speech and behavior appropriate. Normal mood and affect.       ECG 12 Lead    Date/Time: 5/24/2022 11:04 AM  Performed by: Vivien Pina APRN  Authorized by: Vivien Pina APRN   Comparison: compared with previous ECG from 2/21/2022  Comparison to previous ECG: NSR  Rhythm: sinus rhythm  Ectopy: atrial premature contractions  Rate: normal  BPM: 62  Conduction: conduction normal  ST Segments: ST segments normal  T Waves: T waves normal  QRS axis: normal    Clinical impression: non-specific ECG              Assessment:       Diagnosis Plan   1. Essential hypertension     2. TIA (transient ischemic attack)     3. Nonrheumatic aortic valve insufficiency     4. Grade II diastolic dysfunction            Plan:       1.  Hypertension: BP goal of 120/80 or less recommended.  BP on recheck was 122/62.  Follow low-sodium diet and remain active.  I asked her to start taking the spironolactone first thing in the morning so  that she does not have nocturia. Take losartan in AM and amlodipine at dinner.     2.  Single APC noted on today's EKG.  Asymptomatic.    3.  Rash: She wonders if the rash could be related to one of her blood pressure medications.  She is going to follow-up with dermatology and if they do not know the answer, I told her to call me and we can do a drug holiday from the amlodipine and the metoprolol.    4.  Aortic Insufficiency: Moderate aortic regurgitation noted per echo in June 2021.  Repeat echo.     5.  Grade 2 diastolic dysfunction: Noted on last echo.  Euvolemic today.    6.  Further recommendations to follow after echocardiogram is completed.  Follow-up with Dr. Hernández in 6 months.    As always, it has been a pleasure to participate in your patient's care. Thank you.       Sincerely,         LENKA Garcia  [facility] Cardiology      · Dictated utilizing Dragon Dictation  · COVID-19 Precautions - Patient was compliant in wearing a mask. When I saw the patient, I used appropriate personal protective equipment (PPE) including mask and eye shield (standard procedure).  Additionally, I used gown and gloves if indicated.  Hand hygiene was completed before and after seeing the patient.

## 2022-06-02 RX ORDER — AMLODIPINE BESYLATE 5 MG/1
TABLET ORAL
Qty: 90 TABLET | Refills: 1 | Status: SHIPPED | OUTPATIENT
Start: 2022-06-02 | End: 2022-09-07

## 2022-06-03 ENCOUNTER — OFFICE VISIT (OUTPATIENT)
Dept: ONCOLOGY | Facility: CLINIC | Age: 82
End: 2022-06-03

## 2022-06-03 ENCOUNTER — APPOINTMENT (OUTPATIENT)
Dept: ONCOLOGY | Facility: HOSPITAL | Age: 82
End: 2022-06-03

## 2022-06-03 ENCOUNTER — APPOINTMENT (OUTPATIENT)
Dept: LAB | Facility: HOSPITAL | Age: 82
End: 2022-06-03

## 2022-06-03 ENCOUNTER — LAB (OUTPATIENT)
Dept: LAB | Facility: HOSPITAL | Age: 82
End: 2022-06-03

## 2022-06-03 ENCOUNTER — INFUSION (OUTPATIENT)
Dept: ONCOLOGY | Facility: HOSPITAL | Age: 82
End: 2022-06-03

## 2022-06-03 ENCOUNTER — TELEPHONE (OUTPATIENT)
Dept: CARDIOLOGY | Facility: CLINIC | Age: 82
End: 2022-06-03

## 2022-06-03 VITALS
TEMPERATURE: 96.9 F | BODY MASS INDEX: 21.87 KG/M2 | OXYGEN SATURATION: 97 % | HEART RATE: 78 BPM | DIASTOLIC BLOOD PRESSURE: 71 MMHG | HEIGHT: 66 IN | WEIGHT: 136.1 LBS | RESPIRATION RATE: 16 BRPM | SYSTOLIC BLOOD PRESSURE: 137 MMHG

## 2022-06-03 DIAGNOSIS — C50.812 MALIGNANT NEOPLASM OF OVERLAPPING SITES OF LEFT BREAST IN FEMALE, ESTROGEN RECEPTOR POSITIVE: Primary | ICD-10-CM

## 2022-06-03 DIAGNOSIS — C50.812 MALIGNANT NEOPLASM OF OVERLAPPING SITES OF LEFT BREAST IN FEMALE, ESTROGEN RECEPTOR POSITIVE: ICD-10-CM

## 2022-06-03 DIAGNOSIS — Z12.31 ENCOUNTER FOR SCREENING MAMMOGRAM FOR MALIGNANT NEOPLASM OF BREAST: ICD-10-CM

## 2022-06-03 DIAGNOSIS — C88.0 MACROGLOBULINEMIA OF WALDENSTROM: ICD-10-CM

## 2022-06-03 DIAGNOSIS — Z17.0 MALIGNANT NEOPLASM OF OVERLAPPING SITES OF LEFT BREAST IN FEMALE, ESTROGEN RECEPTOR POSITIVE: Primary | ICD-10-CM

## 2022-06-03 DIAGNOSIS — E53.8 VITAMIN B 12 DEFICIENCY: Primary | ICD-10-CM

## 2022-06-03 DIAGNOSIS — Z17.0 MALIGNANT NEOPLASM OF OVERLAPPING SITES OF LEFT BREAST IN FEMALE, ESTROGEN RECEPTOR POSITIVE: ICD-10-CM

## 2022-06-03 LAB
ALBUMIN SERPL-MCNC: 4.8 G/DL (ref 3.5–5.2)
ALBUMIN/GLOB SERPL: 2.2 G/DL (ref 1.1–2.4)
ALP SERPL-CCNC: 69 U/L (ref 38–116)
ALT SERPL W P-5'-P-CCNC: 19 U/L (ref 0–33)
ANION GAP SERPL CALCULATED.3IONS-SCNC: 12.2 MMOL/L (ref 5–15)
AST SERPL-CCNC: 23 U/L (ref 0–32)
BASOPHILS # BLD AUTO: 0.03 10*3/MM3 (ref 0–0.2)
BASOPHILS NFR BLD AUTO: 0.6 % (ref 0–1.5)
BILIRUB SERPL-MCNC: 0.7 MG/DL (ref 0.2–1.2)
BUN SERPL-MCNC: 13 MG/DL (ref 6–20)
BUN/CREAT SERPL: 14.1 (ref 7.3–30)
CALCIUM SPEC-SCNC: 10 MG/DL (ref 8.5–10.2)
CHLORIDE SERPL-SCNC: 90 MMOL/L (ref 98–107)
CO2 SERPL-SCNC: 24.8 MMOL/L (ref 22–29)
CREAT SERPL-MCNC: 0.92 MG/DL (ref 0.6–1.1)
DEPRECATED RDW RBC AUTO: 43.1 FL (ref 37–54)
EGFRCR SERPLBLD CKD-EPI 2021: 62.3 ML/MIN/1.73
EOSINOPHIL # BLD AUTO: 0.25 10*3/MM3 (ref 0–0.4)
EOSINOPHIL NFR BLD AUTO: 5.1 % (ref 0.3–6.2)
ERYTHROCYTE [DISTWIDTH] IN BLOOD BY AUTOMATED COUNT: 12.2 % (ref 12.3–15.4)
GLOBULIN UR ELPH-MCNC: 2.2 GM/DL (ref 1.8–3.5)
GLUCOSE SERPL-MCNC: 87 MG/DL (ref 74–124)
HCT VFR BLD AUTO: 41.5 % (ref 34–46.6)
HGB BLD-MCNC: 14.4 G/DL (ref 12–15.9)
IMM GRANULOCYTES # BLD AUTO: 0.03 10*3/MM3 (ref 0–0.05)
IMM GRANULOCYTES NFR BLD AUTO: 0.6 % (ref 0–0.5)
LYMPHOCYTES # BLD AUTO: 1.05 10*3/MM3 (ref 0.7–3.1)
LYMPHOCYTES NFR BLD AUTO: 21.5 % (ref 19.6–45.3)
MCH RBC QN AUTO: 33.2 PG (ref 26.6–33)
MCHC RBC AUTO-ENTMCNC: 34.7 G/DL (ref 31.5–35.7)
MCV RBC AUTO: 95.6 FL (ref 79–97)
MONOCYTES # BLD AUTO: 0.57 10*3/MM3 (ref 0.1–0.9)
MONOCYTES NFR BLD AUTO: 11.7 % (ref 5–12)
NEUTROPHILS NFR BLD AUTO: 2.96 10*3/MM3 (ref 1.7–7)
NEUTROPHILS NFR BLD AUTO: 60.5 % (ref 42.7–76)
NRBC BLD AUTO-RTO: 0 /100 WBC (ref 0–0.2)
PLATELET # BLD AUTO: 180 10*3/MM3 (ref 140–450)
PMV BLD AUTO: 8.3 FL (ref 6–12)
POTASSIUM SERPL-SCNC: 4.4 MMOL/L (ref 3.5–4.7)
PROT SERPL-MCNC: 7 G/DL (ref 6.3–8)
RBC # BLD AUTO: 4.34 10*6/MM3 (ref 3.77–5.28)
SODIUM SERPL-SCNC: 127 MMOL/L (ref 134–145)
WBC NRBC COR # BLD: 4.89 10*3/MM3 (ref 3.4–10.8)

## 2022-06-03 PROCEDURE — 99214 OFFICE O/P EST MOD 30 MIN: CPT | Performed by: NURSE PRACTITIONER

## 2022-06-03 PROCEDURE — 25010000002 CYANOCOBALAMIN PER 1000 MCG: Performed by: NURSE PRACTITIONER

## 2022-06-03 PROCEDURE — 36415 COLL VENOUS BLD VENIPUNCTURE: CPT

## 2022-06-03 PROCEDURE — 80053 COMPREHEN METABOLIC PANEL: CPT

## 2022-06-03 PROCEDURE — 96372 THER/PROPH/DIAG INJ SC/IM: CPT

## 2022-06-03 PROCEDURE — 85025 COMPLETE CBC W/AUTO DIFF WBC: CPT

## 2022-06-03 RX ORDER — CYANOCOBALAMIN 1000 UG/ML
1000 INJECTION, SOLUTION INTRAMUSCULAR; SUBCUTANEOUS ONCE
Status: CANCELLED | OUTPATIENT
Start: 2022-06-03

## 2022-06-03 RX ORDER — CYANOCOBALAMIN 1000 UG/ML
1000 INJECTION, SOLUTION INTRAMUSCULAR; SUBCUTANEOUS ONCE
Status: COMPLETED | OUTPATIENT
Start: 2022-06-03 | End: 2022-06-03

## 2022-06-03 RX ADMIN — CYANOCOBALAMIN 1000 MCG: 1000 INJECTION, SOLUTION INTRAMUSCULAR at 10:21

## 2022-06-03 NOTE — TELEPHONE ENCOUNTER
----- Message from LENKA Frausto sent at 6/3/2022 12:38 PM EDT -----  Regarding: sodium level  Saw this patient in the office today and her labs resulted after she left showing her sodium down to 127 which is not normal for her.  When I called and we talked about this she said that she did start the spironolactone about a month ago ,so wanted to bring this to your attention.  Would you like me to have this rechecked in our office next week or do you want to check in with her?

## 2022-06-03 NOTE — PROGRESS NOTES
Subjective    REASONS FOR FOLLOWUP:     1. History of Waldenstrom macroglobulinemia.    2. History of breast cancer stage I on the left, status post mastectomy. The patient completed aromatase inhibitor  X 5 YEARS IN 2019 without any evidence of breast cancer recurrence        History of Present Illness   This patient returns today to the office for followup.  Patient returns the office today continue to feel tired though no worse than previous.  She has no new areas of concern in her breast or left chest wall.  She has no new pain to report.  She was started on spironolactone a couple of months ago per cardiology.  She reports she has not gotten out and about much recently we discussed this could help her fatigue.    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   • Epilepsy with simple partial seizures (HCC) 12/14/2017   • Headache, migraine 12/14/2017   • History of transfusion of packed red blood cells     R/T SURGERY   • Hyperlipidemia    • Malignant neoplasm of overlapping sites of left breast in female, estrogen receptor positive (HCC) 4/13/2016   • Nonrheumatic aortic valve insufficiency    • Primary osteoarthritis of left knee 7/22/2015   • Skin cancer    • Thyroid nodule     Removed more than 35 years ago   • TIA (transient ischemic attack) 10/18/2018   • UTI due to extended-spectrum beta lactamase (ESBL) producing Escherichia coli 1/6/2019     Past Surgical History:   Procedure Laterality Date   • ADENOIDECTOMY     • APPENDECTOMY     • CARPAL TUNNEL RELEASE Right    • CATARACT EXTRACTION Bilateral    • CHOLECYSTECTOMY     • COLONOSCOPY     • HYSTERECTOMY      Partial, many years ago, heavy bleeding, no cancer   • KNEE ARTHROSCOPY Right 03/2009    Finding of  chondromalacia and appropriate cleanup of joint was performed by Dr. Moraes.   • MASTECTOMY Left 07/2014   • REPLACEMENT TOTAL KNEE Right 12/2015   • SKIN CANCER EXCISION      several   • TONSILLECTOMY           Social History     Socioeconomic History   • Marital status:    Tobacco Use   • Smoking status: Never Smoker   • Smokeless tobacco: Never Used   Vaping Use   • Vaping Use: Never used   Substance and Sexual Activity   • Alcohol use: No     Comment: 2 cups caffeine/coffee daily   • Drug use: No   • Sexual activity: Defer     Family History   Problem Relation Age of Onset   • Mental illness Mother    • Migraines Mother    • Dementia Mother    • Heart disease Father    • Hypertension Father    • Kidney disease Father    • Alcohol abuse Father    • No Known Problems Daughter    • No Known Problems Daughter    • Breast cancer Other    • Heart disease Other    • Hypertension Other    • Diabetes Other    • Cancer Maternal Grandmother    • Breast cancer Maternal Grandmother    • No Known Problems Maternal Grandfather    • No Known Problems Paternal Grandmother    • No Known Problems Paternal Grandfather    • Hyperlipidemia Son    • Lymphoma Neg Hx    • Leukemia Neg Hx      Current Outpatient Medications on File Prior to Visit   Medication Sig Dispense Refill   • amLODIPine (NORVASC) 5 MG tablet TAKE 1 TABLET EVERY DAY 90 tablet 1   • aspirin 81 MG tablet Take 81 mg by mouth Daily.     • cetirizine (zyrTEC) 10 MG tablet Take 10 mg by mouth Daily.     • gabapentin (NEURONTIN) 400 MG capsule Take 1 capsule by mouth 4 (Four) Times a Day. 360 capsule 1   • levothyroxine (SYNTHROID, LEVOTHROID) 50 MCG tablet Take 1 tablet by mouth Daily. 90 tablet 1   • losartan (COZAAR) 100 MG tablet Take 1 tablet by mouth Daily. 90 tablet 3   • spironolactone (ALDACTONE) 25 MG tablet Take 1 tablet by mouth Daily. 30 tablet 1   • Symbicort 160-4.5 MCG/ACT inhaler Inhale 160 puffs 2 (Two) Times a Day. One in the morning and one in  "the evening     • triamcinolone (KENALOG) 0.1 % ointment Apply 1 application topically to the appropriate area as directed 2 (Two) Times a Day. 80 g 3   • VENTOLIN  (90 Base) MCG/ACT inhaler As Needed.     • metoprolol succinate XL (TOPROL-XL) 50 MG 24 hr tablet Take 1 tablet by mouth Daily. 90 tablet 3     No current facility-administered medications on file prior to visit.       ALLERGIES:    Allergies   Allergen Reactions   • Cortisone Unknown - High Severity     Reports having a shot years ago, and wonders if he hit a vein. She was told by another doctor that it probably went in her blood stream.    • Hytrin [Terazosin] Rash   • Darvon  [Propoxyphene] Palpitations             Objective      Vitals:    06/03/22 1026   BP: 137/71   Pulse: 78   Resp: 16   Temp: 96.9 °F (36.1 °C)   TempSrc: Temporal   SpO2: 97%   Weight: 61.7 kg (136 lb 1.6 oz)   Height: 167.6 cm (65.98\")   PainSc: 0-No pain     Current Status 6/3/2022   ECOG score 0         Physical Exam   I HAVE PERSONALLY REVIEWED THE HISTORY OF THE PRESENT ILLNESS, PAST MEDICAL HISTORY, FAMILY HISTORY, SOCIAL HISTORY, ALLERGIES, MEDICATIONS STATED ABOVE IN THE  NOTE FROM TODAY.        GENERAL:  Well-developed, well-nourished  Patient  in no acute distress.   SKIN:  Warm, dry ,NO purpura ,NO petechiae, no rash.  HEENT:  Pupils were equal and reactive to light and accomodation.  NECK:  Supple with good range of motion; no thyromegaly , no other masses, no JVD.  LYMPHATICS:  No cervical, NO supraclavicular, NO axillary adenopathies.  CARDIAC   normal rate , regular rhythm, without murmur,NO rubs NO S3 NO S4   LUNGS: normal breath sounds bilateral, no wheezing, NO rhonchi, NO crackles ,NO rubs.  INSPECTION of  breast documented symmetry of the tissue per se and location and size of the nipple,no retractions or inversion of the nipple, normal skin without lesions, no erythema or nodules,no peau d'orange, no prominence of superficial veins or chest wall " collateral circulation.PALPATION of the breast documented normal skin turgor, no induration, alteration in local temperature, or pain, no palpable masses or nodules, normal mobility of the tissues,no fixation of the tissue or parenchyma to the chest wall, no alteration at the tail of the breasts or axillas, no adenopathies. LEFT MASTECTOMY Surgical site was well healed.no chest wall nodules.  No lymphedema in either extremity.  ABDOMEN:  Soft, NO pain,no hepatomegaly, no splenomegaly,no masses.  EXTREMITIES  AND SPINE:  No clubbing, no cyanosis OA HANDS deformities , no pain .No kyphosis,  no pain in spine, no pain in ribs , no pain in pelvic bone.  NEUROLOGICAL:  Patient was awake, alert, oriented to time, person and place.      RECENT LABS:  Results from last 7 days   Lab Units 06/03/22  1008   WBC 10*3/mm3 4.89   NEUTROS ABS 10*3/mm3 2.96   HEMOGLOBIN g/dL 14.4   HEMATOCRIT % 41.5   PLATELETS 10*3/mm3 180     Results from last 7 days   Lab Units 06/03/22  1008   SODIUM mmol/L 127*   POTASSIUM mmol/L 4.4   CHLORIDE mmol/L 90*   CO2 mmol/L 24.8   BUN mg/dL 13   CREATININE mg/dL 0.92   CALCIUM mg/dL 10.0   ALBUMIN g/dL 4.80   BILIRUBIN mg/dL 0.7   ALK PHOS U/L 69   ALT (SGPT) U/L 19   AST (SGOT) U/L 23   GLUCOSE mg/dL 87         Assessment & Plan      1. Waldenstrom macroglobulinemia that has been treated in the past mainly with Rituxan. In the past, also she was treated with Velcade with very dramatic improvement in her monoclonal protein but very dramatic sensory peripheral neuropathy. This medication was discontinued altogether.    6/29/2020, she developed progressive fatigue with worsening neuropathy in her feet.  These were her original symptoms at diagnosis of Waldenstrom's.  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  Calcium also elevated at 10.5 with decreased sodium related to pseudohyponatremia associated with monoclonal protein.  Previously, she did not receive improvement from Rituxan, she is not  thought to be a candidate for Imbruvica, therefore she went on to initiate venetoclax 7/13/2020.  She is currently on her next planned dose of 200 mg daily with poor tolerance as outlined below  I discussed with her on 04/28/2021 that I do not believe given the information that we have with a sedimentation rate that was normal and a stable monoclonal protein IgM that the Waldenstrom could be the most focal reason to explain her fatigue. On the other hand it is impossible given her thrombocytopenia to be sure that she still has some component of lymphoma in her bone marrow and the only way to prove this will be to pursue in a bone marrow test. She does not believe that she is ready for this at this time.     Radiologically speaking the CT scan of the chest, abdomen and pelvis failed to document any lymphadenopathy, masses, splenomegaly or any other issue pertinent to her Waldenstrom.  I discussed with her on 06/02/2021 that I do not feel that the symptoms of fatigue are related to her Waldenström's at this time. Her monoclonal protein study has remained completely quiet. Her white count and hemoglobin are normal. Her B12, folic acid, ferritin, iron profile remain normal and TSH hs remained into the normal limits. Her sed rate was normal at 4 mm/hr and she has no obvious infection or inflammatory process.   On 08/25/2021 the patient's monoclonal protein has remained very stable as discussed with her during the previous weeks. We have another level pending today. One more analysis that I decided to pursue was an amyloid analysis and abdominal fat pad biopsy. This was performed in late 06/2021 by Deric Llamas MD. Material was sent to [facility]. No [place] red material was found in this specimen. Therefore the possibility of amyloidosis is less likely under these circumstances.     We have run CT scans, laboratory testing and all sorts of things trying to explain her fatigue without having a definitive answer. I  think a lot of this is lack of training.    My recommendation in regard to her Waldenstrom's is to continue monitoring her parameters with CBC, CMP and monoclonal protein studies in 3 months.   The patient was further reviewed on 11/17/2021 in regard to her Waldenstrom's. She does not have any symptoms pertinent to this. There is no abnormal bleeding, there is no blurred vision, she has no peripheral adenopathy, there is no hepatosplenomegaly. Her fatigue is no different than before but she is able to do some housework. Her white count, hemoglobin and platelets are stable. We have pending monoclonal protein studies that will be called to her once that they become available. I find no need to pursue any intervention from this point of view at this time.   The patient was reviewed on 12/17/2021. I do not see any symptoms or signs of her Waldenstrom's. Her white count, hemoglobin, platelets, white count differential are normal. Her chemistry profile is pending. I find no reason to proceed with any other therapy for this condition. Her monoclonal protein has remained quiet, stable.   On 03/11/2022 the patient’s white count, hemoglobin and platelets are normal. She has minimal sensory neuropathy in her feet that is no worse than before. She has no fever, chills or infections. No clinical bleeding. Her monoclonal protein IgM has remained very stable. She has not required any other intervention from my side of the story and she will be watched in absence of any other intervention. She will be called at home with the report of her monoclonal protein study next week.  Patient returns 6/3/2022 in follow-up.  She has stable fatigue she previously reported.  Protein levels obtained 3 months ago show stable M spike and kappa lambda ratio.  She reports feeling fatigued though she also reports getting up over 5 times per night for urination after being started on spironolactone.  She does have an upcoming echocardiogram and will  discuss this with cardiology following this test.  It was noted her sodium level is low today and we will bring her back next week to recheck this.  Message was sent to cardiology as well.  She does continue with B12 injections every 3 months due to borderline macrocytosis.  She will be scheduled for review with Dr. Larose in 3 months.        ·   2.From the point of view of her breast cancer status post mastectomy on the left she completed her adjuvant therapy, mammogram is coming up in the end of 06/2021 on the right side. The left chest wall has remained unremarkable.  On 08/25/2021 she has no symptoms or signs of breast cancer recurrence. Her mastectomy site on the left is completely healed. Her right breast examination is normal. She is up to date on her mammogram. This will be watched in absence of any other intervention.   On 11/17/2021 the patient's left sided mastectomy is well healed, right breast exam is normal. There is no evidence of breast cancer recurrence clinically. She will remain in observation.   I find no symptoms or signs of breast cancer recurrence on her. This will be watched in absence of any other intervention as per 12/17/2021.   On 03/11/2022 the patient has no symptoms or signs of breast cancer recurrence. Her right breast exam is normal. The left mastectomy site is normal. She will be watched in absence of any other intervention.   On 6/3/2022 clinical exam shows no signs of recurrent breast cancer.  Patient denies any new areas of pain.  We will continue with monitoring and she will be due for follow-up right breast mammogram in July of this year which will be scheduled today.          ·   3  The next symptom is profound fatigue. We have looked for endocrinological diseases, medication side effects, infections, malignancies, Waldenstrom's, amyloid, cardiac disease and so forth not having any conclusion in regard to the nature of this. I do believe that the lack of bad news or not  finding any other thing is good news to me and I pointed this out to the patient. Maybe by the end of the day her fatigue is lack of proper physical training. She has bought a treadmill device that will be in her house as per today and hopefully she will be able to do some walking on this that will be meaningful in regard to recovering in regard energy.   The patient believes that the degree of fatigue that she was experiencing before is substantially better and now she is able to do some house activity. I encouraged her to continue this in the long run, is the only solution for fatigue is physical activity.   On 12/17/2021 the patient's fatigue has improved highly. She is now doing treadmill activity at home for 10 minutes once or twice a day. She has been eating better, she has lost some weight but she feels substantially better. I think the conditioning was probably the culprit of all of this related to the pandemia and I advised her to remain doing her treadmill activity twice a day if possible.   On 03/11/2022 her fatigue is better since the previous visit.  She is doing some exercise on her treadmill and actually she believes that this has made a big difference. I encouraged her to remain doing this.  I also encouraged her to  her car, go and buy some groceries and that she can go to restaurants with her family to be outside if possible, and she can go to the beach if she can arrange this with her family.  It is time for her to leave her house, now 2 years after pandemia and immunization. I think a lot of her fatigue is being house confined.    6/3/2022 patient continues to struggle with fatigue though unchanged from previous.  Once again she reports being at home and majority of the time and we discussed leaving the house and doing some physical activity can be helpful.  We have repeat protein studies pending.      4.  Hyponatremia.  Patient sodium levels 127 today and chloride 90.  This is abnormal  for the patient.  She denies nausea or vomiting.  She has had no diarrhea recently.  She denies dietary changes.  She was started on spironolactone in late March however did have a follow-up lab couple weeks thereafter that did not show hyponatremia.  Patient does report getting up up 5 times each night since starting the spironolactone.  She will discuss this with cardiology.  We will bring her back in 1 week for repeat labs with nurse review.                      Ina Lutz, LENKA   6/3/2022      CC:

## 2022-06-03 NOTE — TELEPHONE ENCOUNTER
Thank you for your message.  On 5/24/2022, I saw her in the office.  She had already been on spironolactone, I just asked her to start taking it in the morning.  I did not start any new medications.    In March her sodium level was 134, kidney function normal, and potassium 4.3.    She had blood work completed today at oncology and her sodium level was 127 which is a new finding for her.  The spironolactone is not new for her.  I would recommend rechecking the BMP at the oncology office in 1 week for reassessment.  If it is still low we will make further adjustments to her medication regimen.

## 2022-06-07 LAB
ALBUMIN SERPL ELPH-MCNC: 4.1 G/DL (ref 2.9–4.4)
ALBUMIN/GLOB SERPL: 1.6 {RATIO} (ref 0.7–1.7)
ALPHA1 GLOB SERPL ELPH-MCNC: 0.3 G/DL (ref 0–0.4)
ALPHA2 GLOB SERPL ELPH-MCNC: 0.8 G/DL (ref 0.4–1)
B-GLOBULIN SERPL ELPH-MCNC: 1 G/DL (ref 0.7–1.3)
GAMMA GLOB SERPL ELPH-MCNC: 0.6 G/DL (ref 0.4–1.8)
GLOBULIN SER-MCNC: 2.7 G/DL (ref 2.2–3.9)
IGA SERPL-MCNC: 39 MG/DL (ref 64–422)
IGG SERPL-MCNC: 459 MG/DL (ref 586–1602)
IGM SERPL-MCNC: 179 MG/DL (ref 26–217)
INTERPRETATION SERPL IEP-IMP: ABNORMAL
KAPPA LC FREE SER-MCNC: 11.8 MG/L (ref 3.3–19.4)
KAPPA LC FREE/LAMBDA FREE SER: 3.03 {RATIO} (ref 0.26–1.65)
LABORATORY COMMENT REPORT: ABNORMAL
LAMBDA LC FREE SERPL-MCNC: 3.9 MG/L (ref 5.7–26.3)
M PROTEIN SERPL ELPH-MCNC: 0.3 G/DL
PROT SERPL-MCNC: 6.8 G/DL (ref 6–8.5)

## 2022-06-08 ENCOUNTER — LAB (OUTPATIENT)
Dept: LAB | Facility: HOSPITAL | Age: 82
End: 2022-06-08

## 2022-06-08 ENCOUNTER — CLINICAL SUPPORT (OUTPATIENT)
Dept: ONCOLOGY | Facility: HOSPITAL | Age: 82
End: 2022-06-08

## 2022-06-08 DIAGNOSIS — Z17.0 MALIGNANT NEOPLASM OF OVERLAPPING SITES OF LEFT BREAST IN FEMALE, ESTROGEN RECEPTOR POSITIVE: Primary | ICD-10-CM

## 2022-06-08 DIAGNOSIS — C50.812 MALIGNANT NEOPLASM OF OVERLAPPING SITES OF LEFT BREAST IN FEMALE, ESTROGEN RECEPTOR POSITIVE: Primary | ICD-10-CM

## 2022-06-08 LAB
ANION GAP SERPL CALCULATED.3IONS-SCNC: 10.3 MMOL/L (ref 5–15)
BUN SERPL-MCNC: 10 MG/DL (ref 6–20)
BUN/CREAT SERPL: 10.9 (ref 7.3–30)
CALCIUM SPEC-SCNC: 10 MG/DL (ref 8.5–10.2)
CHLORIDE SERPL-SCNC: 89 MMOL/L (ref 98–107)
CO2 SERPL-SCNC: 25.7 MMOL/L (ref 22–29)
CREAT SERPL-MCNC: 0.92 MG/DL (ref 0.6–1.1)
EGFRCR SERPLBLD CKD-EPI 2021: 62.3 ML/MIN/1.73
GLUCOSE SERPL-MCNC: 77 MG/DL (ref 74–124)
POTASSIUM SERPL-SCNC: 4.5 MMOL/L (ref 3.5–4.7)
SODIUM SERPL-SCNC: 125 MMOL/L (ref 134–145)

## 2022-06-08 PROCEDURE — 80048 BASIC METABOLIC PNL TOTAL CA: CPT

## 2022-06-08 PROCEDURE — 36415 COLL VENOUS BLD VENIPUNCTURE: CPT

## 2022-06-08 NOTE — PROGRESS NOTES
Called pt to review BMP. Sodium and chloride are both lower today. Pt reports eating salt in her diet. Pt confirms taking spironolactone prescribed by her cardiologist. D/w Dr. Larose. No new orders. Called pts cardiologist Dr. Hernández and discussed the low sodium.     Lab Results   Component Value Date    GLUCOSE 77 06/08/2022    CALCIUM 10.0 06/08/2022     (C) 06/08/2022    K 4.5 06/08/2022    CO2 25.7 06/08/2022    CL 89 (L) 06/08/2022    BUN 10 06/08/2022    CREATININE 0.92 06/08/2022    EGFRIFAFRI 77 02/10/2022    EGFRIFNONA 67 02/10/2022    BCR 10.9 06/08/2022    ANIONGAP 10.3 06/08/2022

## 2022-06-14 ENCOUNTER — TELEPHONE (OUTPATIENT)
Dept: CARDIOLOGY | Facility: CLINIC | Age: 82
End: 2022-06-14

## 2022-06-14 DIAGNOSIS — E87.1 ACUTE HYPONATREMIA: Primary | ICD-10-CM

## 2022-06-14 NOTE — TELEPHONE ENCOUNTER
I spoke with patient via phone. She has been hyponatremic at the onocology office.  I just spoke with her.  She said the spironolactone is keeping her up at nighttime and she feels tired.  I recommended stopping the spironolactone.  She drinks a lot of water and has been eating more sodium.  I asked her to lower her sodium content and reduce her water intake.    She will have a repeat BMP at the main lab at the hospital next week.  Echocardiogram scheduled for Thursday.

## 2022-06-16 ENCOUNTER — HOSPITAL ENCOUNTER (OUTPATIENT)
Dept: CARDIOLOGY | Facility: HOSPITAL | Age: 82
Discharge: HOME | End: 2022-06-16
Admitting: NURSE PRACTITIONER

## 2022-06-16 VITALS
SYSTOLIC BLOOD PRESSURE: 120 MMHG | WEIGHT: 136 LBS | HEART RATE: 72 BPM | OXYGEN SATURATION: 99 % | HEIGHT: 66 IN | BODY MASS INDEX: 21.86 KG/M2 | DIASTOLIC BLOOD PRESSURE: 72 MMHG

## 2022-06-16 DIAGNOSIS — I35.1 NONRHEUMATIC AORTIC VALVE INSUFFICIENCY: ICD-10-CM

## 2022-06-16 DIAGNOSIS — I51.89 GRADE II DIASTOLIC DYSFUNCTION: ICD-10-CM

## 2022-06-16 LAB
AORTIC ARCH: 1.8 CM
ASCENDING AORTA: 3.2 CM
BH CV ECHO LEFT VENTRICLE GLOBAL LONGITUDINAL STRAIN: -22.1 %
BH CV ECHO MEAS - ACS: 1.88 CM
BH CV ECHO MEAS - AO MAX PG: 8.5 MMHG
BH CV ECHO MEAS - AO MEAN PG: 5.4 MMHG
BH CV ECHO MEAS - AO ROOT DIAM: 3.1 CM
BH CV ECHO MEAS - AO V2 MAX: 146 CM/SEC
BH CV ECHO MEAS - AO V2 VTI: 34.9 CM
BH CV ECHO MEAS - AVA(I,D): 2.01 CM2
BH CV ECHO MEAS - EDV(CUBED): 28.5 ML
BH CV ECHO MEAS - EDV(MOD-SP2): 55 ML
BH CV ECHO MEAS - EDV(MOD-SP4): 84 ML
BH CV ECHO MEAS - EF(MOD-BP): 68.3 %
BH CV ECHO MEAS - EF(MOD-SP2): 67.3 %
BH CV ECHO MEAS - EF(MOD-SP4): 69 %
BH CV ECHO MEAS - ESV(CUBED): 10.4 ML
BH CV ECHO MEAS - ESV(MOD-SP2): 18 ML
BH CV ECHO MEAS - ESV(MOD-SP4): 26 ML
BH CV ECHO MEAS - FS: 28.5 %
BH CV ECHO MEAS - IVS/LVPW: 1.26 CM
BH CV ECHO MEAS - IVSD: 1.45 CM
BH CV ECHO MEAS - LAT PEAK E' VEL: 7.6 CM/SEC
BH CV ECHO MEAS - LV DIASTOLIC VOL/BSA (35-75): 49.5 CM2
BH CV ECHO MEAS - LV MASS(C)D: 126.9 GRAMS
BH CV ECHO MEAS - LV MAX PG: 5 MMHG
BH CV ECHO MEAS - LV MEAN PG: 3.1 MMHG
BH CV ECHO MEAS - LV SYSTOLIC VOL/BSA (12-30): 15.3 CM2
BH CV ECHO MEAS - LV V1 MAX: 111.8 CM/SEC
BH CV ECHO MEAS - LV V1 VTI: 24.5 CM
BH CV ECHO MEAS - LVIDD: 3.1 CM
BH CV ECHO MEAS - LVIDS: 2.19 CM
BH CV ECHO MEAS - LVOT AREA: 2.9 CM2
BH CV ECHO MEAS - LVOT DIAM: 1.91 CM
BH CV ECHO MEAS - LVPWD: 1.15 CM
BH CV ECHO MEAS - MED PEAK E' VEL: 5.8 CM/SEC
BH CV ECHO MEAS - MV A DUR: 0.12 SEC
BH CV ECHO MEAS - MV A MAX VEL: 116.1 CM/SEC
BH CV ECHO MEAS - MV DEC SLOPE: 421.9 CM/SEC2
BH CV ECHO MEAS - MV DEC TIME: 0.2 MSEC
BH CV ECHO MEAS - MV E MAX VEL: 83.1 CM/SEC
BH CV ECHO MEAS - MV E/A: 0.72
BH CV ECHO MEAS - MV MAX PG: 5.8 MMHG
BH CV ECHO MEAS - MV MEAN PG: 1.95 MMHG
BH CV ECHO MEAS - MV P1/2T: 59.6 MSEC
BH CV ECHO MEAS - MV V2 VTI: 31.8 CM
BH CV ECHO MEAS - MVA(P1/2T): 3.7 CM2
BH CV ECHO MEAS - MVA(VTI): 2.2 CM2
BH CV ECHO MEAS - PA ACC TIME: 0.13 SEC
BH CV ECHO MEAS - PA PR(ACCEL): 18.4 MMHG
BH CV ECHO MEAS - PA V2 MAX: 83.4 CM/SEC
BH CV ECHO MEAS - PULM A REVS DUR: 0.11 SEC
BH CV ECHO MEAS - PULM A REVS VEL: 26.6 CM/SEC
BH CV ECHO MEAS - PULM DIAS VEL: 33.8 CM/SEC
BH CV ECHO MEAS - PULM S/D: 1.62
BH CV ECHO MEAS - PULM SYS VEL: 54.8 CM/SEC
BH CV ECHO MEAS - QP/QS: 0.51
BH CV ECHO MEAS - RAP SYSTOLE: 3 MMHG
BH CV ECHO MEAS - RV MAX PG: 1.18 MMHG
BH CV ECHO MEAS - RV V1 MAX: 54.4 CM/SEC
BH CV ECHO MEAS - RV V1 VTI: 11.5 CM
BH CV ECHO MEAS - RVOT DIAM: 1.99 CM
BH CV ECHO MEAS - RVSP: 33 MMHG
BH CV ECHO MEAS - SI(MOD-SP2): 21.8 ML/M2
BH CV ECHO MEAS - SI(MOD-SP4): 34.2 ML/M2
BH CV ECHO MEAS - SUP REN AO DIAM: 1.8 CM
BH CV ECHO MEAS - SV(LVOT): 70 ML
BH CV ECHO MEAS - SV(MOD-SP2): 37 ML
BH CV ECHO MEAS - SV(MOD-SP4): 58 ML
BH CV ECHO MEAS - SV(RVOT): 35.9 ML
BH CV ECHO MEAS - TAPSE (>1.6): 1.5 CM
BH CV ECHO MEAS - TR MAX PG: 29.5 MMHG
BH CV ECHO MEAS - TR MAX VEL: 271.5 CM/SEC
BH CV ECHO MEASUREMENTS AVERAGE E/E' RATIO: 12.4
BH CV XLRA - RV BASE: 3 CM
BH CV XLRA - RV LENGTH: 4.4 CM
BH CV XLRA - RV MID: 2.25 CM
BH CV XLRA - TDI S': 11 CM/SEC
LEFT ATRIUM VOLUME INDEX: 30.3 ML/M2
MAXIMAL PREDICTED HEART RATE: 138 BPM
SINUS: 3.2 CM
STJ: 2.6 CM
STRESS TARGET HR: 117 BPM

## 2022-06-16 PROCEDURE — 93306 TTE W/DOPPLER COMPLETE: CPT | Performed by: INTERNAL MEDICINE

## 2022-06-16 PROCEDURE — 93356 MYOCRD STRAIN IMG SPCKL TRCK: CPT

## 2022-06-16 PROCEDURE — 25010000002 PERFLUTREN (DEFINITY) 8.476 MG IN SODIUM CHLORIDE (PF) 0.9 % 10 ML INJECTION: Performed by: NURSE PRACTITIONER

## 2022-06-16 PROCEDURE — 93356 MYOCRD STRAIN IMG SPCKL TRCK: CPT | Performed by: INTERNAL MEDICINE

## 2022-06-16 PROCEDURE — 93306 TTE W/DOPPLER COMPLETE: CPT

## 2022-06-16 RX ADMIN — PERFLUTREN 1.5 ML: 6.52 INJECTION, SUSPENSION INTRAVENOUS at 13:51

## 2022-06-17 NOTE — TELEPHONE ENCOUNTER
Echocardiogram Results:    · Calculated left ventricular EF = 68.3% Estimated left ventricular EF was in agreement with the calculated left ventricular EF. Left ventricular systolic function is normal. Normal global longitudinal LV strain (GLS) = -22.1%. Left ventricle strain data was reviewed by the physician and found to be accurate. Normal left ventricular cavity size noted. All left ventricular wall segments contract normally. Left ventricular diastolic function is consistent with (grade I) impaired relaxation.  · There is mild concentric left ventricular hypertrophy, and mild to moderate basal septal hypertrophy, without evidence of left ventricular outflow tract obstruction.  · The aortic valve is abnormal in structure. Moderate aortic valve regurgitation is present. No aortic valve stenosis is present. Nodular sclerosis is noted on the aortic valve       She was given the results and verbalizes understanding.  She will have a BMP at the hospital next week.

## 2022-06-22 ENCOUNTER — TELEPHONE (OUTPATIENT)
Dept: CARDIOLOGY | Facility: CLINIC | Age: 82
End: 2022-06-22

## 2022-06-22 NOTE — TELEPHONE ENCOUNTER
The BMP will be checking her kidney function and electrolytes.  Unfortunately, I am unable to check for anything further.  Please follow-up with PCP.  Thanks

## 2022-06-22 NOTE — TELEPHONE ENCOUNTER
Pt LVM asking since we are having her labs drawn on friday can we test her kidneys to see if she has an infection and also test her kidney to see if that's what's causing the rash all over her skin. Please advise.    dd

## 2022-06-23 ENCOUNTER — TELEPHONE (OUTPATIENT)
Dept: FAMILY MEDICINE CLINIC | Facility: CLINIC | Age: 82
End: 2022-06-23

## 2022-06-23 NOTE — TELEPHONE ENCOUNTER
Caller: Karli Loomis    Relationship: Self    Best call back number: [phone]    What orders are you requesting (i.e. lab or imaging):PATIENT IS WONDERING IF SHE CAN GET AN ORDER FOR A URINE SPECIMEN TO SEE IF THIS RASH THAT SHE HAS HAD FOR OVER A YEAR AND THAT IS GETTING WORSE IS COMING FROM HER KIDNEYS?    In what timeframe would the patient need to come in: TOMORROW    Where will you receive your lab/imaging services: AT THE HOSPITAL    Additional notes: PATIENT IS HAVING OTHER LABS FROM THE CARDIOLOGIST.

## 2022-06-24 ENCOUNTER — LAB (OUTPATIENT)
Dept: LAB | Facility: HOSPITAL | Age: 82
End: 2022-06-24

## 2022-06-24 ENCOUNTER — TELEPHONE (OUTPATIENT)
Dept: CARDIOLOGY | Facility: CLINIC | Age: 82
End: 2022-06-24

## 2022-06-24 DIAGNOSIS — E87.1 ACUTE HYPONATREMIA: ICD-10-CM

## 2022-06-24 LAB
ANION GAP SERPL CALCULATED.3IONS-SCNC: 5.5 MMOL/L (ref 5–15)
BUN SERPL-MCNC: 8 MG/DL (ref 8–23)
BUN/CREAT SERPL: 9.2 (ref 7–25)
CALCIUM SPEC-SCNC: 9.2 MG/DL (ref 8.6–10.5)
CHLORIDE SERPL-SCNC: 94 MMOL/L (ref 98–107)
CO2 SERPL-SCNC: 30.5 MMOL/L (ref 22–29)
CREAT SERPL-MCNC: 0.87 MG/DL (ref 0.57–1)
EGFRCR SERPLBLD CKD-EPI 2021: 66.6 ML/MIN/1.73
GLUCOSE SERPL-MCNC: 96 MG/DL (ref 65–99)
POTASSIUM SERPL-SCNC: 4.6 MMOL/L (ref 3.5–5.2)
SODIUM SERPL-SCNC: 130 MMOL/L (ref 136–145)

## 2022-06-24 PROCEDURE — 80048 BASIC METABOLIC PNL TOTAL CA: CPT

## 2022-06-24 PROCEDURE — 36415 COLL VENOUS BLD VENIPUNCTURE: CPT

## 2022-06-24 NOTE — TELEPHONE ENCOUNTER
I spoke with patient.  Her sodium level has improved to 130 with stopping the spironolactone.  Her blood pressure was well controlled during the echocardiogram. She will monitor her blood pressures at home.  She was noted to have moderate aortic insufficiency which is stable.  She denies any symptoms and will follow up with Dr. Hernández in November.

## 2022-07-01 ENCOUNTER — OFFICE VISIT (OUTPATIENT)
Dept: FAMILY MEDICINE CLINIC | Facility: CLINIC | Age: 82
End: 2022-07-01

## 2022-07-01 VITALS
DIASTOLIC BLOOD PRESSURE: 60 MMHG | WEIGHT: 138.3 LBS | BODY MASS INDEX: 22.23 KG/M2 | RESPIRATION RATE: 18 BRPM | HEIGHT: 66 IN | SYSTOLIC BLOOD PRESSURE: 122 MMHG | HEART RATE: 83 BPM | OXYGEN SATURATION: 99 % | TEMPERATURE: 96.8 F

## 2022-07-01 DIAGNOSIS — R21 RASH: ICD-10-CM

## 2022-07-01 DIAGNOSIS — R82.90 MALODOROUS URINE: Primary | ICD-10-CM

## 2022-07-01 LAB
BILIRUB BLD-MCNC: NEGATIVE MG/DL
CLARITY, POC: CLEAR
COLOR UR: YELLOW
EXPIRATION DATE: ABNORMAL
GLUCOSE UR STRIP-MCNC: NEGATIVE MG/DL
KETONES UR QL: ABNORMAL
LEUKOCYTE EST, POC: ABNORMAL
Lab: ABNORMAL
NITRITE UR-MCNC: NEGATIVE MG/ML
PH UR: 6 [PH] (ref 5–8)
PROT UR STRIP-MCNC: ABNORMAL MG/DL
RBC # UR STRIP: NEGATIVE /UL
SP GR UR: 1.02 (ref 1–1.03)
UROBILINOGEN UR QL: NORMAL

## 2022-07-01 PROCEDURE — 99213 OFFICE O/P EST LOW 20 MIN: CPT | Performed by: NURSE PRACTITIONER

## 2022-07-01 PROCEDURE — 81003 URINALYSIS AUTO W/O SCOPE: CPT | Performed by: NURSE PRACTITIONER

## 2022-07-01 RX ORDER — PRAMOXINE HYDROCHLORIDE 10 MG/ML
LOTION TOPICAL
COMMUNITY
Start: 2022-06-10 | End: 2022-10-20

## 2022-07-01 RX ORDER — PANTOPRAZOLE SODIUM 40 MG/1
40 TABLET, DELAYED RELEASE ORAL DAILY
Qty: 30 TABLET | Refills: 1 | Status: SHIPPED | OUTPATIENT
Start: 2022-07-01 | End: 2022-10-20

## 2022-07-01 NOTE — PROGRESS NOTES
"Chief Complaint  Hypertension, Follow-up, and Rash (X over 1 yr, all over body)    Subjective        Karli Loomis presents to [facility]  Karli presents for follow up hypertension. She is taking amlodipine and losartan. She states metoprolol and spironolactone were discontinued by cardiology. Her bp today is well controlled at 122/60.     She reports a rash for over a year, she is seeing dermatology    Started after taking terazosin, however never resolved  Increased reflux symptoms in the past 3-4 weeks, after meals, unrelated to meals. Will occur in the morning with toast and coffee, occasionally in the afternoon    Malodorous and concentrated urine, concerned for UTI  Drinks cup of water and cup of coffee in the morning  At times will feel urgency to go, then trouble urinating, no flank pain, no n/v, denies pain    Hypertension    Rash        Objective   Vital Signs:  /60 (BP Location: Left arm, Patient Position: Sitting, Cuff Size: Adult)   Pulse 83   Temp 96.8 °F (36 °C) (Temporal)   Resp 18   Ht 167.6 cm (65.98\")   Wt 62.7 kg (138 lb 4.8 oz)   SpO2 99%   BMI 22.33 kg/m²   Estimated body mass index is 22.33 kg/m² as calculated from the following:    Height as of this encounter: 167.6 cm (65.98\").    Weight as of this encounter: 62.7 kg (138 lb 4.8 oz).    BMI is within normal parameters. No other follow-up for BMI required.      Physical Exam  HENT:      Right Ear: Tympanic membrane and ear canal normal.      Left Ear: Tympanic membrane and ear canal normal.      Mouth/Throat:      Mouth: Mucous membranes are moist.      Pharynx: Oropharynx is clear.   Eyes:      Conjunctiva/sclera: Conjunctivae normal.      Pupils: Pupils are equal, round, and reactive to light.   Cardiovascular:      Rate and Rhythm: Normal rate and regular rhythm.      Heart sounds: No murmur heard.    No friction rub. No gallop.   Pulmonary:      Effort: Pulmonary effort is normal. No respiratory " distress.      Breath sounds: Normal breath sounds. No wheezing, rhonchi or rales.   Skin:     General: Skin is warm and dry.      Findings: Rash present.   Neurological:      Mental Status: She is oriented to person, place, and time.   Psychiatric:         Mood and Affect: Mood normal.        Result Review :                Assessment and Plan   Diagnoses and all orders for this visit:    1. Malodorous urine (Primary)  -     POC Urinalysis Dipstick, Automated  -     Urine Culture - Urine, Urine, Clean Catch    2. Rash    Other orders  -     pantoprazole (Protonix) 40 MG EC tablet; Take 1 tablet by mouth Daily.  Dispense: 30 tablet; Refill: 1             Follow Up   No follow-ups on file.  Patient was given instructions and counseling regarding her condition or for health maintenance advice. Please see specific information pulled into the AVS if appropriate.     Increased reflux: start protonix x 30 days, refill x 1 as needed  Malodorous urine: this may be related to dehydration, recommend increasing fluids and monitor, will obtain culture today  No evidence of infection  Rash: recommend follow up with dermatology, consider allergist if no improvement despite treatment, may be something in her environment contributing to rash

## 2022-07-03 LAB
BACTERIA UR CULT: NORMAL
BACTERIA UR CULT: NORMAL

## 2022-07-07 ENCOUNTER — OFFICE VISIT (OUTPATIENT)
Dept: FAMILY MEDICINE CLINIC | Facility: CLINIC | Age: 82
End: 2022-07-07

## 2022-07-07 VITALS
HEIGHT: 66 IN | OXYGEN SATURATION: 99 % | SYSTOLIC BLOOD PRESSURE: 122 MMHG | RESPIRATION RATE: 18 BRPM | TEMPERATURE: 96.8 F | BODY MASS INDEX: 22.31 KG/M2 | WEIGHT: 138.8 LBS | HEART RATE: 79 BPM | DIASTOLIC BLOOD PRESSURE: 78 MMHG

## 2022-07-07 DIAGNOSIS — R82.90 MALODOROUS URINE: Primary | ICD-10-CM

## 2022-07-07 LAB
BILIRUB BLD-MCNC: NEGATIVE MG/DL
CLARITY, POC: ABNORMAL
COLOR UR: ABNORMAL
EXPIRATION DATE: ABNORMAL
GLUCOSE UR STRIP-MCNC: NEGATIVE MG/DL
KETONES UR QL: NEGATIVE
LEUKOCYTE EST, POC: ABNORMAL
Lab: ABNORMAL
NITRITE UR-MCNC: NEGATIVE MG/ML
PH UR: 6 [PH] (ref 5–8)
PROT UR STRIP-MCNC: NEGATIVE MG/DL
RBC # UR STRIP: NEGATIVE /UL
SP GR UR: 1.01 (ref 1–1.03)
UROBILINOGEN UR QL: NORMAL

## 2022-07-07 PROCEDURE — 99213 OFFICE O/P EST LOW 20 MIN: CPT | Performed by: NURSE PRACTITIONER

## 2022-07-07 PROCEDURE — 81003 URINALYSIS AUTO W/O SCOPE: CPT | Performed by: NURSE PRACTITIONER

## 2022-07-09 NOTE — PROGRESS NOTES
"Chief Complaint  urine odor (Dark) and Rash (Wanted test to test kidney for rash)    Subjective        Karli Loomis presents to [facility]  Karli presents for follow up urinary symtoms. She reports dark urine and she has a rash. She was told by a family member the rash may be caused by the kidneys and she is interested in further work up. She has not followed up with dermatology since her last visit. She states the rash occurred in relation to a bp medication and since she stopped the medication, it has not resolved. Recent UA with culture was completed that showed mixed bacteria. She is here to repeat.    Rash  This is a chronic problem. The current episode started more than 1 year ago. The problem has been gradually worsening since onset. The rash is diffuse. The rash is characterized by burning and itchiness. She was exposed to nothing. Pertinent negatives include no anorexia, congestion, cough, diarrhea, eye pain, facial edema, fatigue, fever, joint pain, nail changes, rhinorrhea, shortness of breath, sore throat or vomiting. Past treatments include anti-itch cream. The treatment provided mild relief.   Urinary Tract Infection   The current episode started 1 to 4 weeks ago. The problem occurs intermittently. The problem has been unchanged. The quality of the pain is described as aching. The pain is at a severity of 0/10. The patient is experiencing no pain. Associated symptoms include frequency. Pertinent negatives include no chills, discharge, flank pain, hematuria, hesitancy, nausea, sweats, urgency or vomiting. She has tried nothing for the symptoms. The treatment provided no relief.       Objective   Vital Signs:  /78 (BP Location: Right arm, Patient Position: Sitting, Cuff Size: Adult)   Pulse 79   Temp 96.8 °F (36 °C) (Temporal)   Resp 18   Ht 167.6 cm (65.98\")   Wt 63 kg (138 lb 12.8 oz)   SpO2 99%   BMI 22.41 kg/m²   Estimated body mass index is 22.41 kg/m² as " "calculated from the following:    Height as of this encounter: 167.6 cm (65.98\").    Weight as of this encounter: 63 kg (138 lb 12.8 oz).    BMI is within normal parameters. No other follow-up for BMI required.      Physical Exam  Vitals reviewed.   Constitutional:       Appearance: Normal appearance.   Cardiovascular:      Rate and Rhythm: Normal rate and regular rhythm.      Heart sounds: No murmur heard.    No friction rub. No gallop.   Pulmonary:      Effort: Pulmonary effort is normal. No respiratory distress.      Breath sounds: Normal breath sounds. No wheezing, rhonchi or rales.   Abdominal:      General: Bowel sounds are normal. There is no distension.      Palpations: Abdomen is soft. There is no mass.      Tenderness: There is no abdominal tenderness.      Hernia: No hernia is present.   Skin:     General: Skin is warm and dry.      Findings: Rash present.   Neurological:      Mental Status: She is alert and oriented to person, place, and time.   Psychiatric:         Mood and Affect: Mood normal.        Result Review :                Assessment and Plan   Diagnoses and all orders for this visit:    1. Malodorous urine (Primary)  -     Urine Culture - Urine, Urine, Clean Catch  -     POCT urinalysis dipstick, automated             Follow Up   No follow-ups on file.  Patient was given instructions and counseling regarding her condition or for health maintenance advice. Please see specific information pulled into the AVS if appropriate.     Will repeat urine culture  Do not suspect correlation with rash, kidney function is normal, unsure if she is referring to vasculitis, no facial rash  Recommend follow up with dermatology as last medication worsened symptoms      "

## 2022-07-11 LAB
BACTERIA UR CULT: ABNORMAL
BACTERIA UR CULT: ABNORMAL
OTHER ANTIBIOTIC SUSC ISLT: ABNORMAL

## 2022-07-12 RX ORDER — NITROFURANTOIN 25; 75 MG/1; MG/1
100 CAPSULE ORAL 2 TIMES DAILY
Qty: 14 CAPSULE | Refills: 0 | Status: SHIPPED | OUTPATIENT
Start: 2022-07-12 | End: 2022-10-20

## 2022-07-12 RX ORDER — NITROFURANTOIN 25; 75 MG/1; MG/1
100 CAPSULE ORAL 2 TIMES DAILY
Qty: 14 CAPSULE | Refills: 0 | Status: SHIPPED | OUTPATIENT
Start: 2022-07-12 | End: 2022-07-12 | Stop reason: SDUPTHER

## 2022-07-25 ENCOUNTER — APPOINTMENT (OUTPATIENT)
Dept: WOMENS IMAGING | Facility: HOSPITAL | Age: 82
End: 2022-07-25

## 2022-07-25 PROCEDURE — 77067 SCR MAMMO BI INCL CAD: CPT | Performed by: RADIOLOGY

## 2022-07-25 PROCEDURE — 77063 BREAST TOMOSYNTHESIS BI: CPT | Performed by: RADIOLOGY

## 2022-09-06 ENCOUNTER — TELEPHONE (OUTPATIENT)
Dept: CARDIOLOGY | Facility: CLINIC | Age: 82
End: 2022-09-06

## 2022-09-06 DIAGNOSIS — E03.9 ADULT HYPOTHYROIDISM: ICD-10-CM

## 2022-09-07 RX ORDER — LEVOTHYROXINE SODIUM 0.05 MG/1
TABLET ORAL
Qty: 90 TABLET | Refills: 1 | Status: SHIPPED | OUTPATIENT
Start: 2022-09-07

## 2022-09-07 NOTE — TELEPHONE ENCOUNTER
Rx Refill Note  Requested Prescriptions     Pending Prescriptions Disp Refills   • levothyroxine (SYNTHROID, LEVOTHROID) 50 MCG tablet [Pharmacy Med Name: LEVOTHYROXINE SODIUM 50 MCG Tablet] 90 tablet 1     Sig: TAKE 1 TABLET EVERY DAY      Last office visit with prescribing clinician: 7/7/2022      Next office visit with prescribing clinician: 10/20/2022       {TIP  Please add Last Relevant Lab Date if appropriate: 07/07/22    Shannon Núñez MA  09/07/22, 09:04 EDT

## 2022-09-07 NOTE — TELEPHONE ENCOUNTER
Notified patient of results/recommendations. Patient verbalized understanding.    Lizbeth Fregoso RN  Triage [facility]

## 2022-09-07 NOTE — TELEPHONE ENCOUNTER
Please stop amlodipine.  Note to myself-she has been hyponatremic with spironolactone and I am concerned about starting a thiazide diuretic.  She is on the maximum dose of losartan.    Ask her to monitor her blood pressure and heart rates.  Please give me an update in 1 to 2 weeks and see if the rash resolves without the amlodipine.  Thank you.

## 2022-09-08 ENCOUNTER — TELEPHONE (OUTPATIENT)
Dept: FAMILY MEDICINE CLINIC | Facility: CLINIC | Age: 82
End: 2022-09-08

## 2022-09-08 NOTE — TELEPHONE ENCOUNTER
Left message for pt to call back and r/s due to Luisa being out of office.    HUB OK TO RESCHEDULE TO NEXT AVAILABLE APPT

## 2022-09-10 ENCOUNTER — DOCUMENTATION (OUTPATIENT)
Dept: ONCOLOGY | Facility: CLINIC | Age: 82
End: 2022-09-10

## 2022-09-10 NOTE — PROGRESS NOTES
ON CALL NOTE:    Patient called to report she thinks she may have COVID and is unsure what to do.  Yesterday afternoon she started coughing and experiencing congestion.  Last night, she checked her temperature and it was 101.0 degrees.  She does not have a fever today, but is still coughing and experiencing congestion.  She does not have a rapid test at home to test for COVID.  She is wanting to know where the nearest urgent care is so she can get a COVID-PCR  She would like to go to [facility] urgent care.  Advised her to make an appointment online and report her symptoms when making her appointment so they are aware she is being ruled out for COVID-19.  She understands, and is going to schedule for the next available appointment today.

## 2022-09-12 ENCOUNTER — APPOINTMENT (OUTPATIENT)
Dept: ONCOLOGY | Facility: HOSPITAL | Age: 82
End: 2022-09-12

## 2022-09-12 ENCOUNTER — APPOINTMENT (OUTPATIENT)
Dept: LAB | Facility: HOSPITAL | Age: 82
End: 2022-09-12

## 2022-09-27 ENCOUNTER — TELEPHONE (OUTPATIENT)
Dept: CARDIOLOGY | Facility: CLINIC | Age: 82
End: 2022-09-27

## 2022-09-27 NOTE — TELEPHONE ENCOUNTER
Patient called to report her BP readings over two weeks:     September 11th 136/65 HR 85    September 12th 165/75 HR 82    September 13th    172/85  HR 75    September 14th    162/82  HR 73     September 15th    154/68 HR 73     September 16th    158/69  HR 70      September 17th 182/84  HR 70     September 18th   164/77  HR 77      September 19th    182/84  HR 63      September 20th    165/75  HR 67      September 21st     158/72  HR 74     September 22nd  128/40  HR 68      September 23rd   166/74   HR 65

## 2022-09-30 NOTE — TELEPHONE ENCOUNTER
She has had a rash for 9 months and is followed with dermatology.  She said it all started with terazosin back in January 2022.  She feels like the amlodipine worsen the rash.  The medication has been discontinued, but her blood pressures remain elevated.  She is concerned about starting another medication because the rash has not resolved completely.    I asked her to monitor her blood pressures and call with an update in 1 month.  We will see if the rash has completely resolved at that time.  Then we can treat the blood pressure.

## 2022-10-03 ENCOUNTER — TELEPHONE (OUTPATIENT)
Dept: ONCOLOGY | Facility: CLINIC | Age: 82
End: 2022-10-03

## 2022-10-03 NOTE — TELEPHONE ENCOUNTER
Caller: Karli Loomis    Relationship to patient: Self    Best call back number: [phone]    Chief complaint: patient to reschedule 10/14/22 appt     Type of visit: fu    Requested date: next available not a Friday due to transportation

## 2022-10-05 RX ORDER — HYDRALAZINE HYDROCHLORIDE 25 MG/1
25 TABLET, FILM COATED ORAL 3 TIMES DAILY
Qty: 90 TABLET | Refills: 1 | Status: SHIPPED | OUTPATIENT
Start: 2022-10-05 | End: 2022-10-24 | Stop reason: SDUPTHER

## 2022-10-06 ENCOUNTER — APPOINTMENT (OUTPATIENT)
Dept: LAB | Facility: HOSPITAL | Age: 82
End: 2022-10-06

## 2022-10-06 ENCOUNTER — APPOINTMENT (OUTPATIENT)
Dept: ONCOLOGY | Facility: HOSPITAL | Age: 82
End: 2022-10-06

## 2022-10-07 ENCOUNTER — TELEPHONE (OUTPATIENT)
Dept: ONCOLOGY | Facility: CLINIC | Age: 82
End: 2022-10-07

## 2022-10-07 NOTE — TELEPHONE ENCOUNTER
Caller: Karli Loomis    Relationship to patient: Self    Best call back number: [phone]    Chief complaint: INJECTION APPT. NEEDS TO BE ADDED TO 11/2/2022 APPTS.    Type of visit:  INJECTION    Requested date: 11/2/2022    If rescheduling, when is the original appointment: 10/14/2022    Additional notes: OTHER APPTS. WERE CHANGED BUT NOT INJECTION, IF YOU JUST  ADD TO THE END OF HER APPT. [facility]. FOR THE 2ND, NO NEED TO CALL PATIENT.

## 2022-10-13 DIAGNOSIS — G62.0 DRUG-INDUCED POLYNEUROPATHY: ICD-10-CM

## 2022-10-14 ENCOUNTER — APPOINTMENT (OUTPATIENT)
Dept: ONCOLOGY | Facility: HOSPITAL | Age: 82
End: 2022-10-14

## 2022-10-14 RX ORDER — GABAPENTIN 400 MG/1
CAPSULE ORAL
Qty: 360 CAPSULE | Refills: 0 | Status: SHIPPED | OUTPATIENT
Start: 2022-10-14

## 2022-10-14 NOTE — TELEPHONE ENCOUNTER
Rx Refill Note  Requested Prescriptions     Pending Prescriptions Disp Refills   • gabapentin (NEURONTIN) 400 MG capsule [Pharmacy Med Name: GABAPENTIN 400 MG Capsule] 360 capsule      Sig: TAKE 1 CAPSULE FOUR TIMES DAILY      Last office visit with prescribing clinician: 7/7/2022      Next office visit with prescribing clinician: 11/29/2022       {TIP  Please add Last Relevant Lab Date if appropriate: 09/10/22    Shannon Núñez MA  10/14/22, 06:58 EDT

## 2022-10-24 RX ORDER — HYDRALAZINE HYDROCHLORIDE 50 MG/1
50 TABLET, FILM COATED ORAL 3 TIMES DAILY
Qty: 90 TABLET | Refills: 1 | Status: SHIPPED | OUTPATIENT
Start: 2022-10-24 | End: 2022-12-27 | Stop reason: SDUPTHER

## 2022-11-02 ENCOUNTER — LAB (OUTPATIENT)
Dept: LAB | Facility: HOSPITAL | Age: 82
End: 2022-11-02

## 2022-11-02 ENCOUNTER — OFFICE VISIT (OUTPATIENT)
Dept: ONCOLOGY | Facility: CLINIC | Age: 82
End: 2022-11-02

## 2022-11-02 ENCOUNTER — INFUSION (OUTPATIENT)
Dept: ONCOLOGY | Facility: HOSPITAL | Age: 82
End: 2022-11-02

## 2022-11-02 VITALS
TEMPERATURE: 97.3 F | BODY MASS INDEX: 22.53 KG/M2 | SYSTOLIC BLOOD PRESSURE: 128 MMHG | DIASTOLIC BLOOD PRESSURE: 86 MMHG | HEIGHT: 66 IN | OXYGEN SATURATION: 99 % | HEART RATE: 75 BPM | RESPIRATION RATE: 16 BRPM | WEIGHT: 140.2 LBS

## 2022-11-02 DIAGNOSIS — E53.8 VITAMIN B 12 DEFICIENCY: Primary | ICD-10-CM

## 2022-11-02 DIAGNOSIS — C50.812 MALIGNANT NEOPLASM OF OVERLAPPING SITES OF LEFT BREAST IN FEMALE, ESTROGEN RECEPTOR POSITIVE: ICD-10-CM

## 2022-11-02 DIAGNOSIS — Z17.0 MALIGNANT NEOPLASM OF OVERLAPPING SITES OF LEFT BREAST IN FEMALE, ESTROGEN RECEPTOR POSITIVE: ICD-10-CM

## 2022-11-02 DIAGNOSIS — Z17.0 MALIGNANT NEOPLASM OF OVERLAPPING SITES OF LEFT BREAST IN FEMALE, ESTROGEN RECEPTOR POSITIVE: Primary | ICD-10-CM

## 2022-11-02 DIAGNOSIS — C88.0 MACROGLOBULINEMIA OF WALDENSTROM: ICD-10-CM

## 2022-11-02 DIAGNOSIS — C50.812 MALIGNANT NEOPLASM OF OVERLAPPING SITES OF LEFT BREAST IN FEMALE, ESTROGEN RECEPTOR POSITIVE: Primary | ICD-10-CM

## 2022-11-02 DIAGNOSIS — R21 RASH AND OTHER NONSPECIFIC SKIN ERUPTION: ICD-10-CM

## 2022-11-02 LAB
ALBUMIN SERPL-MCNC: 4.7 G/DL (ref 3.5–5.2)
ALBUMIN/GLOB SERPL: 1.7 G/DL (ref 1.1–2.4)
ALP SERPL-CCNC: 76 U/L (ref 38–116)
ALT SERPL W P-5'-P-CCNC: 14 U/L (ref 0–33)
ANION GAP SERPL CALCULATED.3IONS-SCNC: 11.8 MMOL/L (ref 5–15)
AST SERPL-CCNC: 25 U/L (ref 0–32)
BASOPHILS # BLD AUTO: 0.04 10*3/MM3 (ref 0–0.2)
BASOPHILS NFR BLD AUTO: 0.7 % (ref 0–1.5)
BILIRUB SERPL-MCNC: 0.7 MG/DL (ref 0.2–1.2)
BUN SERPL-MCNC: 9 MG/DL (ref 6–20)
BUN/CREAT SERPL: 11.5 (ref 7.3–30)
CALCIUM SPEC-SCNC: 10.2 MG/DL (ref 8.5–10.2)
CHLORIDE SERPL-SCNC: 97 MMOL/L (ref 98–107)
CO2 SERPL-SCNC: 26.2 MMOL/L (ref 22–29)
CREAT SERPL-MCNC: 0.78 MG/DL (ref 0.6–1.1)
CRP SERPL-MCNC: 0.77 MG/DL (ref 0–0.5)
DEPRECATED RDW RBC AUTO: 45.9 FL (ref 37–54)
EGFRCR SERPLBLD CKD-EPI 2021: 75.5 ML/MIN/1.73
EOSINOPHIL # BLD AUTO: 0.41 10*3/MM3 (ref 0–0.4)
EOSINOPHIL NFR BLD AUTO: 7.1 % (ref 0.3–6.2)
ERYTHROCYTE [DISTWIDTH] IN BLOOD BY AUTOMATED COUNT: 13 % (ref 12.3–15.4)
ERYTHROCYTE [SEDIMENTATION RATE] IN BLOOD: 5 MM/HR (ref 0–30)
GLOBULIN UR ELPH-MCNC: 2.7 GM/DL (ref 1.8–3.5)
GLUCOSE SERPL-MCNC: 103 MG/DL (ref 74–124)
HCT VFR BLD AUTO: 41.5 % (ref 34–46.6)
HGB BLD-MCNC: 14.3 G/DL (ref 12–15.9)
IMM GRANULOCYTES # BLD AUTO: 0.01 10*3/MM3 (ref 0–0.05)
IMM GRANULOCYTES NFR BLD AUTO: 0.2 % (ref 0–0.5)
LDH SERPL-CCNC: 275 U/L (ref 99–259)
LYMPHOCYTES # BLD AUTO: 1 10*3/MM3 (ref 0.7–3.1)
LYMPHOCYTES NFR BLD AUTO: 17.4 % (ref 19.6–45.3)
MCH RBC QN AUTO: 32.9 PG (ref 26.6–33)
MCHC RBC AUTO-ENTMCNC: 34.5 G/DL (ref 31.5–35.7)
MCV RBC AUTO: 95.6 FL (ref 79–97)
MONOCYTES # BLD AUTO: 0.43 10*3/MM3 (ref 0.1–0.9)
MONOCYTES NFR BLD AUTO: 7.5 % (ref 5–12)
NEUTROPHILS NFR BLD AUTO: 3.85 10*3/MM3 (ref 1.7–7)
NEUTROPHILS NFR BLD AUTO: 67.1 % (ref 42.7–76)
NRBC BLD AUTO-RTO: 0 /100 WBC (ref 0–0.2)
PLATELET # BLD AUTO: 151 10*3/MM3 (ref 140–450)
PMV BLD AUTO: 9.2 FL (ref 6–12)
POTASSIUM SERPL-SCNC: 3.8 MMOL/L (ref 3.5–4.7)
PROT SERPL-MCNC: 7.4 G/DL (ref 6.3–8)
RBC # BLD AUTO: 4.34 10*6/MM3 (ref 3.77–5.28)
SODIUM SERPL-SCNC: 135 MMOL/L (ref 134–145)
URATE SERPL-MCNC: 3.1 MG/DL (ref 2.8–7.4)
WBC NRBC COR # BLD: 5.74 10*3/MM3 (ref 3.4–10.8)

## 2022-11-02 PROCEDURE — 36415 COLL VENOUS BLD VENIPUNCTURE: CPT

## 2022-11-02 PROCEDURE — 84550 ASSAY OF BLOOD/URIC ACID: CPT | Performed by: INTERNAL MEDICINE

## 2022-11-02 PROCEDURE — 86140 C-REACTIVE PROTEIN: CPT | Performed by: INTERNAL MEDICINE

## 2022-11-02 PROCEDURE — 96372 THER/PROPH/DIAG INJ SC/IM: CPT

## 2022-11-02 PROCEDURE — 85025 COMPLETE CBC W/AUTO DIFF WBC: CPT

## 2022-11-02 PROCEDURE — 99214 OFFICE O/P EST MOD 30 MIN: CPT | Performed by: INTERNAL MEDICINE

## 2022-11-02 PROCEDURE — 85652 RBC SED RATE AUTOMATED: CPT | Performed by: INTERNAL MEDICINE

## 2022-11-02 PROCEDURE — 83615 LACTATE (LD) (LDH) ENZYME: CPT | Performed by: INTERNAL MEDICINE

## 2022-11-02 PROCEDURE — 80053 COMPREHEN METABOLIC PANEL: CPT

## 2022-11-02 PROCEDURE — 25010000002 CYANOCOBALAMIN PER 1000 MCG: Performed by: NURSE PRACTITIONER

## 2022-11-02 RX ORDER — CYANOCOBALAMIN 1000 UG/ML
1000 INJECTION, SOLUTION INTRAMUSCULAR; SUBCUTANEOUS ONCE
Status: COMPLETED | OUTPATIENT
Start: 2022-11-02 | End: 2022-11-02

## 2022-11-02 RX ADMIN — CYANOCOBALAMIN 1000 MCG: 1000 INJECTION, SOLUTION INTRAMUSCULAR at 13:43

## 2022-11-02 NOTE — PROGRESS NOTES
Subjective    REASONS FOR FOLLOWUP:     History of Waldenstrom macroglobulinemia.    History of breast cancer stage I on the left, status post mastectomy. The patient completed aromatase inhibitor  X 5 YEARS IN 2019 without any evidence of breast cancer recurrence        History of Present Illness      DURING THE VISIT WITH THE PATIENT TODAY , PATIENT HAD FACE MASK, I HAD PROPER PROTECTIVE EQUIPMENT, AND I DID HAND HYGIENE WITH SOAP AND WATER BEFORE AND AFTER THE VISIT.    On 11/02/2022 this 82-year-old white female who has history of Waldenstrom macroglobulinemia who has been withholding any treatment for a long time returns to the office complaining for the last 6 months of progressive rash in the skin in her trunk and lower extremities associated with severe pruritus. She has been seen by dermatologist and allergist, not finding any reason for this phenomenon to occur. The symptoms have become so bad that she is barely able to walk anymore because she does not want to get herself into the situation of driving and trying to scratch at the same time. She has not had any fever, chills or sweats. No abnormal bleeding. She has still pain in both knees associated with osteoarthritis. Urination in volume is normal. Defecation is normal. She denies any manifestations of cardiac disease or coronary disease. No alterations in her lung . No other new symptoms.     She has had sometimes up to the point of itching all night and sleeping nothing. The patient is very disconcerted about the symptomatology that she has right now. She has not found any toney in anything and her fatigue is overwhelming to the point that she does not want to do any treadmill walking.              Past Medical History:   Diagnosis Date    Acid reflux     APC (atrial premature contractions) 5/25/2022    Chronic pansinusitis 7/12/2018    Clostridium difficile colitis     Requiring admission to [facility] in 09/2008    Drug-induced  polyneuropathy (HCC) 12/14/2017    Epilepsy with simple partial seizures (HCC) 12/14/2017    Headache, migraine 12/14/2017    History of transfusion of packed red blood cells     R/T SURGERY    Hyperlipidemia     Malignant neoplasm of overlapping sites of left breast in female, estrogen receptor positive (HCC) 4/13/2016    Nonrheumatic aortic valve insufficiency     Primary osteoarthritis of left knee 7/22/2015    Skin cancer     Thyroid nodule     Removed more than 35 years ago    TIA (transient ischemic attack) 10/18/2018    UTI due to extended-spectrum beta lactamase (ESBL) producing Escherichia coli 1/6/2019     Past Surgical History:   Procedure Laterality Date    ADENOIDECTOMY      APPENDECTOMY      CARPAL TUNNEL RELEASE Right     CATARACT EXTRACTION Bilateral     CHOLECYSTECTOMY      COLONOSCOPY      HYSTERECTOMY      Partial, many years ago, heavy bleeding, no cancer    KNEE ARTHROSCOPY Right 03/2009    Finding of chondromalacia and appropriate cleanup of joint was performed by Dr. Moraes.    MASTECTOMY Left 07/2014    REPLACEMENT TOTAL KNEE Right 12/2015    SKIN CANCER EXCISION      several    TONSILLECTOMY           Social History     Socioeconomic History    Marital status:    Tobacco Use    Smoking status: Never    Smokeless tobacco: Never   Vaping Use    Vaping Use: Never used   Substance and Sexual Activity    Alcohol use: No     Comment: 2 cups caffeine/coffee daily    Drug use: No    Sexual activity: Defer     Family History   Problem Relation Age of Onset    Mental illness Mother     Migraines Mother     Dementia Mother     Heart disease Father     Hypertension Father     Kidney disease Father     Alcohol abuse Father     No Known Problems Daughter     No Known Problems Daughter     Breast cancer Other     Heart disease Other     Hypertension Other     Diabetes Other     Cancer Maternal Grandmother     Breast cancer Maternal Grandmother     No Known Problems Maternal Grandfather     No Known  "Problems Paternal Grandmother     No Known Problems Paternal Grandfather     Hyperlipidemia Son     Lymphoma Neg Hx     Leukemia Neg Hx      Current Outpatient Medications on File Prior to Visit   Medication Sig Dispense Refill    amoxicillin-clavulanate (Augmentin) 875-125 MG per tablet Take 1 tablet by mouth 2 (Two) Times a Day. 20 tablet 0    aspirin 81 MG tablet Take 81 mg by mouth Daily.      cetirizine (zyrTEC) 10 MG tablet Take 10 mg by mouth Daily.      gabapentin (NEURONTIN) 400 MG capsule TAKE 1 CAPSULE FOUR TIMES DAILY 360 capsule 0    hydrALAZINE (APRESOLINE) 50 MG tablet Take 1 tablet by mouth 3 (Three) Times a Day. 90 tablet 1    levalbuterol (XOPENEX HFA) 45 MCG/ACT inhaler       levothyroxine (SYNTHROID, LEVOTHROID) 50 MCG tablet TAKE 1 TABLET EVERY DAY 90 tablet 1    losartan (COZAAR) 100 MG tablet Take 1 tablet by mouth Daily. 90 tablet 3    Symbicort 160-4.5 MCG/ACT inhaler Inhale 160 puffs 2 (Two) Times a Day. One in the morning and one in the evening      triamcinolone (KENALOG) 0.1 % ointment Apply 1 application topically to the appropriate area as directed 2 (Two) Times a Day. 80 g 3     No current facility-administered medications on file prior to visit.       ALLERGIES:    Allergies   Allergen Reactions    Cortisone Unknown - High Severity     Reports having a shot years ago, and wonders if he hit a vein. She was told by another doctor that it probably went in her blood stream.     Hytrin [Terazosin] Rash    Amlodipine Rash    Darvon  [Propoxyphene] Palpitations             Objective      Vitals:    11/02/22 1416   Pulse: 75   Resp: 16   Temp: 97.3 °F (36.3 °C)   TempSrc: Temporal   SpO2: 99%   Weight: 63.6 kg (140 lb 3.2 oz)   Height: 167.6 cm (65.98\")   PainSc: 0-No pain     Current Status 11/2/2022   ECOG score 0         Physical Exam             GENERAL:  Well-developed, well-nourished  Patient  in no acute distress.   SKIN:  Warm, dry ,NO purpura .On 11/02/2022 the patient has a very " peculiar maculopapular crusty rash in the trunk, upper and lower extremities. It is less evident, centrally nucleated.     Photographs were obtained to document the rash.      HEENT:  Pupils were equal and reactive to light and accomodation, conjunctivae noninjected, normal extraocular movements, normal visual acuity.   NECK:  Supple with good range of motion; no thyromegaly , no JVD or bruits,.No carotid artery pain, no carotid abnormal pulsation   LYMPHATICS:  No cervical, NO supraclavicular, NO axillary, NO inguinal adenopathies.  CARDIAC   normal rate , regular rhythm, without murmur,NO rubs NO S3 NO S4   LUNGS: normal breath sounds bilateral, no wheezing, NO rhonchi, NO crackles ,NO rubs.  VASCULAR VENOUS: no cyanosis, NO collateral circulation, NO varicosities, NO edema, NO palpable cords, NO pain,NO erythema, NO pigmentation of the skin.  ABDOMEN:  Soft, NO pain,no hepatomegaly, no splenomegaly,no masses, no ascites, no collateral circulation,no distention.  EXTREMITIES  AND SPINE:  No clubbing, no cyanosis ,no deformities , no pain .No kyphosis,  no pain in spine, no pain in ribs , no pain in pelvic bone.  NEUROLOGICAL:  Patient was awake, alert, oriented to time, person and place.SENSORY NEUROPATHY IN FEET                          RECENT LABS:  Results from last 7 days   Lab Units 11/02/22  1334   WBC 10*3/mm3 5.74   NEUTROS ABS 10*3/mm3 3.85   HEMOGLOBIN g/dL 14.3   HEMATOCRIT % 41.5   PLATELETS 10*3/mm3 151     Results from last 7 days   Lab Units 11/02/22  1334   SODIUM mmol/L 135   POTASSIUM mmol/L 3.8   CHLORIDE mmol/L 97*   CO2 mmol/L 26.2   BUN mg/dL 9   CREATININE mg/dL 0.78   CALCIUM mg/dL 10.2   ALBUMIN g/dL 4.70   BILIRUBIN mg/dL 0.7   ALK PHOS U/L 76   ALT (SGPT) U/L 14   AST (SGOT) U/L 25   GLUCOSE mg/dL 103         Assessment & Plan      1. Waldenstrom macroglobulinemia that has been treated in the past mainly with Rituxan. In the past, also she was treated with Velcade with very dramatic  improvement in her monoclonal protein but very dramatic sensory peripheral neuropathy. This medication was discontinued altogether.    6/29/2020, she developed progressive fatigue with worsening neuropathy in her feet.  These were her original symptoms at diagnosis of Waldenstrom's.  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  Calcium also elevated at 10.5 with decreased sodium related to pseudohyponatremia associated with monoclonal protein.  Previously, she did not receive improvement from Rituxan, she is not thought to be a candidate for Imbruvica, therefore she went on to initiate venetoclax 7/13/2020.  She is currently on her next planned dose of 200 mg daily with poor tolerance as outlined below  I discussed with her on 04/28/2021 that I do not believe given the information that we have with a sedimentation rate that was normal and a stable monoclonal protein IgM that the Waldenstrom could be the most focal reason to explain her fatigue. On the other hand it is impossible given her thrombocytopenia to be sure that she still has some component of lymphoma in her bone marrow and the only way to prove this will be to pursue in a bone marrow test. She does not believe that she is ready for this at this time.     Radiologically speaking the CT scan of the chest, abdomen and pelvis failed to document any lymphadenopathy, masses, splenomegaly or any other issue pertinent to her Waldenstrom.  I discussed with her on 06/02/2021 that I do not feel that the symptoms of fatigue are related to her Waldenström's at this time. Her monoclonal protein study has remained completely quiet. Her white count and hemoglobin are normal. Her B12, folic acid, ferritin, iron profile remain normal and TSH hs remained into the normal limits. Her sed rate was normal at 4 mm/hr and she has no obvious infection or inflammatory process.   On 08/25/2021 the patient's monoclonal protein has remained very stable as discussed with her during  the previous weeks. We have another level pending today. One more analysis that I decided to pursue was an amyloid analysis and abdominal fat pad biopsy. This was performed in late 06/2021 by Deric Llamas MD. Material was sent to [facility]. No [place] red material was found in this specimen. Therefore the possibility of amyloidosis is less likely under these circumstances.     We have run CT scans, laboratory testing and all sorts of things trying to explain her fatigue without having a definitive answer. I think a lot of this is lack of training.    My recommendation in regard to her Waldenstrom's is to continue monitoring her parameters with CBC, CMP and monoclonal protein studies in 3 months.   The patient was further reviewed on 11/17/2021 in regard to her Waldenstrom's. She does not have any symptoms pertinent to this. There is no abnormal bleeding, there is no blurred vision, she has no peripheral adenopathy, there is no hepatosplenomegaly. Her fatigue is no different than before but she is able to do some housework. Her white count, hemoglobin and platelets are stable. We have pending monoclonal protein studies that will be called to her once that they become available. I find no need to pursue any intervention from this point of view at this time.   The patient was reviewed on 12/17/2021. I do not see any symptoms or signs of her Waldenstrom's. Her white count, hemoglobin, platelets, white count differential are normal. Her chemistry profile is pending. I find no reason to proceed with any other therapy for this condition. Her monoclonal protein has remained quiet, stable.   On 03/11/2022 the patient’s white count, hemoglobin and platelets are normal. She has minimal sensory neuropathy in her feet that is no worse than before. She has no fever, chills or infections. No clinical bleeding. Her monoclonal protein IgM has remained very stable. She has not required any other intervention from my side of the  story and she will be watched in absence of any other intervention. She will be called at home with the report of her monoclonal protein study next week.  On 11/02/2022 the patient has not had any clinical bleeding, no febrile illness, no peripheral adenopathy, no blurred vision, and her white count, hemoglobin and platelets remain stable in regard to her previous history of Waldenstrom. On the other hand the patient has this very peculiar maculopapular rash with multiple areas of ulceration not only in her trunk but also in her face, upper and lower extremities. The areas in the trunk are the worse, especially on her backside. Photographs were obtained and placed in media by Dr. Larose.     In spite of seeing dermatologist, a skin biopsy has not been done. The patient continues blaming this rash to her blood pressure medications. I went back into time in 2020 to see if we can find what she was taking before. She was not having any rash but she has been worse from this point of view in the last several weeks.     I think independent of what she is going to do with her life very likely she needs to have a different approach to the diagnosis of her rash in the skin and I strongly believe there is a connection between the rash and her Waldenstrom. I have placed a phone call to discuss this with the patient's dermatologist. I strongly believe that she will require skin biopsy looking for specific skin entities as well as looking for pemphigoid.    I went ahead and scheduled the patient to have a CBC, CMP in 2 weeks, perform serum protein immunoelectrophoresis today and she will be called with report of this. I also did analysis in regard to other infections.     In regard to her fatigue, I think it is connected to what goes on in the lung. 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     With all this said I did not prescribe  any medications for her because I do not want to modify the characteristic of the rash .     She has similar lesions in her chest wall, similar lesion in her buttock and back of the spine and upper and lower extremities.              2.From the point of view of her breast cancer status post mastectomy on the left she completed her adjuvant therapy, mammogram is coming up in the end of 06/2021 on the right side. The left chest wall has remained unremarkable.  On 08/25/2021 she has no symptoms or signs of breast cancer recurrence. Her mastectomy site on the left is completely healed. Her right breast examination is normal. She is up to date on her mammogram. This will be watched in absence of any other intervention.   On 11/17/2021 the patient's left sided mastectomy is well healed, right breast exam is normal. There is no evidence of breast cancer recurrence clinically. She will remain in observation.   I find no symptoms or signs of breast cancer recurrence on her. This will be watched in absence of any other intervention as per 12/17/2021.   On 03/11/2022 the patient has no symptoms or signs of breast cancer recurrence. Her right breast exam is normal. The left mastectomy site is normal. She will be watched in absence of any other intervention. She is up-to-date in right-sided mammogram.  On 11/02/2022 I do not find any symptoms or signs that would indicate breast cancer recurrence.    She is up-to-date in her mammogram .              3  The next symptom is profound fatigue. We have looked for endocrinological diseases, medication side effects, infections, malignancies, Waldenstrom's, amyloid, cardiac disease and so forth not having any conclusion in regard to the nature of this. I do believe that the lack of bad news or not finding any other thing is good news to me and I pointed this out to the patient. Maybe by the end of the day her fatigue is lack of proper physical training. She has bought a treadmill device  that will be in her house as per today and hopefully she will be able to do some walking on this that will be meaningful in regard to recovering in regard energy.   The patient believes that the degree of fatigue that she was experiencing before is substantially better and now she is able to do some house activity. I encouraged her to continue this in the long run, is the only solution for fatigue is physical activity.   On 12/17/2021 the patient's fatigue has improved highly. She is now doing treadmill activity at home for 10 minutes once or twice a day. She has been eating better, she has lost some weight but she feels substantially better. I think the conditioning was probably the culprit of all of this related to the pandemia and I advised her to remain doing her treadmill activity twice a day if possible.   On 03/11/2022 her fatigue is better since the previous visit.  She is doing some exercise on her treadmill and actually she believes that this has made a big difference. I encouraged her to remain doing this.  I also encouraged her to  her car, go and buy some groceries and that she can go to restaurants with her family to be outside if possible, and she can go to the beach if she can arrange this with her family.  It is time for her to leave her house, now 2 years after pandemia and immunization. I think a lot of her fatigue is being house confined.    On 11/02/2022 the fatigue continues and has become overwhelming. She does not want to do too much around the house anymore. She sits on the couch all the time. I do believe that the connection between the rash, the Waldenstrom and the rest of the symptoms is real and the patient has learned from us in regard to her overall future.     I will review her back in 2 weeks. I sent her back to the lab. We will give her some premedications for her visit to the dermatologist. I will place a call to discuss this with them. I still believe that there is a  connection. I went ahead and scheduled CT scans. I sent her back to the lab to obtain an KARRIE, cryoglobulin, cold agglutinins and also to obtain a CCP antibody and an KARRIE profile. We will need to have a skin biopsy also and I will discuss this further with dermatologist.                            Florencio Larose MD   11/2/2022      CC:

## 2022-11-03 LAB
CENTROMERE B AB SER-ACNC: <0.2 AI (ref 0–0.9)
CHROMATIN AB SERPL-ACNC: <0.2 AI (ref 0–0.9)
DSDNA AB SER-ACNC: <1 IU/ML (ref 0–9)
ENA JO1 AB SER-ACNC: <0.2 AI (ref 0–0.9)
ENA RNP AB SER-ACNC: <0.2 AI (ref 0–0.9)
ENA SCL70 AB SER-ACNC: <0.2 AI (ref 0–0.9)
ENA SM AB SER-ACNC: <0.2 AI (ref 0–0.9)
ENA SS-A AB SER-ACNC: <0.2 AI (ref 0–0.9)
ENA SS-B AB SER-ACNC: <0.2 AI (ref 0–0.9)
Lab: NORMAL

## 2022-11-04 LAB
ALBUMIN SERPL ELPH-MCNC: 4.1 G/DL (ref 2.9–4.4)
ALBUMIN/GLOB SERPL: 1.4 {RATIO} (ref 0.7–1.7)
ALPHA1 GLOB SERPL ELPH-MCNC: 0.4 G/DL (ref 0–0.4)
ALPHA2 GLOB SERPL ELPH-MCNC: 0.8 G/DL (ref 0.4–1)
B-GLOBULIN SERPL ELPH-MCNC: 1 G/DL (ref 0.7–1.3)
GAMMA GLOB SERPL ELPH-MCNC: 0.8 G/DL (ref 0.4–1.8)
GLOBULIN SER-MCNC: 3 G/DL (ref 2.2–3.9)
IGA SERPL-MCNC: 55 MG/DL (ref 64–422)
IGG SERPL-MCNC: 651 MG/DL (ref 586–1602)
IGM SERPL-MCNC: 215 MG/DL (ref 26–217)
INTERPRETATION SERPL IEP-IMP: ABNORMAL
KAPPA LC FREE SER-MCNC: 20.2 MG/L (ref 3.3–19.4)
KAPPA LC FREE/LAMBDA FREE SER: 1.98 {RATIO} (ref 0.26–1.65)
LABORATORY COMMENT REPORT: ABNORMAL
LAMBDA LC FREE SERPL-MCNC: 10.2 MG/L (ref 5.7–26.3)
M PROTEIN SERPL ELPH-MCNC: 0.4 G/DL
PROT SERPL-MCNC: 7.1 G/DL (ref 6–8.5)

## 2022-11-07 ENCOUNTER — HOSPITAL ENCOUNTER (OUTPATIENT)
Dept: CT IMAGING | Facility: HOSPITAL | Age: 82
Discharge: HOME | End: 2022-11-07
Admitting: INTERNAL MEDICINE

## 2022-11-07 DIAGNOSIS — Z17.0 MALIGNANT NEOPLASM OF OVERLAPPING SITES OF LEFT BREAST IN FEMALE, ESTROGEN RECEPTOR POSITIVE: ICD-10-CM

## 2022-11-07 DIAGNOSIS — C50.812 MALIGNANT NEOPLASM OF OVERLAPPING SITES OF LEFT BREAST IN FEMALE, ESTROGEN RECEPTOR POSITIVE: ICD-10-CM

## 2022-11-07 DIAGNOSIS — C88.0 MACROGLOBULINEMIA OF WALDENSTROM: ICD-10-CM

## 2022-11-07 LAB — CRYOGLOB SER QL 1D COLD INC: NORMAL

## 2022-11-07 PROCEDURE — 74176 CT ABD & PELVIS W/O CONTRAST: CPT

## 2022-11-07 PROCEDURE — 71250 CT THORAX DX C-: CPT

## 2022-11-08 ENCOUNTER — TELEPHONE (OUTPATIENT)
Dept: ONCOLOGY | Facility: CLINIC | Age: 82
End: 2022-11-08

## 2022-11-10 ENCOUNTER — TELEPHONE (OUTPATIENT)
Dept: ONCOLOGY | Facility: CLINIC | Age: 82
End: 2022-11-10

## 2022-11-10 LAB
CA TITR SERPL AGGL: NEGATIVE {TITER}
IGE SERPL-ACNC: 1355 IU/ML (ref 6–495)

## 2022-11-10 NOTE — TELEPHONE ENCOUNTER
Called pt to receive update on derm appt. Pt reports rash was biopsied in June. Informed pt of elevated IgE and tentative plan for bone marrow. Placed another call out to Dr. Christianson. Saba Zuniga RN

## 2022-11-15 NOTE — PROGRESS NOTES
RM:________     PCP: Luisa Prasad APRN    : 1940  AGE: 82 y.o.  EST PATIENT   REASON FOR VISIT/  CC:    BP Readings from Last 3 Encounters:   22 128/86   10/20/22 156/77   09/10/22 133/70        WT: ____________ BP: __________L __________R HR______    CHEST PAIN: _____________    SOA: _____________PALPS: _______________     LIGHTHEADED: ___________FATIGUE: ________________ EDEMA __________    ALLERGIES:Cortisone, Hytrin [terazosin], Amlodipine, and Darvon  [propoxyphene] SMOKING HISTORY:  Social History     Tobacco Use   • Smoking status: Never   • Smokeless tobacco: Never   Vaping Use   • Vaping Use: Never used   Substance Use Topics   • Alcohol use: No     Comment: 2 cups caffeine/coffee daily   • Drug use: No     CAFFEINE USE_________________  ALCOHOL ______________________    Below is the patient's most recent value for Albumin, ALT, AST, BUN, Calcium, Chloride, Cholesterol, CO2, Creatinine, GFR, Glucose, HDL, Hematocrit, Hemoglobin, Hemoglobin A1C, LDL, Magnesium, Phosphorus, Platelets, Potassium, PSA, Sodium, Triglycerides, TSH and WBC.   Lab Results   Component Value Date    ALBUMIN 4.70 2022    ALBUMIN 4.1 2022    ALT 14 2022    AST 25 2022    BUN 9 2022    CALCIUM 10.2 2022    CL 97 (L) 2022    CHOL 200 2021    CO2 26.2 2022    CREATININE 0.78 2022    HDL 66 02/10/2022    HCT 41.5 2022    HGB 14.3 2022     (H) 02/10/2022    MG 1.8 01/10/2019    PHOS 3.2 2019     2022    K 3.8 2022     2022    TRIG 101 02/10/2022    TSH 1.960 02/10/2022    WBC 5.74 2022          NEW DIAGNOSIS/ SURGERY/ HOSP OR ED VISITS: ______________________    __________________________________________________________________      RECENT LABS OR DIAGNOSTIC TESTING:  _____________________________    __________________________________________________________________      ASSESSMENT/ PLAN:  _______________________________________________    __________________________________________________________________

## 2022-11-17 ENCOUNTER — TELEPHONE (OUTPATIENT)
Dept: CARDIOLOGY | Facility: CLINIC | Age: 82
End: 2022-11-17

## 2022-11-17 ENCOUNTER — LAB (OUTPATIENT)
Dept: LAB | Facility: HOSPITAL | Age: 82
End: 2022-11-17

## 2022-11-17 ENCOUNTER — OFFICE VISIT (OUTPATIENT)
Dept: ONCOLOGY | Facility: CLINIC | Age: 82
End: 2022-11-17

## 2022-11-17 VITALS
DIASTOLIC BLOOD PRESSURE: 96 MMHG | HEIGHT: 66 IN | HEART RATE: 82 BPM | OXYGEN SATURATION: 96 % | TEMPERATURE: 96.8 F | SYSTOLIC BLOOD PRESSURE: 182 MMHG | BODY MASS INDEX: 22.64 KG/M2 | RESPIRATION RATE: 16 BRPM | WEIGHT: 140.9 LBS

## 2022-11-17 DIAGNOSIS — Z17.0 MALIGNANT NEOPLASM OF OVERLAPPING SITES OF LEFT BREAST IN FEMALE, ESTROGEN RECEPTOR POSITIVE: ICD-10-CM

## 2022-11-17 DIAGNOSIS — C50.812 MALIGNANT NEOPLASM OF OVERLAPPING SITES OF LEFT BREAST IN FEMALE, ESTROGEN RECEPTOR POSITIVE: Primary | ICD-10-CM

## 2022-11-17 DIAGNOSIS — Z17.0 MALIGNANT NEOPLASM OF OVERLAPPING SITES OF LEFT BREAST IN FEMALE, ESTROGEN RECEPTOR POSITIVE: Primary | ICD-10-CM

## 2022-11-17 DIAGNOSIS — C88.0 MACROGLOBULINEMIA OF WALDENSTROM: ICD-10-CM

## 2022-11-17 DIAGNOSIS — C50.812 MALIGNANT NEOPLASM OF OVERLAPPING SITES OF LEFT BREAST IN FEMALE, ESTROGEN RECEPTOR POSITIVE: ICD-10-CM

## 2022-11-17 LAB
ALBUMIN SERPL-MCNC: 4.5 G/DL (ref 3.5–5.2)
ALBUMIN/GLOB SERPL: 1.9 G/DL (ref 1.1–2.4)
ALP SERPL-CCNC: 74 U/L (ref 38–116)
ALT SERPL W P-5'-P-CCNC: 14 U/L (ref 0–33)
ANION GAP SERPL CALCULATED.3IONS-SCNC: 12.4 MMOL/L (ref 5–15)
AST SERPL-CCNC: 25 U/L (ref 0–32)
BASOPHILS # BLD AUTO: 0.03 10*3/MM3 (ref 0–0.2)
BASOPHILS NFR BLD AUTO: 0.6 % (ref 0–1.5)
BILIRUB SERPL-MCNC: 0.7 MG/DL (ref 0.2–1.2)
BUN SERPL-MCNC: 7 MG/DL (ref 6–20)
BUN/CREAT SERPL: 8.8 (ref 7.3–30)
CALCIUM SPEC-SCNC: 10 MG/DL (ref 8.5–10.2)
CHLORIDE SERPL-SCNC: 97 MMOL/L (ref 98–107)
CO2 SERPL-SCNC: 25.6 MMOL/L (ref 22–29)
CREAT SERPL-MCNC: 0.8 MG/DL (ref 0.6–1.1)
DEPRECATED RDW RBC AUTO: 45.9 FL (ref 37–54)
EGFRCR SERPLBLD CKD-EPI 2021: 73.2 ML/MIN/1.73
EOSINOPHIL # BLD AUTO: 0.38 10*3/MM3 (ref 0–0.4)
EOSINOPHIL NFR BLD AUTO: 8.2 % (ref 0.3–6.2)
ERYTHROCYTE [DISTWIDTH] IN BLOOD BY AUTOMATED COUNT: 13.2 % (ref 12.3–15.4)
GLOBULIN UR ELPH-MCNC: 2.4 GM/DL (ref 1.8–3.5)
GLUCOSE SERPL-MCNC: 119 MG/DL (ref 74–124)
HCT VFR BLD AUTO: 38.9 % (ref 34–46.6)
HGB BLD-MCNC: 13.3 G/DL (ref 12–15.9)
IMM GRANULOCYTES # BLD AUTO: 0 10*3/MM3 (ref 0–0.05)
IMM GRANULOCYTES NFR BLD AUTO: 0 % (ref 0–0.5)
LYMPHOCYTES # BLD AUTO: 0.98 10*3/MM3 (ref 0.7–3.1)
LYMPHOCYTES NFR BLD AUTO: 21 % (ref 19.6–45.3)
MCH RBC QN AUTO: 32.4 PG (ref 26.6–33)
MCHC RBC AUTO-ENTMCNC: 34.2 G/DL (ref 31.5–35.7)
MCV RBC AUTO: 94.6 FL (ref 79–97)
MONOCYTES # BLD AUTO: 0.44 10*3/MM3 (ref 0.1–0.9)
MONOCYTES NFR BLD AUTO: 9.4 % (ref 5–12)
NEUTROPHILS NFR BLD AUTO: 2.83 10*3/MM3 (ref 1.7–7)
NEUTROPHILS NFR BLD AUTO: 60.8 % (ref 42.7–76)
NRBC BLD AUTO-RTO: 0 /100 WBC (ref 0–0.2)
PLATELET # BLD AUTO: 149 10*3/MM3 (ref 140–450)
PMV BLD AUTO: 9.7 FL (ref 6–12)
POTASSIUM SERPL-SCNC: 3.8 MMOL/L (ref 3.5–4.7)
PROT SERPL-MCNC: 6.9 G/DL (ref 6.3–8)
RBC # BLD AUTO: 4.11 10*6/MM3 (ref 3.77–5.28)
SODIUM SERPL-SCNC: 135 MMOL/L (ref 134–145)
WBC NRBC COR # BLD: 4.66 10*3/MM3 (ref 3.4–10.8)

## 2022-11-17 PROCEDURE — 36415 COLL VENOUS BLD VENIPUNCTURE: CPT

## 2022-11-17 PROCEDURE — 85025 COMPLETE CBC W/AUTO DIFF WBC: CPT

## 2022-11-17 PROCEDURE — 99215 OFFICE O/P EST HI 40 MIN: CPT | Performed by: INTERNAL MEDICINE

## 2022-11-17 PROCEDURE — 80053 COMPREHEN METABOLIC PANEL: CPT

## 2022-11-17 RX ORDER — PREDNISONE 10 MG/1
TABLET ORAL
Qty: 30 TABLET | Refills: 2 | Status: SHIPPED | OUTPATIENT
Start: 2022-11-17 | End: 2023-01-23

## 2022-11-17 RX ORDER — MONTELUKAST SODIUM 5 MG/1
5 TABLET, CHEWABLE ORAL NIGHTLY
Qty: 30 TABLET | Refills: 3 | Status: SHIPPED | OUTPATIENT
Start: 2022-11-17

## 2022-11-17 NOTE — PROGRESS NOTES
Subjective    REASONS FOR FOLLOWUP:     1. History of Waldenstrom macroglobulinemia.    2. History of breast cancer stage I on the left, status post mastectomy. The patient completed aromatase inhibitor  X 5 YEARS IN 2019 without any evidence of breast cancer recurrence        History of Present Illness      DURING THE VISIT WITH THE PATIENT TODAY , PATIENT HAD FACE MASK, I HAD PROPER PROTECTIVE EQUIPMENT, AND I DID HAND HYGIENE WITH SOAP AND WATER BEFORE AND AFTER THE VISIT.    On 11/02/2022 this 82-year-old white female who has history of Waldenstrom macroglobulinemia who has been withholding any treatment for a long time returns to the office complaining for the last 6 months of progressive rash in the skin in her trunk and lower extremities associated with severe pruritus. She has been seen by dermatologist and allergist, not finding any reason for this phenomenon to occur. The symptoms have become so bad that she is barely able to walk anymore because she does not want to get herself into the situation of driving and trying to scratch at the same time. She has not had any fever, chills or sweats. No abnormal bleeding. She has still pain in both knees associated with osteoarthritis. Urination in volume is normal. Defecation is normal. She denies any manifestations of cardiac disease or coronary disease. No alterations in her lung . No other new symptoms.     She has had sometimes up to the point of itching all night and sleeping nothing. The patient is very disconcerted about the symptomatology that she has right now. She has not found any toney in anything and her fatigue is overwhelming to the point that she does not want to do any treadmill walking.    The patient returned to the office on 11/17/2022. Since the previous visit the patient has been seen by Teresa Salgado MD, Dermatologist and I have had a conversation with her on the telephone after reviewing the pathology and her laboratory testing including a  very high level of IgE. Dr. Teresa Salgado and the pathology team document that what they see in the skin is a typical reaction that is allergic to medication and they do not see anything to suggest an infection, any inflammatory condition or pemphigoid for example. Obviously raises the question about which one of the medicines that the patient takes it the culprit. The amlodipine has been discontinued and raises the question about the need of refurbishing all of her blood pressure medicines to see where is the problem. Dr. Teresa Salgado also discussed with me the need for this patient to be on some prednisone and my personal impression is that the patient also could benefit from a low dose Singulair at nighttime to help her to sleep and maybe minimize the allergic reaction. The patient states that she has been so called allergic to high doses of prednisone when she had asthma before but it was not a real allergy it was over activity and I think a low dose prednisone could be calming effect on this process. I will need to discuss all of these facts as well with the patient's cardiology nurse practitioner and general family nurse practitioner. Please review below. Her symptoms continue as per today, the lesions in the skin are unchanged, she has new crops of them developing and she is just digging all of the time. She remains fatigued, she is worried, she has not too much of an appetite, bowel activity is normal. Radiological scans, CT scan has been done and will be reviewed below.     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   • Epilepsy with simple partial seizures (HCC) 12/14/2017   • Headache, migraine 12/14/2017   • History of transfusion of packed red blood cells      R/T SURGERY   • Hyperlipidemia    • Malignant neoplasm of overlapping sites of left breast in female, estrogen receptor positive (HCC) 4/13/2016   • Nonrheumatic aortic valve insufficiency    • Primary osteoarthritis of left knee 7/22/2015   • Skin cancer    • Thyroid nodule     Removed more than 35 years ago   • TIA (transient ischemic attack) 10/18/2018   • UTI due to extended-spectrum beta lactamase (ESBL) producing Escherichia coli 1/6/2019     Past Surgical History:   Procedure Laterality Date   • ADENOIDECTOMY     • APPENDECTOMY     • CARPAL TUNNEL RELEASE Right    • CATARACT EXTRACTION Bilateral    • CHOLECYSTECTOMY     • COLONOSCOPY     • HYSTERECTOMY      Partial, many years ago, heavy bleeding, no cancer   • KNEE ARTHROSCOPY Right 03/2009    Finding of chondromalacia and appropriate cleanup of joint was performed by Dr. Moraes.   • MASTECTOMY Left 07/2014   • REPLACEMENT TOTAL KNEE Right 12/2015   • SKIN CANCER EXCISION      several   • TONSILLECTOMY           Social History     Socioeconomic History   • Marital status:    Tobacco Use   • Smoking status: Never   • Smokeless tobacco: Never   Vaping Use   • Vaping Use: Never used   Substance and Sexual Activity   • Alcohol use: No     Comment: 2 cups caffeine/coffee daily   • Drug use: No   • Sexual activity: Defer     Family History   Problem Relation Age of Onset   • Mental illness Mother    • Migraines Mother    • Dementia Mother    • Heart disease Father    • Hypertension Father    • Kidney disease Father    • Alcohol abuse Father    • No Known Problems Daughter    • No Known Problems Daughter    • Breast cancer Other    • Heart disease Other    • Hypertension Other    • Diabetes Other    • Cancer Maternal Grandmother    • Breast cancer Maternal Grandmother    • No Known Problems Maternal Grandfather    • No Known Problems Paternal Grandmother    • No Known Problems Paternal Grandfather    • Hyperlipidemia Son    • Lymphoma Neg Hx    •  "Leukemia Neg Hx      Current Outpatient Medications on File Prior to Visit   Medication Sig Dispense Refill   • amoxicillin-clavulanate (Augmentin) 875-125 MG per tablet Take 1 tablet by mouth 2 (Two) Times a Day. 20 tablet 0   • aspirin 81 MG tablet Take 81 mg by mouth Daily.     • cetirizine (zyrTEC) 10 MG tablet Take 10 mg by mouth Daily.     • gabapentin (NEURONTIN) 400 MG capsule TAKE 1 CAPSULE FOUR TIMES DAILY 360 capsule 0   • hydrALAZINE (APRESOLINE) 50 MG tablet Take 1 tablet by mouth 3 (Three) Times a Day. 90 tablet 1   • levalbuterol (XOPENEX HFA) 45 MCG/ACT inhaler      • levothyroxine (SYNTHROID, LEVOTHROID) 50 MCG tablet TAKE 1 TABLET EVERY DAY 90 tablet 1   • losartan (COZAAR) 100 MG tablet Take 1 tablet by mouth Daily. 90 tablet 3   • Symbicort 160-4.5 MCG/ACT inhaler Inhale 160 puffs 2 (Two) Times a Day. One in the morning and one in the evening     • triamcinolone (KENALOG) 0.1 % ointment Apply 1 application topically to the appropriate area as directed 2 (Two) Times a Day. 80 g 3     No current facility-administered medications on file prior to visit.       ALLERGIES:    Allergies   Allergen Reactions   • Cortisone Unknown - High Severity     Reports having a shot years ago, and wonders if he hit a vein. She was told by another doctor that it probably went in her blood stream.    • Hytrin [Terazosin] Rash   • Amlodipine Rash   • Darvon  [Propoxyphene] Palpitations             Objective      Vitals:    11/17/22 1141   BP: (!) 182/96   Pulse: 82   Resp: 16   Temp: 96.8 °F (36 °C)   TempSrc: Temporal   SpO2: 96%   Weight: 63.9 kg (140 lb 14.4 oz)   Height: 167.6 cm (65.98\")   PainSc: 0-No pain     Current Status 11/17/2022   ECOG score 0         Physical Exam               GENERAL:  Well-developed, well-nourished  Patient  in no acute distress.   SKIN:  Warm, dry ,NO purpura ,Rash in the neck, chest, abdomen, buttock, upper extremities and lower extremities, remains about the same with the same " features that were described and photographed during the previous visit. There are now new lesions in her upper extremities. There are no lesions in the face.       HEENT:  Pupils were equal and reactive to light and accomodation, conjunctivae noninjected, normal extraocular movements, normal visual acuity.   NECK:  Supple with good range of motion; no thyromegaly , no JVD or bruits,.No carotid artery pain, no carotid abnormal pulsation   LYMPHATICS:  No cervical, NO supraclavicular, NO axillary, NO inguinal adenopathies.  CARDIAC   normal rate , regular rhythm, without murmur,NO rubs NO S3 NO S4   LUNGS: normal breath sounds bilateral, no wheezing, NO rhonchi, NO crackles ,NO rubs.  VASCULAR VENOUS: no cyanosis, NO collateral circulation, NO varicosities, NO edema, NO palpable cords, NO pain,NO erythema, NO pigmentation of the skin.  ABDOMEN:  Soft, NO pain,no hepatomegaly, no splenomegaly,no masses, no ascites, no collateral circulation,no distention.  EXTREMITIES  AND SPINE:  No clubbing, no cyanosis ,no deformities , no pain .No kyphosis,  no pain in spine, no pain in ribs , no pain in pelvic bone.  NEUROLOGICAL:  Patient was awake, alert, oriented to time, person and place.                            RECENT LABS:  Results from last 7 days   Lab Units 11/17/22  1125   WBC 10*3/mm3 4.66   NEUTROS ABS 10*3/mm3 2.83   HEMOGLOBIN g/dL 13.3   HEMATOCRIT % 38.9   PLATELETS 10*3/mm3 149             Assessment & Plan      1. Waldenstrom macroglobulinemia that has been treated in the past mainly with Rituxan. In the past, also she was treated with Velcade with very dramatic improvement in her monoclonal protein but very dramatic sensory peripheral neuropathy. This medication was discontinued altogether.    6/29/2020, she developed progressive fatigue with worsening neuropathy in her feet.  These were her original symptoms at diagnosis of Waldenstrom's.  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  Calcium also  elevated at 10.5 with decreased sodium related to pseudohyponatremia associated with monoclonal protein.  Previously, she did not receive improvement from Rituxan, she is not thought to be a candidate for Imbruvica, therefore she went on to initiate venetoclax 7/13/2020.  She is currently on her next planned dose of 200 mg daily with poor tolerance as outlined below  • I discussed with her on 04/28/2021 that I do not believe given the information that we have with a sedimentation rate that was normal and a stable monoclonal protein IgM that the Waldenstrom could be the most focal reason to explain her fatigue. On the other hand it is impossible given her thrombocytopenia to be sure that she still has some component of lymphoma in her bone marrow and the only way to prove this will be to pursue in a bone marrow test. She does not believe that she is ready for this at this time.     Radiologically speaking the CT scan of the chest, abdomen and pelvis failed to document any lymphadenopathy, masses, splenomegaly or any other issue pertinent to her Waldenstrom.  • I discussed with her on 06/02/2021 that I do not feel that the symptoms of fatigue are related to her Waldenström's at this time. Her monoclonal protein study has remained completely quiet. Her white count and hemoglobin are normal. Her B12, folic acid, ferritin, iron profile remain normal and TSH hs remained into the normal limits. Her sed rate was normal at 4 mm/hr and she has no obvious infection or inflammatory process.   • On 08/25/2021 the patient's monoclonal protein has remained very stable as discussed with her during the previous weeks. We have another level pending today. One more analysis that I decided to pursue was an amyloid analysis and abdominal fat pad biopsy. This was performed in late 06/2021 by Deric Llamas MD. Material was sent to [facility]. No [place] red material was found in this specimen. Therefore the possibility of amyloidosis  is less likely under these circumstances.     We have run CT scans, laboratory testing and all sorts of things trying to explain her fatigue without having a definitive answer. I think a lot of this is lack of training.    My recommendation in regard to her Waldenstrom's is to continue monitoring her parameters with CBC, CMP and monoclonal protein studies in 3 months.   • The patient was further reviewed on 11/17/2021 in regard to her Waldenstrom's. She does not have any symptoms pertinent to this. There is no abnormal bleeding, there is no blurred vision, she has no peripheral adenopathy, there is no hepatosplenomegaly. Her fatigue is no different than before but she is able to do some housework. Her white count, hemoglobin and platelets are stable. We have pending monoclonal protein studies that will be called to her once that they become available. I find no need to pursue any intervention from this point of view at this time.   • The patient was reviewed on 12/17/2021. I do not see any symptoms or signs of her Waldenstrom's. Her white count, hemoglobin, platelets, white count differential are normal. Her chemistry profile is pending. I find no reason to proceed with any other therapy for this condition. Her monoclonal protein has remained quiet, stable.   • On 03/11/2022 the patient’s white count, hemoglobin and platelets are normal. She has minimal sensory neuropathy in her feet that is no worse than before. She has no fever, chills or infections. No clinical bleeding. Her monoclonal protein IgM has remained very stable. She has not required any other intervention from my side of the story and she will be watched in absence of any other intervention. She will be called at home with the report of her monoclonal protein study next week.  On 11/02/2022 the patient has not had any clinical bleeding, no febrile illness, no peripheral adenopathy, no blurred vision, and her white count, hemoglobin and platelets remain  stable in regard to her previous history of Waldenstrom. On the other hand the patient has this very peculiar maculopapular rash with multiple areas of ulceration not only in her trunk but also in her face, upper and lower extremities. The areas in the trunk are the worse, especially on her backside. Photographs were obtained and placed in media by Dr. Larose.     In spite of seeing dermatologist, a skin biopsy has not been done. The patient continues blaming this rash to her blood pressure medications. I went back into time in 2020 to see if we can find what she was taking before. She was not having any rash but she has been worse from this point of view in the last several weeks.     I think independent of what she is going to do with her life very likely she needs to have a different approach to the diagnosis of her rash in the skin and I strongly believe there is a connection between the rash and her Waldenstrom. I have placed a phone call to discuss this with the patient's dermatologist. I strongly believe that she will require skin biopsy looking for specific skin entities as well as looking for pemphigoid.    I went ahead and scheduled the patient to have a CBC, CMP in 2 weeks, perform serum protein immunoelectrophoresis today and she will be called with report of this. I also did analysis in regard to other infections.     In regard to her fatigue, I think it is connected to what goes on in the lung. 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     With all this said I did not prescribe any medications for her because I do not want to modify the characteristic of the rash .     She has similar lesions in her chest wall, similar lesion in her buttock and back of the spine and upper and lower extremities.  On 11/17/2022 the patient has continued having extensive lesions in the skin. We have measured her monoclonal  protein that has not changed and she has not required any therapy for this for a long time. I measured her IgE level that was 1370 and any number above 500 is very abnormal. 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 Amlodipine has been discontinued. Obviously the question is what is the next medicine that needs to be discontinued and my next candidate given the characteristics of medication is hydralazine.    I think the patient needs to have a refurbish of all of her blood pressure medications, clear up all of these things and I wonder how much of her continuous worrying is collaborating to blood pressure to remain out of control.     I have placed phone calls to discuss the case with LENKA Garcia, for Cardiology team and LENKA Jackson, Family Practice. I would like to be on the same page with all of them and that way we act upon in regard to all of these issues. I went ahead and advised the patient to initiate a program of prednisone 10 mg at breakfast time and Singulair 5 mg at bedtime.     The patient will continue putting topical moisturizer on the skin. She is not using any topical steroid at this time.     I reviewed with the patient the CT scan of the chest, abdomen and pelvis that shows nothing related to lymphoma malignancy, myeloma or things of this nature. In fact her lung anatomy, cardiac anatomy, kidneys, pancreas, bowel anatomies are very much unremarkable including also the spleen and bone anatomies. There are no adenopathies, there is no tumoral lesions to speak of.     I also checked on this patient's KARRIE that was negative.    I raised the question to Teresa Salgado MD, if she saw anything to suggest any form of vasculitis that was negative.     Therefore after all of these facts and these discussions back and forth and so much conversation we are ready  to proceed to see if we change the outcome of this patient in regard all of these symptoms.     •   •   •   •   •   ·   2.From the point of view of her breast cancer status post mastectomy on the left she completed her adjuvant therapy, mammogram is coming up in the end of 06/2021 on the right side. The left chest wall has remained unremarkable.  • On 08/25/2021 she has no symptoms or signs of breast cancer recurrence. Her mastectomy site on the left is completely healed. Her right breast examination is normal. She is up to date on her mammogram. This will be watched in absence of any other intervention.   • On 11/17/2021 the patient's left sided mastectomy is well healed, right breast exam is normal. There is no evidence of breast cancer recurrence clinically. She will remain in observation.   • I find no symptoms or signs of breast cancer recurrence on her. This will be watched in absence of any other intervention as per 12/17/2021.   • On 03/11/2022 the patient has no symptoms or signs of breast cancer recurrence. Her right breast exam is normal. The left mastectomy site is normal. She will be watched in absence of any other intervention. She is up-to-date in right-sided mammogram.  On 11/02/2022 I do not find any symptoms or signs that would indicate breast cancer recurrence.  •   She is up-to-date in her mammogram .  On 1/17/2022 no issues pertinent to her previous history of breast cancer. This entity remains quiet clinically and radiologically. She will remain in observation from this point of view.     •   •   •   •   •   ·   • 3  • The next symptom is profound fatigue. We have looked for endocrinological diseases, medication side effects, infections, malignancies, Waldenstrom's, amyloid, cardiac disease and so forth not having any conclusion in regard to the nature of this. I do believe that the lack of bad news or not finding any other thing is good news to me and I pointed this out to the patient. Maybe  by the end of the day her fatigue is lack of proper physical training. She has bought a treadmill device that will be in her house as per today and hopefully she will be able to do some walking on this that will be meaningful in regard to recovering in regard energy.   • The patient believes that the degree of fatigue that she was experiencing before is substantially better and now she is able to do some house activity. I encouraged her to continue this in the long run, is the only solution for fatigue is physical activity.   • On 12/17/2021 the patient's fatigue has improved highly. She is now doing treadmill activity at home for 10 minutes once or twice a day. She has been eating better, she has lost some weight but she feels substantially better. I think the conditioning was probably the culprit of all of this related to the pandemia and I advised her to remain doing her treadmill activity twice a day if possible.   • On 03/11/2022 her fatigue is better since the previous visit.  She is doing some exercise on her treadmill and actually she believes that this has made a big difference. I encouraged her to remain doing this.  I also encouraged her to  her car, go and buy some groceries and that she can go to restaurants with her family to be outside if possible, and she can go to the beach if she can arrange this with her family.  It is time for her to leave her house, now 2 years after pandemia and immunization. I think a lot of her fatigue is being house confined.    On 11/02/2022 the fatigue continues and has become overwhelming. She does not want to do too much around the house anymore. She sits on the couch all the time. I do believe that the connection between the rash, the Waldenstrom and the rest of the symptoms is real and the patient has learned from us in regard to her overall future.     I will review her back in 2 weeks. I sent her back to the lab. We will give her some premedications for her  visit to the dermatologist. I will place a call to discuss this with them. I still believe that there is a connection. I went ahead and scheduled CT scans. I sent her back to the lab to obtain an KARRIE, cryoglobulin, cold agglutinins and also to obtain a CCP antibody and an KARRIE profile. We will need to have a skin biopsy also and I will discuss this further with dermatologist.  On 11/17/2022 a lot of the patient's fatigue is lack of sleep. She wakes up at 2-o'clock in the morning to dig into the skin of her lower extremities and she spends the rest of the night just digging and up and about. Hopefully with the Singulair taken at nighttime that also can facilitate sleep and taking the prednisone in the morning as posted above it could have a positive impact on her that maybe will allow her to function better.    I placed phone calls as stated for nurse practitioners for family practice and cardiology to be sure that everybody is on the same page in regard to the handling of this patient's problem.    I reviewed the report by pathologist in regard to her dermatology issue and I discussed personally with Teresa Salgado MD,  on the telephone this patient's situation and she agrees with new plan of care that was discussed with her on the telephone.   I also performed on this patient cold agglutinins that were negative, cryoglobulins that were negative and sedimentation rate and LDH that were normal against any inflammatory condition of any nature. I had discussion with Teresa Salgado MD, in regard any alterations in the skin to suggest any parasitic conditions like scabies or things of this nature. Everything was negative in this regard and this is not consistent clinically neither anyway.      I am planning to review the patient back in 2 months with repeat CBC, CMP, IgE level and serum protein immunoelectrophoresis.    •   •   •   •                     Florencio Larose MD   11/17/2022      CC:

## 2022-11-17 NOTE — TELEPHONE ENCOUNTER
Pt informed that Dr. Hernández thinks she needs a skin biospy by her dermatologist. She thinks this may have been done. She had a rash for 1.5 years. She will fu with Dr. Hernández in 2 weeks.

## 2022-11-17 NOTE — TELEPHONE ENCOUNTER
Dr. Larose asked that I call him today.    Here is the timeline that of research:  · She reports that the rash started when she was placed on terazosin in January 2022.  The rash continued.  · Amlodipine was increased to 10 mg daily  · In February 2022, she was started on hydralazine 25 mg twice per day  · In March 2022, she felt the hydralazine was making her rash/itching worse.  I stopped the hydralazine and placed her on spironolactone.  · In June 2022, spironolactone was discontinued because she felt like it was keeping her up at nighttime, she felt tired, and developed hyponatremia.  · In September 2022, I spoke with her she reported that she had had a rash for 9 months and was following with dermatology. Dermatology feels that she is having a rash from her medications    Dr. Larose would like us to try to figure out what medication is causing the rash.  He was concerned about hydralazine.  However, the rash was present before she even started hydralazine.  She is scheduled to see you on 11/30.

## 2022-11-17 NOTE — TELEPHONE ENCOUNTER
With a persistent rash that has not changed despite changes in her medication, I'm having a hard time calling it a drug rash...  Has she had a biopsy? I think that would be the first step.     CECE

## 2022-11-21 ENCOUNTER — TELEPHONE (OUTPATIENT)
Dept: ONCOLOGY | Facility: CLINIC | Age: 82
End: 2022-11-21

## 2022-11-21 NOTE — TELEPHONE ENCOUNTER
Caller: [facility]    Best call back number: [phone]    What is the best time to reach you: ANYTIME    Who are you requesting to speak with (clinical staff, provider,  specific staff member): /NURSE    Do you know the name of the person who called: RONALDO    What was the call regarding: DR. PATEL IS NEEDING LAB RESULTS FROM 11-17 TO BE FAXED [phone]    Do you require a callback: YES

## 2022-11-30 ENCOUNTER — OFFICE VISIT (OUTPATIENT)
Dept: CARDIOLOGY | Facility: CLINIC | Age: 82
End: 2022-11-30

## 2022-11-30 VITALS
HEART RATE: 79 BPM | WEIGHT: 141.4 LBS | DIASTOLIC BLOOD PRESSURE: 88 MMHG | HEIGHT: 66 IN | SYSTOLIC BLOOD PRESSURE: 182 MMHG | BODY MASS INDEX: 22.73 KG/M2

## 2022-11-30 DIAGNOSIS — I49.1 APC (ATRIAL PREMATURE CONTRACTIONS): ICD-10-CM

## 2022-11-30 DIAGNOSIS — I35.1 NONRHEUMATIC AORTIC VALVE INSUFFICIENCY: ICD-10-CM

## 2022-11-30 DIAGNOSIS — R21 RASH AND OTHER NONSPECIFIC SKIN ERUPTION: ICD-10-CM

## 2022-11-30 DIAGNOSIS — I10 ESSENTIAL HYPERTENSION: Primary | Chronic | ICD-10-CM

## 2022-11-30 DIAGNOSIS — I51.89 GRADE II DIASTOLIC DYSFUNCTION: ICD-10-CM

## 2022-11-30 PROCEDURE — 99214 OFFICE O/P EST MOD 30 MIN: CPT | Performed by: INTERNAL MEDICINE

## 2022-11-30 PROCEDURE — 93000 ELECTROCARDIOGRAM COMPLETE: CPT | Performed by: INTERNAL MEDICINE

## 2022-11-30 NOTE — PROGRESS NOTES
Date of Office Visit: 22  Encounter Provider: Bacilio Hernández MD  Place of Service: [facility] CARDIOLOGY  Patient Name: Karli Loomis  :1940    Chief Complaint   Patient presents with   • Hypertension   :   HPI:     Ms. Loomis is 82 y.o. and presents today to follow up. I have reviewed prior notes and there are no changes except for any new updates described below. I have also reviewed any information entered into the medical record by the patient or by ancillary staff.     She has a history of right sided breast cancer treated. She underwent mastectomy and took an aromatase inhibitor for five years, which was completed in 2019. She did not receive chemotherapy or radiation. She has Waldenstrom's macroglobulinemia and was treated with rituximab, bortezomib, and venetoclax. She has hypertension, treated with losartan and metoprolol succinate. I can see in Epic that she has been on minoxidil and amlodipine in the past. She does not have diabetes or hyperlipidemia.     She presented in May 2021 with severe exertional fatigue and weakness, as well as episodes of profound fatigue and some lightheadedness.  I wanted to see how her heart rate responded to exercise as she was heavily beta blocked.  She was able to walk on a treadmill for 2.5 minutes and her heart rate went from 70 to 80 bpm.  She had to stop due to weakness.  A nuclear perfusion stress test was negative for ischemia.  An echo revealed normal left ventricular systolic function, grade 2 diastolic dysfunction, and moderate aortic insufficiency.  A 2-week heart monitor was performed after her metoprolol dose was decreased and revealed no rhythm disturbances or pauses.  Her average heart rate was 62 bpm, with a range of 45 to 174 bpm.    The severely weak episode stopped when I decreased her metoprolol from 100 mg daily to 50 mg daily.  However, her blood pressure remained high, so terazosin was added.  Around that  same time, she developed a rash, so the medicine was stopped. Amlodipine was increased, but she developed leg swelling. We tried spironolactone and it caused polyuria. She has continued to have this pruritic rash and has been repeatedly told that it was a drug eruption. She recently had a biopsy but hasn't heard the results. She was started on oral steroids a week ago. She feels jittery, isn't sleeping well, just doesn't feel like herself.     Her BP at home is ~160-180 mm Hg. Her home cuff reads 30-40 points too high.     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   • Epilepsy with simple partial seizures (HCC) 12/14/2017   • Headache, migraine 12/14/2017   • History of transfusion of packed red blood cells     R/T SURGERY   • Hyperlipidemia    • Malignant neoplasm of overlapping sites of left breast in female, estrogen receptor positive (HCC) 4/13/2016   • Nonrheumatic aortic valve insufficiency    • Primary osteoarthritis of left knee 7/22/2015   • Skin cancer    • Thyroid nodule     Removed more than 35 years ago   • TIA (transient ischemic attack) 10/18/2018   • UTI due to extended-spectrum beta lactamase (ESBL) producing Escherichia coli 1/6/2019       Past Surgical History:   Procedure Laterality Date   • ADENOIDECTOMY     • APPENDECTOMY     • CARPAL TUNNEL RELEASE Right    • CATARACT EXTRACTION Bilateral    • CHOLECYSTECTOMY     • COLONOSCOPY     • HYSTERECTOMY      Partial, many years ago, heavy bleeding, no cancer   • KNEE ARTHROSCOPY Right 03/2009    Finding of chondromalacia and appropriate cleanup of joint was performed by Dr. Moraes.   • MASTECTOMY Left 07/2014   • REPLACEMENT TOTAL KNEE Right 12/2015   • SKIN CANCER EXCISION      several   • TONSILLECTOMY          Social History     Socioeconomic History   • Marital status:    Tobacco Use   • Smoking status: Never   • Smokeless tobacco: Never   Vaping Use   • Vaping Use: Never used   Substance and Sexual Activity   • Alcohol use: No     Comment: 2 cups caffeine/coffee daily   • Drug use: No   • Sexual activity: Defer       Family History   Problem Relation Age of Onset   • Mental illness Mother    • Migraines Mother    • Dementia Mother    • Heart disease Father    • Hypertension Father    • Kidney disease Father    • Alcohol abuse Father    • No Known Problems Daughter    • No Known Problems Daughter    • Breast cancer Other    • Heart disease Other    • Hypertension Other    • Diabetes Other    • Cancer Maternal Grandmother    • Breast cancer Maternal Grandmother    • No Known Problems Maternal Grandfather    • No Known Problems Paternal Grandmother    • No Known Problems Paternal Grandfather    • Hyperlipidemia Son    • Lymphoma Neg Hx    • Leukemia Neg Hx        Review of Systems   Constitutional: Positive for malaise/fatigue.   Skin: Positive for itching and rash.   Psychiatric/Behavioral: The patient has insomnia and is nervous/anxious.    All other systems reviewed and are negative.      Allergies   Allergen Reactions   • Cortisone Unknown - High Severity     Reports having a shot years ago, and wonders if he hit a vein. She was told by another doctor that it probably went in her blood stream.    • Hytrin [Terazosin] Rash   • Amlodipine Rash   • Darvon  [Propoxyphene] Palpitations         Current Outpatient Medications:   •  aspirin 81 MG tablet, Take 81 mg by mouth Daily., Disp: , Rfl:   •  cetirizine (zyrTEC) 10 MG tablet, Take 10 mg by mouth Daily., Disp: , Rfl:   •  gabapentin (NEURONTIN) 400 MG capsule, TAKE 1 CAPSULE FOUR TIMES DAILY, Disp: 360 capsule, Rfl: 0  •  hydrALAZINE (APRESOLINE) 50 MG tablet, Take 1 tablet by mouth 3 (Three) Times a Day., Disp: 90 tablet, Rfl: 1  •  levothyroxine  "(SYNTHROID, LEVOTHROID) 50 MCG tablet, TAKE 1 TABLET EVERY DAY, Disp: 90 tablet, Rfl: 1  •  losartan (COZAAR) 100 MG tablet, Take 1 tablet by mouth Daily., Disp: 90 tablet, Rfl: 3  •  montelukast (Singulair) 5 MG chewable tablet, Chew 1 tablet Every Night., Disp: 30 tablet, Rfl: 3  •  predniSONE (DELTASONE) 10 MG tablet, TAKE IT AT BREAKFAST, Disp: 30 tablet, Rfl: 2  •  Symbicort 160-4.5 MCG/ACT inhaler, Inhale 160 puffs 2 (Two) Times a Day. One in the morning and one in the evening, Disp: , Rfl:   •  triamcinolone (KENALOG) 0.1 % ointment, Apply 1 application topically to the appropriate area as directed 2 (Two) Times a Day., Disp: 80 g, Rfl: 3      Objective:     Vitals:    11/30/22 1331   BP: (!) 182/88   BP Location: Right arm   Pulse: 79   Weight: 64.1 kg (141 lb 6.4 oz)   Height: 167.6 cm (65.98\")     Body mass index is 22.84 kg/m².    Constitutional:       Appearance: Healthy appearance. Not in distress.   Eyes:      Conjunctiva/sclera: Conjunctivae normal.   HENT:      Nose: Nose normal.         Comments: Masked  Neck:      Vascular: JVD normal.      Lymphadenopathy: No cervical adenopathy.   Pulmonary:      Effort: Pulmonary effort is normal.      Breath sounds: Normal breath sounds.   Chest:      Comments: Left mastectomy  Cardiovascular:      Normal rate. Regular rhythm.      Murmurs: There is no murmur.   Pulses:     Intact distal pulses.   Edema:     Peripheral edema absent.   Abdominal:      Palpations: Abdomen is soft.      Tenderness: There is no abdominal tenderness.   Musculoskeletal: Normal range of motion.      Cervical back: Normal range of motion. Skin:     General: Skin is warm and dry.   Neurological:      General: No focal deficit present.      Mental Status: Alert and oriented to person, place and time.           ECG 12 Lead    Date/Time: 11/30/2022 2:31 PM  Performed by: Bacilio Hernández MD  Authorized by: Bacilio Hernández MD   Comparison: compared with previous ECG   Similar to previous " ECG  Rhythm: sinus rhythm  Ectopy: atrial premature contractions  Conduction: conduction normal  ST Segments: ST segments normal  T Waves: T waves normal  QRS axis: normal  Other: no other findings    Clinical impression: non-specific ECG              Assessment:       Diagnosis Plan   1. Essential hypertension        2. Grade II diastolic dysfunction        3. APC (atrial premature contractions)        4. Nonrheumatic aortic valve insufficiency        5. Rash and other nonspecific skin eruption               Plan:       She has severe hypertension. She runs a low HR when on AVN blockers so we have avoided them.  She developed swelling with amlodipine and nocturia/polyuria with spironolactone.  She developed a rash after being started on Sky in.  However, the rash did not go away when the medication was stopped and the rash has persisted ever since.  She recently had a biopsy but the results are pending.  She was placed on oral steroids, which are helping, but she is really jittery and not sleeping.  She is very anxious.  That is surely what is driving up her blood pressure today.  I also discovered that her home blood pressure cuff reads 30-40 points higher than her actual blood pressure.    Her goal blood pressure is around 140 mmHg.  I am not making any changes to her medicines today as she is so sensitive to them and as I suspect that her blood pressure will get better as the steroids get out of her system.    She had moderate aortic insufficiency in June 2022; we will repeat this next year.     Sincerely,       Bacilio Hernández MD

## 2022-12-02 ENCOUNTER — TELEPHONE (OUTPATIENT)
Dept: ONCOLOGY | Facility: CLINIC | Age: 82
End: 2022-12-02

## 2022-12-02 ENCOUNTER — OFFICE VISIT (OUTPATIENT)
Dept: FAMILY MEDICINE CLINIC | Facility: CLINIC | Age: 82
End: 2022-12-02

## 2022-12-02 VITALS
OXYGEN SATURATION: 98 % | WEIGHT: 139.5 LBS | TEMPERATURE: 96.6 F | SYSTOLIC BLOOD PRESSURE: 122 MMHG | HEART RATE: 76 BPM | DIASTOLIC BLOOD PRESSURE: 64 MMHG | RESPIRATION RATE: 18 BRPM | HEIGHT: 66 IN | BODY MASS INDEX: 22.42 KG/M2

## 2022-12-02 DIAGNOSIS — E03.9 ADULT HYPOTHYROIDISM: Primary | ICD-10-CM

## 2022-12-02 DIAGNOSIS — R21 RASH: ICD-10-CM

## 2022-12-02 DIAGNOSIS — I10 ESSENTIAL HYPERTENSION: ICD-10-CM

## 2022-12-02 DIAGNOSIS — Z13.220 SCREENING FOR LIPOID DISORDERS: ICD-10-CM

## 2022-12-02 PROCEDURE — 99214 OFFICE O/P EST MOD 30 MIN: CPT | Performed by: NURSE PRACTITIONER

## 2022-12-02 NOTE — TELEPHONE ENCOUNTER
Caller: Karli Loomis    Relationship: Self    Best call back number: [phone]    What is the best time to reach you: ANYTIME    Who are you requesting to speak with (clinical staff, provider,  specific staff member): DR BURNETTE OR NURSE    What was the call regarding: PT STATED THAT SHE STOPPED HER PREDNISONE YESTERDAY - SHE DIDN'T LIKE THE WAY IT WAS MAKING HER FEEL, FELT LIKE SHE COULDN'T SIT STILL.     PLEASE CALL PT TO ADVISE.     Do you require a callback: YES

## 2022-12-02 NOTE — TELEPHONE ENCOUNTER
Called and spoke w/ patient and discussed w/ Dr. Larose. Gave patient new directions to take 5mg every other day. Pt v/u. Saba Zuniga RN    05-Oct-2018 11:38
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
## 2022-12-19 ENCOUNTER — TELEPHONE (OUTPATIENT)
Dept: ONCOLOGY | Facility: CLINIC | Age: 82
End: 2022-12-19

## 2022-12-19 NOTE — TELEPHONE ENCOUNTER
Reviewed patient chart and returned call to patient with approval to take the Doxycycline with her Prednisone.  She v/u.

## 2022-12-19 NOTE — TELEPHONE ENCOUNTER
Caller: Karli Loomis    Relationship: Self    Best call back number: [phone]    What is the best time to reach you: ANYTIME    Who are you requesting to speak with (clinical staff, provider,  specific staff member): CLINICAL     What was the call regarding: PT SEEN HER   DERMATOLOGIST AND HE PRESCRIBED HER DOXYCYCLINE HYCLAPE 100MG CAPSULES AND SHE WANTS TO KNOW CAN SHE STILL TAKE THE  PREDNISONE?    CALL TO DISCUSS    Do you require a callback: YES

## 2022-12-27 RX ORDER — HYDRALAZINE HYDROCHLORIDE 50 MG/1
50 TABLET, FILM COATED ORAL 3 TIMES DAILY
Qty: 90 TABLET | Refills: 1 | Status: SHIPPED | OUTPATIENT
Start: 2022-12-27 | End: 2023-02-23

## 2023-01-23 ENCOUNTER — LAB (OUTPATIENT)
Dept: LAB | Facility: HOSPITAL | Age: 83
End: 2023-01-23
Payer: MEDICARE

## 2023-01-23 ENCOUNTER — OFFICE VISIT (OUTPATIENT)
Dept: ONCOLOGY | Facility: CLINIC | Age: 83
End: 2023-01-23
Payer: MEDICARE

## 2023-01-23 VITALS
BODY MASS INDEX: 21.97 KG/M2 | RESPIRATION RATE: 16 BRPM | OXYGEN SATURATION: 99 % | HEART RATE: 73 BPM | HEIGHT: 66 IN | WEIGHT: 136.7 LBS | DIASTOLIC BLOOD PRESSURE: 84 MMHG | TEMPERATURE: 96.9 F | SYSTOLIC BLOOD PRESSURE: 132 MMHG

## 2023-01-23 DIAGNOSIS — R21 RASH AND OTHER NONSPECIFIC SKIN ERUPTION: ICD-10-CM

## 2023-01-23 DIAGNOSIS — Z17.0 MALIGNANT NEOPLASM OF OVERLAPPING SITES OF LEFT BREAST IN FEMALE, ESTROGEN RECEPTOR POSITIVE: ICD-10-CM

## 2023-01-23 DIAGNOSIS — C50.812 MALIGNANT NEOPLASM OF OVERLAPPING SITES OF LEFT BREAST IN FEMALE, ESTROGEN RECEPTOR POSITIVE: ICD-10-CM

## 2023-01-23 DIAGNOSIS — L12.0 BULLOUS PEMPHIGOID: ICD-10-CM

## 2023-01-23 DIAGNOSIS — C88.0 MACROGLOBULINEMIA OF WALDENSTROM: ICD-10-CM

## 2023-01-23 DIAGNOSIS — C88.0 MACROGLOBULINEMIA OF WALDENSTROM: Primary | ICD-10-CM

## 2023-01-23 LAB
ALBUMIN SERPL-MCNC: 4.8 G/DL (ref 3.5–5.2)
ALBUMIN/GLOB SERPL: 1.8 G/DL (ref 1.1–2.4)
ALP SERPL-CCNC: 78 U/L (ref 38–116)
ALT SERPL W P-5'-P-CCNC: 13 U/L (ref 0–33)
ANION GAP SERPL CALCULATED.3IONS-SCNC: 13.3 MMOL/L (ref 5–15)
AST SERPL-CCNC: 23 U/L (ref 0–32)
BASOPHILS # BLD AUTO: 0.04 10*3/MM3 (ref 0–0.2)
BASOPHILS NFR BLD AUTO: 0.7 % (ref 0–1.5)
BILIRUB SERPL-MCNC: 0.7 MG/DL (ref 0.2–1.2)
BUN SERPL-MCNC: 10 MG/DL (ref 6–20)
BUN/CREAT SERPL: 12.3 (ref 7.3–30)
CALCIUM SPEC-SCNC: 10.6 MG/DL (ref 8.5–10.2)
CHLORIDE SERPL-SCNC: 96 MMOL/L (ref 98–107)
CO2 SERPL-SCNC: 25.7 MMOL/L (ref 22–29)
CREAT SERPL-MCNC: 0.81 MG/DL (ref 0.6–1.1)
DEPRECATED RDW RBC AUTO: 45.3 FL (ref 37–54)
EGFRCR SERPLBLD CKD-EPI 2021: 72.1 ML/MIN/1.73
EOSINOPHIL # BLD AUTO: 0.34 10*3/MM3 (ref 0–0.4)
EOSINOPHIL NFR BLD AUTO: 6.1 % (ref 0.3–6.2)
ERYTHROCYTE [DISTWIDTH] IN BLOOD BY AUTOMATED COUNT: 13.2 % (ref 12.3–15.4)
GLOBULIN UR ELPH-MCNC: 2.7 GM/DL (ref 1.8–3.5)
GLUCOSE SERPL-MCNC: 98 MG/DL (ref 74–124)
HCT VFR BLD AUTO: 42.8 % (ref 34–46.6)
HGB BLD-MCNC: 14.4 G/DL (ref 12–15.9)
IMM GRANULOCYTES # BLD AUTO: 0.01 10*3/MM3 (ref 0–0.05)
IMM GRANULOCYTES NFR BLD AUTO: 0.2 % (ref 0–0.5)
LYMPHOCYTES # BLD AUTO: 1.19 10*3/MM3 (ref 0.7–3.1)
LYMPHOCYTES NFR BLD AUTO: 21.4 % (ref 19.6–45.3)
MCH RBC QN AUTO: 31.4 PG (ref 26.6–33)
MCHC RBC AUTO-ENTMCNC: 33.6 G/DL (ref 31.5–35.7)
MCV RBC AUTO: 93.2 FL (ref 79–97)
MONOCYTES # BLD AUTO: 0.43 10*3/MM3 (ref 0.1–0.9)
MONOCYTES NFR BLD AUTO: 7.7 % (ref 5–12)
NEUTROPHILS NFR BLD AUTO: 3.55 10*3/MM3 (ref 1.7–7)
NEUTROPHILS NFR BLD AUTO: 63.9 % (ref 42.7–76)
NRBC BLD AUTO-RTO: 0 /100 WBC (ref 0–0.2)
PLATELET # BLD AUTO: 147 10*3/MM3 (ref 140–450)
PMV BLD AUTO: 9.7 FL (ref 6–12)
POTASSIUM SERPL-SCNC: 3.7 MMOL/L (ref 3.5–4.7)
PROT SERPL-MCNC: 7.5 G/DL (ref 6.3–8)
RBC # BLD AUTO: 4.59 10*6/MM3 (ref 3.77–5.28)
SODIUM SERPL-SCNC: 135 MMOL/L (ref 134–145)
WBC NRBC COR # BLD: 5.56 10*3/MM3 (ref 3.4–10.8)

## 2023-01-23 PROCEDURE — 36415 COLL VENOUS BLD VENIPUNCTURE: CPT

## 2023-01-23 PROCEDURE — 80053 COMPREHEN METABOLIC PANEL: CPT

## 2023-01-23 PROCEDURE — 85025 COMPLETE CBC W/AUTO DIFF WBC: CPT

## 2023-01-23 PROCEDURE — 99214 OFFICE O/P EST MOD 30 MIN: CPT | Performed by: INTERNAL MEDICINE

## 2023-01-23 RX ORDER — NIACINAMIDE 500 MG
500 TABLET ORAL 3 TIMES DAILY
COMMUNITY
Start: 2022-12-15

## 2023-01-23 RX ORDER — PREDNISONE 10 MG/1
10 TABLET ORAL EVERY OTHER DAY
Qty: 60 TABLET | Refills: 2 | Status: SHIPPED | OUTPATIENT
Start: 2023-01-23

## 2023-01-23 NOTE — PROGRESS NOTES
Subjective    REASONS FOR FOLLOWUP:     1. History of Waldenstrom macroglobulinemia.    2. History of breast cancer stage I on the left, status post mastectomy. The patient completed aromatase inhibitor  X 5 YEARS IN 2019 without any evidence of breast cancer recurrence        History of Present Illness      DURING THE VISIT WITH THE PATIENT TODAY , PATIENT HAD FACE MASK, I HAD PROPER PROTECTIVE EQUIPMENT, AND I DID HAND HYGIENE WITH SOAP AND WATER BEFORE AND AFTER THE VISIT.     On 01/23/2023 this 80-year-old female returns to the office for follow up of her Waldenstrom's macroglobulinemia that has been watched in absence of any other intervention. In the past she has been treated with Rituxan and she also has received oral medication with terrible side effects. The patient also has been treated with Velcade that gave her a terrible peripheral neuropathy. Since the previous visit we have been watching the development of her skin rash that has been photographed and discussed with her dermatologist on multiple occasions during the last 3 visits. Finally after the last visit the patient was so bad when she called us back a few days later after the visit that I put her on prednisone at the dose of 10 mg everyday. She took the medicine, this made her hyper, then we modified the dose to 10 mg every other day and since then she has had a very substantial improvement in her skin including most of the lesions on her back have calmed down and the lesions in the left leg have improved, the right leg has remained active but is less intense in severity as before. The dramatic itching that she was experiencing has dramatically improved to the point of near complete resolution and she does not sped all night long scratching. She still has an element of insomnia though. This was present even before the prednisone was initiated. Finally she was seen again by her dermatologist who finally has confirmed a diagnosis of bullous  pemphigoid and has provided her medication to start with doxycycline and niacinamide that the patient has not started yet. Overall the patient has had more strength, more ability to walk, her appetite has improved, she feels better. She has good bowel activity, proper urination. No cardiovascular or respiratory issues.               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/14/2017   • Epilepsy with simple partial seizures (HCC) 12/14/2017   • Headache, migraine 12/14/2017   • History of transfusion of packed red blood cells     R/T SURGERY   • Hyperlipidemia    • Malignant neoplasm of overlapping sites of left breast in female, estrogen receptor positive (HCC) 4/13/2016   • Nonrheumatic aortic valve insufficiency    • Primary osteoarthritis of left knee 7/22/2015   • Skin cancer    • Thyroid nodule     Removed more than 35 years ago   • TIA (transient ischemic attack) 10/18/2018   • UTI due to extended-spectrum beta lactamase (ESBL) producing Escherichia coli 1/6/2019     Past Surgical History:   Procedure Laterality Date   • ADENOIDECTOMY     • APPENDECTOMY     • CARPAL TUNNEL RELEASE Right    • CATARACT EXTRACTION Bilateral    • CHOLECYSTECTOMY     • COLONOSCOPY     • HYSTERECTOMY      Partial, many years ago, heavy bleeding, no cancer   • KNEE ARTHROSCOPY Right 03/2009    Finding of chondromalacia and appropriate cleanup of joint was performed by Dr. Moraes.   • MASTECTOMY Left 07/2014   • REPLACEMENT TOTAL KNEE Right 12/2015   • SKIN CANCER EXCISION      several   • TONSILLECTOMY           Social History     Socioeconomic History   • Marital status:    Tobacco Use   • Smoking status: Never   • Smokeless tobacco: Never   Vaping Use   • Vaping Use: Never used   Substance  and Sexual Activity   • Alcohol use: No     Comment: 2 cups caffeine/coffee daily   • Drug use: No   • Sexual activity: Defer     Family History   Problem Relation Age of Onset   • Mental illness Mother    • Migraines Mother    • Dementia Mother    • Heart disease Father    • Hypertension Father    • Kidney disease Father    • Alcohol abuse Father    • No Known Problems Daughter    • No Known Problems Daughter    • Breast cancer Other    • Heart disease Other    • Hypertension Other    • Diabetes Other    • Cancer Maternal Grandmother    • Breast cancer Maternal Grandmother    • No Known Problems Maternal Grandfather    • No Known Problems Paternal Grandmother    • No Known Problems Paternal Grandfather    • Hyperlipidemia Son    • Lymphoma Neg Hx    • Leukemia Neg Hx      Current Outpatient Medications on File Prior to Visit   Medication Sig Dispense Refill   • aspirin 81 MG tablet Take 81 mg by mouth Daily.     • cetirizine (zyrTEC) 10 MG tablet Take 10 mg by mouth Daily.     • gabapentin (NEURONTIN) 400 MG capsule TAKE 1 CAPSULE FOUR TIMES DAILY 360 capsule 0   • hydrALAZINE (APRESOLINE) 50 MG tablet Take 1 tablet by mouth 3 (Three) Times a Day. 90 tablet 1   • levothyroxine (SYNTHROID, LEVOTHROID) 50 MCG tablet TAKE 1 TABLET EVERY DAY 90 tablet 1   • losartan (COZAAR) 100 MG tablet Take 1 tablet by mouth Daily. 90 tablet 3   • niacinamide 500 MG tablet Take 500 mg by mouth 3 (Three) Times a Day.     • Symbicort 160-4.5 MCG/ACT inhaler Inhale 160 puffs 2 (Two) Times a Day. One in the morning and one in the evening     • [DISCONTINUED] predniSONE (DELTASONE) 10 MG tablet TAKE IT AT BREAKFAST 30 tablet 2   • montelukast (Singulair) 5 MG chewable tablet Chew 1 tablet Every Night. 30 tablet 3   • triamcinolone (KENALOG) 0.1 % ointment Apply 1 application topically to the appropriate area as directed 2 (Two) Times a Day. 80 g 3     No current facility-administered medications on file prior to visit.       ALLERGIES:   "  Allergies   Allergen Reactions   • Cortisone Unknown - High Severity     Reports having a shot years ago, and wonders if he hit a vein. She was told by another doctor that it probably went in her blood stream.    • Hytrin [Terazosin] Rash   • Amlodipine Rash   • Darvon  [Propoxyphene] Palpitations             Objective      Vitals:    01/23/23 1129   BP: 132/84   Pulse: 73   Resp: 16   Temp: 96.9 °F (36.1 °C)   TempSrc: Temporal   SpO2: 99%   Weight: 62 kg (136 lb 11.2 oz)   Height: 167.6 cm (65.98\")   PainSc: 0-No pain     Current Status 1/23/2023   ECOG score 0         Physical Exam                   GENERAL:  Well-developed, Patient  in no acute distress.   SKIN:  Warm, dry ,NO purpura .Most of the areas of the rash on her back, buttocks and upper extremities are resolved. There is only minimal rash related into her right lower extremity. Her left lower extremity lesions have substantially improved. Please review photograph that I obtained and placed in media.      HEENT:  Pupils were equal and reactive to light and accomodation, conjunctivae noninjected,  normal visual acuity.   NECK:  Supple with good range of motion; no thyromegaly , no JVD or bruits,.No carotid artery pain, no carotid abnormal pulsation   LYMPHATICS:  No cervical, NO supraclavicular, NO axillary, NO inguinal adenopathies.  CARDIAC   normal rate , regular rhythm, without murmur,NO rubs NO S3 NO S4   LUNGS: normal breath sounds bilateral, no wheezing, NO rhonchi, NO crackles ,NO rubs.  VASCULAR VENOUS: no cyanosis, NO collateral circulation, NO varicosities, NO edema, NO palpable cords, NO pain,NO erythema, NO pigmentation of the skin.  ABDOMEN:  Soft, NO pain,no hepatomegaly, no splenomegaly,no masses, no ascites, no collateral circulation,no distention.  EXTREMITIES  AND SPINE:  No clubbing, no cyanosis ,no deformities , no pain .t spine kyphosis,  no pain in spine, no pain in ribs , no pain in pelvic bone.  NEUROLOGICAL:  Patient was " awake, alert, oriented to time, person and place.                            RECENT LABS:  Results from last 7 days   Lab Units 01/23/23  1106   WBC 10*3/mm3 5.56   NEUTROS ABS 10*3/mm3 3.55   HEMOGLOBIN g/dL 14.4   HEMATOCRIT % 42.8   PLATELETS 10*3/mm3 147             Assessment & Plan      1. Waldenstrom macroglobulinemia that has been treated in the past mainly with Rituxan. In the past, also she was treated with Velcade with very dramatic improvement in her monoclonal protein but very dramatic sensory peripheral neuropathy. This medication was discontinued altogether.    6/29/2020, she developed progressive fatigue with worsening neuropathy in her feet.  These were her original symptoms at diagnosis of Waldenstrom's.  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  Calcium also elevated at 10.5 with decreased sodium related to pseudohyponatremia associated with monoclonal protein.  Previously, she did not receive improvement from Rituxan, she is not thought to be a candidate for Imbruvica, therefore she went on to initiate venetoclax 7/13/2020.  She is currently on her next planned dose of 200 mg daily with poor tolerance as outlined below  • I discussed with her on 04/28/2021 that I do not believe given the information that we have with a sedimentation rate that was normal and a stable monoclonal protein IgM that the Waldenstrom could be the most focal reason to explain her fatigue. On the other hand it is impossible given her thrombocytopenia to be sure that she still has some component of lymphoma in her bone marrow and the only way to prove this will be to pursue in a bone marrow test. She does not believe that she is ready for this at this time.     Radiologically speaking the CT scan of the chest, abdomen and pelvis failed to document any lymphadenopathy, masses, splenomegaly or any other issue pertinent to her Waldenstrom.  • I discussed with her on 06/02/2021 that I do not feel that the symptoms of  fatigue are related to her Waldenström's at this time. Her monoclonal protein study has remained completely quiet. Her white count and hemoglobin are normal. Her B12, folic acid, ferritin, iron profile remain normal and TSH hs remained into the normal limits. Her sed rate was normal at 4 mm/hr and she has no obvious infection or inflammatory process.   • On 08/25/2021 the patient's monoclonal protein has remained very stable as discussed with her during the previous weeks. We have another level pending today. One more analysis that I decided to pursue was an amyloid analysis and abdominal fat pad biopsy. This was performed in late 06/2021 by Deric Llamas MD. Material was sent to [facility]. No [place] red material was found in this specimen. Therefore the possibility of amyloidosis is less likely under these circumstances.     We have run CT scans, laboratory testing and all sorts of things trying to explain her fatigue without having a definitive answer. I think a lot of this is lack of training.    My recommendation in regard to her Waldenstrom's is to continue monitoring her parameters with CBC, CMP and monoclonal protein studies in 3 months.   • The patient was further reviewed on 11/17/2021 in regard to her Waldenstrom's. She does not have any symptoms pertinent to this. There is no abnormal bleeding, there is no blurred vision, she has no peripheral adenopathy, there is no hepatosplenomegaly. Her fatigue is no different than before but she is able to do some housework. Her white count, hemoglobin and platelets are stable. We have pending monoclonal protein studies that will be called to her once that they become available. I find no need to pursue any intervention from this point of view at this time.   • The patient was reviewed on 12/17/2021. I do not see any symptoms or signs of her Waldenstrom's. Her white count, hemoglobin, platelets, white count differential are normal. Her chemistry profile is  pending. I find no reason to proceed with any other therapy for this condition. Her monoclonal protein has remained quiet, stable.   • On 03/11/2022 the patient’s white count, hemoglobin and platelets are normal. She has minimal sensory neuropathy in her feet that is no worse than before. She has no fever, chills or infections. No clinical bleeding. Her monoclonal protein IgM has remained very stable. She has not required any other intervention from my side of the story and she will be watched in absence of any other intervention. She will be called at home with the report of her monoclonal protein study next week.  On 11/02/2022 the patient has not had any clinical bleeding, no febrile illness, no peripheral adenopathy, no blurred vision, and her white count, hemoglobin and platelets remain stable in regard to her previous history of Waldenstrom. On the other hand the patient has this very peculiar maculopapular rash with multiple areas of ulceration not only in her trunk but also in her face, upper and lower extremities. The areas in the trunk are the worse, especially on her backside. Photographs were obtained and placed in media by Dr. Larose.     In spite of seeing dermatologist, a skin biopsy has not been done. The patient continues blaming this rash to her blood pressure medications. I went back into time in 2020 to see if we can find what she was taking before. She was not having any rash but she has been worse from this point of view in the last several weeks.     I think independent of what she is going to do with her life very likely she needs to have a different approach to the diagnosis of her rash in the skin and I strongly believe there is a connection between the rash and her Waldenstrom. I have placed a phone call to discuss this with the patient's dermatologist. I strongly believe that she will require skin biopsy looking for specific skin entities as well as looking for pemphigoid.    I went ahead  and scheduled the patient to have a CBC, CMP in 2 weeks, perform serum protein immunoelectrophoresis today and she will be called with report of this. I also did analysis in regard to other infections.     In regard to her fatigue, I think it is connected to what goes on in the lung. 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     With all this said I did not prescribe any medications for her because I do not want to modify the characteristic of the rash .     She has similar lesions in her chest wall, similar lesion in her buttock and back of the spine and upper and lower extremities.  On 11/17/2022 the patient has continued having extensive lesions in the skin. We have measured her monoclonal protein that has not changed and she has not required any therapy for this for a long time. I measured her IgE level that was 1370 and any number above 500 is very abnormal. 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 Amlodipine has been discontinued. Obviously the question is what is the next medicine that needs to be discontinued and my next candidate given the characteristics of medication is hydralazine.    I think the patient needs to have a refurbish of all of her blood pressure medications, clear up all of these things and I wonder how much of her continuous worrying is collaborating to blood pressure to remain out of control.     I have placed phone calls to discuss the case with LENKA Garcia, for Cardiology team and LENKA Jackson, Family Practice. I would like to be on the same page with all of them and that way we act upon in regard to all of these issues. I went ahead and advised the patient to initiate a program of prednisone 10 mg at breakfast time and Singulair 5 mg at  bedtime.     The patient will continue putting topical moisturizer on the skin. She is not using any topical steroid at this time.     I reviewed with the patient the CT scan of the chest, abdomen and pelvis that shows nothing related to lymphoma malignancy, myeloma or things of this nature. In fact her lung anatomy, cardiac anatomy, kidneys, pancreas, bowel anatomies are very much unremarkable including also the spleen and bone anatomies. There are no adenopathies, there is no tumoral lesions to speak of.     I also checked on this patient's KARRIE that was negative.    I raised the question to Teresa Salgado MD, if she saw anything to suggest any form of vasculitis that was negative.     Therefore after all of these facts and these discussions back and forth and so much conversation we are ready to proceed to see if we change the outcome of this patient in regard all of these symptoms.   On 01/23/2023 the patient has not had any new symptoms pertinent to Waldenstrom's. She has no peripheral adenopathy, she has no hepatosplenomegaly, she has no bleeding, blurred vision.  Her white count, hemoglobin and platelets remain normal. White count differential is normal and we are waiting to review her monoclonal protein that during the previous visit was completely stable in regard to her IgM level. I expect that this condition will remain quiet and for this reason I advised her to return to see me back in 3 months with repeat CBC, CMP and monoclonal protein study.      •   •   •   •   •   ·   2.From the point of view of her breast cancer status post mastectomy on the left she completed her adjuvant therapy, mammogram is coming up in the end of 06/2021 on the right side. The left chest wall has remained unremarkable.  • On 08/25/2021 she has no symptoms or signs of breast cancer recurrence. Her mastectomy site on the left is completely healed. Her right breast examination is normal. She is up to date on her mammogram. This will  be watched in absence of any other intervention.   • On 11/17/2021 the patient's left sided mastectomy is well healed, right breast exam is normal. There is no evidence of breast cancer recurrence clinically. She will remain in observation.   • I find no symptoms or signs of breast cancer recurrence on her. This will be watched in absence of any other intervention as per 12/17/2021.   • On 03/11/2022 the patient has no symptoms or signs of breast cancer recurrence. Her right breast exam is normal. The left mastectomy site is normal. She will be watched in absence of any other intervention. She is up-to-date in right-sided mammogram.  On 11/02/2022 I do not find any symptoms or signs that would indicate breast cancer recurrence.  •   She is up-to-date in her mammogram .  On 1/17/2022 no issues pertinent to her previous history of breast cancer. This entity remains quiet clinically and radiologically. She will remain in observation from this point of view.   On 01/23/2023 the patient was further reviewed in regard to her history of breast cancer, her mastectomy site remains normal, she is up to date on her mammogram. In 06/2023 she will be due to have another one and she has no symptoms or signs that would indicate breast cancer recurrence. She will remain in observation from this point of view.       •   •   •   •   •   ·   • 3  • The next symptom is profound fatigue. We have looked for endocrinological diseases, medication side effects, infections, malignancies, Waldenstrom's, amyloid, cardiac disease and so forth not having any conclusion in regard to the nature of this. I do believe that the lack of bad news or not finding any other thing is good news to me and I pointed this out to the patient. Maybe by the end of the day her fatigue is lack of proper physical training. She has bought a treadmill device that will be in her house as per today and hopefully she will be able to do some walking on this that will be  meaningful in regard to recovering in regard energy.   • The patient believes that the degree of fatigue that she was experiencing before is substantially better and now she is able to do some house activity. I encouraged her to continue this in the long run, is the only solution for fatigue is physical activity.   • On 12/17/2021 the patient's fatigue has improved highly. She is now doing treadmill activity at home for 10 minutes once or twice a day. She has been eating better, she has lost some weight but she feels substantially better. I think the conditioning was probably the culprit of all of this related to the pandemia and I advised her to remain doing her treadmill activity twice a day if possible.   • On 03/11/2022 her fatigue is better since the previous visit.  She is doing some exercise on her treadmill and actually she believes that this has made a big difference. I encouraged her to remain doing this.  I also encouraged her to  her car, go and buy some groceries and that she can go to restaurants with her family to be outside if possible, and she can go to the beach if she can arrange this with her family.  It is time for her to leave her house, now 2 years after pandemia and immunization. I think a lot of her fatigue is being house confined.    On 11/02/2022 the fatigue continues and has become overwhelming. She does not want to do too much around the house anymore. She sits on the couch all the time. I do believe that the connection between the rash, the Waldenstrom and the rest of the symptoms is real and the patient has learned from us in regard to her overall future.     I will review her back in 2 weeks. I sent her back to the lab. We will give her some premedications for her visit to the dermatologist. I will place a call to discuss this with them. I still believe that there is a connection. I went ahead and scheduled CT scans. I sent her back to the lab to obtain an KARRIE, cryoglobulin,  cold agglutinins and also to obtain a CCP antibody and an KARRIE profile. We will need to have a skin biopsy also and I will discuss this further with dermatologist.  On 11/17/2022 a lot of the patient's fatigue is lack of sleep. She wakes up at 2-o'clock in the morning to dig into the skin of her lower extremities and she spends the rest of the night just digging and up and about. Hopefully with the Singulair taken at nighttime that also can facilitate sleep and taking the prednisone in the morning as posted above it could have a positive impact on her that maybe will allow her to function better.    I placed phone calls as stated for nurse practitioners for family practice and cardiology to be sure that everybody is on the same page in regard to the handling of this patient's problem.    I reviewed the report by pathologist in regard to her dermatology issue and I discussed personally with Teresa Salgado MD,  on the telephone this patient's situation and she agrees with new plan of care that was discussed with her on the telephone.   I also performed on this patient cold agglutinins that were negative, cryoglobulins that were negative and sedimentation rate and LDH that were normal against any inflammatory condition of any nature. I had discussion with Teresa Salgado MD, in regard any alterations in the skin to suggest any parasitic conditions like scabies or things of this nature. Everything was negative in this regard and this is not consistent clinically neither anyway.      I am planning to review the patient back in 2 months with repeat CBC, CMP, IgE level and serum protein immunoelectrophoresis.  On 01/23/2023 the patient was further reviewed in regard to her fatigue and her skin rash. Since the previous visit we started the patient on prednisone initially 10 mg once a day that she could not handle because of overactivity and then subsequently we moved to every other day. The patient has been taking this amount now  for almost 6-7 weeks. Since then the pruritus has disappeared and most of the rash of the skin has cleared with the exception of her right lower extremity that I took a picture of today.    In the meantime her Dermatologist, Teresa Christianson MD, has sent us information about bullous pemphigoid and my belief is probably Dr. Christianson has enough information available to believe that the patient has this condition. She prescribed doxycycline and nicotinic acid. The patient has not started those medicines yet. She raised the question to me if she needs to take them. My advice will be yes and I advised her to use the doxycycline 100 mg a day first and if in a week she feels okay the medicine then initiate the 2nd medication.     I sent her a prescription for prednisone 10 mg every other day. Maybe in the long run we will be able to decrease it to just 5 mg and see how things evolve.     As we mentioned before she still has had in the past a very high level of IgE, this is not pertinent to her lymphoma, it is pertinent to allergic reaction and we have another level of this pending today. My expectation is that this will be better.     Overall the patient looks and feels dramatically better. I encouraged her to remain on her prednisone and the rest of the medicines. I encouraged her to make another appointment to see Teresa Christianson MD, Dermatologist, and monitor the medications that she has been advised to take.       •   •   •                     Florencio Larose MD   1/23/2023      CC:

## 2023-01-25 LAB
ALBUMIN SERPL ELPH-MCNC: 3.9 G/DL (ref 2.9–4.4)
ALBUMIN/GLOB SERPL: 1.5 {RATIO} (ref 0.7–1.7)
ALPHA1 GLOB SERPL ELPH-MCNC: 0.3 G/DL (ref 0–0.4)
ALPHA2 GLOB SERPL ELPH-MCNC: 0.7 G/DL (ref 0.4–1)
B-GLOBULIN SERPL ELPH-MCNC: 0.9 G/DL (ref 0.7–1.3)
GAMMA GLOB SERPL ELPH-MCNC: 0.8 G/DL (ref 0.4–1.8)
GLOBULIN SER-MCNC: 2.7 G/DL (ref 2.2–3.9)
IGA SERPL-MCNC: 43 MG/DL (ref 64–422)
IGG SERPL-MCNC: 716 MG/DL (ref 586–1602)
IGM SERPL-MCNC: 230 MG/DL (ref 26–217)
INTERPRETATION SERPL IEP-IMP: ABNORMAL
KAPPA LC FREE SER-MCNC: 18.7 MG/L (ref 3.3–19.4)
KAPPA LC FREE/LAMBDA FREE SER: 2.28 {RATIO} (ref 0.26–1.65)
LABORATORY COMMENT REPORT: ABNORMAL
LAMBDA LC FREE SERPL-MCNC: 8.2 MG/L (ref 5.7–26.3)
M PROTEIN SERPL ELPH-MCNC: 0.3 G/DL
PROT SERPL-MCNC: 6.6 G/DL (ref 6–8.5)

## 2023-01-26 ENCOUNTER — TELEPHONE (OUTPATIENT)
Dept: ONCOLOGY | Facility: CLINIC | Age: 83
End: 2023-01-26
Payer: MEDICARE

## 2023-01-26 LAB — IGE SERPL-ACNC: 2466 IU/ML (ref 6–495)

## 2023-01-26 NOTE — TELEPHONE ENCOUNTER
Called patient and relayed message. Pt v/u. No other needs identified at this time. Saba Zuniga RN

## 2023-01-26 NOTE — TELEPHONE ENCOUNTER
----- Message from Florencio Larose MD sent at 1/25/2023  2:28 PM EST -----  CALL HER IG M PROTEIN STABLE GREAT KEEP THE SAME IN REGARD PREDNISONE

## 2023-02-10 ENCOUNTER — TELEPHONE (OUTPATIENT)
Dept: ONCOLOGY | Facility: CLINIC | Age: 83
End: 2023-02-10
Payer: MEDICARE

## 2023-02-10 RX ORDER — LANSOPRAZOLE 30 MG/1
30 TABLET, ORALLY DISINTEGRATING, DELAYED RELEASE ORAL
Qty: 30 TABLET | Refills: 2 | Status: SHIPPED | OUTPATIENT
Start: 2023-02-10 | End: 2023-02-10

## 2023-02-10 RX ORDER — LANSOPRAZOLE 30 MG/1
30 TABLET, ORALLY DISINTEGRATING, DELAYED RELEASE ORAL
Qty: 30 TABLET | Refills: 2 | Status: SHIPPED | OUTPATIENT
Start: 2023-02-10

## 2023-02-15 NOTE — TELEPHONE ENCOUNTER
PATIENT CALLED TO ADVISE HER [facility] PHARM[facility]Y INSURANCE WONT COVER THAT AND WANTED TO KNOW IF WE COULD CALL IN ANOTHER MEDICATION. PATIENT STATED THEY DIDN'T TELL WHAT MEDICATIONS THEY WOULD COVER

## 2023-02-15 NOTE — TELEPHONE ENCOUNTER
Called Karli to let her know Prevacid is OTC. Patient states she will either purchase it or if too expensive she will call insurance company to find out what proton pump inhibitors they will/if any cover. Patient v/u. Saba Zuniga RN

## 2023-02-20 ENCOUNTER — TELEPHONE (OUTPATIENT)
Dept: ONCOLOGY | Facility: CLINIC | Age: 83
End: 2023-02-20

## 2023-02-20 NOTE — TELEPHONE ENCOUNTER
Caller: Karli Loomis    Relationship: Self    Best call back number: [phone]    What is the best time to reach you: ANYTIME    Who are you requesting to speak with (clinical staff, provider,  specific staff member): MARIELA    What was the call regarding: PT CALLED TO LET US KNOW THAT PREVACID SAYS IT IS FOR HEARTBURN, BUT SHE DOES NOT HAVE HEARTBURN, JUST THE REFLUX.    Do you require a callback: YES, PT WANTS TO BE SURE THIS IS STILL OK/CORRECT TO TAKE.

## 2023-02-20 NOTE — TELEPHONE ENCOUNTER
Returned call to patient with the information that the Prevacid is appropriate for her to take.  She v/u.

## 2023-02-23 RX ORDER — HYDRALAZINE HYDROCHLORIDE 50 MG/1
TABLET, FILM COATED ORAL
Qty: 180 TABLET | Refills: 1 | Status: SHIPPED | OUTPATIENT
Start: 2023-02-23

## 2023-04-12 DIAGNOSIS — G62.0 DRUG-INDUCED POLYNEUROPATHY: ICD-10-CM

## 2023-04-13 RX ORDER — GABAPENTIN 400 MG/1
CAPSULE ORAL
Qty: 360 CAPSULE | Refills: 1 | Status: SHIPPED | OUTPATIENT
Start: 2023-04-13

## 2023-04-13 NOTE — TELEPHONE ENCOUNTER
Rx Refill Note  Requested Prescriptions     Pending Prescriptions Disp Refills   • gabapentin (NEURONTIN) 400 MG capsule [Pharmacy Med Name: GABAPENTIN 400 MG Capsule] 360 capsule      Sig: TAKE 1 CAPSULE FOUR TIMES DAILY      Last office visit with prescribing clinician: Visit date not found   Last telemedicine visit with prescribing clinician: 6/9/2023   Next office visit with prescribing clinician: Visit date not found                         Would you like a call back once the refill request has been completed: [] Yes [] No    If the office needs to give you a call back, can they leave a voicemail: [] Yes [] No    Brittani Acosta MA  04/13/23, 14:48 EDT

## 2023-04-17 ENCOUNTER — LAB (OUTPATIENT)
Dept: LAB | Facility: HOSPITAL | Age: 83
End: 2023-04-17
Payer: MEDICARE

## 2023-04-17 ENCOUNTER — OFFICE VISIT (OUTPATIENT)
Dept: ONCOLOGY | Facility: CLINIC | Age: 83
End: 2023-04-17
Payer: MEDICARE

## 2023-04-17 VITALS
HEIGHT: 66 IN | HEART RATE: 75 BPM | DIASTOLIC BLOOD PRESSURE: 78 MMHG | TEMPERATURE: 98.4 F | WEIGHT: 134.2 LBS | SYSTOLIC BLOOD PRESSURE: 134 MMHG | BODY MASS INDEX: 21.57 KG/M2 | OXYGEN SATURATION: 96 %

## 2023-04-17 DIAGNOSIS — C50.812 MALIGNANT NEOPLASM OF OVERLAPPING SITES OF LEFT BREAST IN FEMALE, ESTROGEN RECEPTOR POSITIVE: ICD-10-CM

## 2023-04-17 DIAGNOSIS — Z17.0 MALIGNANT NEOPLASM OF OVERLAPPING SITES OF LEFT BREAST IN FEMALE, ESTROGEN RECEPTOR POSITIVE: ICD-10-CM

## 2023-04-17 DIAGNOSIS — C88.0 MACROGLOBULINEMIA OF WALDENSTROM: ICD-10-CM

## 2023-04-17 DIAGNOSIS — N39.0 URINARY TRACT INFECTION WITH HEMATURIA, SITE UNSPECIFIED: Primary | ICD-10-CM

## 2023-04-17 DIAGNOSIS — R30.0 DYSURIA: Primary | ICD-10-CM

## 2023-04-17 DIAGNOSIS — R31.9 URINARY TRACT INFECTION WITH HEMATURIA, SITE UNSPECIFIED: Primary | ICD-10-CM

## 2023-04-17 DIAGNOSIS — R21 RASH AND OTHER NONSPECIFIC SKIN ERUPTION: ICD-10-CM

## 2023-04-17 LAB
ALBUMIN SERPL-MCNC: 4.3 G/DL (ref 3.5–5.2)
ALBUMIN/GLOB SERPL: 2 G/DL
ALP SERPL-CCNC: 71 U/L (ref 39–117)
ALT SERPL W P-5'-P-CCNC: 15 U/L (ref 1–33)
ANION GAP SERPL CALCULATED.3IONS-SCNC: 11 MMOL/L (ref 5–15)
AST SERPL-CCNC: 24 U/L (ref 1–32)
BACTERIA UR QL AUTO: ABNORMAL /HPF
BASOPHILS # BLD AUTO: 0.03 10*3/MM3 (ref 0–0.2)
BASOPHILS NFR BLD AUTO: 0.7 % (ref 0–1.5)
BILIRUB SERPL-MCNC: 0.6 MG/DL (ref 0–1.2)
BILIRUB UR QL STRIP: NEGATIVE
BUN SERPL-MCNC: 9 MG/DL (ref 8–23)
BUN/CREAT SERPL: 10.3 (ref 7–25)
CALCIUM SPEC-SCNC: 9.3 MG/DL (ref 8.6–10.5)
CHLORIDE SERPL-SCNC: 95 MMOL/L (ref 98–107)
CLARITY UR: CLEAR
CO2 SERPL-SCNC: 25 MMOL/L (ref 22–29)
COLOR UR: YELLOW
CREAT SERPL-MCNC: 0.87 MG/DL (ref 0.57–1)
DEPRECATED RDW RBC AUTO: 44.9 FL (ref 37–54)
EGFRCR SERPLBLD CKD-EPI 2021: 66.2 ML/MIN/1.73
EOSINOPHIL # BLD AUTO: 0.42 10*3/MM3 (ref 0–0.4)
EOSINOPHIL NFR BLD AUTO: 9.9 % (ref 0.3–6.2)
ERYTHROCYTE [DISTWIDTH] IN BLOOD BY AUTOMATED COUNT: 13.2 % (ref 12.3–15.4)
GLOBULIN UR ELPH-MCNC: 2.2 GM/DL
GLUCOSE SERPL-MCNC: 102 MG/DL (ref 65–99)
GLUCOSE UR STRIP-MCNC: NEGATIVE MG/DL
HCT VFR BLD AUTO: 39.8 % (ref 34–46.6)
HGB BLD-MCNC: 13.7 G/DL (ref 12–15.9)
HGB UR QL STRIP.AUTO: NEGATIVE
HYALINE CASTS UR QL AUTO: ABNORMAL /LPF
IMM GRANULOCYTES # BLD AUTO: 0.01 10*3/MM3 (ref 0–0.05)
IMM GRANULOCYTES NFR BLD AUTO: 0.2 % (ref 0–0.5)
KETONES UR QL STRIP: NEGATIVE
LEUKOCYTE ESTERASE UR QL STRIP.AUTO: ABNORMAL
LYMPHOCYTES # BLD AUTO: 0.96 10*3/MM3 (ref 0.7–3.1)
LYMPHOCYTES NFR BLD AUTO: 22.6 % (ref 19.6–45.3)
MCH RBC QN AUTO: 32 PG (ref 26.6–33)
MCHC RBC AUTO-ENTMCNC: 34.4 G/DL (ref 31.5–35.7)
MCV RBC AUTO: 93 FL (ref 79–97)
MONOCYTES # BLD AUTO: 0.41 10*3/MM3 (ref 0.1–0.9)
MONOCYTES NFR BLD AUTO: 9.7 % (ref 5–12)
NEUTROPHILS NFR BLD AUTO: 2.41 10*3/MM3 (ref 1.7–7)
NEUTROPHILS NFR BLD AUTO: 56.9 % (ref 42.7–76)
NITRITE UR QL STRIP: NEGATIVE
NRBC BLD AUTO-RTO: 0 /100 WBC (ref 0–0.2)
PH UR STRIP.AUTO: 6 [PH] (ref 4.5–8)
PLATELET # BLD AUTO: 140 10*3/MM3 (ref 140–450)
PMV BLD AUTO: 9.1 FL (ref 6–12)
POTASSIUM SERPL-SCNC: 3.8 MMOL/L (ref 3.5–5.2)
PROT SERPL-MCNC: 6.5 G/DL (ref 6–8.5)
PROT UR QL STRIP: ABNORMAL
RBC # BLD AUTO: 4.28 10*6/MM3 (ref 3.77–5.28)
RBC # UR STRIP: ABNORMAL /HPF
REF LAB TEST METHOD: ABNORMAL
SODIUM SERPL-SCNC: 131 MMOL/L (ref 136–145)
SP GR UR STRIP: 1.02 (ref 1–1.03)
SQUAMOUS #/AREA URNS HPF: ABNORMAL /HPF
UROBILINOGEN UR QL STRIP: ABNORMAL
WBC # UR STRIP: ABNORMAL /HPF
WBC NRBC COR # BLD: 4.24 10*3/MM3 (ref 3.4–10.8)

## 2023-04-17 PROCEDURE — 80053 COMPREHEN METABOLIC PANEL: CPT | Performed by: INTERNAL MEDICINE

## 2023-04-17 PROCEDURE — 81001 URINALYSIS AUTO W/SCOPE: CPT | Performed by: NURSE PRACTITIONER

## 2023-04-17 PROCEDURE — 87186 SC STD MICRODIL/AGAR DIL: CPT | Performed by: NURSE PRACTITIONER

## 2023-04-17 PROCEDURE — 87086 URINE CULTURE/COLONY COUNT: CPT | Performed by: NURSE PRACTITIONER

## 2023-04-17 PROCEDURE — 36415 COLL VENOUS BLD VENIPUNCTURE: CPT

## 2023-04-17 PROCEDURE — 85025 COMPLETE CBC W/AUTO DIFF WBC: CPT

## 2023-04-17 RX ORDER — NITROFURANTOIN 25; 75 MG/1; MG/1
100 CAPSULE ORAL 2 TIMES DAILY
Qty: 10 CAPSULE | Refills: 0 | Status: SHIPPED | OUTPATIENT
Start: 2023-04-17

## 2023-04-17 RX ORDER — PAROXETINE 10 MG/1
10 TABLET, FILM COATED ORAL EVERY MORNING
Qty: 30 TABLET | Refills: 2 | Status: SHIPPED | OUTPATIENT
Start: 2023-04-17

## 2023-04-17 NOTE — H&P (VIEW-ONLY)
Subjective    REASONS FOR FOLLOWUP:     1. History of Waldenstrom macroglobulinemia.    2. History of breast cancer stage I on the left, status post mastectomy. The patient completed aromatase inhibitor  X 5 YEARS IN 2019 without any evidence of breast cancer recurrence        History of Present Illness   Patient is an 82-year-old female with the above-mentioned history returns today for follow-up.  She continues to complain of ongoing issues with fatigue.  still with itching especially at night wakes up itching and then has trouble falling back to sleep.   She states that the intensity of itching comes and goes.  She is using Maida. She has not tried to take anything to help her sleep. Dr. Larose told her in the past to take Melatonin, but she has never tried it.    Patient is also complaining of urinary frequency.  She is getting up at least 4-5 times per night to urinate.  She also urinates frequently throughout the day.  Patient states cold all the time, but denies fevers.  She denies any visible skin rash.  She continues on prednisone 10 mg every other day.   She states Dr. Larose talked to her in the past about restarting Rituxan again, and she is interested in doing that.  She has received Rituxan in the past, and states that she did have some issues with vomiting and had to receive additional premeds and then did fine.      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   • Epilepsy with simple partial seizures 12/14/2017   • Headache, migraine 12/14/2017   • History of transfusion of packed red blood cells     R/T SURGERY   • Hyperlipidemia    • Malignant neoplasm of overlapping sites of left breast in female, estrogen receptor positive 4/13/2016   • Nonrheumatic aortic valve  insufficiency    • Primary osteoarthritis of left knee 7/22/2015   • Skin cancer    • Thyroid nodule     Removed more than 35 years ago   • TIA (transient ischemic attack) 10/18/2018   • UTI due to extended-spectrum beta lactamase (ESBL) producing Escherichia coli 1/6/2019     Past Surgical History:   Procedure Laterality Date   • ADENOIDECTOMY     • APPENDECTOMY     • CARPAL TUNNEL RELEASE Right    • CATARACT EXTRACTION Bilateral    • CHOLECYSTECTOMY     • COLONOSCOPY     • HYSTERECTOMY      Partial, many years ago, heavy bleeding, no cancer   • KNEE ARTHROSCOPY Right 03/2009    Finding of chondromalacia and appropriate cleanup of joint was performed by Dr. Moraes.   • MASTECTOMY Left 07/2014   • REPLACEMENT TOTAL KNEE Right 12/2015   • SKIN CANCER EXCISION      several   • TONSILLECTOMY           Social History     Socioeconomic History   • Marital status:    Tobacco Use   • Smoking status: Never   • Smokeless tobacco: Never   Vaping Use   • Vaping Use: Never used   Substance and Sexual Activity   • Alcohol use: No     Comment: 2 cups caffeine/coffee daily   • Drug use: No   • Sexual activity: Defer     Family History   Problem Relation Age of Onset   • Mental illness Mother    • Migraines Mother    • Dementia Mother    • Heart disease Father    • Hypertension Father    • Kidney disease Father    • Alcohol abuse Father    • No Known Problems Daughter    • No Known Problems Daughter    • Breast cancer Other    • Heart disease Other    • Hypertension Other    • Diabetes Other    • Cancer Maternal Grandmother    • Breast cancer Maternal Grandmother    • No Known Problems Maternal Grandfather    • No Known Problems Paternal Grandmother    • No Known Problems Paternal Grandfather    • Hyperlipidemia Son    • Lymphoma Neg Hx    • Leukemia Neg Hx      Current Outpatient Medications on File Prior to Visit   Medication Sig Dispense Refill   • aspirin 81 MG tablet Take 1 tablet by mouth Daily.     • cetirizine  "(zyrTEC) 10 MG tablet Take 1 tablet by mouth Daily.     • gabapentin (NEURONTIN) 400 MG capsule TAKE 1 CAPSULE FOUR TIMES DAILY 360 capsule 1   • hydrALAZINE (APRESOLINE) 50 MG tablet TAKE 1 TABLET THREE TIMES DAILY 180 tablet 1   • lansoprazole (Prevacid SoluTab) 30 MG Tablet Delayed Release Dispersible disintegrating tablet Take 1 tablet by mouth Every Morning Before Breakfast. 30 tablet 2   • levothyroxine (SYNTHROID, LEVOTHROID) 50 MCG tablet TAKE 1 TABLET EVERY DAY 90 tablet 1   • losartan (COZAAR) 100 MG tablet Take 1 tablet by mouth Daily. 90 tablet 3   • montelukast (Singulair) 5 MG chewable tablet Chew 1 tablet Every Night. 30 tablet 3   • niacinamide 500 MG tablet Take 1 tablet by mouth 3 (Three) Times a Day.     • predniSONE (DELTASONE) 10 MG tablet Take 1 tablet by mouth Every Other Day. 60 tablet 2   • Symbicort 160-4.5 MCG/ACT inhaler Inhale 160 puffs 2 (Two) Times a Day. One in the morning and one in the evening     • triamcinolone (KENALOG) 0.1 % ointment Apply 1 application topically to the appropriate area as directed 2 (Two) Times a Day. 80 g 3     No current facility-administered medications on file prior to visit.       ALLERGIES:    Allergies   Allergen Reactions   • Cortisone Unknown - High Severity     Reports having a shot years ago, and wonders if he hit a vein. She was told by another doctor that it probably went in her blood stream.    • Hytrin [Terazosin] Rash   • Amlodipine Rash   • Darvon  [Propoxyphene] Palpitations       Objective      Vitals:    04/17/23 1125   BP: 134/78   Pulse: 75   Temp: 98.4 °F (36.9 °C)   TempSrc: Temporal   SpO2: 96%   Weight: 60.9 kg (134 lb 3.2 oz)   Height: 167.6 cm (65.98\")   PainSc: 0-No pain         4/17/2023    11:23 AM   Current Status   ECOG score 0     Physical Exam  Vitals reviewed.   Constitutional:       General: She is not in acute distress.     Appearance: Normal appearance. She is well-developed.   HENT:      Head: Normocephalic and " atraumatic.   Eyes:      Pupils: Pupils are equal, round, and reactive to light.   Cardiovascular:      Rate and Rhythm: Normal rate and regular rhythm.      Heart sounds: Normal heart sounds. No murmur heard.  Pulmonary:      Effort: Pulmonary effort is normal. No respiratory distress.      Breath sounds: Normal breath sounds. No wheezing, rhonchi or rales.   Chest:      Comments: Status post left mastectomy- left chest wall free of palpable mass or nodularity.  Abdominal:      General: Bowel sounds are normal. There is no distension.      Palpations: Abdomen is soft.   Musculoskeletal:         General: Normal range of motion.      Cervical back: Normal range of motion.   Lymphadenopathy:      Upper Body:      Right upper body: No supraclavicular or axillary adenopathy.      Left upper body: No supraclavicular or axillary adenopathy.   Skin:     General: Skin is warm and dry.      Findings: No rash.   Neurological:      Mental Status: She is alert and oriented to person, place, and time.         RECENT LABS:  Results from last 7 days   Lab Units 04/17/23  1103   WBC 10*3/mm3 4.24   NEUTROS ABS 10*3/mm3 2.41   HEMOGLOBIN g/dL 13.7   HEMATOCRIT % 39.8   PLATELETS 10*3/mm3 140     Results from last 7 days   Lab Units 04/17/23  1103   SODIUM mmol/L 131*   POTASSIUM mmol/L 3.8   CHLORIDE mmol/L 95*   CO2 mmol/L 25.0   BUN mg/dL 9   CREATININE mg/dL 0.87   CALCIUM mg/dL 9.3   ALBUMIN g/dL 4.3  3.9   BILIRUBIN mg/dL 0.6   ALK PHOS U/L 71   ALT (SGPT) U/L 15   AST (SGOT) U/L 24   GLUCOSE mg/dL 102*         Assessment & Plan      1.  Waldenstrom macroglobulinemia that has been treated in the past mainly with Rituxan. In the past, also she was treated with Velcade with very dramatic improvement in her monoclonal protein but very dramatic sensory peripheral neuropathy. This medication was discontinued altogether.   · 6/29/2020, she developed progressive fatigue with worsening neuropathy in her feet.  These were her original  symptoms at diagnosis of Waldenstrom's.  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  Calcium also elevated at 10.5 with decreased sodium related to pseudohyponatremia associated with monoclonal protein.  Previously, she did not receive improvement from Rituxan, she is not thought to be a candidate for Imbruvica, therefore she went on to initiate venetoclax 7/13/2020.  She is currently on her next planned dose of 200 mg daily with poor tolerance as outlined below  • 04/28/2021 normal sedimentation rate, stable monoclonal protein IgM that the Waldenstrom could be the most focal reason to explain her fatigue. On the other hand it is impossible given her thrombocytopenia to be sure that she still has some component of lymphoma in her bone marrow and the only way to prove this will be to pursue in a bone marrow test. She does not believe that she is ready for this at this time.  Radiologically speaking the CT scan of the chest, abdomen and pelvis failed to document any lymphadenopathy, masses, splenomegaly or any other issue pertinent to her Waldenstrom.  • 06/02/2021 that I do not feel that the symptoms of fatigue are related to her Waldenström's at this time. Her monoclonal protein study has remained completely quiet. Her white count and hemoglobin are normal. Her B12, folic acid, ferritin, iron profile remain normal and TSH hs remained into the normal limits. Her sed rate was normal at 4 mm/hr and she has no obvious infection or inflammatory process.   • On 08/25/2021 the patient's monoclonal protein has remained very stable as discussed with her during the previous weeks. We have another level pending today. One more analysis that I decided to pursue was an amyloid analysis and abdominal fat pad biopsy. This was performed in late 06/2021 by Deric Llamas MD. Material was sent to [facility]. No [place] red material was found in this specimen. Therefore the possibility of amyloidosis is less likely under these  circumstances.   • 11/17/2021 in regard to her Waldenstrom's. She does not have any symptoms pertinent to this   • 12/17/2021. I do not see any symptoms or signs of her Waldenstrom's. Her white count, hemoglobin, platelets, white count differential are normal. Her chemistry profile is pending. I find no reason to proceed with any other therapy for this condition. Her monoclonal protein has remained quiet, stable.   • 03/11/2022 the patient’s white count, hemoglobin and platelets are normal. She has minimal sensory neuropathy in her feet that is no worse than before. She has no fever, chills or infections. No clinical bleeding. Her monoclonal protein IgM has remained very stable. She has not required any other intervention from my side of the story and she will be watched in absence of any other intervention. She will be called at home with the report of her monoclonal protein study next week.  • 11/02/2022 the patient has not had any clinical bleeding, no febrile illness, no peripheral adenopathy, no blurred vision, and her white count, hemoglobin and platelets remain stable in regard to her previous history of Waldenstrom. On the other hand the patient has this very peculiar maculopapular rash with multiple areas of ulceration not only in her trunk but also in her face, upper and lower extremities. The areas in the trunk are the worse, especially on her backside. Photographs were obtained and placed in media by Dr. Larose.    In spite of seeing dermatologist, a skin biopsy has not been done. The patient continues blaming this rash to her blood pressure medications. I went back into time in 2020 to see if we can find what she was taking before. She was not having any rash but she has been worse from this point of view in the last several weeks.   • I think independent of what she is going to do with her life very likely she needs to have a different approach to the diagnosis of her rash in the skin and I strongly  believe there is a connection between the rash and her Waldenstrom. I have placed a phone call to discuss this with the patient's dermatologist. I strongly believe that she will require skin biopsy looking for specific skin entities as well as looking for pemphigoid.  She has similar lesions in her chest wall, similar lesion in her buttock and back of the spine and upper and lower extremities.  On 11/17/2022 the patient has continued having extensive lesions in the skin. We have measured her monoclonal protein that has not changed and she has not required any therapy for this for a long time. I measured her IgE level that was 1370 and any number above 500 is very abnormal. 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 Amlodipine has been discontinued. Obviously the question is what is the next medicine that needs to be discontinued and my next candidate given the characteristics of medication is hydralazine.   I went ahead and advised the patient to initiate a program of prednisone 10 mg at breakfast time and Singulair 5 mg at bedtime. The patient will continue putting topical moisturizer on the skin. She is not using any topical steroid at this time.  I also checked on this patient's KARRIE that was negative.  I raised the question to Teresa Salgado MD, if she saw anything to suggest any form of vasculitis that was negative.   Therefore after all of these facts and these discussions back and forth and so much conversation we are ready to proceed to see if we change the outcome of this patient in regard all of these symptoms.   On 01/23/2023 the patient has not had any new symptoms pertinent to Waldenstrom's. She has no peripheral adenopathy, she has no hepatosplenomegaly, she has no bleeding, blurred vision.  Her white count, hemoglobin and platelets remain normal. White count differential  is normal and we are waiting to review her monoclonal protein that during the previous visit was completely stable in regard to her IgM level. I expect that this condition will remain quiet and for this reason I advised her to return to see me back in 3 months with repeat CBC, CMP and monoclonal protein study.  4/17/2023 complaining of intense pruritus in the absence of skin rash.  She is using moisturizers.         2.  History of left breast cancer, status post mastectomy.  She completed adjuvant therapy.    · Mammogram 6/30/2020 benign  • On 08/25/2021 she has no symptoms or signs of breast cancer recurrence. Her mastectomy site on the left is completely healed. Her right breast examination is normal. She is up to date on her mammogram. This will be watched in absence of any other intervention.   • On 11/17/2021 the patient's left sided mastectomy is well healed, right breast exam is normal. There is no evidence of breast cancer recurrence clinically. She will remain in observation.   • I find no symptoms or signs of breast cancer recurrence on her. This will be watched in absence of any other intervention as per 12/17/2021.   • On 03/11/2022 the patient has no symptoms or signs of breast cancer recurrence. Her right breast exam is normal. The left mastectomy site is normal. She will be watched in absence of any other intervention. She is up-to-date in right-sided mammogram.  Right breast Mammogram 7/25/2022 negative        3.  Fatigue. We have looked for endocrinological diseases, medication side effects, infections, malignancies, Waldenstrom's, amyloid, cardiac disease and so forth not having any conclusion in regard to the nature of this. I do believe that the lack of bad news or not finding any other thing is good news to me and I pointed this out to the patient. Maybe by the end of the day her fatigue is lack of proper physical training. She has bought a treadmill device that will be in her house as per today  and hopefully she will be able to do some walking on this that will be meaningful in regard to recovering in regard energy.   • The patient believes that the degree of fatigue that she was experiencing before is substantially better and now she is able to do some house activity. I encouraged her to continue this in the long run, is the only solution for fatigue is physical activity.   • On 12/17/2021 the patient's fatigue has improved highly. She is now doing treadmill activity at home for 10 minutes once or twice a day. She has been eating better, she has lost some weight but she feels substantially better. I think the conditioning was probably the culprit of all of this related to the pandemia and I advised her to remain doing her treadmill activity twice a day if possible.   On 11/17/2022 a lot of the patient's fatigue is lack of sleep. She wakes up at 2-o'clock in the morning to dig into the skin of her lower extremities and she spends the rest of the night just digging and up and about. Hopefully with the Singulair taken at nighttime that also can facilitate sleep and taking the prednisone in the morning as posted above it could have a positive impact on her that maybe will allow her to function better.  II reviewed the report by pathologist in regard to her dermatology issue and I discussed personally with Teresa Salgado MD,  on the telephone this patient's situation and she agrees with new plan of care that was discussed with her on the telephone.   I also performed on this patient cold agglutinins that were negative, cryoglobulins that were negative and sedimentation rate and LDH that were normal against any inflammatory condition of any nature. I had discussion with Teresa Salgado MD, in regard any alterations in the skin to suggest any parasitic conditions like scabies or things of this nature. Everything was negative in this regard and this is not consistent clinically neither anyway.  On 01/23/2023 the  patient was further reviewed in regard to her fatigue and her skin rash. Since the previous visit we started the patient on prednisone initially 10 mg once a day that she could not handle because of overactivity and then subsequently we moved to every other day. The patient has been taking this amount now for almost 6-7 weeks. Since then the pruritus has disappeared and most of the rash of the skin has cleared with the exception of her right lower extremity that I took a picture of today.  Dermatologist, Teresa Christianson MD, has sent us information about bullous pemphigoid and my belief is probably Dr. Christianson has enough information available to believe that the patient has this condition. She prescribed doxycycline and nicotinic acid. The patient has not started those medicines yet. She raised the question to me if she needs to take them. My advice will be yes and I advised her to use the doxycycline 100 mg a day first and if in a week she feels okay the medicine then initiate the 2nd medication.   I sent her a prescription for prednisone 10 mg every other day. Maybe in the long run we will be able to decrease it to just 5 mg and see how things evolve.         PLAN:  1. Check urinalysis today.  2. Start Paxil 10 mg daily.  3. Return in 2-3 weeks for follow up with MD or NP for reassessment and consideration of starting weekly Rituxan x 4 doses.   4. Patient has had rituxan infusions in the past. We briefly discussed the infusion and possible side effects, which she was familiar with.  She states she did require additional premedication in the past.  5. Call/ return sooner should the patient develop any new concerns or problems.              Nicki Steward, APRN   4/18/2023      CC:

## 2023-04-17 NOTE — PROGRESS NOTES
Subjective    REASONS FOR FOLLOWUP:     1. History of Waldenstrom macroglobulinemia.    2. History of breast cancer stage I on the left, status post mastectomy. The patient completed aromatase inhibitor  X 5 YEARS IN 2019 without any evidence of breast cancer recurrence        History of Present Illness   Patient is an 82-year-old female with the above-mentioned history returns today for follow-up.  She continues to complain of ongoing issues with fatigue.  still with itching especially at night wakes up itching and then has trouble falling back to sleep.   She states that the intensity of itching comes and goes.  She is using Maida. She has not tried to take anything to help her sleep. Dr. Larose told her in the past to take Melatonin, but she has never tried it.    Patient is also complaining of urinary frequency.  She is getting up at least 4-5 times per night to urinate.  She also urinates frequently throughout the day.  Patient states cold all the time, but denies fevers.  She denies any visible skin rash.  She continues on prednisone 10 mg every other day.   She states Dr. Larose talked to her in the past about restarting Rituxan again, and she is interested in doing that.  She has received Rituxan in the past, and states that she did have some issues with vomiting and had to receive additional premeds and then did fine.      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   • Epilepsy with simple partial seizures 12/14/2017   • Headache, migraine 12/14/2017   • History of transfusion of packed red blood cells     R/T SURGERY   • Hyperlipidemia    • Malignant neoplasm of overlapping sites of left breast in female, estrogen receptor positive 4/13/2016   • Nonrheumatic aortic valve  insufficiency    • Primary osteoarthritis of left knee 7/22/2015   • Skin cancer    • Thyroid nodule     Removed more than 35 years ago   • TIA (transient ischemic attack) 10/18/2018   • UTI due to extended-spectrum beta lactamase (ESBL) producing Escherichia coli 1/6/2019     Past Surgical History:   Procedure Laterality Date   • ADENOIDECTOMY     • APPENDECTOMY     • CARPAL TUNNEL RELEASE Right    • CATARACT EXTRACTION Bilateral    • CHOLECYSTECTOMY     • COLONOSCOPY     • HYSTERECTOMY      Partial, many years ago, heavy bleeding, no cancer   • KNEE ARTHROSCOPY Right 03/2009    Finding of chondromalacia and appropriate cleanup of joint was performed by Dr. Moraes.   • MASTECTOMY Left 07/2014   • REPLACEMENT TOTAL KNEE Right 12/2015   • SKIN CANCER EXCISION      several   • TONSILLECTOMY           Social History     Socioeconomic History   • Marital status:    Tobacco Use   • Smoking status: Never   • Smokeless tobacco: Never   Vaping Use   • Vaping Use: Never used   Substance and Sexual Activity   • Alcohol use: No     Comment: 2 cups caffeine/coffee daily   • Drug use: No   • Sexual activity: Defer     Family History   Problem Relation Age of Onset   • Mental illness Mother    • Migraines Mother    • Dementia Mother    • Heart disease Father    • Hypertension Father    • Kidney disease Father    • Alcohol abuse Father    • No Known Problems Daughter    • No Known Problems Daughter    • Breast cancer Other    • Heart disease Other    • Hypertension Other    • Diabetes Other    • Cancer Maternal Grandmother    • Breast cancer Maternal Grandmother    • No Known Problems Maternal Grandfather    • No Known Problems Paternal Grandmother    • No Known Problems Paternal Grandfather    • Hyperlipidemia Son    • Lymphoma Neg Hx    • Leukemia Neg Hx      Current Outpatient Medications on File Prior to Visit   Medication Sig Dispense Refill   • aspirin 81 MG tablet Take 1 tablet by mouth Daily.     • cetirizine  "(zyrTEC) 10 MG tablet Take 1 tablet by mouth Daily.     • gabapentin (NEURONTIN) 400 MG capsule TAKE 1 CAPSULE FOUR TIMES DAILY 360 capsule 1   • hydrALAZINE (APRESOLINE) 50 MG tablet TAKE 1 TABLET THREE TIMES DAILY 180 tablet 1   • lansoprazole (Prevacid SoluTab) 30 MG Tablet Delayed Release Dispersible disintegrating tablet Take 1 tablet by mouth Every Morning Before Breakfast. 30 tablet 2   • levothyroxine (SYNTHROID, LEVOTHROID) 50 MCG tablet TAKE 1 TABLET EVERY DAY 90 tablet 1   • losartan (COZAAR) 100 MG tablet Take 1 tablet by mouth Daily. 90 tablet 3   • montelukast (Singulair) 5 MG chewable tablet Chew 1 tablet Every Night. 30 tablet 3   • niacinamide 500 MG tablet Take 1 tablet by mouth 3 (Three) Times a Day.     • predniSONE (DELTASONE) 10 MG tablet Take 1 tablet by mouth Every Other Day. 60 tablet 2   • Symbicort 160-4.5 MCG/ACT inhaler Inhale 160 puffs 2 (Two) Times a Day. One in the morning and one in the evening     • triamcinolone (KENALOG) 0.1 % ointment Apply 1 application topically to the appropriate area as directed 2 (Two) Times a Day. 80 g 3     No current facility-administered medications on file prior to visit.       ALLERGIES:    Allergies   Allergen Reactions   • Cortisone Unknown - High Severity     Reports having a shot years ago, and wonders if he hit a vein. She was told by another doctor that it probably went in her blood stream.    • Hytrin [Terazosin] Rash   • Amlodipine Rash   • Darvon  [Propoxyphene] Palpitations       Objective      Vitals:    04/17/23 1125   BP: 134/78   Pulse: 75   Temp: 98.4 °F (36.9 °C)   TempSrc: Temporal   SpO2: 96%   Weight: 60.9 kg (134 lb 3.2 oz)   Height: 167.6 cm (65.98\")   PainSc: 0-No pain         4/17/2023    11:23 AM   Current Status   ECOG score 0     Physical Exam  Vitals reviewed.   Constitutional:       General: She is not in acute distress.     Appearance: Normal appearance. She is well-developed.   HENT:      Head: Normocephalic and " atraumatic.   Eyes:      Pupils: Pupils are equal, round, and reactive to light.   Cardiovascular:      Rate and Rhythm: Normal rate and regular rhythm.      Heart sounds: Normal heart sounds. No murmur heard.  Pulmonary:      Effort: Pulmonary effort is normal. No respiratory distress.      Breath sounds: Normal breath sounds. No wheezing, rhonchi or rales.   Chest:      Comments: Status post left mastectomy- left chest wall free of palpable mass or nodularity.  Abdominal:      General: Bowel sounds are normal. There is no distension.      Palpations: Abdomen is soft.   Musculoskeletal:         General: Normal range of motion.      Cervical back: Normal range of motion.   Lymphadenopathy:      Upper Body:      Right upper body: No supraclavicular or axillary adenopathy.      Left upper body: No supraclavicular or axillary adenopathy.   Skin:     General: Skin is warm and dry.      Findings: No rash.   Neurological:      Mental Status: She is alert and oriented to person, place, and time.         RECENT LABS:  Results from last 7 days   Lab Units 04/17/23  1103   WBC 10*3/mm3 4.24   NEUTROS ABS 10*3/mm3 2.41   HEMOGLOBIN g/dL 13.7   HEMATOCRIT % 39.8   PLATELETS 10*3/mm3 140     Results from last 7 days   Lab Units 04/17/23  1103   SODIUM mmol/L 131*   POTASSIUM mmol/L 3.8   CHLORIDE mmol/L 95*   CO2 mmol/L 25.0   BUN mg/dL 9   CREATININE mg/dL 0.87   CALCIUM mg/dL 9.3   ALBUMIN g/dL 4.3  3.9   BILIRUBIN mg/dL 0.6   ALK PHOS U/L 71   ALT (SGPT) U/L 15   AST (SGOT) U/L 24   GLUCOSE mg/dL 102*         Assessment & Plan      1.  Waldenstrom macroglobulinemia that has been treated in the past mainly with Rituxan. In the past, also she was treated with Velcade with very dramatic improvement in her monoclonal protein but very dramatic sensory peripheral neuropathy. This medication was discontinued altogether.   · 6/29/2020, she developed progressive fatigue with worsening neuropathy in her feet.  These were her original  symptoms at diagnosis of Waldenstrom's.  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  Calcium also elevated at 10.5 with decreased sodium related to pseudohyponatremia associated with monoclonal protein.  Previously, she did not receive improvement from Rituxan, she is not thought to be a candidate for Imbruvica, therefore she went on to initiate venetoclax 7/13/2020.  She is currently on her next planned dose of 200 mg daily with poor tolerance as outlined below  • 04/28/2021 normal sedimentation rate, stable monoclonal protein IgM that the Waldenstrom could be the most focal reason to explain her fatigue. On the other hand it is impossible given her thrombocytopenia to be sure that she still has some component of lymphoma in her bone marrow and the only way to prove this will be to pursue in a bone marrow test. She does not believe that she is ready for this at this time.  Radiologically speaking the CT scan of the chest, abdomen and pelvis failed to document any lymphadenopathy, masses, splenomegaly or any other issue pertinent to her Waldenstrom.  • 06/02/2021 that I do not feel that the symptoms of fatigue are related to her Waldenström's at this time. Her monoclonal protein study has remained completely quiet. Her white count and hemoglobin are normal. Her B12, folic acid, ferritin, iron profile remain normal and TSH hs remained into the normal limits. Her sed rate was normal at 4 mm/hr and she has no obvious infection or inflammatory process.   • On 08/25/2021 the patient's monoclonal protein has remained very stable as discussed with her during the previous weeks. We have another level pending today. One more analysis that I decided to pursue was an amyloid analysis and abdominal fat pad biopsy. This was performed in late 06/2021 by Deric Llamas MD. Material was sent to [facility]. No [place] red material was found in this specimen. Therefore the possibility of amyloidosis is less likely under these  circumstances.   • 11/17/2021 in regard to her Waldenstrom's. She does not have any symptoms pertinent to this   • 12/17/2021. I do not see any symptoms or signs of her Waldenstrom's. Her white count, hemoglobin, platelets, white count differential are normal. Her chemistry profile is pending. I find no reason to proceed with any other therapy for this condition. Her monoclonal protein has remained quiet, stable.   • 03/11/2022 the patient’s white count, hemoglobin and platelets are normal. She has minimal sensory neuropathy in her feet that is no worse than before. She has no fever, chills or infections. No clinical bleeding. Her monoclonal protein IgM has remained very stable. She has not required any other intervention from my side of the story and she will be watched in absence of any other intervention. She will be called at home with the report of her monoclonal protein study next week.  • 11/02/2022 the patient has not had any clinical bleeding, no febrile illness, no peripheral adenopathy, no blurred vision, and her white count, hemoglobin and platelets remain stable in regard to her previous history of Waldenstrom. On the other hand the patient has this very peculiar maculopapular rash with multiple areas of ulceration not only in her trunk but also in her face, upper and lower extremities. The areas in the trunk are the worse, especially on her backside. Photographs were obtained and placed in media by Dr. Larose.    In spite of seeing dermatologist, a skin biopsy has not been done. The patient continues blaming this rash to her blood pressure medications. I went back into time in 2020 to see if we can find what she was taking before. She was not having any rash but she has been worse from this point of view in the last several weeks.   • I think independent of what she is going to do with her life very likely she needs to have a different approach to the diagnosis of her rash in the skin and I strongly  believe there is a connection between the rash and her Waldenstrom. I have placed a phone call to discuss this with the patient's dermatologist. I strongly believe that she will require skin biopsy looking for specific skin entities as well as looking for pemphigoid.  She has similar lesions in her chest wall, similar lesion in her buttock and back of the spine and upper and lower extremities.  On 11/17/2022 the patient has continued having extensive lesions in the skin. We have measured her monoclonal protein that has not changed and she has not required any therapy for this for a long time. I measured her IgE level that was 1370 and any number above 500 is very abnormal. 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 Amlodipine has been discontinued. Obviously the question is what is the next medicine that needs to be discontinued and my next candidate given the characteristics of medication is hydralazine.   I went ahead and advised the patient to initiate a program of prednisone 10 mg at breakfast time and Singulair 5 mg at bedtime. The patient will continue putting topical moisturizer on the skin. She is not using any topical steroid at this time.  I also checked on this patient's KARRIE that was negative.  I raised the question to Teresa Salgado MD, if she saw anything to suggest any form of vasculitis that was negative.   Therefore after all of these facts and these discussions back and forth and so much conversation we are ready to proceed to see if we change the outcome of this patient in regard all of these symptoms.   On 01/23/2023 the patient has not had any new symptoms pertinent to Waldenstrom's. She has no peripheral adenopathy, she has no hepatosplenomegaly, she has no bleeding, blurred vision.  Her white count, hemoglobin and platelets remain normal. White count differential  is normal and we are waiting to review her monoclonal protein that during the previous visit was completely stable in regard to her IgM level. I expect that this condition will remain quiet and for this reason I advised her to return to see me back in 3 months with repeat CBC, CMP and monoclonal protein study.  4/17/2023 complaining of intense pruritus in the absence of skin rash.  She is using moisturizers.         2.  History of left breast cancer, status post mastectomy.  She completed adjuvant therapy.    · Mammogram 6/30/2020 benign  • On 08/25/2021 she has no symptoms or signs of breast cancer recurrence. Her mastectomy site on the left is completely healed. Her right breast examination is normal. She is up to date on her mammogram. This will be watched in absence of any other intervention.   • On 11/17/2021 the patient's left sided mastectomy is well healed, right breast exam is normal. There is no evidence of breast cancer recurrence clinically. She will remain in observation.   • I find no symptoms or signs of breast cancer recurrence on her. This will be watched in absence of any other intervention as per 12/17/2021.   • On 03/11/2022 the patient has no symptoms or signs of breast cancer recurrence. Her right breast exam is normal. The left mastectomy site is normal. She will be watched in absence of any other intervention. She is up-to-date in right-sided mammogram.  Right breast Mammogram 7/25/2022 negative        3.  Fatigue. We have looked for endocrinological diseases, medication side effects, infections, malignancies, Waldenstrom's, amyloid, cardiac disease and so forth not having any conclusion in regard to the nature of this. I do believe that the lack of bad news or not finding any other thing is good news to me and I pointed this out to the patient. Maybe by the end of the day her fatigue is lack of proper physical training. She has bought a treadmill device that will be in her house as per today  and hopefully she will be able to do some walking on this that will be meaningful in regard to recovering in regard energy.   • The patient believes that the degree of fatigue that she was experiencing before is substantially better and now she is able to do some house activity. I encouraged her to continue this in the long run, is the only solution for fatigue is physical activity.   • On 12/17/2021 the patient's fatigue has improved highly. She is now doing treadmill activity at home for 10 minutes once or twice a day. She has been eating better, she has lost some weight but she feels substantially better. I think the conditioning was probably the culprit of all of this related to the pandemia and I advised her to remain doing her treadmill activity twice a day if possible.   On 11/17/2022 a lot of the patient's fatigue is lack of sleep. She wakes up at 2-o'clock in the morning to dig into the skin of her lower extremities and she spends the rest of the night just digging and up and about. Hopefully with the Singulair taken at nighttime that also can facilitate sleep and taking the prednisone in the morning as posted above it could have a positive impact on her that maybe will allow her to function better.  II reviewed the report by pathologist in regard to her dermatology issue and I discussed personally with Teresa Salgado MD,  on the telephone this patient's situation and she agrees with new plan of care that was discussed with her on the telephone.   I also performed on this patient cold agglutinins that were negative, cryoglobulins that were negative and sedimentation rate and LDH that were normal against any inflammatory condition of any nature. I had discussion with Teresa Salgado MD, in regard any alterations in the skin to suggest any parasitic conditions like scabies or things of this nature. Everything was negative in this regard and this is not consistent clinically neither anyway.  On 01/23/2023 the  patient was further reviewed in regard to her fatigue and her skin rash. Since the previous visit we started the patient on prednisone initially 10 mg once a day that she could not handle because of overactivity and then subsequently we moved to every other day. The patient has been taking this amount now for almost 6-7 weeks. Since then the pruritus has disappeared and most of the rash of the skin has cleared with the exception of her right lower extremity that I took a picture of today.  Dermatologist, Teresa Christianson MD, has sent us information about bullous pemphigoid and my belief is probably Dr. Christianson has enough information available to believe that the patient has this condition. She prescribed doxycycline and nicotinic acid. The patient has not started those medicines yet. She raised the question to me if she needs to take them. My advice will be yes and I advised her to use the doxycycline 100 mg a day first and if in a week she feels okay the medicine then initiate the 2nd medication.   I sent her a prescription for prednisone 10 mg every other day. Maybe in the long run we will be able to decrease it to just 5 mg and see how things evolve.         PLAN:  1. Check urinalysis today.  2. Start Paxil 10 mg daily.  3. Return in 2-3 weeks for follow up with MD or NP for reassessment and consideration of starting weekly Rituxan x 4 doses.   4. Patient has had rituxan infusions in the past. We briefly discussed the infusion and possible side effects, which she was familiar with.  She states she did require additional premedication in the past.  5. Call/ return sooner should the patient develop any new concerns or problems.              Nicki Steward, APRN   4/18/2023      CC:

## 2023-04-18 LAB
ALBUMIN SERPL ELPH-MCNC: 3.9 G/DL (ref 2.9–4.4)
ALBUMIN/GLOB SERPL: 1.9 {RATIO} (ref 0.7–1.7)
ALPHA1 GLOB SERPL ELPH-MCNC: 0.3 G/DL (ref 0–0.4)
ALPHA2 GLOB SERPL ELPH-MCNC: 0.6 G/DL (ref 0.4–1)
B-GLOBULIN SERPL ELPH-MCNC: 0.7 G/DL (ref 0.7–1.3)
GAMMA GLOB SERPL ELPH-MCNC: 0.6 G/DL (ref 0.4–1.8)
GLOBULIN SER-MCNC: 2.1 G/DL (ref 2.2–3.9)
IGA SERPL-MCNC: 41 MG/DL (ref 64–422)
IGG SERPL-MCNC: 568 MG/DL (ref 586–1602)
IGM SERPL-MCNC: 226 MG/DL (ref 26–217)
INTERPRETATION SERPL IEP-IMP: ABNORMAL
KAPPA LC FREE SER-MCNC: 17.4 MG/L (ref 3.3–19.4)
KAPPA LC FREE/LAMBDA FREE SER: 2.32 {RATIO} (ref 0.26–1.65)
LABORATORY COMMENT REPORT: ABNORMAL
LAMBDA LC FREE SERPL-MCNC: 7.5 MG/L (ref 5.7–26.3)
M PROTEIN SERPL ELPH-MCNC: 0.3 G/DL
PROT SERPL-MCNC: 6 G/DL (ref 6–8.5)

## 2023-04-19 LAB — BACTERIA SPEC AEROBE CULT: ABNORMAL

## 2023-04-20 LAB — IGE SERPL-ACNC: 1538 IU/ML (ref 6–495)

## 2023-05-01 ENCOUNTER — APPOINTMENT (OUTPATIENT)
Dept: ONCOLOGY | Facility: HOSPITAL | Age: 83
End: 2023-05-01
Payer: MEDICARE

## 2023-05-01 ENCOUNTER — LAB (OUTPATIENT)
Dept: LAB | Facility: HOSPITAL | Age: 83
End: 2023-05-01
Payer: MEDICARE

## 2023-05-01 ENCOUNTER — OFFICE VISIT (OUTPATIENT)
Dept: ONCOLOGY | Facility: CLINIC | Age: 83
End: 2023-05-01
Payer: MEDICARE

## 2023-05-01 VITALS
SYSTOLIC BLOOD PRESSURE: 150 MMHG | RESPIRATION RATE: 16 BRPM | DIASTOLIC BLOOD PRESSURE: 68 MMHG | BODY MASS INDEX: 21.49 KG/M2 | TEMPERATURE: 98 F | HEIGHT: 66 IN | HEART RATE: 65 BPM | WEIGHT: 133.7 LBS | OXYGEN SATURATION: 98 %

## 2023-05-01 DIAGNOSIS — R76.8 ELEVATED IGE LEVEL: ICD-10-CM

## 2023-05-01 DIAGNOSIS — C50.812 MALIGNANT NEOPLASM OF OVERLAPPING SITES OF LEFT BREAST IN FEMALE, ESTROGEN RECEPTOR POSITIVE: ICD-10-CM

## 2023-05-01 DIAGNOSIS — C88.0 MACROGLOBULINEMIA OF WALDENSTROM: Primary | ICD-10-CM

## 2023-05-01 DIAGNOSIS — Z17.0 MALIGNANT NEOPLASM OF OVERLAPPING SITES OF LEFT BREAST IN FEMALE, ESTROGEN RECEPTOR POSITIVE: ICD-10-CM

## 2023-05-01 LAB
ALBUMIN SERPL-MCNC: 4.6 G/DL (ref 3.5–5.2)
ALBUMIN/GLOB SERPL: 1.9 G/DL (ref 1.1–2.4)
ALP SERPL-CCNC: 75 U/L (ref 38–116)
ALT SERPL W P-5'-P-CCNC: 12 U/L (ref 0–33)
ANION GAP SERPL CALCULATED.3IONS-SCNC: 12.4 MMOL/L (ref 5–15)
AST SERPL-CCNC: 20 U/L (ref 0–32)
BASOPHILS # BLD AUTO: 0.03 10*3/MM3 (ref 0–0.2)
BASOPHILS NFR BLD AUTO: 0.9 % (ref 0–1.5)
BILIRUB SERPL-MCNC: 0.6 MG/DL (ref 0.2–1.2)
BUN SERPL-MCNC: 8 MG/DL (ref 6–20)
BUN/CREAT SERPL: 9.4 (ref 7.3–30)
CALCIUM SPEC-SCNC: 10.1 MG/DL (ref 8.5–10.2)
CHLORIDE SERPL-SCNC: 95 MMOL/L (ref 98–107)
CO2 SERPL-SCNC: 25.6 MMOL/L (ref 22–29)
CREAT SERPL-MCNC: 0.85 MG/DL (ref 0.6–1.1)
DEPRECATED RDW RBC AUTO: 46.4 FL (ref 37–54)
EGFRCR SERPLBLD CKD-EPI 2021: 68.1 ML/MIN/1.73
EOSINOPHIL # BLD AUTO: 0.39 10*3/MM3 (ref 0–0.4)
EOSINOPHIL NFR BLD AUTO: 11.1 % (ref 0.3–6.2)
ERYTHROCYTE [DISTWIDTH] IN BLOOD BY AUTOMATED COUNT: 13.2 % (ref 12.3–15.4)
GLOBULIN UR ELPH-MCNC: 2.4 GM/DL (ref 1.8–3.5)
GLUCOSE SERPL-MCNC: 97 MG/DL (ref 74–124)
HCT VFR BLD AUTO: 41.1 % (ref 34–46.6)
HGB BLD-MCNC: 14 G/DL (ref 12–15.9)
IMM GRANULOCYTES # BLD AUTO: 0.01 10*3/MM3 (ref 0–0.05)
IMM GRANULOCYTES NFR BLD AUTO: 0.3 % (ref 0–0.5)
LYMPHOCYTES # BLD AUTO: 1 10*3/MM3 (ref 0.7–3.1)
LYMPHOCYTES NFR BLD AUTO: 28.6 % (ref 19.6–45.3)
MCH RBC QN AUTO: 32.2 PG (ref 26.6–33)
MCHC RBC AUTO-ENTMCNC: 34.1 G/DL (ref 31.5–35.7)
MCV RBC AUTO: 94.5 FL (ref 79–97)
MONOCYTES # BLD AUTO: 0.34 10*3/MM3 (ref 0.1–0.9)
MONOCYTES NFR BLD AUTO: 9.7 % (ref 5–12)
NEUTROPHILS NFR BLD AUTO: 1.73 10*3/MM3 (ref 1.7–7)
NEUTROPHILS NFR BLD AUTO: 49.4 % (ref 42.7–76)
NRBC BLD AUTO-RTO: 0 /100 WBC (ref 0–0.2)
PLATELET # BLD AUTO: 125 10*3/MM3 (ref 140–450)
PMV BLD AUTO: 8.8 FL (ref 6–12)
POTASSIUM SERPL-SCNC: 3.7 MMOL/L (ref 3.5–4.7)
PROT SERPL-MCNC: 7 G/DL (ref 6.3–8)
RBC # BLD AUTO: 4.35 10*6/MM3 (ref 3.77–5.28)
SODIUM SERPL-SCNC: 133 MMOL/L (ref 134–145)
WBC NRBC COR # BLD: 3.5 10*3/MM3 (ref 3.4–10.8)

## 2023-05-01 PROCEDURE — 85025 COMPLETE CBC W/AUTO DIFF WBC: CPT

## 2023-05-01 PROCEDURE — 36415 COLL VENOUS BLD VENIPUNCTURE: CPT

## 2023-05-01 PROCEDURE — 80053 COMPREHEN METABOLIC PANEL: CPT

## 2023-05-01 NOTE — PROGRESS NOTES
Subjective    REASONS FOR FOLLOWUP:     1. History of Waldenstrom macroglobulinemia.    2. History of breast cancer stage I on the left, status post mastectomy. The patient completed aromatase inhibitor  X 5 YEARS IN 2019 without any evidence of breast cancer recurrence        History of Present Illness   Patient is an 82-year-old female with the above mentioned history here today for lab review and evaluation.  We originally planned for possible Rituxan today.    Patient has been having ongoing issues with intense itching.  She previously stated that it was without rash.  Patient's daughter is with her today and states that the patient has had issues with a rash on her legs that has never quite cleared up.  She is using Maida moisturizer multiple times per day.  We also previously started the patient on Paxil.  Patient states that she did not tolerate it as it made her too drowsy.  She also did not feel like it helped her with pruritus.  Patient describes it as a burning itching sensation, more generalized.  She describes it as a rash on her lower extremities, but just pruritus on the rest of her body.  Patient was treated for UTI and has completed those antibiotics.  She is no longer taking prednisone.      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   • Epilepsy with simple partial seizures 12/14/2017   • Headache, migraine 12/14/2017   • History of transfusion of packed red blood cells     R/T SURGERY   • Hyperlipidemia    • Malignant neoplasm of overlapping sites of left breast in female, estrogen receptor positive 4/13/2016   • Nonrheumatic aortic valve insufficiency    • Primary osteoarthritis of left knee 7/22/2015   • Skin cancer    • Thyroid nodule     Removed more than 35  years ago   • TIA (transient ischemic attack) 10/18/2018   • UTI due to extended-spectrum beta lactamase (ESBL) producing Escherichia coli 1/6/2019     Past Surgical History:   Procedure Laterality Date   • ADENOIDECTOMY     • APPENDECTOMY     • CARPAL TUNNEL RELEASE Right    • CATARACT EXTRACTION Bilateral    • CHOLECYSTECTOMY     • COLONOSCOPY     • HYSTERECTOMY      Partial, many years ago, heavy bleeding, no cancer   • KNEE ARTHROSCOPY Right 03/2009    Finding of chondromalacia and appropriate cleanup of joint was performed by Dr. Moraes.   • MASTECTOMY Left 07/2014   • REPLACEMENT TOTAL KNEE Right 12/2015   • SKIN CANCER EXCISION      several   • TONSILLECTOMY           Social History     Socioeconomic History   • Marital status:    Tobacco Use   • Smoking status: Never   • Smokeless tobacco: Never   Vaping Use   • Vaping Use: Never used   Substance and Sexual Activity   • Alcohol use: No     Comment: 2 cups caffeine/coffee daily   • Drug use: No   • Sexual activity: Defer     Family History   Problem Relation Age of Onset   • Mental illness Mother    • Migraines Mother    • Dementia Mother    • Heart disease Father    • Hypertension Father    • Kidney disease Father    • Alcohol abuse Father    • No Known Problems Daughter    • No Known Problems Daughter    • Breast cancer Other    • Heart disease Other    • Hypertension Other    • Diabetes Other    • Cancer Maternal Grandmother    • Breast cancer Maternal Grandmother    • No Known Problems Maternal Grandfather    • No Known Problems Paternal Grandmother    • No Known Problems Paternal Grandfather    • Hyperlipidemia Son    • Lymphoma Neg Hx    • Leukemia Neg Hx      Current Outpatient Medications on File Prior to Visit   Medication Sig Dispense Refill   • aspirin 81 MG tablet Take 1 tablet by mouth Daily.     • cetirizine (zyrTEC) 10 MG tablet Take 1 tablet by mouth Daily.     • gabapentin (NEURONTIN) 400 MG capsule TAKE 1 CAPSULE FOUR TIMES DAILY 360  capsule 1   • hydrALAZINE (APRESOLINE) 50 MG tablet TAKE 1 TABLET THREE TIMES DAILY 180 tablet 1   • levothyroxine (SYNTHROID, LEVOTHROID) 50 MCG tablet TAKE 1 TABLET EVERY DAY 90 tablet 1   • losartan (COZAAR) 100 MG tablet Take 1 tablet by mouth Daily. 90 tablet 3   • Symbicort 160-4.5 MCG/ACT inhaler Inhale 160 puffs 2 (Two) Times a Day. One in the morning and one in the evening     • [DISCONTINUED] montelukast (Singulair) 5 MG chewable tablet Chew 1 tablet Every Night. (Patient taking differently: Chew 1 tablet 2 (Two) Times a Day.) 30 tablet 3   • [DISCONTINUED] lansoprazole (Prevacid SoluTab) 30 MG Tablet Delayed Release Dispersible disintegrating tablet Take 1 tablet by mouth Every Morning Before Breakfast. (Patient not taking: Reported on 5/1/2023) 30 tablet 2   • [DISCONTINUED] niacinamide 500 MG tablet Take 1 tablet by mouth 3 (Three) Times a Day.     • [DISCONTINUED] nitrofurantoin, macrocrystal-monohydrate, (Macrobid) 100 MG capsule Take 1 capsule by mouth 2 (Two) Times a Day. (Patient not taking: Reported on 5/1/2023) 10 capsule 0   • [DISCONTINUED] PARoxetine (Paxil) 10 MG tablet Take 1 tablet by mouth Every Morning. (Patient not taking: Reported on 5/1/2023) 30 tablet 2   • [DISCONTINUED] predniSONE (DELTASONE) 10 MG tablet Take 1 tablet by mouth Every Other Day. (Patient not taking: Reported on 5/1/2023) 60 tablet 2   • [DISCONTINUED] triamcinolone (KENALOG) 0.1 % ointment Apply 1 application topically to the appropriate area as directed 2 (Two) Times a Day. (Patient not taking: Reported on 5/1/2023) 80 g 3     No current facility-administered medications on file prior to visit.       ALLERGIES:    Allergies   Allergen Reactions   • Cortisone Unknown - High Severity     Reports having a shot years ago, and wonders if he hit a vein. She was told by another doctor that it probably went in her blood stream.    • Hytrin [Terazosin] Rash   • Amlodipine Rash   • Darvon  [Propoxyphene] Palpitations  "      Objective      Vitals:    05/01/23 1010   BP: 150/68   Pulse: 65   Resp: 16   Temp: 98 °F (36.7 °C)   TempSrc: Temporal   SpO2: 98%   Weight: 60.6 kg (133 lb 11.2 oz)   Height: 167.6 cm (65.98\")   PainSc: 0-No pain         5/1/2023    10:06 AM   Current Status   ECOG score 0     Physical Exam  Vitals reviewed.   Constitutional:       General: She is not in acute distress.     Appearance: Normal appearance. She is well-developed.   HENT:      Head: Normocephalic and atraumatic.   Eyes:      Pupils: Pupils are equal, round, and reactive to light.   Cardiovascular:      Rate and Rhythm: Normal rate and regular rhythm.      Heart sounds: Normal heart sounds. No murmur heard.  Pulmonary:      Effort: Pulmonary effort is normal. No respiratory distress.      Breath sounds: Normal breath sounds. No wheezing, rhonchi or rales.   Chest:      Comments: Status post left mastectomy- left chest wall free of palpable mass or nodularity.  Abdominal:      General: Bowel sounds are normal. There is no distension.      Palpations: Abdomen is soft.   Musculoskeletal:         General: Normal range of motion.      Cervical back: Normal range of motion.   Lymphadenopathy:      Upper Body:      Right upper body: No supraclavicular or axillary adenopathy.      Left upper body: No supraclavicular or axillary adenopathy.   Skin:     General: Skin is warm and dry.      Comments: Bilateral lower extremities with some venous stasis changes as well as several areas of scabs from scratching.  See photo under media.   Neurological:      Mental Status: She is alert and oriented to person, place, and time.         RECENT LABS:  Results from last 7 days   Lab Units 05/01/23  0953   WBC 10*3/mm3 3.50   NEUTROS ABS 10*3/mm3 1.73   HEMOGLOBIN g/dL 14.0   HEMATOCRIT % 41.1   PLATELETS 10*3/mm3 125*     Results from last 7 days   Lab Units 05/01/23  0953   SODIUM mmol/L 133*   POTASSIUM mmol/L 3.7   CHLORIDE mmol/L 95*   CO2 mmol/L 25.6   BUN mg/dL " 8   CREATININE mg/dL 0.85   CALCIUM mg/dL 10.1   ALBUMIN g/dL 4.6   BILIRUBIN mg/dL 0.6   ALK PHOS U/L 75   ALT (SGPT) U/L 12   AST (SGOT) U/L 20   GLUCOSE mg/dL 97         Assessment & Plan      1.  Waldenstrom macroglobulinemia that has been treated in the past mainly with Rituxan. In the past, also she was treated with Velcade with very dramatic improvement in her monoclonal protein but very dramatic sensory peripheral neuropathy. This medication was discontinued altogether.   · 6/29/2020, she developed progressive fatigue with worsening neuropathy in her feet.  These were her original symptoms at diagnosis of Waldenstrom's.  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  Calcium also elevated at 10.5 with decreased sodium related to pseudohyponatremia associated with monoclonal protein.  Previously, she did not receive improvement from Rituxan, she is not thought to be a candidate for Imbruvica, therefore she went on to initiate venetoclax 7/13/2020.  She is currently on her next planned dose of 200 mg daily with poor tolerance as outlined below  • 04/28/2021 normal sedimentation rate, stable monoclonal protein IgM that the Waldenstrom could be the most focal reason to explain her fatigue. On the other hand it is impossible given her thrombocytopenia to be sure that she still has some component of lymphoma in her bone marrow and the only way to prove this will be to pursue in a bone marrow test. She does not believe that she is ready for this at this time.  Radiologically speaking the CT scan of the chest, abdomen and pelvis failed to document any lymphadenopathy, masses, splenomegaly or any other issue pertinent to her Waldenstrom.  • 06/02/2021 that I do not feel that the symptoms of fatigue are related to her Waldenström's at this time. Her monoclonal protein study has remained completely quiet. Her white count and hemoglobin are normal. Her B12, folic acid, ferritin, iron profile remain normal and TSH hs  remained into the normal limits. Her sed rate was normal at 4 mm/hr and she has no obvious infection or inflammatory process.   • On 08/25/2021 the patient's monoclonal protein has remained very stable as discussed with her during the previous weeks. We have another level pending today. One more analysis that I decided to pursue was an amyloid analysis and abdominal fat pad biopsy. This was performed in late 06/2021 by Deric Llamas MD. Material was sent to [facility]. No [place] red material was found in this specimen. Therefore the possibility of amyloidosis is less likely under these circumstances.   • 11/17/2021 in regard to her Waldenstrom's. She does not have any symptoms pertinent to this   • 12/17/2021. I do not see any symptoms or signs of her Waldenstrom's. Her white count, hemoglobin, platelets, white count differential are normal. Her chemistry profile is pending. I find no reason to proceed with any other therapy for this condition. Her monoclonal protein has remained quiet, stable.   • 03/11/2022 the patient’s white count, hemoglobin and platelets are normal. She has minimal sensory neuropathy in her feet that is no worse than before. She has no fever, chills or infections. No clinical bleeding. Her monoclonal protein IgM has remained very stable. She has not required any other intervention from my side of the story and she will be watched in absence of any other intervention. She will be called at home with the report of her monoclonal protein study next week.  • 11/02/2022 the patient has not had any clinical bleeding, no febrile illness, no peripheral adenopathy, no blurred vision, and her white count, hemoglobin and platelets remain stable in regard to her previous history of Waldenstrom. On the other hand the patient has this very peculiar maculopapular rash with multiple areas of ulceration not only in her trunk but also in her face, upper and lower extremities. The areas in the trunk are the  worse, especially on her backside. Photographs were obtained and placed in media by Dr. Larose.    In spite of seeing dermatologist, a skin biopsy has not been done. The patient continues blaming this rash to her blood pressure medications. I went back into time in 2020 to see if we can find what she was taking before. She was not having any rash but she has been worse from this point of view in the last several weeks.   • I think independent of what she is going to do with her life very likely she needs to have a different approach to the diagnosis of her rash in the skin and I strongly believe there is a connection between the rash and her Waldenstrom. I have placed a phone call to discuss this with the patient's dermatologist. I strongly believe that she will require skin biopsy looking for specific skin entities as well as looking for pemphigoid.  She has similar lesions in her chest wall, similar lesion in her buttock and back of the spine and upper and lower extremities.  On 11/17/2022 the patient has continued having extensive lesions in the skin. We have measured her monoclonal protein that has not changed and she has not required any therapy for this for a long time. I measured her IgE level that was 1370 and any number above 500 is very abnormal. 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 Amlodipine has been discontinued. Obviously the question is what is the next medicine that needs to be discontinued and my next candidate given the characteristics of medication is hydralazine.   I went ahead and advised the patient to initiate a program of prednisone 10 mg at breakfast time and Singulair 5 mg at bedtime. The patient will continue putting topical moisturizer on the skin. She is not using any topical steroid at this time.  I also checked on this patient's KARRIE that was  negative.  I raised the question to Teresa Salgado MD, if she saw anything to suggest any form of vasculitis that was negative.   Therefore after all of these facts and these discussions back and forth and so much conversation we are ready to proceed to see if we change the outcome of this patient in regard all of these symptoms.   On 01/23/2023 the patient has not had any new symptoms pertinent to Waldenstrom's. She has no peripheral adenopathy, she has no hepatosplenomegaly, she has no bleeding, blurred vision.  Her white count, hemoglobin and platelets remain normal. White count differential is normal and we are waiting to review her monoclonal protein that during the previous visit was completely stable in regard to her IgM level. I expect that this condition will remain quiet and for this reason I advised her to return to see me back in 3 months with repeat CBC, CMP and monoclonal protein study.  4/17/2023 complaining of intense pruritus in the absence of skin rash.  She is using moisturizers.   5/1/2023 patient seen initially planned to start Rituxan today however after received labs from her last visit patient's IgE remains elevated.  Dr. Larose would like for the patient to undergo bone marrow biopsy for further evaluation.  We will hold off on Rituxan and schedule her for bone marrow biopsy and return follow-up with Dr. Larose in a few weeks to review the results and further discuss plan of care.        2.  History of left breast cancer, status post mastectomy.  She completed adjuvant therapy.    · Mammogram 6/30/2020 benign  • On 08/25/2021 she has no symptoms or signs of breast cancer recurrence. Her mastectomy site on the left is completely healed. Her right breast examination is normal. She is up to date on her mammogram. This will be watched in absence of any other intervention.   • On 11/17/2021 the patient's left sided mastectomy is well healed, right breast exam is normal. There is no evidence of  breast cancer recurrence clinically. She will remain in observation.   • I find no symptoms or signs of breast cancer recurrence on her. This will be watched in absence of any other intervention as per 12/17/2021.   • On 03/11/2022 the patient has no symptoms or signs of breast cancer recurrence. Her right breast exam is normal. The left mastectomy site is normal. She will be watched in absence of any other intervention. She is up-to-date in right-sided mammogram.  Right breast Mammogram 7/25/2022 negative        3.  Fatigue. We have looked for endocrinological diseases, medication side effects, infections, malignancies, Waldenstrom's, amyloid, cardiac disease and so forth not having any conclusion in regard to the nature of this. I do believe that the lack of bad news or not finding any other thing is good news to me and I pointed this out to the patient. Maybe by the end of the day her fatigue is lack of proper physical training. She has bought a treadmill device that will be in her house as per today and hopefully she will be able to do some walking on this that will be meaningful in regard to recovering in regard energy.   • The patient believes that the degree of fatigue that she was experiencing before is substantially better and now she is able to do some house activity. I encouraged her to continue this in the long run, is the only solution for fatigue is physical activity.   • On 12/17/2021 the patient's fatigue has improved highly. She is now doing treadmill activity at home for 10 minutes once or twice a day. She has been eating better, she has lost some weight but she feels substantially better. I think the conditioning was probably the culprit of all of this related to the pandemia and I advised her to remain doing her treadmill activity twice a day if possible.   On 11/17/2022 a lot of the patient's fatigue is lack of sleep. She wakes up at 2-o'clock in the morning to dig into the skin of her lower  extremities and she spends the rest of the night just digging and up and about. Hopefully with the Singulair taken at nighttime that also can facilitate sleep and taking the prednisone in the morning as posted above it could have a positive impact on her that maybe will allow her to function better.  II reviewed the report by pathologist in regard to her dermatology issue and I discussed personally with Teresa Salgado MD,  on the telephone this patient's situation and she agrees with new plan of care that was discussed with her on the telephone.   I also performed on this patient cold agglutinins that were negative, cryoglobulins that were negative and sedimentation rate and LDH that were normal against any inflammatory condition of any nature. I had discussion with Teresa Salgado MD, in regard any alterations in the skin to suggest any parasitic conditions like scabies or things of this nature. Everything was negative in this regard and this is not consistent clinically neither anyway.  On 01/23/2023 the patient was further reviewed in regard to her fatigue and her skin rash. Since the previous visit we started the patient on prednisone initially 10 mg once a day that she could not handle because of overactivity and then subsequently we moved to every other day. The patient has been taking this amount now for almost 6-7 weeks. Since then the pruritus has disappeared and most of the rash of the skin has cleared with the exception of her right lower extremity that I took a picture of today.  Dermatologist, Teresa Christianson MD, has sent us information about bullous pemphigoid and my belief is probably Dr. Christianson has enough information available to believe that the patient has this condition. She prescribed doxycycline and nicotinic acid. The patient has not started those medicines yet. She raised the question to me if she needs to take them. My advice will be yes and I advised her to use the doxycycline 100 mg a day first and  if in a week she feels okay the medicine then initiate the 2nd medication.   I sent her a prescription for prednisone 10 mg every other day. Maybe in the long run we will be able to decrease it to just 5 mg and see how things evolve.   Patient reports she is no longer taking prednisone.         PLAN:  1. Schedule patient for bone marrow biopsy.  2. Patient will return for follow-up visit with Dr. Larose a few weeks after the bone marrow biopsy to review the results and further discuss plan of care.  3. We did discuss following up with her dermatologist however the patient states that she has seen them in the past and did not feel like they were very beneficial.  She will continue using Maida moisturizer as she feels like that does help.    4. Call/ return sooner should the patient develop any new concerns or problems.              Nicki Steward, APRN   5/1/2023      CC:

## 2023-05-02 NOTE — PROGRESS NOTES
05/03/23 0001   Pre-Procedure Phone Call   Procedure Time Verified Yes   Arrival Time 1100   Medical History Reviewed No   NPO Status Reinforced Yes   Ride and Caregiver Arranged Yes   Patient Knows to Bring Current Medications   (No changes in medications since last reviewed.)   Bring Outside Films Requested   (CBC & PLT'S 5/1/23 (125))

## 2023-05-03 ENCOUNTER — HOSPITAL ENCOUNTER (OUTPATIENT)
Dept: CT IMAGING | Facility: HOSPITAL | Age: 83
Discharge: HOME | End: 2023-05-03
Payer: MEDICARE

## 2023-05-09 ENCOUNTER — HOSPITAL ENCOUNTER (OUTPATIENT)
Dept: CT IMAGING | Facility: HOSPITAL | Age: 83
Discharge: HOME | End: 2023-05-09
Payer: MEDICARE

## 2023-05-09 VITALS
BODY MASS INDEX: 21.38 KG/M2 | OXYGEN SATURATION: 95 % | DIASTOLIC BLOOD PRESSURE: 83 MMHG | TEMPERATURE: 98.2 F | WEIGHT: 133 LBS | HEART RATE: 61 BPM | RESPIRATION RATE: 18 BRPM | SYSTOLIC BLOOD PRESSURE: 190 MMHG | HEIGHT: 66 IN

## 2023-05-09 DIAGNOSIS — C88.0 MACROGLOBULINEMIA OF WALDENSTROM: ICD-10-CM

## 2023-05-09 LAB
BASOPHILS # BLD AUTO: 0.04 10*3/MM3 (ref 0–0.2)
BASOPHILS NFR BLD AUTO: 1.1 % (ref 0–1.5)
DEPRECATED RDW RBC AUTO: 43.2 FL (ref 37–54)
EOSINOPHIL # BLD AUTO: 0.33 10*3/MM3 (ref 0–0.4)
EOSINOPHIL NFR BLD AUTO: 8.8 % (ref 0.3–6.2)
ERYTHROCYTE [DISTWIDTH] IN BLOOD BY AUTOMATED COUNT: 12.6 % (ref 12.3–15.4)
HCT VFR BLD AUTO: 40.9 % (ref 34–46.6)
HGB BLD-MCNC: 14.2 G/DL (ref 12–15.9)
LYMPHOCYTES # BLD AUTO: 1.11 10*3/MM3 (ref 0.7–3.1)
LYMPHOCYTES NFR BLD AUTO: 29.5 % (ref 19.6–45.3)
MCH RBC QN AUTO: 32.1 PG (ref 26.6–33)
MCHC RBC AUTO-ENTMCNC: 34.7 G/DL (ref 31.5–35.7)
MCV RBC AUTO: 92.3 FL (ref 79–97)
MONOCYTES # BLD AUTO: 0.34 10*3/MM3 (ref 0.1–0.9)
MONOCYTES NFR BLD AUTO: 9 % (ref 5–12)
NEUTROPHILS NFR BLD AUTO: 1.93 10*3/MM3 (ref 1.7–7)
NEUTROPHILS NFR BLD AUTO: 51.3 % (ref 42.7–76)
PLATELET # BLD AUTO: 153 10*3/MM3 (ref 140–450)
PMV BLD AUTO: 9.5 FL (ref 6–12)
RBC # BLD AUTO: 4.43 10*6/MM3 (ref 3.77–5.28)
WBC NRBC COR # BLD: 3.76 10*3/MM3 (ref 3.4–10.8)

## 2023-05-09 PROCEDURE — 25010000002 FENTANYL CITRATE (PF) 50 MCG/ML SOLUTION: Performed by: RADIOLOGY

## 2023-05-09 PROCEDURE — 25010000002 MIDAZOLAM PER 1 MG: Performed by: RADIOLOGY

## 2023-05-09 PROCEDURE — 77012 CT SCAN FOR NEEDLE BIOPSY: CPT

## 2023-05-09 PROCEDURE — 0 LIDOCAINE 1 % SOLUTION: Performed by: RADIOLOGY

## 2023-05-09 PROCEDURE — 85025 COMPLETE CBC W/AUTO DIFF WBC: CPT | Performed by: RADIOLOGY

## 2023-05-09 RX ORDER — MIDAZOLAM HYDROCHLORIDE 1 MG/ML
INJECTION INTRAMUSCULAR; INTRAVENOUS AS NEEDED
Status: COMPLETED | OUTPATIENT
Start: 2023-05-09 | End: 2023-05-09

## 2023-05-09 RX ORDER — SODIUM CHLORIDE 0.9 % (FLUSH) 0.9 %
3 SYRINGE (ML) INJECTION EVERY 12 HOURS SCHEDULED
Status: DISCONTINUED | OUTPATIENT
Start: 2023-05-09 | End: 2023-05-10 | Stop reason: HOSPADM

## 2023-05-09 RX ORDER — FENTANYL CITRATE 50 UG/ML
INJECTION, SOLUTION INTRAMUSCULAR; INTRAVENOUS AS NEEDED
Status: COMPLETED | OUTPATIENT
Start: 2023-05-09 | End: 2023-05-09

## 2023-05-09 RX ORDER — SODIUM CHLORIDE 0.9 % (FLUSH) 0.9 %
10 SYRINGE (ML) INJECTION AS NEEDED
Status: DISCONTINUED | OUTPATIENT
Start: 2023-05-09 | End: 2023-05-10 | Stop reason: HOSPADM

## 2023-05-09 RX ORDER — LIDOCAINE HYDROCHLORIDE 10 MG/ML
20 INJECTION, SOLUTION INFILTRATION; PERINEURAL ONCE
Status: COMPLETED | OUTPATIENT
Start: 2023-05-09 | End: 2023-05-09

## 2023-05-09 RX ORDER — SODIUM CHLORIDE 9 MG/ML
25 INJECTION, SOLUTION INTRAVENOUS ONCE
Status: COMPLETED | OUTPATIENT
Start: 2023-05-09 | End: 2023-05-09

## 2023-05-09 RX ORDER — SODIUM CHLORIDE 9 MG/ML
40 INJECTION, SOLUTION INTRAVENOUS AS NEEDED
Status: DISCONTINUED | OUTPATIENT
Start: 2023-05-09 | End: 2023-05-10 | Stop reason: HOSPADM

## 2023-05-09 RX ADMIN — FENTANYL CITRATE 50 MCG: 50 INJECTION, SOLUTION INTRAMUSCULAR; INTRAVENOUS at 12:31

## 2023-05-09 RX ADMIN — SODIUM CHLORIDE 25 ML/HR: 9 INJECTION, SOLUTION INTRAVENOUS at 12:22

## 2023-05-09 RX ADMIN — MIDAZOLAM 1 MG: 1 INJECTION INTRAMUSCULAR; INTRAVENOUS at 12:31

## 2023-05-09 RX ADMIN — Medication 3 ML: at 12:22

## 2023-05-09 RX ADMIN — LIDOCAINE HYDROCHLORIDE 20 ML: 10 INJECTION, SOLUTION INFILTRATION; PERINEURAL at 12:51

## 2023-05-09 NOTE — DISCHARGE INSTRUCTIONS

## 2023-05-09 NOTE — POST-PROCEDURE NOTE
POST PROCEDURE NOTE    Procedure: ct guided BM biopsy    Pre-Procedure Diagnosis: pelvic collection    Post-procedure Diagnosis: same    Findings: successful bx    Complications: none    Blood loss: min    Specimen Removed: aspir.&core    Disposition:   Discharge home

## 2023-05-10 ENCOUNTER — TELEPHONE (OUTPATIENT)
Dept: INTERVENTIONAL RADIOLOGY/VASCULAR | Facility: HOSPITAL | Age: 83
End: 2023-05-10
Payer: MEDICARE

## 2023-05-10 LAB
DX PRELIMINARY: NORMAL
LAB AP CASE REPORT: NORMAL
LAB AP CLINICAL INFORMATION: NORMAL
LAB AP DIAGNOSIS COMMENT: NORMAL
LAB AP SPECIAL STAINS: NORMAL
PATH REPORT.GROSS SPEC: NORMAL

## 2023-05-15 ENCOUNTER — TELEPHONE (OUTPATIENT)
Dept: ONCOLOGY | Facility: CLINIC | Age: 83
End: 2023-05-15
Payer: MEDICARE

## 2023-05-15 DIAGNOSIS — C50.812 MALIGNANT NEOPLASM OF OVERLAPPING SITES OF LEFT BREAST IN FEMALE, ESTROGEN RECEPTOR POSITIVE: Primary | ICD-10-CM

## 2023-05-15 DIAGNOSIS — R88.8 VERY LOW IRON STORES IN BONE MARROW: ICD-10-CM

## 2023-05-15 DIAGNOSIS — Z17.0 MALIGNANT NEOPLASM OF OVERLAPPING SITES OF LEFT BREAST IN FEMALE, ESTROGEN RECEPTOR POSITIVE: Primary | ICD-10-CM

## 2023-05-15 DIAGNOSIS — K90.9 IRON MALABSORPTION: ICD-10-CM

## 2023-05-15 NOTE — TELEPHONE ENCOUNTER
Called patient to inform her of lab results and plans to receive iron. Placed iron labs, plan,  and sent message to pre-cert and scheduling. Discontinued Rituxan plan. Pt v/u.  Saba Zuniga RN

## 2023-05-15 NOTE — TELEPHONE ENCOUNTER
----- Message from Florencio Larose MD sent at 5/12/2023  5:33 PM EDT -----  FLOW SHOW MINIMAL POPULATION OF MONOCLONAL B CELLS, NO IRON AT ALL IN THE BONE MARROW: THAT MEANS:  1. NO NEED FOR RITUXAN CANCEL THAT.  2. SHE NEEDS IV IRON THERAPY.    2. SHE NEEDS FERRITIN IRON TIBC RETIC HB AND HEMOCCULT X 3  ----- Message -----  From: Nicki Steward APRN  Sent: 5/12/2023   8:32 AM EDT  To: Florencio Larose MD    She had her bone marrow biopsy on 5/9.  As of right now she is scheduled to see me back on 5/22 --- It was supposed to be scheduled with you to review the results and decide if we are going to do rituxan, or what the plan is.    Should I have them reschedule her, or do you want to look at it and give me a plan?    Thanks  Nicki  :)

## 2023-05-16 DIAGNOSIS — I10 ESSENTIAL HYPERTENSION: ICD-10-CM

## 2023-05-16 DIAGNOSIS — E03.9 ADULT HYPOTHYROIDISM: ICD-10-CM

## 2023-05-16 RX ORDER — LOSARTAN POTASSIUM 100 MG/1
100 TABLET ORAL DAILY
Qty: 90 TABLET | Refills: 3 | Status: SHIPPED | OUTPATIENT
Start: 2023-05-16

## 2023-05-16 RX ORDER — LEVOTHYROXINE SODIUM 0.05 MG/1
50 TABLET ORAL DAILY
Qty: 90 TABLET | Refills: 1 | Status: SHIPPED | OUTPATIENT
Start: 2023-05-16

## 2023-05-16 NOTE — TELEPHONE ENCOUNTER
Rx Refill Note  Requested Prescriptions     Pending Prescriptions Disp Refills   • losartan (COZAAR) 100 MG tablet 90 tablet 3     Sig: Take 1 tablet by mouth Daily.   • levothyroxine (SYNTHROID, LEVOTHROID) 50 MCG tablet 90 tablet 1     Sig: Take 1 tablet by mouth Daily.      Last office visit with prescribing clinician: 12/2/2022   Last telemedicine visit with prescribing clinician: 4/12/2023   Next office visit with prescribing clinician: 6/9/2023       {TIP  Please add Last Relevant Lab Date if appropriate: 05/09/23                 Would you like a call back once the refill request has been completed: [] Yes [] No    If the office needs to give you a call back, can they leave a voicemail: [] Yes [] No    Shannon Núñez MA  05/16/23, 13:10 EDT

## 2023-05-16 NOTE — TELEPHONE ENCOUNTER
.    Caller: Loomis Karli KIRAN    Relationship: Self    Best call back number: [phone]    Requested Prescriptions:   Requested Prescriptions     Pending Prescriptions Disp Refills   • losartan (COZAAR) 100 MG tablet 90 tablet 3     Sig: Take 1 tablet by mouth Daily.   • levothyroxine (SYNTHROID, LEVOTHROID) 50 MCG tablet 90 tablet 1     Sig: Take 1 tablet by mouth Daily.        Pharmacy where request should be sent: [facility] - [facility] [address] - [phone] [facility] [phone] FX     Last office visit with prescribing clinician: 12/2/2022   Last telemedicine visit with prescribing clinician: 4/12/2023   Next office visit with prescribing clinician: 6/9/2023     Additional details provided by patient:     Does the patient have less than a 3 day supply:  [] Yes  [x] No    Would you like a call back once the refill request has been completed: [] Yes [] No    If the office needs to give you a call back, can they leave a voicemail: [] Yes [] No    Tamy Anthony Rep   05/16/23 11:10 EDT

## 2023-05-18 LAB
DX PRELIMINARY: NORMAL
LAB AP CASE REPORT: NORMAL
LAB AP CLINICAL INFORMATION: NORMAL
LAB AP DIAGNOSIS COMMENT: NORMAL
LAB AP FLOW CYTOMETRY SUMMARY: NORMAL
LAB AP SPECIAL STAINS: NORMAL
PATH REPORT.FINAL DX SPEC: NORMAL
PATH REPORT.GROSS SPEC: NORMAL

## 2023-05-22 ENCOUNTER — OFFICE VISIT (OUTPATIENT)
Dept: ONCOLOGY | Facility: CLINIC | Age: 83
End: 2023-05-22
Payer: MEDICARE

## 2023-05-22 ENCOUNTER — LAB (OUTPATIENT)
Dept: LAB | Facility: HOSPITAL | Age: 83
End: 2023-05-22
Payer: MEDICARE

## 2023-05-22 ENCOUNTER — INFUSION (OUTPATIENT)
Dept: ONCOLOGY | Facility: HOSPITAL | Age: 83
End: 2023-05-22
Payer: MEDICARE

## 2023-05-22 VITALS
SYSTOLIC BLOOD PRESSURE: 138 MMHG | TEMPERATURE: 98 F | DIASTOLIC BLOOD PRESSURE: 84 MMHG | OXYGEN SATURATION: 98 % | BODY MASS INDEX: 21.17 KG/M2 | WEIGHT: 131.7 LBS | HEIGHT: 66 IN | HEART RATE: 68 BPM

## 2023-05-22 VITALS — SYSTOLIC BLOOD PRESSURE: 130 MMHG | HEART RATE: 69 BPM | DIASTOLIC BLOOD PRESSURE: 78 MMHG

## 2023-05-22 DIAGNOSIS — R88.8 VERY LOW IRON STORES IN BONE MARROW: ICD-10-CM

## 2023-05-22 DIAGNOSIS — R88.8 VERY LOW IRON STORES IN BONE MARROW: Primary | ICD-10-CM

## 2023-05-22 DIAGNOSIS — C88.0 MACROGLOBULINEMIA OF WALDENSTROM: ICD-10-CM

## 2023-05-22 DIAGNOSIS — C50.812 MALIGNANT NEOPLASM OF OVERLAPPING SITES OF LEFT BREAST IN FEMALE, ESTROGEN RECEPTOR POSITIVE: ICD-10-CM

## 2023-05-22 DIAGNOSIS — K90.9 IRON MALABSORPTION: ICD-10-CM

## 2023-05-22 DIAGNOSIS — Z17.0 MALIGNANT NEOPLASM OF OVERLAPPING SITES OF LEFT BREAST IN FEMALE, ESTROGEN RECEPTOR POSITIVE: ICD-10-CM

## 2023-05-22 LAB
BASOPHILS # BLD AUTO: 0.03 10*3/MM3 (ref 0–0.2)
BASOPHILS NFR BLD AUTO: 0.7 % (ref 0–1.5)
DEPRECATED RDW RBC AUTO: 45.2 FL (ref 37–54)
EOSINOPHIL # BLD AUTO: 0.32 10*3/MM3 (ref 0–0.4)
EOSINOPHIL NFR BLD AUTO: 7 % (ref 0.3–6.2)
ERYTHROCYTE [DISTWIDTH] IN BLOOD BY AUTOMATED COUNT: 13.1 % (ref 12.3–15.4)
FERRITIN SERPL-MCNC: 49 NG/ML (ref 13–150)
HCT VFR BLD AUTO: 39.7 % (ref 34–46.6)
HGB BLD-MCNC: 13.6 G/DL (ref 12–15.9)
HGB RETIC QN AUTO: 36.6 PG (ref 29.8–36.1)
IMM GRANULOCYTES # BLD AUTO: 0.01 10*3/MM3 (ref 0–0.05)
IMM GRANULOCYTES NFR BLD AUTO: 0.2 % (ref 0–0.5)
IMM RETICS NFR: 2.4 % (ref 3–15.8)
IRON 24H UR-MRATE: 94 MCG/DL (ref 37–145)
IRON SATN MFR SERPL: 23 % (ref 14–48)
LYMPHOCYTES # BLD AUTO: 1.21 10*3/MM3 (ref 0.7–3.1)
LYMPHOCYTES NFR BLD AUTO: 26.3 % (ref 19.6–45.3)
MCH RBC QN AUTO: 32.2 PG (ref 26.6–33)
MCHC RBC AUTO-ENTMCNC: 34.3 G/DL (ref 31.5–35.7)
MCV RBC AUTO: 93.9 FL (ref 79–97)
MONOCYTES # BLD AUTO: 0.41 10*3/MM3 (ref 0.1–0.9)
MONOCYTES NFR BLD AUTO: 8.9 % (ref 5–12)
NEUTROPHILS NFR BLD AUTO: 2.62 10*3/MM3 (ref 1.7–7)
NEUTROPHILS NFR BLD AUTO: 56.9 % (ref 42.7–76)
NRBC BLD AUTO-RTO: 0 /100 WBC (ref 0–0.2)
PLATELET # BLD AUTO: 139 10*3/MM3 (ref 140–450)
PMV BLD AUTO: 9 FL (ref 6–12)
RBC # BLD AUTO: 4.23 10*6/MM3 (ref 3.77–5.28)
RETICS # AUTO: 0.05 10*6/MM3 (ref 0.02–0.13)
RETICS/RBC NFR AUTO: 1.12 % (ref 0.7–1.9)
TIBC SERPL-MCNC: 407 MCG/DL (ref 249–505)
TRANSFERRIN SERPL-MCNC: 291 MG/DL (ref 200–360)
WBC NRBC COR # BLD: 4.6 10*3/MM3 (ref 3.4–10.8)

## 2023-05-22 PROCEDURE — 84466 ASSAY OF TRANSFERRIN: CPT

## 2023-05-22 PROCEDURE — 3079F DIAST BP 80-89 MM HG: CPT | Performed by: NURSE PRACTITIONER

## 2023-05-22 PROCEDURE — 3075F SYST BP GE 130 - 139MM HG: CPT | Performed by: NURSE PRACTITIONER

## 2023-05-22 PROCEDURE — 1126F AMNT PAIN NOTED NONE PRSNT: CPT | Performed by: NURSE PRACTITIONER

## 2023-05-22 PROCEDURE — 25010000002 FERRIC CARBOXYMALTOSE 750 MG/15ML SOLUTION 15 ML VIAL: Performed by: NURSE PRACTITIONER

## 2023-05-22 PROCEDURE — 85025 COMPLETE CBC W/AUTO DIFF WBC: CPT

## 2023-05-22 PROCEDURE — 99214 OFFICE O/P EST MOD 30 MIN: CPT | Performed by: NURSE PRACTITIONER

## 2023-05-22 PROCEDURE — 63710000001 PROCHLORPERAZINE MALEATE PER 10 MG: Performed by: NURSE PRACTITIONER

## 2023-05-22 PROCEDURE — 83540 ASSAY OF IRON: CPT

## 2023-05-22 PROCEDURE — 82728 ASSAY OF FERRITIN: CPT

## 2023-05-22 PROCEDURE — 96374 THER/PROPH/DIAG INJ IV PUSH: CPT

## 2023-05-22 PROCEDURE — 1159F MED LIST DOCD IN RCRD: CPT | Performed by: NURSE PRACTITIONER

## 2023-05-22 PROCEDURE — 85046 RETICYTE/HGB CONCENTRATE: CPT

## 2023-05-22 PROCEDURE — 1160F RVW MEDS BY RX/DR IN RCRD: CPT | Performed by: NURSE PRACTITIONER

## 2023-05-22 PROCEDURE — A9270 NON-COVERED ITEM OR SERVICE: HCPCS | Performed by: NURSE PRACTITIONER

## 2023-05-22 PROCEDURE — 36415 COLL VENOUS BLD VENIPUNCTURE: CPT

## 2023-05-22 RX ORDER — SODIUM CHLORIDE 9 MG/ML
250 INJECTION, SOLUTION INTRAVENOUS ONCE
Status: COMPLETED | OUTPATIENT
Start: 2023-05-22 | End: 2023-05-22

## 2023-05-22 RX ORDER — PROCHLORPERAZINE MALEATE 10 MG
10 TABLET ORAL ONCE
Status: CANCELLED | OUTPATIENT
Start: 2023-05-22 | End: 2023-05-22

## 2023-05-22 RX ORDER — PROCHLORPERAZINE MALEATE 10 MG
10 TABLET ORAL ONCE
Status: COMPLETED | OUTPATIENT
Start: 2023-05-22 | End: 2023-05-22

## 2023-05-22 RX ORDER — SODIUM CHLORIDE 9 MG/ML
250 INJECTION, SOLUTION INTRAVENOUS ONCE
Status: CANCELLED | OUTPATIENT
Start: 2023-05-22

## 2023-05-22 RX ADMIN — PROCHLORPERAZINE MALEATE 10 MG: 10 TABLET ORAL at 10:58

## 2023-05-22 RX ADMIN — SODIUM CHLORIDE 250 ML: 9 INJECTION, SOLUTION INTRAVENOUS at 11:18

## 2023-05-22 RX ADMIN — FERRIC CARBOXYMALTOSE INJECTION 750 MG: 50 INJECTION, SOLUTION INTRAVENOUS at 11:28

## 2023-05-22 NOTE — PROGRESS NOTES
Subjective    REASONS FOR FOLLOWUP:     1. History of Waldenstrom macroglobulinemia.    2. History of breast cancer stage I on the left, status post mastectomy. The patient completed aromatase inhibitor  X 5 YEARS IN 2019 without any evidence of breast cancer recurrence        History of Present Illness    The patient is a 82 y.o. female with a bunch history, who returns the office today for follow-up and bone marrow biopsy review.  She underwent CT-guided bone marrow biopsy 5/9/2023 with worsening peripheral IgE and concerns for progressive WaldenStrom's.  She returns the office today to discuss these results.  She reports she is overall feeling at her baseline.  She has fairly significant fatigue though otherwise is feeling well.  She is eating and drinking without difficulty.  Her bowels are moving normally.  She has no night sweats or palpable adenopathy.  She was previously struggling with significant itching of her skin.  She followed up with dermatology and was prescribed a salve which she has found very helpful.  She has no new pain.      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   • Epilepsy with simple partial seizures 12/14/2017   • Headache, migraine 12/14/2017   • History of transfusion of packed red blood cells     R/T SURGERY   • Hyperlipidemia    • Hypertension    • Malignant neoplasm of overlapping sites of left breast in female, estrogen receptor positive 04/13/2016   • Nonrheumatic aortic valve insufficiency    • Primary osteoarthritis of left knee 07/22/2015   • Skin cancer    • Thyroid nodule     Removed more than 35 years ago   • TIA (transient ischemic attack) 10/18/2018   • UTI due to extended-spectrum beta lactamase (ESBL) producing Escherichia coli  01/06/2019   • Waldenstrom macroglobulinemia      Past Surgical History:   Procedure Laterality Date   • ADENOIDECTOMY     • APPENDECTOMY     • CARPAL TUNNEL RELEASE Right    • CATARACT EXTRACTION Bilateral    • CHOLECYSTECTOMY     • COLONOSCOPY     • HYSTERECTOMY      Partial, many years ago, heavy bleeding, no cancer   • KNEE ARTHROSCOPY Right 03/2009    Finding of chondromalacia and appropriate cleanup of joint was performed by Dr. Moraes.   • MASTECTOMY Left 07/2014   • REPLACEMENT TOTAL KNEE Right 12/2015   • SKIN CANCER EXCISION      several   • TONSILLECTOMY           Social History     Socioeconomic History   • Marital status:    Tobacco Use   • Smoking status: Never   • Smokeless tobacco: Never   Vaping Use   • Vaping Use: Never used   Substance and Sexual Activity   • Alcohol use: No     Comment: 2 cups caffeine/coffee daily   • Drug use: No   • Sexual activity: Defer     Family History   Problem Relation Age of Onset   • Mental illness Mother    • Migraines Mother    • Dementia Mother    • Heart disease Father    • Hypertension Father    • Kidney disease Father    • Alcohol abuse Father    • No Known Problems Daughter    • No Known Problems Daughter    • Breast cancer Other    • Heart disease Other    • Hypertension Other    • Diabetes Other    • Cancer Maternal Grandmother    • Breast cancer Maternal Grandmother    • No Known Problems Maternal Grandfather    • No Known Problems Paternal Grandmother    • No Known Problems Paternal Grandfather    • Hyperlipidemia Son    • Lymphoma Neg Hx    • Leukemia Neg Hx      Current Outpatient Medications on File Prior to Visit   Medication Sig Dispense Refill   • aspirin 81 MG tablet Take 1 tablet by mouth Daily.     • cetirizine (zyrTEC) 10 MG tablet Take 1 tablet by mouth Daily.     • gabapentin (NEURONTIN) 400 MG capsule TAKE 1 CAPSULE FOUR TIMES DAILY 360 capsule 1   • hydrALAZINE (APRESOLINE) 50 MG tablet TAKE 1 TABLET THREE TIMES DAILY 180 tablet 1   •  "levothyroxine (SYNTHROID, LEVOTHROID) 50 MCG tablet Take 1 tablet by mouth Daily. 90 tablet 1   • losartan (COZAAR) 100 MG tablet Take 1 tablet by mouth Daily. 90 tablet 3   • mupirocin (BACTROBAN) 2 % ointment APPLY A SMALL AMOUNT TO AFFECTED AREA 2 TIMES A DAY     • Symbicort 160-4.5 MCG/ACT inhaler Inhale 160 puffs 2 (Two) Times a Day. One in the morning and one in the evening       No current facility-administered medications on file prior to visit.       ALLERGIES:    Allergies   Allergen Reactions   • Cortisone Unknown - High Severity     Reports having a shot years ago, and wonders if he hit a vein. She was told by another doctor that it probably went in her blood stream.    • Hytrin [Terazosin] Rash   • Amlodipine Rash   • Darvon  [Propoxyphene] Palpitations       Objective      Vitals:    05/22/23 1017   BP: 138/84   Pulse: 68   Temp: 98 °F (36.7 °C)   TempSrc: Temporal   SpO2: 98%   Weight: 59.7 kg (131 lb 11.2 oz)   Height: 167.6 cm (65.98\")   PainSc: 0-No pain         5/22/2023    10:15 AM   Current Status   ECOG score 0     Physical Exam  Vitals reviewed.   Constitutional:       General: She is not in acute distress.     Appearance: Normal appearance. She is well-developed.   HENT:      Head: Normocephalic and atraumatic.   Eyes:      Pupils: Pupils are equal, round, and reactive to light.   Cardiovascular:      Rate and Rhythm: Normal rate and regular rhythm.      Heart sounds: Normal heart sounds. No murmur heard.  Pulmonary:      Effort: Pulmonary effort is normal. No respiratory distress.      Breath sounds: Normal breath sounds. No wheezing, rhonchi or rales.   Chest:      Comments: Status post left mastectomy- left chest wall free of palpable mass or nodularity.  Abdominal:      General: Bowel sounds are normal. There is no distension.      Palpations: Abdomen is soft.   Musculoskeletal:         General: Normal range of motion.      Cervical back: Normal range of motion.   Lymphadenopathy:      " Upper Body:      Right upper body: No supraclavicular or axillary adenopathy.      Left upper body: No supraclavicular or axillary adenopathy.   Skin:     General: Skin is warm and dry.   Neurological:      Mental Status: She is alert and oriented to person, place, and time.     I have reexamined the patient and the results are consistent with the previously documented exam. LENKA Lynn      RECENT LABS:  Results from last 7 days   Lab Units 05/22/23  0941   WBC 10*3/mm3 4.60   NEUTROS ABS 10*3/mm3 2.62   HEMOGLOBIN g/dL 13.6   HEMATOCRIT % 39.7   PLATELETS 10*3/mm3 139*     Results from last 7 days   Lab Units 05/22/23  0941   FERRITIN ng/mL 49.00   IRON mcg/dL 94   TIBC mcg/dL 407     Assessment & Plan      1.  Waldenstrom macroglobulinemia that has been treated in the past mainly with Rituxan. In the past, also she was treated with Velcade with very dramatic improvement in her monoclonal protein but very dramatic sensory peripheral neuropathy. This medication was discontinued altogether.   · 6/29/2020, she developed progressive fatigue with worsening neuropathy in her feet.  These were her original symptoms at diagnosis of Waldenstrom's.  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  Calcium also elevated at 10.5 with decreased sodium related to pseudohyponatremia associated with monoclonal protein.  Previously, she did not receive improvement from Rituxan, she is not thought to be a candidate for Imbruvica, therefore she went on to initiate venetoclax 7/13/2020.  She is currently on her next planned dose of 200 mg daily with poor tolerance as outlined below  • 04/28/2021 normal sedimentation rate, stable monoclonal protein IgM that the Waldenstrom could be the most focal reason to explain her fatigue. On the other hand it is impossible given her thrombocytopenia to be sure that she still has some component of lymphoma in her bone marrow and the only way to prove this will be to pursue in a  bone marrow test. She does not believe that she is ready for this at this time.  Radiologically speaking the CT scan of the chest, abdomen and pelvis failed to document any lymphadenopathy, masses, splenomegaly or any other issue pertinent to her Waldenstrom.  • 06/02/2021 that I do not feel that the symptoms of fatigue are related to her Waldenström's at this time. Her monoclonal protein study has remained completely quiet. Her white count and hemoglobin are normal. Her B12, folic acid, ferritin, iron profile remain normal and TSH hs remained into the normal limits. Her sed rate was normal at 4 mm/hr and she has no obvious infection or inflammatory process.   • On 08/25/2021 the patient's monoclonal protein has remained very stable as discussed with her during the previous weeks. We have another level pending today. One more analysis that I decided to pursue was an amyloid analysis and abdominal fat pad biopsy. This was performed in late 06/2021 by Deric Llamas MD. Material was sent to [facility]. No Congo red material was found in this specimen. Therefore the possibility of amyloidosis is less likely under these circumstances.   • 11/17/2021 in regard to her Waldenstrom's. She does not have any symptoms pertinent to this   • 12/17/2021. I do not see any symptoms or signs of her Waldenstrom's. Her white count, hemoglobin, platelets, white count differential are normal. Her chemistry profile is pending. I find no reason to proceed with any other therapy for this condition. Her monoclonal protein has remained quiet, stable.   • 03/11/2022 the patient’s white count, hemoglobin and platelets are normal. She has minimal sensory neuropathy in her feet that is no worse than before. She has no fever, chills or infections. No clinical bleeding. Her monoclonal protein IgM has remained very stable. She has not required any other intervention from my side of the story and she will be watched in absence of any other  intervention. She will be called at home with the report of her monoclonal protein study next week.  • 11/02/2022 the patient has not had any clinical bleeding, no febrile illness, no peripheral adenopathy, no blurred vision, and her white count, hemoglobin and platelets remain stable in regard to her previous history of Waldenstrom. On the other hand the patient has this very peculiar maculopapular rash with multiple areas of ulceration not only in her trunk but also in her face, upper and lower extremities. The areas in the trunk are the worse, especially on her backside. Photographs were obtained and placed in media by Dr. Larose.    In spite of seeing dermatologist, a skin biopsy has not been done. The patient continues blaming this rash to her blood pressure medications. I went back into time in 2020 to see if we can find what she was taking before. She was not having any rash but she has been worse from this point of view in the last several weeks.   • I think independent of what she is going to do with her life very likely she needs to have a different approach to the diagnosis of her rash in the skin and I strongly believe there is a connection between the rash and her Waldenstrom. I have placed a phone call to discuss this with the patient's dermatologist. I strongly believe that she will require skin biopsy looking for specific skin entities as well as looking for pemphigoid.  She has similar lesions in her chest wall, similar lesion in her buttock and back of the spine and upper and lower extremities.  On 11/17/2022 the patient has continued having extensive lesions in the skin. We have measured her monoclonal protein that has not changed and she has not required any therapy for this for a long time. I measured her IgE level that was 1370 and any number above 500 is very abnormal. 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 Amlodipine has been discontinued. Obviously the question is what is the next medicine that needs to be discontinued and my next candidate given the characteristics of medication is hydralazine.   I went ahead and advised the patient to initiate a program of prednisone 10 mg at breakfast time and Singulair 5 mg at bedtime. The patient will continue putting topical moisturizer on the skin. She is not using any topical steroid at this time.  I also checked on this patient's KARRIE that was negative.  I raised the question to Teresa Salgado MD, if she saw anything to suggest any form of vasculitis that was negative.   Therefore after all of these facts and these discussions back and forth and so much conversation we are ready to proceed to see if we change the outcome of this patient in regard all of these symptoms.   On 01/23/2023 the patient has not had any new symptoms pertinent to Waldenstrom's. She has no peripheral adenopathy, she has no hepatosplenomegaly, she has no bleeding, blurred vision.  Her white count, hemoglobin and platelets remain normal. White count differential is normal and we are waiting to review her monoclonal protein that during the previous visit was completely stable in regard to her IgM level. I expect that this condition will remain quiet and for this reason I advised her to return to see me back in 3 months with repeat CBC, CMP and monoclonal protein study.  4/17/2023 complaining of intense pruritus in the absence of skin rash.  She is using moisturizers.  IgE at that time 1538  5/1/2023 patient seen initially planned to start Rituxan today however after received labs from her last visit patient's IgE remains elevated.  Dr. Larose would like for the patient to undergo bone marrow biopsy for further evaluation.   5/9/2023 CT-guided bone marrow biopsy.  Flow cytometry shows minimal population of monoclonal B cells and no iron in the bone  marrow.  Per Dr. Larose, the patient does not need to undergo repeat Rituxan though will require IV iron replacement.      2.  History of left breast cancer, status post mastectomy.  She completed adjuvant therapy.    · Mammogram 6/30/2020 benign  • On 08/25/2021 she has no symptoms or signs of breast cancer recurrence. Her mastectomy site on the left is completely healed. Her right breast examination is normal. She is up to date on her mammogram. This will be watched in absence of any other intervention.   • On 11/17/2021 the patient's left sided mastectomy is well healed, right breast exam is normal. There is no evidence of breast cancer recurrence clinically. She will remain in observation.   • I find no symptoms or signs of breast cancer recurrence on her. This will be watched in absence of any other intervention as per 12/17/2021.   • On 03/11/2022 the patient has no symptoms or signs of breast cancer recurrence. Her right breast exam is normal. The left mastectomy site is normal. She will be watched in absence of any other intervention. She is up-to-date in right-sided mammogram.  Right breast Mammogram 7/25/2022 negative  She remains in observation with no clinical evidence of recurrent disease        3.  Fatigue. We have looked for endocrinological diseases, medication side effects, infections, malignancies, Waldenstrom's, amyloid, cardiac disease and so forth not having any conclusion in regard to the nature of this. I do believe that the lack of bad news or not finding any other thing is good news to me and I pointed this out to the patient. Maybe by the end of the day her fatigue is lack of proper physical training. She has bought a treadmill device that will be in her house as per today and hopefully she will be able to do some walking on this that will be meaningful in regard to recovering in regard energy.   • The patient believes that the degree of fatigue that she was experiencing before is  substantially better and now she is able to do some house activity. I encouraged her to continue this in the long run, is the only solution for fatigue is physical activity.   • On 12/17/2021 the patient's fatigue has improved highly. She is now doing treadmill activity at home for 10 minutes once or twice a day. She has been eating better, she has lost some weight but she feels substantially better. I think the conditioning was probably the culprit of all of this related to the pandemia and I advised her to remain doing her treadmill activity twice a day if possible.   On 11/17/2022 a lot of the patient's fatigue is lack of sleep. She wakes up at 2-o'clock in the morning to dig into the skin of her lower extremities and she spends the rest of the night just digging and up and about. Hopefully with the Singulair taken at nighttime that also can facilitate sleep and taking the prednisone in the morning as posted above it could have a positive impact on her that maybe will allow her to function better.  II reviewed the report by pathologist in regard to her dermatology issue and I discussed personally with Teresa Salgado MD,  on the telephone this patient's situation and she agrees with new plan of care that was discussed with her on the telephone.   I also performed on this patient cold agglutinins that were negative, cryoglobulins that were negative and sedimentation rate and LDH that were normal against any inflammatory condition of any nature. I had discussion with Teresa Salgado MD, in regard any alterations in the skin to suggest any parasitic conditions like scabies or things of this nature. Everything was negative in this regard and this is not consistent clinically neither anyway.  On 01/23/2023 the patient was further reviewed in regard to her fatigue and her skin rash. Since the previous visit we started the patient on prednisone initially 10 mg once a day that she could not handle because of overactivity and  then subsequently we moved to every other day. The patient has been taking this amount now for almost 6-7 weeks. Since then the pruritus has disappeared and most of the rash of the skin has cleared with the exception of her right lower extremity that I took a picture of today.  Dermatologist, Teresa Christianson MD, has sent us information about bullous pemphigoid and my belief is probably Dr. Christianson has enough information available to believe that the patient has this condition. She prescribed doxycycline and nicotinic acid. The patient has not started those medicines yet. She raised the question to me if she needs to take them. My advice will be yes and I advised her to use the doxycycline 100 mg a day first and if in a week she feels okay the medicine then initiate the 2nd medication.   The prescribed prednisone that this was since discontinued  Bone marrow being depleted of iron stores may be the culprit of her fatigue    4.  Bone marrow depletion of iron  · Bone marrow biopsy 5/9/2023 with pathology noting virtually absent stainable iron identified.  · Injectafer planned x2.  Proceed with first dose today    5.  Pruritus  · Recent evaluation by dermatology with addition of a salve.  She has found this very beneficial and is no longer struggling with significant pruritus    PLAN:  1. Bone marrow results reviewed with the patient today with no evidence of progressive WaldenStrom, though bone marrow is depleted of iron with virtually absent iron.  2. Proceed today with dose #1 Injectafer  3. Return in 2 weeks for CBC and dose #2 Injectafer  4. We will provide stool cards to the patient to evaluate for blood loss  5. Follow-up and repeat IgE levels will be at Dr. Larose this discretion.        Glory Santamaria, APRN   5/22/2023      CC:

## 2023-05-30 LAB
COLLECT DATE SP2 STL: NORMAL
COLLECT DATE SP3 STL: NORMAL
COLLECT DATE STL: NORMAL
DX PRELIMINARY: NORMAL
GASTROCULT GAST QL: NEGATIVE
HEMOCCULT SP2 STL QL: NEGATIVE
HEMOCCULT SP3 STL QL: NEGATIVE
LAB AP CASE REPORT: NORMAL
LAB AP CLINICAL INFORMATION: NORMAL
LAB AP DIAGNOSIS COMMENT: NORMAL
LAB AP FLOW CYTOMETRY SUMMARY: NORMAL
LAB AP SPECIAL STAINS: NORMAL
Lab: 12
PATH REPORT.ADDENDUM SPEC: NORMAL
PATH REPORT.FINAL DX SPEC: NORMAL
PATH REPORT.GROSS SPEC: NORMAL

## 2023-06-02 ENCOUNTER — OFFICE VISIT (OUTPATIENT)
Dept: CARDIOLOGY | Facility: CLINIC | Age: 83
End: 2023-06-02

## 2023-06-02 VITALS
DIASTOLIC BLOOD PRESSURE: 78 MMHG | HEART RATE: 66 BPM | SYSTOLIC BLOOD PRESSURE: 130 MMHG | BODY MASS INDEX: 20.73 KG/M2 | HEIGHT: 66 IN | WEIGHT: 129 LBS

## 2023-06-02 DIAGNOSIS — I10 ESSENTIAL HYPERTENSION: Chronic | ICD-10-CM

## 2023-06-02 DIAGNOSIS — R21 RASH AND OTHER NONSPECIFIC SKIN ERUPTION: ICD-10-CM

## 2023-06-02 DIAGNOSIS — I35.1 NONRHEUMATIC AORTIC VALVE INSUFFICIENCY: Primary | ICD-10-CM

## 2023-06-02 DIAGNOSIS — I51.89 DIASTOLIC DYSFUNCTION: ICD-10-CM

## 2023-06-02 RX ORDER — HYDRALAZINE HYDROCHLORIDE 50 MG/1
50 TABLET, FILM COATED ORAL 3 TIMES DAILY
Qty: 270 TABLET | Refills: 2 | Status: SHIPPED | OUTPATIENT
Start: 2023-06-02

## 2023-06-02 RX ORDER — NIACINAMIDE 500 MG
1 TABLET ORAL 3 TIMES DAILY
COMMUNITY
Start: 2023-05-16

## 2023-06-02 RX ORDER — BETAMETHASONE DIPROPIONATE 0.5 MG/G
OINTMENT TOPICAL
COMMUNITY
Start: 2023-05-11

## 2023-06-02 NOTE — PROGRESS NOTES
Date of Office Visit: 2023  Encounter Provider: LENKA Zeng  Place of Service: [facility] CARDIOLOGY  Patient Name: Karli Loomis  :1940  Primary Cardiologist: Dr. Bacilio Hernández     Chief Complaint   Patient presents with   • Follow-up   • Aortic Insufficiency   • Hypertension   :     HPI: Karli Loomis is a 82 y.o. female who presents today for 6-month cardiac follow-up visit.  I have reviewed her medical records.    She has a history of right-sided breast cancer that was treated with hammertoes inhibitor for 5 years and completed in 2019.  She underwent right mastectomy and did not require chemotherapy or radiation.  She has María Gokul's macroglobulinemia and has been on rituximab, bortezomib, and venetoclax.    In May 2021, she established care with Dr. Bacilio Hernández for cardio-oncology consultation at the request of Dr. Larose.  She was having multiple symptoms.  Myocardial perfusion study showed no evidence of ischemia.  Echocardiogram showed the following: LVEF 67%, normal global longitudinal LV strain -21.4%, mild basal septal hypertrophy without outflow obstruction, grade 2 diastolic dysfunction, left atrial volume mildly increased, nodular thickening on the left coronary cusp of the aortic valve, moderate aortic valve regurgitation, trace mitral valve regurgitation, and trace tricuspid regurgitation.   Her metoprolol dosage was decreased because of bradycardia.    In 2022, repeat echo showed EF 60%, grade 1 diastolic dysfunction, mild LVH, mild to moderate basal septal hypertrophy without obstruction, moderate aortic regurgitation and nodular sclerosis on the aortic valve.    She has had a rash over the years of unknown etiology and she felt that it was drug related.  Multiple medication changes have been made to her regimen over the past year with no resolution of the rash.  She felt that the terazosin caused a rash, amlodipine on 10 mg daily  "because of lower extremity edema, and spironolactone caused polyuria.  Her BP has been better controlled on hydralazine.    She presents today for a follow-up visit.  She is found out that her rash is \"autoimmune\" and her treatment from her dermatologist has improved the rash on her back/legs/arms.  She denies chest pain, shortness Clayton, palpitations, edema, dizziness, syncope, or bleeding.  Blood pressure was elevated on the first check and normal on the second check.  She has not been checking at home.      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   • Epilepsy with simple partial seizures 12/14/2017   • Headache, migraine 12/14/2017   • History of transfusion of packed red blood cells     R/T SURGERY   • Hyperlipidemia    • Hypertension    • Malignant neoplasm of overlapping sites of left breast in female, estrogen receptor positive 04/13/2016   • Nonrheumatic aortic valve insufficiency    • Primary osteoarthritis of left knee 07/22/2015   • Skin cancer    • Thyroid nodule     Removed more than 35 years ago   • TIA (transient ischemic attack) 10/18/2018   • UTI due to extended-spectrum beta lactamase (ESBL) producing Escherichia coli 01/06/2019   • Waldenstrom macroglobulinemia        Past Surgical History:   Procedure Laterality Date   • ADENOIDECTOMY     • APPENDECTOMY     • CARPAL TUNNEL RELEASE Right    • CATARACT EXTRACTION Bilateral    • CHOLECYSTECTOMY     • COLONOSCOPY     • HYSTERECTOMY      Partial, many years ago, heavy bleeding, no cancer   • KNEE ARTHROSCOPY Right 03/2009    Finding of chondromalacia and appropriate cleanup of joint was performed by Dr. Moraes.   • MASTECTOMY Left 07/2014   • REPLACEMENT TOTAL KNEE Right 12/2015   • SKIN CANCER EXCISION      " several   • TONSILLECTOMY         Social History     Socioeconomic History   • Marital status:    Tobacco Use   • Smoking status: Never   • Smokeless tobacco: Never   Vaping Use   • Vaping Use: Never used   Substance and Sexual Activity   • Alcohol use: No     Comment: 2 cups caffeine/coffee daily   • Drug use: No   • Sexual activity: Defer       Family History   Problem Relation Age of Onset   • Mental illness Mother    • Migraines Mother    • Dementia Mother    • Heart disease Father    • Hypertension Father    • Kidney disease Father    • Alcohol abuse Father    • No Known Problems Daughter    • No Known Problems Daughter    • Breast cancer Other    • Heart disease Other    • Hypertension Other    • Diabetes Other    • Cancer Maternal Grandmother    • Breast cancer Maternal Grandmother    • No Known Problems Maternal Grandfather    • No Known Problems Paternal Grandmother    • No Known Problems Paternal Grandfather    • Hyperlipidemia Son    • Lymphoma Neg Hx    • Leukemia Neg Hx        The following portion of the patient's history were reviewed and updated as appropriate: past medical history, past surgical history, past social history, past family history, allergies, current medications, and problem list.    Review of Systems   Constitutional: Negative.   Cardiovascular: Negative.    Respiratory: Negative.    Hematologic/Lymphatic: Negative.    Skin: Positive for rash.   Neurological: Negative.        Allergies   Allergen Reactions   • Cortisone Unknown - High Severity     Reports having a shot years ago, and wonders if he hit a vein. She was told by another doctor that it probably went in her blood stream.    • Hytrin [Terazosin] Rash   • Amlodipine Rash   • Darvon  [Propoxyphene] Palpitations         Current Outpatient Medications:   •  aspirin 81 MG tablet, Take 1 tablet by mouth Daily., Disp: , Rfl:   •  betamethasone, augmented, (DIPROLENE) 0.05 % ointment, APPLY A SMALL AMOUNT TOPICALLY TO  "AFFECTED AREA 2 TIMES A DAY, Disp: , Rfl:   •  cetirizine (zyrTEC) 10 MG tablet, Take 1 tablet by mouth Daily., Disp: , Rfl:   •  gabapentin (NEURONTIN) 400 MG capsule, TAKE 1 CAPSULE FOUR TIMES DAILY, Disp: 360 capsule, Rfl: 1  •  hydrALAZINE (APRESOLINE) 50 MG tablet, Take 1 tablet by mouth 3 (Three) Times a Day., Disp: 270 tablet, Rfl: 2  •  levothyroxine (SYNTHROID, LEVOTHROID) 50 MCG tablet, Take 1 tablet by mouth Daily., Disp: 90 tablet, Rfl: 1  •  losartan (COZAAR) 100 MG tablet, Take 1 tablet by mouth Daily., Disp: 90 tablet, Rfl: 3  •  mupirocin (BACTROBAN) 2 % ointment, APPLY A SMALL AMOUNT TO AFFECTED AREA 2 TIMES A DAY, Disp: , Rfl:   •  niacinamide 500 MG tablet, Take 1 tablet by mouth 3 (Three) Times a Day., Disp: , Rfl:   •  Symbicort 160-4.5 MCG/ACT inhaler, Inhale 160 puffs 2 (Two) Times a Day. One in the morning and one in the evening, Disp: , Rfl:          Objective:     Vitals:    06/02/23 1117 06/02/23 1142   BP: 168/80 130/78   BP Location: Right arm Right arm   Patient Position: Sitting Sitting   Cuff Size: Adult    Pulse: 66    Weight: 58.5 kg (129 lb)    Height: 167.6 cm (65.98\")      Body mass index is 20.83 kg/m².    PHYSICAL EXAM:    Vitals Reviewed.   General Appearance: No acute distress, well developed and well nourished. Thin.    Eyes: Glasses.   HENT: No hearing loss noted.    Respiratory: No signs of respiratory distress. Respiration rhythm and depth normal.  Clear to auscultation.   Cardiovascular:  Jugular Venous Pressure: Normal  Heart Rate and Rhythm: Normal, Heart Sounds: Normal S1 and S2. No S3 or S4 noted.  Murmurs: No murmurs noted. No rubs, thrills, or gallops.   Lower Extremities: No edema noted.  Musculoskeletal: Normal movement of extremities.  Skin: General appearance normal.    Psychiatric: Patient alert and oriented to person, place, and time. Speech and behavior appropriate. Normal mood and affect.       ECG 12 Lead    Date/Time: 6/2/2023 11:16 AM  Performed by: " Vivien Pina APRN  Authorized by: Vivien Pina APRN   Comparison: compared with previous ECG from 11/30/2022  Similar to previous ECG  Rhythm: sinus rhythm  Rate: normal  BPM: 66  Conduction: conduction normal  ST Segments: ST segments normal  T Waves: T waves normal  QRS axis: normal  Other findings: left ventricular hypertrophy and poor R wave progression    Clinical impression: non-specific ECG              Assessment:       Diagnosis Plan   1. Nonrheumatic aortic valve insufficiency  Adult Transthoracic Echo Complete w/ Color, Spectral and Contrast if Necessary Per Protocol      2. Diastolic dysfunction        3. Essential hypertension        4. Rash and other nonspecific skin eruption               Plan:       1.  Aortic Insufficiency: Moderate per echocardiogram in June 2022.  She denies symptoms of worsening valvular disease.  Repeat echocardiogram in the near future.    2.  Diastolic Dysfunction: Euvolemic.    3.  Hypertension: Blood pressure well controlled on current medication regimen.    4.  Autoimmune rash: Treated by dermatology.  Her symptoms have improved.  Her stress level has significantly decreased with the diagnosis.    5.  We will follow-up with her about the echocardiogram results.  I recommend a 1 year follow-up visit with Dr. Hernández.    As always, it has been a pleasure to participate in your patient's care. Thank you.         Sincerely,         LENKA Garcia  [facility] Cardiology      · Dictated utilizing Dragon Dictation

## 2023-06-05 ENCOUNTER — INFUSION (OUTPATIENT)
Dept: ONCOLOGY | Facility: HOSPITAL | Age: 83
End: 2023-06-05
Payer: MEDICARE

## 2023-06-05 VITALS
WEIGHT: 129.8 LBS | TEMPERATURE: 97.1 F | HEART RATE: 73 BPM | DIASTOLIC BLOOD PRESSURE: 82 MMHG | SYSTOLIC BLOOD PRESSURE: 160 MMHG | OXYGEN SATURATION: 98 % | RESPIRATION RATE: 16 BRPM | BODY MASS INDEX: 20.96 KG/M2

## 2023-06-05 DIAGNOSIS — K90.9 IRON MALABSORPTION: Primary | ICD-10-CM

## 2023-06-05 DIAGNOSIS — R88.8 VERY LOW IRON STORES IN BONE MARROW: ICD-10-CM

## 2023-06-05 PROCEDURE — 25010000002 FERRIC CARBOXYMALTOSE 750 MG/15ML SOLUTION 15 ML VIAL: Performed by: INTERNAL MEDICINE

## 2023-06-05 PROCEDURE — 63710000001 PROCHLORPERAZINE MALEATE PER 10 MG: Performed by: INTERNAL MEDICINE

## 2023-06-05 PROCEDURE — A9270 NON-COVERED ITEM OR SERVICE: HCPCS | Performed by: INTERNAL MEDICINE

## 2023-06-05 PROCEDURE — 96374 THER/PROPH/DIAG INJ IV PUSH: CPT

## 2023-06-05 RX ORDER — SODIUM CHLORIDE 9 MG/ML
250 INJECTION, SOLUTION INTRAVENOUS ONCE
Status: COMPLETED | OUTPATIENT
Start: 2023-06-05 | End: 2023-06-05

## 2023-06-05 RX ORDER — PROCHLORPERAZINE MALEATE 10 MG
10 TABLET ORAL ONCE
Status: COMPLETED | OUTPATIENT
Start: 2023-06-05 | End: 2023-06-05

## 2023-06-05 RX ADMIN — PROCHLORPERAZINE MALEATE 10 MG: 10 TABLET ORAL at 14:42

## 2023-06-05 RX ADMIN — SODIUM CHLORIDE 250 ML: 9 INJECTION, SOLUTION INTRAVENOUS at 14:42

## 2023-06-05 RX ADMIN — FERRIC CARBOXYMALTOSE INJECTION 750 MG: 50 INJECTION, SOLUTION INTRAVENOUS at 14:47

## 2023-06-14 ENCOUNTER — HOSPITAL ENCOUNTER (OUTPATIENT)
Dept: CARDIOLOGY | Facility: HOSPITAL | Age: 83
Discharge: HOME | End: 2023-06-14
Admitting: NURSE PRACTITIONER
Payer: MEDICARE

## 2023-06-14 VITALS
WEIGHT: 129 LBS | BODY MASS INDEX: 21.49 KG/M2 | DIASTOLIC BLOOD PRESSURE: 60 MMHG | HEART RATE: 73 BPM | SYSTOLIC BLOOD PRESSURE: 160 MMHG | OXYGEN SATURATION: 98 % | HEIGHT: 65 IN

## 2023-06-14 DIAGNOSIS — I35.1 NONRHEUMATIC AORTIC VALVE INSUFFICIENCY: ICD-10-CM

## 2023-06-14 LAB
AORTIC ARCH: 3 CM
ASCENDING AORTA: 3 CM
BH CV ECHO LEFT VENTRICLE GLOBAL LONGITUDINAL STRAIN: -22.2 %
BH CV ECHO MEAS - ACS: 1.63 CM
BH CV ECHO MEAS - AI P1/2T: 534.4 MSEC
BH CV ECHO MEAS - AO MAX PG: 8.1 MMHG
BH CV ECHO MEAS - AO MEAN PG: 4 MMHG
BH CV ECHO MEAS - AO ROOT DIAM: 2.6 CM
BH CV ECHO MEAS - AO V2 MAX: 142 CM/SEC
BH CV ECHO MEAS - AO V2 VTI: 31.3 CM
BH CV ECHO MEAS - AVA(I,D): 1.78 CM2
BH CV ECHO MEAS - EDV(CUBED): 58.4 ML
BH CV ECHO MEAS - EDV(MOD-SP2): 52 ML
BH CV ECHO MEAS - EDV(MOD-SP4): 47 ML
BH CV ECHO MEAS - EF(MOD-BP): 74.4 %
BH CV ECHO MEAS - EF(MOD-SP2): 80.8 %
BH CV ECHO MEAS - EF(MOD-SP4): 70.2 %
BH CV ECHO MEAS - ESV(CUBED): 14.7 ML
BH CV ECHO MEAS - ESV(MOD-SP2): 10 ML
BH CV ECHO MEAS - ESV(MOD-SP4): 14 ML
BH CV ECHO MEAS - FS: 36.8 %
BH CV ECHO MEAS - IVS/LVPW: 1 CM
BH CV ECHO MEAS - IVSD: 1.17 CM
BH CV ECHO MEAS - LA 3D VOL INDEX: 48
BH CV ECHO MEAS - LAT PEAK E' VEL: 6.5 CM/SEC
BH CV ECHO MEAS - LV DIASTOLIC VOL/BSA (35-75): 28.6 CM2
BH CV ECHO MEAS - LV MASS(C)D: 153 GRAMS
BH CV ECHO MEAS - LV MAX PG: 4.5 MMHG
BH CV ECHO MEAS - LV MEAN PG: 2 MMHG
BH CV ECHO MEAS - LV SYSTOLIC VOL/BSA (12-30): 8.5 CM2
BH CV ECHO MEAS - LV V1 MAX: 106 CM/SEC
BH CV ECHO MEAS - LV V1 VTI: 23.1 CM
BH CV ECHO MEAS - LVIDD: 3.9 CM
BH CV ECHO MEAS - LVIDS: 2.45 CM
BH CV ECHO MEAS - LVOT AREA: 2.42 CM2
BH CV ECHO MEAS - LVOT DIAM: 1.75 CM
BH CV ECHO MEAS - LVPWD: 1.18 CM
BH CV ECHO MEAS - MED PEAK E' VEL: 5 CM/SEC
BH CV ECHO MEAS - MV A DUR: 0.1 SEC
BH CV ECHO MEAS - MV A MAX VEL: 112.3 CM/SEC
BH CV ECHO MEAS - MV DEC SLOPE: 556.3 CM/SEC2
BH CV ECHO MEAS - MV DEC TIME: 0.22 MSEC
BH CV ECHO MEAS - MV E MAX VEL: 100.1 CM/SEC
BH CV ECHO MEAS - MV E/A: 0.89
BH CV ECHO MEAS - MV MAX PG: 6.3 MMHG
BH CV ECHO MEAS - MV MEAN PG: 2.34 MMHG
BH CV ECHO MEAS - MV P1/2T: 54 MSEC
BH CV ECHO MEAS - MV V2 VTI: 36.5 CM
BH CV ECHO MEAS - MVA(P1/2T): 4.1 CM2
BH CV ECHO MEAS - MVA(VTI): 1.53 CM2
BH CV ECHO MEAS - PA ACC TIME: 0.08 SEC
BH CV ECHO MEAS - PA V2 MAX: 96.4 CM/SEC
BH CV ECHO MEAS - PULM A REVS DUR: 0.1 SEC
BH CV ECHO MEAS - PULM A REVS VEL: 38.3 CM/SEC
BH CV ECHO MEAS - PULM DIAS VEL: 40.5 CM/SEC
BH CV ECHO MEAS - PULM S/D: 0.84
BH CV ECHO MEAS - PULM SYS VEL: 33.9 CM/SEC
BH CV ECHO MEAS - QP/QS: 1.22
BH CV ECHO MEAS - RAP SYSTOLE: 3 MMHG
BH CV ECHO MEAS - RV MAX PG: 2.7 MMHG
BH CV ECHO MEAS - RV V1 MAX: 82.6 CM/SEC
BH CV ECHO MEAS - RV V1 VTI: 20.7 CM
BH CV ECHO MEAS - RVOT DIAM: 2.05 CM
BH CV ECHO MEAS - RVSP: 26.1 MMHG
BH CV ECHO MEAS - SI(MOD-SP2): 25.6 ML/M2
BH CV ECHO MEAS - SI(MOD-SP4): 20.1 ML/M2
BH CV ECHO MEAS - SUP REN AO DIAM: 1.8 CM
BH CV ECHO MEAS - SV(LVOT): 55.9 ML
BH CV ECHO MEAS - SV(MOD-SP2): 42 ML
BH CV ECHO MEAS - SV(MOD-SP4): 33 ML
BH CV ECHO MEAS - SV(RVOT): 68 ML
BH CV ECHO MEAS - TAPSE (>1.6): 1.65 CM
BH CV ECHO MEAS - TR MAX PG: 23.1 MMHG
BH CV ECHO MEAS - TR MAX VEL: 240.3 CM/SEC
BH CV ECHO MEASUREMENTS AVERAGE E/E' RATIO: 17.41
BH CV VAS BP RIGHT ARM: NORMAL MMHG
BH CV XLRA - RV BASE: 2.9 CM
BH CV XLRA - RV LENGTH: 6.2 CM
BH CV XLRA - RV MID: 2.45 CM
BH CV XLRA - TDI S': 13.1 CM/SEC
LEFT ATRIUM VOLUME INDEX: 34 ML/M2
SINUS: 3 CM
STJ: 2.35 CM

## 2023-06-14 PROCEDURE — 93306 TTE W/DOPPLER COMPLETE: CPT | Performed by: INTERNAL MEDICINE

## 2023-06-14 PROCEDURE — 93356 MYOCRD STRAIN IMG SPCKL TRCK: CPT | Performed by: INTERNAL MEDICINE

## 2023-06-14 PROCEDURE — 93306 TTE W/DOPPLER COMPLETE: CPT

## 2023-06-14 PROCEDURE — 93356 MYOCRD STRAIN IMG SPCKL TRCK: CPT

## 2023-06-16 NOTE — PROGRESS NOTES
Stable echocardiogram for age.  Grade 2 diastolic dysfunction noted and she denies shortness of breath or fluid retention symptoms.  Mild aortic regurgitation noted.  She is doing well.  Patient verbalizes understanding of results.  Follow-up as scheduled.

## 2023-07-07 LAB
DX PRELIMINARY: NORMAL
LAB AP CASE REPORT: NORMAL
LAB AP CLINICAL INFORMATION: NORMAL
LAB AP DIAGNOSIS COMMENT: NORMAL
LAB AP FLOW CYTOMETRY SUMMARY: NORMAL
LAB AP SPECIAL STAINS: NORMAL
Lab: NORMAL
PATH REPORT.ADDENDUM SPEC: NORMAL
PATH REPORT.FINAL DX SPEC: NORMAL
PATH REPORT.GROSS SPEC: NORMAL

## 2023-07-26 RX ORDER — HYDRALAZINE HYDROCHLORIDE 50 MG/1
TABLET, FILM COATED ORAL
Qty: 270 TABLET | Refills: 2 | Status: SHIPPED | OUTPATIENT
Start: 2023-07-26

## 2023-08-28 ENCOUNTER — HOSPITAL ENCOUNTER (OUTPATIENT)
Facility: HOSPITAL | Age: 83
Setting detail: OBSERVATION
Discharge: HOME | End: 2023-08-30
Attending: EMERGENCY MEDICINE | Admitting: INTERNAL MEDICINE
Payer: MEDICARE

## 2023-08-28 ENCOUNTER — APPOINTMENT (OUTPATIENT)
Dept: CT IMAGING | Facility: HOSPITAL | Age: 83
End: 2023-08-28
Payer: MEDICARE

## 2023-08-28 DIAGNOSIS — J45.20 MILD INTERMITTENT ASTHMA, UNSPECIFIED WHETHER COMPLICATED: ICD-10-CM

## 2023-08-28 DIAGNOSIS — R06.00 DYSPNEA, UNSPECIFIED TYPE: Primary | ICD-10-CM

## 2023-08-28 DIAGNOSIS — R77.8 ELEVATED TROPONIN: ICD-10-CM

## 2023-08-28 LAB
ALBUMIN SERPL-MCNC: 4.1 G/DL (ref 3.5–5.2)
ALBUMIN/GLOB SERPL: 2 G/DL
ALP SERPL-CCNC: 132 U/L (ref 39–117)
ALT SERPL W P-5'-P-CCNC: 9 U/L (ref 1–33)
ANION GAP SERPL CALCULATED.3IONS-SCNC: 11.9 MMOL/L (ref 5–15)
AST SERPL-CCNC: 19 U/L (ref 1–32)
BASOPHILS # BLD AUTO: 0.02 10*3/MM3 (ref 0–0.2)
BASOPHILS NFR BLD AUTO: 0.5 % (ref 0–1.5)
BILIRUB SERPL-MCNC: 0.9 MG/DL (ref 0–1.2)
BUN SERPL-MCNC: 7 MG/DL (ref 8–23)
BUN/CREAT SERPL: 9.5 (ref 7–25)
CALCIUM SPEC-SCNC: 9.2 MG/DL (ref 8.6–10.5)
CHLORIDE SERPL-SCNC: 94 MMOL/L (ref 98–107)
CO2 SERPL-SCNC: 24.1 MMOL/L (ref 22–29)
CREAT SERPL-MCNC: 0.74 MG/DL (ref 0.57–1)
D DIMER PPP FEU-MCNC: 1.08 MCGFEU/ML (ref 0–0.84)
DEPRECATED RDW RBC AUTO: 44.3 FL (ref 37–54)
EGFRCR SERPLBLD CKD-EPI 2021: 79.9 ML/MIN/1.73
EOSINOPHIL # BLD AUTO: 0.15 10*3/MM3 (ref 0–0.4)
EOSINOPHIL NFR BLD AUTO: 3.7 % (ref 0.3–6.2)
ERYTHROCYTE [DISTWIDTH] IN BLOOD BY AUTOMATED COUNT: 12.4 % (ref 12.3–15.4)
GLOBULIN UR ELPH-MCNC: 2.1 GM/DL
GLUCOSE SERPL-MCNC: 100 MG/DL (ref 65–99)
HCT VFR BLD AUTO: 40.6 % (ref 34–46.6)
HGB BLD-MCNC: 14.1 G/DL (ref 12–15.9)
IMM GRANULOCYTES # BLD AUTO: 0.01 10*3/MM3 (ref 0–0.05)
IMM GRANULOCYTES NFR BLD AUTO: 0.2 % (ref 0–0.5)
LYMPHOCYTES # BLD AUTO: 1.09 10*3/MM3 (ref 0.7–3.1)
LYMPHOCYTES NFR BLD AUTO: 26.7 % (ref 19.6–45.3)
MCH RBC QN AUTO: 33.6 PG (ref 26.6–33)
MCHC RBC AUTO-ENTMCNC: 34.7 G/DL (ref 31.5–35.7)
MCV RBC AUTO: 96.7 FL (ref 79–97)
MONOCYTES # BLD AUTO: 0.34 10*3/MM3 (ref 0.1–0.9)
MONOCYTES NFR BLD AUTO: 8.3 % (ref 5–12)
NEUTROPHILS NFR BLD AUTO: 2.47 10*3/MM3 (ref 1.7–7)
NEUTROPHILS NFR BLD AUTO: 60.6 % (ref 42.7–76)
NRBC BLD AUTO-RTO: 0 /100 WBC (ref 0–0.2)
NT-PROBNP SERPL-MCNC: 378 PG/ML (ref 0–1800)
PLATELET # BLD AUTO: 129 10*3/MM3 (ref 140–450)
PMV BLD AUTO: 8.5 FL (ref 6–12)
POTASSIUM SERPL-SCNC: 3.4 MMOL/L (ref 3.5–5.2)
PROT SERPL-MCNC: 6.2 G/DL (ref 6–8.5)
QT INTERVAL: 418 MS
QTC INTERVAL: 497 MS
RBC # BLD AUTO: 4.2 10*6/MM3 (ref 3.77–5.28)
SODIUM SERPL-SCNC: 130 MMOL/L (ref 136–145)
TROPONIN T SERPL HS-MCNC: 18 NG/L
TROPONIN T SERPL HS-MCNC: 20 NG/L
WBC NRBC COR # BLD: 4.08 10*3/MM3 (ref 3.4–10.8)

## 2023-08-28 PROCEDURE — 94640 AIRWAY INHALATION TREATMENT: CPT

## 2023-08-28 PROCEDURE — 94799 UNLISTED PULMONARY SVC/PX: CPT

## 2023-08-28 PROCEDURE — 85379 FIBRIN DEGRADATION QUANT: CPT | Performed by: EMERGENCY MEDICINE

## 2023-08-28 PROCEDURE — 84484 ASSAY OF TROPONIN QUANT: CPT | Performed by: EMERGENCY MEDICINE

## 2023-08-28 PROCEDURE — 71275 CT ANGIOGRAPHY CHEST: CPT

## 2023-08-28 PROCEDURE — 36415 COLL VENOUS BLD VENIPUNCTURE: CPT

## 2023-08-28 PROCEDURE — 93005 ELECTROCARDIOGRAM TRACING: CPT | Performed by: EMERGENCY MEDICINE

## 2023-08-28 PROCEDURE — G0378 HOSPITAL OBSERVATION PER HR: HCPCS

## 2023-08-28 PROCEDURE — 85025 COMPLETE CBC W/AUTO DIFF WBC: CPT | Performed by: EMERGENCY MEDICINE

## 2023-08-28 PROCEDURE — 83880 ASSAY OF NATRIURETIC PEPTIDE: CPT | Performed by: EMERGENCY MEDICINE

## 2023-08-28 PROCEDURE — 99285 EMERGENCY DEPT VISIT HI MDM: CPT

## 2023-08-28 PROCEDURE — 25510000001 IOPAMIDOL PER 1 ML: Performed by: EMERGENCY MEDICINE

## 2023-08-28 PROCEDURE — 80053 COMPREHEN METABOLIC PANEL: CPT | Performed by: EMERGENCY MEDICINE

## 2023-08-28 PROCEDURE — 93010 ELECTROCARDIOGRAM REPORT: CPT | Performed by: INTERNAL MEDICINE

## 2023-08-28 PROCEDURE — 94664 DEMO&/EVAL PT USE INHALER: CPT

## 2023-08-28 RX ORDER — HYDRALAZINE HYDROCHLORIDE 50 MG/1
50 TABLET, FILM COATED ORAL 3 TIMES DAILY
Status: DISCONTINUED | OUTPATIENT
Start: 2023-08-28 | End: 2023-08-30 | Stop reason: HOSPADM

## 2023-08-28 RX ORDER — LORAZEPAM 1 MG/1
1 TABLET ORAL ONCE
Status: COMPLETED | OUTPATIENT
Start: 2023-08-28 | End: 2023-08-28

## 2023-08-28 RX ORDER — GABAPENTIN 300 MG/1
300 CAPSULE ORAL 4 TIMES DAILY
Status: DISCONTINUED | OUTPATIENT
Start: 2023-08-28 | End: 2023-08-30 | Stop reason: HOSPADM

## 2023-08-28 RX ORDER — POTASSIUM CHLORIDE 750 MG/1
40 TABLET, FILM COATED, EXTENDED RELEASE ORAL EVERY 4 HOURS
Status: COMPLETED | OUTPATIENT
Start: 2023-08-28 | End: 2023-08-29

## 2023-08-28 RX ORDER — BISACODYL 5 MG/1
5 TABLET, DELAYED RELEASE ORAL DAILY PRN
Status: DISCONTINUED | OUTPATIENT
Start: 2023-08-28 | End: 2023-08-30 | Stop reason: HOSPADM

## 2023-08-28 RX ORDER — UREA 10 %
3 LOTION (ML) TOPICAL NIGHTLY PRN
Status: DISCONTINUED | OUTPATIENT
Start: 2023-08-28 | End: 2023-08-30 | Stop reason: HOSPADM

## 2023-08-28 RX ORDER — ALBUTEROL SULFATE 2.5 MG/3ML
2.5 SOLUTION RESPIRATORY (INHALATION) EVERY 6 HOURS PRN
Status: DISCONTINUED | OUTPATIENT
Start: 2023-08-28 | End: 2023-08-30 | Stop reason: HOSPADM

## 2023-08-28 RX ORDER — LEVOTHYROXINE SODIUM 0.05 MG/1
50 TABLET ORAL
Status: DISCONTINUED | OUTPATIENT
Start: 2023-08-29 | End: 2023-08-30 | Stop reason: HOSPADM

## 2023-08-28 RX ORDER — LOSARTAN POTASSIUM 100 MG/1
100 TABLET ORAL DAILY
Status: DISCONTINUED | OUTPATIENT
Start: 2023-08-28 | End: 2023-08-30 | Stop reason: HOSPADM

## 2023-08-28 RX ORDER — ASPIRIN 81 MG/1
81 TABLET ORAL DAILY
Status: DISCONTINUED | OUTPATIENT
Start: 2023-08-28 | End: 2023-08-30 | Stop reason: HOSPADM

## 2023-08-28 RX ORDER — SODIUM CHLORIDE 0.9 % (FLUSH) 0.9 %
10 SYRINGE (ML) INJECTION AS NEEDED
Status: DISCONTINUED | OUTPATIENT
Start: 2023-08-28 | End: 2023-08-30 | Stop reason: HOSPADM

## 2023-08-28 RX ORDER — ACETAMINOPHEN 325 MG/1
650 TABLET ORAL EVERY 4 HOURS PRN
Status: DISCONTINUED | OUTPATIENT
Start: 2023-08-28 | End: 2023-08-30 | Stop reason: HOSPADM

## 2023-08-28 RX ORDER — BUDESONIDE AND FORMOTEROL FUMARATE DIHYDRATE 160; 4.5 UG/1; UG/1
2 AEROSOL RESPIRATORY (INHALATION) 2 TIMES DAILY
Status: DISCONTINUED | OUTPATIENT
Start: 2023-08-28 | End: 2023-08-30 | Stop reason: HOSPADM

## 2023-08-28 RX ORDER — BISACODYL 10 MG
10 SUPPOSITORY, RECTAL RECTAL DAILY PRN
Status: DISCONTINUED | OUTPATIENT
Start: 2023-08-28 | End: 2023-08-30 | Stop reason: HOSPADM

## 2023-08-28 RX ORDER — ONDANSETRON 2 MG/ML
4 INJECTION INTRAMUSCULAR; INTRAVENOUS EVERY 6 HOURS PRN
Status: DISCONTINUED | OUTPATIENT
Start: 2023-08-28 | End: 2023-08-30 | Stop reason: HOSPADM

## 2023-08-28 RX ORDER — AMOXICILLIN 250 MG
2 CAPSULE ORAL 2 TIMES DAILY
Status: DISCONTINUED | OUTPATIENT
Start: 2023-08-28 | End: 2023-08-30 | Stop reason: HOSPADM

## 2023-08-28 RX ORDER — IPRATROPIUM BROMIDE AND ALBUTEROL SULFATE 2.5; .5 MG/3ML; MG/3ML
3 SOLUTION RESPIRATORY (INHALATION)
Status: DISCONTINUED | OUTPATIENT
Start: 2023-08-28 | End: 2023-08-30 | Stop reason: HOSPADM

## 2023-08-28 RX ORDER — IPRATROPIUM BROMIDE AND ALBUTEROL SULFATE 2.5; .5 MG/3ML; MG/3ML
3 SOLUTION RESPIRATORY (INHALATION) ONCE
Status: COMPLETED | OUTPATIENT
Start: 2023-08-28 | End: 2023-08-28

## 2023-08-28 RX ORDER — AZITHROMYCIN 250 MG/1
250 TABLET, FILM COATED ORAL
Status: DISCONTINUED | OUTPATIENT
Start: 2023-08-28 | End: 2023-08-30 | Stop reason: HOSPADM

## 2023-08-28 RX ORDER — POLYETHYLENE GLYCOL 3350 17 G/17G
17 POWDER, FOR SOLUTION ORAL DAILY PRN
Status: DISCONTINUED | OUTPATIENT
Start: 2023-08-28 | End: 2023-08-30 | Stop reason: HOSPADM

## 2023-08-28 RX ORDER — ONDANSETRON 4 MG/1
4 TABLET, FILM COATED ORAL EVERY 6 HOURS PRN
Status: DISCONTINUED | OUTPATIENT
Start: 2023-08-28 | End: 2023-08-30 | Stop reason: HOSPADM

## 2023-08-28 RX ADMIN — LOSARTAN POTASSIUM 100 MG: 100 TABLET, FILM COATED ORAL at 21:32

## 2023-08-28 RX ADMIN — IPRATROPIUM BROMIDE AND ALBUTEROL SULFATE 3 ML: .5; 2.5 SOLUTION RESPIRATORY (INHALATION) at 20:15

## 2023-08-28 RX ADMIN — POTASSIUM CHLORIDE 40 MEQ: 750 TABLET, EXTENDED RELEASE ORAL at 21:34

## 2023-08-28 RX ADMIN — IOPAMIDOL 95 ML: 755 INJECTION, SOLUTION INTRAVENOUS at 14:33

## 2023-08-28 RX ADMIN — IPRATROPIUM BROMIDE AND ALBUTEROL SULFATE 3 ML: .5; 2.5 SOLUTION RESPIRATORY (INHALATION) at 13:01

## 2023-08-28 RX ADMIN — GABAPENTIN 300 MG: 300 CAPSULE ORAL at 21:33

## 2023-08-28 RX ADMIN — HYDRALAZINE HYDROCHLORIDE 50 MG: 50 TABLET ORAL at 21:33

## 2023-08-28 RX ADMIN — LORAZEPAM 1 MG: 1 TABLET ORAL at 13:33

## 2023-08-28 RX ADMIN — AZITHROMYCIN DIHYDRATE 250 MG: 250 TABLET, FILM COATED ORAL at 21:32

## 2023-08-28 NOTE — ED PROVIDER NOTES
EMERGENCY DEPARTMENT ENCOUNTER    Room Number:  S617/1  PCP: Provider, No Known  Historian: Patient      HPI:  Chief Complaint: Shortness of breath  A complete HPI/ROS/PMH/PSH/SH/FH are unobtainable due to: Nothing  Context: Karli Loomis is a 83 y.o. female, with a history of asthma, who presents to the ED from home by private vehicle c/o shortness of breath for the past 2 weeks.  Symptoms have been constant for the past 2 weeks but been worse for the past few days.  She has used her Symbicort and albuterol inhaler with no relief.  She also complains of sharp pain in her upper back.  Denies recent back injury, fever, chills, cough, chest pain, palpitations, dizziness, syncope, leg pain, or leg swelling.  She called Dr. Ogden's office today and was advised to come to the ED.  Denies history of heart disease or PE/DVT.  She is not on home O2.            PAST MEDICAL HISTORY  Active Ambulatory Problems     Diagnosis Date Noted    Malignant neoplasm of overlapping sites of left breast in female, estrogen receptor positive 04/13/2016    Macroglobulinemia of Waldenstrom 11/09/2016    Adult hypothyroidism 12/14/2017    Primary osteoarthritis of left knee 07/22/2015    Headache, migraine 12/14/2017    Hyponatremia with normal extracellular fluid volume 02/26/2018    TIA (transient ischemic attack) 10/18/2018    Essential hypertension 01/06/2019    Vision loss   08/25/2019    Asthma     Other insomnia 12/10/2020    Nonrheumatic aortic valve insufficiency     Grade II diastolic dysfunction     Vitamin B 12 deficiency 03/11/2022    Rash and other nonspecific skin eruption     APC (atrial premature contractions) 05/25/2022    Bullous pemphigoid 01/23/2023    Very low iron stores in bone marrow 05/15/2023    Iron malabsorption 05/15/2023     Resolved Ambulatory Problems     Diagnosis Date Noted    Lymphoma malignant, nodular, 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    Drug-induced polyneuropathy 12/14/2017    Epilepsy with simple partial  seizures 12/14/2017    Pneumonia due to infectious organism 01/06/2019    Sepsis 01/06/2019    Left hip pain      Past Medical History:   Diagnosis Date    Acid reflux     Chronic pansinusitis 07/12/2018    Clostridium difficile colitis     History of transfusion of packed red blood cells     Hyperlipidemia     Hypertension     Skin cancer     Thyroid nodule     UTI due to extended-spectrum beta lactamase (ESBL) producing Escherichia coli 01/06/2019    Waldenstrom macroglobulinemia          PAST SURGICAL HISTORY  Past Surgical History:   Procedure Laterality Date    ADENOIDECTOMY      APPENDECTOMY      CARPAL TUNNEL RELEASE Right     CATARACT EXTRACTION Bilateral     CHOLECYSTECTOMY      COLONOSCOPY      HYSTERECTOMY      Partial, many years ago, heavy bleeding, no cancer    KNEE ARTHROSCOPY Right 03/2009    Finding of chondromalacia and appropriate cleanup of joint was performed by Dr. Moraes.    MASTECTOMY Left 07/2014    REPLACEMENT TOTAL KNEE Right 12/2015    SKIN CANCER EXCISION      several    TONSILLECTOMY           FAMILY HISTORY  Family History   Problem Relation Age of Onset    Mental illness Mother     Migraines Mother     Dementia Mother     Heart disease Father     Hypertension Father     Kidney disease Father     Alcohol abuse Father     No Known Problems Daughter     No Known Problems Daughter     Breast cancer Other     Heart disease Other     Hypertension Other     Diabetes Other     Cancer Maternal Grandmother     Breast cancer Maternal Grandmother     No Known Problems Maternal Grandfather     No Known Problems Paternal Grandmother     No Known Problems Paternal Grandfather     Hyperlipidemia Son     Lymphoma Neg Hx     Leukemia Neg Hx          SOCIAL HISTORY  Social History     Socioeconomic History    Marital status:    Tobacco Use    Smoking status: Never    Smokeless tobacco: Never   Vaping Use    Vaping Use: Never used   Substance and Sexual Activity    Alcohol use: No     Comment: 2  cups caffeine/coffee daily    Drug use: No    Sexual activity: Defer         ALLERGIES  Cortisone, Hytrin [terazosin], Amlodipine, and Darvon  [propoxyphene]    REVIEW OF SYSTEMS  All systems have been reviewed and are negative except for those listed in the HPI      PHYSICAL EXAM  ED Triage Vitals [08/28/23 1226]   Temp Heart Rate Resp BP SpO2   97.7 °F (36.5 °C) 84 22 -- 97 %      Temp src Heart Rate Source Patient Position BP Location FiO2 (%)   Tympanic Monitor -- -- --       Physical Exam      GENERAL: Awake, alert, oriented x3.  Well-developed elderly female.  Appears dyspneic and mildly anxious   HENT: NCAT, nares patent  EYES: no scleral icterus  CV: regular rhythm, normal rate  RESPIRATORY: Mildly tachypneic, clear to auscultation bilaterally, breath sounds are slightly diminished in both lung bases  ABDOMEN: soft, nontender  MUSCULOSKELETAL: Extremities are nontender with full range of motion.  Trace edema in both ankles.  No calf tenderness.  Back is nontender  NEURO: Speech is normal.  No facial droop.  Follows commands  PSYCH:  calm, cooperative  SKIN: warm, dry    Vital signs and nursing notes reviewed.          LAB RESULTS  Recent Results (from the past 24 hour(s))   Single High Sensitivity Troponin T    Collection Time: 08/28/23  4:24 PM    Specimen: Blood   Result Value Ref Range    HS Troponin T 20 (H) <10 ng/L   Potassium    Collection Time: 08/29/23  5:04 AM    Specimen: Blood   Result Value Ref Range    Potassium 4.4 3.5 - 5.2 mmol/L   Basic Metabolic Panel    Collection Time: 08/29/23  5:04 AM    Specimen: Blood   Result Value Ref Range    Glucose 97 65 - 99 mg/dL    BUN 7 (L) 8 - 23 mg/dL    Creatinine 0.64 0.57 - 1.00 mg/dL    Sodium 132 (L) 136 - 145 mmol/L    Potassium 4.4 3.5 - 5.2 mmol/L    Chloride 98 98 - 107 mmol/L    CO2 23.1 22.0 - 29.0 mmol/L    Calcium 9.0 8.6 - 10.5 mg/dL    BUN/Creatinine Ratio 10.9 7.0 - 25.0    Anion Gap 10.9 5.0 - 15.0 mmol/L    eGFR 87.3 >60.0 mL/min/1.73    CBC (No Diff)    Collection Time: 08/29/23  5:04 AM    Specimen: Blood   Result Value Ref Range    WBC 4.23 3.40 - 10.80 10*3/mm3    RBC 4.02 3.77 - 5.28 10*6/mm3    Hemoglobin 13.7 12.0 - 15.9 g/dL    Hematocrit 38.8 34.0 - 46.6 %    MCV 96.5 79.0 - 97.0 fL    MCH 34.1 (H) 26.6 - 33.0 pg    MCHC 35.3 31.5 - 35.7 g/dL    RDW 12.3 12.3 - 15.4 %    RDW-SD 43.5 37.0 - 54.0 fl    MPV 8.9 6.0 - 12.0 fL    Platelets 133 (L) 140 - 450 10*3/mm3       Ordered the above labs and reviewed the results.        RADIOLOGY  No Radiology Exams Resulted Within Past 24 Hours    Ordered the above noted radiological studies. Reviewed by me in PACS.            PROCEDURES  Procedures              MEDICATIONS GIVEN IN ER  Medications   sodium chloride 0.9 % flush 10 mL (has no administration in time range)   acetaminophen (TYLENOL) tablet 650 mg (has no administration in time range)   sennosides-docusate (PERICOLACE) 8.6-50 MG per tablet 2 tablet (2 tablets Oral Given 8/29/23 0842)     And   polyethylene glycol (MIRALAX) packet 17 g (has no administration in time range)     And   bisacodyl (DULCOLAX) EC tablet 5 mg (has no administration in time range)     And   bisacodyl (DULCOLAX) suppository 10 mg (has no administration in time range)   ondansetron (ZOFRAN) tablet 4 mg (has no administration in time range)     Or   ondansetron (ZOFRAN) injection 4 mg (has no administration in time range)   melatonin tablet 3 mg (has no administration in time range)   ipratropium-albuterol (DUO-NEB) nebulizer solution 3 mL (3 mL Nebulization Given 8/29/23 1125)   albuterol (PROVENTIL) nebulizer solution 0.083% 2.5 mg/3mL (has no administration in time range)   azithromycin (ZITHROMAX) tablet 250 mg (250 mg Oral Given 8/29/23 0842)   aspirin EC tablet 81 mg (81 mg Oral Given 8/29/23 0842)   gabapentin (NEURONTIN) capsule 300 mg (300 mg Oral Given 8/29/23 1147)   hydrALAZINE (APRESOLINE) tablet 50 mg (50 mg Oral Given 8/29/23 1147)   levothyroxine  (SYNTHROID, LEVOTHROID) tablet 50 mcg (50 mcg Oral Given 8/29/23 0627)   losartan (COZAAR) tablet 100 mg (100 mg Oral Given 8/29/23 0842)   budesonide-formoterol (SYMBICORT) 160-4.5 MCG/ACT inhaler 2 puff (2 puffs Inhalation Given 8/29/23 0808)   Potassium Replacement - Follow Nurse / BPA Driven Protocol (has no administration in time range)   Magnesium Standard Dose Replacement - Follow Nurse / BPA Driven Protocol (has no administration in time range)   Phosphorus Replacement - Follow Nurse / BPA Driven Protocol (has no administration in time range)   Calcium Replacement - Follow Nurse / BPA Driven Protocol (has no administration in time range)   metoprolol tartrate (LOPRESSOR) tablet 25 mg (has no administration in time range)   ipratropium-albuterol (DUO-NEB) nebulizer solution 3 mL (3 mL Nebulization Given 8/28/23 1301)   LORazepam (ATIVAN) tablet 1 mg (1 mg Oral Given 8/28/23 1333)   iopamidol (ISOVUE-370) 76 % injection 100 mL (95 mL Intravenous Given 8/28/23 1433)   potassium chloride (K-DUR,KLOR-CON) ER tablet 40 mEq (40 mEq Oral Given 8/29/23 0158)                   MEDICAL DECISION MAKING, PROGRESS, and CONSULTS    All labs have been independently reviewed by me.  All radiology studies have been reviewed by me and I have also reviewed the radiology report.   EKG's independently viewed and interpreted by me.  Discussion below represents my analysis of pertinent findings related to patient's condition, differential diagnosis, treatment plan and final disposition.      Additional sources:  - Discussed/ obtained information from independent historians: Family member at bedside    - External (non-ED) record review: Patient was last admitted here in August 2019 for transient vision loss.  MRI of the brain was negative acute.  Patient was seen by neurology.  Echocardiogram was done in June 2023 and showed an EF of 74%.    - Chronic or social conditions impacting care: N/A          Orders placed during this  visit:  Orders Placed This Encounter   Procedures    CT Angiogram Chest    XR Spine Thoracic 3 View    Comprehensive Metabolic Panel    Single High Sensitivity Troponin T    BNP    D-dimer, Quantitative    CBC Auto Differential    Single High Sensitivity Troponin T    Basic Metabolic Panel    CBC (No Diff)    Potassium    Diet: Cardiac Diets; Healthy Heart (2-3 Na+); Texture: Regular Texture (IDDSI 7); Fluid Consistency: Thin (IDDSI 0)    Monitor Blood Pressure    Pulse Oximetry, Continuous    Vital Signs    Up with assistance    Daily Weights    Strict Intake & Output    Oral Care    Place Sequential Compression Device    Maintain Sequential Compression Device    Code Status and Medical Interventions:    ZARA (on-call MD unless specified) Details    Inpatient Pulmonology Consult    Inpatient Neurosurgery Consult    ECG 12 Lead Dyspnea    SCANNED - TELEMETRY      SCANNED - TELEMETRY      SCANNED - TELEMETRY      SCANNED - TELEMETRY      SCANNED - TELEMETRY      SCANNED - TELEMETRY      Insert Peripheral IV    Initiate Observation Status    CBC & Differential         Additional orders considered but not ordered:  N/A        Differential diagnosis:    Differential diagnosis includes but is not limited to CHF, acute coronary syndrome, pulmonary embolism, pneumothorax, pneumonia, asthma/COPD, deconditioning, anemia, anxiety.         Independent interpretation of labs, radiology studies, and discussions with consultants:  ED Course as of 08/29/23 1440   Mon Aug 28, 2023   1319 I was called to the bedside by Carol, the patient's nurse.  Patient is complaining of not being able to breathe.  O2 sats are 100% on room air.  On reexam, lungs are clear.  Patient is quite anxious.  She will be given a dose of Ativan. [WH]   1329 D-Dimer, Quant(!): 1.08 [WH]   1329 WBC: 4.08 [WH]   1329 Hemoglobin: 14.1 [WH]   1336 HS Troponin T(!): 18 [WH]   1337 Sodium(!): 130 [WH]   1337 Creatinine: 0.74 [WH]   1337 proBNP: 378.0 [WH]   1345  EKG personally interpreted by me at 1340.  My personal interpretation is:          EKG time: 1336  Artifact is present in multiple leads which limits interpretation  Rhythm/Rate: Sinus rhythm with PACs rate 85  P waves and MO: Normal  QRS, axis: LAD, LVH  ST and T waves: Normal    Interpreted Contemporaneously by me, independently viewed  EKG not significantly changed compared to prior EKG done on 8/25/2019.     [WH]   1525 CTA chest personally interpreted by me.  My personal trepidation is: No saddle embolus.  No pleural effusion.  Per the radiologist, there is no PE.  There is chronic pleural-parenchymal thickening in the left upper lobe.  There are unchanged pulmonary nodules.  There are no new pulmonary opacities.  No pleural or pericardial effusion.  There is a suspected acute versus subacute T8 compression fracture [WH]   1530 Test results discussed with the patient.  She is less anxious and less tachypneic.  On reexam, sounds are still slightly diminished in the lung bases.  There is no wheezing.  Shared decision-making was discussed.  Patient is agreeable with being admitted. [WH]   1615 Discussed with Dr. Urban, [facility], who agrees to admit. [JR]      ED Course User Index  [JR] Alfred Nieto MD  [WH] Deric Carlos MD               DIAGNOSIS  Final diagnoses:   Dyspnea, unspecified type   Mild intermittent asthma, unspecified whether complicated   Elevated troponin         DISPOSITION  ADMISSION    Discussed treatment plan and reason for admission with pt/family and admitting physician.  Pt/family voiced understanding of the plan for admission for further testing/treatment as needed.               Latest Documented Vital Signs:  As of 14:40 EDT  BP- 131/54 HR- 89 Temp- 98.1 °F (36.7 °C) (Oral) O2 sat- 96%              --    Please note that portions of this were completed with a voice recognition program.       Note Disclaimer: At [facility], we believe that sharing information builds trust  and better relationships. You are receiving this note because you are receiving care at [facility] or recently visited. It is possible you will see health information before a provider has talked with you about it. This kind of information can be easy to misunderstand. To help you fully understand what it means for your health, we urge you to discuss this note with your provider.             Deric Carlos MD  08/29/23 7947

## 2023-08-28 NOTE — ED NOTES
Nursing report ED to floor  Karli Loomis  83 y.o.  female    HPI :   Chief Complaint   Patient presents with    Shortness of Breath       Admitting doctor:   Maryann Urban MD    Admitting diagnosis:   The primary encounter diagnosis was Dyspnea, unspecified type. Diagnoses of Mild intermittent asthma, unspecified whether complicated and Elevated troponin were also pertinent to this visit.    Code status:   Current Code Status       Date Active Code Status Order ID Comments User Context       Prior            Allergies:   Cortisone, Hytrin [terazosin], Amlodipine, and Darvon  [propoxyphene]    Isolation:   No active isolations    Intake and Output  No intake or output data in the 24 hours ending 08/28/23 1625    Weight:   There were no vitals filed for this visit.    Most recent vitals:   Vitals:    08/28/23 1401 08/28/23 1404 08/28/23 1405 08/28/23 1406   BP: 125/99      Pulse:  71 73 72   Resp:       Temp:       TempSrc:       SpO2: 95% 94% 97% 100%       Active LDAs/IV Access:   Lines, Drains & Airways       Active LDAs       Name Placement date Placement time Site Days    Peripheral IV 08/28/23 1315 Right Antecubital 08/28/23  1315  Antecubital  less than 1                    Labs (abnormal labs have a star):   Labs Reviewed   COMPREHENSIVE METABOLIC PANEL - Abnormal; Notable for the following components:       Result Value    Glucose 100 (*)     BUN 7 (*)     Sodium 130 (*)     Potassium 3.4 (*)     Chloride 94 (*)     Alkaline Phosphatase 132 (*)     All other components within normal limits    Narrative:     GFR Normal >60  Chronic Kidney Disease <60  Kidney Failure <15    The GFR formula is only valid for adults with stable renal function between ages 18 and 70.   SINGLE HSTROPONIN T - Abnormal; Notable for the following components:    HS Troponin T 18 (*)     All other components within normal limits    Narrative:     High Sensitive Troponin T Reference Range:  <10.0 ng/L- Negative Female for  "AMI  <15.0 ng/L- Negative Male for AMI  >=10 - Abnormal Female indicating possible myocardial injury.  >=15 - Abnormal Male indicating possible myocardial injury.   Clinicians would have to utilize clinical acumen, EKG, Troponin, and serial changes to determine if it is an Acute Myocardial Infarction or myocardial injury due to an underlying chronic condition.        D-DIMER, QUANTITATIVE - Abnormal; Notable for the following components:    D-Dimer, Quantitative 1.08 (*)     All other components within normal limits    Narrative:     According to the assay 's published package insert, a normal (<0.50 MCGFEU/mL) D-dimer result in conjunction with a non-high clinical probability assessment, excludes deep vein thrombosis (DVT) and pulmonary embolism (PE) with high sensitivity.    D-dimer values increase with age and this can make VTE exclusion of an older population difficult. To address this, the [employer], based on best available evidence and recent guidelines, recommends that clinicians use age-adjusted D-dimer thresholds in patients greater than 50 years of age with: a) a low probability of PE who do not meet all Pulmonary Embolism Rule Out Criteria, or b) in those with intermediate probability of PE.   The formula for an age-adjusted D-dimer cut-off is \"age/100\".  For example, a 60 year old patient would have an age-adjusted cut-off of 0.60 MCGFEU/mL and an 80 year old 0.80 MCGFEU/mL.   CBC WITH AUTO DIFFERENTIAL - Abnormal; Notable for the following components:    MCH 33.6 (*)     Platelets 129 (*)     All other components within normal limits   BNP (IN-HOUSE) - Normal    Narrative:     Among patients with dyspnea, NT-proBNP is highly sensitive for the detection of acute congestive heart failure. In addition NT-proBNP of <300 pg/ml effectively rules out acute congestive heart failure with 99% negative predictive value.     SINGLE HSTROPONIN T   CBC AND DIFFERENTIAL    Narrative:  "    The following orders were created for panel order CBC & Differential.  Procedure                               Abnormality         Status                     ---------                               -----------         ------                     CBC Auto Differential[[id number]]        Abnormal            Final result                 Please view results for these tests on the individual orders.       EKG:   ECG 12 Lead Dyspnea   Final Result   HEART RATE= 85  bpm   RR Interval= 706  ms   TX Interval= 200  ms   P Horizontal Axis= -37  deg   P Front Axis= -41  deg   QRSD Interval= 90  ms   QT Interval= 418  ms   QTcB= 497  ms   QRS Axis= -13  deg   T Wave Axis= 27  deg   - ABNORMAL ECG -   Left ventricular hypertrophy   Inferior infarct, old   NSR with PACs   Poor baseline   No change from previous tracing   Electronically Signed By: Sheridan Puckett ([facility]) 28-Aug-2023 14:25:52   Date and Time of Study: 2023-08-28 13:36:47          Meds given in ED:   Medications   sodium chloride 0.9 % flush 10 mL (has no administration in time range)   predniSONE (DELTASONE) tablet 60 mg (60 mg Oral Not Given 8/28/23 1624)   ipratropium-albuterol (DUO-NEB) nebulizer solution 3 mL (3 mL Nebulization Given 8/28/23 1301)   LORazepam (ATIVAN) tablet 1 mg (1 mg Oral Given 8/28/23 1333)   iopamidol (ISOVUE-370) 76 % injection 100 mL (95 mL Intravenous Given 8/28/23 1433)       Imaging results:  No radiology results for the last day    Ambulatory status:   - ax1    Social issues:   Social History     Socioeconomic History    Marital status:    Tobacco Use    Smoking status: Never    Smokeless tobacco: Never   Vaping Use    Vaping Use: Never used   Substance and Sexual Activity    Alcohol use: No     Comment: 2 cups caffeine/coffee daily    Drug use: No    Sexual activity: Defer       NIH Stroke Scale:       Rachel Salazar RN  08/28/23 16:25 EDT

## 2023-08-28 NOTE — H&P
HISTORY AND PHYSICAL   [facility]        Date of Admission: 2023  Patient Identification:  Name: Karli Loomis  Age: 83 y.o.  Sex: female  :  1940  MRN: [id number]                     Primary Care Physician: Provider, No Known    Chief Complaint:  83 year old female who presented to the emergency room with shortness of breath for the last two weeks; she had been using her inhalers but did not improve; she called dr azul but could not get an appointement so she came to the ED; she denies fever or chills; she is worse with exertion; denies chest pain    History of Present Illness:   As above    Past Medical History:  Past Medical History:   Diagnosis Date    Acid reflux     APC (atrial premature contractions) 2022    Chronic pansinusitis 2018    Clostridium difficile colitis     Requiring admission to [facility] in 2008    Drug-induced polyneuropathy 2017    Epilepsy with simple partial seizures 2017    Headache, migraine 2017    History of transfusion of packed red blood cells     R/T SURGERY    Hyperlipidemia     Hypertension     Malignant neoplasm of overlapping sites of left breast in female, estrogen receptor positive 2016    Nonrheumatic aortic valve insufficiency     Primary osteoarthritis of left knee 2015    Skin cancer     Thyroid nodule     Removed more than 35 years ago    TIA (transient ischemic attack) 10/18/2018    UTI due to extended-spectrum beta lactamase (ESBL) producing Escherichia coli 2019    Waldenstrom macroglobulinemia      Past Surgical History:  Past Surgical History:   Procedure Laterality Date    ADENOIDECTOMY      APPENDECTOMY      CARPAL TUNNEL RELEASE Right     CATARACT EXTRACTION Bilateral     CHOLECYSTECTOMY      COLONOSCOPY      HYSTERECTOMY      Partial, many years ago, heavy bleeding, no cancer    KNEE ARTHROSCOPY Right 2009    Finding of chondromalacia and appropriate cleanup of joint  was performed by Dr. Moraes.    MASTECTOMY Left 07/2014    REPLACEMENT TOTAL KNEE Right 12/2015    SKIN CANCER EXCISION      several    TONSILLECTOMY        Home Meds:  Medications Prior to Admission   Medication Sig Dispense Refill Last Dose    aspirin 81 MG tablet Take 1 tablet by mouth Daily.       betamethasone, augmented, (DIPROLENE) 0.05 % ointment APPLY A SMALL AMOUNT TOPICALLY TO AFFECTED AREA 2 TIMES A DAY       cetirizine (zyrTEC) 10 MG tablet Take 1 tablet by mouth Daily.       gabapentin (NEURONTIN) 400 MG capsule TAKE 1 CAPSULE FOUR TIMES DAILY 360 capsule 1     hydrALAZINE (APRESOLINE) 50 MG tablet TAKE 1 TABLET THREE TIMES DAILY 270 tablet 2     levothyroxine (SYNTHROID, LEVOTHROID) 50 MCG tablet Take 1 tablet by mouth Daily. 90 tablet 1     losartan (COZAAR) 100 MG tablet Take 1 tablet by mouth Daily. 90 tablet 3     mupirocin (BACTROBAN) 2 % ointment APPLY A SMALL AMOUNT TO AFFECTED AREA 2 TIMES A DAY       niacinamide 500 MG tablet Take 1 tablet by mouth 3 (Three) Times a Day.       Symbicort 160-4.5 MCG/ACT inhaler Inhale 160 puffs 2 (Two) Times a Day. One in the morning and one in the evening          Allergies:  Allergies   Allergen Reactions    Cortisone Unknown - High Severity     Reports having a shot years ago, and wonders if he hit a vein. She was told by another doctor that it probably went in her blood stream.     Hytrin [Terazosin] Rash    Amlodipine Rash    Darvon  [Propoxyphene] Palpitations     Immunizations:  Immunization History   Administered Date(s) Administered    COVID-19 (PFIZER) BIVALENT 12+YRS 12/12/2022    COVID-19 (PFIZER) Purple Cap Monovalent 01/22/2021, 02/12/2021, 09/04/2021    Covid-19 (Pfizer) Gray Cap Monovalent 05/21/2022    Fluad Quad 65+ 10/03/2019, 09/21/2020, 10/29/2021    Fluzone High Dose =>65 Years ([employer] ONLY) 10/30/2015, 10/11/2016, 10/18/2017, 10/06/2018    Hepatitis A 04/25/2018, 11/06/2018    Pneumococcal Conjugate 13-Valent (PCV13) 12/01/2016     Pneumococcal Polysaccharide (PPSV23) 06/28/2019    Shingrix 09/25/2021, 12/06/2021    Tdap 08/24/2021     Social History:   Social History     Social History Narrative    She lives with her son.     Social History     Socioeconomic History    Marital status:    Tobacco Use    Smoking status: Never    Smokeless tobacco: Never   Vaping Use    Vaping Use: Never used   Substance and Sexual Activity    Alcohol use: No     Comment: 2 cups caffeine/coffee daily    Drug use: No    Sexual activity: Defer       Family History:  Family History   Problem Relation Age of Onset    Mental illness Mother     Migraines Mother     Dementia Mother     Heart disease Father     Hypertension Father     Kidney disease Father     Alcohol abuse Father     No Known Problems Daughter     No Known Problems Daughter     Breast cancer Other     Heart disease Other     Hypertension Other     Diabetes Other     Cancer Maternal Grandmother     Breast cancer Maternal Grandmother     No Known Problems Maternal Grandfather     No Known Problems Paternal Grandmother     No Known Problems Paternal Grandfather     Hyperlipidemia Son     Lymphoma Neg Hx     Leukemia Neg Hx         Review of Systems  See history of present illness and past medical history.  Patient denies headache, dizziness, syncope, falls, trauma, change in vision, change in hearing, change in taste, changes in weight, changes in appetite, focal weakness, numbness, or paresthesia.  Patient denies chest pain, palpitations,  PND,   sinus pressure, rhinorrhea, epistaxis, hemoptysis, nausea, vomiting,hematemesis, diarrhea, constipation or hematochezia.  Denies cold or heat intolerance, polydipsia, polyuria, polyphagia. Denies hematuria, pyuria, dysuria, hesitancy, frequency or urgency. Denies consumption of raw and under cooked meats foods or change in water source.  Denies fever, chills, sweats, night sweats.  Denies missing any routine medications.  .    Objective:  T Max 24 hrs:  "Temp (24hrs), Av.8 °F (36.6 °C), Min:97.7 °F (36.5 °C), Max:97.9 °F (36.6 °C)    Vitals Ranges:   Temp:  [97.7 °F (36.5 °C)-97.9 °F (36.6 °C)] 97.9 °F (36.6 °C)  Heart Rate:  [70-84] 72  Resp:  [18-22] 18  BP: (125-226)/(63-99) 226/88      Exam:  BP (!) 226/88 (Patient Position: Sitting)   Pulse 72   Temp 97.9 °F (36.6 °C) (Oral)   Resp 18   Ht 167.6 cm (66\")   Wt 59.4 kg (131 lb)   LMP  (LMP Unknown)   SpO2 99%   BMI 21.14 kg/m²     General Appearance:    Alert, cooperative, no distress, appears stated age   Head:    Normocephalic, without obvious abnormality, atraumatic   Eyes:    PERRL, conjunctivae/corneas clear, EOM's intact, both eyes   Ears:    Normal external ear canals, both ears   Nose:   Nares normal, septum midline, mucosa normal, no drainage    or sinus tenderness   Throat:   Lips, mucosa, and tongue normal   Neck:   Supple, symmetrical, trachea midline, no adenopathy;     thyroid:  no enlargement/tenderness/nodules; no carotid    bruit or JVD   Back:     Symmetric, no curvature, ROM normal, no CVA tenderness   Lungs:     Decreased breath sounds bilaterally, respirations unlabored   Chest Wall:    No tenderness or deformity    Heart:    Regular rate and rhythm, S1 and S2 normal, no murmur, rub   or gallop   Abdomen:     Soft, nontender, bowel sounds active all four quadrants,     no masses, no hepatomegaly, no splenomegaly   Extremities:   Extremities normal, atraumatic, no cyanosis or edema                       .    Data Review:  Labs in chart were reviewed.  WBC   Date Value Ref Range Status   2023 4.08 3.40 - 10.80 10*3/mm3 Final     Hemoglobin   Date Value Ref Range Status   2023 14.1 12.0 - 15.9 g/dL Final     Hematocrit   Date Value Ref Range Status   2023 40.6 34.0 - 46.6 % Final     Platelets   Date Value Ref Range Status   2023 129 (L) 140 - 450 10*3/mm3 Final     Sodium   Date Value Ref Range Status   2023 130 (L) 136 - 145 mmol/L Final     Potassium "   Date Value Ref Range Status   08/28/2023 3.4 (L) 3.5 - 5.2 mmol/L Final     Chloride   Date Value Ref Range Status   08/28/2023 94 (L) 98 - 107 mmol/L Final     CO2   Date Value Ref Range Status   08/28/2023 24.1 22.0 - 29.0 mmol/L Final     BUN   Date Value Ref Range Status   08/28/2023 7 (L) 8 - 23 mg/dL Final     Creatinine   Date Value Ref Range Status   08/28/2023 0.74 0.57 - 1.00 mg/dL Final     Glucose   Date Value Ref Range Status   08/28/2023 100 (H) 65 - 99 mg/dL Final     Calcium   Date Value Ref Range Status   08/28/2023 9.2 8.6 - 10.5 mg/dL Final                Imaging Results (All)       Procedure Component Value Units Date/Time    CT Angiogram Chest [[id number]] Collected: 08/28/23 1523     Updated: 08/28/23 1523    Narrative:      CT ANGIOGRAM OF THE CHEST. MULTIPLE CORONAL, SAGITTAL, AND 3-D  RECONSTRUCTIONS.     HISTORY: 83-year-old female with shortness of breath. Left mastectomy in  the past for breast cancer.     TECHNIQUE: Radiation dose reduction techniques were utilized, including  automated exposure control and exposure modulation based on body size.   CT angiogram of the chest was performed following the administration of  IV contrast. Multiple coronal, sagittal, and 3-D reconstruction images  were obtained. Chest CT 11/07/2022.     FINDINGS:  1. There is adequate opacification of the pulmonary arteries and there  is no convincing evidence for pulmonary thromboemboli. There is chronic  pleural-parenchymal thickening at the posterior segment of the left  upper lobe along the major fissure. There is no interval change in  several sub-6 mm pulmonary nodules with a few clustered at the left  lower lobe. There are no new pulmonary opacities and there are no  pleural or pericardial effusions. There is no lymphadenopathy within the  chest. There is a small hiatal hernia, not present previously.  2. There is slight paraspinal haziness at the T8 level and the T8  vertebral body has loss of height  relative to the previous examination.  An acute or subacute T8 compression fracture is suspected. There is no  significant retropulsion. Please correlate clinically and consider  further evaluation with MRI.                       Assessment:  Active Hospital Problems    Diagnosis  POA    **Dyspnea [R06.00]  Yes      Resolved Hospital Problems   No resolved problems to display.   Asthma exacerbation  Hypertension  Hyponatremia  Hyperglycemia  Hypokalemia  hypothyroidism    Plan:  Patient refused prednisone  Will ask dr azul to see her  Mininebs,zithromax  Monitor on telemetry  Dw patient and ed provider    Maryann Urban MD  8/28/2023  19:51 EDT

## 2023-08-29 ENCOUNTER — APPOINTMENT (OUTPATIENT)
Dept: GENERAL RADIOLOGY | Facility: HOSPITAL | Age: 83
End: 2023-08-29
Payer: MEDICARE

## 2023-08-29 PROBLEM — S22.060A COMPRESSION FRACTURE OF T8 VERTEBRA: Status: ACTIVE | Noted: 2023-08-29

## 2023-08-29 LAB
ANION GAP SERPL CALCULATED.3IONS-SCNC: 10.9 MMOL/L (ref 5–15)
BUN SERPL-MCNC: 7 MG/DL (ref 8–23)
BUN/CREAT SERPL: 10.9 (ref 7–25)
CALCIUM SPEC-SCNC: 9 MG/DL (ref 8.6–10.5)
CHLORIDE SERPL-SCNC: 98 MMOL/L (ref 98–107)
CO2 SERPL-SCNC: 23.1 MMOL/L (ref 22–29)
CREAT SERPL-MCNC: 0.64 MG/DL (ref 0.57–1)
DEPRECATED RDW RBC AUTO: 43.5 FL (ref 37–54)
EGFRCR SERPLBLD CKD-EPI 2021: 87.3 ML/MIN/1.73
ERYTHROCYTE [DISTWIDTH] IN BLOOD BY AUTOMATED COUNT: 12.3 % (ref 12.3–15.4)
GLUCOSE SERPL-MCNC: 97 MG/DL (ref 65–99)
HCT VFR BLD AUTO: 38.8 % (ref 34–46.6)
HGB BLD-MCNC: 13.7 G/DL (ref 12–15.9)
MCH RBC QN AUTO: 34.1 PG (ref 26.6–33)
MCHC RBC AUTO-ENTMCNC: 35.3 G/DL (ref 31.5–35.7)
MCV RBC AUTO: 96.5 FL (ref 79–97)
PLATELET # BLD AUTO: 133 10*3/MM3 (ref 140–450)
PMV BLD AUTO: 8.9 FL (ref 6–12)
POTASSIUM SERPL-SCNC: 4.4 MMOL/L (ref 3.5–5.2)
POTASSIUM SERPL-SCNC: 4.4 MMOL/L (ref 3.5–5.2)
RBC # BLD AUTO: 4.02 10*6/MM3 (ref 3.77–5.28)
SODIUM SERPL-SCNC: 132 MMOL/L (ref 136–145)
WBC NRBC COR # BLD: 4.23 10*3/MM3 (ref 3.4–10.8)

## 2023-08-29 PROCEDURE — 85027 COMPLETE CBC AUTOMATED: CPT | Performed by: INTERNAL MEDICINE

## 2023-08-29 PROCEDURE — 84132 ASSAY OF SERUM POTASSIUM: CPT | Performed by: INTERNAL MEDICINE

## 2023-08-29 PROCEDURE — 72072 X-RAY EXAM THORAC SPINE 3VWS: CPT

## 2023-08-29 PROCEDURE — 94799 UNLISTED PULMONARY SVC/PX: CPT

## 2023-08-29 PROCEDURE — 94761 N-INVAS EAR/PLS OXIMETRY MLT: CPT

## 2023-08-29 PROCEDURE — 99214 OFFICE O/P EST MOD 30 MIN: CPT | Performed by: NURSE PRACTITIONER

## 2023-08-29 PROCEDURE — 80048 BASIC METABOLIC PNL TOTAL CA: CPT | Performed by: INTERNAL MEDICINE

## 2023-08-29 PROCEDURE — G0378 HOSPITAL OBSERVATION PER HR: HCPCS

## 2023-08-29 RX ADMIN — AZITHROMYCIN DIHYDRATE 250 MG: 250 TABLET, FILM COATED ORAL at 08:42

## 2023-08-29 RX ADMIN — HYDRALAZINE HYDROCHLORIDE 50 MG: 50 TABLET ORAL at 11:47

## 2023-08-29 RX ADMIN — GABAPENTIN 300 MG: 300 CAPSULE ORAL at 20:09

## 2023-08-29 RX ADMIN — HYDRALAZINE HYDROCHLORIDE 50 MG: 50 TABLET ORAL at 16:08

## 2023-08-29 RX ADMIN — BUDESONIDE AND FORMOTEROL FUMARATE DIHYDRATE 2 PUFF: 160; 4.5 AEROSOL RESPIRATORY (INHALATION) at 08:08

## 2023-08-29 RX ADMIN — ASPIRIN 81 MG: 81 TABLET, COATED ORAL at 08:42

## 2023-08-29 RX ADMIN — GABAPENTIN 300 MG: 300 CAPSULE ORAL at 08:42

## 2023-08-29 RX ADMIN — BUDESONIDE AND FORMOTEROL FUMARATE DIHYDRATE 2 PUFF: 160; 4.5 AEROSOL RESPIRATORY (INHALATION) at 20:27

## 2023-08-29 RX ADMIN — SENNOSIDES AND DOCUSATE SODIUM 2 TABLET: 50; 8.6 TABLET ORAL at 08:42

## 2023-08-29 RX ADMIN — IPRATROPIUM BROMIDE AND ALBUTEROL SULFATE 3 ML: .5; 2.5 SOLUTION RESPIRATORY (INHALATION) at 08:04

## 2023-08-29 RX ADMIN — IPRATROPIUM BROMIDE AND ALBUTEROL SULFATE 3 ML: .5; 2.5 SOLUTION RESPIRATORY (INHALATION) at 11:25

## 2023-08-29 RX ADMIN — LEVOTHYROXINE SODIUM 50 MCG: 50 TABLET ORAL at 06:27

## 2023-08-29 RX ADMIN — IPRATROPIUM BROMIDE AND ALBUTEROL SULFATE 3 ML: .5; 2.5 SOLUTION RESPIRATORY (INHALATION) at 20:22

## 2023-08-29 RX ADMIN — HYDRALAZINE HYDROCHLORIDE 50 MG: 50 TABLET ORAL at 20:08

## 2023-08-29 RX ADMIN — LOSARTAN POTASSIUM 100 MG: 100 TABLET, FILM COATED ORAL at 08:42

## 2023-08-29 RX ADMIN — POTASSIUM CHLORIDE 40 MEQ: 750 TABLET, EXTENDED RELEASE ORAL at 01:58

## 2023-08-29 RX ADMIN — GABAPENTIN 300 MG: 300 CAPSULE ORAL at 16:09

## 2023-08-29 RX ADMIN — GABAPENTIN 300 MG: 300 CAPSULE ORAL at 11:47

## 2023-08-29 NOTE — PROGRESS NOTES
Name: Karli Loomis ADMIT: 2023   : 1940  PCP: Provider, No Known    MRN: [id number] LOS: 0 days   AGE/SEX: 83 y.o. female  ROOM: [facility]     Subjective   Subjective   Patient is lying on the bed and does not appear to be any major distress.  Denies nausea, vomiting, abdominal pain, chest pain.  Respiratory status has been improving quite well.       Objective   Objective   Vital Signs  Temp:  [97.3 °F (36.3 °C)-98.1 °F (36.7 °C)] 98.1 °F (36.7 °C)  Heart Rate:  [66-89] 89  Resp:  [16-18] 18  BP: (125-226)/(54-99) 131/54  SpO2:  [94 %-100 %] 96 %  on   ;   Device (Oxygen Therapy): room air  Body mass index is 19.55 kg/m².  Physical Exam  HEENT: PERRLA, extraocular movements intact, Scleras no icterus  Neck: Supple, no JVD  Cardiovascular: Regular rate and rhythm with normal S1 and S2  Respiratory: Very faint wheezes otherwise good air movement.  GI: Soft, nontender, bowel sounds present  Extremities: No edema, palpable pedal pulses    Results Review     I reviewed the patient's new clinical results.  Results from last 7 days   Lab Units 23  0504 23  1300   WBC 10*3/mm3 4.23 4.08   HEMOGLOBIN g/dL 13.7 14.1   PLATELETS 10*3/mm3 133* 129*     Results from last 7 days   Lab Units 23  0504 23  1300   SODIUM mmol/L 132* 130*   POTASSIUM mmol/L 4.4  4.4 3.4*   CHLORIDE mmol/L 98 94*   CO2 mmol/L 23.1 24.1   BUN mg/dL 7* 7*   CREATININE mg/dL 0.64 0.74   GLUCOSE mg/dL 97 100*   EGFR mL/min/1.73 87.3 79.9     Results from last 7 days   Lab Units 23  1300   ALBUMIN g/dL 4.1   BILIRUBIN mg/dL 0.9   ALK PHOS U/L 132*   AST (SGOT) U/L 19   ALT (SGPT) U/L 9     Results from last 7 days   Lab Units 23  0504 23  1300   CALCIUM mg/dL 9.0 9.2   ALBUMIN g/dL  --  4.1       No results found for: HGBA1C, POCGLU    No radiology results for the last day    I have personally reviewed all medications:  Scheduled Medications  aspirin, 81 mg, Oral, Daily  azithromycin, 250 mg, Oral,  Q24H  budesonide-formoterol, 2 puff, Inhalation, BID  gabapentin, 300 mg, Oral, 4x Daily  hydrALAZINE, 50 mg, Oral, TID  ipratropium-albuterol, 3 mL, Nebulization, Q6H While Awake - RT  levothyroxine, 50 mcg, Oral, Q AM  losartan, 100 mg, Oral, Daily  senna-docusate sodium, 2 tablet, Oral, BID    Infusions   Diet  Diet: Cardiac Diets; Healthy Heart (2-3 Na+); Texture: Regular Texture (IDDSI 7); Fluid Consistency: Thin (IDDSI 0)    I have personally reviewed:  [x]  Laboratory   [x]  Microbiology   [x]  Radiology   [x]  EKG/Telemetry  [x]  Cardiology/Vascular   []  Pathology    []  Records       Assessment/Plan     Active Hospital Problems    Diagnosis  POA    **Dyspnea [R06.00]  Yes    Compression fracture of T8 vertebra [S22.060A]  Unknown      Resolved Hospital Problems   No resolved problems to display.       83 y.o. female admitted with Dyspnea.    #1 mild asthma exacerbation, currently on Zithromax and DuoNebs which will continued.  No need for any steroids and currently not requiring any oxygen.  If she continues to do well home tomorrow.  2.  Hypertension, on losartan and hydralazine.  3.  Hypothyroidism, on Synthroid and will be continued.  4.  Neuropathy, Neurontin.  5.  Suspected compression fractures appears to be old currently does not have any active pain and neurosurgery ordered x-rays.  Most likely will not require any treatment at this point.  6.  Hypokalemia, being treated with replacement protocol.  7.  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  8.  8 beat of SVT, metoprolol low-dose will be initiated.  9.  On SCDs for DVT prophylaxis.  10.  CODE STATUS is full code.      Eric Morgan MD  [facility]  08/29/23  13:19 EDT

## 2023-08-29 NOTE — PLAN OF CARE
Goal Outcome Evaluation:  Plan of Care Reviewed With: patient        Progress: improving  Outcome Evaluation: VSS. AOx4. Room air.  Pt. expresses feeling better and having a better time breathing. Complaints of upper back pain.

## 2023-08-29 NOTE — PLAN OF CARE
Goal Outcome Evaluation:  Patient alert and oriented x4.  Room air.  Stand by assist. Denies pain/SOA.  Family at bedside.  Anticipated discharge tomorrow.  Call light in reach.

## 2023-08-29 NOTE — PROGRESS NOTES
"                                              LOS: 0 days   Patient Care Team:  Provider, No Known as PCP - Florencio Elliott MD as Consulting Physician (Hematology and Oncology)  Abebe Barbosa MD as Referring Physician (Hospitalist)  Bacilio Hernández MD as Cardiologist (Cardiology)    Chief Complaint:  F/up asthma and dyspnea    Subjective   Interval History  I reviewed the admission note, progress notes, PMH, PSH, Family hx, social history, imagings and prior records on this admission, summarized the finding in my note and formulated a transition of care plan.    Dyspnea resolved.  No cough.  On Symbicort and DuoNeb.  Pain resolved as well.    REVIEW OF SYSTEMS:   CARDIOVASCULAR: No chest pain, chest pressure or chest discomfort. No palpitations or edema.   GASTROINTESTINAL: No anorexia, nausea, vomiting or diarrhea. No abdominal pain.  CONSTITUTIONAL: No fever or chills.     Ventilator/Non-Invasive Ventilation Settings (From admission, onward)      None                  Physical Exam:     Vital Signs  Temp:  [97.3 °F (36.3 °C)-98.1 °F (36.7 °C)] 98.1 °F (36.7 °C)  Heart Rate:  [66-89] 89  Resp:  [16-18] 18  BP: (125-226)/(54-99) 131/54    Intake/Output Summary (Last 24 hours) at 8/29/2023 1341  Last data filed at 8/29/2023 1216  Gross per 24 hour   Intake 240 ml   Output --   Net 240 ml     Flowsheet Rows      Flowsheet Row First Filed Value   Admission Height 167.6 cm (66\") Documented at 08/28/2023 1804   Admission Weight 59.4 kg (131 lb) Documented at 08/28/2023 1804            PPE used per hospital policy    General Appearance:   Alert, cooperative, in no acute distress   ENMT:  Mallampati score 2, moist mucous membrane   Eyes:  Pupils equal and reactive to light. EOMI   Neck:    Trachea midline. No thyromegaly.   Lungs:    Clear to auscultation,respirations regular, even and nonlabored    Heart:   Regular rhythm and normal rate, normal S1 and S2, systolic murmur over the left sternal border, mild. "   Skin:   No rash or ecchymosis   Abdomen:    Soft. No tenderness. No HSM.   Neuro/psych:  Conscious, alert, oriented x3. Strength 5/5 in upper and lower  ext.  Appropriate mood and affect   Extremities:  No cyanosis, clubbing or edema.  Warm extremities and well-perfused          Results Review:        Results from last 7 days   Lab Units 08/29/23  0504 08/28/23  1300   SODIUM mmol/L 132* 130*   POTASSIUM mmol/L 4.4  4.4 3.4*   CHLORIDE mmol/L 98 94*   CO2 mmol/L 23.1 24.1   BUN mg/dL 7* 7*   CREATININE mg/dL 0.64 0.74   GLUCOSE mg/dL 97 100*   CALCIUM mg/dL 9.0 9.2     Results from last 7 days   Lab Units 08/28/23  1624 08/28/23  1300   HSTROP T ng/L 20* 18*     Results from last 7 days   Lab Units 08/29/23  0504 08/28/23  1300   WBC 10*3/mm3 4.23 4.08   HEMOGLOBIN g/dL 13.7 14.1   HEMATOCRIT % 38.8 40.6   PLATELETS 10*3/mm3 133* 129*         Results from last 7 days   Lab Units 08/28/23  1300   PROBNP pg/mL 378.0       Results from last 7 days   Lab Units 08/28/23  1300   D DIMER QUANT MCGFEU/mL 1.08*                     I reviewed the patient's new clinical results.        Medication Review:   aspirin, 81 mg, Oral, Daily  azithromycin, 250 mg, Oral, Q24H  budesonide-formoterol, 2 puff, Inhalation, BID  gabapentin, 300 mg, Oral, 4x Daily  hydrALAZINE, 50 mg, Oral, TID  ipratropium-albuterol, 3 mL, Nebulization, Q6H While Awake - RT  levothyroxine, 50 mcg, Oral, Q AM  losartan, 100 mg, Oral, Daily  metoprolol tartrate, 25 mg, Oral, Q12H  senna-docusate sodium, 2 tablet, Oral, BID             Diagnostic imaging:  I personally and independently reviewed the following images:  CT chest 8/28/2023: Old scarring and stable left pulmonary nodules.    Assessment     Dyspnea, likely secondary to pain from T8 fracture  Mild intermittent asthma with no exacerbation  Stable 6 mm pulmonary nodules  Pulmonary scarring in the left upper lobe  Small hiatal hernia  T8  subacute fracture likely responsible for the above    All  problems new to me    Plan     Continue Symbicort 2 puffs twice daily.  No evidence of asthma exacerbation  DuoNeb 4 times a day as needed  Follow-up as outpatient with Dr. Ogden regarding the pulmonary nodules.  Conservative management with serial imaging is recommended for now.        Roderick Infante MD  08/29/23  13:41 EDT        This note was dictated utilizing [employer] dictation

## 2023-08-29 NOTE — CONSULTS
[facility] NEUROSURGERY CONSULT NOTE    Patient name: Karli Loomis  Referring Provider: Dr. Bullock  Reason for Consultation: T8 compression fracture     Patient Care Team:  Provider, No Known as PCP - Florencio Elliott MD as Consulting Physician (Hematology and Oncology)  Abebe Barbosa MD as Referring Physician (Hospitalist)  Bacilio Hernández MD as Cardiologist (Cardiology)    Chief complaint: SOA    Subjective .     History of present illness:    Patient is a 83 y.o.  female who presented to ED with 2 weeks of progressive shortness of air and pressure discomfort in her upper back.  She denies any specific event initiating the onset of discomfort.  No trauma or heavy lifting.  She states she tried her inhalers without relief.  No use of pain medications.  She denies any associated numbness, tingling, weakness of her extremities.  Today, she reports no pain at all and is hoping to go home.  She denies history of osteoporosis or treatment for such. History of breast cancer status postmastectomy.  She is treated with Rituxan for Macroglobulinemia of Waldenstrom. non-smoker.  No prior back surgery.  She is not interested in surgical intervention.  She states that she has noticed some increased curvature of her spine.    Review of Systems  Review of Systems   Constitutional:  Negative for fever.   Respiratory:  Positive for shortness of breath.    Genitourinary:  Negative for enuresis.   Musculoskeletal:  Positive for back pain.   Neurological:  Negative for weakness and numbness.   Psychiatric/Behavioral:  Negative for confusion.      History  PAST MEDICAL HISTORY  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    Epilepsy with simple partial seizures 12/14/2017    Headache, migraine  12/14/2017    History of transfusion of packed red blood cells     R/T SURGERY    Hyperlipidemia     Hypertension     Malignant neoplasm of overlapping sites of left breast in female, estrogen receptor positive 04/13/2016    Nonrheumatic aortic valve insufficiency     Primary osteoarthritis of left knee 07/22/2015    Skin cancer     Thyroid nodule     Removed more than 35 years ago    TIA (transient ischemic attack) 10/18/2018    UTI due to extended-spectrum beta lactamase (ESBL) producing Escherichia coli 01/06/2019    Waldenstrom macroglobulinemia        PAST SURGICAL HISTORY  Past Surgical History:   Procedure Laterality Date    ADENOIDECTOMY      APPENDECTOMY      CARPAL TUNNEL RELEASE Right     CATARACT EXTRACTION Bilateral     CHOLECYSTECTOMY      COLONOSCOPY      HYSTERECTOMY      Partial, many years ago, heavy bleeding, no cancer    KNEE ARTHROSCOPY Right 03/2009    Finding of chondromalacia and appropriate cleanup of joint was performed by Dr. Moraes.    MASTECTOMY Left 07/2014    REPLACEMENT TOTAL KNEE Right 12/2015    SKIN CANCER EXCISION      several    TONSILLECTOMY         FAMILY HISTORY  Family History   Problem Relation Age of Onset    Mental illness Mother     Migraines Mother     Dementia Mother     Heart disease Father     Hypertension Father     Kidney disease Father     Alcohol abuse Father     No Known Problems Daughter     No Known Problems Daughter     Breast cancer Other     Heart disease Other     Hypertension Other     Diabetes Other     Cancer Maternal Grandmother     Breast cancer Maternal Grandmother     No Known Problems Maternal Grandfather     No Known Problems Paternal Grandmother     No Known Problems Paternal Grandfather     Hyperlipidemia Son     Lymphoma Neg Hx     Leukemia Neg Hx        SOCIAL HISTORY  Social History     Tobacco Use    Smoking status: Never    Smokeless tobacco: Never   Vaping Use    Vaping Use: Never used   Substance Use Topics    Alcohol use: No     Comment:  2 cups caffeine/coffee daily    Drug use: No       retired  Lives alone    Allergies:  Cortisone, Hytrin [terazosin], Amlodipine, and Darvon  [propoxyphene]    MEDICATIONS:  Medications Prior to Admission   Medication Sig Dispense Refill Last Dose    aspirin 81 MG tablet Take 1 tablet by mouth Daily.       betamethasone, augmented, (DIPROLENE) 0.05 % ointment APPLY A SMALL AMOUNT TOPICALLY TO AFFECTED AREA 2 TIMES A DAY       cetirizine (zyrTEC) 10 MG tablet Take 1 tablet by mouth Daily.       gabapentin (NEURONTIN) 400 MG capsule TAKE 1 CAPSULE FOUR TIMES DAILY 360 capsule 1     hydrALAZINE (APRESOLINE) 50 MG tablet TAKE 1 TABLET THREE TIMES DAILY 270 tablet 2     levothyroxine (SYNTHROID, LEVOTHROID) 50 MCG tablet Take 1 tablet by mouth Daily. 90 tablet 1     losartan (COZAAR) 100 MG tablet Take 1 tablet by mouth Daily. 90 tablet 3     mupirocin (BACTROBAN) 2 % ointment APPLY A SMALL AMOUNT TO AFFECTED AREA 2 TIMES A DAY       niacinamide 500 MG tablet Take 1 tablet by mouth 3 (Three) Times a Day.       Symbicort 160-4.5 MCG/ACT inhaler Inhale 160 puffs 2 (Two) Times a Day. One in the morning and one in the evening          Current Facility-Administered Medications:     acetaminophen (TYLENOL) tablet 650 mg, 650 mg, Oral, Q4H PRN, Maryann Urban MD    albuterol (PROVENTIL) nebulizer solution 0.083% 2.5 mg/3mL, 2.5 mg, Nebulization, Q6H PRN, Maryann Urban MD    aspirin EC tablet 81 mg, 81 mg, Oral, Daily, Maryann Urban MD, 81 mg at 08/29/23 0842    azithromycin (ZITHROMAX) tablet 250 mg, 250 mg, Oral, Q24H, Maryann Urban MD, 250 mg at 08/29/23 0842    sennosides-docusate (PERICOLACE) 8.6-50 MG per tablet 2 tablet, 2 tablet, Oral, BID, 2 tablet at 08/29/23 0842 **AND** polyethylene glycol (MIRALAX) packet 17 g, 17 g, Oral, Daily PRN **AND** bisacodyl (DULCOLAX) EC tablet 5 mg, 5 mg, Oral, Daily PRN **AND** bisacodyl (DULCOLAX) suppository 10 mg, 10 mg, Rectal, Daily PRN,  Maryann Urban MD    budesonide-formoterol (SYMBICORT) 160-4.5 MCG/ACT inhaler 2 puff, 2 puff, Inhalation, BID, Maryann Urban MD, 2 puff at 08/29/23 0808    Calcium Replacement - Follow Nurse / BPA Driven Protocol, , Does not apply, PRN, Maryann Urban MD    gabapentin (NEURONTIN) capsule 300 mg, 300 mg, Oral, 4x Daily, Maryann Urban MD, 300 mg at 08/29/23 0842    hydrALAZINE (APRESOLINE) tablet 50 mg, 50 mg, Oral, TID, Maryann Urban MD, 50 mg at 08/28/23 2133    ipratropium-albuterol (DUO-NEB) nebulizer solution 3 mL, 3 mL, Nebulization, Q6H While Awake - RT, Maryann Urban MD, 3 mL at 08/29/23 1125    levothyroxine (SYNTHROID, LEVOTHROID) tablet 50 mcg, 50 mcg, Oral, Q AM, Maryann Urban MD, 50 mcg at 08/29/23 0627    losartan (COZAAR) tablet 100 mg, 100 mg, Oral, Daily, Maryann Urban MD, 100 mg at 08/29/23 0842    Magnesium Standard Dose Replacement - Follow Nurse / BPA Driven Protocol, , Does not apply, PRN, Maryann Urban MD    melatonin tablet 3 mg, 3 mg, Oral, Nightly PRN, Maryann Urban MD    ondansetron (ZOFRAN) tablet 4 mg, 4 mg, Oral, Q6H PRN **OR** ondansetron (ZOFRAN) injection 4 mg, 4 mg, Intravenous, Q6H PRN, Maryann Urban MD    Phosphorus Replacement - Follow Nurse / BPA Driven Protocol, , Does not apply, PRWilmar ROCK Renate Andrea, MD    Potassium Replacement - Follow Nurse / BPA Driven Protocol, , Does not apply, PRNWilmar Renate Andrea, MD    [COMPLETED] Insert Peripheral IV, , , Once **AND** sodium chloride 0.9 % flush 10 mL, 10 mL, Intravenous, PRN, Deric Carlos MD    Objective     Results Review:  LABS:  Results from last 7 days   Lab Units 08/29/23  0504 08/28/23  1300   WBC 10*3/mm3 4.23 4.08   HEMOGLOBIN g/dL 13.7 14.1   HEMATOCRIT % 38.8 40.6   PLATELETS 10*3/mm3 133* 129*     Results from last 7 days   Lab Units 08/29/23  0504 08/28/23  1300   SODIUM mmol/L 132* 130*   POTASSIUM mmol/L  4.4  4.4 3.4*   CHLORIDE mmol/L 98 94*   CO2 mmol/L 23.1 24.1   BUN mg/dL 7* 7*   CREATININE mg/dL 0.64 0.74   CALCIUM mg/dL 9.0 9.2   BILIRUBIN mg/dL  --  0.9   ALK PHOS U/L  --  132*   ALT (SGPT) U/L  --  9   AST (SGOT) U/L  --  19   GLUCOSE mg/dL 97 100*     DIAGNOSTICS:  CT chest August 28, 2023 compared to CT chest November 7, 2022.  There is some mild change in anterior wedging of the T8 vertebral body although no acute fracture line is seen.  There is severe stable hyperkyphosis of the mid thoracic spine..  Stable appearing Schmorl's node of the superior endplate T12    Results Review:   I reviewed the patient's new clinical results.  I personally viewed and interpreted the patient's CT chest.  Also reviewed by and discussed with Dr. Mora    Vital Signs   Temp:  [97.3 °F (36.3 °C)-98.1 °F (36.7 °C)] 98.1 °F (36.7 °C)  Heart Rate:  [66-84] 79  Resp:  [16-22] 18  BP: (125-226)/(63-99) 194/90    Physical Exam:  Physical Exam  Vitals reviewed.   Constitutional:       Appearance: Normal appearance.   Pulmonary:      Effort: Pulmonary effort is normal.   Musculoskeletal:      Thoracic back: Bony tenderness (Very mild discomfort to palpation of the mid thoracic) present.      Lumbar back: Negative right straight leg raise test and negative left straight leg raise test.   Skin:     General: Skin is warm and dry.   Neurological:      General: No focal deficit present.      Mental Status: She is alert.   Psychiatric:         Mood and Affect: Mood normal.         Speech: Speech normal.         Thought Content: Thought content normal.     Neurologic Exam     Mental Status   Speech: speech is normal   Level of consciousness: alert  Knowledge: good.   Normal comprehension.     Motor Exam   Muscle bulk: normal  Overall muscle tone: normal  5/5 bilateral lower extremity     Sensory Exam   Right leg light touch: normal  Left leg light touch: normal    Gait, Coordination, and Reflexes     Gait  Gait: (Not tested.)    Reflexes  "  Right ankle clonus: absent  Left ankle clonus: absent    Assessment & Plan       Dyspnea    Compression fracture of T8 vertebra    Patient with 2 weeks of shortness of air and associated upper back pain.  Has chronic lung issues.  Symptoms progressive.  Seen in ED and CT chest showed no acute lung issues but evidence of anterior wedge compression of T8 vertebral body which is mild and no evidence of retropulsion.  Today, patient reports no pain.  She has no weakness or numbness.  She has quite kyphotic and not amenable to bracing.  We also discussed kyphoplasty which she has not interested in surgical intervention and since she is having no pain at present, would not be advised.  We will obtain upright x-rays for baseline and have her follow-up in 6 weeks with repeat x-ray.  She can call in the meantime with questions or concerns.  I did discuss the fact she needs work-up for osteoporosis and potential treatment.  Her last DEXA scan in 2019 showed normal bone mineral density.  This is likely to have changed in 4 years.  Okay for discharge anytime from neurosurgery standpoint.    PLAN:   Upright x-ray prior to DC  Follow-up neurosurgery 6 weeks with upright x-ray  Recommend outpatient work-up with PCP or oncologist for osteoporosis  May benefit from physical therapy to work on posture after fracture healed.-Wait 6 weeks    I discussed the patient's findings and my recommendations with patient, nursing staff, primary care team, and Dr. Mora    During patient visit, I utilized appropriate personal protective equipment including gloves. Appropriate PPE was worn during the entire visit.  Hand hygiene was completed before and after.     [place], APRN  08/29/23  11:29 EDT    \"Dictated utilizing Dragon dictation\".      "

## 2023-08-29 NOTE — CASE MANAGEMENT/SOCIAL WORK
Discharge Planning Assessment  [facility]     Patient Name: Karli Loomis  MRN: [id number]  Today's Date: 8/29/2023    Admit Date: 8/28/2023    Plan: Return home with family   Discharge Needs Assessment       Row Name 08/29/23 1001       Living Environment    People in Home child(yemi), adult    Name(s) of People in Home Chad Gipson    Current Living Arrangements home    Potentially Unsafe Housing Conditions none    Primary Care Provided by self    Provides Primary Care For no one    Family Caregiver if Needed child(yemi), adult    Family Caregiver Names Chad Gipson [phone] or daughter Shannon Arevalo [phone]    Quality of Family Relationships helpful    Able to Return to Prior Arrangements yes       Resource/Environmental Concerns    Resource/Environmental Concerns none    Transportation Concerns none       Food Insecurity    Within the past 12 months, you worried that your food would run out before you got the money to buy more. Never true    Within the past 12 months, the food you bought just didn't last and you didn't have money to get more. Never true       Transition Planning    Patient/Family Anticipates Transition to home with family    Patient/Family Anticipated Services at Transition none    Transportation Anticipated family or friend will provide       Discharge Needs Assessment    Readmission Within the Last 30 Days no previous admission in last 30 days    Equipment Currently Used at Home none    Concerns to be Addressed denies needs/concerns at this time    Anticipated Changes Related to Illness none    Equipment Needed After Discharge none                   Discharge Plan       Row Name 08/29/23 1002       Plan    Plan Return home with family    Patient/Family in Agreement with Plan yes    Plan Comments Spoke with patient and daughter Shannon Arevalo [phone] at bedside.  Patient lives with chad Leos [phone], is IADL, uses no DME, has used HH in the past after TKR and has  never been to SNF.  She does not have a PCP - given list of [facility] physicians.  Pharmacy is [facility] on [address].  Patient does not drive, son or daughter drive and assist if needed.  She plans to return home at [place].  CCP will follow.  Ladonna LIU                  Continued Care and Services - Admitted Since 8/28/2023    Coordination has not been started for this encounter.       Expected Discharge Date and Time       Expected Discharge Date Expected Discharge Time    Sep 1, 2023            Demographic Summary       Row Name 08/29/23 0953       General Information    Admission Type observation    Arrived From home    Referral Source admission list    Reason for Consult discharge planning    Preferred Language English                   Functional Status       Row Name 08/29/23 1000       Functional Status    Usual Activity Tolerance moderate    Current Activity Tolerance moderate       Functional Status, IADL    Medications independent    Meal Preparation independent    Housekeeping independent    Laundry independent    Shopping independent       Mental Status    General Appearance WDL WDL       Mental Status Summary    Recent Changes in Mental Status/Cognitive Functioning no changes                               Becky S. [place], RN

## 2023-08-30 VITALS
WEIGHT: 121.6 LBS | DIASTOLIC BLOOD PRESSURE: 75 MMHG | BODY MASS INDEX: 19.54 KG/M2 | SYSTOLIC BLOOD PRESSURE: 188 MMHG | HEART RATE: 76 BPM | OXYGEN SATURATION: 100 % | RESPIRATION RATE: 16 BRPM | HEIGHT: 66 IN | TEMPERATURE: 97.9 F

## 2023-08-30 PROCEDURE — 94664 DEMO&/EVAL PT USE INHALER: CPT

## 2023-08-30 PROCEDURE — 94799 UNLISTED PULMONARY SVC/PX: CPT

## 2023-08-30 PROCEDURE — G0378 HOSPITAL OBSERVATION PER HR: HCPCS

## 2023-08-30 PROCEDURE — 94761 N-INVAS EAR/PLS OXIMETRY MLT: CPT

## 2023-08-30 RX ORDER — AZITHROMYCIN 250 MG/1
250 TABLET, FILM COATED ORAL DAILY
Qty: 3 TABLET | Refills: 0 | Status: SHIPPED | OUTPATIENT
Start: 2023-08-30 | End: 2023-09-02

## 2023-08-30 RX ORDER — BUDESONIDE AND FORMOTEROL FUMARATE DIHYDRATE 80; 4.5 UG/1; UG/1
2 AEROSOL RESPIRATORY (INHALATION)
Qty: 1 EACH | Refills: 0 | Status: SHIPPED | OUTPATIENT
Start: 2023-08-30

## 2023-08-30 RX ADMIN — BUDESONIDE AND FORMOTEROL FUMARATE DIHYDRATE 2 PUFF: 160; 4.5 AEROSOL RESPIRATORY (INHALATION) at 07:20

## 2023-08-30 RX ADMIN — LEVOTHYROXINE SODIUM 50 MCG: 50 TABLET ORAL at 05:59

## 2023-08-30 RX ADMIN — HYDRALAZINE HYDROCHLORIDE 50 MG: 50 TABLET ORAL at 12:11

## 2023-08-30 RX ADMIN — AZITHROMYCIN DIHYDRATE 250 MG: 250 TABLET, FILM COATED ORAL at 08:00

## 2023-08-30 RX ADMIN — LOSARTAN POTASSIUM 100 MG: 100 TABLET, FILM COATED ORAL at 08:00

## 2023-08-30 RX ADMIN — IPRATROPIUM BROMIDE AND ALBUTEROL SULFATE 3 ML: .5; 2.5 SOLUTION RESPIRATORY (INHALATION) at 07:20

## 2023-08-30 RX ADMIN — GABAPENTIN 300 MG: 300 CAPSULE ORAL at 12:11

## 2023-08-30 RX ADMIN — ASPIRIN 81 MG: 81 TABLET, COATED ORAL at 08:00

## 2023-08-30 RX ADMIN — GABAPENTIN 300 MG: 300 CAPSULE ORAL at 08:00

## 2023-08-30 NOTE — DISCHARGE SUMMARY
Patient Name: Karli Loomis  : 1940  MRN: [id number]    Date of Admission: 2023  Date of Discharge:  2023  Primary Care Physician: Provider, No Known      Chief Complaint:   Shortness of Breath      Discharge Diagnoses     Active Hospital Problems    Diagnosis  POA    **Dyspnea [R06.00]  Yes    Compression fracture of T8 vertebra [S22.060A]  Unknown      Resolved Hospital Problems   No resolved problems to display.        Hospital Course       83 year old female who presented to the emergency room with shortness of breath for the last two weeks; she had been using her inhalers but did not improve; she called dr azul but could not get an appointement so she came to the ED; she denies fever or chills; she is worse with exertion; denies chest pain .    Mild asthma exacerbation, was treated with Zithromax, DuoNebs during this hospital stay and she is doing quite well from respiratory standpoint.  In fact she does not have any wheezes today and not requiring any oxygen and able to complete sentences without any difficulty and is ambulatory.  Given the above she is being discharged home and does have Symbicort which will be continued as an outpatient and will complete a course of Zithromax.    2.  Hypertension, on losartan and hydralazine.    3.  Hypothyroidism, on Synthroid and will be continued.    4.  Neuropathy, Neurontin.    5.  Suspected compression fractures, x-rays did reveal mild to moderate height loss in the mid thoracic vertebrae of indeterminate age.  Patient does not have any back pain as of today neurosurgery did evaluate and no recommendations for any treatment at this point.    6.  Hypokalemia, being treated with replacement protocol.    7.  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    8.  8 beat of SVT, metoprolol has been initiated and will be continued as an outpatient basis.      Copied text on this note has been reviewed by me on  8/30/2023    Day of Discharge         Physical Exam:  Temp:  [97.9 °F (36.6 °C)-98.6 °F (37 °C)] 97.9 °F (36.6 °C)  Heart Rate:  [] 85  Resp:  [16-18] 16  BP: (131-162)/(54-84) 158/84  Body mass index is 19.63 kg/m².  Physical Exam  HEENT: PERRLA, extraocular movements intact, Scleras no icterus  Neck: Supple, no JVD  Cardiovascular: Regular rate and rhythm with normal S1 and S2  Respiratory: Very faint wheezes otherwise good air movement.  GI: Soft, nontender, bowel sounds present  Extremities: No edema, palpable pedal pulses         Consultants     Consult Orders (all) (From admission, onward)       Start     Ordered    08/28/23 2142  Inpatient Neurosurgery Consult  Once        Specialty:  Neurosurgery  Provider:  Jordin Sprague MD    08/28/23 2141 08/28/23 1951  Inpatient Pulmonology Consult  Once        Specialty:  Pulmonary Disease  Provider:  Marge Bullock MD    08/28/23 1950    08/28/23 1530  A (on-call MD unless specified) Details  Once        Specialty:  Hospitalist  Provider:  (Not yet assigned)    08/28/23 1529                  Procedures     * Surgery not found *    Imaging Results (All)       Procedure Component Value Units Date/Time    XR Spine Thoracic 3 View [[id number]] Collected: 08/29/23 1537     Updated: 08/29/23 1543    Narrative:      PROCEDURE:  XR SPINE THORACIC 3 VW-     HISTORY: Concern for T8 fracture.     COMPARISON: CT chest 8/28/2023. Chest radiograph 9/10/2022.     FINDINGS:       4 views of the thoracic spine were obtained.  There is an exaggerated thoracic kyphosis. No other abnormalities of  alignment are identified. There is mild to moderate height loss of a  midthoracic vertebral body, slightly progressed prior chest radiograph  from 9/10/2022. This is age-indeterminate. Consider evaluation with MRI.  There is multilevel degenerative disc disease consisting of disc height  loss and small degenerative endplate osteophyte formation.  There is calcific aortic  atherosclerosis. There are surgical clips in  the right upper quadrant.     This report was finalized on 8/29/2023 3:40 PM by Dr. Ritika Brown M.D.       CT Angiogram Chest [[id number]] Collected: 08/28/23 1523     Updated: 08/28/23 1956    Narrative:      CT ANGIOGRAM OF THE CHEST. MULTIPLE CORONAL, SAGITTAL, AND 3-D  RECONSTRUCTIONS.     HISTORY: 83-year-old female with shortness of breath. Left mastectomy in  the past for breast cancer.     TECHNIQUE: Radiation dose reduction techniques were utilized, including  automated exposure control and exposure modulation based on body size.   CT angiogram of the chest was performed following the administration of  IV contrast. Multiple coronal, sagittal, and 3-D reconstruction images  were obtained. Chest CT 11/07/2022.     FINDINGS:  1. There is adequate opacification of the pulmonary arteries and there  is no convincing evidence for pulmonary thromboemboli. There is chronic  pleural-parenchymal thickening at the posterior segment of the left  upper lobe along the major fissure. There is no interval change in  several sub-6 mm pulmonary nodules with a few clustered at the left  lower lobe. There are no new pulmonary opacities and there are no  pleural or pericardial effusions. There is no lymphadenopathy within the  chest. There is a small hiatal hernia, not present previously.  2. There is slight paraspinal haziness at the T8 level and the T8  vertebral body has slight loss of height relative to the previous  examination. An acute or subacute T8 compression fracture is suspected.  There is no significant retropulsion. Please correlate clinically and  consider further evaluation with MRI.           This report was finalized on 8/28/2023 7:53 PM by Dr. Pam Foreman M.D.             Results for orders placed in visit on 04/10/20    Duplex Venous Lower Extremity - Left CAR    Interpretation Summary  · Normal left lower extremity venous duplex scan.    Results for orders  placed during the hospital encounter of 06/14/23    Adult Transthoracic Echo Complete w/ Color, Spectral and Contrast if Necessary Per Protocol    Interpretation Summary    Left ventricular systolic function is hyperdynamic (EF > 70%). Calculated left ventricular EF = 74.4%    Left ventricular wall thickness is consistent with hypertrophy. Sigmoid-shaped ventricular septum is present.    Left ventricular diastolic function is consistent with (grade II w/high LAP) pseudonormalization.    Left atrial volume is mildly increased.    Estimated right ventricular systolic pressure from tricuspid regurgitation is normal (<35 mmHg). Calculated right ventricular systolic pressure from tricuspid regurgitation is 26 mmHg.    C/w prior studies, there is no change    Baseline 6/9/21  EF 67%   GLS -21.4%  Previous 6/16/22 EF 68%  GLS -22.1%    No Radiation or Chemotherapy (treated with inhibitors)    Pertinent Labs     Results from last 7 days   Lab Units 08/29/23  0504 08/28/23  1300   WBC 10*3/mm3 4.23 4.08   HEMOGLOBIN g/dL 13.7 14.1   PLATELETS 10*3/mm3 133* 129*     Results from last 7 days   Lab Units 08/29/23  0504 08/28/23  1300   SODIUM mmol/L 132* 130*   POTASSIUM mmol/L 4.4  4.4 3.4*   CHLORIDE mmol/L 98 94*   CO2 mmol/L 23.1 24.1   BUN mg/dL 7* 7*   CREATININE mg/dL 0.64 0.74   GLUCOSE mg/dL 97 100*   EGFR mL/min/1.73 87.3 79.9     Results from last 7 days   Lab Units 08/28/23  1300   ALBUMIN g/dL 4.1   BILIRUBIN mg/dL 0.9   ALK PHOS U/L 132*   AST (SGOT) U/L 19   ALT (SGPT) U/L 9     Results from last 7 days   Lab Units 08/29/23  0504 08/28/23  1300   CALCIUM mg/dL 9.0 9.2   ALBUMIN g/dL  --  4.1       Results from last 7 days   Lab Units 08/28/23  1624 08/28/23  1300   HSTROP T ng/L 20* 18*   PROBNP pg/mL  --  378.0   D DIMER QUANT MCGFEU/mL  --  1.08*           Invalid input(s): LDLCALC          Test Results Pending at Discharge       Discharge Details        Discharge Medications        New Medications         Instructions Start Date   azithromycin 250 MG tablet  Commonly known as: ZITHROMAX   250 mg, Oral, Daily      budesonide-formoterol 80-4.5 MCG/ACT inhaler  Commonly known as: Symbicort  Replaces: Symbicort 160-4.5 MCG/ACT inhaler   2 puffs, Inhalation, 2 Times Daily - RT      metoprolol tartrate 25 MG tablet  Commonly known as: LOPRESSOR   25 mg, Oral, Every 12 Hours Scheduled             Continue These Medications        Instructions Start Date   aspirin 81 MG tablet   81 mg, Oral, Daily      betamethasone (augmented) 0.05 % ointment  Commonly known as: DIPROLENE   APPLY A SMALL AMOUNT TOPICALLY TO AFFECTED AREA 2 TIMES A DAY      cetirizine 10 MG tablet  Commonly known as: zyrTEC   10 mg, Oral, Daily      gabapentin 400 MG capsule  Commonly known as: NEURONTIN   TAKE 1 CAPSULE FOUR TIMES DAILY      hydrALAZINE 50 MG tablet  Commonly known as: APRESOLINE   TAKE 1 TABLET THREE TIMES DAILY      levothyroxine 50 MCG tablet  Commonly known as: SYNTHROID, LEVOTHROID   50 mcg, Oral, Daily      losartan 100 MG tablet  Commonly known as: COZAAR   100 mg, Oral, Daily      mupirocin 2 % ointment  Commonly known as: BACTROBAN   APPLY A SMALL AMOUNT TO AFFECTED AREA 2 TIMES A DAY      niacinamide 500 MG tablet   1 tablet, Oral, 3 Times Daily             Stop These Medications      Symbicort 160-4.5 MCG/ACT inhaler  Generic drug: budesonide-formoterol  Replaced by: budesonide-formoterol 80-4.5 MCG/ACT inhaler              Allergies   Allergen Reactions    Cortisone Unknown - High Severity     Reports having a shot years ago, and wonders if he hit a vein. She was told by another doctor that it probably went in her blood stream.     Hytrin [Terazosin] Rash    Amlodipine Rash    Darvon  [Propoxyphene] Palpitations       Discharge Disposition:  Home or Self Care      Discharge Diet:  Diet Order   Procedures    Diet: Cardiac Diets; Healthy Heart (2-3 Na+); Texture: Regular Texture (IDDSI 7); Fluid Consistency: Thin (IDDSI 0)        Discharge Activity:   Activity Instructions       Activity as Tolerated              CODE STATUS:    Code Status and Medical Interventions:   Ordered at: 08/28/23 1755     Code Status (Patient has no pulse and is not breathing):    CPR (Attempt to Resuscitate)     Medical Interventions (Patient has pulse or is breathing):    Full       Future Appointments   Date Time Provider Department Center   10/18/2023 11:00 AM LAB CHAIR 6 [facility]  [facility]   10/18/2023 11:20 AM Florencio Larose MD [facility]   12/5/2023 12:00 PM Luisa Prasad APRN [facility]   6/3/2024 10:45 AM Bacilio Hernández MD [facility]     Additional Instructions for the Follow-ups that You Need to Schedule       Discharge Follow-up with PCP   As directed       Currently Documented PCP:    Provider, No Known    PCP Phone Number:    [phone]     Follow Up Details: 1 week               Follow-up Information       Provider, No Known .    Why: 1 week  Contact information:  [facility] [id number]  [phone]                             Additional Instructions for the Follow-ups that You Need to Schedule       Discharge Follow-up with PCP   As directed       Currently Documented PCP:    Provider, No Known    PCP Phone Number:    [phone]     Follow Up Details: 1 week            Time Spent on Discharge:  Greater than 30 minutes      Eric Morgan MD  [facility]  08/30/23  10:48 EDT

## 2023-08-30 NOTE — PROGRESS NOTES
"                                              LOS: 0 days   Patient Care Team:  Provider, No Known as PCP - Florencio Elliott MD as Consulting Physician (Hematology and Oncology)  Abebe Barbosa MD as Referring Physician (Hospitalist)  Bacilio Hernández MD as Cardiologist (Cardiology)    Chief Complaint:  F/up asthma and dyspnea    Subjective   Interval History      Dyspnea resolved.  No cough.  No back pain.  l.    REVIEW OF SYSTEMS:   CARDIOVASCULAR: No chest pain, chest pressure or chest discomfort. No palpitations or edema.     CONSTITUTIONAL: No fever or chills.     Ventilator/Non-Invasive Ventilation Settings (From admission, onward)      None                  Physical Exam:     Vital Signs  Temp:  [97.9 °F (36.6 °C)-98.6 °F (37 °C)] 97.9 °F (36.6 °C)  Heart Rate:  [] 85  Resp:  [16-18] 16  BP: (131-162)/(54-84) 158/84    Intake/Output Summary (Last 24 hours) at 8/30/2023 1002  Last data filed at 8/30/2023 0900  Gross per 24 hour   Intake 680 ml   Output --   Net 680 ml       Flowsheet Rows      Flowsheet Row First Filed Value   Admission Height 167.6 cm (66\") Documented at 08/28/2023 1804   Admission Weight 59.4 kg (131 lb) Documented at 08/28/2023 1804            PPE used per hospital policy    General Appearance:   Alert, cooperative, in no acute distress   ENMT:  Mallampati score 2, moist mucous membrane   Eyes:  Pupils equal and reactive to light. EOMI   Neck:    Trachea midline. No thyromegaly.   Lungs:    Clear to auscultation,respirations regular, even and nonlabored    Heart:   Regular rhythm and normal rate, normal S1 and S2, systolic murmur over the left sternal border, mild.   Skin:   No rash or ecchymosis   Abdomen:    Soft. No tenderness. No HSM.   Neuro/psych:  Conscious, alert, oriented x3. Strength 5/5 in upper and lower  ext.  Appropriate mood and affect   Extremities:  No cyanosis, clubbing or edema.  Warm extremities and well-perfused          Results Review:        Results " from last 7 days   Lab Units 08/29/23  0504 08/28/23  1300   SODIUM mmol/L 132* 130*   POTASSIUM mmol/L 4.4  4.4 3.4*   CHLORIDE mmol/L 98 94*   CO2 mmol/L 23.1 24.1   BUN mg/dL 7* 7*   CREATININE mg/dL 0.64 0.74   GLUCOSE mg/dL 97 100*   CALCIUM mg/dL 9.0 9.2       Results from last 7 days   Lab Units 08/28/23  1624 08/28/23  1300   HSTROP T ng/L 20* 18*       Results from last 7 days   Lab Units 08/29/23  0504 08/28/23  1300   WBC 10*3/mm3 4.23 4.08   HEMOGLOBIN g/dL 13.7 14.1   HEMATOCRIT % 38.8 40.6   PLATELETS 10*3/mm3 133* 129*           Results from last 7 days   Lab Units 08/28/23  1300   PROBNP pg/mL 378.0         Results from last 7 days   Lab Units 08/28/23  1300   D DIMER QUANT MCGFEU/mL 1.08*                       I reviewed the patient's new clinical results.        Medication Review:   aspirin, 81 mg, Oral, Daily  azithromycin, 250 mg, Oral, Q24H  budesonide-formoterol, 2 puff, Inhalation, BID  gabapentin, 300 mg, Oral, 4x Daily  hydrALAZINE, 50 mg, Oral, TID  ipratropium-albuterol, 3 mL, Nebulization, Q6H While Awake - RT  levothyroxine, 50 mcg, Oral, Q AM  losartan, 100 mg, Oral, Daily  metoprolol tartrate, 25 mg, Oral, Q12H  senna-docusate sodium, 2 tablet, Oral, BID             Diagnostic imaging:  I personally and independently reviewed the following images:  CT chest 8/28/2023: Old scarring and stable left pulmonary nodules.    Assessment     Dyspnea, likely secondary to pain from T8 fracture  Mild intermittent asthma with no exacerbation  Stable 6 mm pulmonary nodules  Pulmonary scarring in the left upper lobe  Small hiatal hernia  T8  subacute fracture likely responsible for the above      Plan     Continue Symbicort 2 puffs twice daily.  No evidence of asthma exacerbation.  On DC, she can resume her Symbicort and albuterol HFA.  DuoNeb 4 times a day as needed  Incentive spirometry  Follow-up as outpatient with Dr. Ogden regarding the pulmonary nodules.  Conservative management with serial  imaging is recommended for now.    Dispo: Okay to DC from pulmonary perspective    Roderick Infante MD  08/30/23  10:02 EDT        This note was dictated utilizing Dragon dictation

## 2023-08-30 NOTE — PLAN OF CARE
Problem: Adult Inpatient Plan of Care  Goal: Plan of Care Review  Outcome: Ongoing, Progressing  Flowsheets (Taken 8/30/2023 0441)  Progress: improving  Plan of Care Reviewed With: patient  Outcome Evaluation: Patient slept well overnight. A&Ox4. VSS. No complaints of pain. Breathing is better per patient. Plan for discharge today.

## 2023-08-31 ENCOUNTER — READMISSION MANAGEMENT (OUTPATIENT)
Dept: CALL CENTER | Facility: HOSPITAL | Age: 83
End: 2023-08-31
Payer: MEDICARE

## 2023-08-31 NOTE — CASE MANAGEMENT/SOCIAL WORK
Case Management Discharge Note      Final Note: DC'd home 8/30               Transportation Services  Private: Car    Final Discharge Disposition Code: 01 - home or self-care

## 2023-08-31 NOTE — OUTREACH NOTE
Prep Survey      Flowsheet Row Responses   [facility] facility patient discharged from? [facility]   Is LACE score < 7 ? No   Eligibility Readm Mgmt   Discharge diagnosis Dyspnea   Does the patient have one of the following disease processes/diagnoses(primary or secondary)? Other   Does the patient have Home health ordered? No   Is there a DME ordered? No   Prep survey completed? Yes            Deborah WALL - Registered Nurse

## 2023-09-01 ENCOUNTER — TELEPHONE (OUTPATIENT)
Dept: NEUROSURGERY | Facility: CLINIC | Age: 83
End: 2023-09-01
Payer: MEDICARE

## 2023-09-01 DIAGNOSIS — S22.060A COMPRESSION FRACTURE OF T8 VERTEBRA, INITIAL ENCOUNTER: Primary | ICD-10-CM

## 2023-09-01 NOTE — TELEPHONE ENCOUNTER
S/w daughter to inform that patient needs to get xrays done same day as appointment before they come to appointment.

## 2023-09-05 ENCOUNTER — READMISSION MANAGEMENT (OUTPATIENT)
Dept: CALL CENTER | Facility: HOSPITAL | Age: 83
End: 2023-09-05
Payer: MEDICARE

## 2023-09-05 NOTE — OUTREACH NOTE
"Medical Week 1 Survey      Flowsheet Row Responses   [facility] patient discharged from? [place]   Does the patient have one of the following disease processes/diagnoses(primary or secondary)? Other   Week 1 attempt successful? Yes   Call start time 1059   Call end time 1101   Discharge diagnosis Dyspnea   Meds reviewed with patient/caregiver? Yes   Is the patient having any side effects they believe may be caused by any medication additions or changes? No   Does the patient have all medications ordered at discharge? Yes   Is the patient taking all medications as directed (includes completed medication regime)? Yes   Does the patient have a primary care provider?  Yes   Does the patient have an appointment with their PCP within 7 days of discharge? Yes   Has the patient kept scheduled appointments due by today? N/A   Psychosocial issues? No   Did the patient receive a copy of their discharge instructions? Yes   Nursing interventions Reviewed instructions with patient   What is the patient's perception of their health status since discharge? Improving   Is the patient/caregiver able to teach back signs and symptoms related to disease process for when to call PCP? Yes   Is the patient/caregiver able to teach back signs and symptoms related to disease process for when to call 911? Yes   Is the patient/caregiver able to teach back the hierarchy of who to call/visit for symptoms/problems? PCP, Specialist, Home health nurse, Urgent Care, ED, 911 Yes   Additional teach back comments States she is doing \"ok\".  Has new meds and taking as directed.  Soa has improved.   Week 1 call completed? Yes   Graduated Yes   Graduated/Revoked comments Denies questions or needs at this time.   Call end time 1101            Hina POTTER - Licensed Nurse  "

## 2023-09-11 ENCOUNTER — OFFICE VISIT (OUTPATIENT)
Dept: CARDIOLOGY | Facility: CLINIC | Age: 83
End: 2023-09-11
Payer: MEDICARE

## 2023-09-11 ENCOUNTER — APPOINTMENT (OUTPATIENT)
Dept: WOMENS IMAGING | Facility: HOSPITAL | Age: 83
End: 2023-09-11
Payer: MEDICARE

## 2023-09-11 VITALS
HEART RATE: 85 BPM | HEIGHT: 66 IN | DIASTOLIC BLOOD PRESSURE: 60 MMHG | BODY MASS INDEX: 20.63 KG/M2 | WEIGHT: 128.4 LBS | SYSTOLIC BLOOD PRESSURE: 132 MMHG

## 2023-09-11 DIAGNOSIS — S22.060S COMPRESSION FRACTURE OF T8 VERTEBRA, SEQUELA: ICD-10-CM

## 2023-09-11 DIAGNOSIS — R35.89 INCREASED URINE OUTPUT: ICD-10-CM

## 2023-09-11 DIAGNOSIS — I10 ESSENTIAL HYPERTENSION: Chronic | ICD-10-CM

## 2023-09-11 DIAGNOSIS — Z00.00 HEALTH CARE MAINTENANCE: ICD-10-CM

## 2023-09-11 DIAGNOSIS — I35.1 NONRHEUMATIC AORTIC VALVE INSUFFICIENCY: Primary | ICD-10-CM

## 2023-09-11 DIAGNOSIS — I49.1 APC (ATRIAL PREMATURE CONTRACTIONS): ICD-10-CM

## 2023-09-11 DIAGNOSIS — I51.89 GRADE II DIASTOLIC DYSFUNCTION: ICD-10-CM

## 2023-09-11 PROBLEM — R06.00 DYSPNEA: Status: RESOLVED | Noted: 2023-08-28 | Resolved: 2023-09-11

## 2023-09-11 PROCEDURE — 77063 BREAST TOMOSYNTHESIS BI: CPT | Performed by: RADIOLOGY

## 2023-09-11 PROCEDURE — 77067 SCR MAMMO BI INCL CAD: CPT | Performed by: RADIOLOGY

## 2023-09-11 NOTE — PROGRESS NOTES
Date of Office Visit: 2023  Encounter Provider: LENKA Zeng  Place of Service: [facility] CARDIOLOGY  Patient Name: Karli Loomis  :1940  Primary Cardiologist: Dr. Bacilio Hernández     Chief Complaint   Patient presents with    Hospital Follow Up Visit   :     HPI: Karli Loomis is a 83 y.o. female who presents today for cardiac follow-up visit.  I have reviewed her medical records.    She has a history of right-sided breast cancer that was treated with aromatase inhibitor for 5 years and completed in 2019.  She underwent right mastectomy and did not require chemotherapy or radiation.  She has [place] Gokul's macroglobulinemia and has been on rituximab, bortezomib, and venetoclax.    In May 2021, she established care with Dr. Bacilio Hernández for cardio-oncology consultation at the request of Dr. Larose.  She was having multiple symptoms.  Myocardial perfusion study showed no evidence of ischemia.  Echocardiogram showed the following: LVEF 67%, normal global longitudinal LV strain -21.4%, mild basal septal hypertrophy without outflow obstruction, grade 2 diastolic dysfunction, left atrial volume mildly increased, nodular thickening on the left coronary cusp of the aortic valve, moderate aortic valve regurgitation, trace mitral valve regurgitation, and trace tricuspid regurgitation.   Her metoprolol dosage was decreased because of bradycardia.    In 2022, repeat echo showed EF 60%, grade 1 diastolic dysfunction, mild LVH, mild to moderate basal septal hypertrophy without obstruction, moderate aortic regurgitation and nodular sclerosis on the aortic valve.    She reported a rash for several years and felt that it was related to multiple medications.  Despite stopping medications, her rash continued.  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    In 2023, echocardiogram showed hyperdynamic EF greater than 70%, LVH, grade 2 diastolic  dysfunction, mild aortic regurgitation, and trace MR/TR.    In August 2023, she presented to the [facility] ED with dyspnea x2 weeks.  She was diagnosed with mild asthma exacerbation and treated with Zithromax and DuoNebs.  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  She had a brief episode of SVT 8 beats in duration and was discharged on metoprolol.  I reviewed her EKG which showed normal sinus rhythm with APCs.  She was also diagnosed with suspected compression fractures.    She presents today for follow-up visit.  She reports back pain and has been diagnosed with osteoporosis.  She denies any further shortness of breath and did not experience palpitations during the hospital.  She denies chest pain, palpitations, edema, dizziness, syncope, or bleeding.  She has been peeing frequently and wonders if it is from hydralazine.      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    Epilepsy with simple partial seizures 12/14/2017    Headache, migraine 12/14/2017    History of transfusion of packed red blood cells     R/T SURGERY    Hyperlipidemia     Hypertension     Malignant neoplasm of overlapping sites of left breast in female, estrogen receptor positive 04/13/2016    Nonrheumatic aortic valve insufficiency     Primary osteoarthritis of left knee 07/22/2015    Skin cancer     Thyroid nodule     Removed more than 35 years ago    TIA (transient ischemic attack) 10/18/2018    UTI due to extended-spectrum beta lactamase (ESBL) producing Escherichia coli 01/06/2019    [place]strom macroglobulinemia        Past Surgical History:   Procedure Laterality Date    ADENOIDECTOMY      APPENDECTOMY      CARPAL TUNNEL RELEASE Right     CATARACT EXTRACTION  Bilateral     CHOLECYSTECTOMY      COLONOSCOPY      HYSTERECTOMY      Partial, many years ago, heavy bleeding, no cancer    KNEE ARTHROSCOPY Right 03/2009    Finding of chondromalacia and appropriate cleanup of joint was performed by Dr. Moraes.    MASTECTOMY Left 07/2014    REPLACEMENT TOTAL KNEE Right 12/2015    SKIN CANCER EXCISION      several    TONSILLECTOMY         Social History     Socioeconomic History    Marital status:    Tobacco Use    Smoking status: Never    Smokeless tobacco: Never   Vaping Use    Vaping Use: Never used   Substance and Sexual Activity    Alcohol use: No     Comment: 2 cups caffeine/coffee daily    Drug use: No    Sexual activity: Defer       Family History   Problem Relation Age of Onset    Mental illness Mother     Migraines Mother     Dementia Mother     Heart disease Father     Hypertension Father     Kidney disease Father     Alcohol abuse Father     No Known Problems Daughter     No Known Problems Daughter     Breast cancer Other     Heart disease Other     Hypertension Other     Diabetes Other     Cancer Maternal Grandmother     Breast cancer Maternal Grandmother     No Known Problems Maternal Grandfather     No Known Problems Paternal Grandmother     No Known Problems Paternal Grandfather     Hyperlipidemia Son     Lymphoma Neg Hx     Leukemia Neg Hx        The following portion of the patient's history were reviewed and updated as appropriate: past medical history, past surgical history, past social history, past family history, allergies, current medications, and problem list.    Review of Systems   Constitutional: Negative.   Cardiovascular: Negative.    Respiratory: Negative.     Hematologic/Lymphatic: Negative.    Musculoskeletal:  Positive for back pain.   Neurological: Negative.      Allergies   Allergen Reactions    Cortisone Unknown - High Severity     Reports having a shot years ago, and wonders if he hit a vein. She was told by another doctor that it  "probably went in her blood stream.     Hytrin [Terazosin] Rash    Amlodipine Rash    Darvon  [Propoxyphene] Palpitations         Current Outpatient Medications:     aspirin 81 MG tablet, Take 1 tablet by mouth Daily., Disp: , Rfl:     betamethasone, augmented, (DIPROLENE) 0.05 % ointment, APPLY A SMALL AMOUNT TOPICALLY TO AFFECTED AREA 2 TIMES A DAY, Disp: , Rfl:     budesonide-formoterol (Symbicort) 80-4.5 MCG/ACT inhaler, Inhale 2 puffs 2 (Two) Times a Day., Disp: 1 each, Rfl: 0    cetirizine (zyrTEC) 10 MG tablet, Take 1 tablet by mouth Daily., Disp: , Rfl:     gabapentin (NEURONTIN) 400 MG capsule, TAKE 1 CAPSULE FOUR TIMES DAILY, Disp: 360 capsule, Rfl: 1    hydrALAZINE (APRESOLINE) 50 MG tablet, TAKE 1 TABLET THREE TIMES DAILY, Disp: 270 tablet, Rfl: 2    levothyroxine (SYNTHROID, LEVOTHROID) 50 MCG tablet, Take 1 tablet by mouth Daily., Disp: 90 tablet, Rfl: 1    losartan (COZAAR) 100 MG tablet, Take 1 tablet by mouth Daily., Disp: 90 tablet, Rfl: 3    mupirocin (BACTROBAN) 2 % ointment, APPLY A SMALL AMOUNT TO AFFECTED AREA 2 TIMES A DAY, Disp: , Rfl:     niacinamide 500 MG tablet, Take 1 tablet by mouth 3 (Three) Times a Day., Disp: , Rfl:     metoprolol tartrate (LOPRESSOR) 25 MG tablet, Take 1 tablet by mouth Every 12 (Twelve) Hours for 30 days., Disp: 60 tablet, Rfl: 0         Objective:     Vitals:    09/11/23 1506   BP: 132/60   BP Location: Right arm   Patient Position: Sitting   Cuff Size: Adult   Pulse: 85   Weight: 58.2 kg (128 lb 6.4 oz)   Height: 167.6 cm (65.98\")     Body mass index is 20.73 kg/m².    PHYSICAL EXAM:    Vitals Reviewed.   General Appearance: No acute distress, well developed and well nourished. Thin.    Eyes: Glasses.   HENT: No hearing loss noted.    Respiratory: No signs of respiratory distress. Respiration rhythm and depth normal.  Clear to auscultation.   Cardiovascular:  Jugular Venous Pressure: Normal  Heart Rate and Rhythm: Normal, Heart Sounds: Normal S1 and S2. No S3 or " S4 noted.  Murmurs: No murmurs noted. No rubs, thrills, or gallops.   Lower Extremities: No edema noted.  Musculoskeletal: Normal movement of extremities.  Skin: General appearance normal.    Psychiatric: Patient alert and oriented to person, place, and time. Speech and behavior appropriate. Normal mood and affect.       ECG 12 Lead    Date/Time: 9/11/2023 3:07 PM  Performed by: Vivien Pina APRN  Authorized by: Vivien Pina APRN   Comparison: compared with previous ECG from 8/28/2023  Comparison to previous ECG: Normal sinus rhythm with APCs  Rhythm: sinus rhythm  Rate: normal  BPM: 85  Conduction: conduction normal  ST Segments: ST segments normal  T Waves: T waves normal  QRS axis: normal  Other findings: left ventricular hypertrophy    Clinical impression: non-specific ECG          Assessment:       Diagnosis Plan   1. Nonrheumatic aortic valve insufficiency        2. Grade II diastolic dysfunction        3. APC (atrial premature contractions)        4. Essential hypertension        5. Increased urine output        6. Compression fracture of T8 vertebra, sequela        7. Health care maintenance  Ambulatory Referral to Internal Medicine             Plan:       1.  Aortic Insufficiency: Mild per echocardiogram in June 2023.  She denies symptoms of worsening valvular disease.      2.  Diastolic Dysfunction: Euvolemic.    3.  APCs noted during her recent hospitalization.  It was also noted that she had a brief episode of PSVT.  Denies palpitations.  She never started the metoprolol and will just hold off on doing so.    4.  Hypertension: Blood pressure well controlled on current medication regimen.    5.  She reports increased urine output and I recommend that she follow-up with her PCP.    6.  Compression fractures: Reports chronic back pain.    7.  Healthcare maintenance: She has requested referral to a new internal medicine PCP.  Referral placed.    8.  She will keep her scheduled appointment with   Edgar in June 2024    As always, it has been a pleasure to participate in your patient's care. Thank you.         Sincerely,         LENKA Garcia  [facility] Cardiology      Dictated utilizing Dragon Dictation  I spent 35 minutes reviewing her medical records/testing/previous office notes/labs, face-to-face interaction with patient, physical examination, formulating the plan of care, and discussion of plan of care with patient.

## 2023-10-05 DIAGNOSIS — G62.0 DRUG-INDUCED POLYNEUROPATHY: ICD-10-CM

## 2023-10-05 RX ORDER — GABAPENTIN 400 MG/1
CAPSULE ORAL
Qty: 360 CAPSULE | Refills: 1 | Status: SHIPPED | OUTPATIENT
Start: 2023-10-05

## 2023-10-05 NOTE — TELEPHONE ENCOUNTER
Rx Refill Note  Requested Prescriptions     Pending Prescriptions Disp Refills    gabapentin (NEURONTIN) 400 MG capsule [Pharmacy Med Name: GABAPENTIN 400 MG Capsule] 360 capsule      Sig: TAKE 1 CAPSULE FOUR TIMES DAILY      Last office visit with prescribing clinician: 6/9/2023   Last telemedicine visit with prescribing clinician: Visit date not found   Next office visit with prescribing clinician: 12/5/2023       {TIP  Please add Last Relevant Lab Date if appropriate: 08/29/23                 Would you like a call back once the refill request has been completed: [] Yes [] No    If the office needs to give you a call back, can they leave a voicemail: [] Yes [] No    Shannon Núñez MA  10/05/23, 14:53 EDT

## 2023-10-18 ENCOUNTER — OFFICE VISIT (OUTPATIENT)
Dept: ONCOLOGY | Facility: CLINIC | Age: 83
End: 2023-10-18
Payer: MEDICARE

## 2023-10-18 ENCOUNTER — LAB (OUTPATIENT)
Dept: LAB | Facility: HOSPITAL | Age: 83
End: 2023-10-18
Payer: MEDICARE

## 2023-10-18 VITALS
OXYGEN SATURATION: 99 % | HEART RATE: 73 BPM | BODY MASS INDEX: 20.22 KG/M2 | TEMPERATURE: 98 F | DIASTOLIC BLOOD PRESSURE: 84 MMHG | HEIGHT: 66 IN | WEIGHT: 125.8 LBS | SYSTOLIC BLOOD PRESSURE: 132 MMHG

## 2023-10-18 DIAGNOSIS — C50.812 MALIGNANT NEOPLASM OF OVERLAPPING SITES OF LEFT BREAST IN FEMALE, ESTROGEN RECEPTOR POSITIVE: Primary | ICD-10-CM

## 2023-10-18 DIAGNOSIS — C88.0 MACROGLOBULINEMIA OF WALDENSTROM: ICD-10-CM

## 2023-10-18 DIAGNOSIS — L12.0 BULLOUS PEMPHIGOID: ICD-10-CM

## 2023-10-18 DIAGNOSIS — M80.08XA PATHOLOGICAL FRACTURE OF VERTEBRA DUE TO OSTEOPOROSIS, UNSPECIFIED OSTEOPOROSIS TYPE, INITIAL ENCOUNTER: ICD-10-CM

## 2023-10-18 DIAGNOSIS — K90.9 IRON MALABSORPTION: ICD-10-CM

## 2023-10-18 DIAGNOSIS — Z17.0 MALIGNANT NEOPLASM OF OVERLAPPING SITES OF LEFT BREAST IN FEMALE, ESTROGEN RECEPTOR POSITIVE: Primary | ICD-10-CM

## 2023-10-18 DIAGNOSIS — S22.41XA CLOSED FRACTURE OF MULTIPLE RIBS OF RIGHT SIDE, INITIAL ENCOUNTER: ICD-10-CM

## 2023-10-18 DIAGNOSIS — Z17.0 MALIGNANT NEOPLASM OF OVERLAPPING SITES OF LEFT BREAST IN FEMALE, ESTROGEN RECEPTOR POSITIVE: ICD-10-CM

## 2023-10-18 DIAGNOSIS — C50.812 MALIGNANT NEOPLASM OF OVERLAPPING SITES OF LEFT BREAST IN FEMALE, ESTROGEN RECEPTOR POSITIVE: ICD-10-CM

## 2023-10-18 LAB
25(OH)D3 SERPL-MCNC: 17.9 NG/ML (ref 30–100)
ALBUMIN SERPL-MCNC: 4 G/DL (ref 3.5–5.2)
ALBUMIN/GLOB SERPL: 2 G/DL
ALP SERPL-CCNC: 98 U/L (ref 39–117)
ALT SERPL W P-5'-P-CCNC: 11 U/L (ref 1–33)
ANION GAP SERPL CALCULATED.3IONS-SCNC: 13.7 MMOL/L (ref 5–15)
AST SERPL-CCNC: 28 U/L (ref 1–32)
BASOPHILS # BLD AUTO: 0.03 10*3/MM3 (ref 0–0.2)
BASOPHILS NFR BLD AUTO: 0.8 % (ref 0–1.5)
BILIRUB SERPL-MCNC: 0.9 MG/DL (ref 0–1.2)
BUN SERPL-MCNC: 7 MG/DL (ref 8–23)
BUN/CREAT SERPL: 10.9 (ref 7–25)
CALCIUM SPEC-SCNC: 9.4 MG/DL (ref 8.6–10.5)
CANCER AG15-3 SERPL-ACNC: 20.2 U/ML
CHLORIDE SERPL-SCNC: 93 MMOL/L (ref 98–107)
CO2 SERPL-SCNC: 24.3 MMOL/L (ref 22–29)
CREAT SERPL-MCNC: 0.64 MG/DL (ref 0.6–1.1)
DEPRECATED RDW RBC AUTO: 44.6 FL (ref 37–54)
EGFRCR SERPLBLD CKD-EPI 2021: 87.3 ML/MIN/1.73
EOSINOPHIL # BLD AUTO: 0.22 10*3/MM3 (ref 0–0.4)
EOSINOPHIL NFR BLD AUTO: 6.2 % (ref 0.3–6.2)
ERYTHROCYTE [DISTWIDTH] IN BLOOD BY AUTOMATED COUNT: 12.2 % (ref 12.3–15.4)
FERRITIN SERPL-MCNC: 503 NG/ML (ref 13–150)
GLOBULIN UR ELPH-MCNC: 2 GM/DL
GLUCOSE SERPL-MCNC: 92 MG/DL (ref 65–99)
HCT VFR BLD AUTO: 41 % (ref 34–46.6)
HGB BLD-MCNC: 14.5 G/DL (ref 12–15.9)
HGB RETIC QN AUTO: 36.8 PG (ref 29.8–36.1)
IMM GRANULOCYTES # BLD AUTO: 0.01 10*3/MM3 (ref 0–0.05)
IMM GRANULOCYTES NFR BLD AUTO: 0.3 % (ref 0–0.5)
IMM RETICS NFR: 1.7 % (ref 3–15.8)
IRON 24H UR-MRATE: 173 MCG/DL (ref 37–145)
IRON SATN MFR SERPL: 59 % (ref 20–50)
LYMPHOCYTES # BLD AUTO: 1.13 10*3/MM3 (ref 0.7–3.1)
LYMPHOCYTES NFR BLD AUTO: 31.8 % (ref 19.6–45.3)
MAGNESIUM SERPL-MCNC: 2 MG/DL (ref 1.6–2.4)
MCH RBC QN AUTO: 35 PG (ref 26.6–33)
MCHC RBC AUTO-ENTMCNC: 35.4 G/DL (ref 31.5–35.7)
MCV RBC AUTO: 99 FL (ref 79–97)
MONOCYTES # BLD AUTO: 0.3 10*3/MM3 (ref 0.1–0.9)
MONOCYTES NFR BLD AUTO: 8.5 % (ref 5–12)
NEUTROPHILS NFR BLD AUTO: 1.86 10*3/MM3 (ref 1.7–7)
NEUTROPHILS NFR BLD AUTO: 52.4 % (ref 42.7–76)
NRBC BLD AUTO-RTO: 0 /100 WBC (ref 0–0.2)
PHOSPHATE SERPL-MCNC: 2.8 MG/DL (ref 2.5–4.5)
PLATELET # BLD AUTO: 122 10*3/MM3 (ref 140–450)
PMV BLD AUTO: 8.9 FL (ref 6–12)
POTASSIUM SERPL-SCNC: 4.8 MMOL/L (ref 3.5–5.2)
PROT SERPL-MCNC: 6 G/DL (ref 6–8.5)
RBC # BLD AUTO: 4.14 10*6/MM3 (ref 3.77–5.28)
RETICS # AUTO: 0.06 10*6/MM3 (ref 0.02–0.13)
RETICS/RBC NFR AUTO: 1.4 % (ref 0.7–1.9)
SODIUM SERPL-SCNC: 131 MMOL/L (ref 136–145)
TIBC SERPL-MCNC: 294 MCG/DL (ref 298–536)
TRANSFERRIN SERPL-MCNC: 210 MG/DL (ref 200–360)
WBC NRBC COR # BLD: 3.55 10*3/MM3 (ref 3.4–10.8)

## 2023-10-18 PROCEDURE — 83735 ASSAY OF MAGNESIUM: CPT | Performed by: INTERNAL MEDICINE

## 2023-10-18 PROCEDURE — 85046 RETICYTE/HGB CONCENTRATE: CPT

## 2023-10-18 PROCEDURE — 82306 VITAMIN D 25 HYDROXY: CPT | Performed by: INTERNAL MEDICINE

## 2023-10-18 PROCEDURE — 36415 COLL VENOUS BLD VENIPUNCTURE: CPT

## 2023-10-18 PROCEDURE — 84100 ASSAY OF PHOSPHORUS: CPT | Performed by: INTERNAL MEDICINE

## 2023-10-18 PROCEDURE — 84466 ASSAY OF TRANSFERRIN: CPT

## 2023-10-18 PROCEDURE — 80053 COMPREHEN METABOLIC PANEL: CPT

## 2023-10-18 PROCEDURE — 83540 ASSAY OF IRON: CPT

## 2023-10-18 PROCEDURE — 82728 ASSAY OF FERRITIN: CPT

## 2023-10-18 PROCEDURE — 85025 COMPLETE CBC W/AUTO DIFF WBC: CPT

## 2023-10-18 PROCEDURE — 86300 IMMUNOASSAY TUMOR CA 15-3: CPT | Performed by: INTERNAL MEDICINE

## 2023-10-18 NOTE — PROGRESS NOTES
Subjective    REASONS FOR FOLLOWUP:     History of Waldenstrom macroglobulinemia.    History of breast cancer stage I on the left, status post mastectomy. The patient completed aromatase inhibitor  X 5 YEARS IN 2019 without any evidence of breast cancer recurrence        History of Present Illness   On 10/18/2023 this 82-year-old female returns to the office for follow-up of her history of multiple medical problems including previous history of left breast cancer, Waldenstrom macroglobulinemia, iron deficiency anemia, and bullous pemphigoid.Today, the patient expresses her difficulties during this recent hospital stay when she was significantly mistreated in the emergency room according to her by one of the nurses and she believes that she does not have the proper diagnosis after her discharge. Happened to be that the morning the patient woke up feeling so short of breath, the 1st symptom that she had before the shortness of breath was pain in the chest wall posteriorly on the right side in absence of any trauma. She felt that she could not catch her breath given the intensity of the pain. She was not having any coughing, sputum production, fever, hemoptysis, or pleuritic pain. At the time that she was brought to the emergency room, she was told that she had an anxiety attack. CT angiogram was done that failed to document pulmonary embolism. She was admitted to the hospital. She was found to have collapse of vertebras in the thoracic spine thoracic spine, unknown time for when this happened. The patient was not remembering any time of fracture or falls before that issue. She was treated in the hospital for several days and discharged. In spite of all this the pain in the rib cage posteriorly on the right side continues. She hates to stand up for a long time, more than 20 minutes is able to do her in because of the intensity of the discomfort in this anatomical site. She has no tenderness in the spine whatsoever.  She denies any pain in any bone in her skeleton. Her appetite is mediocre. Her bowel activity also is mediocre and she has frequency to urinate, especially at nighttime. The volume that she urinates at night is small, frequent with no hematuria and no pain. Besides this significant fatigue. She denies any fevers or chills. She has no abnormal bleeding. The pruritis that she has related to her pemphigoid is very much resolved and she has had more skin lesions removed by her dermatologist after I saw her the last time. Those lesions were premalignant or malignant already. She denies any difficulties otherwise with her previous left mastectomy. She is not wearing a bra because of the discomfort in the chest wall posteriorly on the right side.         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    Epilepsy with simple partial seizures 12/14/2017    Headache, migraine 12/14/2017    History of transfusion of packed red blood cells     R/T SURGERY    Hyperlipidemia     Hypertension     Malignant neoplasm of overlapping sites of left breast in female, estrogen receptor positive 04/13/2016    Nonrheumatic aortic valve insufficiency     Primary osteoarthritis of left knee 07/22/2015    Skin cancer     Thyroid nodule     Removed more than 35 years ago    TIA (transient ischemic attack) 10/18/2018    UTI due to extended-spectrum beta lactamase (ESBL) producing Escherichia coli 01/06/2019    Waldenstrom macroglobulinemia      Past Surgical History:   Procedure Laterality Date    ADENOIDECTOMY      APPENDECTOMY      CARPAL TUNNEL RELEASE Right     CATARACT EXTRACTION Bilateral     CHOLECYSTECTOMY      COLONOSCOPY      HYSTERECTOMY      Partial, many years ago, heavy bleeding, no cancer    KNEE  ARTHROSCOPY Right 03/2009    Finding of chondromalacia and appropriate cleanup of joint was performed by Dr. Moraes.    MASTECTOMY Left 07/2014    REPLACEMENT TOTAL KNEE Right 12/2015    SKIN CANCER EXCISION      several    TONSILLECTOMY           Social History     Socioeconomic History    Marital status:    Tobacco Use    Smoking status: Never    Smokeless tobacco: Never   Vaping Use    Vaping Use: Never used   Substance and Sexual Activity    Alcohol use: No     Comment: 2 cups caffeine/coffee daily    Drug use: No    Sexual activity: Defer     Family History   Problem Relation Age of Onset    Mental illness Mother     Migraines Mother     Dementia Mother     Heart disease Father     Hypertension Father     Kidney disease Father     Alcohol abuse Father     No Known Problems Daughter     No Known Problems Daughter     Breast cancer Other     Heart disease Other     Hypertension Other     Diabetes Other     Cancer Maternal Grandmother     Breast cancer Maternal Grandmother     No Known Problems Maternal Grandfather     No Known Problems Paternal Grandmother     No Known Problems Paternal Grandfather     Hyperlipidemia Son     Lymphoma Neg Hx     Leukemia Neg Hx      Current Outpatient Medications on File Prior to Visit   Medication Sig Dispense Refill    aspirin 81 MG tablet Take 1 tablet by mouth Daily.      betamethasone, augmented, (DIPROLENE) 0.05 % ointment APPLY A SMALL AMOUNT TOPICALLY TO AFFECTED AREA 2 TIMES A DAY      budesonide-formoterol (Symbicort) 80-4.5 MCG/ACT inhaler Inhale 2 puffs 2 (Two) Times a Day. 1 each 0    cetirizine (zyrTEC) 10 MG tablet Take 1 tablet by mouth Daily.      gabapentin (NEURONTIN) 400 MG capsule TAKE 1 CAPSULE FOUR TIMES DAILY 360 capsule 1    hydrALAZINE (APRESOLINE) 50 MG tablet TAKE 1 TABLET THREE TIMES DAILY 270 tablet 2    levothyroxine (SYNTHROID, LEVOTHROID) 50 MCG tablet Take 1 tablet by mouth Daily. 90 tablet 1    losartan (COZAAR) 100 MG tablet Take 1  "tablet by mouth Daily. 90 tablet 3    mupirocin (BACTROBAN) 2 % ointment APPLY A SMALL AMOUNT TO AFFECTED AREA 2 TIMES A DAY      niacinamide 500 MG tablet Take 1 tablet by mouth 3 (Three) Times a Day.       No current facility-administered medications on file prior to visit.       ALLERGIES:    Allergies   Allergen Reactions    Cortisone Unknown - High Severity     Reports having a shot years ago, and wonders if he hit a vein. She was told by another doctor that it probably went in her blood stream.     Hytrin [Terazosin] Rash    Amlodipine Rash    Darvon  [Propoxyphene] Palpitations       Objective      Vitals:    10/18/23 1134   BP: 132/84   Pulse: 73   Temp: 98 °F (36.7 °C)   TempSrc: Temporal   SpO2: 99%   Weight: 57.1 kg (125 lb 12.8 oz)   Height: 167.6 cm (65.98\")   PainSc:   8   PainLoc: Back  Comment: patient has a fracture in her spine         10/18/2023    11:32 AM   Current Status   ECOG score 0   Exam  :           GENERAL:  Well-developed, Patient  in chronic illness  SKIN:  Warm, dry ,NO purpura ,no rash.  HEENT:  Pupils were equal and reactive to light and accomodation, conjunctivae noninjected,  normal visual acuity.   NECK:  Supple with good range of motion; no thyromegaly , no JVD or bruits,.No carotid artery pain, no carotid abnormal pulsation   LYMPHATICS:  No cervical, NO supraclavicular, NO axillary, NO inguinal adenopathies.  CARDIAC   normal rate , regular rhythm, without murmur,NO rubs NO S3 NO S4   LUNGS: normal breath sounds bilateral, no wheezing, NO rhonchi, NO crackles ,NO rubs.  VASCULAR VENOUS: no cyanosis, NO collateral circulation, NO varicosities, NO edema, NO palpable cords, NO pain,NO erythema, NO pigmentation of the skin.  ABDOMEN:  Soft, NO pain,no hepatomegaly, no splenomegaly,no masses, no ascites, no collateral circulation,no distention.  EXTREMITIES  AND SPINE:  No clubbing, no cyanosis ,no deformities , posterior right rib cage pain .t spine kyphosis,  no pain in spine, no " pain in left ribs , no pain in pelvic bone.  NEUROLOGICAL:  Patient was awake, alert, oriented to time, person and place.  Left mastectomy site remains completely normal with no masses, induration, tenderness. No left axillary adenopathy, no left upper extremity lymphedema. Right breast disclosed normal nipple, normal skin. No palpable masses. No tenderness. Normal right axilla.      RECENT LABS:  Results from last 7 days   Lab Units 10/18/23  1112   WBC 10*3/mm3 3.55   NEUTROS ABS 10*3/mm3 1.86   HEMOGLOBIN g/dL 14.5   HEMATOCRIT % 41.0   PLATELETS 10*3/mm3 122*   XR Spine Thoracic 3 View (08/29/2023 15:33)       SCANNED - MAMMO (09/11/2023)           Assessment & Plan      1.  Waldenstrom macroglobulinemia that has been treated in the past mainly with Rituxan. In the past, also she was treated with Velcade with very dramatic improvement in her monoclonal protein but very dramatic sensory peripheral neuropathy. This medication was discontinued altogether.   6/29/2020, she developed progressive fatigue with worsening neuropathy in her feet.  These were her original symptoms at diagnosis of Waldenstrom's.  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  Calcium also elevated at 10.5 with decreased sodium related to pseudohyponatremia associated with monoclonal protein.  Previously, she did not receive improvement from Rituxan, she is not thought to be a candidate for Imbruvica, therefore she went on to initiate venetoclax 7/13/2020.  She is currently on her next planned dose of 200 mg daily with poor tolerance as outlined below  04/28/2021 normal sedimentation rate, stable monoclonal protein IgM that the Waldenstrom could be the most focal reason to explain her fatigue. On the other hand it is impossible given her thrombocytopenia to be sure that she still has some component of lymphoma in her bone marrow and the only way to prove this will be to pursue in a bone marrow test. She does not believe that she is ready  for this at this time.  Radiologically speaking the CT scan of the chest, abdomen and pelvis failed to document any lymphadenopathy, masses, splenomegaly or any other issue pertinent to her Waldenstrom.  06/02/2021 that I do not feel that the symptoms of fatigue are related to her Waldenström's at this time. Her monoclonal protein study has remained completely quiet. Her white count and hemoglobin are normal. Her B12, folic acid, ferritin, iron profile remain normal and TSH hs remained into the normal limits. Her sed rate was normal at 4 mm/hr and she has no obvious infection or inflammatory process.   On 08/25/2021 the patient's monoclonal protein has remained very stable as discussed with her during the previous weeks. We have another level pending today. One more analysis that I decided to pursue was an amyloid analysis and abdominal fat pad biopsy. This was performed in late 06/2021 by Deric Llamas MD. Material was sent to [facility]. No [place] red material was found in this specimen. Therefore the possibility of amyloidosis is less likely under these circumstances.   11/17/2021 in regard to her Waldenstrom's. She does not have any symptoms pertinent to this   12/17/2021. I do not see any symptoms or signs of her Waldenstrom's. Her white count, hemoglobin, platelets, white count differential are normal. Her chemistry profile is pending. I find no reason to proceed with any other therapy for this condition. Her monoclonal protein has remained quiet, stable.   03/11/2022 the patient’s white count, hemoglobin and platelets are normal. She has minimal sensory neuropathy in her feet that is no worse than before. She has no fever, chills or infections. No clinical bleeding. Her monoclonal protein IgM has remained very stable. She has not required any other intervention from my side of the story and she will be watched in absence of any other intervention. She will be called at home with the report of her  monoclonal protein study next week.  11/02/2022 the patient has not had any clinical bleeding, no febrile illness, no peripheral adenopathy, no blurred vision, and her white count, hemoglobin and platelets remain stable in regard to her previous history of Waldenstrom. On the other hand the patient has this very peculiar maculopapular rash with multiple areas of ulceration not only in her trunk but also in her face, upper and lower extremities. The areas in the trunk are the worse, especially on her backside. Photographs were obtained and placed in media by Dr. Larose.   In spite of seeing dermatologist, a skin biopsy has not been done. The patient continues blaming this rash to her blood pressure medications. I went back into time in 2020 to see if we can find what she was taking before. She was not having any rash but she has been worse from this point of view in the last several weeks.   I think independent of what she is going to do with her life very likely she needs to have a different approach to the diagnosis of her rash in the skin and I strongly believe there is a connection between the rash and her Waldenstrom. I have placed a phone call to discuss this with the patient's dermatologist. I strongly believe that she will require skin biopsy looking for specific skin entities as well as looking for pemphigoid.  She has similar lesions in her chest wall, similar lesion in her buttock and back of the spine and upper and lower extremities.  On 11/17/2022 the patient has continued having extensive lesions in the skin. We have measured her monoclonal protein that has not changed and she has not required any therapy for this for a long time. I measured her IgE level that was 1370 and any number above 500 is very abnormal. 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 Amlodipine has  been discontinued. Obviously the question is what is the next medicine that needs to be discontinued and my next candidate given the characteristics of medication is hydralazine.   I went ahead and advised the patient to initiate a program of prednisone 10 mg at breakfast time and Singulair 5 mg at bedtime. The patient will continue putting topical moisturizer on the skin. She is not using any topical steroid at this time.  I also checked on this patient's KARRIE that was negative.  I raised the question to Teresa Salgado MD, if she saw anything to suggest any form of vasculitis that was negative.   Therefore after all of these facts and these discussions back and forth and so much conversation we are ready to proceed to see if we change the outcome of this patient in regard all of these symptoms.   On 01/23/2023 the patient has not had any new symptoms pertinent to Waldenstrom's. She has no peripheral adenopathy, she has no hepatosplenomegaly, she has no bleeding, blurred vision.  Her white count, hemoglobin and platelets remain normal. White count differential is normal and we are waiting to review her monoclonal protein that during the previous visit was completely stable in regard to her IgM level. I expect that this condition will remain quiet and for this reason I advised her to return to see me back in 3 months with repeat CBC, CMP and monoclonal protein study.  4/17/2023 complaining of intense pruritus in the absence of skin rash.  She is using moisturizers.  IgE at that time 1538  5/1/2023 patient seen initially planned to start Rituxan today however after received labs from her last visit patient's IgE remains elevated.  Dr. Larose would like for the patient to undergo bone marrow biopsy for further evaluation.   5/9/2023 CT-guided bone marrow biopsy.  Flow cytometry shows minimal population of monoclonal B cells and no iron in the bone marrow.  Per Dr. Larose, the patient does not need to undergo repeat  Rituxan though will require IV iron replacement.  On 07/12/2023 the patient has no symptoms that suggests Waldenstrom recurrence. The only concern and issue is minor drop in her platelet count. The patient's hemoglobin and white count are normal and her serum protein electrophoresis is pending. The patient will be called with the report of her serum protein electrophoresis expecting that this will be hopefully stable in comparison with previous assessment. I find no need to trigger anymore radiological assessment on her, neither repeating bone marrow testing, neither any other issue pertinent to her Waldenstrom's at this time.  On 10/18/2023 the patient has no obvious symptoms related to her Waldenstrom macroglobulinemia. Her white count, hemoglobin and platelets are doing fine and we are pending to review her IgA monoclonal protein in the next couple of days. 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 This is called pseudohyponatremia.     It is very concerning though the fact of new pain in the rib cage posteriorly on the right side; that is what triggered her hospitalization and it was missed completely.     Please review below.          2.  History of left breast cancer, status post mastectomy.  She completed adjuvant therapy.    Mammogram 6/30/2020 benign  On 08/25/2021 she has no symptoms or signs of breast cancer recurrence. Her mastectomy site on the left is completely healed. Her right breast examination is normal. She is up to date on her mammogram. This will be watched in absence of any other intervention.   On 11/17/2021 the patient's left sided mastectomy is well healed, right breast exam is normal. There is no evidence of breast cancer recurrence clinically. She will remain in observation.   I find no symptoms or signs of breast cancer recurrence on her. This will be watched in absence of any other intervention as per 12/17/2021.    On 03/11/2022 the patient has no symptoms or signs of breast cancer recurrence. Her right breast exam is normal. The left mastectomy site is normal. She will be watched in absence of any other intervention. She is up-to-date in right-sided mammogram.  Right breast Mammogram 7/25/2022 negative  She remains in observation with no clinical evidence of recurrent disease  On 07/12/2023, her left breast mastectomy is normal, her right breast is normal. She has no symptoms or signs of breast cancer recurrence. She will be watched in absence of any other intervention.  On 10/18/2023, no history of breast cancer recurrence so far. On the other hand, the ridge posteriorly on the right side is worrisome given her previous history breast cancer. She has not had any trauma. She has not had any falls. The ribs are tender on clinical examination. There is no crepitation. She needs to have a bone scan and an MRI of the spine. A CA 15-3 will be ordered today.            3.  Fatigue. We have looked for endocrinological diseases, medication side effects, infections, malignancies, Waldenstrom's, amyloid, cardiac disease and so forth not having any conclusion in regard to the nature of this. I do believe that the lack of bad news or not finding any other thing is good news to me and I pointed this out to the patient. Maybe by the end of the day her fatigue is lack of proper physical training. She has bought a treadmill device that will be in her house as per today and hopefully she will be able to do some walking on this that will be meaningful in regard to recovering in regard energy.   The patient believes that the degree of fatigue that she was experiencing before is substantially better and now she is able to do some house activity. I encouraged her to continue this in the long run, is the only solution for fatigue is physical activity.   On 12/17/2021 the patient's fatigue has improved highly. She is now doing treadmill activity  at home for 10 minutes once or twice a day. She has been eating better, she has lost some weight but she feels substantially better. I think the conditioning was probably the culprit of all of this related to the pandemia and I advised her to remain doing her treadmill activity twice a day if possible.   On 11/17/2022 a lot of the patient's fatigue is lack of sleep. She wakes up at 2-o'clock in the morning to dig into the skin of her lower extremities and she spends the rest of the night just digging and up and about. Hopefully with the Singulair taken at nighttime that also can facilitate sleep and taking the prednisone in the morning as posted above it could have a positive impact on her that maybe will allow her to function better.  II reviewed the report by pathologist in regard to her dermatology issue and I discussed personally with Teresa Salgado MD,  on the telephone this patient's situation and she agrees with new plan of care that was discussed with her on the telephone.   I also performed on this patient cold agglutinins that were negative, cryoglobulins that were negative and sedimentation rate and LDH that were normal against any inflammatory condition of any nature. I had discussion with Teresa Salgado MD, in regard any alterations in the skin to suggest any parasitic conditions like scabies or things of this nature. Everything was negative in this regard and this is not consistent clinically neither anyway.  On 01/23/2023 the patient was further reviewed in regard to her fatigue and her skin rash. Since the previous visit we started the patient on prednisone initially 10 mg once a day that she could not handle because of overactivity and then subsequently we moved to every other day. The patient has been taking this amount now for almost 6-7 weeks. Since then the pruritus has disappeared and most of the rash of the skin has cleared with the exception of her right lower extremity that I took a picture of  today.  Dermatologist, Teresa Christianson MD, has sent us information about bullous pemphigoid and my belief is probably Dr. Christianson has enough information available to believe that the patient has this condition. She prescribed doxycycline and nicotinic acid. The patient has not started those medicines yet. She raised the question to me if she needs to take them. My advice will be yes and I advised her to use the doxycycline 100 mg a day first and if in a week she feels okay the medicine then initiate the 2nd medication.   The prescribed prednisone that this was since discontinued  Bone marrow being depleted of iron stores may be the culprit of her fatigue  On 07/12/2023 the patient's fatigue has improved since she received IV iron therapy. We are pending to review ferritin, iron profile today and reticulated hemoglobin. The patient's appetite is better and she has variation in foods that she was not having before. We welcome this.  On 10/18/2023, her fatigue is ongoing given the new issue of the pain in the chest wall posteriorly on the right side.        4.  Bone marrow depletion of iron  Bone marrow biopsy 5/9/2023 with pathology noting virtually absent stainable iron identified.  Injectafer planned x2.  Proceed with first dose today  On 07/12/2023 we have iron studies pending today. They were normal before we documented her depletion of bone marrow iron. We will follow her clinically.  On 10/18/2023 I asked her to stop her iron supplementation. At this time, her ferritin and iron profile are back to normal.        5.  Pruritus  Recent evaluation by dermatology with addition of a salve.  She has found this very beneficial and is no longer struggling with significant pruritus  On 07/12/2023 the only area where she has pruritus, there are no skin lesions in the inner aspect of the skin close to her scapula. There are no lesions in the skin in this anatomical site whatsoever. What to do with this, it is impossible for me  to decide. She has proper moisture in this location.  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 No new lesions in the skin pertinent to this. She has areas of seborrheic keratoses with no implications. Previous lesions in the previous visit have been removed by Dermatology.    6.On 10/18/2023, the patient has been reviewed today with pain in the chest wall posteriorly on the right side in absence of any trauma. She does not remember  sneezing, coughing or abrupt movement to trigger the pain. This triggered her admission to the hospital. She was told in the emergency room that she had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

## 2023-10-19 ENCOUNTER — TELEPHONE (OUTPATIENT)
Dept: ONCOLOGY | Facility: CLINIC | Age: 83
End: 2023-10-19
Payer: MEDICARE

## 2023-10-19 LAB
ALBUMIN SERPL ELPH-MCNC: 3.9 G/DL (ref 2.9–4.4)
ALBUMIN/GLOB SERPL: 1.6 {RATIO} (ref 0.7–1.7)
ALPHA1 GLOB SERPL ELPH-MCNC: 0.3 G/DL (ref 0–0.4)
ALPHA2 GLOB SERPL ELPH-MCNC: 0.7 G/DL (ref 0.4–1)
B-GLOBULIN SERPL ELPH-MCNC: 0.8 G/DL (ref 0.7–1.3)
GAMMA GLOB SERPL ELPH-MCNC: 0.7 G/DL (ref 0.4–1.8)
GLOBULIN SER-MCNC: 2.5 G/DL (ref 2.2–3.9)
IGA SERPL-MCNC: 45 MG/DL (ref 64–422)
IGG SERPL-MCNC: 545 MG/DL (ref 586–1602)
IGM SERPL-MCNC: 309 MG/DL (ref 26–217)
INTERPRETATION SERPL IEP-IMP: ABNORMAL
KAPPA LC FREE SER-MCNC: 14.3 MG/L (ref 3.3–19.4)
KAPPA LC FREE/LAMBDA FREE SER: 2.42 {RATIO} (ref 0.26–1.65)
LABORATORY COMMENT REPORT: ABNORMAL
LAMBDA LC FREE SERPL-MCNC: 5.9 MG/L (ref 5.7–26.3)
M PROTEIN SERPL ELPH-MCNC: 0.4 G/DL
PROT SERPL-MCNC: 6.4 G/DL (ref 6–8.5)

## 2023-10-19 NOTE — TELEPHONE ENCOUNTER
Called patient and relayed message vitamin d is low. Advised she supplement with 5000 units/ week for 6 weeks and then 1000 units daily. Pt v/u. Updated med list. Also informed her of fx ribs. Pt v/u. Saba Zuniga RN

## 2023-10-26 NOTE — PROGRESS NOTES
Subjective   Patient ID: Karli Loomis is a 83 y.o. female is here today for follow-up wit xrays done on 8/29/2023. Has pain on right side in the rib area.     History of Present Illness    The patient was seen for consultation in the hospital on August 29 for a T8 compression fracture.  She underwent CT chest imaging for chronic lung issues and there was an incidental finding of an anterior wedge compression of the T8 vertebral body.  She's not had new imaging.     Today, she denies back pain.  She does have a pain on the right side of her ribs that is quite uncomfortable to the point that she can no longer wear a bra.  She denies any problems with falls, balance or gait issues.  She is pending bone density testing and thoracic MRI ordered by her oncologist next week.  She denies any radiating pain in her legs, numbness, tingling or weakness.    She presents unaccompanied.        The following portions of the patient's history were reviewed and updated as appropriate: allergies, current medications, past family history, past medical history, past social history, past surgical history, and problem list.    Review of Systems   Constitutional:  Negative for chills and fever.   Cardiovascular:  Negative for leg swelling.   Gastrointestinal:  Negative for constipation.   Genitourinary:  Negative for difficulty urinating and enuresis.   Musculoskeletal:  Positive for gait problem. Negative for back pain.   Skin:  Negative for wound (redness, swelling, drainage).   Neurological:  Positive for weakness. Negative for numbness and headaches.   Psychiatric/Behavioral:  Negative for sleep disturbance.        /80   Pulse 70   LMP  (LMP Unknown)   SpO2 97%       Objective     Vitals:    10/31/23 1325   BP: 140/80   Pulse: 70   SpO2: 97%     There is no height or weight on file to calculate BMI.    Tobacco Use: Low Risk  (10/31/2023)    Patient History     Smoking Tobacco Use: Never     Smokeless Tobacco Use: Never      Passive Exposure: Not on file          Physical Exam  Vitals reviewed.   Constitutional:       Appearance: Normal appearance.      Comments: Thin, very pleasant older female   HENT:      Head: Atraumatic.   Pulmonary:      Effort: Pulmonary effort is normal.   Musculoskeletal:         General: Tenderness (Right sided rib tenderness, nontender to firm palpation diffuse thoracic and upper lumbar spine) and deformity (Thoracic kyphosis) present.      Comments: Moving all extremities well  Strength intact throughout   Skin:     General: Skin is warm and dry.   Neurological:      Mental Status: She is alert.      GCS: GCS eye subscore is 4. GCS verbal subscore is 5. GCS motor subscore is 6.      Motor: No weakness.      Gait: Gait normal.      Comments: Gait is stable and upright, nonantalgic   Psychiatric:         Mood and Affect: Mood normal.       Neurologic Exam        Assessment & Plan   Independent Review of Radiographic Studies:      I personally reviewed the images from the following studies.    No new imaging    Medical Decision Making:      Returns office today for follow-up of a T8 compression fracture.  Exam as noted above, no red flags.  She was not put in a brace secondary to her severe thoracic kyphosis.  Fortunately, she is really not having any back pain and is nontender to firm palpation at the T8 bony prominence.  The pain that she is having is in the right rib area and it is very tender to minimal palpation.  She is pending thoracic MRI imaging next week ordered by her oncologist, as well as bone density scans.  As long as the thoracic MRIs appear stable and considering that she is not having much pain, I think we can see her back on his as-needed basis going forward.  She has a very difficult time getting to doctors appointments as she no longer drives and is dependent on her adult children, both of whom have full-time jobs, to get to appointments.  If her symptoms change or worsen regarding her back  pain, she will let us know.  If she has any new problems or concerns she will call us anytime.  We will wait for the thoracic MRI results, but as long as they are stable we will see her back as needed.  The patient is agreeable to this plan.    Plan: Pending thoracic MRI next week, if stable, return to office as needed    Diagnoses and all orders for this visit:    1. Compression fracture of T8 vertebra, subsequent encounter (Primary)      Return if symptoms worsen or fail to improve.

## 2023-10-31 ENCOUNTER — OFFICE VISIT (OUTPATIENT)
Dept: NEUROSURGERY | Facility: CLINIC | Age: 83
End: 2023-10-31
Payer: MEDICARE

## 2023-10-31 VITALS — SYSTOLIC BLOOD PRESSURE: 140 MMHG | OXYGEN SATURATION: 97 % | DIASTOLIC BLOOD PRESSURE: 80 MMHG | HEART RATE: 70 BPM

## 2023-10-31 DIAGNOSIS — S22.060A COMPRESSION FRACTURE OF T8 VERTEBRA, INITIAL ENCOUNTER: Primary | ICD-10-CM

## 2023-10-31 PROCEDURE — 3079F DIAST BP 80-89 MM HG: CPT | Performed by: NURSE PRACTITIONER

## 2023-10-31 PROCEDURE — 1160F RVW MEDS BY RX/DR IN RCRD: CPT | Performed by: NURSE PRACTITIONER

## 2023-10-31 PROCEDURE — 1159F MED LIST DOCD IN RCRD: CPT | Performed by: NURSE PRACTITIONER

## 2023-10-31 PROCEDURE — 3077F SYST BP >= 140 MM HG: CPT | Performed by: NURSE PRACTITIONER

## 2023-10-31 PROCEDURE — 99213 OFFICE O/P EST LOW 20 MIN: CPT | Performed by: NURSE PRACTITIONER

## 2023-11-08 ENCOUNTER — HOSPITAL ENCOUNTER (OUTPATIENT)
Dept: MRI IMAGING | Facility: HOSPITAL | Age: 83
Discharge: HOME | End: 2023-11-08
Admitting: INTERNAL MEDICINE
Payer: MEDICARE

## 2023-11-08 ENCOUNTER — HOSPITAL ENCOUNTER (OUTPATIENT)
Dept: NUCLEAR MEDICINE | Facility: HOSPITAL | Age: 83
Discharge: HOME | End: 2023-11-08
Payer: MEDICARE

## 2023-11-08 ENCOUNTER — HOSPITAL ENCOUNTER (OUTPATIENT)
Dept: BONE DENSITY | Facility: HOSPITAL | Age: 83
Discharge: HOME | End: 2023-11-08
Payer: MEDICARE

## 2023-11-08 ENCOUNTER — HOSPITAL ENCOUNTER (OUTPATIENT)
Dept: GENERAL RADIOLOGY | Facility: HOSPITAL | Age: 83
Discharge: HOME | End: 2023-11-08
Payer: MEDICARE

## 2023-11-08 DIAGNOSIS — Z17.0 MALIGNANT NEOPLASM OF OVERLAPPING SITES OF LEFT BREAST IN FEMALE, ESTROGEN RECEPTOR POSITIVE: ICD-10-CM

## 2023-11-08 DIAGNOSIS — C50.812 MALIGNANT NEOPLASM OF OVERLAPPING SITES OF LEFT BREAST IN FEMALE, ESTROGEN RECEPTOR POSITIVE: ICD-10-CM

## 2023-11-08 DIAGNOSIS — C88.0 MACROGLOBULINEMIA OF WALDENSTROM: ICD-10-CM

## 2023-11-08 DIAGNOSIS — M80.08XA PATHOLOGICAL FRACTURE OF VERTEBRA DUE TO OSTEOPOROSIS, UNSPECIFIED OSTEOPOROSIS TYPE, INITIAL ENCOUNTER: ICD-10-CM

## 2023-11-08 DIAGNOSIS — L12.0 BULLOUS PEMPHIGOID: ICD-10-CM

## 2023-11-08 DIAGNOSIS — S22.060A COMPRESSION FRACTURE OF T8 VERTEBRA, INITIAL ENCOUNTER: ICD-10-CM

## 2023-11-08 DIAGNOSIS — S22.41XA CLOSED FRACTURE OF MULTIPLE RIBS OF RIGHT SIDE, INITIAL ENCOUNTER: ICD-10-CM

## 2023-11-08 PROCEDURE — 72072 X-RAY EXAM THORAC SPINE 3VWS: CPT

## 2023-11-08 PROCEDURE — A9503 TC99M MEDRONATE: HCPCS | Performed by: INTERNAL MEDICINE

## 2023-11-08 PROCEDURE — A9577 INJ MULTIHANCE: HCPCS | Performed by: INTERNAL MEDICINE

## 2023-11-08 PROCEDURE — 72157 MRI CHEST SPINE W/O & W/DYE: CPT

## 2023-11-08 PROCEDURE — 78306 BONE IMAGING WHOLE BODY: CPT

## 2023-11-08 PROCEDURE — 77080 DXA BONE DENSITY AXIAL: CPT

## 2023-11-08 PROCEDURE — 0 TECHNETIUM MEDRONATE KIT: Performed by: INTERNAL MEDICINE

## 2023-11-08 PROCEDURE — 0 GADOBENATE DIMEGLUMINE 529 MG/ML SOLUTION: Performed by: INTERNAL MEDICINE

## 2023-11-08 RX ORDER — TC 99M MEDRONATE 20 MG/10ML
20.2 INJECTION, POWDER, LYOPHILIZED, FOR SOLUTION INTRAVENOUS
Status: COMPLETED | OUTPATIENT
Start: 2023-11-08 | End: 2023-11-08

## 2023-11-08 RX ADMIN — GADOBENATE DIMEGLUMINE 12 ML: 529 INJECTION, SOLUTION INTRAVENOUS at 15:16

## 2023-11-08 RX ADMIN — Medication 20.2 MILLICURIE: at 11:03

## 2023-11-08 NOTE — TELEPHONE ENCOUNTER
Patient called stating that her allergy doctor says the rash all over is caused by her medication, amlodipine. Patient states she would like to try something else.       Please Advise    Writer GISELLE for patient offering appointment. Please schedule upon call back (see instructions below).

## 2023-11-13 ENCOUNTER — OFFICE VISIT (OUTPATIENT)
Dept: ONCOLOGY | Facility: CLINIC | Age: 83
End: 2023-11-13
Payer: MEDICARE

## 2023-11-13 ENCOUNTER — LAB (OUTPATIENT)
Dept: LAB | Facility: HOSPITAL | Age: 83
End: 2023-11-13
Payer: MEDICARE

## 2023-11-13 VITALS
TEMPERATURE: 98.4 F | OXYGEN SATURATION: 99 % | HEART RATE: 80 BPM | DIASTOLIC BLOOD PRESSURE: 78 MMHG | WEIGHT: 126.8 LBS | HEIGHT: 66 IN | BODY MASS INDEX: 20.38 KG/M2 | SYSTOLIC BLOOD PRESSURE: 132 MMHG

## 2023-11-13 DIAGNOSIS — C50.812 MALIGNANT NEOPLASM OF OVERLAPPING SITES OF LEFT BREAST IN FEMALE, ESTROGEN RECEPTOR POSITIVE: ICD-10-CM

## 2023-11-13 DIAGNOSIS — Z17.0 MALIGNANT NEOPLASM OF OVERLAPPING SITES OF LEFT BREAST IN FEMALE, ESTROGEN RECEPTOR POSITIVE: Primary | ICD-10-CM

## 2023-11-13 DIAGNOSIS — M80.00XA AGE-RELATED OSTEOPOROSIS WITH CURRENT PATHOLOGICAL FRACTURE, INITIAL ENCOUNTER: ICD-10-CM

## 2023-11-13 DIAGNOSIS — C50.812 MALIGNANT NEOPLASM OF OVERLAPPING SITES OF LEFT BREAST IN FEMALE, ESTROGEN RECEPTOR POSITIVE: Primary | ICD-10-CM

## 2023-11-13 DIAGNOSIS — Z17.0 MALIGNANT NEOPLASM OF OVERLAPPING SITES OF LEFT BREAST IN FEMALE, ESTROGEN RECEPTOR POSITIVE: ICD-10-CM

## 2023-11-13 LAB
ALBUMIN SERPL-MCNC: 4.3 G/DL (ref 3.5–5.2)
ALBUMIN/GLOB SERPL: 2.2 G/DL
ALP SERPL-CCNC: 88 U/L (ref 39–117)
ALT SERPL W P-5'-P-CCNC: 11 U/L (ref 1–33)
ANION GAP SERPL CALCULATED.3IONS-SCNC: 6.9 MMOL/L (ref 5–15)
AST SERPL-CCNC: 25 U/L (ref 1–32)
BASOPHILS # BLD AUTO: 0.03 10*3/MM3 (ref 0–0.2)
BASOPHILS NFR BLD AUTO: 0.8 % (ref 0–1.5)
BILIRUB SERPL-MCNC: 0.6 MG/DL (ref 0–1.2)
BUN SERPL-MCNC: 6 MG/DL (ref 8–23)
BUN/CREAT SERPL: 7.9 (ref 7–25)
CALCIUM SPEC-SCNC: 9.7 MG/DL (ref 8.6–10.5)
CHLORIDE SERPL-SCNC: 95 MMOL/L (ref 98–107)
CO2 SERPL-SCNC: 29.1 MMOL/L (ref 22–29)
CREAT SERPL-MCNC: 0.76 MG/DL (ref 0.6–1.1)
DEPRECATED RDW RBC AUTO: 45.7 FL (ref 37–54)
EGFRCR SERPLBLD CKD-EPI 2021: 77.4 ML/MIN/1.73
EOSINOPHIL # BLD AUTO: 0.28 10*3/MM3 (ref 0–0.4)
EOSINOPHIL NFR BLD AUTO: 7.8 % (ref 0.3–6.2)
ERYTHROCYTE [DISTWIDTH] IN BLOOD BY AUTOMATED COUNT: 12.4 % (ref 12.3–15.4)
GLOBULIN UR ELPH-MCNC: 2 GM/DL
GLUCOSE SERPL-MCNC: 85 MG/DL (ref 65–99)
HCT VFR BLD AUTO: 42.8 % (ref 34–46.6)
HGB BLD-MCNC: 14.8 G/DL (ref 12–15.9)
IMM GRANULOCYTES # BLD AUTO: 0.01 10*3/MM3 (ref 0–0.05)
IMM GRANULOCYTES NFR BLD AUTO: 0.3 % (ref 0–0.5)
LYMPHOCYTES # BLD AUTO: 1.07 10*3/MM3 (ref 0.7–3.1)
LYMPHOCYTES NFR BLD AUTO: 29.8 % (ref 19.6–45.3)
MCH RBC QN AUTO: 34.5 PG (ref 26.6–33)
MCHC RBC AUTO-ENTMCNC: 34.6 G/DL (ref 31.5–35.7)
MCV RBC AUTO: 99.8 FL (ref 79–97)
MONOCYTES # BLD AUTO: 0.32 10*3/MM3 (ref 0.1–0.9)
MONOCYTES NFR BLD AUTO: 8.9 % (ref 5–12)
NEUTROPHILS NFR BLD AUTO: 1.88 10*3/MM3 (ref 1.7–7)
NEUTROPHILS NFR BLD AUTO: 52.4 % (ref 42.7–76)
NRBC BLD AUTO-RTO: 0 /100 WBC (ref 0–0.2)
PLATELET # BLD AUTO: 104 10*3/MM3 (ref 140–450)
PMV BLD AUTO: 8.8 FL (ref 6–12)
POTASSIUM SERPL-SCNC: 4.1 MMOL/L (ref 3.5–5.2)
PROT SERPL-MCNC: 6.3 G/DL (ref 6–8.5)
RBC # BLD AUTO: 4.29 10*6/MM3 (ref 3.77–5.28)
SODIUM SERPL-SCNC: 131 MMOL/L (ref 136–145)
WBC NRBC COR # BLD: 3.59 10*3/MM3 (ref 3.4–10.8)

## 2023-11-13 PROCEDURE — 36415 COLL VENOUS BLD VENIPUNCTURE: CPT

## 2023-11-13 PROCEDURE — 80053 COMPREHEN METABOLIC PANEL: CPT

## 2023-11-13 PROCEDURE — 84100 ASSAY OF PHOSPHORUS: CPT | Performed by: INTERNAL MEDICINE

## 2023-11-13 PROCEDURE — 85025 COMPLETE CBC W/AUTO DIFF WBC: CPT

## 2023-11-13 PROCEDURE — 83735 ASSAY OF MAGNESIUM: CPT | Performed by: INTERNAL MEDICINE

## 2023-11-13 NOTE — PROGRESS NOTES
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 Typically, Waldenstrom does not produce bone disease but we never know after having this condition for so long.     I advised her to take Tylenol Arthritis 1 tablet t.i.d. She cannot handle narcotic medications and I asked her daughter to apply Salonpas patch in the chest wall posteriorly on the right side.     I will review her back in a couple of weeks after all this radiological assessment is done. I also requested a vitamin D level and a magnesium and phosphorus level today.    On 11/13/2023, the patient has had obvious radiological assessment of her spine, bone scan and bone density. The conclusion of this is that she has osteoporosis, collapse of T8 and T11 as well as fracture of the right rib, 11 on the right side that triggered the pain that she had in the hospital not too long ago. These areas are healing. The question that I have, given the degree of osteoporosis and collapse of vertebra, she could be a candidate to have a kyphoplasty that in the long run could be giving her some more permanent relief of her pain and discomfort. I went ahead and made a referral to neurosurgery for this purpose. If that is the case, we would for the neurosurgeon who does the kyphoplasty  to take us a bone marrow sample at that time. 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        Now that we know on 11/13/2023 that the patient has osteoporosis by bone density and she has had fracture of vertebras and fracture of ribs, the patient has been advised to proceed with Prolia. She has not seen a dentist in a long time. Her mouth examination to me is very benign besides minimal periodontal disease. She has no obvious decay, roots or anything that would suggest immediate need for dental appointment and visit. I think it will be perfectly safe for her to proceed with Prolia. I asked her to eventually consider being seen by her dentist. She has not seen the dentist since COVID time.     I will review the patient back in a few months, 3 to be precise with a CBC, CMP and a serum protein immunoelectrophoresis and a ferritin and iron profile and reticulated hemoglobin.     The patient will be brought back to the office for her Prolia injection. In anticipation of that I went ahead and requested a vitamin D level and a magnesium and a phosphorus level. I requested the patient to do as much walking as she can.        I spent 1 hour of my time with this patient today reviewing all the previous notes and the laboratory assessment and radiological assessment done on her recently.      Florencio Larose MD   11/13/2023      CC:

## 2023-11-14 PROBLEM — M80.00XA AGE-RELATED OSTEOPOROSIS WITH CURRENT PATHOLOGICAL FRACTURE: Status: ACTIVE | Noted: 2023-11-14

## 2023-11-14 LAB
MAGNESIUM SERPL-MCNC: 2 MG/DL (ref 1.6–2.4)
PHOSPHATE SERPL-MCNC: 2.8 MG/DL (ref 2.5–4.5)

## 2023-11-21 ENCOUNTER — LAB (OUTPATIENT)
Dept: LAB | Facility: HOSPITAL | Age: 83
End: 2023-11-21
Payer: MEDICARE

## 2023-11-21 ENCOUNTER — INFUSION (OUTPATIENT)
Dept: ONCOLOGY | Facility: HOSPITAL | Age: 83
End: 2023-11-21
Payer: MEDICARE

## 2023-11-21 DIAGNOSIS — M80.00XA AGE-RELATED OSTEOPOROSIS WITH CURRENT PATHOLOGICAL FRACTURE, INITIAL ENCOUNTER: Primary | ICD-10-CM

## 2023-11-21 PROCEDURE — 96372 THER/PROPH/DIAG INJ SC/IM: CPT

## 2023-11-21 PROCEDURE — 25010000002 DENOSUMAB 60 MG/ML SOLUTION PREFILLED SYRINGE: Performed by: INTERNAL MEDICINE

## 2023-11-21 RX ADMIN — DENOSUMAB 60 MG: 60 INJECTION SUBCUTANEOUS at 11:05

## 2023-11-22 ENCOUNTER — OFFICE VISIT (OUTPATIENT)
Dept: INTERNAL MEDICINE | Facility: CLINIC | Age: 83
End: 2023-11-22
Payer: MEDICARE

## 2023-11-22 VITALS
HEIGHT: 66 IN | OXYGEN SATURATION: 97 % | SYSTOLIC BLOOD PRESSURE: 182 MMHG | HEART RATE: 82 BPM | BODY MASS INDEX: 20.79 KG/M2 | WEIGHT: 129.4 LBS | DIASTOLIC BLOOD PRESSURE: 84 MMHG

## 2023-11-22 DIAGNOSIS — I10 ESSENTIAL HYPERTENSION: Chronic | ICD-10-CM

## 2023-11-22 DIAGNOSIS — R56.9 SEIZURES: ICD-10-CM

## 2023-11-22 DIAGNOSIS — E03.9 ADULT HYPOTHYROIDISM: Primary | Chronic | ICD-10-CM

## 2023-11-22 DIAGNOSIS — S22.060S COMPRESSION FRACTURE OF T8 VERTEBRA, SEQUELA: ICD-10-CM

## 2023-11-22 DIAGNOSIS — Z79.899 HIGH RISK MEDICATION USE: ICD-10-CM

## 2023-11-22 NOTE — PROGRESS NOTES
Bear Reis M.D.  Internal Medicine  [facility]  [address], [address]  [place] [address]  [phone]      Chief Complaint  Establish Care, Hypertension, and Back Pain    SUBJECTIVE    History of Present Illness    Karli Loomis is a 83 y.o. female with History of Waldenstrom macroglobulinemia and history of breast cancer who presents to the office today as a new patient to establish care.     History of Waldenstrom macroglobulinemia and History of breast cancer stage I on the left, status post mastectomy. The patient completed aromatase inhibitor.  Follows with Dr. Larose.     Osteoporosis-history of rib fracture.  Her oncologist recommended Prolia.  She follows with neurosurgery for compression fracture.States they are planning a procedure. Previously took Tylenol but does not like medicine. Can't wear a bra due to fracuture. No pain with breath.     Follows with Dr. Hernández for cardio oncology and aortic insufficiency, diastolic dysfunction    She reported a rash for several years and felt that it was related to multiple medications. Despite stopping medications, her rash continued. 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     Hypertension-on hydralazine 3 times daily and losartan 100 mg. Does not monitor BP at hoime. Denies symtpoms. Reports good adherance. Allergic to amlodipine and HCTZ made her hyponatremic.     Follows with derm for history of skin cancers and autooimmune rash.     Tremor runs in her family. Her tremor is getting better with age.     Hypothyroidism on replacement.     On  gabapentin for seizures. No seizures for years. Goes into a blind stare. Shakes and can't think if she stops.     Social-She lives with her son. Used to do housework but less since back injury.     Review of Systems    Allergies   Allergen Reactions    Cortisone Unknown - High Severity     Reports having a shot years ago, and wonders if he hit a vein. She was  told by another doctor that it probably went in her blood stream.     Hytrin [Terazosin] Rash    Amlodipine Rash    Darvon  [Propoxyphene] Palpitations        Outpatient Medications Marked as Taking for the 11/22/23 encounter (Office Visit) with Bear Reis MD   Medication Sig Dispense Refill    aspirin 81 MG tablet Take 1 tablet by mouth Daily.      betamethasone, augmented, (DIPROLENE) 0.05 % ointment       budesonide-formoterol (Symbicort) 80-4.5 MCG/ACT inhaler Inhale 2 puffs 2 (Two) Times a Day. 1 each 0    cetirizine (zyrTEC) 10 MG tablet Take 1 tablet by mouth Daily.      gabapentin (NEURONTIN) 400 MG capsule TAKE 1 CAPSULE FOUR TIMES DAILY 360 capsule 1    hydrALAZINE (APRESOLINE) 50 MG tablet TAKE 1 TABLET THREE TIMES DAILY 270 tablet 2    levothyroxine (SYNTHROID, LEVOTHROID) 50 MCG tablet Take 1 tablet by mouth Daily. 90 tablet 1    losartan (COZAAR) 100 MG tablet Take 1 tablet by mouth Daily. 90 tablet 3    mupirocin (BACTROBAN) 2 % ointment       niacinamide 500 MG tablet Take 1 tablet by mouth 3 (Three) Times a Day.          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    Epilepsy with simple partial seizures 12/14/2017    Headache, migraine 12/14/2017    History of transfusion of packed red blood cells     R/T SURGERY    Hyperlipidemia     Hypertension     Malignant neoplasm of overlapping sites of left breast in female, estrogen receptor positive 04/13/2016    Nonrheumatic aortic valve insufficiency     Primary osteoarthritis of left knee 07/22/2015    Skin cancer     Thyroid nodule     Removed more than 35 years ago    TIA (transient ischemic attack) 10/18/2018    UTI due to extended-spectrum beta lactamase (ESBL) producing Escherichia coli 01/06/2019    Jose A  "macroglobulinemia      Past Surgical History:   Procedure Laterality Date    ADENOIDECTOMY      APPENDECTOMY      CARPAL TUNNEL RELEASE Right     CATARACT EXTRACTION Bilateral     CHOLECYSTECTOMY      COLONOSCOPY      HYSTERECTOMY      Partial, many years ago, heavy bleeding, no cancer    KNEE ARTHROSCOPY Right 03/2009    Finding of chondromalacia and appropriate cleanup of joint was performed by Dr. Moraes.    MASTECTOMY Left 07/2014    REPLACEMENT TOTAL KNEE Right 12/2015    SKIN CANCER EXCISION      several    TONSILLECTOMY       Family History   Problem Relation Age of Onset    Mental illness Mother     Migraines Mother     Dementia Mother     Heart disease Father     Hypertension Father     Kidney disease Father     Alcohol abuse Father     No Known Problems Daughter     No Known Problems Daughter     Breast cancer Other     Heart disease Other     Hypertension Other     Diabetes Other     Cancer Maternal Grandmother     Breast cancer Maternal Grandmother     No Known Problems Maternal Grandfather     No Known Problems Paternal Grandmother     No Known Problems Paternal Grandfather     Hyperlipidemia Son     Lymphoma Neg Hx     Leukemia Neg Hx     reports that she has never smoked. She has never used smokeless tobacco. She reports that she does not drink alcohol and does not use drugs.    OBJECTIVE    Vital Signs:   BP (!) 182/84   Pulse 82   Ht 167.6 cm (65.98\")   Wt 58.7 kg (129 lb 6.4 oz)   SpO2 97%   BMI 20.90 kg/m²     Physical Exam  Constitutional:       Appearance: Normal appearance. She is normal weight.   Cardiovascular:      Rate and Rhythm: Normal rate and regular rhythm.      Heart sounds: Normal heart sounds. No murmur heard.  Pulmonary:      Effort: Pulmonary effort is normal.      Breath sounds: Normal breath sounds.   Abdominal:      General: Abdomen is flat. There is no distension.      Palpations: Abdomen is soft.      Tenderness: There is no abdominal tenderness.   Musculoskeletal:      " Right lower leg: No edema.      Left lower leg: No edema.   Skin:     General: Skin is warm and dry.   Neurological:      Mental Status: She is alert.   Psychiatric:         Mood and Affect: Mood normal.         Behavior: Behavior normal.         Thought Content: Thought content normal.            The following data was reviewed by: Bear Reis MD on 11/22/2023:  CMP          8/29/2023    05:04 10/18/2023    11:12 11/13/2023    08:57   CMP   Glucose 97  92  85    BUN 7  7  6    Creatinine 0.64  0.64  0.76    EGFR 87.3  87.3  77.4    Sodium 132  131  131    Potassium 4.4     4.4  4.8  4.1    Chloride 98  93  95    Calcium 9.0  9.4  9.7    Total Protein  6.4     Total Protein  6.0  6.3    Albumin  4.0     3.9  4.3    Globulin  2.5     Globulin  2.0  2.0    Total Bilirubin  0.9  0.6    Alkaline Phosphatase  98  88    AST (SGOT)  28  25    ALT (SGPT)  11  11    Albumin/Globulin Ratio  2.0  2.2    BUN/Creatinine Ratio 10.9  10.9  7.9    Anion Gap 10.9  13.7  6.9      CBC w/diff          8/29/2023    05:04 10/18/2023    11:12 11/13/2023    08:57   CBC w/Diff   WBC 4.23  3.55  3.59    RBC 4.02  4.14  4.29    Hemoglobin 13.7  14.5  14.8    Hematocrit 38.8  41.0  42.8    MCV 96.5  99.0  99.8    MCH 34.1  35.0  34.5    MCHC 35.3  35.4  34.6    RDW 12.3  12.2  12.4    Platelets 133  122  104    Neutrophil Rel %  52.4  52.4    Immature Granulocyte Rel %  0.3  0.3    Lymphocyte Rel %  31.8  29.8    Monocyte Rel %  8.5  8.9    Eosinophil Rel %  6.2  7.8    Basophil Rel %  0.8  0.8      Lipid Panel          11/22/2023    14:48   Lipid Panel   Total Cholesterol 176    Triglycerides 84    HDL Cholesterol 84    VLDL Cholesterol 15    LDL Cholesterol  77    LDL/HDL Ratio 0.9      TSH          11/22/2023    14:48   TSH   TSH 1.880        Data reviewed : recent oncology notes              ASSESSMENT & PLAN     Diagnoses and all orders for this visit:    1. Adult hypothyroidism (Primary)  -     TSH Rfx On Abnormal To Free T4    2.  Essential hypertension  -     Lipid Panel With LDL / HDL Ratio    3. High risk medication use  -     Urine Drug Screen - Urine, Clean Catch    4. Seizures  -     Urine Drug Screen - Urine, Clean Catch    5. Compression fracture of T8 vertebra, sequela        Can take tylenol, lidocaine patch and diclofenac gel for rib/back pain.     BP quite elevated today. It has been normal recent appointments and at home. She endorses white coat hypertension. She was instructed to check BP at home and call if elevated.     Continue gabapentin for seizures. [place] reviewed today and appropriate.    There are no preventive care reminders to display for this patient.       Follow Up  Return in about 4 weeks (around 12/20/2023) for Recheck.    Patient/family had no further questions at this time and verbalized understanding of the plan discussed today.

## 2023-11-22 NOTE — PATIENT INSTRUCTIONS
Patient was asked to check their BP 3-5 times weekly at various times of day after being seated for 5 minutes or longer. Discussed that goal blood pressure is less than 140 systolic and less than 90 diastolic. Asked patient to bring log to next visit.     Call if greater than 180/110.

## 2023-11-24 LAB
AMPHETAMINES UR QL SCN: NEGATIVE NG/ML
BARBITURATES UR QL SCN: NEGATIVE NG/ML
BENZODIAZ UR QL SCN: NEGATIVE NG/ML
BZE UR QL SCN: NEGATIVE NG/ML
CANNABINOIDS UR QL SCN: NEGATIVE NG/ML
CHOLEST SERPL-MCNC: 176 MG/DL (ref 100–199)
CREAT UR-MCNC: 71.6 MG/DL (ref 20–300)
HDLC SERPL-MCNC: 84 MG/DL
LABORATORY COMMENT REPORT: NORMAL
LDLC SERPL CALC-MCNC: 77 MG/DL (ref 0–99)
LDLC/HDLC SERPL: 0.9 RATIO (ref 0–3.2)
METHADONE UR QL SCN: NEGATIVE NG/ML
OPIATES UR QL SCN: NEGATIVE NG/ML
OXYCODONE+OXYMORPHONE UR QL SCN: NEGATIVE NG/ML
PCP UR QL: NEGATIVE NG/ML
PH UR: 6.6 [PH] (ref 4.5–8.9)
PROPOXYPH UR QL SCN: NEGATIVE NG/ML
TRIGL SERPL-MCNC: 84 MG/DL (ref 0–149)
TSH SERPL DL<=0.005 MIU/L-ACNC: 1.88 UIU/ML (ref 0.45–4.5)
VLDLC SERPL CALC-MCNC: 15 MG/DL (ref 5–40)

## 2023-11-29 NOTE — PROGRESS NOTES
Subjective   Patient ID: Karli Loomis is a 83 y.o. female is being seen for consultation today at the request of Florencio Larose MD for Age-related osteoporosis with current pathological fracture/T9 compression fracture. Patient had MRI of thoracic spine, bone scan, and DEXA scan performed on 11/8/2023.    History of Present Illness  83-year-old female with history of breast cancer and thoracic pain for 2 months with subsequent workup revealing a vertebral compression fracture at T8 that may be metastatic.  Patient does have underlying osteoporosis, but is a non-smoker.  She has hypertension which is treated with antihypertensive medications and is also on a baby aspirin for her cerebrovascular risk factors.  Her pain started in August and has improved since then but still present.  There is no radiation into the lower extremities or or buttock region.  Her imaging demonstrated a T8 compression fracture on the MRI with increased uptake on the bone scan indicating an acute to subacute fracture.    The following portions of the patient's history were reviewed and updated as appropriate: She  has a past medical history of Acid reflux, APC (atrial premature contractions) (05/25/2022), Chronic pansinusitis (07/12/2018), Clostridium difficile colitis, Drug-induced polyneuropathy (12/14/2017), Epilepsy with simple partial seizures (12/14/2017), Headache, migraine (12/14/2017), History of transfusion of packed red blood cells, Hyperlipidemia, Hypertension, Malignant neoplasm of overlapping sites of left breast in female, estrogen receptor positive (04/13/2016), Nonrheumatic aortic valve insufficiency, Primary osteoarthritis of left knee (07/22/2015), Skin cancer, Thyroid nodule, TIA (transient ischemic attack) (10/18/2018), UTI due to extended-spectrum beta lactamase (ESBL) producing Escherichia coli (01/06/2019), and Waldenstrom macroglobulinemia.  She does not have any pertinent problems on file.  She  has a past  surgical history that includes Replacement total knee (Right, 12/2015); Mastectomy (Left, 07/2014); Cholecystectomy; Appendectomy; Adenoidectomy; Tonsillectomy; Hysterectomy; Colonoscopy; Knee arthroscopy (Right, 03/2009); Cataract extraction (Bilateral); Carpal tunnel release (Right); and Skin cancer excision.  Her family history includes Alcohol abuse in her father; Breast cancer in her maternal grandmother and another family member; Cancer in her maternal grandmother; Dementia in her mother; Diabetes in an other family member; Heart disease in her father and another family member; Hyperlipidemia in her son; Hypertension in her father and another family member; Kidney disease in her father; Mental illness in her mother; Migraines in her mother; No Known Problems in her daughter, daughter, maternal grandfather, paternal grandfather, and paternal grandmother.  She  reports that she has never smoked. She has never used smokeless tobacco. She reports that she does not drink alcohol and does not use drugs.  Current Outpatient Medications   Medication Sig Dispense Refill   • aspirin 81 MG tablet Take 1 tablet by mouth Daily.     • betamethasone, augmented, (DIPROLENE) 0.05 % ointment      • budesonide-formoterol (Symbicort) 80-4.5 MCG/ACT inhaler Inhale 2 puffs 2 (Two) Times a Day. 1 each 0   • cetirizine (zyrTEC) 10 MG tablet Take 1 tablet by mouth Daily.     • gabapentin (NEURONTIN) 400 MG capsule TAKE 1 CAPSULE FOUR TIMES DAILY 360 capsule 1   • hydrALAZINE (APRESOLINE) 50 MG tablet TAKE 1 TABLET THREE TIMES DAILY 270 tablet 2   • levothyroxine (SYNTHROID, LEVOTHROID) 50 MCG tablet Take 1 tablet by mouth Daily. 90 tablet 1   • losartan (COZAAR) 100 MG tablet Take 1 tablet by mouth Daily. 90 tablet 3   • mupirocin (BACTROBAN) 2 % ointment      • niacinamide 500 MG tablet Take 1 tablet by mouth 3 (Three) Times a Day.       No current facility-administered medications for this visit.     Current Outpatient Medications  "on File Prior to Visit   Medication Sig   • aspirin 81 MG tablet Take 1 tablet by mouth Daily.   • betamethasone, augmented, (DIPROLENE) 0.05 % ointment    • budesonide-formoterol (Symbicort) 80-4.5 MCG/ACT inhaler Inhale 2 puffs 2 (Two) Times a Day.   • cetirizine (zyrTEC) 10 MG tablet Take 1 tablet by mouth Daily.   • gabapentin (NEURONTIN) 400 MG capsule TAKE 1 CAPSULE FOUR TIMES DAILY   • hydrALAZINE (APRESOLINE) 50 MG tablet TAKE 1 TABLET THREE TIMES DAILY   • levothyroxine (SYNTHROID, LEVOTHROID) 50 MCG tablet Take 1 tablet by mouth Daily.   • losartan (COZAAR) 100 MG tablet Take 1 tablet by mouth Daily.   • mupirocin (BACTROBAN) 2 % ointment    • niacinamide 500 MG tablet Take 1 tablet by mouth 3 (Three) Times a Day.     No current facility-administered medications on file prior to visit.     She is allergic to cortisone, hytrin [terazosin], amlodipine, and darvon  [propoxyphene]..    Review of Systems   Constitutional:  Negative for fatigue.   Eyes:  Negative for visual disturbance.   Respiratory:  Positive for shortness of breath.    Gastrointestinal:  Negative for nausea and vomiting.   Musculoskeletal:  Positive for back pain. Negative for gait problem, neck pain and neck stiffness.   Neurological:  Positive for weakness. Negative for dizziness, syncope, light-headedness, numbness and headaches.   Psychiatric/Behavioral:  Negative for confusion, decreased concentration and sleep disturbance. The patient is nervous/anxious.        Objective     Vitals:    12/07/23 1103   BP: 164/80   Pulse: 72   Resp: 20   SpO2: 100%   Weight: 58.1 kg (128 lb)   Height: 167.6 cm (65.98\")     Body mass index is 20.67 kg/m².    Tobacco Use: Low Risk  (12/7/2023)    Patient History    • Smoking Tobacco Use: Never    • Smokeless Tobacco Use: Never    • Passive Exposure: Not on file          Physical Exam  Vitals and nursing note reviewed.   Constitutional:       General: She is in acute distress.   Cardiovascular:      Rate " and Rhythm: Normal rate.   Pulmonary:      Effort: Pulmonary effort is normal.   Musculoskeletal:         General: Tenderness present.      Cervical back: Normal range of motion.   Skin:     General: Skin is warm and dry.   Neurological:      General: No focal deficit present.      Mental Status: She is alert and oriented to person, place, and time.      Cranial Nerves: No cranial nerve deficit.      Sensory: No sensory deficit.      Motor: No weakness.      Coordination: Coordination normal.   Neurologic Exam     Mental Status   Oriented to person, place, and time.         Assessment & Plan   Independent Review of Radiographic Studies:      I personally reviewed the images from the following studies.    The MRI of the thoracic spine with and without contrast dated 2023 as well as the bone scan dated the same date were both reviewed with the patient and show the T8 compression fracture with increased tracer activity representing an acute compression fracture, however in light of her underlying previous left breast cancer, metastatic disease could not be excluded.    Medical Decision Makin-year-old female with history of breast cancer who developed a compression fracture at T8 with no known injury.  While she does have osteoporosis, lesion may represent a metastatic focus with subsequent pathologic fracture.  A kyphoplasty with biopsy has been requested and can be performed with a RFA ablation.  The risks, alternatives and procedure were reviewed with the patient and the procedure will be performed under general anesthesia.  Patient's questions were answered and she wishes to proceed with the outpatient procedure.    Diagnoses and all orders for this visit:    1. Essential hypertension (Primary)    2. Malignant neoplasm of overlapping sites of left breast in female, estrogen receptor positive    3. Age-related osteoporosis with current pathological fracture with delayed healing, subsequent  encounter    4. Compression fracture of T8 vertebra, sequela      Return in about 1 week (around 12/14/2023) for Kyphoplasty T8.

## 2023-11-30 ENCOUNTER — TELEPHONE (OUTPATIENT)
Dept: ONCOLOGY | Facility: CLINIC | Age: 83
End: 2023-11-30
Payer: MEDICARE

## 2023-11-30 NOTE — TELEPHONE ENCOUNTER
Caller: Mandie Loomis    Relationship: Self    Best call back number: [phone]    What is the best time to reach you: ANY    Who are you requesting to speak with (clinical staff, provider,  specific staff member): SCHEDULING/VIVI        What was the call regarding: PATIENT CALLED TO SPEAK TO VIVI IN SCHEDULING.  MANDIE IS CALLING ABOUT A NEUROSURGERY REFERRAL AND MANDIE WANTED TO KNOW IF IT WAS OK FOR HER TO SEE DR BRIZUELA ON 12/06/23 EVEN THOUGH THAT WASN'T WHO DR BURNETTE ORIGINALLY RECOMMENDED . MANDIE INDICATED THAT WHEN NEURO CALLED HER TO SCHEDULE THAT THE PROVIDER THAT DR BURNETTE RECOMMENDED WAS UNAVAILABLE.     Is it okay if the provider responds through MyChart: NO

## 2023-12-07 ENCOUNTER — OFFICE VISIT (OUTPATIENT)
Dept: NEUROSURGERY | Facility: CLINIC | Age: 83
End: 2023-12-07
Payer: MEDICARE

## 2023-12-07 VITALS
SYSTOLIC BLOOD PRESSURE: 164 MMHG | HEIGHT: 66 IN | OXYGEN SATURATION: 100 % | HEART RATE: 72 BPM | RESPIRATION RATE: 20 BRPM | WEIGHT: 128 LBS | BODY MASS INDEX: 20.57 KG/M2 | DIASTOLIC BLOOD PRESSURE: 80 MMHG

## 2023-12-07 DIAGNOSIS — Z17.0 MALIGNANT NEOPLASM OF OVERLAPPING SITES OF LEFT BREAST IN FEMALE, ESTROGEN RECEPTOR POSITIVE: ICD-10-CM

## 2023-12-07 DIAGNOSIS — S22.060S COMPRESSION FRACTURE OF T8 VERTEBRA, SEQUELA: Primary | ICD-10-CM

## 2023-12-07 DIAGNOSIS — C50.812 MALIGNANT NEOPLASM OF OVERLAPPING SITES OF LEFT BREAST IN FEMALE, ESTROGEN RECEPTOR POSITIVE: ICD-10-CM

## 2023-12-07 DIAGNOSIS — I10 ESSENTIAL HYPERTENSION: Chronic | ICD-10-CM

## 2023-12-07 DIAGNOSIS — M80.08XA AGE-RELATED OSTEOPOROSIS WITH CURRENT PATHOLOGICAL FRACTURE, VERTEBRA(E), INITIAL ENCOUNTER FOR FRACTURE: ICD-10-CM

## 2023-12-11 DIAGNOSIS — E03.9 ADULT HYPOTHYROIDISM: ICD-10-CM

## 2023-12-12 ENCOUNTER — LAB (OUTPATIENT)
Dept: LAB | Facility: HOSPITAL | Age: 83
End: 2023-12-12
Payer: MEDICARE

## 2023-12-12 DIAGNOSIS — I10 ESSENTIAL HYPERTENSION: ICD-10-CM

## 2023-12-12 DIAGNOSIS — Z17.0 MALIGNANT NEOPLASM OF OVERLAPPING SITES OF LEFT BREAST IN FEMALE, ESTROGEN RECEPTOR POSITIVE: ICD-10-CM

## 2023-12-12 DIAGNOSIS — C50.812 MALIGNANT NEOPLASM OF OVERLAPPING SITES OF LEFT BREAST IN FEMALE, ESTROGEN RECEPTOR POSITIVE: ICD-10-CM

## 2023-12-12 DIAGNOSIS — S22.060S COMPRESSION FRACTURE OF T8 VERTEBRA, SEQUELA: ICD-10-CM

## 2023-12-12 LAB
ANION GAP SERPL CALCULATED.3IONS-SCNC: 10.5 MMOL/L (ref 5–15)
BASOPHILS # BLD AUTO: 0.03 10*3/MM3 (ref 0–0.2)
BASOPHILS NFR BLD AUTO: 0.8 % (ref 0–1.5)
BUN SERPL-MCNC: 6 MG/DL (ref 8–23)
BUN/CREAT SERPL: 10.2 (ref 7–25)
CALCIUM SPEC-SCNC: 9.2 MG/DL (ref 8.6–10.5)
CHLORIDE SERPL-SCNC: 95 MMOL/L (ref 98–107)
CO2 SERPL-SCNC: 21.5 MMOL/L (ref 22–29)
CREAT SERPL-MCNC: 0.59 MG/DL (ref 0.57–1)
DEPRECATED RDW RBC AUTO: 44 FL (ref 37–54)
EGFRCR SERPLBLD CKD-EPI 2021: 89 ML/MIN/1.73
EOSINOPHIL # BLD AUTO: 0.22 10*3/MM3 (ref 0–0.4)
EOSINOPHIL NFR BLD AUTO: 6.1 % (ref 0.3–6.2)
ERYTHROCYTE [DISTWIDTH] IN BLOOD BY AUTOMATED COUNT: 12.3 % (ref 12.3–15.4)
GLUCOSE SERPL-MCNC: 118 MG/DL (ref 65–99)
HCT VFR BLD AUTO: 37.7 % (ref 34–46.6)
HGB BLD-MCNC: 13.6 G/DL (ref 12–15.9)
LYMPHOCYTES # BLD AUTO: 1.24 10*3/MM3 (ref 0.7–3.1)
LYMPHOCYTES NFR BLD AUTO: 34.3 % (ref 19.6–45.3)
MCH RBC QN AUTO: 35.5 PG (ref 26.6–33)
MCHC RBC AUTO-ENTMCNC: 36.1 G/DL (ref 31.5–35.7)
MCV RBC AUTO: 98.4 FL (ref 79–97)
MONOCYTES # BLD AUTO: 0.4 10*3/MM3 (ref 0.1–0.9)
MONOCYTES NFR BLD AUTO: 11.1 % (ref 5–12)
NEUTROPHILS NFR BLD AUTO: 1.71 10*3/MM3 (ref 1.7–7)
NEUTROPHILS NFR BLD AUTO: 47.4 % (ref 42.7–76)
PLATELET # BLD AUTO: 121 10*3/MM3 (ref 140–450)
PMV BLD AUTO: 9.7 FL (ref 6–12)
POTASSIUM SERPL-SCNC: 4.2 MMOL/L (ref 3.5–5.2)
RBC # BLD AUTO: 3.83 10*6/MM3 (ref 3.77–5.28)
SODIUM SERPL-SCNC: 127 MMOL/L (ref 136–145)
WBC NRBC COR # BLD AUTO: 3.61 10*3/MM3 (ref 3.4–10.8)

## 2023-12-12 PROCEDURE — 36415 COLL VENOUS BLD VENIPUNCTURE: CPT

## 2023-12-12 PROCEDURE — 85025 COMPLETE CBC W/AUTO DIFF WBC: CPT

## 2023-12-12 PROCEDURE — 82728 ASSAY OF FERRITIN: CPT | Performed by: INTERNAL MEDICINE

## 2023-12-12 PROCEDURE — 80048 BASIC METABOLIC PNL TOTAL CA: CPT

## 2023-12-12 PROCEDURE — 83540 ASSAY OF IRON: CPT | Performed by: INTERNAL MEDICINE

## 2023-12-12 PROCEDURE — 84466 ASSAY OF TRANSFERRIN: CPT | Performed by: INTERNAL MEDICINE

## 2023-12-12 RX ORDER — LEVOTHYROXINE SODIUM 0.05 MG/1
50 TABLET ORAL DAILY
Qty: 90 TABLET | Refills: 3 | Status: SHIPPED | OUTPATIENT
Start: 2023-12-12

## 2023-12-15 NOTE — PROGRESS NOTES
12/18/23 0001   Pre-Procedure Phone Call   Procedure Time Verified Yes   Arrival Time 1200   Procedure Location Verified Yes   Medical History Reviewed Yes   NPO Status Reinforced Yes   Ride and Caregiver Arranged Yes   Phone Number for Ride/Caregiver daughter   Patient Knows to Bring Current Medications Yes   Bring Outside Films Requested No

## 2023-12-18 ENCOUNTER — ANESTHESIA (OUTPATIENT)
Dept: INTERVENTIONAL RADIOLOGY/VASCULAR | Facility: HOSPITAL | Age: 83
End: 2023-12-18
Payer: MEDICARE

## 2023-12-18 ENCOUNTER — HOSPITAL ENCOUNTER (OUTPATIENT)
Dept: INTERVENTIONAL RADIOLOGY/VASCULAR | Facility: HOSPITAL | Age: 83
Discharge: HOME | End: 2023-12-18
Payer: MEDICARE

## 2023-12-18 VITALS
OXYGEN SATURATION: 94 % | RESPIRATION RATE: 18 BRPM | SYSTOLIC BLOOD PRESSURE: 166 MMHG | DIASTOLIC BLOOD PRESSURE: 74 MMHG | HEIGHT: 65 IN | BODY MASS INDEX: 21.66 KG/M2 | TEMPERATURE: 97.6 F | WEIGHT: 130 LBS | HEART RATE: 61 BPM

## 2023-12-18 DIAGNOSIS — Z17.0 MALIGNANT NEOPLASM OF OVERLAPPING SITES OF LEFT BREAST IN FEMALE, ESTROGEN RECEPTOR POSITIVE: ICD-10-CM

## 2023-12-18 DIAGNOSIS — C50.812 MALIGNANT NEOPLASM OF OVERLAPPING SITES OF LEFT BREAST IN FEMALE, ESTROGEN RECEPTOR POSITIVE: ICD-10-CM

## 2023-12-18 DIAGNOSIS — S22.060S COMPRESSION FRACTURE OF T8 VERTEBRA, SEQUELA: ICD-10-CM

## 2023-12-18 DIAGNOSIS — I10 ESSENTIAL HYPERTENSION: Chronic | ICD-10-CM

## 2023-12-18 PROCEDURE — 25010000002 SUGAMMADEX 200 MG/2ML SOLUTION: Performed by: ANESTHESIOLOGY

## 2023-12-18 PROCEDURE — 25010000002 CEFAZOLIN IN DEXTROSE 2000 MG/ 100 ML SOLUTION: Performed by: RADIOLOGY

## 2023-12-18 PROCEDURE — 22513 PERQ VERTEBRAL AUGMENTATION: CPT

## 2023-12-18 PROCEDURE — 88342 IMHCHEM/IMCYTCHM 1ST ANTB: CPT | Performed by: RADIOLOGY

## 2023-12-18 PROCEDURE — 25810000003 LACTATED RINGERS PER 1000 ML: Performed by: ANESTHESIOLOGY

## 2023-12-18 PROCEDURE — 25010000002 ONDANSETRON PER 1 MG: Performed by: ANESTHESIOLOGY

## 2023-12-18 PROCEDURE — 25010000002 PROPOFOL 10 MG/ML EMULSION: Performed by: ANESTHESIOLOGY

## 2023-12-18 PROCEDURE — C1713 ANCHOR/SCREW BN/BN,TIS/BN: HCPCS

## 2023-12-18 PROCEDURE — 88307 TISSUE EXAM BY PATHOLOGIST: CPT | Performed by: RADIOLOGY

## 2023-12-18 PROCEDURE — 25010000002 DEXAMETHASONE SODIUM PHOSPHATE 20 MG/5ML SOLUTION: Performed by: ANESTHESIOLOGY

## 2023-12-18 RX ORDER — HYDROCODONE BITARTRATE AND ACETAMINOPHEN 5; 325 MG/1; MG/1
1 TABLET ORAL ONCE AS NEEDED
Status: DISCONTINUED | OUTPATIENT
Start: 2023-12-18 | End: 2023-12-19 | Stop reason: HOSPADM

## 2023-12-18 RX ORDER — ACETAMINOPHEN 160 MG/5ML
650 SOLUTION ORAL EVERY 4 HOURS PRN
Status: DISCONTINUED | OUTPATIENT
Start: 2023-12-18 | End: 2023-12-19 | Stop reason: HOSPADM

## 2023-12-18 RX ORDER — EPHEDRINE SULFATE 50 MG/ML
5 INJECTION, SOLUTION INTRAVENOUS ONCE AS NEEDED
Status: DISCONTINUED | OUTPATIENT
Start: 2023-12-18 | End: 2023-12-19 | Stop reason: HOSPADM

## 2023-12-18 RX ORDER — ONDANSETRON 2 MG/ML
INJECTION INTRAMUSCULAR; INTRAVENOUS AS NEEDED
Status: DISCONTINUED | OUTPATIENT
Start: 2023-12-18 | End: 2023-12-18 | Stop reason: SURG

## 2023-12-18 RX ORDER — FAMOTIDINE 10 MG/ML
20 INJECTION, SOLUTION INTRAVENOUS ONCE
Status: COMPLETED | OUTPATIENT
Start: 2023-12-18 | End: 2023-12-18

## 2023-12-18 RX ORDER — FENTANYL CITRATE 50 UG/ML
25 INJECTION, SOLUTION INTRAMUSCULAR; INTRAVENOUS
Status: DISCONTINUED | OUTPATIENT
Start: 2023-12-18 | End: 2023-12-19 | Stop reason: HOSPADM

## 2023-12-18 RX ORDER — IPRATROPIUM BROMIDE AND ALBUTEROL SULFATE 2.5; .5 MG/3ML; MG/3ML
3 SOLUTION RESPIRATORY (INHALATION) ONCE AS NEEDED
Status: DISCONTINUED | OUTPATIENT
Start: 2023-12-18 | End: 2023-12-19 | Stop reason: HOSPADM

## 2023-12-18 RX ORDER — SODIUM CHLORIDE 0.9 % (FLUSH) 0.9 %
3 SYRINGE (ML) INJECTION EVERY 12 HOURS SCHEDULED
Status: DISCONTINUED | OUTPATIENT
Start: 2023-12-18 | End: 2023-12-19 | Stop reason: HOSPADM

## 2023-12-18 RX ORDER — ONDANSETRON 2 MG/ML
4 INJECTION INTRAMUSCULAR; INTRAVENOUS ONCE AS NEEDED
Status: DISCONTINUED | OUTPATIENT
Start: 2023-12-18 | End: 2023-12-19 | Stop reason: HOSPADM

## 2023-12-18 RX ORDER — HYDROMORPHONE HYDROCHLORIDE 1 MG/ML
0.25 INJECTION, SOLUTION INTRAMUSCULAR; INTRAVENOUS; SUBCUTANEOUS
Status: DISCONTINUED | OUTPATIENT
Start: 2023-12-18 | End: 2023-12-19 | Stop reason: HOSPADM

## 2023-12-18 RX ORDER — NALOXONE HCL 0.4 MG/ML
0.2 VIAL (ML) INJECTION AS NEEDED
Status: DISCONTINUED | OUTPATIENT
Start: 2023-12-18 | End: 2023-12-19 | Stop reason: HOSPADM

## 2023-12-18 RX ORDER — ROCURONIUM BROMIDE 10 MG/ML
INJECTION, SOLUTION INTRAVENOUS AS NEEDED
Status: DISCONTINUED | OUTPATIENT
Start: 2023-12-18 | End: 2023-12-18 | Stop reason: SURG

## 2023-12-18 RX ORDER — PROMETHAZINE HYDROCHLORIDE 25 MG/1
25 SUPPOSITORY RECTAL ONCE AS NEEDED
Status: DISCONTINUED | OUTPATIENT
Start: 2023-12-18 | End: 2023-12-19 | Stop reason: HOSPADM

## 2023-12-18 RX ORDER — SODIUM CHLORIDE 0.9 % (FLUSH) 0.9 %
10 SYRINGE (ML) INJECTION EVERY 12 HOURS SCHEDULED
Status: DISCONTINUED | OUTPATIENT
Start: 2023-12-18 | End: 2023-12-19 | Stop reason: HOSPADM

## 2023-12-18 RX ORDER — CEFAZOLIN SODIUM 2 G/100ML
2000 INJECTION, SOLUTION INTRAVENOUS ONCE
Status: COMPLETED | OUTPATIENT
Start: 2023-12-18 | End: 2023-12-18

## 2023-12-18 RX ORDER — SODIUM CHLORIDE 9 MG/ML
40 INJECTION, SOLUTION INTRAVENOUS AS NEEDED
Status: DISCONTINUED | OUTPATIENT
Start: 2023-12-18 | End: 2023-12-19 | Stop reason: HOSPADM

## 2023-12-18 RX ORDER — FLUMAZENIL 0.1 MG/ML
0.2 INJECTION INTRAVENOUS AS NEEDED
Status: DISCONTINUED | OUTPATIENT
Start: 2023-12-18 | End: 2023-12-19 | Stop reason: HOSPADM

## 2023-12-18 RX ORDER — HYDROCODONE BITARTRATE AND ACETAMINOPHEN 7.5; 325 MG/1; MG/1
1 TABLET ORAL EVERY 4 HOURS PRN
Status: DISCONTINUED | OUTPATIENT
Start: 2023-12-18 | End: 2023-12-19 | Stop reason: HOSPADM

## 2023-12-18 RX ORDER — FENTANYL CITRATE 50 UG/ML
50 INJECTION, SOLUTION INTRAMUSCULAR; INTRAVENOUS ONCE AS NEEDED
Status: DISCONTINUED | OUTPATIENT
Start: 2023-12-18 | End: 2023-12-19 | Stop reason: HOSPADM

## 2023-12-18 RX ORDER — SODIUM CHLORIDE, SODIUM LACTATE, POTASSIUM CHLORIDE, CALCIUM CHLORIDE 600; 310; 30; 20 MG/100ML; MG/100ML; MG/100ML; MG/100ML
INJECTION, SOLUTION INTRAVENOUS CONTINUOUS PRN
Status: DISCONTINUED | OUTPATIENT
Start: 2023-12-18 | End: 2023-12-18 | Stop reason: SURG

## 2023-12-18 RX ORDER — LIDOCAINE HYDROCHLORIDE 20 MG/ML
INJECTION, SOLUTION INFILTRATION; PERINEURAL AS NEEDED
Status: DISCONTINUED | OUTPATIENT
Start: 2023-12-18 | End: 2023-12-18 | Stop reason: SURG

## 2023-12-18 RX ORDER — HYDRALAZINE HYDROCHLORIDE 20 MG/ML
5 INJECTION INTRAMUSCULAR; INTRAVENOUS
Status: DISCONTINUED | OUTPATIENT
Start: 2023-12-18 | End: 2023-12-19 | Stop reason: HOSPADM

## 2023-12-18 RX ORDER — DROPERIDOL 2.5 MG/ML
0.62 INJECTION, SOLUTION INTRAMUSCULAR; INTRAVENOUS
Status: DISCONTINUED | OUTPATIENT
Start: 2023-12-18 | End: 2023-12-19 | Stop reason: HOSPADM

## 2023-12-18 RX ORDER — DIPHENHYDRAMINE HYDROCHLORIDE 50 MG/ML
12.5 INJECTION INTRAMUSCULAR; INTRAVENOUS
Status: DISCONTINUED | OUTPATIENT
Start: 2023-12-18 | End: 2023-12-19 | Stop reason: HOSPADM

## 2023-12-18 RX ORDER — ACETAMINOPHEN 325 MG/1
650 TABLET ORAL EVERY 4 HOURS PRN
Status: DISCONTINUED | OUTPATIENT
Start: 2023-12-18 | End: 2023-12-19 | Stop reason: HOSPADM

## 2023-12-18 RX ORDER — PROPOFOL 10 MG/ML
VIAL (ML) INTRAVENOUS AS NEEDED
Status: DISCONTINUED | OUTPATIENT
Start: 2023-12-18 | End: 2023-12-18 | Stop reason: SURG

## 2023-12-18 RX ORDER — SODIUM CHLORIDE 0.9 % (FLUSH) 0.9 %
10 SYRINGE (ML) INJECTION AS NEEDED
Status: DISCONTINUED | OUTPATIENT
Start: 2023-12-18 | End: 2023-12-19 | Stop reason: HOSPADM

## 2023-12-18 RX ORDER — DEXAMETHASONE SODIUM PHOSPHATE 4 MG/ML
INJECTION, SOLUTION INTRA-ARTICULAR; INTRALESIONAL; INTRAMUSCULAR; INTRAVENOUS; SOFT TISSUE AS NEEDED
Status: DISCONTINUED | OUTPATIENT
Start: 2023-12-18 | End: 2023-12-18 | Stop reason: SURG

## 2023-12-18 RX ORDER — SODIUM CHLORIDE, SODIUM LACTATE, POTASSIUM CHLORIDE, CALCIUM CHLORIDE 600; 310; 30; 20 MG/100ML; MG/100ML; MG/100ML; MG/100ML
9 INJECTION, SOLUTION INTRAVENOUS CONTINUOUS
Status: DISCONTINUED | OUTPATIENT
Start: 2023-12-18 | End: 2023-12-19 | Stop reason: HOSPADM

## 2023-12-18 RX ORDER — SODIUM CHLORIDE 0.9 % (FLUSH) 0.9 %
3-10 SYRINGE (ML) INJECTION AS NEEDED
Status: DISCONTINUED | OUTPATIENT
Start: 2023-12-18 | End: 2023-12-19 | Stop reason: HOSPADM

## 2023-12-18 RX ORDER — LIDOCAINE HYDROCHLORIDE 10 MG/ML
0.5 INJECTION, SOLUTION INFILTRATION; PERINEURAL ONCE AS NEEDED
Status: DISCONTINUED | OUTPATIENT
Start: 2023-12-18 | End: 2023-12-19 | Stop reason: HOSPADM

## 2023-12-18 RX ORDER — LABETALOL HYDROCHLORIDE 5 MG/ML
5 INJECTION, SOLUTION INTRAVENOUS
Status: DISCONTINUED | OUTPATIENT
Start: 2023-12-18 | End: 2023-12-19 | Stop reason: HOSPADM

## 2023-12-18 RX ORDER — PROMETHAZINE HYDROCHLORIDE 25 MG/1
25 TABLET ORAL ONCE AS NEEDED
Status: DISCONTINUED | OUTPATIENT
Start: 2023-12-18 | End: 2023-12-19 | Stop reason: HOSPADM

## 2023-12-18 RX ADMIN — ONDANSETRON 4 MG: 2 INJECTION INTRAMUSCULAR; INTRAVENOUS at 13:34

## 2023-12-18 RX ADMIN — Medication 3 ML: at 14:50

## 2023-12-18 RX ADMIN — LIDOCAINE HYDROCHLORIDE 60 MG: 20 INJECTION, SOLUTION INFILTRATION; PERINEURAL at 13:22

## 2023-12-18 RX ADMIN — ROCURONIUM BROMIDE 30 MG: 10 INJECTION, SOLUTION INTRAVENOUS at 13:22

## 2023-12-18 RX ADMIN — SUGAMMADEX 200 MG: 100 INJECTION, SOLUTION INTRAVENOUS at 14:04

## 2023-12-18 RX ADMIN — SODIUM CHLORIDE, POTASSIUM CHLORIDE, SODIUM LACTATE AND CALCIUM CHLORIDE 9 ML/HR: 600; 310; 30; 20 INJECTION, SOLUTION INTRAVENOUS at 12:50

## 2023-12-18 RX ADMIN — LABETALOL HYDROCHLORIDE 5 MG: 5 INJECTION, SOLUTION INTRAVENOUS at 14:51

## 2023-12-18 RX ADMIN — PROPOFOL 50 MG: 10 INJECTION, EMULSION INTRAVENOUS at 13:23

## 2023-12-18 RX ADMIN — SODIUM CHLORIDE, POTASSIUM CHLORIDE, SODIUM LACTATE AND CALCIUM CHLORIDE: 600; 310; 30; 20 INJECTION, SOLUTION INTRAVENOUS at 13:17

## 2023-12-18 RX ADMIN — CEFAZOLIN SODIUM 2 G: 2 INJECTION, SOLUTION INTRAVENOUS at 13:31

## 2023-12-18 RX ADMIN — FAMOTIDINE 20 MG: 10 INJECTION INTRAVENOUS at 12:50

## 2023-12-18 RX ADMIN — DEXAMETHASONE SODIUM PHOSPHATE 4 MG: 4 INJECTION, SOLUTION INTRAMUSCULAR; INTRAVENOUS at 13:34

## 2023-12-18 RX ADMIN — PROPOFOL 50 MG: 10 INJECTION, EMULSION INTRAVENOUS at 13:22

## 2023-12-18 NOTE — POST-PROCEDURE NOTE
"POST PROCEDURE NOTE    Procedure: Thoracic Eight Kyphoplasty    Pre-Procedure Diagnosis: Thoracic 8 VCF    Post-procedure Diagnosis: Same    Findings: T8 VCF with successful biopsy and subsequent balloon inflation plus good \"cement\" deposition. Official report to follow.    Complications: None encountered    Blood loss: 4 cc    Specimen Removed: T8 vertebral body bilaterally    Disposition:   Discharge home today.   "

## 2023-12-18 NOTE — ANESTHESIA PROCEDURE NOTES
Airway  Date/Time: 12/18/2023 1:24 PM  Airway not difficult    General Information and Staff    Anesthesiologist: Kosta St MD    Indications and Patient Condition    Preoxygenated: yes  Mask difficulty assessment: 1 - vent by mask    Final Airway Details  Final airway type: endotracheal airway      Successful airway: ETT  Cuffed: yes   Successful intubation technique: direct laryngoscopy  Endotracheal tube insertion site: oral  Blade: Pretty  Blade size: 2  ETT size (mm): 7.0  Cormack-Lehane Classification: grade I - full view of glottis  Placement verified by: chest auscultation and capnometry   Measured from: teeth  ETT/EBT  to teeth (cm): 20  Assessment: lips, teeth, and gum same as pre-op and atraumatic intubation

## 2023-12-18 NOTE — ANESTHESIA POSTPROCEDURE EVALUATION
"Patient: Karli Loomis    Procedure Summary       Date: 12/18/23 Room / Location: [facility]IONAL    Anesthesia Start: 1317 Anesthesia Stop: 1414    Procedure: IR KYPHOPLASTY THORACIC Diagnosis:       Essential hypertension      Malignant neoplasm of overlapping sites of left breast in female, estrogen receptor positive      Compression fracture of T8 vertebra, sequela      (Thoracic 8 VCF)    Scheduled Providers:  Provider: Kosta St MD    Anesthesia Type: general ASA Status: 3            Anesthesia Type: general    Vitals  Vitals Value Taken Time   BP     Temp     Pulse 70 12/18/23 1415   Resp     SpO2 96 % 12/18/23 1415   Vitals shown include unfiled device data.        Post Anesthesia Care and Evaluation    Patient location during evaluation: bedside  Patient participation: complete - patient participated  Level of consciousness: awake  Pain management: adequate    Airway patency: patent  Anesthetic complications: No anesthetic complications    Cardiovascular status: acceptable  Respiratory status: acceptable  Hydration status: acceptable    Comments: /76 (BP Location: Right arm, Patient Position: Lying)   Pulse 68   Temp 36.4 °C (97.6 °F) (Oral)   Resp 18   Ht 165.1 cm (65\")   Wt 59 kg (130 lb)   LMP  (LMP Unknown)   SpO2 95%   BMI 21.63 kg/m²       "

## 2023-12-18 NOTE — ANESTHESIA PREPROCEDURE EVALUATION
Anesthesia Evaluation     NPO Solid Status: > 8 hours             Airway   Mallampati: II  Dental      Pulmonary    (+) asthma,  (-) sleep apnea, not a smoker    ROS comment: Negative patient screen for ANNA    Cardiovascular     (+) hypertension, hyperlipidemia      Neuro/Psych  (+) TIA  GI/Hepatic/Renal/Endo    (+) GERD, thyroid problem hypothyroidism    Musculoskeletal     Abdominal    Substance History      OB/GYN          Other                    Anesthesia Plan    ASA 3     general       Anesthetic plan, risks, benefits, and alternatives have been provided, discussed and informed consent has been obtained with: patient.    CODE STATUS:

## 2023-12-21 LAB
LAB AP CASE REPORT: NORMAL
LAB AP SPECIAL STAINS: NORMAL
PATH REPORT.FINAL DX SPEC: NORMAL
PATH REPORT.GROSS SPEC: NORMAL

## 2023-12-26 ENCOUNTER — OFFICE VISIT (OUTPATIENT)
Dept: INTERNAL MEDICINE | Facility: CLINIC | Age: 83
End: 2023-12-26
Payer: MEDICARE

## 2023-12-26 VITALS
BODY MASS INDEX: 20.96 KG/M2 | OXYGEN SATURATION: 97 % | WEIGHT: 125.8 LBS | HEIGHT: 65 IN | DIASTOLIC BLOOD PRESSURE: 68 MMHG | SYSTOLIC BLOOD PRESSURE: 168 MMHG | HEART RATE: 74 BPM

## 2023-12-26 DIAGNOSIS — I10 ESSENTIAL HYPERTENSION: Primary | Chronic | ICD-10-CM

## 2023-12-26 DIAGNOSIS — E87.1 HYPONATREMIA: ICD-10-CM

## 2023-12-26 DIAGNOSIS — E03.9 ADULT HYPOTHYROIDISM: Chronic | ICD-10-CM

## 2023-12-26 LAB
BUN SERPL-MCNC: 7 MG/DL (ref 8–23)
BUN/CREAT SERPL: 8.6 (ref 7–25)
CALCIUM SERPL-MCNC: 9.5 MG/DL (ref 8.6–10.5)
CHLORIDE SERPL-SCNC: 97 MMOL/L (ref 98–107)
CO2 SERPL-SCNC: 26.5 MMOL/L (ref 22–29)
CREAT SERPL-MCNC: 0.81 MG/DL (ref 0.57–1)
EGFRCR SERPLBLD CKD-EPI 2021: 71.7 ML/MIN/1.73
GLUCOSE SERPL-MCNC: 96 MG/DL (ref 65–99)
POTASSIUM SERPL-SCNC: 3.6 MMOL/L (ref 3.5–5.2)
SODIUM SERPL-SCNC: 135 MMOL/L (ref 136–145)

## 2023-12-26 RX ORDER — ATENOLOL 25 MG/1
100 TABLET ORAL DAILY
Qty: 30 TABLET | Refills: 2 | Status: SHIPPED | OUTPATIENT
Start: 2023-12-26

## 2023-12-26 RX ORDER — BUDESONIDE AND FORMOTEROL FUMARATE DIHYDRATE 80; 4.5 UG/1; UG/1
2 AEROSOL RESPIRATORY (INHALATION)
Qty: 1 EACH | Refills: 0 | Status: SHIPPED | OUTPATIENT
Start: 2023-12-26 | End: 2024-01-01

## 2023-12-26 NOTE — PROGRESS NOTES
Bear Reis M.D.  Internal Medicine  [facility]  [address], [address]  [place] [address]  [phone]      Chief Complaint  Hypertension (4 week F/U HTN /)    SUBJECTIVE    History of Present Illness    Karli Loomis is a 83 y.o. female who presents to the office today as an established patient that last saw me on 11/22/2023.     She is following with neurosurgery for compression fracture at T8.  There is some concern that this may be related to metastatic focus with pathologic fracture although she does have osteoporosis.  They are planning for kyphoplasty with biopsy. Recently had surgery    Hyponatremia-Na low on recent  symptoms. Drinks a lot of water.     Hypothyroidism-TSH in goal range.    HTN-Took medication today. She is on hydralaine and losartan. Reports good adherence but sometimes may take hydralazine late. Her BP cuff is broken. She is stressed about her son's health.  Denies symptoms. BP high at [facility] (166/74).     Review of Systems    Allergies   Allergen Reactions    Cortisone Unknown - High Severity     Reports having a shot years ago, and wonders if he hit a vein. She was told by another doctor that it probably went in her blood stream.     Hytrin [Terazosin] Rash    Amlodipine Rash    Darvon  [Propoxyphene] Palpitations        Outpatient Medications Marked as Taking for the 12/26/23 encounter (Office Visit) with Bear Reis MD   Medication Sig Dispense Refill    aspirin 81 MG tablet Take 1 tablet by mouth Daily.      betamethasone, augmented, (DIPROLENE) 0.05 % ointment       budesonide-formoterol (Symbicort) 80-4.5 MCG/ACT inhaler Inhale 2 puffs 2 (Two) Times a Day. 1 each 0    cetirizine (zyrTEC) 10 MG tablet Take 1 tablet by mouth Daily.      gabapentin (NEURONTIN) 400 MG capsule TAKE 1 CAPSULE FOUR TIMES DAILY 360 capsule 1    hydrALAZINE (APRESOLINE) 50 MG tablet TAKE 1 TABLET THREE TIMES DAILY 270 tablet 2    levothyroxine (SYNTHROID, LEVOTHROID)  50 MCG tablet TAKE 1 TABLET EVERY DAY 90 tablet 3    losartan (COZAAR) 100 MG tablet Take 1 tablet by mouth Daily. 90 tablet 3    mupirocin (BACTROBAN) 2 % ointment       niacinamide 500 MG tablet Take 1 tablet by mouth 3 (Three) Times a Day.      [DISCONTINUED] budesonide-formoterol (Symbicort) 80-4.5 MCG/ACT inhaler Inhale 2 puffs 2 (Two) Times a Day. 1 each 0        Past Medical History:   Diagnosis Date    Acid reflux     APC (atrial premature contractions) 05/25/2022    Chronic pansinusitis 07/12/2018    Clostridium difficile colitis     Requiring admission to [facility] in 09/2008    Drug-induced polyneuropathy 12/14/2017    Epilepsy with simple partial seizures 12/14/2017    Headache, migraine 12/14/2017    History of transfusion of packed red blood cells     R/T SURGERY    Hyperlipidemia     Hypertension     Malignant neoplasm of overlapping sites of left breast in female, estrogen receptor positive 04/13/2016    Nonrheumatic aortic valve insufficiency     Skin cancer     Thyroid nodule     Removed more than 35 years ago    TIA (transient ischemic attack) 10/18/2018    UTI due to extended-spectrum beta lactamase (ESBL) producing Escherichia coli 01/06/2019    Waldenstrom macroglobulinemia      Past Surgical History:   Procedure Laterality Date    ADENOIDECTOMY      APPENDECTOMY      CARPAL TUNNEL RELEASE Right     CATARACT EXTRACTION Bilateral     CHOLECYSTECTOMY      COLONOSCOPY      HYSTERECTOMY      Partial, many years ago, heavy bleeding, no cancer    KNEE ARTHROSCOPY Right 03/2009    Finding of chondromalacia and appropriate cleanup of joint was performed by Dr. Moraes.    MASTECTOMY Left 07/2014    REPLACEMENT TOTAL KNEE Right 12/2015    SKIN CANCER EXCISION      several    TONSILLECTOMY       Family History   Problem Relation Age of Onset    Mental illness Mother     Migraines Mother     Dementia Mother     Heart disease Father     Hypertension Father     Kidney disease Father     Alcohol  "abuse Father     No Known Problems Daughter     No Known Problems Daughter     Breast cancer Other     Heart disease Other     Hypertension Other     Diabetes Other     Cancer Maternal Grandmother     Breast cancer Maternal Grandmother     No Known Problems Maternal Grandfather     No Known Problems Paternal Grandmother     No Known Problems Paternal Grandfather     Hyperlipidemia Son     Lymphoma Neg Hx     Leukemia Neg Hx     reports that she has never smoked. She has never used smokeless tobacco. She reports that she does not drink alcohol and does not use drugs.    OBJECTIVE    Vital Signs:   /68   Pulse 74   Ht 165.1 cm (65\")   Wt 57.1 kg (125 lb 12.8 oz)   SpO2 97%   BMI 20.93 kg/m²     Physical Exam  Constitutional:       Appearance: Normal appearance. She is normal weight.   Cardiovascular:      Rate and Rhythm: Normal rate and regular rhythm.      Heart sounds: Normal heart sounds. No murmur heard.  Pulmonary:      Effort: Pulmonary effort is normal.      Breath sounds: Normal breath sounds.   Musculoskeletal:      Right lower leg: No edema.      Left lower leg: No edema.   Skin:     General: Skin is warm and dry.   Neurological:      Mental Status: She is alert.   Psychiatric:         Mood and Affect: Mood normal.         Behavior: Behavior normal.         Thought Content: Thought content normal.            The following data was reviewed by: Bear Reis MD on 12/26/2023:  CMP          10/18/2023    11:12 11/13/2023    08:57 12/12/2023    12:55   CMP   Glucose 92  85  118    BUN 7  6  6    Creatinine 0.64  0.76  0.59    EGFR 87.3  77.4  89.0    Sodium 131  131  127    Potassium 4.8  4.1  4.2    Chloride 93  95  95    Calcium 9.4  9.7  9.2    Total Protein 6.4      Total Protein 6.0  6.3     Albumin 4.0     3.9  4.3     Globulin 2.5      Globulin 2.0  2.0     Total Bilirubin 0.9  0.6     Alkaline Phosphatase 98  88     AST (SGOT) 28  25     ALT (SGPT) 11  11     Albumin/Globulin Ratio 2.0  2.2   "   BUN/Creatinine Ratio 10.9  7.9  10.2    Anion Gap 13.7  6.9  10.5      CBC w/diff          10/18/2023    11:12 11/13/2023    08:57 12/12/2023    12:55   CBC w/Diff   WBC 3.55  3.59  3.61    RBC 4.14  4.29  3.83    Hemoglobin 14.5  14.8  13.6    Hematocrit 41.0  42.8  37.7    MCV 99.0  99.8  98.4    MCH 35.0  34.5  35.5    MCHC 35.4  34.6  36.1    RDW 12.2  12.4  12.3    Platelets 122  104  121    Neutrophil Rel % 52.4  52.4  47.4    Immature Granulocyte Rel % 0.3  0.3     Lymphocyte Rel % 31.8  29.8  34.3    Monocyte Rel % 8.5  8.9  11.1    Eosinophil Rel % 6.2  7.8  6.1    Basophil Rel % 0.8  0.8  0.8      Lipid Panel          11/22/2023    14:48   Lipid Panel   Total Cholesterol 176    Triglycerides 84    HDL Cholesterol 84    VLDL Cholesterol 15    LDL Cholesterol  77    LDL/HDL Ratio 0.9      TSH          11/22/2023    14:48   TSH   TSH 1.880        Data reviewed : recent neurosurrgery note              ASSESSMENT & PLAN     Diagnoses and all orders for this visit:    1. Essential hypertension (Primary)  -     Basic Metabolic Panel  -     atenolol (TENORMIN) 25 MG tablet; Take 4 tablets by mouth Daily.  Dispense: 30 tablet; Refill: 2    2. Adult hypothyroidism    3. Hyponatremia  -     Basic Metabolic Panel    Other orders  -     budesonide-formoterol (Symbicort) 80-4.5 MCG/ACT inhaler; Inhale 2 puffs 2 (Two) Times a Day.  Dispense: 1 each; Refill: 0      BP above goal. She is allergic to amlodipine. Will avoid CCB. No diuretic due to hyponatremia. White coat HTN may be contributing. Will add atenolol. This may help with her tremor. I would like to see her home BP. Patient was asked to check their BP 3-5 times weekly at various times of day after being seated for 5 minutes or longer. Discussed that goal blood pressure is less than 140 systolic and less than 90 diastolic. Asked patient to bring log to next visit. She has chronic hyponatremia but recent Na was 127. Rechecking today.     Health Maintenance Due    Topic Date Due    COVID-19 Vaccine (7 - 2023-24 season) 12/10/2023        Follow Up  Return in about 4 weeks (around 1/23/2024).    Patient/family had no further questions at this time and verbalized understanding of the plan discussed today.

## 2023-12-28 NOTE — PROGRESS NOTES
Subjective   Patient ID: Karli Loomis is a 83 y.o. female is here today for follow-up after T8 Kyphoplasty done on 12/18/23.    History of Present Illness  83-year-old female with history of breast cancer and thoracic pain for 2 months with subsequent workup revealing a vertebral compression fracture at T8 that may be metastatic.  Patient does have underlying osteoporosis, but is a non-smoker.  She has hypertension which is treated with antihypertensive medications and is also on a baby aspirin for her cerebrovascular risk factors.  Her pain started in August and has improved since then but still present.  There is no radiation into the lower extremities or or buttock region.  Her imaging demonstrated a T8 compression fracture on the MRI with increased uptake on the bone scan indicating an acute to subacute fracture.  She underwent a successful T8 kyphoplasty on 12/18/2023 with a biopsy obtained at that time.  She returns today to review her procedure and pathology report with no new complaints and successful pain relief.    The following portions of the patient's history were reviewed and updated as appropriate: She  has a past medical history of Acid reflux, APC (atrial premature contractions) (05/25/2022), Asthma, Chronic pansinusitis (07/12/2018), Clostridium difficile colitis, Drug-induced polyneuropathy (12/14/2017), Epilepsy with simple partial seizures (12/14/2017), Headache, migraine (12/14/2017), History of transfusion of packed red blood cells, Hyperlipidemia, Hypertension, Malignant neoplasm of overlapping sites of left breast in female, estrogen receptor positive (04/13/2016), Nonrheumatic aortic valve insufficiency, Skin cancer, Thyroid nodule, TIA (transient ischemic attack) (10/18/2018), UTI due to extended-spectrum beta lactamase (ESBL) producing Escherichia coli (01/06/2019), and Waldenstrom macroglobulinemia.  She does not have any pertinent problems on file.  She  has a past surgical history  that includes Replacement total knee (Right, 12/2015); Mastectomy (Left, 07/2014); Cholecystectomy; Appendectomy; Adenoidectomy; Tonsillectomy; Hysterectomy; Colonoscopy; Knee arthroscopy (Right, 03/2009); Cataract extraction (Bilateral); Carpal tunnel release (Right); Skin cancer excision; and Back surgery.  Her family history includes Alcohol abuse in her father; Breast cancer in her maternal grandmother and another family member; Cancer in her maternal grandmother; Dementia in her mother; Diabetes in an other family member; Heart disease in her father and another family member; Hyperlipidemia in her son; Hypertension in her father and another family member; Kidney disease in her father; Mental illness in her mother; Migraines in her mother; No Known Problems in her daughter, daughter, maternal grandfather, paternal grandfather, and paternal grandmother.  She  reports that she has never smoked. She has never been exposed to tobacco smoke. She has never used smokeless tobacco. She reports that she does not drink alcohol and does not use drugs.  Current Outpatient Medications   Medication Sig Dispense Refill   • aspirin 81 MG tablet Take 1 tablet by mouth Daily.     • atenolol (TENORMIN) 25 MG tablet Take 4 tablets by mouth Daily. 30 tablet 2   • betamethasone, augmented, (DIPROLENE) 0.05 % ointment      • budesonide-formoterol (Symbicort) 160-4.5 MCG/ACT inhaler Inhale 2 puffs 2 (Two) Times a Day. 10.2 g 2   • cetirizine (zyrTEC) 10 MG tablet Take 1 tablet by mouth Daily.     • gabapentin (NEURONTIN) 400 MG capsule TAKE 1 CAPSULE FOUR TIMES DAILY 360 capsule 1   • hydrALAZINE (APRESOLINE) 50 MG tablet TAKE 1 TABLET THREE TIMES DAILY 270 tablet 2   • levothyroxine (SYNTHROID, LEVOTHROID) 50 MCG tablet TAKE 1 TABLET EVERY DAY 90 tablet 3   • losartan (COZAAR) 100 MG tablet Take 1 tablet by mouth Daily. 90 tablet 3   • mupirocin (BACTROBAN) 2 % ointment      • niacinamide 500 MG tablet Take 1 tablet by mouth 3 (Three)  "Times a Day.       No current facility-administered medications for this visit.     Current Outpatient Medications on File Prior to Visit   Medication Sig   • aspirin 81 MG tablet Take 1 tablet by mouth Daily.   • atenolol (TENORMIN) 25 MG tablet Take 4 tablets by mouth Daily.   • betamethasone, augmented, (DIPROLENE) 0.05 % ointment    • budesonide-formoterol (Symbicort) 160-4.5 MCG/ACT inhaler Inhale 2 puffs 2 (Two) Times a Day.   • cetirizine (zyrTEC) 10 MG tablet Take 1 tablet by mouth Daily.   • gabapentin (NEURONTIN) 400 MG capsule TAKE 1 CAPSULE FOUR TIMES DAILY   • hydrALAZINE (APRESOLINE) 50 MG tablet TAKE 1 TABLET THREE TIMES DAILY   • levothyroxine (SYNTHROID, LEVOTHROID) 50 MCG tablet TAKE 1 TABLET EVERY DAY   • losartan (COZAAR) 100 MG tablet Take 1 tablet by mouth Daily.   • mupirocin (BACTROBAN) 2 % ointment    • niacinamide 500 MG tablet Take 1 tablet by mouth 3 (Three) Times a Day.     No current facility-administered medications on file prior to visit.     She is allergic to cortisone, hytrin [terazosin], amlodipine, and darvon  [propoxyphene]..    Review of Systems   Constitutional:  Negative for fever.   Gastrointestinal:  Positive for diarrhea.   Neurological:  Negative for weakness and numbness.   All other systems reviewed and are negative.      Objective     Vitals:    01/02/24 1109   BP: 142/62   BP Location: Left arm   Patient Position: Sitting   Cuff Size: Adult   Resp: 20   Weight: 56.7 kg (125 lb)   Height: 165.1 cm (65\")   PainSc: 0-No pain     Body mass index is 20.8 kg/m².      Physical Exam  Vitals and nursing note reviewed.   Constitutional:       General: She is not in acute distress.     Appearance: She is normal weight.   Cardiovascular:      Rate and Rhythm: Normal rate.   Pulmonary:      Effort: Pulmonary effort is normal.   Musculoskeletal:         General: Normal range of motion.      Cervical back: Normal range of motion.   Skin:     General: Skin is warm and dry. " "  Neurological:      General: No focal deficit present.      Mental Status: She is alert and oriented to person, place, and time.   Neurologic Exam     Mental Status   Oriented to person, place, and time.           Assessment & Plan   Independent Review of Radiographic Studies:      I personally reviewed the images from the following studies.    The kyphoplasty procedure performed at T8 on 2023 was reviewed with the patient and shows good balloon inflation with subsequent \"cement\" deposition and no significant extravasation.    Medical Decision Makin-year-old female with a T8 vertebral compression fracture that was due to osteoporosis with a negative biopsy for malignancy.  Patient underwent successful balloon kyphoplasty and has had pain relief.  The puncture sites are well-healed and no current new pain is reported.  Diagnoses and all orders for this visit:    1. Compression fracture of T8 vertebra status post kyphoplasty (Primary)    2. Age-related osteoporosis    3. Malignant neoplasm of overlapping sites of left breast in female, estrogen receptor positive    4. Essential hypertension      Return if symptoms worsen or fail to improve.         "

## 2023-12-29 ENCOUNTER — TELEPHONE (OUTPATIENT)
Dept: INTERNAL MEDICINE | Facility: CLINIC | Age: 83
End: 2023-12-29
Payer: MEDICARE

## 2023-12-29 NOTE — TELEPHONE ENCOUNTER
Caller: Karli Loomis A    Relationship to patient: Self    Best call back number: [phone]     Patient is needing: PATIENT STATES WRONG STRENGTH OF SYMBICORT SENT IN. PATIENT NORMALLY TAKES 160-4.5 MCG INSTEAD OF THE 80-4.5 MCG. PATIENT ALSO STATES IT IS CHEAPER IF SENT IN AS A 90 DAY SUPPLY. WOULD LIKE IT TO BE SENT OVER AS SOON AS POSSIBLE.     [facility] - [facility] [address] - [phone] [facility] [phone]      PATIENT ALSO WOULD LIKE TO DISCUSS DOSING ON THE ATENOLOL PRESCRIPTION SHE WAS GIVEN DUE TO HER OTHER BLOOD PRESSURE MEDICATIONS SHE IS TAKING.

## 2024-01-01 RX ORDER — BUDESONIDE AND FORMOTEROL FUMARATE DIHYDRATE 160; 4.5 UG/1; UG/1
2 AEROSOL RESPIRATORY (INHALATION)
Qty: 10.2 G | Refills: 2 | Status: SHIPPED | OUTPATIENT
Start: 2024-01-01

## 2024-01-02 ENCOUNTER — OFFICE VISIT (OUTPATIENT)
Dept: NEUROSURGERY | Facility: CLINIC | Age: 84
End: 2024-01-02
Payer: MEDICARE

## 2024-01-02 VITALS
BODY MASS INDEX: 20.83 KG/M2 | HEIGHT: 65 IN | RESPIRATION RATE: 20 BRPM | WEIGHT: 125 LBS | DIASTOLIC BLOOD PRESSURE: 62 MMHG | SYSTOLIC BLOOD PRESSURE: 142 MMHG

## 2024-01-02 DIAGNOSIS — C50.812 MALIGNANT NEOPLASM OF OVERLAPPING SITES OF LEFT BREAST IN FEMALE, ESTROGEN RECEPTOR POSITIVE: ICD-10-CM

## 2024-01-02 DIAGNOSIS — S22.060S COMPRESSION FRACTURE OF T8 VERTEBRA, SEQUELA: Primary | ICD-10-CM

## 2024-01-02 DIAGNOSIS — M80.08XA AGE-RELATED OSTEOPOROSIS WITH CURRENT PATHOLOGICAL FRACTURE, VERTEBRA(E), INITIAL ENCOUNTER FOR FRACTURE: ICD-10-CM

## 2024-01-02 DIAGNOSIS — Z17.0 MALIGNANT NEOPLASM OF OVERLAPPING SITES OF LEFT BREAST IN FEMALE, ESTROGEN RECEPTOR POSITIVE: ICD-10-CM

## 2024-01-02 DIAGNOSIS — I10 ESSENTIAL HYPERTENSION: Chronic | ICD-10-CM

## 2024-01-02 PROCEDURE — 3077F SYST BP >= 140 MM HG: CPT | Performed by: RADIOLOGY

## 2024-01-02 PROCEDURE — 1160F RVW MEDS BY RX/DR IN RCRD: CPT | Performed by: RADIOLOGY

## 2024-01-02 PROCEDURE — 99213 OFFICE O/P EST LOW 20 MIN: CPT | Performed by: RADIOLOGY

## 2024-01-02 PROCEDURE — 3078F DIAST BP <80 MM HG: CPT | Performed by: RADIOLOGY

## 2024-01-02 PROCEDURE — 1159F MED LIST DOCD IN RCRD: CPT | Performed by: RADIOLOGY

## 2024-01-22 ENCOUNTER — OFFICE VISIT (OUTPATIENT)
Dept: ONCOLOGY | Facility: CLINIC | Age: 84
End: 2024-01-22
Payer: MEDICARE

## 2024-01-22 ENCOUNTER — LAB (OUTPATIENT)
Dept: LAB | Facility: HOSPITAL | Age: 84
End: 2024-01-22
Payer: MEDICARE

## 2024-01-22 VITALS
TEMPERATURE: 97.7 F | BODY MASS INDEX: 20.69 KG/M2 | HEART RATE: 67 BPM | OXYGEN SATURATION: 97 % | RESPIRATION RATE: 18 BRPM | DIASTOLIC BLOOD PRESSURE: 78 MMHG | SYSTOLIC BLOOD PRESSURE: 140 MMHG | WEIGHT: 124.2 LBS | HEIGHT: 65 IN

## 2024-01-22 DIAGNOSIS — C50.812 MALIGNANT NEOPLASM OF OVERLAPPING SITES OF LEFT BREAST IN FEMALE, ESTROGEN RECEPTOR POSITIVE: ICD-10-CM

## 2024-01-22 DIAGNOSIS — S22.060G COMPRESSION FRACTURE OF T8 VERTEBRA WITH DELAYED HEALING, SUBSEQUENT ENCOUNTER: ICD-10-CM

## 2024-01-22 DIAGNOSIS — Z17.0 MALIGNANT NEOPLASM OF OVERLAPPING SITES OF LEFT BREAST IN FEMALE, ESTROGEN RECEPTOR POSITIVE: ICD-10-CM

## 2024-01-22 DIAGNOSIS — C88.0 MACROGLOBULINEMIA OF WALDENSTROM: Primary | ICD-10-CM

## 2024-01-22 DIAGNOSIS — E87.1 HYPONATREMIA WITH NORMAL EXTRACELLULAR FLUID VOLUME: ICD-10-CM

## 2024-01-22 DIAGNOSIS — L12.0 BULLOUS PEMPHIGOID: ICD-10-CM

## 2024-01-22 DIAGNOSIS — M80.08XA AGE-RELATED OSTEOPOROSIS WITH CURRENT PATHOLOGICAL FRACTURE, VERTEBRA(E), INITIAL ENCOUNTER FOR FRACTURE: ICD-10-CM

## 2024-01-22 DIAGNOSIS — R88.8 VERY LOW IRON STORES IN BONE MARROW: ICD-10-CM

## 2024-01-22 DIAGNOSIS — M80.00XA AGE-RELATED OSTEOPOROSIS WITH CURRENT PATHOLOGICAL FRACTURE, INITIAL ENCOUNTER: ICD-10-CM

## 2024-01-22 LAB
ALBUMIN SERPL-MCNC: 4.4 G/DL (ref 3.5–5.2)
ALBUMIN/GLOB SERPL: 2 G/DL
ALP SERPL-CCNC: 65 U/L (ref 39–117)
ALT SERPL W P-5'-P-CCNC: 11 U/L (ref 1–33)
ANION GAP SERPL CALCULATED.3IONS-SCNC: 8.7 MMOL/L (ref 5–15)
AST SERPL-CCNC: 23 U/L (ref 1–32)
BASOPHILS # BLD AUTO: 0.02 10*3/MM3 (ref 0–0.2)
BASOPHILS NFR BLD AUTO: 0.5 % (ref 0–1.5)
BILIRUB SERPL-MCNC: 0.6 MG/DL (ref 0–1.2)
BUN SERPL-MCNC: 7 MG/DL (ref 8–23)
BUN/CREAT SERPL: 10 (ref 7–25)
CALCIUM SPEC-SCNC: 9.6 MG/DL (ref 8.6–10.5)
CHLORIDE SERPL-SCNC: 92 MMOL/L (ref 98–107)
CO2 SERPL-SCNC: 28.3 MMOL/L (ref 22–29)
CREAT SERPL-MCNC: 0.7 MG/DL (ref 0.57–1)
DEPRECATED RDW RBC AUTO: 45.7 FL (ref 37–54)
EGFRCR SERPLBLD CKD-EPI 2021: 85.4 ML/MIN/1.73
EOSINOPHIL # BLD AUTO: 0.42 10*3/MM3 (ref 0–0.4)
EOSINOPHIL NFR BLD AUTO: 10.9 % (ref 0.3–6.2)
ERYTHROCYTE [DISTWIDTH] IN BLOOD BY AUTOMATED COUNT: 12.5 % (ref 12.3–15.4)
GLOBULIN UR ELPH-MCNC: 2.2 GM/DL
GLUCOSE SERPL-MCNC: 101 MG/DL (ref 65–99)
HCT VFR BLD AUTO: 40.7 % (ref 34–46.6)
HGB BLD-MCNC: 13.8 G/DL (ref 12–15.9)
IMM GRANULOCYTES # BLD AUTO: 0.01 10*3/MM3 (ref 0–0.05)
IMM GRANULOCYTES NFR BLD AUTO: 0.3 % (ref 0–0.5)
LYMPHOCYTES # BLD AUTO: 1.13 10*3/MM3 (ref 0.7–3.1)
LYMPHOCYTES NFR BLD AUTO: 29.4 % (ref 19.6–45.3)
MCH RBC QN AUTO: 33.7 PG (ref 26.6–33)
MCHC RBC AUTO-ENTMCNC: 33.9 G/DL (ref 31.5–35.7)
MCV RBC AUTO: 99.3 FL (ref 79–97)
MONOCYTES # BLD AUTO: 0.37 10*3/MM3 (ref 0.1–0.9)
MONOCYTES NFR BLD AUTO: 9.6 % (ref 5–12)
NEUTROPHILS NFR BLD AUTO: 1.9 10*3/MM3 (ref 1.7–7)
NEUTROPHILS NFR BLD AUTO: 49.3 % (ref 42.7–76)
NRBC BLD AUTO-RTO: 0 /100 WBC (ref 0–0.2)
PLATELET # BLD AUTO: 105 10*3/MM3 (ref 140–450)
PMV BLD AUTO: 8.9 FL (ref 6–12)
POTASSIUM SERPL-SCNC: 3.6 MMOL/L (ref 3.5–5.2)
PROT SERPL-MCNC: 6.6 G/DL (ref 6–8.5)
RBC # BLD AUTO: 4.1 10*6/MM3 (ref 3.77–5.28)
SODIUM SERPL-SCNC: 129 MMOL/L (ref 136–145)
WBC NRBC COR # BLD AUTO: 3.85 10*3/MM3 (ref 3.4–10.8)

## 2024-01-22 PROCEDURE — 3078F DIAST BP <80 MM HG: CPT | Performed by: INTERNAL MEDICINE

## 2024-01-22 PROCEDURE — 3077F SYST BP >= 140 MM HG: CPT | Performed by: INTERNAL MEDICINE

## 2024-01-22 PROCEDURE — 85025 COMPLETE CBC W/AUTO DIFF WBC: CPT

## 2024-01-22 PROCEDURE — 36415 COLL VENOUS BLD VENIPUNCTURE: CPT

## 2024-01-22 PROCEDURE — 99214 OFFICE O/P EST MOD 30 MIN: CPT | Performed by: INTERNAL MEDICINE

## 2024-01-22 PROCEDURE — 1126F AMNT PAIN NOTED NONE PRSNT: CPT | Performed by: INTERNAL MEDICINE

## 2024-01-22 PROCEDURE — 80053 COMPREHEN METABOLIC PANEL: CPT

## 2024-01-22 NOTE — PROGRESS NOTES
Subjective    REASONS FOR FOLLOWUP:     History of Waldenstrom macroglobulinemia.  Off therapy for many years.  History of breast cancer stage I on the left, status post mastectomy. The patient completed aromatase inhibitor  X 5 YEARS IN 2019 without any evidence of breast cancer recurrence        History of Present Illness     On 01/22/2024 this 83-year-old female returns to the office for follow up. She is here today after she has had a kyphoplasty byGerardo MD, and this made a big difference in regard the intensity and frequency of her pain in her back to the point of complete resolution. She is able to function completely normal around the house nowadays and by any chance her energy level has come back all of the anxiety pertinent to her has improved and her skin rash associated with pemphigoid is very much quiet. The patient has improved her appetite and she has improved her mobility and ability to function at home. Her bowel activity is normal, urination is normal, her blood pressure is under good control. She has not had any fever, chills, night sweats, pruritus, adenopathy or abnormal bleeding. At the time of the kyphoplasty a biopsy was performed byGerardo MD, report will be reviewed below.                Past Medical History:   Diagnosis Date    Acid reflux     APC (atrial premature contractions) 05/25/2022    Asthma     Chronic pansinusitis 07/12/2018    Clostridium difficile colitis     Requiring admission to [facility] in 09/2008    Drug-induced polyneuropathy 12/14/2017    Epilepsy with simple partial seizures 12/14/2017    Headache, migraine 12/14/2017    History of transfusion of packed red blood cells     R/T SURGERY    Hyperlipidemia     Hypertension     Malignant neoplasm of overlapping sites of left breast in female, estrogen receptor positive 04/13/2016    Nonrheumatic aortic valve insufficiency     Skin cancer     Thyroid nodule     Removed more than 35  years ago    TIA (transient ischemic attack) 10/18/2018    UTI due to extended-spectrum beta lactamase (ESBL) producing Escherichia coli 01/06/2019    Waldenstrom macroglobulinemia      Past Surgical History:   Procedure Laterality Date    ADENOIDECTOMY      APPENDECTOMY      BACK SURGERY      CARPAL TUNNEL RELEASE Right     CATARACT EXTRACTION Bilateral     CHOLECYSTECTOMY      COLONOSCOPY      HYSTERECTOMY      Partial, many years ago, heavy bleeding, no cancer    KNEE ARTHROSCOPY Right 03/2009    Finding of chondromalacia and appropriate cleanup of joint was performed by Dr. Moraes.    MASTECTOMY Left 07/2014    REPLACEMENT TOTAL KNEE Right 12/2015    SKIN CANCER EXCISION      several    TONSILLECTOMY           Social History     Socioeconomic History    Marital status:    Tobacco Use    Smoking status: Never     Passive exposure: Never    Smokeless tobacco: Never   Vaping Use    Vaping Use: Never used   Substance and Sexual Activity    Alcohol use: No     Comment: 2 cups caffeine/coffee daily    Drug use: No    Sexual activity: Defer     Family History   Problem Relation Age of Onset    Mental illness Mother     Migraines Mother     Dementia Mother     Heart disease Father     Hypertension Father     Kidney disease Father     Alcohol abuse Father     No Known Problems Daughter     No Known Problems Daughter     Breast cancer Other     Heart disease Other     Hypertension Other     Diabetes Other     Cancer Maternal Grandmother     Breast cancer Maternal Grandmother     No Known Problems Maternal Grandfather     No Known Problems Paternal Grandmother     No Known Problems Paternal Grandfather     Hyperlipidemia Son     Lymphoma Neg Hx     Leukemia Neg Hx      Current Outpatient Medications on File Prior to Visit   Medication Sig Dispense Refill    aspirin 81 MG tablet Take 1 tablet by mouth Daily.      betamethasone, augmented, (DIPROLENE) 0.05 % ointment       budesonide-formoterol (Symbicort) 160-4.5  "MCG/ACT inhaler Inhale 2 puffs 2 (Two) Times a Day. 10.2 g 2    cetirizine (zyrTEC) 10 MG tablet Take 1 tablet by mouth Daily.      gabapentin (NEURONTIN) 400 MG capsule TAKE 1 CAPSULE FOUR TIMES DAILY 360 capsule 1    hydrALAZINE (APRESOLINE) 50 MG tablet TAKE 1 TABLET THREE TIMES DAILY 270 tablet 2    Lactobacillus-Inulin (Culturelle Digestive Daily) capsule Take 1 tablet by mouth Daily. 30 capsule 0    levothyroxine (SYNTHROID, LEVOTHROID) 50 MCG tablet TAKE 1 TABLET EVERY DAY 90 tablet 3    losartan (COZAAR) 100 MG tablet Take 1 tablet by mouth Daily. 90 tablet 3    mupirocin (BACTROBAN) 2 % ointment       niacinamide 500 MG tablet Take 1 tablet by mouth 3 (Three) Times a Day.      atenolol (TENORMIN) 25 MG tablet Take 4 tablets by mouth Daily. (Patient not taking: Reported on 1/22/2024) 30 tablet 2     No current facility-administered medications on file prior to visit.       ALLERGIES:    Allergies   Allergen Reactions    Cortisone Unknown - High Severity     Reports having a shot years ago, and wonders if he hit a vein. She was told by another doctor that it probably went in her blood stream.     Hytrin [Terazosin] Rash    Amlodipine Rash    Darvon  [Propoxyphene] Palpitations       Objective      Vitals:    01/22/24 0940   BP: 140/78   Pulse: 67   Resp: 18   Temp: 97.7 °F (36.5 °C)   TempSrc: Temporal   SpO2: 97%   Weight: 56.3 kg (124 lb 3.2 oz)   Height: 165.1 cm (65\")   PainSc: 0-No pain           1/22/2024     9:42 AM   Current Status   ECOG score 0   Exam  :                       GENERAL:  Well-developed, Patient  in no acute distress.   SKIN:  Warm, dry ,NO purpura ,no rash.  HEENT:  Pupils were equal and reactive to light and accomodation, conjunctivae noninjected,  normal visual acuity.   NECK:  Supple with good range of motion; no thyromegaly , no JVD or bruits,.No carotid artery pain, no carotid abnormal pulsation   LYMPHATICS:  No cervical, NO supraclavicular, NO axillary, NO inguinal " adenopathies.  CARDIAC   normal rate , regular rhythm, without murmur,NO rubs NO S3 NO S4   LUNGS: normal breath sounds bilateral, no wheezing, NO rhonchi, NO crackles ,NO rubs.  VASCULAR VENOUS: no cyanosis, NO collateral circulation, NO varicosities, NO edema, NO palpable cords, NO pain,NO erythema, NO pigmentation of the skin.  ABDOMEN:  Soft, NO pain,no hepatomegaly, no splenomegaly,no masses, no ascites, no collateral circulation,no distention.  EXTREMITIES  AND SPINE:  No clubbing, no cyanosis ,no deformities , no pain . kyphosis,  no pain in spine, no pain in ribs , no pain in pelvic bone.  NEUROLOGICAL:  Patient was awake, alert, oriented to time, person and place.  Right breast examination disclosed normal nipple, normal skin, no masses, no tenderness, normal right axilla, left mastectomy site is normal, normal left axilla, normal left upper extremity.           RECENT LABS:  Results from last 7 days   Lab Units 01/22/24  0926   WBC 10*3/mm3 3.85   NEUTROS ABS 10*3/mm3 1.90   HEMOGLOBIN g/dL 13.8   HEMATOCRIT % 40.7   PLATELETS 10*3/mm3 105*     Tissue Pathology Exam (12/18/2023 13:45)         Assessment & Plan      1.  Waldenstrom macroglobulinemia that has been treated in the past mainly with Rituxan. In the past, also she was treated with Velcade with very dramatic improvement in her monoclonal protein but very dramatic sensory peripheral neuropathy. This medication was discontinued altogether.   6/29/2020, she developed progressive fatigue with worsening neuropathy in her feet.  These were her original symptoms at diagnosis of Waldenstrom's.  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  Calcium also elevated at 10.5 with decreased sodium related to pseudohyponatremia associated with monoclonal protein.  Previously, she did not receive improvement from Rituxan, she is not thought to be a candidate for Imbruvica, therefore she went on to initiate venetoclax 7/13/2020.  She is currently on her next  planned dose of 200 mg daily with poor tolerance as outlined below  04/28/2021 normal sedimentation rate, stable monoclonal protein IgM that the Waldenstrom could be the most focal reason to explain her fatigue. On the other hand it is impossible given her thrombocytopenia to be sure that she still has some component of lymphoma in her bone marrow and the only way to prove this will be to pursue in a bone marrow test. She does not believe that she is ready for this at this time.  Radiologically speaking the CT scan of the chest, abdomen and pelvis failed to document any lymphadenopathy, masses, splenomegaly or any other issue pertinent to her Waldenstrom.  06/02/2021 that I do not feel that the symptoms of fatigue are related to her Waldenström's at this time. Her monoclonal protein study has remained completely quiet. Her white count and hemoglobin are normal. Her B12, folic acid, ferritin, iron profile remain normal and TSH hs remained into the normal limits. Her sed rate was normal at 4 mm/hr and she has no obvious infection or inflammatory process.   On 08/25/2021 the patient's monoclonal protein has remained very stable as discussed with her during the previous weeks. We have another level pending today. One more analysis that I decided to pursue was an amyloid analysis and abdominal fat pad biopsy. This was performed in late 06/2021 by Deric Llamas MD. Material was sent to [facility]. No [place] red material was found in this specimen. Therefore the possibility of amyloidosis is less likely under these circumstances.   11/17/2021 in regard to her Waldenstrom's. She does not have any symptoms pertinent to this   12/17/2021. I do not see any symptoms or signs of her Waldenstrom's. Her white count, hemoglobin, platelets, white count differential are normal. Her chemistry profile is pending. I find no reason to proceed with any other therapy for this condition. Her monoclonal protein has remained quiet,  stable.   03/11/2022 the patient’s white count, hemoglobin and platelets are normal. She has minimal sensory neuropathy in her feet that is no worse than before. She has no fever, chills or infections. No clinical bleeding. Her monoclonal protein IgM has remained very stable. She has not required any other intervention from my side of the story and she will be watched in absence of any other intervention. She will be called at home with the report of her monoclonal protein study next week.  11/02/2022 the patient has not had any clinical bleeding, no febrile illness, no peripheral adenopathy, no blurred vision, and her white count, hemoglobin and platelets remain stable in regard to her previous history of Waldenstrom. On the other hand the patient has this very peculiar maculopapular rash with multiple areas of ulceration not only in her trunk but also in her face, upper and lower extremities. The areas in the trunk are the worse, especially on her backside. Photographs were obtained and placed in media by Dr. Larose.   In spite of seeing dermatologist, a skin biopsy has not been done. The patient continues blaming this rash to her blood pressure medications. I went back into time in 2020 to see if we can find what she was taking before. She was not having any rash but she has been worse from this point of view in the last several weeks.   I think independent of what she is going to do with her life very likely she needs to have a different approach to the diagnosis of her rash in the skin and I strongly believe there is a connection between the rash and her Waldenstrom. I have placed a phone call to discuss this with the patient's dermatologist. I strongly believe that she will require skin biopsy looking for specific skin entities as well as looking for pemphigoid.  She has similar lesions in her chest wall, similar lesion in her buttock and back of the spine and upper and lower extremities.  On 11/17/2022 the  patient has continued having extensive lesions in the skin. We have measured her monoclonal protein that has not changed and she has not required any therapy for this for a long time. I measured her IgE level that was 1370 and any number above 500 is very abnormal. 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 Amlodipine has been discontinued. Obviously the question is what is the next medicine that needs to be discontinued and my next candidate given the characteristics of medication is hydralazine.   I went ahead and advised the patient to initiate a program of prednisone 10 mg at breakfast time and Singulair 5 mg at bedtime. The patient will continue putting topical moisturizer on the skin. She is not using any topical steroid at this time.  I also checked on this patient's KARRIE that was negative.  I raised the question to Teresa Salgado MD, if she saw anything to suggest any form of vasculitis that was negative.   Therefore after all of these facts and these discussions back and forth and so much conversation we are ready to proceed to see if we change the outcome of this patient in regard all of these symptoms.   On 01/23/2023 the patient has not had any new symptoms pertinent to Waldenstrom's. She has no peripheral adenopathy, she has no hepatosplenomegaly, she has no bleeding, blurred vision.  Her white count, hemoglobin and platelets remain normal. White count differential is normal and we are waiting to review her monoclonal protein that during the previous visit was completely stable in regard to her IgM level. I expect that this condition will remain quiet and for this reason I advised her to return to see me back in 3 months with repeat CBC, CMP and monoclonal protein study.  4/17/2023 complaining of intense pruritus in the absence of skin rash.  She is using moisturizers.  IgE  at that time 1538  5/1/2023 patient seen initially planned to start Rituxan today however after received labs from her last visit patient's IgE remains elevated.  Dr. Larose would like for the patient to undergo bone marrow biopsy for further evaluation.   5/9/2023 CT-guided bone marrow biopsy.  Flow cytometry shows minimal population of monoclonal B cells and no iron in the bone marrow.  Per Dr. Larose, the patient does not need to undergo repeat Rituxan though will require IV iron replacement.  On 07/12/2023 the patient has no symptoms that suggests Waldenstrom recurrence. The only concern and issue is minor drop in her platelet count. The patient's hemoglobin and white count are normal and her serum protein electrophoresis is pending. The patient will be called with the report of her serum protein electrophoresis expecting that this will be hopefully stable in comparison with previous assessment. I find no need to trigger anymore radiological assessment on her, neither repeating bone marrow testing, neither any other issue pertinent to her Waldenstrom's at this time.  On 10/18/2023 the patient has no obvious symptoms related to her Waldenstrom macroglobulinemia. Her white count, hemoglobin and platelets are doing fine and we are pending to review her IgA monoclonal protein in the next couple of days. 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 This is called pseudohyponatremia.     It is very concerning though the fact of new pain in the rib cage posteriorly on the right side; that is what triggered her hospitalization and it was missed completely.     Please review below.    On 11/13/2023, her monoclonal protein studies have remained stable in regard to this issue and this will remain in observation for the time being. Her white count, hemoglobin and platelets are acceptable. Minimal low platelet count with no implications at this time.  On  01/22/2024 the patient was further reviewed. Her white count, hemoglobin and platelets are stable, her monoclonal protein studies are pending and I do not see any obvious clinical progression of her Waldenstrom's. She has no peripheral adenopathy, no abnormal bleeding, no fatigue. In fact many of the symptoms that she had before have disappeared.            2.  History of left breast cancer, status post mastectomy.  She completed adjuvant therapy.    Mammogram 6/30/2020 benign  On 08/25/2021 she has no symptoms or signs of breast cancer recurrence. Her mastectomy site on the left is completely healed. Her right breast examination is normal. She is up to date on her mammogram. This will be watched in absence of any other intervention.   On 11/17/2021 the patient's left sided mastectomy is well healed, right breast exam is normal. There is no evidence of breast cancer recurrence clinically. She will remain in observation.   I find no symptoms or signs of breast cancer recurrence on her. This will be watched in absence of any other intervention as per 12/17/2021.   On 03/11/2022 the patient has no symptoms or signs of breast cancer recurrence. Her right breast exam is normal. The left mastectomy site is normal. She will be watched in absence of any other intervention. She is up-to-date in right-sided mammogram.  Right breast Mammogram 7/25/2022 negative  She remains in observation with no clinical evidence of recurrent disease  On 07/12/2023, her left breast mastectomy is normal, her right breast is normal. She has no symptoms or signs of breast cancer recurrence. She will be watched in absence of any other intervention.  On 10/18/2023, no history of breast cancer recurrence so far. On the other hand, the ridge posteriorly on the right side is worrisome given her previous history breast cancer. She has not had any trauma. She has not had any falls. The ribs are tender on clinical examination. There is no crepitation.  She needs to have a bone scan and an MRI of the spine. A CA 15-3 will be ordered today.  On 11/13/2023, the patient has not had any issues pertinent to her history of breast cancer and tumor marker measurement not too long ago was normal. Her left mastectomy site is normal. Right breast has not developed any new problems. This will remain in observation.  On 01/22/2024 no evidence of breast cancer recurrence, right breast examination is normal. Left mastectomy site is normal.                3.  Fatigue. We have looked for endocrinological diseases, medication side effects, infections, malignancies, Waldenstrom's, amyloid, cardiac disease and so forth not having any conclusion in regard to the nature of this. I do believe that the lack of bad news or not finding any other thing is good news to me and I pointed this out to the patient. Maybe by the end of the day her fatigue is lack of proper physical training. She has bought a treadmill device that will be in her house as per today and hopefully she will be able to do some walking on this that will be meaningful in regard to recovering in regard energy.   The patient believes that the degree of fatigue that she was experiencing before is substantially better and now she is able to do some house activity. I encouraged her to continue this in the long run, is the only solution for fatigue is physical activity.   On 12/17/2021 the patient's fatigue has improved highly. She is now doing treadmill activity at home for 10 minutes once or twice a day. She has been eating better, she has lost some weight but she feels substantially better. I think the conditioning was probably the culprit of all of this related to the pandemia and I advised her to remain doing her treadmill activity twice a day if possible.   On 11/17/2022 a lot of the patient's fatigue is lack of sleep. She wakes up at 2-o'clock in the morning to dig into the skin of her lower extremities and she spends the  rest of the night just digging and up and about. Hopefully with the Singulair taken at nighttime that also can facilitate sleep and taking the prednisone in the morning as posted above it could have a positive impact on her that maybe will allow her to function better.  II reviewed the report by pathologist in regard to her dermatology issue and I discussed personally with Teresa Salgado MD,  on the telephone this patient's situation and she agrees with new plan of care that was discussed with her on the telephone.   I also performed on this patient cold agglutinins that were negative, cryoglobulins that were negative and sedimentation rate and LDH that were normal against any inflammatory condition of any nature. I had discussion with Teresa Salgado MD, in regard any alterations in the skin to suggest any parasitic conditions like scabies or things of this nature. Everything was negative in this regard and this is not consistent clinically neither anyway.  On 01/23/2023 the patient was further reviewed in regard to her fatigue and her skin rash. Since the previous visit we started the patient on prednisone initially 10 mg once a day that she could not handle because of overactivity and then subsequently we moved to every other day. The patient has been taking this amount now for almost 6-7 weeks. Since then the pruritus has disappeared and most of the rash of the skin has cleared with the exception of her right lower extremity that I took a picture of today.  Dermatologist, Teresa Christianson MD, has sent us information about bullous pemphigoid and my belief is probably Dr. Christianson has enough information available to believe that the patient has this condition. She prescribed doxycycline and nicotinic acid. The patient has not started those medicines yet. She raised the question to me if she needs to take them. My advice will be yes and I advised her to use the doxycycline 100 mg a day first and if in a week she feels okay the  medicine then initiate the 2nd medication.   The prescribed prednisone that this was since discontinued  Bone marrow being depleted of iron stores may be the culprit of her fatigue  On 07/12/2023 the patient's fatigue has improved since she received IV iron therapy. We are pending to review ferritin, iron profile today and reticulated hemoglobin. The patient's appetite is better and she has variation in foods that she was not having before. We welcome this.  On 10/18/2023, her fatigue is ongoing given the new issue of the pain in the chest wall posteriorly on the right side.  On 11/13/2023, her fatigue is now some better after she has received IV iron therapy. The patient is able to do a few areas of housework including some laundry, some dishes, some mild cooking. I encouraged this activity to continue including walking.  On 01/22/2024 the fatigue that she has had for so many months is now improving, she is able to do housework, she is able to do a lot more walking, she has gained weight. Her appetite is improved and she feels substantially better now that she has no back pain associated with compression fracture, she feels like a new person. Seeing this happen is a toney, we will enjoy as much as we can. The patient remains functional and active as she is now.             4.  Bone marrow depletion of iron  Bone marrow biopsy 5/9/2023 with pathology noting virtually absent stainable iron identified.  Injectafer planned x2.  Proceed with first dose today  On 07/12/2023 we have iron studies pending today. They were normal before we documented her depletion of bone marrow iron. We will follow her clinically.  On 10/18/2023 I asked her to stop her iron supplementation. At this time, her ferritin and iron profile are back to normal.  On 11/13/2023, her ferritin, iron profile are much better now.  On 01/22/2024 hemoglobin is stable. We will perform a reticulated hemoglobin in the future.            5.  Pruritus  Recent  evaluation by dermatology with addition of a salve.  She has found this very beneficial and is no longer struggling with significant pruritus  On 07/12/2023 the only area where she has pruritus, there are no skin lesions in the inner aspect of the skin close to her scapula. There are no lesions in the skin in this anatomical site whatsoever. What to do with this, it is impossible for me to decide. She has proper moisture in this location.  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 No new lesions in the skin pertinent to this. She has areas of seborrheic keratoses with no implications. Previous lesions in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

## 2024-01-23 LAB
ALBUMIN SERPL ELPH-MCNC: 4 G/DL (ref 2.9–4.4)
ALBUMIN/GLOB SERPL: 1.8 {RATIO} (ref 0.7–1.7)
ALPHA1 GLOB SERPL ELPH-MCNC: 0.3 G/DL (ref 0–0.4)
ALPHA2 GLOB SERPL ELPH-MCNC: 0.6 G/DL (ref 0.4–1)
B-GLOBULIN SERPL ELPH-MCNC: 0.7 G/DL (ref 0.7–1.3)
GAMMA GLOB SERPL ELPH-MCNC: 0.7 G/DL (ref 0.4–1.8)
GLOBULIN SER-MCNC: 2.3 G/DL (ref 2.2–3.9)
IGA SERPL-MCNC: 40 MG/DL (ref 64–422)
IGG SERPL-MCNC: 492 MG/DL (ref 586–1602)
IGM SERPL-MCNC: 386 MG/DL (ref 26–217)
INTERPRETATION SERPL IEP-IMP: ABNORMAL
KAPPA LC FREE SER-MCNC: 16.6 MG/L (ref 3.3–19.4)
KAPPA LC FREE/LAMBDA FREE SER: 2.81 {RATIO} (ref 0.26–1.65)
LABORATORY COMMENT REPORT: ABNORMAL
LAMBDA LC FREE SERPL-MCNC: 5.9 MG/L (ref 5.7–26.3)
M PROTEIN SERPL ELPH-MCNC: 0.3 G/DL
PROT SERPL-MCNC: 6.3 G/DL (ref 6–8.5)

## 2024-01-24 ENCOUNTER — TELEPHONE (OUTPATIENT)
Dept: ONCOLOGY | Facility: CLINIC | Age: 84
End: 2024-01-24
Payer: MEDICARE

## 2024-01-24 NOTE — TELEPHONE ENCOUNTER
----- Message from Florencio Larose MD sent at 1/24/2024 10:37 AM EST -----  Call her ig  m protein is stable great

## 2024-02-13 ENCOUNTER — OFFICE VISIT (OUTPATIENT)
Dept: FAMILY MEDICINE CLINIC | Facility: CLINIC | Age: 84
End: 2024-02-13
Payer: MEDICARE

## 2024-02-13 VITALS
DIASTOLIC BLOOD PRESSURE: 74 MMHG | RESPIRATION RATE: 18 BRPM | TEMPERATURE: 96.9 F | HEART RATE: 65 BPM | SYSTOLIC BLOOD PRESSURE: 140 MMHG | BODY MASS INDEX: 20.76 KG/M2 | WEIGHT: 124.6 LBS | OXYGEN SATURATION: 97 % | HEIGHT: 65 IN

## 2024-02-13 DIAGNOSIS — J45.20 MILD INTERMITTENT ASTHMA WITHOUT COMPLICATION: ICD-10-CM

## 2024-02-13 DIAGNOSIS — I10 ESSENTIAL HYPERTENSION: ICD-10-CM

## 2024-02-13 DIAGNOSIS — E03.9 ADULT HYPOTHYROIDISM: Primary | ICD-10-CM

## 2024-02-13 PROCEDURE — 3078F DIAST BP <80 MM HG: CPT | Performed by: NURSE PRACTITIONER

## 2024-02-13 PROCEDURE — 99214 OFFICE O/P EST MOD 30 MIN: CPT | Performed by: NURSE PRACTITIONER

## 2024-02-13 PROCEDURE — 3077F SYST BP >= 140 MM HG: CPT | Performed by: NURSE PRACTITIONER

## 2024-02-13 RX ORDER — MELATONIN
1000 DAILY
COMMUNITY

## 2024-02-27 DIAGNOSIS — E03.9 ADULT HYPOTHYROIDISM: ICD-10-CM

## 2024-02-27 NOTE — TELEPHONE ENCOUNTER
Rx Refill Note  Requested Prescriptions     Pending Prescriptions Disp Refills    levothyroxine (SYNTHROID, LEVOTHROID) 50 MCG tablet 90 tablet 3     Sig: Take 1 tablet by mouth Daily.      Last office visit with prescribing clinician: 2/13/2024   Last telemedicine visit with prescribing clinician: Visit date not found   Next office visit with prescribing clinician: 8/13/2024       {TIP  Please add Last Relevant Lab Date if appropriate: 01/22/24                 Would you like a call back once the refill request has been completed: [] Yes [] No    If the office needs to give you a call back, can they leave a voicemail: [] Yes [] No    Shannon Núñez MA  02/27/24, 13:27 EST

## 2024-02-27 NOTE — TELEPHONE ENCOUNTER
Caller: Karli Loomis    Relationship: Self    Best call back number: [phone]     Requested Prescriptions:   Requested Prescriptions     Pending Prescriptions Disp Refills    levothyroxine (SYNTHROID, LEVOTHROID) 50 MCG tablet 90 tablet 3     Sig: Take 1 tablet by mouth Daily.        Pharmacy where request should be sent: [facility] - [facility] [address] - [phone]  - [phone]     Last office visit with prescribing clinician: 2/13/2024   Last telemedicine visit with prescribing clinician: Visit date not found   Next office visit with prescribing clinician: 8/13/2024     Additional details provided by patient:     Does the patient have less than a 3 day supply:  [] Yes  [x] No    Would you like a call back once the refill request has been completed: [] Yes [x] No    If the office needs to give you a call back, can they leave a voicemail: [] Yes [x] No    Tamy Krause Rep   02/27/24 13:07 EST

## 2024-02-28 RX ORDER — LEVOTHYROXINE SODIUM 0.05 MG/1
50 TABLET ORAL DAILY
Qty: 90 TABLET | Refills: 1 | Status: SHIPPED | OUTPATIENT
Start: 2024-02-28

## 2024-03-01 ENCOUNTER — TELEPHONE (OUTPATIENT)
Dept: ONCOLOGY | Facility: CLINIC | Age: 84
End: 2024-03-01

## 2024-03-01 NOTE — TELEPHONE ENCOUNTER
The [facility] received a fax that requires your attention. The document has been indexed to the patient’s chart for your review.      Reason for sending: RECEIVED MEDICAL RECORDS    Documents Description: PROGRESS NOTE 02/29/24    Name of Sender: SARAN ARGUELLES [phone]    Date Indexed: 02/29/24    Notes (if needed): THANKS!

## 2024-03-13 DIAGNOSIS — I10 ESSENTIAL HYPERTENSION: ICD-10-CM

## 2024-03-13 RX ORDER — LOSARTAN POTASSIUM 100 MG/1
100 TABLET ORAL DAILY
Qty: 90 TABLET | Refills: 3 | Status: SHIPPED | OUTPATIENT
Start: 2024-03-13

## 2024-04-15 DIAGNOSIS — G62.0 DRUG-INDUCED POLYNEUROPATHY: ICD-10-CM

## 2024-04-15 NOTE — TELEPHONE ENCOUNTER
Rx Refill Note  Requested Prescriptions     Pending Prescriptions Disp Refills    gabapentin (NEURONTIN) 400 MG capsule [Pharmacy Med Name: GABAPENTIN 400 MG Capsule] 360 capsule      Sig: TAKE 1 CAPSULE FOUR TIMES DAILY      Last office visit with prescribing clinician: 2/13/2024   Last telemedicine visit with prescribing clinician: Visit date not found   Next office visit with prescribing clinician: 8/13/2024                         Would you like a call back once the refill request has been completed: [] Yes [] No    If the office needs to give you a call back, can they leave a voicemail: [] Yes [] No    Evy Clark MA  04/15/24, 13:31 EDT

## 2024-04-16 RX ORDER — GABAPENTIN 400 MG/1
CAPSULE ORAL
Qty: 360 CAPSULE | Refills: 1 | Status: SHIPPED | OUTPATIENT
Start: 2024-04-16

## 2024-05-14 NOTE — TELEPHONE ENCOUNTER
Patient called in stated that since increasing the blood pressure medication she noticed that her feet are swelling to the point her shoes are tight.  She also noticed  An itch on the right side of there chest.      Please advise    [phone]   4 = No assist / stand by assistance

## 2024-06-04 ENCOUNTER — OFFICE VISIT (OUTPATIENT)
Dept: ONCOLOGY | Facility: CLINIC | Age: 84
End: 2024-06-04
Payer: MEDICARE

## 2024-06-04 ENCOUNTER — LAB (OUTPATIENT)
Dept: LAB | Facility: HOSPITAL | Age: 84
End: 2024-06-04
Payer: MEDICARE

## 2024-06-04 VITALS
HEART RATE: 68 BPM | OXYGEN SATURATION: 96 % | DIASTOLIC BLOOD PRESSURE: 72 MMHG | TEMPERATURE: 98 F | SYSTOLIC BLOOD PRESSURE: 181 MMHG | BODY MASS INDEX: 20.31 KG/M2 | WEIGHT: 121.9 LBS | HEIGHT: 65 IN

## 2024-06-04 DIAGNOSIS — L12.0 BULLOUS PEMPHIGOID: ICD-10-CM

## 2024-06-04 DIAGNOSIS — R94.6 ABNORMAL RESULTS OF THYROID FUNCTION STUDIES: ICD-10-CM

## 2024-06-04 DIAGNOSIS — C50.812 MALIGNANT NEOPLASM OF OVERLAPPING SITES OF LEFT BREAST IN FEMALE, ESTROGEN RECEPTOR POSITIVE: ICD-10-CM

## 2024-06-04 DIAGNOSIS — Z17.0 MALIGNANT NEOPLASM OF OVERLAPPING SITES OF LEFT BREAST IN FEMALE, ESTROGEN RECEPTOR POSITIVE: ICD-10-CM

## 2024-06-04 DIAGNOSIS — C88.0 MACROGLOBULINEMIA OF WALDENSTROM: ICD-10-CM

## 2024-06-04 DIAGNOSIS — C50.812 MALIGNANT NEOPLASM OF OVERLAPPING SITES OF LEFT BREAST IN FEMALE, ESTROGEN RECEPTOR POSITIVE: Primary | ICD-10-CM

## 2024-06-04 DIAGNOSIS — Z17.0 MALIGNANT NEOPLASM OF OVERLAPPING SITES OF LEFT BREAST IN FEMALE, ESTROGEN RECEPTOR POSITIVE: Primary | ICD-10-CM

## 2024-06-04 DIAGNOSIS — R88.8 VERY LOW IRON STORES IN BONE MARROW: ICD-10-CM

## 2024-06-04 DIAGNOSIS — S22.060G COMPRESSION FRACTURE OF T8 VERTEBRA WITH DELAYED HEALING, SUBSEQUENT ENCOUNTER: ICD-10-CM

## 2024-06-04 DIAGNOSIS — K90.9 IRON MALABSORPTION: ICD-10-CM

## 2024-06-04 DIAGNOSIS — E87.1 HYPONATREMIA WITH NORMAL EXTRACELLULAR FLUID VOLUME: ICD-10-CM

## 2024-06-04 DIAGNOSIS — E53.8 VITAMIN B 12 DEFICIENCY: ICD-10-CM

## 2024-06-04 DIAGNOSIS — M80.08XA AGE-RELATED OSTEOPOROSIS WITH CURRENT PATHOLOGICAL FRACTURE, VERTEBRA(E), INITIAL ENCOUNTER FOR FRACTURE: ICD-10-CM

## 2024-06-04 LAB
ALBUMIN SERPL-MCNC: 4.4 G/DL (ref 3.5–5.2)
ALBUMIN/GLOB SERPL: 2.1 G/DL
ALP SERPL-CCNC: 56 U/L (ref 39–117)
ALT SERPL W P-5'-P-CCNC: 15 U/L (ref 1–33)
ANION GAP SERPL CALCULATED.3IONS-SCNC: 10.5 MMOL/L (ref 5–15)
AST SERPL-CCNC: 29 U/L (ref 1–32)
BASOPHILS # BLD AUTO: 0.04 10*3/MM3 (ref 0–0.2)
BASOPHILS NFR BLD AUTO: 0.8 % (ref 0–1.5)
BILIRUB SERPL-MCNC: 0.8 MG/DL (ref 0–1.2)
BUN SERPL-MCNC: 8 MG/DL (ref 8–23)
BUN/CREAT SERPL: 10.7 (ref 7–25)
CALCIUM SPEC-SCNC: 9.7 MG/DL (ref 8.6–10.5)
CHLORIDE SERPL-SCNC: 92 MMOL/L (ref 98–107)
CO2 SERPL-SCNC: 26.5 MMOL/L (ref 22–29)
CREAT SERPL-MCNC: 0.75 MG/DL (ref 0.57–1)
DEPRECATED RDW RBC AUTO: 45.6 FL (ref 37–54)
EGFRCR SERPLBLD CKD-EPI 2021: 78.6 ML/MIN/1.73
EOSINOPHIL # BLD AUTO: 0.88 10*3/MM3 (ref 0–0.4)
EOSINOPHIL NFR BLD AUTO: 17.9 % (ref 0.3–6.2)
ERYTHROCYTE [DISTWIDTH] IN BLOOD BY AUTOMATED COUNT: 12.7 % (ref 12.3–15.4)
GLOBULIN UR ELPH-MCNC: 2.1 GM/DL
GLUCOSE SERPL-MCNC: 96 MG/DL (ref 65–99)
HCT VFR BLD AUTO: 39.7 % (ref 34–46.6)
HGB BLD-MCNC: 13.9 G/DL (ref 12–15.9)
IMM GRANULOCYTES # BLD AUTO: 0.01 10*3/MM3 (ref 0–0.05)
IMM GRANULOCYTES NFR BLD AUTO: 0.2 % (ref 0–0.5)
LYMPHOCYTES # BLD AUTO: 1.33 10*3/MM3 (ref 0.7–3.1)
LYMPHOCYTES NFR BLD AUTO: 27 % (ref 19.6–45.3)
MCH RBC QN AUTO: 34.3 PG (ref 26.6–33)
MCHC RBC AUTO-ENTMCNC: 35 G/DL (ref 31.5–35.7)
MCV RBC AUTO: 98 FL (ref 79–97)
MONOCYTES # BLD AUTO: 0.35 10*3/MM3 (ref 0.1–0.9)
MONOCYTES NFR BLD AUTO: 7.1 % (ref 5–12)
NEUTROPHILS NFR BLD AUTO: 2.31 10*3/MM3 (ref 1.7–7)
NEUTROPHILS NFR BLD AUTO: 47 % (ref 42.7–76)
NRBC BLD AUTO-RTO: 0 /100 WBC (ref 0–0.2)
PLATELET # BLD AUTO: 108 10*3/MM3 (ref 140–450)
PMV BLD AUTO: 8.7 FL (ref 6–12)
POTASSIUM SERPL-SCNC: 4.2 MMOL/L (ref 3.5–5.2)
PROT SERPL-MCNC: 6.5 G/DL (ref 6–8.5)
RBC # BLD AUTO: 4.05 10*6/MM3 (ref 3.77–5.28)
SODIUM SERPL-SCNC: 129 MMOL/L (ref 136–145)
T4 FREE SERPL-MCNC: 1.62 NG/DL (ref 0.92–1.68)
TSH SERPL DL<=0.05 MIU/L-ACNC: 2.06 UIU/ML (ref 0.27–4.2)
WBC NRBC COR # BLD AUTO: 4.92 10*3/MM3 (ref 3.4–10.8)

## 2024-06-04 PROCEDURE — 84443 ASSAY THYROID STIM HORMONE: CPT | Performed by: INTERNAL MEDICINE

## 2024-06-04 PROCEDURE — 80053 COMPREHEN METABOLIC PANEL: CPT

## 2024-06-04 PROCEDURE — 36415 COLL VENOUS BLD VENIPUNCTURE: CPT

## 2024-06-04 PROCEDURE — 84439 ASSAY OF FREE THYROXINE: CPT | Performed by: INTERNAL MEDICINE

## 2024-06-04 PROCEDURE — 85025 COMPLETE CBC W/AUTO DIFF WBC: CPT

## 2024-06-04 NOTE — PROGRESS NOTES
Subjective    REASONS FOR FOLLOWUP:     History of Waldenstrom macroglobulinemia.  Off therapy for many years.  History of breast cancer stage I on the left, status post mastectomy. The patient completed aromatase inhibitor  X 5 YEARS IN 2019 without any evidence of breast cancer recurrence        History of Present Illness   On 06/04/2024 I had the opportunity to see this 83-year-old female who has remote history of left breast cancer status post mastectomy and adjuvant therapy with aromatase inhibitor for 5 years and previous history of Waldenstrom's macroglobulinemia. The patient was treated in the past with Rituxan. Last year she went through a crisis of rash, pruritus, fatigue. We did every single test known to mankind including bone marrow testing, radiological assessment of all sorts, we could not come up with any conclusion besides iron deficiency and a minimal population of lymphoma cells triggering the Waldenstrom's in the bone marrow. She had no need for therapy given the fact that her IgM protein has not risen. Today the patient states that she has been having no appetite whatsoever in the last 4 months, she has lost probably 4-5 pounds and she has profound fatigue. She has no longer rash or itching. No febrile illness. No nocturnal sweats. She denies any abnormal bleeding, bone pain, joint pain, nausea, vomiting, diarrhea, constipation, urinary symptoms, cough, sputum production, shortness of breath, pleuritic pain or neurological symptoms. She is fatigued, she is tired, she does not want to get off of the couch and she barely is able to get around to fix a meal for herself. Her daughter comes once a month to stay with her for a week from [place] and that week she feels much better. The patient denies any psychiatric issues.                Past Medical History:   Diagnosis Date    Acid reflux     APC (atrial premature contractions) 05/25/2022    Asthma     Chronic pansinusitis 07/12/2018     Clostridium difficile colitis     Requiring admission to [facility] in 09/2008    Drug-induced polyneuropathy 12/14/2017    Epilepsy with simple partial seizures 12/14/2017    Headache, migraine 12/14/2017    History of transfusion of packed red blood cells     R/T SURGERY    Hyperlipidemia     Hypertension     Malignant neoplasm of overlapping sites of left breast in female, estrogen receptor positive 04/13/2016    Nonrheumatic aortic valve insufficiency     Skin cancer     Thyroid nodule     Removed more than 35 years ago    TIA (transient ischemic attack) 10/18/2018    UTI due to extended-spectrum beta lactamase (ESBL) producing Escherichia coli 01/06/2019    Waldenstrom macroglobulinemia      Past Surgical History:   Procedure Laterality Date    ADENOIDECTOMY      APPENDECTOMY      BACK SURGERY      CARPAL TUNNEL RELEASE Right     CATARACT EXTRACTION Bilateral     CHOLECYSTECTOMY      COLONOSCOPY      HYSTERECTOMY      Partial, many years ago, heavy bleeding, no cancer    KNEE ARTHROSCOPY Right 03/2009    Finding of chondromalacia and appropriate cleanup of joint was performed by Dr. Moraes.    MASTECTOMY Left 07/2014    REPLACEMENT TOTAL KNEE Right 12/2015    SKIN CANCER EXCISION      several    TONSILLECTOMY           Social History     Socioeconomic History    Marital status:    Tobacco Use    Smoking status: Never     Passive exposure: Never    Smokeless tobacco: Never   Vaping Use    Vaping status: Never Used   Substance and Sexual Activity    Alcohol use: No     Comment: 2 cups caffeine/coffee daily    Drug use: No    Sexual activity: Defer     Family History   Problem Relation Age of Onset    Mental illness Mother     Migraines Mother     Dementia Mother     Heart disease Father     Hypertension Father     Kidney disease Father     Alcohol abuse Father     No Known Problems Daughter     No Known Problems Daughter     Breast cancer Other     Heart disease Other     Hypertension Other   "   Diabetes Other     Cancer Maternal Grandmother     Breast cancer Maternal Grandmother     No Known Problems Maternal Grandfather     No Known Problems Paternal Grandmother     No Known Problems Paternal Grandfather     Hyperlipidemia Son     Lymphoma Neg Hx     Leukemia Neg Hx      Current Outpatient Medications on File Prior to Visit   Medication Sig Dispense Refill    aspirin 81 MG tablet Take 1 tablet by mouth Daily.      betamethasone, augmented, (DIPROLENE) 0.05 % ointment       budesonide-formoterol (Symbicort) 160-4.5 MCG/ACT inhaler Inhale 2 puffs 2 (Two) Times a Day. 10.2 g 2    cetirizine (zyrTEC) 10 MG tablet Take 1 tablet by mouth Daily.      Cholecalciferol 25 MCG (1000 UT) tablet Take 1 tablet by mouth Daily.      gabapentin (NEURONTIN) 400 MG capsule TAKE 1 CAPSULE FOUR TIMES DAILY 360 capsule 1    hydrALAZINE (APRESOLINE) 50 MG tablet TAKE 1 TABLET THREE TIMES DAILY 270 tablet 2    levothyroxine (SYNTHROID, LEVOTHROID) 50 MCG tablet Take 1 tablet by mouth Daily. 90 tablet 1    losartan (COZAAR) 100 MG tablet TAKE 1 TABLET EVERY DAY 90 tablet 3    mupirocin (BACTROBAN) 2 % ointment       niacinamide 500 MG tablet Take 1 tablet by mouth 3 (Three) Times a Day.      [DISCONTINUED] Lactobacillus-Inulin (Culturelle Digestive Daily) capsule Take 1 tablet by mouth Daily. (Patient not taking: Reported on 2/13/2024) 30 capsule 0     No current facility-administered medications on file prior to visit.       ALLERGIES:    Allergies   Allergen Reactions    Cortisone Unknown - High Severity     Reports having a shot years ago, and wonders if he hit a vein. She was told by another doctor that it probably went in her blood stream.     Hytrin [Terazosin] Rash    Amlodipine Rash    Darvon  [Propoxyphene] Palpitations       Objective      Vitals:    06/04/24 1339   BP: (!) 181/72   Pulse: 68   Temp: 98 °F (36.7 °C)   TempSrc: Oral   SpO2: 96%   Weight: 55.3 kg (121 lb 14.4 oz)   Height: 165.1 cm (65\")   PainSc: 0-No " pain           6/4/2024     1:41 PM   Current Status   ECOG score 0   Exam  :                     Minor blood pressure check per Dr. Larose, right upper extremity baseline condition 140/70.      GENERAL:  Well-developed, Patient  in no acute distress.   SKIN:  Warm, dry ,NO purpura ,no rash.  HEENT:  Pupils were equal and reactive to light and accomodation, conjunctivae noninjected,  normal visual acuity.   NECK:  Supple with good range of motion; no thyromegaly , no JVD or bruits,.No carotid artery pain, no carotid abnormal pulsation   LYMPHATICS:  No cervical, NO supraclavicular, NO axillary, NO inguinal adenopathies.  CARDIAC   normal rate , regular rhythm, without murmur,NO rubs NO S3 NO S4   LUNGS: normal breath sounds bilateral, no wheezing, NO rhonchi, NO crackles ,NO rubs.  VASCULAR VENOUS: no cyanosis, NO collateral circulation, NO varicosities, NO edema, NO palpable cords, NO pain,NO erythema, NO pigmentation of the skin.  ABDOMEN:  Soft, NO pain,no hepatomegaly, no splenomegaly,no masses, no ascites, no collateral circulation,no distention.  EXTREMITIES  AND SPINE:  No clubbing, no cyanosis ,no deformities , no pain .No kyphosis,  no pain in spine, no pain in ribs , no pain in pelvic bone.sarcopenia  NEUROLOGICAL:  Patient was awake, alert, oriented to time, person and place.  Left sided mastectomy with no masses, induration, tenderness, no left axillary adenopathy, no lymphedema left upper extremity. Normal right breast, no induration, no tenderness, no erythema, no masses, normal right axilla.          RECENT LABS:  Results from last 7 days   Lab Units 06/04/24  1330   WBC 10*3/mm3 4.92   NEUTROS ABS 10*3/mm3 2.31   HEMOGLOBIN g/dL 13.9   HEMATOCRIT % 39.7   PLATELETS 10*3/mm3 108*         Assessment & Plan      1.  Waldenstrom macroglobulinemia that has been treated in the past mainly with Rituxan. In the past, also she was treated with Velcade with very dramatic improvement in her monoclonal protein  but very dramatic sensory peripheral neuropathy. This medication was discontinued altogether.   6/29/2020, she developed progressive fatigue with worsening neuropathy in her feet.  These were her original symptoms at diagnosis of Waldenstrom's.  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  Calcium also elevated at 10.5 with decreased sodium related to pseudohyponatremia associated with monoclonal protein.  Previously, she did not receive improvement from Rituxan, she is not thought to be a candidate for Imbruvica, therefore she went on to initiate venetoclax 7/13/2020.  She is currently on her next planned dose of 200 mg daily with poor tolerance as outlined below  04/28/2021 normal sedimentation rate, stable monoclonal protein IgM that the Waldenstrom could be the most focal reason to explain her fatigue. On the other hand it is impossible given her thrombocytopenia to be sure that she still has some component of lymphoma in her bone marrow and the only way to prove this will be to pursue in a bone marrow test. She does not believe that she is ready for this at this time.  Radiologically speaking the CT scan of the chest, abdomen and pelvis failed to document any lymphadenopathy, masses, splenomegaly or any other issue pertinent to her Waldenstrom.  06/02/2021 that I do not feel that the symptoms of fatigue are related to her Waldenström's at this time. Her monoclonal protein study has remained completely quiet. Her white count and hemoglobin are normal. Her B12, folic acid, ferritin, iron profile remain normal and TSH hs remained into the normal limits. Her sed rate was normal at 4 mm/hr and she has no obvious infection or inflammatory process.   On 08/25/2021 the patient's monoclonal protein has remained very stable as discussed with her during the previous weeks. We have another level pending today. One more analysis that I decided to pursue was an amyloid analysis and abdominal fat pad biopsy. This was  performed in late 06/2021 by Deric Llamas MD. Material was sent to [facility]. No [place] red material was found in this specimen. Therefore the possibility of amyloidosis is less likely under these circumstances.   11/17/2021 in regard to her Waldenstrom's. She does not have any symptoms pertinent to this   12/17/2021. I do not see any symptoms or signs of her Waldenstrom's. Her white count, hemoglobin, platelets, white count differential are normal. Her chemistry profile is pending. I find no reason to proceed with any other therapy for this condition. Her monoclonal protein has remained quiet, stable.   03/11/2022 the patient’s white count, hemoglobin and platelets are normal. She has minimal sensory neuropathy in her feet that is no worse than before. She has no fever, chills or infections. No clinical bleeding. Her monoclonal protein IgM has remained very stable. She has not required any other intervention from my side of the story and she will be watched in absence of any other intervention. She will be called at home with the report of her monoclonal protein study next week.  11/02/2022 the patient has not had any clinical bleeding, no febrile illness, no peripheral adenopathy, no blurred vision, and her white count, hemoglobin and platelets remain stable in regard to her previous history of Waldenstrom. On the other hand the patient has this very peculiar maculopapular rash with multiple areas of ulceration not only in her trunk but also in her face, upper and lower extremities. The areas in the trunk are the worse, especially on her backside. Photographs were obtained and placed in media by Dr. Larose.   In spite of seeing dermatologist, a skin biopsy has not been done. The patient continues blaming this rash to her blood pressure medications. I went back into time in 2020 to see if we can find what she was taking before. She was not having any rash but she has been worse from this point of view in the  last several weeks.   I think independent of what she is going to do with her life very likely she needs to have a different approach to the diagnosis of her rash in the skin and I strongly believe there is a connection between the rash and her Waldenstrom. I have placed a phone call to discuss this with the patient's dermatologist. I strongly believe that she will require skin biopsy looking for specific skin entities as well as looking for pemphigoid.  She has similar lesions in her chest wall, similar lesion in her buttock and back of the spine and upper and lower extremities.  On 11/17/2022 the patient has continued having extensive lesions in the skin. We have measured her monoclonal protein that has not changed and she has not required any therapy for this for a long time. I measured her IgE level that was 1370 and any number above 500 is very abnormal. 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 Amlodipine has been discontinued. Obviously the question is what is the next medicine that needs to be discontinued and my next candidate given the characteristics of medication is hydralazine.   I went ahead and advised the patient to initiate a program of prednisone 10 mg at breakfast time and Singulair 5 mg at bedtime. The patient will continue putting topical moisturizer on the skin. She is not using any topical steroid at this time.  I also checked on this patient's KARRIE that was negative.  I raised the question to Teresa Salgado MD, if she saw anything to suggest any form of vasculitis that was negative.   Therefore after all of these facts and these discussions back and forth and so much conversation we are ready to proceed to see if we change the outcome of this patient in regard all of these symptoms.   On 01/23/2023 the patient has not had any new symptoms pertinent to  Waldenstrom's. She has no peripheral adenopathy, she has no hepatosplenomegaly, she has no bleeding, blurred vision.  Her white count, hemoglobin and platelets remain normal. White count differential is normal and we are waiting to review her monoclonal protein that during the previous visit was completely stable in regard to her IgM level. I expect that this condition will remain quiet and for this reason I advised her to return to see me back in 3 months with repeat CBC, CMP and monoclonal protein study.  4/17/2023 complaining of intense pruritus in the absence of skin rash.  She is using moisturizers.  IgE at that time 1538  5/1/2023 patient seen initially planned to start Rituxan today however after received labs from her last visit patient's IgE remains elevated.  Dr. Larose would like for the patient to undergo bone marrow biopsy for further evaluation.   5/9/2023 CT-guided bone marrow biopsy.  Flow cytometry shows minimal population of monoclonal B cells and no iron in the bone marrow.  Per Dr. Larose, the patient does not need to undergo repeat Rituxan though will require IV iron replacement.  On 07/12/2023 the patient has no symptoms that suggests Waldenstrom recurrence. The only concern and issue is minor drop in her platelet count. The patient's hemoglobin and white count are normal and her serum protein electrophoresis is pending. The patient will be called with the report of her serum protein electrophoresis expecting that this will be hopefully stable in comparison with previous assessment. I find no need to trigger anymore radiological assessment on her, neither repeating bone marrow testing, neither any other issue pertinent to her Waldenstrom's at this time.  On 10/18/2023 the patient has no obvious symptoms related to her Waldenstrom macroglobulinemia. Her white count, hemoglobin and platelets are doing fine and we are pending to review her IgA monoclonal protein in the next couple of days. 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 This is called pseudohyponatremia.     It is very concerning though the fact of new pain in the rib cage posteriorly on the right side; that is what triggered her hospitalization and it was missed completely.     Please review below.    On 11/13/2023, her monoclonal protein studies have remained stable in regard to this issue and this will remain in observation for the time being. Her white count, hemoglobin and platelets are acceptable. Minimal low platelet count with no implications at this time.  On 01/22/2024 the patient was further reviewed. Her white count, hemoglobin and platelets are stable, her monoclonal protein studies are pending and I do not see any obvious clinical progression of her Waldenstrom's. She has no peripheral adenopathy, no abnormal bleeding, no fatigue. In fact many of the symptoms that she had before have disappeared.  On 06/04/2024 the patient goes back to the cycle of profound and anorexia that she hs had before and came out of this out of the blue without any other reason for. Today her clinical examination in regard to her breast cancer and Waldenstrom's is very much negative with no obvious progressive symptoms of any nature pertinent to this. The white count, hemoglobin and platelets are no different than before, minimal thrombocytopenia no different than before. Chemistry profile, CA 15-3, thyroid test all of them pending.     Monoclonal protein studies are also pending.     Under the present circumstances raises the question in regard what goes on. I do not know the answer to this but I think it will be worth it to watch her back in absence of any other intervention in 4 months to repeat CBC, CMP and ARON. Once that the report of the laboratory testing requested today becomes available she will be contacted and discussed depending on findings.     Today I reiterated her blood  pressure in the right upper extremity in baseline condition is 140/70.               2.  History of left breast cancer, status post mastectomy.  She completed adjuvant therapy.    Mammogram 6/30/2020 benign  On 08/25/2021 she has no symptoms or signs of breast cancer recurrence. Her mastectomy site on the left is completely healed. Her right breast examination is normal. She is up to date on her mammogram. This will be watched in absence of any other intervention.   On 11/17/2021 the patient's left sided mastectomy is well healed, right breast exam is normal. There is no evidence of breast cancer recurrence clinically. She will remain in observation.   I find no symptoms or signs of breast cancer recurrence on her. This will be watched in absence of any other intervention as per 12/17/2021.   On 03/11/2022 the patient has no symptoms or signs of breast cancer recurrence. Her right breast exam is normal. The left mastectomy site is normal. She will be watched in absence of any other intervention. She is up-to-date in right-sided mammogram.  Right breast Mammogram 7/25/2022 negative  She remains in observation with no clinical evidence of recurrent disease  On 07/12/2023, her left breast mastectomy is normal, her right breast is normal. She has no symptoms or signs of breast cancer recurrence. She will be watched in absence of any other intervention.  On 10/18/2023, no history of breast cancer recurrence so far. On the other hand, the ridge posteriorly on the right side is worrisome given her previous history breast cancer. She has not had any trauma. She has not had any falls. The ribs are tender on clinical examination. There is no crepitation. She needs to have a bone scan and an MRI of the spine. A CA 15-3 will be ordered today.  On 11/13/2023, the patient has not had any issues pertinent to her history of breast cancer and tumor marker measurement not too long ago was normal. Her left mastectomy site is normal.  Right breast has not developed any new problems. This will remain in observation.  On 01/22/2024 no evidence of breast cancer recurrence, right breast examination is normal. Left mastectomy site is normal.  On 06/04/2024 no issues pertinent to this, no evidence of recurrent disease. She had today mammogram on the right side and left chest wall discloses no abnormality.                  3.  Fatigue. We have looked for endocrinological diseases, medication side effects, infections, malignancies, Waldenstrom's, amyloid, cardiac disease and so forth not having any conclusion in regard to the nature of this. I do believe that the lack of bad news or not finding any other thing is good news to me and I pointed this out to the patient. Maybe by the end of the day her fatigue is lack of proper physical training. She has bought a treadmill device that will be in her house as per today and hopefully she will be able to do some walking on this that will be meaningful in regard to recovering in regard energy.   The patient believes that the degree of fatigue that she was experiencing before is substantially better and now she is able to do some house activity. I encouraged her to continue this in the long run, is the only solution for fatigue is physical activity.   On 12/17/2021 the patient's fatigue has improved highly. She is now doing treadmill activity at home for 10 minutes once or twice a day. She has been eating better, she has lost some weight but she feels substantially better. I think the conditioning was probably the culprit of all of this related to the pandemia and I advised her to remain doing her treadmill activity twice a day if possible.   On 11/17/2022 a lot of the patient's fatigue is lack of sleep. She wakes up at 2-o'clock in the morning to dig into the skin of her lower extremities and she spends the rest of the night just digging and up and about. Hopefully with the Singulair taken at nighttime that also  can facilitate sleep and taking the prednisone in the morning as posted above it could have a positive impact on her that maybe will allow her to function better.  II reviewed the report by pathologist in regard to her dermatology issue and I discussed personally with Teresa Salgado MD,  on the telephone this patient's situation and she agrees with new plan of care that was discussed with her on the telephone.   I also performed on this patient cold agglutinins that were negative, cryoglobulins that were negative and sedimentation rate and LDH that were normal against any inflammatory condition of any nature. I had discussion with Teresa Salgado MD, in regard any alterations in the skin to suggest any parasitic conditions like scabies or things of this nature. Everything was negative in this regard and this is not consistent clinically neither anyway.  On 01/23/2023 the patient was further reviewed in regard to her fatigue and her skin rash. Since the previous visit we started the patient on prednisone initially 10 mg once a day that she could not handle because of overactivity and then subsequently we moved to every other day. The patient has been taking this amount now for almost 6-7 weeks. Since then the pruritus has disappeared and most of the rash of the skin has cleared with the exception of her right lower extremity that I took a picture of today.  Dermatologist, Teresa Christianson MD, has sent us information about bullous pemphigoid and my belief is probably Dr. Christianson has enough information available to believe that the patient has this condition. She prescribed doxycycline and nicotinic acid. The patient has not started those medicines yet. She raised the question to me if she needs to take them. My advice will be yes and I advised her to use the doxycycline 100 mg a day first and if in a week she feels okay the medicine then initiate the 2nd medication.   The prescribed prednisone that this was since  discontinued  Bone marrow being depleted of iron stores may be the culprit of her fatigue  On 07/12/2023 the patient's fatigue has improved since she received IV iron therapy. We are pending to review ferritin, iron profile today and reticulated hemoglobin. The patient's appetite is better and she has variation in foods that she was not having before. We welcome this.  On 10/18/2023, her fatigue is ongoing given the new issue of the pain in the chest wall posteriorly on the right side.  On 11/13/2023, her fatigue is now some better after she has received IV iron therapy. The patient is able to do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

## 2024-06-05 LAB
ALBUMIN SERPL ELPH-MCNC: 3.9 G/DL (ref 2.9–4.4)
ALBUMIN/GLOB SERPL: 1.7 {RATIO} (ref 0.7–1.7)
ALPHA1 GLOB SERPL ELPH-MCNC: 0.3 G/DL (ref 0–0.4)
ALPHA2 GLOB SERPL ELPH-MCNC: 0.6 G/DL (ref 0.4–1)
B-GLOBULIN SERPL ELPH-MCNC: 0.7 G/DL (ref 0.7–1.3)
GAMMA GLOB SERPL ELPH-MCNC: 0.8 G/DL (ref 0.4–1.8)
GLOBULIN SER-MCNC: 2.4 G/DL (ref 2.2–3.9)
IGA SERPL-MCNC: 41 MG/DL (ref 64–422)
IGG SERPL-MCNC: 544 MG/DL (ref 586–1602)
IGM SERPL-MCNC: 555 MG/DL (ref 26–217)
INTERPRETATION SERPL IEP-IMP: ABNORMAL
KAPPA LC FREE SER-MCNC: 12.9 MG/L (ref 3.3–19.4)
KAPPA LC FREE/LAMBDA FREE SER: 2.3 {RATIO} (ref 0.26–1.65)
LABORATORY COMMENT REPORT: ABNORMAL
LAMBDA LC FREE SERPL-MCNC: 5.6 MG/L (ref 5.7–26.3)
M PROTEIN SERPL ELPH-MCNC: 0.5 G/DL
PROT SERPL-MCNC: 6.3 G/DL (ref 6–8.5)

## 2024-06-07 ENCOUNTER — TELEPHONE (OUTPATIENT)
Dept: ONCOLOGY | Facility: CLINIC | Age: 84
End: 2024-06-07
Payer: MEDICARE

## 2024-06-07 DIAGNOSIS — C50.812 MALIGNANT NEOPLASM OF OVERLAPPING SITES OF LEFT BREAST IN FEMALE, ESTROGEN RECEPTOR POSITIVE: Primary | ICD-10-CM

## 2024-06-07 DIAGNOSIS — Z17.0 MALIGNANT NEOPLASM OF OVERLAPPING SITES OF LEFT BREAST IN FEMALE, ESTROGEN RECEPTOR POSITIVE: Primary | ICD-10-CM

## 2024-06-07 NOTE — TELEPHONE ENCOUNTER
Called patient to relay message: Call her m protein is up she needs ct cap next week and come back to see me after that. Pt v/u. Orders placed and message sent to scheduling. Saba Zuniga RN

## 2024-06-10 ENCOUNTER — OFFICE VISIT (OUTPATIENT)
Dept: CARDIOLOGY | Facility: CLINIC | Age: 84
End: 2024-06-10
Payer: MEDICARE

## 2024-06-10 ENCOUNTER — TELEPHONE (OUTPATIENT)
Dept: ONCOLOGY | Facility: CLINIC | Age: 84
End: 2024-06-10
Payer: MEDICARE

## 2024-06-10 VITALS
HEIGHT: 65 IN | HEART RATE: 72 BPM | WEIGHT: 121.8 LBS | BODY MASS INDEX: 20.29 KG/M2 | OXYGEN SATURATION: 98 % | SYSTOLIC BLOOD PRESSURE: 140 MMHG | DIASTOLIC BLOOD PRESSURE: 72 MMHG

## 2024-06-10 DIAGNOSIS — R53.1 WEAKNESS: Primary | ICD-10-CM

## 2024-06-10 DIAGNOSIS — C50.812 MALIGNANT NEOPLASM OF OVERLAPPING SITES OF LEFT BREAST IN FEMALE, ESTROGEN RECEPTOR POSITIVE: ICD-10-CM

## 2024-06-10 DIAGNOSIS — I49.1 APC (ATRIAL PREMATURE CONTRACTIONS): ICD-10-CM

## 2024-06-10 DIAGNOSIS — Z17.0 MALIGNANT NEOPLASM OF OVERLAPPING SITES OF LEFT BREAST IN FEMALE, ESTROGEN RECEPTOR POSITIVE: ICD-10-CM

## 2024-06-10 DIAGNOSIS — I35.1 NONRHEUMATIC AORTIC VALVE INSUFFICIENCY: ICD-10-CM

## 2024-06-10 DIAGNOSIS — I51.89 GRADE II DIASTOLIC DYSFUNCTION: ICD-10-CM

## 2024-06-10 DIAGNOSIS — E87.1 ACUTE HYPONATREMIA: ICD-10-CM

## 2024-06-10 DIAGNOSIS — I10 ESSENTIAL HYPERTENSION: Chronic | ICD-10-CM

## 2024-06-10 PROBLEM — G45.9 TIA (TRANSIENT ISCHEMIC ATTACK): Status: RESOLVED | Noted: 2018-10-18 | Resolved: 2024-06-10

## 2024-06-10 PROBLEM — G47.9 TROUBLE IN SLEEPING: Status: ACTIVE | Noted: 2024-06-10

## 2024-06-10 PROCEDURE — 3077F SYST BP >= 140 MM HG: CPT | Performed by: NURSE PRACTITIONER

## 2024-06-10 PROCEDURE — 93000 ELECTROCARDIOGRAM COMPLETE: CPT | Performed by: NURSE PRACTITIONER

## 2024-06-10 PROCEDURE — 99214 OFFICE O/P EST MOD 30 MIN: CPT | Performed by: NURSE PRACTITIONER

## 2024-06-10 PROCEDURE — 1159F MED LIST DOCD IN RCRD: CPT | Performed by: NURSE PRACTITIONER

## 2024-06-10 PROCEDURE — 3078F DIAST BP <80 MM HG: CPT | Performed by: NURSE PRACTITIONER

## 2024-06-10 PROCEDURE — 1160F RVW MEDS BY RX/DR IN RCRD: CPT | Performed by: NURSE PRACTITIONER

## 2024-06-10 NOTE — TELEPHONE ENCOUNTER
Caller: Karli Loomis    Relationship: Self    Best call back number: [phone]     What is the best time to reach you: ASAP    Who are you requesting to speak with (clinical staff, provider,  specific staff member): CLINICAL        What was the call regarding: PLEASE CALL PATIENT TO EXPLAIN INSTRUCTIONS FOR CT SCAN THIS WEEK, SHE NEEDS TO KNOW IF SHE CAN TAKE MEDS THAT MORNING, AND WHEN SHE WILL BE SCHEDULED FOR FOLLOW UP WITH DR BURNETTE.  SHE WAS CHECKING TO SEE IF ANY ADDITIONAL APPTS HAD BEEN MADE FOR HER YET, HUB ADVISED CT WAS SCHEDULED BUT NO ONE HAD NOTIFIED HER YET.  OK TO LEAVE VM IF NO ANSWER.

## 2024-06-10 NOTE — PROGRESS NOTES
Date of Office Visit: 06/10/2024  Encounter Provider: LENKA Zeng  Place of Service: [facility] CARDIOLOGY  Patient Name: Karli Loomis  :1940  Primary Cardiologist: Dr. Bacilio Hernández     Chief Complaint   Patient presents with    Follow-up   :     HPI: Karli Loomis is a 83 y.o. female who presents today for cardiac follow-up visit.  I reviewed her medical records.    She has known hypertension, hyperlipidemia, and asthma. She has had a previous TIA.  She had a rash for several years which was felt to be an autoimmune rash by dermatology.  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     She sees Dr. Bacilio Hernández for cardio-oncology. She has a history of right-sided breast cancer that was treated with aromatase inhibitor for 5 years and completed in .  She underwent right mastectomy and did not require chemotherapy or radiation.  She has Sewaren Gokul's macroglobulinemia and has been on rituximab, bortezomib, and venetoclax.     Echocardiograms:  May 2021, EF 67%, GLS -21.4%, mild basal septal hypertrophy, grade 2 diastolic dysfunction, moderate AI, and trace TR  2022, LVEF 60%, GLS -22.1%, grade 1 diastolic dysfunction, mild LVH, mild to moderate basal septal hypertrophy without obstruction, and moderate aortic regurgitation.  2023, LVEF greater than 70%, GLS -22.2%, grade 2 diastolic dysfunction, mild aortic regurgitation, and trace MR/TR.    In 2023, she had a brief episode of supraventricular tachycardia that was 8 beats in duration and PVCs.  She remains on metoprolol.    She presents today for follow-up visit.  She reports generalized weakness.  She does not always sleep well at nighttime.  She reports shortness of breath from asthma.  She denies chest pain, palpitations, edema, dizziness, syncope, or bleeding.  Blood pressures are elevated today.  She recently had blood work done at her hematology office and was told it  was abnormal.  Her sodium level was 129.      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    Epilepsy with simple partial seizures 12/14/2017    Headache, migraine 12/14/2017    History of transfusion of packed red blood cells     R/T SURGERY    Hyperlipidemia     Hypertension     Malignant neoplasm of overlapping sites of left breast in female, estrogen receptor positive 04/13/2016    Nonrheumatic aortic valve insufficiency     Skin cancer     Thyroid nodule     Removed more than 35 years ago    TIA (transient ischemic attack) 10/18/2018    UTI due to extended-spectrum beta lactamase (ESBL) producing Escherichia coli 01/06/2019    Waldenstrom macroglobulinemia        Past Surgical History:   Procedure Laterality Date    ADENOIDECTOMY      APPENDECTOMY      BACK SURGERY      CARPAL TUNNEL RELEASE Right     CATARACT EXTRACTION Bilateral     CHOLECYSTECTOMY      COLONOSCOPY      HYSTERECTOMY      Partial, many years ago, heavy bleeding, no cancer    KNEE ARTHROSCOPY Right 03/2009    Finding of chondromalacia and appropriate cleanup of joint was performed by Dr. Moraes.    MASTECTOMY Left 07/2014    REPLACEMENT TOTAL KNEE Right 12/2015    SKIN CANCER EXCISION      several    TONSILLECTOMY         Social History     Socioeconomic History    Marital status:    Tobacco Use    Smoking status: Never     Passive exposure: Never    Smokeless tobacco: Never   Vaping Use    Vaping status: Never Used   Substance and Sexual Activity    Alcohol use: No     Comment: 2 cups caffeine/coffee daily    Drug use: No    Sexual activity: Defer       Family History   Problem Relation Age of Onset    Mental illness Mother     Migraines Mother     Dementia Mother     Heart disease Father      Hypertension Father     Kidney disease Father     Alcohol abuse Father     No Known Problems Daughter     No Known Problems Daughter     Breast cancer Other     Heart disease Other     Hypertension Other     Diabetes Other     Cancer Maternal Grandmother     Breast cancer Maternal Grandmother     No Known Problems Maternal Grandfather     No Known Problems Paternal Grandmother     No Known Problems Paternal Grandfather     Hyperlipidemia Son     Lymphoma Neg Hx     Leukemia Neg Hx        The following portion of the patient's history were reviewed and updated as appropriate: past medical history, past surgical history, past social history, past family history, allergies, current medications, and problem list.    Review of Systems   Constitutional: Negative.   Cardiovascular:  Positive for dyspnea on exertion.   Respiratory: Negative.     Hematologic/Lymphatic: Negative.    Neurological:  Positive for weakness.       Allergies   Allergen Reactions    Cortisone Unknown - High Severity     Reports having a shot years ago, and wonders if he hit a vein. She was told by another doctor that it probably went in her blood stream.     Hytrin [Terazosin] Rash    Amlodipine Rash    Darvon  [Propoxyphene] Palpitations         Current Outpatient Medications:     aspirin 81 MG tablet, Take 1 tablet by mouth Daily., Disp: , Rfl:     betamethasone, augmented, (DIPROLENE) 0.05 % ointment, , Disp: , Rfl:     budesonide-formoterol (Symbicort) 160-4.5 MCG/ACT inhaler, Inhale 2 puffs 2 (Two) Times a Day., Disp: 10.2 g, Rfl: 2    cetirizine (zyrTEC) 10 MG tablet, Take 1 tablet by mouth Daily., Disp: , Rfl:     Cholecalciferol 25 MCG (1000 UT) tablet, Take 1 tablet by mouth Daily., Disp: , Rfl:     gabapentin (NEURONTIN) 400 MG capsule, TAKE 1 CAPSULE FOUR TIMES DAILY, Disp: 360 capsule, Rfl: 1    hydrALAZINE (APRESOLINE) 50 MG tablet, TAKE 1 TABLET THREE TIMES DAILY, Disp: 270 tablet, Rfl: 2    levothyroxine (SYNTHROID, LEVOTHROID) 50 MCG  "tablet, Take 1 tablet by mouth Daily., Disp: 90 tablet, Rfl: 1    losartan (COZAAR) 100 MG tablet, TAKE 1 TABLET EVERY DAY, Disp: 90 tablet, Rfl: 3    mupirocin (BACTROBAN) 2 % ointment, , Disp: , Rfl:     niacinamide 500 MG tablet, Take 1 tablet by mouth 3 (Three) Times a Day., Disp: , Rfl:          Objective:     Vitals:    06/10/24 1136 06/10/24 1201   BP: 160/70 140/72   BP Location: Right arm Right arm   Patient Position: Sitting Sitting   Cuff Size: Adult    Pulse: 72    SpO2: 98%    Weight: 55.2 kg (121 lb 12.8 oz)    Height: 165.1 cm (65\")      Body mass index is 20.27 kg/m².    PHYSICAL EXAM:    Vitals Reviewed.   General Appearance: No acute distress, well developed and well nourished.   Eyes: Glasses.   HENT: No hearing loss noted.    Respiratory: No signs of respiratory distress. Respiration rhythm and depth normal.  Clear to auscultation.   Cardiovascular:  Jugular Venous Pressure: Normal  Heart Rate and Rhythm: Normal, Heart Sounds: Normal S1 and S2. No S3 or S4 noted.  Murmurs: No murmurs noted. No rubs, thrills, or gallops.   Lower Extremities: No edema noted.  Musculoskeletal: Normal movement of extremities.  Skin: General appearance normal.    Psychiatric: Patient alert and oriented to person, place, and time. Speech and behavior appropriate. Normal mood and affect.       ECG 12 Lead    Date/Time: 6/10/2024 11:35 AM  Performed by: Vivien Pina APRN    Authorized by: Vivien Pina APRN  Comparison: compared with previous ECG from 9/11/2023  Similar to previous ECG  Rhythm: sinus rhythm  Rate: normal  BPM: 72  Conduction: conduction normal  ST Segments: ST segments normal  T Waves: T waves normal  QRS axis: normal  Other findings: left ventricular hypertrophy    Clinical impression: non-specific ECG            Assessment:       Diagnosis Plan   1. Weakness  Adult Transthoracic Echo Complete w/ Color, Spectral and Contrast if Necessary Per Protocol      2. Acute hyponatremia  Adult " Transthoracic Echo Complete w/ Color, Spectral and Contrast if Necessary Per Protocol      3. Nonrheumatic aortic valve insufficiency  Adult Transthoracic Echo Complete w/ Color, Spectral and Contrast if Necessary Per Protocol      4. Grade II diastolic dysfunction  Adult Transthoracic Echo Complete w/ Color, Spectral and Contrast if Necessary Per Protocol      5. Essential hypertension  Adult Transthoracic Echo Complete w/ Color, Spectral and Contrast if Necessary Per Protocol      6. APC (atrial premature contractions)  Adult Transthoracic Echo Complete w/ Color, Spectral and Contrast if Necessary Per Protocol      7. Malignant neoplasm of overlapping sites of left breast in female, estrogen receptor positive               Plan:       1.  She has been having weakness of unknown etiology.  Repeat echocardiogram.    2.  Acute hyponatremia: Defer to hematology or PCP.    3.  Aortic Insufficiency: Previously moderate and on last echocardiogram mild.  Recheck echocardiogram.    4.  Grade 2 diastolic dysfunction.    5.  Hypertension: Blood pressure elevated today.    6.  History of APCs.  Denies palpitations.    7.  Cardio-oncology visit.  History of breast cancer.    8.  Repeat echocardiogram.  If stable, follow-up with Dr. Hernández in 1 year.    As always, it has been a pleasure to participate in your patient's care. Thank you.         Sincerely,         LENKA Garcia  [facility]      Dictated utilizing Dragon Dictation

## 2024-06-11 ENCOUNTER — TELEPHONE (OUTPATIENT)
Dept: CARDIOLOGY | Facility: CLINIC | Age: 84
End: 2024-06-11
Payer: MEDICARE

## 2024-06-11 NOTE — TELEPHONE ENCOUNTER
After patient left the visit, I decided she needed an echocardiogram.  Please call to make sure that she understands I just want a recheck her ejection fraction and valvular function.  Looks like she is scheduled for 6/25, but I want to make sure that she is aware that it is being completed.  Thank you

## 2024-06-12 ENCOUNTER — TELEPHONE (OUTPATIENT)
Dept: ONCOLOGY | Facility: CLINIC | Age: 84
End: 2024-06-12
Payer: MEDICARE

## 2024-06-12 NOTE — TELEPHONE ENCOUNTER
Called and spoke with patient. Went over information from Vivien. Pt verbalized understanding and said she has the appt written in her calendar.    Thank you,    Syl Arriola, RN  Triage [facility]  06/12/24 09:07 EDT

## 2024-06-13 ENCOUNTER — HOSPITAL ENCOUNTER (OUTPATIENT)
Dept: PET IMAGING | Facility: HOSPITAL | Age: 84
Discharge: HOME | End: 2024-06-13
Admitting: INTERNAL MEDICINE
Payer: MEDICARE

## 2024-06-13 DIAGNOSIS — Z17.0 MALIGNANT NEOPLASM OF OVERLAPPING SITES OF LEFT BREAST IN FEMALE, ESTROGEN RECEPTOR POSITIVE: ICD-10-CM

## 2024-06-13 DIAGNOSIS — C50.812 MALIGNANT NEOPLASM OF OVERLAPPING SITES OF LEFT BREAST IN FEMALE, ESTROGEN RECEPTOR POSITIVE: ICD-10-CM

## 2024-06-13 PROCEDURE — 74177 CT ABD & PELVIS W/CONTRAST: CPT

## 2024-06-13 PROCEDURE — 25510000001 IOPAMIDOL 61 % SOLUTION: Performed by: INTERNAL MEDICINE

## 2024-06-13 PROCEDURE — 0 DIATRIZOATE MEGLUMINE & SODIUM PER 1 ML: Performed by: INTERNAL MEDICINE

## 2024-06-13 PROCEDURE — 71260 CT THORAX DX C+: CPT

## 2024-06-13 RX ADMIN — IOPAMIDOL 85 ML: 612 INJECTION, SOLUTION INTRAVENOUS at 09:05

## 2024-06-13 RX ADMIN — DIATRIZOATE MEGLUMINE AND DIATRIZOATE SODIUM 30 ML: 660; 100 LIQUID ORAL; RECTAL at 09:05

## 2024-06-19 ENCOUNTER — LAB (OUTPATIENT)
Dept: LAB | Facility: HOSPITAL | Age: 84
End: 2024-06-19
Payer: MEDICARE

## 2024-06-19 ENCOUNTER — OFFICE VISIT (OUTPATIENT)
Dept: ONCOLOGY | Facility: CLINIC | Age: 84
End: 2024-06-19
Payer: MEDICARE

## 2024-06-19 VITALS
TEMPERATURE: 97.4 F | WEIGHT: 122 LBS | RESPIRATION RATE: 19 BRPM | HEART RATE: 81 BPM | BODY MASS INDEX: 20.3 KG/M2 | OXYGEN SATURATION: 95 % | DIASTOLIC BLOOD PRESSURE: 82 MMHG | SYSTOLIC BLOOD PRESSURE: 207 MMHG

## 2024-06-19 DIAGNOSIS — C50.812 MALIGNANT NEOPLASM OF OVERLAPPING SITES OF LEFT BREAST IN FEMALE, ESTROGEN RECEPTOR POSITIVE: Primary | ICD-10-CM

## 2024-06-19 DIAGNOSIS — Z17.0 MALIGNANT NEOPLASM OF OVERLAPPING SITES OF LEFT BREAST IN FEMALE, ESTROGEN RECEPTOR POSITIVE: Primary | ICD-10-CM

## 2024-06-19 PROCEDURE — 3077F SYST BP >= 140 MM HG: CPT | Performed by: INTERNAL MEDICINE

## 2024-06-19 PROCEDURE — 3079F DIAST BP 80-89 MM HG: CPT | Performed by: INTERNAL MEDICINE

## 2024-06-19 PROCEDURE — 1159F MED LIST DOCD IN RCRD: CPT | Performed by: INTERNAL MEDICINE

## 2024-06-19 PROCEDURE — 1160F RVW MEDS BY RX/DR IN RCRD: CPT | Performed by: INTERNAL MEDICINE

## 2024-06-19 PROCEDURE — 99214 OFFICE O/P EST MOD 30 MIN: CPT | Performed by: INTERNAL MEDICINE

## 2024-06-19 PROCEDURE — 1126F AMNT PAIN NOTED NONE PRSNT: CPT | Performed by: INTERNAL MEDICINE

## 2024-06-19 NOTE — PROGRESS NOTES
Subjective    REASONS FOR FOLLOWUP:     History of Waldenstrom macroglobulinemia.  Off therapy for many years.  History of breast cancer stage I on the left, status post mastectomy. The patient completed aromatase inhibitor  X 5 YEARS IN 2019 without any evidence of breast cancer recurrence        History of Present Illness   On 06/04/2024 I had the opportunity to see this 83-year-old female who has remote history of left breast cancer status post mastectomy and adjuvant therapy with aromatase inhibitor for 5 years and previous history of Waldenstrom's macroglobulinemia. The patient was treated in the past with Rituxan. Last year she went through a crisis of rash, pruritus, fatigue. We did every single test known to mankind including bone marrow testing, radiological assessment of all sorts, we could not come up with any conclusion besides iron deficiency and a minimal population of lymphoma cells triggering the Waldenstrom's in the bone marrow. She had no need for therapy given the fact that her IgM protein has not risen. Today the patient states that she has been having no appetite whatsoever in the last 4 months, she has lost probably 4-5 pounds and she has profound fatigue. She has no longer rash or itching. No febrile illness. No nocturnal sweats. She denies any abnormal bleeding, bone pain, joint pain, nausea, vomiting, diarrhea, constipation, urinary symptoms, cough, sputum production, shortness of breath, pleuritic pain or neurological symptoms. She is fatigued, she is tired, she does not want to get off of the couch and she barely is able to get around to fix a meal for herself. Her daughter comes once a month to stay with her for a week from [place] and that week she feels much better. The patient denies any psychiatric issues.     On 06/19/2024 the patient returned to the office because during the previous visit we documented a minimal rise in her IgM monoclonal protein to 555. We requested for  her at that time to proceed with radiological assessment of her chest, abdomen and pelvis for review today. She does not feel any better today or worse than before and she continues using ice cream as the main source of  nutrition at nighttime. Besides this she is not doing too much in regard to activities at home. No febrile illness, no pains, no aches. Proper bowel activity, proper urination. No infections. No cardiac or respiratory issues.               Past Medical History:   Diagnosis Date    Acid reflux     APC (atrial premature contractions) 05/25/2022    Asthma     Chronic pansinusitis 07/12/2018    Clostridium difficile colitis     Requiring admission to [facility] in 09/2008    Drug-induced polyneuropathy 12/14/2017    Epilepsy with simple partial seizures 12/14/2017    Headache, migraine 12/14/2017    History of transfusion of packed red blood cells     R/T SURGERY    Hyperlipidemia     Hypertension     Malignant neoplasm of overlapping sites of left breast in female, estrogen receptor positive 04/13/2016    Nonrheumatic aortic valve insufficiency     Skin cancer     Thyroid nodule     Removed more than 35 years ago    TIA (transient ischemic attack) 10/18/2018    UTI due to extended-spectrum beta lactamase (ESBL) producing Escherichia coli 01/06/2019    Waldenstrom macroglobulinemia      Past Surgical History:   Procedure Laterality Date    ADENOIDECTOMY      APPENDECTOMY      BACK SURGERY      CARPAL TUNNEL RELEASE Right     CATARACT EXTRACTION Bilateral     CHOLECYSTECTOMY      COLONOSCOPY      HYSTERECTOMY      Partial, many years ago, heavy bleeding, no cancer    KNEE ARTHROSCOPY Right 03/2009    Finding of chondromalacia and appropriate cleanup of joint was performed by Dr. Moraes.    MASTECTOMY Left 07/2014    REPLACEMENT TOTAL KNEE Right 12/2015    SKIN CANCER EXCISION      several    TONSILLECTOMY           Social History     Socioeconomic History    Marital status:     Tobacco Use    Smoking status: Never     Passive exposure: Never    Smokeless tobacco: Never   Vaping Use    Vaping status: Never Used   Substance and Sexual Activity    Alcohol use: No     Comment: 2 cups caffeine/coffee daily    Drug use: No    Sexual activity: Defer     Family History   Problem Relation Age of Onset    Mental illness Mother     Migraines Mother     Dementia Mother     Heart disease Father     Hypertension Father     Kidney disease Father     Alcohol abuse Father     No Known Problems Daughter     No Known Problems Daughter     Breast cancer Other     Heart disease Other     Hypertension Other     Diabetes Other     Cancer Maternal Grandmother     Breast cancer Maternal Grandmother     No Known Problems Maternal Grandfather     No Known Problems Paternal Grandmother     No Known Problems Paternal Grandfather     Hyperlipidemia Son     Lymphoma Neg Hx     Leukemia Neg Hx      Current Outpatient Medications on File Prior to Visit   Medication Sig Dispense Refill    aspirin 81 MG tablet Take 1 tablet by mouth Daily.      betamethasone, augmented, (DIPROLENE) 0.05 % ointment       budesonide-formoterol (Symbicort) 160-4.5 MCG/ACT inhaler Inhale 2 puffs 2 (Two) Times a Day. 10.2 g 2    cetirizine (zyrTEC) 10 MG tablet Take 1 tablet by mouth Daily.      Cholecalciferol 25 MCG (1000 UT) tablet Take 1 tablet by mouth Daily.      gabapentin (NEURONTIN) 400 MG capsule TAKE 1 CAPSULE FOUR TIMES DAILY 360 capsule 1    hydrALAZINE (APRESOLINE) 50 MG tablet TAKE 1 TABLET THREE TIMES DAILY 270 tablet 2    levothyroxine (SYNTHROID, LEVOTHROID) 50 MCG tablet Take 1 tablet by mouth Daily. 90 tablet 1    losartan (COZAAR) 100 MG tablet TAKE 1 TABLET EVERY DAY 90 tablet 3    mupirocin (BACTROBAN) 2 % ointment       niacinamide 500 MG tablet Take 1 tablet by mouth 3 (Three) Times a Day.       No current facility-administered medications on file prior to visit.       ALLERGIES:    Allergies   Allergen Reactions     Cortisone Unknown - High Severity     Reports having a shot years ago, and wonders if he hit a vein. She was told by another doctor that it probably went in her blood stream.     Hytrin [Terazosin] Rash    Amlodipine Rash    Darvon  [Propoxyphene] Palpitations       Objective      Vitals:    06/19/24 1330   BP: (!) 207/82   Pulse: 81   Resp: 19   Temp: 97.4 °F (36.3 °C)   SpO2: 95%   Weight: 55.3 kg (122 lb)   PainSc: 0-No pain           6/4/2024     1:41 PM   Current Status   ECOG score 0   Exam  :                   GENERAL:  Well-developed, Patient  in no acute distress.   SKIN:  Warm, dry ,NO purpura ,no rash.  HEENT:  Pupils were equal and reactive to light and accomodation, conjunctivae noninjected,  normal visual acuity.   NECK:  Supple with good range of motion; no thyromegaly , no JVD or bruits,.No carotid artery pain, no carotid abnormal pulsation   LYMPHATICS:  No cervical, NO supraclavicular, NO axillary, NO inguinal adenopathies.  CARDIAC   normal rate , regular rhythm, without murmur,NO rubs NO S3 NO S4   LUNGS: normal breath sounds bilateral, no wheezing, NO rhonchi, NO crackles ,NO rubs.  VASCULAR VENOUS: no cyanosis, NO collateral circulation, NO varicosities, NO edema, NO palpable cords, NO pain,NO erythema, NO pigmentation of the skin.  ABDOMEN:  Soft, NO pain,no hepatomegaly, no splenomegaly,no masses, no ascites, no collateral circulation,no distention.  EXTREMITIES  AND SPINE:  No clubbing, no cyanosis ,no deformities , no pain .No kyphosis,  no pain in spine, no pain in ribs , no pain in pelvic bone.  NEUROLOGICAL:  Patient was awake, alert, oriented to time, person and place.          RECENT LABS:ARON,PE and FLC, Serum (06/04/2024 13:30)       CT Abdomen Pelvis With Contrast (06/13/2024 09:08)  CT Chest With Contrast Diagnostic (06/13/2024 09:08)      Assessment & Plan      1.  Waldenstrom macroglobulinemia that has been treated in the past mainly with Rituxan. In the past, also she was treated  with Velcade with very dramatic improvement in her monoclonal protein but very dramatic sensory peripheral neuropathy. This medication was discontinued altogether.   6/29/2020, she developed progressive fatigue with worsening neuropathy in her feet.  These were her original symptoms at diagnosis of Waldenstrom's.  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  Calcium also elevated at 10.5 with decreased sodium related to pseudohyponatremia associated with monoclonal protein.  Previously, she did not receive improvement from Rituxan, she is not thought to be a candidate for Imbruvica, therefore she went on to initiate venetoclax 7/13/2020.  She is currently on her next planned dose of 200 mg daily with poor tolerance as outlined below  04/28/2021 normal sedimentation rate, stable monoclonal protein IgM that the Waldenstrom could be the most focal reason to explain her fatigue. On the other hand it is impossible given her thrombocytopenia to be sure that she still has some component of lymphoma in her bone marrow and the only way to prove this will be to pursue in a bone marrow test. She does not believe that she is ready for this at this time.  Radiologically speaking the CT scan of the chest, abdomen and pelvis failed to document any lymphadenopathy, masses, splenomegaly or any other issue pertinent to her Waldenstrom.  06/02/2021 that I do not feel that the symptoms of fatigue are related to her Waldenström's at this time. Her monoclonal protein study has remained completely quiet. Her white count and hemoglobin are normal. Her B12, folic acid, ferritin, iron profile remain normal and TSH hs remained into the normal limits. Her sed rate was normal at 4 mm/hr and she has no obvious infection or inflammatory process.   On 08/25/2021 the patient's monoclonal protein has remained very stable as discussed with her during the previous weeks. We have another level pending today. One more analysis that I decided to  pursue was an amyloid analysis and abdominal fat pad biopsy. This was performed in late 06/2021 by Deric Llamas MD. Material was sent to [facility]. No [place] red material was found in this specimen. Therefore the possibility of amyloidosis is less likely under these circumstances.   11/17/2021 in regard to her Waldenstrom's. She does not have any symptoms pertinent to this   12/17/2021. I do not see any symptoms or signs of her Waldenstrom's. Her white count, hemoglobin, platelets, white count differential are normal. Her chemistry profile is pending. I find no reason to proceed with any other therapy for this condition. Her monoclonal protein has remained quiet, stable.   03/11/2022 the patient’s white count, hemoglobin and platelets are normal. She has minimal sensory neuropathy in her feet that is no worse than before. She has no fever, chills or infections. No clinical bleeding. Her monoclonal protein IgM has remained very stable. She has not required any other intervention from my side of the story and she will be watched in absence of any other intervention. She will be called at home with the report of her monoclonal protein study next week.  11/02/2022 the patient has not had any clinical bleeding, no febrile illness, no peripheral adenopathy, no blurred vision, and her white count, hemoglobin and platelets remain stable in regard to her previous history of Waldenstrom. On the other hand the patient has this very peculiar maculopapular rash with multiple areas of ulceration not only in her trunk but also in her face, upper and lower extremities. The areas in the trunk are the worse, especially on her backside. Photographs were obtained and placed in media by Dr. Larose.   In spite of seeing dermatologist, a skin biopsy has not been done. The patient continues blaming this rash to her blood pressure medications. I went back into time in 2020 to see if we can find what she was taking before. She was not  having any rash but she has been worse from this point of view in the last several weeks.   I think independent of what she is going to do with her life very likely she needs to have a different approach to the diagnosis of her rash in the skin and I strongly believe there is a connection between the rash and her Waldenstrom. I have placed a phone call to discuss this with the patient's dermatologist. I strongly believe that she will require skin biopsy looking for specific skin entities as well as looking for pemphigoid.  She has similar lesions in her chest wall, similar lesion in her buttock and back of the spine and upper and lower extremities.  On 11/17/2022 the patient has continued having extensive lesions in the skin. We have measured her monoclonal protein that has not changed and she has not required any therapy for this for a long time. I measured her IgE level that was 1370 and any number above 500 is very abnormal. 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 Amlodipine has been discontinued. Obviously the question is what is the next medicine that needs to be discontinued and my next candidate given the characteristics of medication is hydralazine.   I went ahead and advised the patient to initiate a program of prednisone 10 mg at breakfast time and Singulair 5 mg at bedtime. The patient will continue putting topical moisturizer on the skin. She is not using any topical steroid at this time.  I also checked on this patient's KARRIE that was negative.  I raised the question to Teresa Salgado MD, if she saw anything to suggest any form of vasculitis that was negative.   Therefore after all of these facts and these discussions back and forth and so much conversation we are ready to proceed to see if we change the outcome of this patient in regard all of these symptoms.   On  01/23/2023 the patient has not had any new symptoms pertinent to Waldenstrom's. She has no peripheral adenopathy, she has no hepatosplenomegaly, she has no bleeding, blurred vision.  Her white count, hemoglobin and platelets remain normal. White count differential is normal and we are waiting to review her monoclonal protein that during the previous visit was completely stable in regard to her IgM level. I expect that this condition will remain quiet and for this reason I advised her to return to see me back in 3 months with repeat CBC, CMP and monoclonal protein study.  4/17/2023 complaining of intense pruritus in the absence of skin rash.  She is using moisturizers.  IgE at that time 1538  5/1/2023 patient seen initially planned to start Rituxan today however after received labs from her last visit patient's IgE remains elevated.  Dr. Larose would like for the patient to undergo bone marrow biopsy for further evaluation.   5/9/2023 CT-guided bone marrow biopsy.  Flow cytometry shows minimal population of monoclonal B cells and no iron in the bone marrow.  Per Dr. Larose, the patient does not need to undergo repeat Rituxan though will require IV iron replacement.  On 07/12/2023 the patient has no symptoms that suggests Waldenstrom recurrence. The only concern and issue is minor drop in her platelet count. The patient's hemoglobin and white count are normal and her serum protein electrophoresis is pending. The patient will be called with the report of her serum protein electrophoresis expecting that this will be hopefully stable in comparison with previous assessment. I find no need to trigger anymore radiological assessment on her, neither repeating bone marrow testing, neither any other issue pertinent to her Waldenstrom's at this time.  On 10/18/2023 the patient has no obvious symptoms related to her Waldenstrom macroglobulinemia. Her white count, hemoglobin and platelets are doing fine and we are pending to  review her IgA monoclonal protein in the next couple of days. 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 This is called pseudohyponatremia.     It is very concerning though the fact of new pain in the rib cage posteriorly on the right side; that is what triggered her hospitalization and it was missed completely.     Please review below.    On 11/13/2023, her monoclonal protein studies have remained stable in regard to this issue and this will remain in observation for the time being. Her white count, hemoglobin and platelets are acceptable. Minimal low platelet count with no implications at this time.  On 01/22/2024 the patient was further reviewed. Her white count, hemoglobin and platelets are stable, her monoclonal protein studies are pending and I do not see any obvious clinical progression of her Waldenstrom's. She has no peripheral adenopathy, no abnormal bleeding, no fatigue. In fact many of the symptoms that she had before have disappeared.  On 06/04/2024 the patient goes back to the cycle of profound and anorexia that she hs had before and came out of this out of the blue without any other reason for. Today her clinical examination in regard to her breast cancer and Waldenstrom's is very much negative with no obvious progressive symptoms of any nature pertinent to this. The white count, hemoglobin and platelets are no different than before, minimal thrombocytopenia no different than before. Chemistry profile, CA 15-3, thyroid test all of them pending.     Monoclonal protein studies are also pending.     Under the present circumstances raises the question in regard what goes on. I do not know the answer to this but I think it will be worth it to watch her back in absence of any other intervention in 4 months to repeat CBC, CMP and ARON. Once that the report of the laboratory testing requested today becomes available she will be contacted and  discussed depending on findings.     Today I reiterated her blood pressure in the right upper extremity in baseline condition is 140/70.     On 06/19/2024, I reviewed with the patient her IgM monoclonal protein that has risen to 555. On the other hand, her chemistry profile remains normal. The rest of her CBC is normal. Furthermore, a CT scan of the chest shows normal pulmonary anatomy, normal cardiac anatomy. No pleural or pericardial effusions. No mediastinal or hilar adenopathies. No pulmonary masses or lesions. CT scan of the abdomen shows normal liver and spleen anatomies, normal stomach and pancreas anatomies, normal kidney anatomies. No retroperitoneal adenopathy. Adrenal glands were not visible or enlarged. She had no intra-abdominal masses. Bowel unremarkable. Pelvis disclosed no alterations in the bladder. No pelvic abnormality. On this basis, I do not believe that the patient needs to proceed with any other testing at this time. I wonder if eating so much ice cream every night is taking care of all the calories that she needs every day and she is not hungry. My suggestion to her is to drop down the ice cream business consumption to just 3 days a week and the rest of the week get by with regular food to see if this allows her to gain back weight and strength. I do not justify any other treatment for her besides the request for monoclonal protein studies, CBC and CMP in 6 months for review.              2.  History of left breast cancer, status post mastectomy.  She completed adjuvant therapy.    Mammogram 6/30/2020 benign  On 08/25/2021 she has no symptoms or signs of breast cancer recurrence. Her mastectomy site on the left is completely healed. Her right breast examination is normal. She is up to date on her mammogram. This will be watched in absence of any other intervention.   On 11/17/2021 the patient's left sided mastectomy is well healed, right breast exam is normal. There is no evidence of breast  cancer recurrence clinically. She will remain in observation.   I find no symptoms or signs of breast cancer recurrence on her. This will be watched in absence of any other intervention as per 12/17/2021.   On 03/11/2022 the patient has no symptoms or signs of breast cancer recurrence. Her right breast exam is normal. The left mastectomy site is normal. She will be watched in absence of any other intervention. She is up-to-date in right-sided mammogram.  Right breast Mammogram 7/25/2022 negative  She remains in observation with no clinical evidence of recurrent disease  On 07/12/2023, her left breast mastectomy is normal, her right breast is normal. She has no symptoms or signs of breast cancer recurrence. She will be watched in absence of any other intervention.  On 10/18/2023, no history of breast cancer recurrence so far. On the other hand, the ridge posteriorly on the right side is worrisome given her previous history breast cancer. She has not had any trauma. She has not had any falls. The ribs are tender on clinical examination. There is no crepitation. She needs to have a bone scan and an MRI of the spine. A CA 15-3 will be ordered today.  On 11/13/2023, the patient has not had any issues pertinent to her history of breast cancer and tumor marker measurement not too long ago was normal. Her left mastectomy site is normal. Right breast has not developed any new problems. This will remain in observation.  On 01/22/2024 no evidence of breast cancer recurrence, right breast examination is normal. Left mastectomy site is normal.  On 06/04/2024 no issues pertinent to this, no evidence of recurrent disease. She had today mammogram on the right side and left chest wall discloses no abnormality.  On 06/19/2024, no clinical or radiological evidence of recurrent breast cancer. This will be watched in absence of any other intervention.                    3.  Fatigue. We have looked for endocrinological diseases,  medication side effects, infections, malignancies, Waldenstrom's, amyloid, cardiac disease and so forth not having any conclusion in regard to the nature of this. I do believe that the lack of bad news or not finding any other thing is good news to me and I pointed this out to the patient. Maybe by the end of the day her fatigue is lack of proper physical training. She has bought a treadmill device that will be in her house as per today and hopefully she will be able to do some walking on this that will be meaningful in regard to recovering in regard energy.   The patient believes that the degree of fatigue that she was experiencing before is substantially better and now she is able to do some house activity. I encouraged her to continue this in the long run, is the only solution for fatigue is physical activity.   On 12/17/2021 the patient's fatigue has improved highly. She is now doing treadmill activity at home for 10 minutes once or twice a day. She has been eating better, she has lost some weight but she feels substantially better. I think the conditioning was probably the culprit of all of this related to the pandemia and I advised her to remain doing her treadmill activity twice a day if possible.   On 11/17/2022 a lot of the patient's fatigue is lack of sleep. She wakes up at 2-o'clock in the morning to dig into the skin of her lower extremities and she spends the rest of the night just digging and up and about. Hopefully with the Singulair taken at nighttime that also can facilitate sleep and taking the prednisone in the morning as posted above it could have a positive impact on her that maybe will allow her to function better.  II reviewed the report by pathologist in regard to her dermatology issue and I discussed personally with Teresa Salgado MD,  on the telephone this patient's situation and she agrees with new plan of care that was discussed with her on the telephone.   I also performed on this patient  cold agglutinins that were negative, cryoglobulins that were negative and sedimentation rate and LDH that were normal against any inflammatory condition of any nature. I had discussion with Teresa Salgado MD, in regard any alterations in the skin to suggest any parasitic conditions like scabies or things of this nature. Everything was negative in this regard and this is not consistent clinically neither anyway.  On 01/23/2023 the patient was further reviewed in regard to her fatigue and her skin rash. Since the previous visit we started the patient on prednisone initially 10 mg once a day that she could not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

## 2024-06-25 ENCOUNTER — TELEPHONE (OUTPATIENT)
Dept: CARDIOLOGY | Facility: CLINIC | Age: 84
End: 2024-06-25
Payer: MEDICARE

## 2024-06-25 ENCOUNTER — HOSPITAL ENCOUNTER (OUTPATIENT)
Dept: CARDIOLOGY | Facility: HOSPITAL | Age: 84
Discharge: HOME | End: 2024-06-25
Admitting: NURSE PRACTITIONER
Payer: MEDICARE

## 2024-06-25 VITALS
WEIGHT: 122 LBS | HEART RATE: 74 BPM | SYSTOLIC BLOOD PRESSURE: 162 MMHG | DIASTOLIC BLOOD PRESSURE: 68 MMHG | HEIGHT: 65 IN | BODY MASS INDEX: 20.33 KG/M2

## 2024-06-25 DIAGNOSIS — I49.1 APC (ATRIAL PREMATURE CONTRACTIONS): ICD-10-CM

## 2024-06-25 DIAGNOSIS — I35.1 NONRHEUMATIC AORTIC VALVE INSUFFICIENCY: Primary | ICD-10-CM

## 2024-06-25 DIAGNOSIS — I10 ESSENTIAL HYPERTENSION: Chronic | ICD-10-CM

## 2024-06-25 DIAGNOSIS — R53.1 WEAKNESS: ICD-10-CM

## 2024-06-25 DIAGNOSIS — I35.1 NONRHEUMATIC AORTIC VALVE INSUFFICIENCY: ICD-10-CM

## 2024-06-25 DIAGNOSIS — I51.89 GRADE II DIASTOLIC DYSFUNCTION: ICD-10-CM

## 2024-06-25 DIAGNOSIS — E87.1 ACUTE HYPONATREMIA: ICD-10-CM

## 2024-06-25 LAB
AORTIC ARCH: 2.2 CM
ASCENDING AORTA: 3.1 CM
BH CV ECHO LEFT VENTRICLE GLOBAL LONGITUDINAL STRAIN: -22.2 %
BH CV ECHO MEAS - ACS: 1.79 CM
BH CV ECHO MEAS - AI P1/2T: 443.5 MSEC
BH CV ECHO MEAS - AO MAX PG: 12.3 MMHG
BH CV ECHO MEAS - AO MEAN PG: 7 MMHG
BH CV ECHO MEAS - AO ROOT DIAM: 2.8 CM
BH CV ECHO MEAS - AO V2 MAX: 175 CM/SEC
BH CV ECHO MEAS - AO V2 VTI: 38.2 CM
BH CV ECHO MEAS - AVA(I,D): 1.7 CM2
BH CV ECHO MEAS - EDV(CUBED): 59.3 ML
BH CV ECHO MEAS - EDV(MOD-SP2): 55 ML
BH CV ECHO MEAS - EDV(MOD-SP4): 53 ML
BH CV ECHO MEAS - EF(MOD-BP): 68.8 %
BH CV ECHO MEAS - EF(MOD-SP2): 69.1 %
BH CV ECHO MEAS - EF(MOD-SP4): 69.8 %
BH CV ECHO MEAS - ESV(CUBED): 12.3 ML
BH CV ECHO MEAS - ESV(MOD-SP2): 17 ML
BH CV ECHO MEAS - ESV(MOD-SP4): 16 ML
BH CV ECHO MEAS - FS: 40.8 %
BH CV ECHO MEAS - IVS/LVPW: 1.22 CM
BH CV ECHO MEAS - IVSD: 1.1 CM
BH CV ECHO MEAS - LAT PEAK E' VEL: 6.1 CM/SEC
BH CV ECHO MEAS - LV DIASTOLIC VOL/BSA (35-75): 33.1 CM2
BH CV ECHO MEAS - LV MASS(C)D: 122.1 GRAMS
BH CV ECHO MEAS - LV MAX PG: 4.8 MMHG
BH CV ECHO MEAS - LV MEAN PG: 3 MMHG
BH CV ECHO MEAS - LV SYSTOLIC VOL/BSA (12-30): 10 CM2
BH CV ECHO MEAS - LV V1 MAX: 110 CM/SEC
BH CV ECHO MEAS - LV V1 VTI: 24 CM
BH CV ECHO MEAS - LVIDD: 3.9 CM
BH CV ECHO MEAS - LVIDS: 2.31 CM
BH CV ECHO MEAS - LVOT AREA: 2.7 CM2
BH CV ECHO MEAS - LVOT DIAM: 1.86 CM
BH CV ECHO MEAS - LVPWD: 0.9 CM
BH CV ECHO MEAS - MED PEAK E' VEL: 5.1 CM/SEC
BH CV ECHO MEAS - MR MAX PG: 30.4 MMHG
BH CV ECHO MEAS - MR MAX VEL: 275.5 CM/SEC
BH CV ECHO MEAS - MV A DUR: 0.14 SEC
BH CV ECHO MEAS - MV A MAX VEL: 111 CM/SEC
BH CV ECHO MEAS - MV DEC SLOPE: 367.2 CM/SEC2
BH CV ECHO MEAS - MV DEC TIME: 0.24 SEC
BH CV ECHO MEAS - MV E MAX VEL: 84.4 CM/SEC
BH CV ECHO MEAS - MV E/A: 0.76
BH CV ECHO MEAS - MV MAX PG: 9.2 MMHG
BH CV ECHO MEAS - MV MEAN PG: 2.49 MMHG
BH CV ECHO MEAS - MV P1/2T: 68.8 MSEC
BH CV ECHO MEAS - MV V2 VTI: 30.9 CM
BH CV ECHO MEAS - MVA(P1/2T): 3.2 CM2
BH CV ECHO MEAS - MVA(VTI): 2.1 CM2
BH CV ECHO MEAS - PA ACC TIME: 0.12 SEC
BH CV ECHO MEAS - PA V2 MAX: 95 CM/SEC
BH CV ECHO MEAS - PULM A REVS DUR: 0.12 SEC
BH CV ECHO MEAS - PULM A REVS VEL: 23.1 CM/SEC
BH CV ECHO MEAS - PULM DIAS VEL: 29.1 CM/SEC
BH CV ECHO MEAS - PULM S/D: 1.75
BH CV ECHO MEAS - PULM SYS VEL: 51 CM/SEC
BH CV ECHO MEAS - QP/QS: 0.6
BH CV ECHO MEAS - RAP SYSTOLE: 3 MMHG
BH CV ECHO MEAS - RV MAX PG: 1.61 MMHG
BH CV ECHO MEAS - RV V1 MAX: 63.4 CM/SEC
BH CV ECHO MEAS - RV V1 VTI: 13.9 CM
BH CV ECHO MEAS - RVOT DIAM: 1.88 CM
BH CV ECHO MEAS - RVSP: 27.7 MMHG
BH CV ECHO MEAS - SUP REN AO DIAM: 1.9 CM
BH CV ECHO MEAS - SV(LVOT): 64.9 ML
BH CV ECHO MEAS - SV(MOD-SP2): 38 ML
BH CV ECHO MEAS - SV(MOD-SP4): 37 ML
BH CV ECHO MEAS - SV(RVOT): 38.8 ML
BH CV ECHO MEAS - SVI(LVOT): 40.5 ML/M2
BH CV ECHO MEAS - SVI(MOD-SP2): 23.7 ML/M2
BH CV ECHO MEAS - SVI(MOD-SP4): 23.1 ML/M2
BH CV ECHO MEAS - TAPSE (>1.6): 1.86 CM
BH CV ECHO MEAS - TR MAX PG: 24.7 MMHG
BH CV ECHO MEAS - TR MAX VEL: 248.4 CM/SEC
BH CV ECHO MEASUREMENTS AVERAGE E/E' RATIO: 15.07
BH CV XLRA - RV BASE: 2.6 CM
BH CV XLRA - RV LENGTH: 5.7 CM
BH CV XLRA - RV MID: 2.07 CM
BH CV XLRA - TDI S': 14 CM/SEC
LEFT ATRIUM VOLUME INDEX: 15.4 ML/M2
SINUS: 2.8 CM
STJ: 2.7 CM

## 2024-06-25 PROCEDURE — 93306 TTE W/DOPPLER COMPLETE: CPT | Performed by: INTERNAL MEDICINE

## 2024-06-25 PROCEDURE — 93356 MYOCRD STRAIN IMG SPCKL TRCK: CPT | Performed by: INTERNAL MEDICINE

## 2024-06-25 PROCEDURE — 93356 MYOCRD STRAIN IMG SPCKL TRCK: CPT

## 2024-06-25 PROCEDURE — 93306 TTE W/DOPPLER COMPLETE: CPT

## 2024-06-26 NOTE — PROGRESS NOTES
Please call Ms. Loomis. Echo showed normal EF and GLS. Mild thickening in the heart; noted before and unchanged. Moderate aortic regurgitation (mild last year). We will recheck in one year. I would recommend fu with PCP about weakness. Thanks.

## 2024-06-26 NOTE — TELEPHONE ENCOUNTER
Please call and arrange echo when she comes in to see Dr. Hernández on 6/11/25 (same day). Thanks.

## 2024-07-16 DIAGNOSIS — E03.9 ADULT HYPOTHYROIDISM: ICD-10-CM

## 2024-07-17 RX ORDER — LEVOTHYROXINE SODIUM 0.05 MG/1
50 TABLET ORAL DAILY
Qty: 90 TABLET | Refills: 3 | Status: SHIPPED | OUTPATIENT
Start: 2024-07-17

## 2024-07-17 NOTE — TELEPHONE ENCOUNTER
Rx Refill Note  Requested Prescriptions     Pending Prescriptions Disp Refills    levothyroxine (SYNTHROID, LEVOTHROID) 50 MCG tablet [Pharmacy Med Name: LEVOTHYROXINE SODIUM 50 MCG Tablet] 90 tablet 3     Sig: TAKE 1 TABLET EVERY DAY      Last office visit with prescribing clinician: Visit date not found   Last telemedicine visit with prescribing clinician: Visit date not found   Next office visit with prescribing clinician: Visit date not found                         Would you like a call back once the refill request has been completed: [] Yes [] No    If the office needs to give you a call back, can they leave a voicemail: [] Yes [] No    Odalys Tamez MA  07/17/24, 13:07 EDT

## 2024-08-13 ENCOUNTER — TELEPHONE (OUTPATIENT)
Dept: FAMILY MEDICINE CLINIC | Facility: CLINIC | Age: 84
End: 2024-08-13

## 2024-08-13 NOTE — TELEPHONE ENCOUNTER
Hub staff attempted to follow warm transfer process and was unsuccessful     Caller: Karli Loomis    Relationship to patient: Self    Best call back number: [phone] -PLEASE LEAVE VOICEMAIL     Patient is needing: PATIENT STATES SHE HAD A MISSED CALL FROM THE OFFICE

## 2024-08-15 ENCOUNTER — OFFICE VISIT (OUTPATIENT)
Dept: FAMILY MEDICINE CLINIC | Facility: CLINIC | Age: 84
End: 2024-08-15
Payer: MEDICARE

## 2024-08-15 VITALS
DIASTOLIC BLOOD PRESSURE: 80 MMHG | HEIGHT: 65 IN | HEART RATE: 67 BPM | BODY MASS INDEX: 20.66 KG/M2 | WEIGHT: 124 LBS | TEMPERATURE: 97.4 F | RESPIRATION RATE: 12 BRPM | OXYGEN SATURATION: 98 % | SYSTOLIC BLOOD PRESSURE: 138 MMHG

## 2024-08-15 DIAGNOSIS — Z00.00 MEDICARE ANNUAL WELLNESS VISIT, SUBSEQUENT: Primary | ICD-10-CM

## 2024-08-15 DIAGNOSIS — H66.001 NON-RECURRENT ACUTE SUPPURATIVE OTITIS MEDIA OF RIGHT EAR WITHOUT SPONTANEOUS RUPTURE OF TYMPANIC MEMBRANE: ICD-10-CM

## 2024-08-15 DIAGNOSIS — I10 ESSENTIAL HYPERTENSION: ICD-10-CM

## 2024-08-15 DIAGNOSIS — E03.9 ADULT HYPOTHYROIDISM: ICD-10-CM

## 2024-08-15 PROCEDURE — 1170F FXNL STATUS ASSESSED: CPT | Performed by: NURSE PRACTITIONER

## 2024-08-15 PROCEDURE — G0439 PPPS, SUBSEQ VISIT: HCPCS | Performed by: NURSE PRACTITIONER

## 2024-08-15 PROCEDURE — 1126F AMNT PAIN NOTED NONE PRSNT: CPT | Performed by: NURSE PRACTITIONER

## 2024-08-15 PROCEDURE — 3075F SYST BP GE 130 - 139MM HG: CPT | Performed by: NURSE PRACTITIONER

## 2024-08-15 PROCEDURE — 3079F DIAST BP 80-89 MM HG: CPT | Performed by: NURSE PRACTITIONER

## 2024-08-15 RX ORDER — AZITHROMYCIN 250 MG/1
TABLET, FILM COATED ORAL
Qty: 6 TABLET | Refills: 0 | Status: SHIPPED | OUTPATIENT
Start: 2024-08-15

## 2024-08-15 NOTE — PROGRESS NOTES
Subjective   The ABCs of the Annual Wellness Visit  Medicare Wellness Visit      Karli Loomis is a 84 y.o. patient who presents for a Medicare Wellness Visit.    The following portions of the patient's history were reviewed and   updated as appropriate: allergies, current medications, past family history, past medical history, past social history, past surgical history, and problem list.    Compared to one year ago, the patient's physical   health is the same.  Compared to one year ago, the patient's mental   health is the same.    Recent Hospitalizations:  This patient has had a [facility] admission record on file within the last 365 days.  Current Medical Providers:  Patient Care Team:  Luisa Prasad APRN as PCP - General (Family Medicine)  Florencio Larose MD as Consulting Physician (Hematology and Oncology)  Abebe Barbosa MD as Referring Physician (Hospitalist)  Bacilio Hernández MD as Cardiologist (Cardiology)    Outpatient Medications Prior to Visit   Medication Sig Dispense Refill    aspirin 81 MG tablet Take 1 tablet by mouth Daily.      betamethasone, augmented, (DIPROLENE) 0.05 % ointment       budesonide-formoterol (Symbicort) 160-4.5 MCG/ACT inhaler Inhale 2 puffs 2 (Two) Times a Day. 10.2 g 2    cetirizine (zyrTEC) 10 MG tablet Take 1 tablet by mouth Daily.      Cholecalciferol 25 MCG (1000 UT) tablet Take 1 tablet by mouth Daily.      gabapentin (NEURONTIN) 400 MG capsule TAKE 1 CAPSULE FOUR TIMES DAILY 360 capsule 1    hydrALAZINE (APRESOLINE) 50 MG tablet TAKE 1 TABLET THREE TIMES DAILY 270 tablet 2    levothyroxine (SYNTHROID, LEVOTHROID) 50 MCG tablet TAKE 1 TABLET EVERY DAY 90 tablet 3    losartan (COZAAR) 100 MG tablet TAKE 1 TABLET EVERY DAY 90 tablet 3    mupirocin (BACTROBAN) 2 % ointment       niacinamide 500 MG tablet Take 1 tablet by mouth 3 (Three) Times a Day.       No facility-administered medications prior to visit.     No opioid medication identified on active  "medication list. I have reviewed chart for other potential  high risk medication/s and harmful drug interactions in the elderly.      Aspirin is on active medication list. Aspirin use is indicated based on review of current medical condition/s. Pros and cons of this therapy have been discussed today. Benefits of this medication outweigh potential harm.  Patient has been encouraged to continue taking this medication.  .      Patient Active Problem List   Diagnosis    Malignant neoplasm of overlapping sites of left breast in female, estrogen receptor positive    Macroglobulinemia of Waldenstrom    Adult hypothyroidism    Primary osteoarthritis of left knee    Headache, migraine    Hyponatremia with normal extracellular fluid volume    Essential hypertension    Vision loss      Asthma    Other insomnia    Nonrheumatic aortic valve insufficiency    Grade II diastolic dysfunction    Vitamin B 12 deficiency    Rash and other nonspecific skin eruption    APC (atrial premature contractions)    Bullous pemphigoid    Very low iron stores in bone marrow    Iron malabsorption    Compression fracture of T8 vertebra    Age-related osteoporosis with current pathological fracture, vertebra(e), initial encounter for fracture    Trouble in sleeping     Advance Care Planning Advance Directive is not on file.  ACP discussion was held with the patient during this visit. Patient does not have an advance directive, information provided.            Objective   Vitals:    08/15/24 1116   BP: 138/80   Pulse: 67   Resp: 12   Temp: 97.4 °F (36.3 °C)   TempSrc: Infrared   SpO2: 98%   Weight: 56.2 kg (124 lb)   Height: 165.1 cm (65\")   PainSc: 0-No pain       Estimated body mass index is 20.63 kg/m² as calculated from the following:    Height as of this encounter: 165.1 cm (65\").    Weight as of this encounter: 56.2 kg (124 lb).    BMI is within normal parameters. No other follow-up for BMI required.       Does the patient have evidence of " cognitive impairment? No                                                                                               Health  Risk Assessment    Smoking Status:  Social History     Tobacco Use   Smoking Status Never    Passive exposure: Never   Smokeless Tobacco Never     Alcohol Consumption:  Social History     Substance and Sexual Activity   Alcohol Use No    Comment: 2 cups caffeine/coffee daily       Fall Risk Screen  STEADI Fall Risk Assessment was completed, and patient is at LOW risk for falls.Assessment completed on:8/15/2024    Depression Screenin/15/2024    11:00 AM   PHQ-2/PHQ-9 Depression Screening   Little Interest or Pleasure in Doing Things 0-->not at all   Feeling Down, Depressed or Hopeless 0-->not at all   Trouble Falling or Staying Asleep, or Sleeping Too Much 0-->not at all   Feeling Tired or Having Little Energy 0-->not at all   Poor Appetite or Overeating 0-->not at all   Feeling Bad about Yourself - or that You are a Failure or Have Let Yourself or Your Family Down 0-->not at all   Trouble Concentrating on Things, Such as Reading the Newspaper or Watching Television 0-->not at all   Moving or Speaking So Slowly that Other People Could Have Noticed? Or the Opposite - Being So Fidgety 0-->not at all   Thoughts that You Would be Better Off Dead or of Hurting Yourself in Some Way 0-->not at all   PHQ-9: Brief Depression Severity Measure Score 0     Health Habits and Functional and Cognitive Screenin/15/2024    11:22 AM   Functional & Cognitive Status   Do you have difficulty preparing food and eating? No   Do you have difficulty bathing yourself, getting dressed or grooming yourself? No   Do you have difficulty using the toilet? No   Do you have difficulty moving around from place to place? No   Do you have trouble with steps or getting out of a bed or a chair? No   Current Diet Well Balanced Diet   Dental Exam Up to date   Eye Exam Up to date   Exercise (times per week) 0 times  per week   Current Exercises Include No Regular Exercise   Do you need help using the phone?  No   Are you deaf or do you have serious difficulty hearing?  No   Do you need help to go to places out of walking distance? No   Do you need help shopping? No   Do you need help preparing meals?  No   Do you need help with housework?  No   Do you need help with laundry? No   Do you need help taking your medications? No   Do you need help managing money? No   Do you ever drive or ride in a car without wearing a seat belt? No   Have you felt unusual stress, anger or loneliness in the last month? No   Who do you live with? Child   If you need help, do you have trouble finding someone available to you? No   Have you been bothered in the last four weeks by sexual problems? No   Do you have difficulty concentrating, remembering or making decisions? No           Age-appropriate Screening Schedule:  Refer to the list below for future screening recommendations based on patient's age, sex and/or medical conditions. Orders for these recommended tests are listed in the plan section. The patient has been provided with a written plan.    Health Maintenance List  Health Maintenance   Topic Date Due    INFLUENZA VACCINE  08/01/2024    COVID-19 Vaccine (7 - 2023-24 season) 01/27/2025 (Originally 2/15/2024)    LIPID PANEL  11/22/2024    ANNUAL WELLNESS VISIT  08/15/2025    DXA SCAN  11/08/2025    TDAP/TD VACCINES (2 - Td or Tdap) 08/24/2031    RSV Vaccine - Adults  Completed    Pneumococcal Vaccine 65+  Completed    ZOSTER VACCINE  Completed   Physical Exam  Constitutional:       General: She is not in acute distress.     Appearance: She is well-developed. She is not diaphoretic.   Cardiovascular:      Rate and Rhythm: Normal rate and regular rhythm.      Heart sounds: Normal heart sounds. No murmur heard.     No friction rub. No gallop.   Pulmonary:      Effort: Pulmonary effort is normal. No respiratory distress.      Breath sounds:  Normal breath sounds. No wheezing or rales.   Musculoskeletal:      Cervical back: Neck supple.   Skin:     General: Skin is warm and dry.   Neurological:      Mental Status: She is alert and oriented to person, place, and time.                                                                                                                                                    CMS Preventative Services Quick Reference  Risk Factors Identified During Encounter  Immunizations Discussed/Encouraged: Influenza and COVID19    The above risks/problems have been discussed with the patient.  Pertinent information has been shared with the patient in the After Visit Summary.  An After Visit Summary and PPPS were made available to the patient.    Follow Up:   Next Medicare Wellness visit to be scheduled in 1 year.     Assessment & Plan  Medicare annual wellness visit, subsequent    Adult hypothyroidism    Essential hypertension  Hypertension is stable and controlled  Continue current treatment regimen.  Blood pressure will be reassessed in 6 months.  Non-recurrent acute suppurative otitis media of right ear without spontaneous rupture of tympanic membrane       New Medications Ordered This Visit   Medications    azithromycin (Zithromax Z-Ivan) 250 MG tablet     Sig: Take 2 tablets the first day, then 1 tablet daily for 4 days.     Dispense:  6 tablet     Refill:  0          Follow Up:   No follow-ups on file.      Information/counseling provided to the patient regarding periodic kameron maintenance recommendations, including but not limited to immunizations, diet/exercise/healthy lifestyle, laboratory, and other screenings. BMI is discussed. Appropriate exercise, diet, and weight plans are discussed.

## 2024-09-24 ENCOUNTER — OFFICE VISIT (OUTPATIENT)
Dept: ONCOLOGY | Facility: CLINIC | Age: 84
End: 2024-09-24
Payer: MEDICARE

## 2024-09-24 ENCOUNTER — LAB (OUTPATIENT)
Dept: LAB | Facility: HOSPITAL | Age: 84
End: 2024-09-24
Payer: MEDICARE

## 2024-09-24 VITALS
RESPIRATION RATE: 16 BRPM | TEMPERATURE: 97.4 F | OXYGEN SATURATION: 99 % | HEIGHT: 66 IN | SYSTOLIC BLOOD PRESSURE: 136 MMHG | HEART RATE: 68 BPM | WEIGHT: 126 LBS | DIASTOLIC BLOOD PRESSURE: 74 MMHG | BODY MASS INDEX: 20.25 KG/M2

## 2024-09-24 DIAGNOSIS — C88.0 MACROGLOBULINEMIA OF WALDENSTROM: ICD-10-CM

## 2024-09-24 DIAGNOSIS — Z17.0 MALIGNANT NEOPLASM OF OVERLAPPING SITES OF LEFT BREAST IN FEMALE, ESTROGEN RECEPTOR POSITIVE: Primary | ICD-10-CM

## 2024-09-24 DIAGNOSIS — C50.812 MALIGNANT NEOPLASM OF OVERLAPPING SITES OF LEFT BREAST IN FEMALE, ESTROGEN RECEPTOR POSITIVE: ICD-10-CM

## 2024-09-24 DIAGNOSIS — C50.812 MALIGNANT NEOPLASM OF OVERLAPPING SITES OF LEFT BREAST IN FEMALE, ESTROGEN RECEPTOR POSITIVE: Primary | ICD-10-CM

## 2024-09-24 DIAGNOSIS — M81.0 OSTEOPOROSIS, UNSPECIFIED OSTEOPOROSIS TYPE, UNSPECIFIED PATHOLOGICAL FRACTURE PRESENCE: ICD-10-CM

## 2024-09-24 DIAGNOSIS — D69.6 THROMBOCYTOPENIA: ICD-10-CM

## 2024-09-24 DIAGNOSIS — Z17.0 MALIGNANT NEOPLASM OF OVERLAPPING SITES OF LEFT BREAST IN FEMALE, ESTROGEN RECEPTOR POSITIVE: ICD-10-CM

## 2024-09-24 DIAGNOSIS — Z12.31 BREAST CANCER SCREENING BY MAMMOGRAM: ICD-10-CM

## 2024-09-24 LAB
ALBUMIN SERPL-MCNC: 4.4 G/DL (ref 3.5–5.2)
ALBUMIN/GLOB SERPL: 1.6 G/DL
ALP SERPL-CCNC: 67 U/L (ref 39–117)
ALT SERPL W P-5'-P-CCNC: 16 U/L (ref 1–33)
ANION GAP SERPL CALCULATED.3IONS-SCNC: 11.6 MMOL/L (ref 5–15)
AST SERPL-CCNC: 30 U/L (ref 1–32)
BASOPHILS # BLD AUTO: 0.05 10*3/MM3 (ref 0–0.2)
BASOPHILS NFR BLD AUTO: 1.3 % (ref 0–1.5)
BILIRUB SERPL-MCNC: 0.8 MG/DL (ref 0–1.2)
BUN SERPL-MCNC: 8 MG/DL (ref 8–23)
BUN/CREAT SERPL: 11 (ref 7–25)
CALCIUM SPEC-SCNC: 9.6 MG/DL (ref 8.6–10.5)
CHLORIDE SERPL-SCNC: 92 MMOL/L (ref 98–107)
CO2 SERPL-SCNC: 26.4 MMOL/L (ref 22–29)
CREAT SERPL-MCNC: 0.73 MG/DL (ref 0.57–1)
DEPRECATED RDW RBC AUTO: 45 FL (ref 37–54)
EGFRCR SERPLBLD CKD-EPI 2021: 80.7 ML/MIN/1.73
EOSINOPHIL # BLD AUTO: 0.88 10*3/MM3 (ref 0–0.4)
EOSINOPHIL NFR BLD AUTO: 22.3 % (ref 0.3–6.2)
ERYTHROCYTE [DISTWIDTH] IN BLOOD BY AUTOMATED COUNT: 12.5 % (ref 12.3–15.4)
GLOBULIN UR ELPH-MCNC: 2.7 GM/DL
GLUCOSE SERPL-MCNC: 122 MG/DL (ref 65–99)
HCT VFR BLD AUTO: 41.9 % (ref 34–46.6)
HGB BLD-MCNC: 14.4 G/DL (ref 12–15.9)
IMM GRANULOCYTES # BLD AUTO: 0 10*3/MM3 (ref 0–0.05)
IMM GRANULOCYTES NFR BLD AUTO: 0 % (ref 0–0.5)
LYMPHOCYTES # BLD AUTO: 1 10*3/MM3 (ref 0.7–3.1)
LYMPHOCYTES NFR BLD AUTO: 25.4 % (ref 19.6–45.3)
MCH RBC QN AUTO: 33.5 PG (ref 26.6–33)
MCHC RBC AUTO-ENTMCNC: 34.4 G/DL (ref 31.5–35.7)
MCV RBC AUTO: 97.4 FL (ref 79–97)
MONOCYTES # BLD AUTO: 0.29 10*3/MM3 (ref 0.1–0.9)
MONOCYTES NFR BLD AUTO: 7.4 % (ref 5–12)
NEUTROPHILS NFR BLD AUTO: 1.72 10*3/MM3 (ref 1.7–7)
NEUTROPHILS NFR BLD AUTO: 43.6 % (ref 42.7–76)
NRBC BLD AUTO-RTO: 0 /100 WBC (ref 0–0.2)
PLATELET # BLD AUTO: 113 10*3/MM3 (ref 140–450)
PMV BLD AUTO: 9.3 FL (ref 6–12)
POTASSIUM SERPL-SCNC: 4 MMOL/L (ref 3.5–5.2)
PROT SERPL-MCNC: 7.1 G/DL (ref 6–8.5)
RBC # BLD AUTO: 4.3 10*6/MM3 (ref 3.77–5.28)
SODIUM SERPL-SCNC: 130 MMOL/L (ref 136–145)
WBC NRBC COR # BLD AUTO: 3.94 10*3/MM3 (ref 3.4–10.8)

## 2024-09-24 PROCEDURE — 3075F SYST BP GE 130 - 139MM HG: CPT | Performed by: STUDENT IN AN ORGANIZED HEALTH CARE EDUCATION/TRAINING PROGRAM

## 2024-09-24 PROCEDURE — 80053 COMPREHEN METABOLIC PANEL: CPT

## 2024-09-24 PROCEDURE — 36415 COLL VENOUS BLD VENIPUNCTURE: CPT

## 2024-09-24 PROCEDURE — 85025 COMPLETE CBC W/AUTO DIFF WBC: CPT

## 2024-09-24 PROCEDURE — 3078F DIAST BP <80 MM HG: CPT | Performed by: STUDENT IN AN ORGANIZED HEALTH CARE EDUCATION/TRAINING PROGRAM

## 2024-09-24 PROCEDURE — 99214 OFFICE O/P EST MOD 30 MIN: CPT | Performed by: STUDENT IN AN ORGANIZED HEALTH CARE EDUCATION/TRAINING PROGRAM

## 2024-09-24 PROCEDURE — G2211 COMPLEX E/M VISIT ADD ON: HCPCS | Performed by: STUDENT IN AN ORGANIZED HEALTH CARE EDUCATION/TRAINING PROGRAM

## 2024-09-24 PROCEDURE — 1126F AMNT PAIN NOTED NONE PRSNT: CPT | Performed by: STUDENT IN AN ORGANIZED HEALTH CARE EDUCATION/TRAINING PROGRAM

## 2024-09-25 ENCOUNTER — TELEPHONE (OUTPATIENT)
Dept: ONCOLOGY | Facility: CLINIC | Age: 84
End: 2024-09-25
Payer: MEDICARE

## 2024-09-26 LAB
ALBUMIN SERPL ELPH-MCNC: 4 G/DL (ref 2.9–4.4)
ALBUMIN/GLOB SERPL: 1.5 {RATIO} (ref 0.7–1.7)
ALPHA1 GLOB SERPL ELPH-MCNC: 0.3 G/DL (ref 0–0.4)
ALPHA2 GLOB SERPL ELPH-MCNC: 0.7 G/DL (ref 0.4–1)
B-GLOBULIN SERPL ELPH-MCNC: 0.8 G/DL (ref 0.7–1.3)
GAMMA GLOB SERPL ELPH-MCNC: 1.1 G/DL (ref 0.4–1.8)
GLOBULIN SER-MCNC: 2.8 G/DL (ref 2.2–3.9)
IGA SERPL-MCNC: 39 MG/DL (ref 64–422)
IGG SERPL-MCNC: 550 MG/DL (ref 586–1602)
IGM SERPL-MCNC: 792 MG/DL (ref 26–217)
INTERPRETATION SERPL IEP-IMP: ABNORMAL
KAPPA LC FREE SER-MCNC: 15.7 MG/L (ref 3.3–19.4)
KAPPA LC FREE/LAMBDA FREE SER: 2.42 {RATIO} (ref 0.26–1.65)
LABORATORY COMMENT REPORT: ABNORMAL
LAMBDA LC FREE SERPL-MCNC: 6.5 MG/L (ref 5.7–26.3)
M PROTEIN SERPL ELPH-MCNC: 0.8 G/DL
PROT SERPL-MCNC: 6.8 G/DL (ref 6–8.5)

## 2024-09-30 ENCOUNTER — TELEPHONE (OUTPATIENT)
Dept: ONCOLOGY | Facility: CLINIC | Age: 84
End: 2024-09-30
Payer: MEDICARE

## 2024-09-30 DIAGNOSIS — M81.0 OSTEOPOROSIS, UNSPECIFIED OSTEOPOROSIS TYPE, UNSPECIFIED PATHOLOGICAL FRACTURE PRESENCE: Primary | ICD-10-CM

## 2024-09-30 RX ORDER — HYDRALAZINE HYDROCHLORIDE 50 MG/1
TABLET, FILM COATED ORAL
Qty: 270 TABLET | Refills: 3 | Status: SHIPPED | OUTPATIENT
Start: 2024-09-30

## 2024-09-30 NOTE — TELEPHONE ENCOUNTER
Caller: Karli Loomis    Relationship: Self    Best call back number:     [phone]     What is the best time to reach you: ANYTIME - OKAY TO LEAVE A VM IF NO ANSWER.     Who are you requesting to speak with (clinical staff, provider,  specific staff member): CLINICAL     What was the call regarding: PT IS CALLING TO LET DR MONET KNOW TO GO AHEAD AND SET HER UP WITH AN ENDOCRINOLOGIST.     Is it okay if the provider responds through [facility]: NO -

## 2024-10-02 ENCOUNTER — TELEPHONE (OUTPATIENT)
Dept: ONCOLOGY | Facility: CLINIC | Age: 84
End: 2024-10-02
Payer: MEDICARE

## 2024-10-02 DIAGNOSIS — G62.0 DRUG-INDUCED POLYNEUROPATHY: ICD-10-CM

## 2024-10-02 NOTE — TELEPHONE ENCOUNTER
Received call from Dr. Mickey Orozco's office, to let us know Karli Loomis did come in and have a dental exam.  She states patient has the clearance form with her to bring to her appointment.  She states Dr. Arevalo did sign form.

## 2024-10-02 NOTE — TELEPHONE ENCOUNTER
Rx Refill Note  Requested Prescriptions     Pending Prescriptions Disp Refills    gabapentin (NEURONTIN) 400 MG capsule [Pharmacy Med Name: Gabapentin Oral Capsule 400 MG] 360 capsule      Sig: TAKE 1 CAPSULE FOUR TIMES DAILY      Last office visit with prescribing clinician: 8/15/2024   Last telemedicine visit with prescribing clinician: Visit date not found   Next office visit with prescribing clinician: 2/25/2025                         Would you like a call back once the refill request has been completed: [] Yes [] No    If the office needs to give you a call back, can they leave a voicemail: [] Yes [] No    Cesar Vega MA  10/02/24, 15:21 EDT

## 2024-10-03 ENCOUNTER — APPOINTMENT (OUTPATIENT)
Dept: WOMENS IMAGING | Facility: HOSPITAL | Age: 84
End: 2024-10-03
Payer: MEDICARE

## 2024-10-03 PROCEDURE — 77067 SCR MAMMO BI INCL CAD: CPT | Performed by: RADIOLOGY

## 2024-10-03 PROCEDURE — 77063 BREAST TOMOSYNTHESIS BI: CPT | Performed by: RADIOLOGY

## 2024-10-03 RX ORDER — GABAPENTIN 400 MG/1
CAPSULE ORAL
Qty: 360 CAPSULE | Refills: 1 | Status: SHIPPED | OUTPATIENT
Start: 2024-10-03

## 2024-10-16 ENCOUNTER — OFFICE VISIT (OUTPATIENT)
Dept: ENDOCRINOLOGY | Age: 84
End: 2024-10-16
Payer: MEDICARE

## 2024-10-16 VITALS
HEIGHT: 66 IN | HEART RATE: 72 BPM | OXYGEN SATURATION: 97 % | TEMPERATURE: 97.9 F | DIASTOLIC BLOOD PRESSURE: 80 MMHG | BODY MASS INDEX: 20.06 KG/M2 | SYSTOLIC BLOOD PRESSURE: 150 MMHG | WEIGHT: 124.8 LBS

## 2024-10-16 DIAGNOSIS — S22.060S COMPRESSION FRACTURE OF T8 VERTEBRA, SEQUELA: ICD-10-CM

## 2024-10-16 DIAGNOSIS — Z13.9 SCREENING DUE: ICD-10-CM

## 2024-10-16 DIAGNOSIS — E03.9 ADULT HYPOTHYROIDISM: Chronic | ICD-10-CM

## 2024-10-16 DIAGNOSIS — M80.08XA AGE-RELATED OSTEOPOROSIS WITH CURRENT PATHOLOGICAL FRACTURE, VERTEBRA(E), INITIAL ENCOUNTER FOR FRACTURE: Primary | ICD-10-CM

## 2024-10-16 DIAGNOSIS — J45.20 MILD INTERMITTENT ASTHMA WITHOUT COMPLICATION: ICD-10-CM

## 2024-10-16 NOTE — Clinical Note
I reviewed case with Dr. Caro and we feel continuing prolia is a good plan to continue- the question- do you want her to continue to see us to prescribe prolia or were you just wanting our input and you will continue to prescribe

## 2024-10-16 NOTE — PROGRESS NOTES
"Chief Complaint  osteopenia    Subjective        Karli Loomis presents to [facility] ENDOCRINOLOGY  History of Present Illness    Patient is seen as a new patient today for the following, sent by Zelda aVlle MD   *Age-related osteoporosis  *Compression fracture T8 vertebra status post kyphoplasty  Received Prolia in November 2023.  Has not received since  Discussed referral to endocrinology, however patient would like to defer it for now  Of note, patient does not have dental clearance, as such we will discontinue Prolia plan    Fractures: vertebral fracture and ankle fracture a few years ago after sitting for awhile and fell when she stood up when her leg was numb  Falls: none since a few years ago   Calcium intake: no, doesn't think anyone ever told her to take it   Vitamin d: 1000 iu daily   Caffeine intake: no more than 2 cups coffee/ day  Weight bearing exercise: last year has been difficult to stay active r/t weakness, tries to keep up with house chores and fear of falling    Last dental visit: has been recently and reports no hesitation to continue treatment, has lost a lot of teeth but does not plan to have any implants or dental work, last tooth was lost a few years ago    Etoh intake: doesn't drink   Parental hip fx: not that she remembers   Steroid use: denies oral use, does use inhaler Symbicort for astham    Nephrolithiasis: denies   CKD: denies   menopause at age: doesn't remember but does not feel it was earlier than expected    Crohns, UC, celiac: denies   Hypothyroid: on levothyroxine 50mcg daily for \" along time\", TSH back to 2016 has been WNL, s/p surgery for nodule ~ 1970  Hyperparathyroid: no, PTH 22.8 2018, calcium elevated a few times since 2017, highest 1/2023   XRT to skeleton: no  Left breast cancer, did not have radiation     Requesting new PCP, a female, in our office as her current PCP is too far away      Objective   Vital Signs:  /80   Pulse 72   Temp " "97.9 °F (36.6 °C) (Oral)   Ht 167.6 cm (65.98\")   Wt 56.6 kg (124 lb 12.8 oz)   SpO2 97%   BMI 20.15 kg/m²   Estimated body mass index is 20.15 kg/m² as calculated from the following:    Height as of this encounter: 167.6 cm (65.98\").    Weight as of this encounter: 56.6 kg (124 lb 12.8 oz).    BMI is within normal parameters. No other follow-up for BMI required.      Physical Exam  Vitals reviewed.   Constitutional:       General: She is not in acute distress.  HENT:      Head: Normocephalic and atraumatic.   Cardiovascular:      Rate and Rhythm: Normal rate.   Pulmonary:      Effort: Pulmonary effort is normal. No respiratory distress.   Musculoskeletal:         General: No signs of injury. Normal range of motion.      Cervical back: Normal range of motion and neck supple.   Skin:     General: Skin is warm and dry.   Neurological:      Mental Status: She is alert and oriented to person, place, and time. Mental status is at baseline.   Psychiatric:         Mood and Affect: Mood normal.         Behavior: Behavior normal.         Thought Content: Thought content normal.         Judgment: Judgment normal.        Result Review :  The following data was reviewed by: LENKA Ordaz on 10/16/2024:  Common labs          1/22/2024    09:26 6/4/2024    13:30 9/24/2024    12:17   Common Labs   Glucose 101  96  122    BUN 7  8  8    Creatinine 0.70  0.75  0.73    Sodium 129  129  130    Potassium 3.6  4.2  4.0    Chloride 92  92  92    Calcium 9.6  9.7  9.6    Total Protein 6.3  6.3  6.8    Albumin 4.4     4.0  4.4     3.9  4.4     4.0    Total Bilirubin 0.6  0.8  0.8    Alkaline Phosphatase 65  56  67    AST (SGOT) 23  29  30    ALT (SGPT) 11  15  16    WBC 3.85  4.92  3.94    Hemoglobin 13.8  13.9  14.4    Hematocrit 40.7  39.7  41.9    Platelets 105  108  113                Assessment and Plan   Diagnoses and all orders for this visit:    1. Age-related osteoporosis with current pathological fracture, vertebra(e), " initial encounter for fracture (Primary)    2. Screening due  -     Ambulatory Referral to Family Practice    3. Adult hypothyroidism    4. Compression fracture of T8 vertebra, sequela    5. Mild intermittent asthma without complication             Follow Up   No follow-ups on file.    Agree to continue prolia, will discuss with Dr. Valle who will prescribe medication and continue follow up  Further recommendations to follow   Continue vitamin d   Recommend daily calcium supplementation of 1200mg daily through dietary and OTC supplements     Patient was given instructions and counseling regarding her condition or for health maintenance advice. Please see specific information pulled into the AVS if appropriate.     Dana Newell, APRN

## 2024-10-16 NOTE — Clinical Note
RAMON- dentist on [address] in [place]- can we call his office and confirm he has not hesitation from dental standpoint on patient using prolia for her bones please

## 2024-10-17 NOTE — PROGRESS NOTES
10/17/24 spoke with Katia Arevalo is with a patient and she will have him call the office when he is available

## 2024-10-22 DIAGNOSIS — M80.08XA AGE-RELATED OSTEOPOROSIS WITH CURRENT PATHOLOGICAL FRACTURE, VERTEBRA(E), INITIAL ENCOUNTER FOR FRACTURE: Primary | ICD-10-CM

## 2024-11-20 ENCOUNTER — HOSPITAL ENCOUNTER (OUTPATIENT)
Facility: HOSPITAL | Age: 84
Discharge: HOME | End: 2024-11-20
Admitting: NURSE PRACTITIONER
Payer: MEDICARE

## 2024-11-20 DIAGNOSIS — M80.08XA AGE-RELATED OSTEOPOROSIS WITH CURRENT PATHOLOGICAL FRACTURE, VERTEBRA(E), INITIAL ENCOUNTER FOR FRACTURE: ICD-10-CM

## 2024-11-20 PROCEDURE — 77080 DXA BONE DENSITY AXIAL: CPT

## 2024-12-09 ENCOUNTER — OFFICE VISIT (OUTPATIENT)
Dept: INTERNAL MEDICINE | Facility: CLINIC | Age: 84
End: 2024-12-09
Payer: MEDICARE

## 2024-12-09 ENCOUNTER — TELEPHONE (OUTPATIENT)
Dept: ENDOCRINOLOGY | Age: 84
End: 2024-12-09
Payer: MEDICARE

## 2024-12-09 VITALS
HEART RATE: 70 BPM | DIASTOLIC BLOOD PRESSURE: 70 MMHG | OXYGEN SATURATION: 98 % | WEIGHT: 124 LBS | BODY MASS INDEX: 20.66 KG/M2 | SYSTOLIC BLOOD PRESSURE: 126 MMHG | HEIGHT: 65 IN

## 2024-12-09 DIAGNOSIS — E03.9 ADULT HYPOTHYROIDISM: Chronic | ICD-10-CM

## 2024-12-09 DIAGNOSIS — R09.81 SINUS CONGESTION: ICD-10-CM

## 2024-12-09 DIAGNOSIS — I10 ESSENTIAL HYPERTENSION: Chronic | ICD-10-CM

## 2024-12-09 DIAGNOSIS — E78.00 HYPERCHOLESTEROLEMIA: Chronic | ICD-10-CM

## 2024-12-09 DIAGNOSIS — E87.1 HYPONATREMIA WITH NORMAL EXTRACELLULAR FLUID VOLUME: Chronic | ICD-10-CM

## 2024-12-09 DIAGNOSIS — C88.00 MACROGLOBULINEMIA OF WALDENSTROM: Chronic | ICD-10-CM

## 2024-12-09 DIAGNOSIS — Z23 NEED FOR COVID-19 VACCINE: ICD-10-CM

## 2024-12-09 DIAGNOSIS — Z76.89 ENCOUNTER TO ESTABLISH CARE WITH NEW DOCTOR: Primary | ICD-10-CM

## 2024-12-09 PROBLEM — K90.9 IRON MALABSORPTION: Status: RESOLVED | Noted: 2023-05-15 | Resolved: 2024-12-09

## 2024-12-09 PROBLEM — E53.8 VITAMIN B 12 DEFICIENCY: Status: RESOLVED | Noted: 2022-03-11 | Resolved: 2024-12-09

## 2024-12-09 PROBLEM — G47.9 TROUBLE IN SLEEPING: Status: RESOLVED | Noted: 2024-06-10 | Resolved: 2024-12-09

## 2024-12-09 PROBLEM — G43.909 HEADACHE, MIGRAINE: Status: RESOLVED | Noted: 2017-12-14 | Resolved: 2024-12-09

## 2024-12-09 PROCEDURE — 91320 SARSCV2 VAC 30MCG TRS-SUC IM: CPT | Performed by: STUDENT IN AN ORGANIZED HEALTH CARE EDUCATION/TRAINING PROGRAM

## 2024-12-09 PROCEDURE — 90480 ADMN SARSCOV2 VAC 1/ONLY CMP: CPT | Performed by: STUDENT IN AN ORGANIZED HEALTH CARE EDUCATION/TRAINING PROGRAM

## 2024-12-09 PROCEDURE — 3078F DIAST BP <80 MM HG: CPT | Performed by: STUDENT IN AN ORGANIZED HEALTH CARE EDUCATION/TRAINING PROGRAM

## 2024-12-09 PROCEDURE — 1126F AMNT PAIN NOTED NONE PRSNT: CPT | Performed by: STUDENT IN AN ORGANIZED HEALTH CARE EDUCATION/TRAINING PROGRAM

## 2024-12-09 PROCEDURE — 1160F RVW MEDS BY RX/DR IN RCRD: CPT | Performed by: STUDENT IN AN ORGANIZED HEALTH CARE EDUCATION/TRAINING PROGRAM

## 2024-12-09 PROCEDURE — 99215 OFFICE O/P EST HI 40 MIN: CPT | Performed by: STUDENT IN AN ORGANIZED HEALTH CARE EDUCATION/TRAINING PROGRAM

## 2024-12-09 PROCEDURE — 1159F MED LIST DOCD IN RCRD: CPT | Performed by: STUDENT IN AN ORGANIZED HEALTH CARE EDUCATION/TRAINING PROGRAM

## 2024-12-09 PROCEDURE — 3074F SYST BP LT 130 MM HG: CPT | Performed by: STUDENT IN AN ORGANIZED HEALTH CARE EDUCATION/TRAINING PROGRAM

## 2024-12-09 RX ORDER — GUAIFENESIN 600 MG/1
600 TABLET, EXTENDED RELEASE ORAL 2 TIMES DAILY
Qty: 14 TABLET | Refills: 0 | Status: SHIPPED | OUTPATIENT
Start: 2024-12-09

## 2024-12-09 NOTE — PROGRESS NOTES
"Chief Complaint  Hypothyroidism and Establish Care    Subjective        Karli Loomis presents to clinic today to establish care with a new provider. Patient was previously following with Luisa Prasad.    Patient complaining of some ongoing congestion at this time. Was seen by  on 10/29 for what sounded like a URI. Was treated with a Z-Ivan at that time. States that she has ongoing congestion symptoms since this period. Says that she is still with congestion and mucus drainage since this appointment. Does not have any symptoms of fever, chills, cough, shortness of breath, myalgia.     Overall patient states that she feels well and has no acute complaints. Taking all medications as directed without any noticeable side effects. She follows with multiple specialists for management of chronic conditions including cardiology, heme/onc, and endocrinology. Patient reports that she is going to be starting Prolia injections with endocrinology soon for treatment of osteoporosis.     Objective   Vital Signs:  /70 (BP Location: Right arm, Patient Position: Sitting, Cuff Size: Adult)   Pulse 70   Ht 165.1 cm (65\")   Wt 56.2 kg (124 lb)   SpO2 98%   BMI 20.63 kg/m²   Estimated body mass index is 20.63 kg/m² as calculated from the following:    Height as of this encounter: 165.1 cm (65\").    Weight as of this encounter: 56.2 kg (124 lb).    BMI is within normal parameters. No other follow-up for BMI required.    Physical Exam  Constitutional:       General: She is not in acute distress.     Appearance: Normal appearance.   HENT:      Nose: No congestion.      Mouth/Throat:      Mouth: Mucous membranes are moist.      Pharynx: Oropharynx is clear. Posterior oropharyngeal erythema present. No oropharyngeal exudate.   Eyes:      Extraocular Movements: Extraocular movements intact.      Conjunctiva/sclera: Conjunctivae normal.      Pupils: Pupils are equal, round, and reactive to light.   Cardiovascular:      Rate and " Rhythm: Normal rate and regular rhythm.      Heart sounds: No murmur heard.  Pulmonary:      Effort: Pulmonary effort is normal.      Breath sounds: Normal breath sounds. No wheezing.   Musculoskeletal:         General: No swelling or deformity. Normal range of motion.   Skin:     General: Skin is warm and dry.   Neurological:      General: No focal deficit present.      Mental Status: She is oriented to person, place, and time. Mental status is at baseline.   Psychiatric:         Mood and Affect: Mood normal.         Behavior: Behavior normal.     Result Review :  The following data was reviewed by: Abigail Lyles MD on 12/09/2024:  CMP          1/22/2024    09:26 6/4/2024    13:30 9/24/2024    12:17   CMP   Glucose 101  96  122    BUN 7  8  8    Creatinine 0.70  0.75  0.73    EGFR 85.4  78.6  80.7    Sodium 129  129  130    Potassium 3.6  4.2  4.0    Chloride 92  92  92    Calcium 9.6  9.7  9.6    Total Protein 6.3  6.3  6.8    Total Protein 6.6  6.5  7.1    Albumin 4.4     4.0  4.4     3.9  4.4     4.0    Globulin 2.3  2.4  2.8    Globulin 2.2  2.1  2.7    Total Bilirubin 0.6  0.8  0.8    Alkaline Phosphatase 65  56  67    AST (SGOT) 23  29  30    ALT (SGPT) 11  15  16    Albumin/Globulin Ratio 2.0  2.1  1.6    BUN/Creatinine Ratio 10.0  10.7  11.0    Anion Gap 8.7  10.5  11.6      CBC          1/22/2024    09:26 6/4/2024    13:30 9/24/2024    12:17   CBC   WBC 3.85  4.92  3.94    RBC 4.10  4.05  4.30    Hemoglobin 13.8  13.9  14.4    Hematocrit 40.7  39.7  41.9    MCV 99.3  98.0  97.4    MCH 33.7  34.3  33.5    MCHC 33.9  35.0  34.4    RDW 12.5  12.7  12.5    Platelets 105  108  113      Lab Results   Component Value Date    CHOL 200 04/28/2021     Lab Results   Component Value Date    TRIG 84 11/22/2023    TRIG 101 02/10/2022    TRIG 115 04/28/2021     Lab Results   Component Value Date    HDL 84 11/22/2023    HDL 66 02/10/2022    HDL 78 (H) 04/28/2021     Lab Results   Component Value Date    LDL 77  11/22/2023     (H) 02/10/2022     (H) 04/28/2021     Lab Results   Component Value Date    VLDL 15 11/22/2023    VLDL 18 02/10/2022    VLDL 20 04/28/2021     Lab Results   Component Value Date    LDLHDL 0.9 11/22/2023    LDLHDL 1.27 04/28/2021     TSH          6/4/2024    13:30   TSH   TSH 2.060      Data reviewed : Consultant notes Endocrinology note 10/16/2024          Current Outpatient Medications:     aspirin 81 MG tablet, Take 1 tablet by mouth Daily., Disp: , Rfl:     betamethasone, augmented, (DIPROLENE) 0.05 % ointment, , Disp: , Rfl:     budesonide-formoterol (Symbicort) 160-4.5 MCG/ACT inhaler, Inhale 2 puffs 2 (Two) Times a Day., Disp: 10.2 g, Rfl: 2    cetirizine (zyrTEC) 10 MG tablet, Take 1 tablet by mouth Daily., Disp: , Rfl:     Cholecalciferol 25 MCG (1000 UT) tablet, Take 1 tablet by mouth Daily., Disp: , Rfl:     gabapentin (NEURONTIN) 400 MG capsule, TAKE 1 CAPSULE FOUR TIMES DAILY, Disp: 360 capsule, Rfl: 1    hydrALAZINE (APRESOLINE) 50 MG tablet, TAKE 1 TABLET THREE TIMES DAILY, Disp: 270 tablet, Rfl: 3    levothyroxine (SYNTHROID, LEVOTHROID) 50 MCG tablet, TAKE 1 TABLET EVERY DAY, Disp: 90 tablet, Rfl: 3    losartan (COZAAR) 100 MG tablet, TAKE 1 TABLET EVERY DAY, Disp: 90 tablet, Rfl: 3    mupirocin (BACTROBAN) 2 % ointment, , Disp: , Rfl:     niacinamide 500 MG tablet, Take 1 tablet by mouth 3 (Three) Times a Day., Disp: , Rfl:     guaiFENesin (MUCINEX) 600 MG 12 hr tablet, Take 1 tablet by mouth 2 (Two) Times a Day., Disp: 14 tablet, Rfl: 0      Assessment and Plan   Diagnoses and all orders for this visit:    1. Encounter to establish care with new doctor (Primary)    2. Adult hypothyroidism  Assessment & Plan:  Continue levothyroxine daily.    Orders:  -     TSH Rfx On Abnormal To Free T4    3. Essential hypertension  Assessment & Plan:  Hypertension is stable and controlled  Continue current treatment regimen.  Blood pressure will be reassessed in 6 months.    Orders:  -      Comprehensive Metabolic Panel    4. Hypercholesterolemia  Assessment & Plan:  Recommend that patient continue life style modifications with balanced diet and regular exercise.    Orders:  -     Lipid Panel    5. Hyponatremia with normal extracellular fluid volume  -     Comprehensive Metabolic Panel    6. Sinus congestion  -     guaiFENesin (MUCINEX) 600 MG 12 hr tablet; Take 1 tablet by mouth 2 (Two) Times a Day.  Dispense: 14 tablet; Refill: 0    7. Macroglobulinemia of Waldenstrom  Assessment & Plan:  Follows with outpatient oncology    Orders:  -     CBC & Differential    8. Need for COVID-19 vaccine  -     COVID-19 (Pfizer) 12yrs+ (COMIRNATY)       I spent 45 minutes caring for Karli on this date of service. This time includes time spent by me in the following activities:preparing for the visit, obtaining and/or reviewing a separately obtained history, performing a medically appropriate examination and/or evaluation , counseling and educating the patient/family/caregiver, ordering medications, tests, or procedures, and documenting information in the medical record.    Follow Up   Return in about 6 months (around 6/9/2025) for routine follow up with lab work.    Patient was given instructions and counseling regarding her condition or for health maintenance advice. Please see specific information pulled into the AVS if appropriate.     Abigail Lyles MD

## 2024-12-09 NOTE — TELEPHONE ENCOUNTER
Hub staff attempted to follow warm transfer process and was unsuccessful     Caller: Karli Loomis    Relationship to patient: Self    Best call back number: [phone]    Patient is needing: RETURNING PHONE CALL

## 2024-12-09 NOTE — TELEPHONE ENCOUNTER
Left pt a message to return call regarding a message Dana sent to me.     Katja, can you let her know the orders were placed last week and the should be calling soon to arrange.

## 2024-12-10 LAB
ALBUMIN SERPL-MCNC: 4.8 G/DL (ref 3.7–4.7)
ALP SERPL-CCNC: 72 IU/L (ref 44–121)
ALT SERPL-CCNC: 16 IU/L (ref 0–32)
AST SERPL-CCNC: 30 IU/L (ref 0–40)
BASOPHILS # BLD AUTO: 0.1 X10E3/UL (ref 0–0.2)
BASOPHILS NFR BLD AUTO: 1 %
BILIRUB SERPL-MCNC: 0.8 MG/DL (ref 0–1.2)
BUN SERPL-MCNC: 9 MG/DL (ref 8–27)
BUN/CREAT SERPL: 13 (ref 12–28)
CALCIUM SERPL-MCNC: 10.1 MG/DL (ref 8.7–10.3)
CHLORIDE SERPL-SCNC: 89 MMOL/L (ref 96–106)
CHOLEST SERPL-MCNC: 205 MG/DL (ref 100–199)
CO2 SERPL-SCNC: 24 MMOL/L (ref 20–29)
CREAT SERPL-MCNC: 0.71 MG/DL (ref 0.57–1)
EGFRCR SERPLBLD CKD-EPI 2021: 83 ML/MIN/1.73
EOSINOPHIL # BLD AUTO: 0.9 X10E3/UL (ref 0–0.4)
EOSINOPHIL NFR BLD AUTO: 16 %
ERYTHROCYTE [DISTWIDTH] IN BLOOD BY AUTOMATED COUNT: 12 % (ref 11.7–15.4)
GLOBULIN SER CALC-MCNC: 2.4 G/DL (ref 1.5–4.5)
GLUCOSE SERPL-MCNC: 95 MG/DL (ref 70–99)
HCT VFR BLD AUTO: 45.6 % (ref 34–46.6)
HDLC SERPL-MCNC: 97 MG/DL
HGB BLD-MCNC: 15.1 G/DL (ref 11.1–15.9)
IMM GRANULOCYTES # BLD AUTO: 0 X10E3/UL (ref 0–0.1)
IMM GRANULOCYTES NFR BLD AUTO: 0 %
LDLC SERPL CALC-MCNC: 94 MG/DL (ref 0–99)
LYMPHOCYTES # BLD AUTO: 1.9 X10E3/UL (ref 0.7–3.1)
LYMPHOCYTES NFR BLD AUTO: 32 %
MCH RBC QN AUTO: 33.6 PG (ref 26.6–33)
MCHC RBC AUTO-ENTMCNC: 33.1 G/DL (ref 31.5–35.7)
MCV RBC AUTO: 102 FL (ref 79–97)
MONOCYTES # BLD AUTO: 0.4 X10E3/UL (ref 0.1–0.9)
MONOCYTES NFR BLD AUTO: 7 %
NEUTROPHILS # BLD AUTO: 2.6 X10E3/UL (ref 1.4–7)
NEUTROPHILS NFR BLD AUTO: 44 %
PLATELET # BLD AUTO: 147 X10E3/UL (ref 150–450)
POTASSIUM SERPL-SCNC: 4.2 MMOL/L (ref 3.5–5.2)
PROT SERPL-MCNC: 7.2 G/DL (ref 6–8.5)
RBC # BLD AUTO: 4.49 X10E6/UL (ref 3.77–5.28)
SODIUM SERPL-SCNC: 129 MMOL/L (ref 134–144)
TRIGL SERPL-MCNC: 80 MG/DL (ref 0–149)
TSH SERPL DL<=0.005 MIU/L-ACNC: 2.37 UIU/ML (ref 0.45–4.5)
VLDLC SERPL CALC-MCNC: 14 MG/DL (ref 5–40)
WBC # BLD AUTO: 5.9 X10E3/UL (ref 3.4–10.8)

## 2024-12-10 NOTE — TELEPHONE ENCOUNTER
Called and spoke with pt to let her know someone from scheduling will be reaching out to her to schedule that prolia injection.

## 2024-12-16 NOTE — TELEPHONE ENCOUNTER
Caller: Karli Loomis    Relationship: Self    Best call back number: [phone]    What is the best time to reach you: ANYTIME    Who are you requesting to speak with (clinical staff, provider,  specific staff member): CLINICAL    What was the call regarding: PROLIA INJECTION SCHEDULE- PATIENT IS WANTING TO KNOW WHEN SHE WILL BE CALLED ABOUT MAKING AN APPOINTMENT FOR THIS. PLEASE ADVISE.

## 2024-12-17 ENCOUNTER — TELEPHONE (OUTPATIENT)
Dept: ONCOLOGY | Facility: CLINIC | Age: 84
End: 2024-12-17
Payer: MEDICARE

## 2024-12-17 NOTE — TELEPHONE ENCOUNTER
Call to Ms. Loomis to let her know the Prolia is being ordered by Endocrinology, therefore, they will schedule her for the injection, as she will not be getting it under Dr. Valle, since Dr. Valle had referred her to endocrinology for her bone density management.  Patient verbalized understanding and appreciation for the call.

## 2024-12-23 ENCOUNTER — DOCUMENTATION (OUTPATIENT)
Dept: ONCOLOGY | Facility: HOSPITAL | Age: 84
End: 2024-12-23
Payer: MEDICARE

## 2024-12-26 ENCOUNTER — INFUSION (OUTPATIENT)
Dept: ONCOLOGY | Facility: HOSPITAL | Age: 84
End: 2024-12-26
Payer: MEDICARE

## 2024-12-26 DIAGNOSIS — M80.08XA AGE-RELATED OSTEOPOROSIS WITH CURRENT PATHOLOGICAL FRACTURE, VERTEBRA(E), INITIAL ENCOUNTER FOR FRACTURE: Primary | ICD-10-CM

## 2024-12-26 PROCEDURE — 25010000002 DENOSUMAB 60 MG/ML SOLUTION PREFILLED SYRINGE: Performed by: NURSE PRACTITIONER

## 2024-12-26 PROCEDURE — 96372 THER/PROPH/DIAG INJ SC/IM: CPT

## 2024-12-26 RX ADMIN — DENOSUMAB 60 MG: 60 INJECTION SUBCUTANEOUS at 13:08

## 2025-03-19 DIAGNOSIS — G62.0 DRUG-INDUCED POLYNEUROPATHY: ICD-10-CM

## 2025-03-19 DIAGNOSIS — I10 ESSENTIAL HYPERTENSION: ICD-10-CM

## 2025-03-19 RX ORDER — LOSARTAN POTASSIUM 100 MG/1
100 TABLET ORAL DAILY
Qty: 90 TABLET | Refills: 3 | Status: SHIPPED | OUTPATIENT
Start: 2025-03-19

## 2025-03-19 RX ORDER — GABAPENTIN 400 MG/1
400 CAPSULE ORAL 4 TIMES DAILY
Qty: 360 CAPSULE | Refills: 1 | Status: SHIPPED | OUTPATIENT
Start: 2025-03-19

## 2025-03-19 NOTE — TELEPHONE ENCOUNTER
Caller: Karli Loomis    Relationship: Self    Best call back number:   Telephone Information:   Mobile [phone]         Requested Prescriptions:   Requested Prescriptions     Pending Prescriptions Disp Refills    losartan (COZAAR) 100 MG tablet 90 tablet 3     Sig: Take 1 tablet by mouth Daily.    gabapentin (NEURONTIN) 400 MG capsule 360 capsule 1     Sig: Take 1 capsule by mouth 4 (Four) Times a Day.        Pharmacy where request should be sent: [facility] - [facility] [address] - [phone]  - [phone] FX     Last office visit with prescribing clinician: 12/9/2024   Last telemedicine visit with prescribing clinician: Visit date not found   Next office visit with prescribing clinician: 6/16/2025     Additional details provided by patient:     Does the patient have less than a 3 day supply:  [] Yes  [] No    Would you like a call back once the refill request has been completed: [] Yes [] No    If the office needs to give you a call back, can they leave a voicemail: [] Yes [] No    Tamy Gómez Rep   03/19/25 12:31 EDT

## 2025-03-31 NOTE — PROGRESS NOTES
"Chief Complaint   Patient presents with    Follow-up     09/24/2024, Interval History:  Patient here for follow-up.  Doing well.  No new concerns or complaints      Objective      Vitals:    04/01/25 1233   BP: 130/70   Pulse: 67   Resp: 16   Temp: 98.2 °F (36.8 °C)   TempSrc: Oral   SpO2: 97%   Weight: 56.2 kg (124 lb)   Height: 165.1 cm (65\")   PainSc: 0-No pain             4/1/2025    12:39 PM   Current Status   ECOG score 0     Physical Exam  Constitutional:       Appearance: Normal appearance.   HENT:      Head: Normocephalic and atraumatic.      Mouth/Throat:      Mouth: Mucous membranes are moist.      Pharynx: Oropharynx is clear.   Eyes:      Extraocular Movements: Extraocular movements intact.      Pupils: Pupils are equal, round, and reactive to light.   Cardiovascular:      Rate and Rhythm: Normal rate and regular rhythm.   Pulmonary:      Effort: Pulmonary effort is normal.      Breath sounds: Normal breath sounds.   Chest:   Breasts:     Right: No mass, nipple discharge or skin change.      Comments: S/p left mastectomy     Abdominal:      General: Bowel sounds are normal.      Palpations: Abdomen is soft.   Musculoskeletal:      Cervical back: Normal range of motion and neck supple.   Lymphadenopathy:      Upper Body:      Right upper body: No axillary adenopathy.      Left upper body: No axillary adenopathy.   Neurological:      General: No focal deficit present.      Mental Status: She is alert and oriented to person, place, and time.   Psychiatric:         Mood and Affect: Mood normal.         Behavior: Behavior normal.               RECENT LABS:ARON,PE and FLC, Serum (06/04/2024 13:30)   Results from last 7 days   Lab Units 04/01/25  1212   WBC 10*3/mm3 4.54   NEUTROS ABS 10*3/mm3 1.80   HEMOGLOBIN g/dL 13.2   HEMATOCRIT % 38.9   PLATELETS 10*3/mm3 103*       CT Abdomen Pelvis With Contrast (06/13/2024 09:08)  CT Chest With Contrast Diagnostic (06/13/2024 09:08)      Assessment & Plan  "     *Waldenstrom macroglobulinemia   Treated in the past mainly with Rituxan. In the past, also she was treated with Velcade with very dramatic improvement in her monoclonal protein but very dramatic sensory peripheral neuropathy. This medication was discontinued altogether.   5/9/2023 bone marrow biopsy-normal cellular bone marrow (25 to 30%) with trilineage hematopoiesis.  Slightly increased scattered mature CD3 positive/CD5 positive T cells noted.  No atypical lymphoid aggregates nor plasma cell infiltrates identified.  No increased reticulin fiber insulin or myelofibrosis identified with special staining.  Virtually absent stainable iron without ring sideroblasts  06/19/2024, IgM monoclonal protein that has risen to 555.  CT  CAP - unremarkable.   9/24/2024: Patient established care with me.  SPEP, SIEP, flexion ratio currently pending.  Will call with results.  Patient overall doing okay except for chronic fatigue.  04/01/2025: No B symptoms.  Continues to be chronically fatigued.  Paraprotein studies pending     *Chronic thrombocytopenia  Upon review of labs, she has had thrombocytopenia since at least May 2023  09/24/2024: Platelets 113 bleeding or bruising.  Currently on baby aspirin.  Continue to monitor.  04/01/2025: 103    *History of left breast cancer, status post left mastectomy  She completed adjuvant therapy.  September 2023 right [id number] 1  October 2024 [id number] 1    *Iron deficiency  Bone marrow biopsy 5/9/2023 with pathology noting virtually absent stainable iron identified.  Injectafer planned x2.    On 06/19/2024, review of bone marrow sample from the kyphoplasty time disclosed no malignancy or lymphomatoid infiltrate.    *Osteopenia  *History of compression fracture T8 vertebra status post kyphoplasty  On Prolia, received last in Dec 2024 - per endocrinology      Plan:  Appreciate Endocrinology management of osteopenia  [facility] for right breast screening [facility] October 20[facility]  Patient reports it has already been scheduled  MD visit in 6 months with CBC, CMP, SPEP, serum immunoglobulins      Zelda Valle MD   4/1/2025

## 2025-04-01 ENCOUNTER — OFFICE VISIT (OUTPATIENT)
Dept: ONCOLOGY | Facility: CLINIC | Age: 85
End: 2025-04-01
Payer: MEDICARE

## 2025-04-01 ENCOUNTER — LAB (OUTPATIENT)
Dept: LAB | Facility: HOSPITAL | Age: 85
End: 2025-04-01
Payer: MEDICARE

## 2025-04-01 VITALS
TEMPERATURE: 98.2 F | BODY MASS INDEX: 20.66 KG/M2 | HEART RATE: 67 BPM | HEIGHT: 65 IN | WEIGHT: 124 LBS | DIASTOLIC BLOOD PRESSURE: 70 MMHG | SYSTOLIC BLOOD PRESSURE: 130 MMHG | OXYGEN SATURATION: 97 % | RESPIRATION RATE: 16 BRPM

## 2025-04-01 DIAGNOSIS — C50.812 MALIGNANT NEOPLASM OF OVERLAPPING SITES OF LEFT BREAST IN FEMALE, ESTROGEN RECEPTOR POSITIVE: ICD-10-CM

## 2025-04-01 DIAGNOSIS — M80.08XA AGE-RELATED OSTEOPOROSIS WITH CURRENT PATHOLOGICAL FRACTURE, VERTEBRA(E), INITIAL ENCOUNTER FOR FRACTURE: Primary | ICD-10-CM

## 2025-04-01 DIAGNOSIS — C88.00 MACROGLOBULINEMIA OF WALDENSTROM: ICD-10-CM

## 2025-04-01 DIAGNOSIS — Z17.0 MALIGNANT NEOPLASM OF OVERLAPPING SITES OF LEFT BREAST IN FEMALE, ESTROGEN RECEPTOR POSITIVE: ICD-10-CM

## 2025-04-01 LAB
ALBUMIN SERPL-MCNC: 4.1 G/DL (ref 3.5–5.2)
ALBUMIN/GLOB SERPL: 1.7 G/DL
ALP SERPL-CCNC: 51 U/L (ref 39–117)
ALT SERPL W P-5'-P-CCNC: 17 U/L (ref 1–33)
ANION GAP SERPL CALCULATED.3IONS-SCNC: 10.7 MMOL/L (ref 5–15)
AST SERPL-CCNC: 24 U/L (ref 1–32)
BASOPHILS # BLD AUTO: 0.05 10*3/MM3 (ref 0–0.2)
BASOPHILS NFR BLD AUTO: 1.1 % (ref 0–1.5)
BILIRUB SERPL-MCNC: 0.6 MG/DL (ref 0–1.2)
BUN SERPL-MCNC: 7 MG/DL (ref 8–23)
BUN/CREAT SERPL: 10.4 (ref 7–25)
CALCIUM SPEC-SCNC: 9.6 MG/DL (ref 8.6–10.5)
CHLORIDE SERPL-SCNC: 93 MMOL/L (ref 98–107)
CO2 SERPL-SCNC: 28.3 MMOL/L (ref 22–29)
CREAT SERPL-MCNC: 0.67 MG/DL (ref 0.57–1)
DEPRECATED RDW RBC AUTO: 45.1 FL (ref 37–54)
EGFRCR SERPLBLD CKD-EPI 2021: 85.8 ML/MIN/1.73
EOSINOPHIL # BLD AUTO: 0.99 10*3/MM3 (ref 0–0.4)
EOSINOPHIL NFR BLD AUTO: 21.8 % (ref 0.3–6.2)
ERYTHROCYTE [DISTWIDTH] IN BLOOD BY AUTOMATED COUNT: 12.4 % (ref 12.3–15.4)
GLOBULIN UR ELPH-MCNC: 2.4 GM/DL
GLUCOSE SERPL-MCNC: 91 MG/DL (ref 65–99)
HCT VFR BLD AUTO: 38.9 % (ref 34–46.6)
HGB BLD-MCNC: 13.2 G/DL (ref 12–15.9)
IMM GRANULOCYTES # BLD AUTO: 0.01 10*3/MM3 (ref 0–0.05)
IMM GRANULOCYTES NFR BLD AUTO: 0.2 % (ref 0–0.5)
LYMPHOCYTES # BLD AUTO: 1.24 10*3/MM3 (ref 0.7–3.1)
LYMPHOCYTES NFR BLD AUTO: 27.3 % (ref 19.6–45.3)
MCH RBC QN AUTO: 33.6 PG (ref 26.6–33)
MCHC RBC AUTO-ENTMCNC: 33.9 G/DL (ref 31.5–35.7)
MCV RBC AUTO: 99 FL (ref 79–97)
MONOCYTES # BLD AUTO: 0.45 10*3/MM3 (ref 0.1–0.9)
MONOCYTES NFR BLD AUTO: 9.9 % (ref 5–12)
NEUTROPHILS NFR BLD AUTO: 1.8 10*3/MM3 (ref 1.7–7)
NEUTROPHILS NFR BLD AUTO: 39.7 % (ref 42.7–76)
NRBC BLD AUTO-RTO: 0 /100 WBC (ref 0–0.2)
PLATELET # BLD AUTO: 103 10*3/MM3 (ref 140–450)
PMV BLD AUTO: 8.9 FL (ref 6–12)
POTASSIUM SERPL-SCNC: 4.4 MMOL/L (ref 3.5–5.2)
PROT SERPL-MCNC: 6.5 G/DL (ref 6–8.5)
RBC # BLD AUTO: 3.93 10*6/MM3 (ref 3.77–5.28)
SODIUM SERPL-SCNC: 132 MMOL/L (ref 136–145)
WBC NRBC COR # BLD AUTO: 4.54 10*3/MM3 (ref 3.4–10.8)

## 2025-04-01 PROCEDURE — 36415 COLL VENOUS BLD VENIPUNCTURE: CPT

## 2025-04-01 PROCEDURE — 85025 COMPLETE CBC W/AUTO DIFF WBC: CPT

## 2025-04-01 PROCEDURE — 80053 COMPREHEN METABOLIC PANEL: CPT

## 2025-04-03 LAB
ALBUMIN SERPL ELPH-MCNC: 3.6 G/DL (ref 2.9–4.4)
ALBUMIN/GLOB SERPL: 1.4 {RATIO} (ref 0.7–1.7)
ALPHA1 GLOB SERPL ELPH-MCNC: 0.3 G/DL (ref 0–0.4)
ALPHA2 GLOB SERPL ELPH-MCNC: 0.6 G/DL (ref 0.4–1)
B-GLOBULIN SERPL ELPH-MCNC: 0.7 G/DL (ref 0.7–1.3)
GAMMA GLOB SERPL ELPH-MCNC: 1.1 G/DL (ref 0.4–1.8)
GLOBULIN SER-MCNC: 2.7 G/DL (ref 2.2–3.9)
IGA SERPL-MCNC: 34 MG/DL (ref 64–422)
IGG SERPL-MCNC: 507 MG/DL (ref 586–1602)
IGM SERPL-MCNC: 1007 MG/DL (ref 26–217)
INTERPRETATION SERPL IEP-IMP: ABNORMAL
KAPPA LC FREE SER-MCNC: 15.9 MG/L (ref 3.3–19.4)
KAPPA LC FREE/LAMBDA FREE SER: 2.94 {RATIO} (ref 0.26–1.65)
LABORATORY COMMENT REPORT: ABNORMAL
LAMBDA LC FREE SERPL-MCNC: 5.4 MG/L (ref 5.7–26.3)
M PROTEIN SERPL ELPH-MCNC: 0.7 G/DL
PROT SERPL-MCNC: 6.3 G/DL (ref 6–8.5)

## 2025-05-23 DIAGNOSIS — E03.9 ADULT HYPOTHYROIDISM: ICD-10-CM

## 2025-05-23 RX ORDER — LEVOTHYROXINE SODIUM 50 UG/1
50 TABLET ORAL DAILY
Qty: 90 TABLET | Refills: 3 | Status: SHIPPED | OUTPATIENT
Start: 2025-05-23

## 2025-05-23 NOTE — TELEPHONE ENCOUNTER
Caller: Karli Loomis    Relationship: Self    Best call back number: [phone]    Requested Prescriptions:   Requested Prescriptions     Pending Prescriptions Disp Refills    levothyroxine (SYNTHROID, LEVOTHROID) 50 MCG tablet 90 tablet 3     Sig: Take 1 tablet by mouth Daily.        Pharmacy where request should be sent: [facility] - [facility] [address] - [phone]  - [phone]     Last office visit with prescribing clinician: 12/9/2024   Last telemedicine visit with prescribing clinician: Visit date not found   Next office visit with prescribing clinician: 6/16/2025     Tamy Uribe Rep   05/23/25 13:02 EDT

## 2025-06-16 ENCOUNTER — OFFICE VISIT (OUTPATIENT)
Dept: INTERNAL MEDICINE | Facility: CLINIC | Age: 85
End: 2025-06-16
Payer: MEDICARE

## 2025-06-16 VITALS
WEIGHT: 121 LBS | HEIGHT: 65 IN | BODY MASS INDEX: 20.16 KG/M2 | DIASTOLIC BLOOD PRESSURE: 92 MMHG | SYSTOLIC BLOOD PRESSURE: 138 MMHG | HEART RATE: 63 BPM | OXYGEN SATURATION: 98 % | RESPIRATION RATE: 20 BRPM

## 2025-06-16 DIAGNOSIS — R35.0 URINARY FREQUENCY: ICD-10-CM

## 2025-06-16 DIAGNOSIS — D75.89 MACROCYTOSIS: ICD-10-CM

## 2025-06-16 DIAGNOSIS — R53.83 FATIGUE, UNSPECIFIED TYPE: ICD-10-CM

## 2025-06-16 DIAGNOSIS — E03.9 ADULT HYPOTHYROIDISM: Chronic | ICD-10-CM

## 2025-06-16 DIAGNOSIS — I10 ESSENTIAL HYPERTENSION: Primary | Chronic | ICD-10-CM

## 2025-06-16 PROCEDURE — 1126F AMNT PAIN NOTED NONE PRSNT: CPT | Performed by: STUDENT IN AN ORGANIZED HEALTH CARE EDUCATION/TRAINING PROGRAM

## 2025-06-16 PROCEDURE — 99214 OFFICE O/P EST MOD 30 MIN: CPT | Performed by: STUDENT IN AN ORGANIZED HEALTH CARE EDUCATION/TRAINING PROGRAM

## 2025-06-16 PROCEDURE — 3080F DIAST BP >= 90 MM HG: CPT | Performed by: STUDENT IN AN ORGANIZED HEALTH CARE EDUCATION/TRAINING PROGRAM

## 2025-06-16 PROCEDURE — 1160F RVW MEDS BY RX/DR IN RCRD: CPT | Performed by: STUDENT IN AN ORGANIZED HEALTH CARE EDUCATION/TRAINING PROGRAM

## 2025-06-16 PROCEDURE — 3075F SYST BP GE 130 - 139MM HG: CPT | Performed by: STUDENT IN AN ORGANIZED HEALTH CARE EDUCATION/TRAINING PROGRAM

## 2025-06-16 PROCEDURE — 1159F MED LIST DOCD IN RCRD: CPT | Performed by: STUDENT IN AN ORGANIZED HEALTH CARE EDUCATION/TRAINING PROGRAM

## 2025-06-16 PROCEDURE — G2211 COMPLEX E/M VISIT ADD ON: HCPCS | Performed by: STUDENT IN AN ORGANIZED HEALTH CARE EDUCATION/TRAINING PROGRAM

## 2025-06-16 NOTE — PROGRESS NOTES
"Chief Complaint  HTN, chronic fatigue, hypothyroidism    Subjective        Karli Loomis presents to clinic today for routine follow up. Patient has no acute complaints today besides some ongoing fatigue and generalized weakness, which she contributes to normal aging. She does note some problems with her balance at this time and requests a urine study, as she had this symptom in the past and a provider told her this was secondary to UTI. She reports urinary frequency but not pain with urination. She continue to undergo Prolia injections, managed by endocrinology. Also follows with dermatology for routine skin checks and oncology for history of Waldenstrom Macroglobulinemia.   History of Present Illness      Objective   Vital Signs:  /92 (BP Location: Left arm, Patient Position: Sitting, Cuff Size: Adult)   Pulse 63   Resp 20   Ht 165.1 cm (65\")   Wt 54.9 kg (121 lb)   SpO2 98%   BMI 20.14 kg/m²   Estimated body mass index is 20.14 kg/m² as calculated from the following:    Height as of this encounter: 165.1 cm (65\").    Weight as of this encounter: 54.9 kg (121 lb).    BMI is within normal parameters. No other follow-up for BMI required.    Physical Exam  Constitutional:       General: She is not in acute distress.  Cardiovascular:      Rate and Rhythm: Normal rate and regular rhythm.      Heart sounds: No murmur heard.  Pulmonary:      Effort: Pulmonary effort is normal. No respiratory distress.      Breath sounds: Normal breath sounds. No wheezing or rales.   Musculoskeletal:         General: No swelling or deformity.      Comments: Shuffling gait.   Skin:     General: Skin is warm and dry.   Neurological:      General: No focal deficit present.      Mental Status: She is oriented to person, place, and time. Mental status is at baseline.   Psychiatric:         Mood and Affect: Mood normal.         Behavior: Behavior normal.     Result Review :               Current Outpatient Medications:     aspirin " 81 MG tablet, Take 1 tablet by mouth Daily., Disp: , Rfl:     budesonide-formoterol (Symbicort) 160-4.5 MCG/ACT inhaler, Inhale 2 puffs 2 (Two) Times a Day., Disp: 10.2 g, Rfl: 2    cetirizine (zyrTEC) 10 MG tablet, Take 1 tablet by mouth Daily., Disp: , Rfl:     Cholecalciferol 25 MCG (1000 UT) tablet, Take 1 tablet by mouth Daily., Disp: , Rfl:     gabapentin (NEURONTIN) 400 MG capsule, Take 1 capsule by mouth 4 (Four) Times a Day., Disp: 360 capsule, Rfl: 1    hydrALAZINE (APRESOLINE) 50 MG tablet, TAKE 1 TABLET THREE TIMES DAILY, Disp: 270 tablet, Rfl: 3    levothyroxine (SYNTHROID, LEVOTHROID) 50 MCG tablet, Take 1 tablet by mouth Daily., Disp: 90 tablet, Rfl: 3    losartan (COZAAR) 100 MG tablet, Take 1 tablet by mouth Daily., Disp: 90 tablet, Rfl: 3    niacinamide 500 MG tablet, Take 1 tablet by mouth 3 (Three) Times a Day., Disp: , Rfl:       Assessment and Plan   Diagnoses and all orders for this visit:    1. Essential hypertension (Primary)  Assessment & Plan:  Hypertension is stable and controlled  Continue current treatment regimen.  Blood pressure will be reassessed in 6 months.    Orders:  -     Comprehensive Metabolic Panel    2. Adult hypothyroidism  -     TSH Rfx On Abnormal To Free T4    3. Fatigue, unspecified type  -     CBC & Differential  -     Vitamin B12  -     Folate    4. Macrocytosis  -     CBC & Differential  -     Vitamin B12  -     Folate    5. Urinary frequency  -     Urinalysis With Microscopic If Indicated (No Culture) - Urine, Clean Catch           Follow Up   Return in about 6 months (around 12/16/2025) for Annual Medicare Wellness, lab work.    Patient was given instructions and counseling regarding her condition or for health maintenance advice. Please see specific information pulled into the AVS if appropriate.     Abigail Lyles MD

## 2025-06-17 ENCOUNTER — RESULTS FOLLOW-UP (OUTPATIENT)
Dept: INTERNAL MEDICINE | Facility: CLINIC | Age: 85
End: 2025-06-17
Payer: MEDICARE

## 2025-06-17 DIAGNOSIS — E87.1 HYPONATREMIA: Primary | ICD-10-CM

## 2025-06-17 LAB
ALBUMIN SERPL-MCNC: 4.4 G/DL (ref 3.5–5.2)
ALBUMIN/GLOB SERPL: 1.5 G/DL
ALP SERPL-CCNC: 63 U/L (ref 39–117)
ALT SERPL-CCNC: 17 U/L (ref 1–33)
APPEARANCE UR: CLEAR
AST SERPL-CCNC: 26 U/L (ref 1–32)
BACTERIA #/AREA URNS HPF: NORMAL /HPF
BASOPHILS # BLD AUTO: 0.04 10*3/MM3 (ref 0–0.2)
BASOPHILS NFR BLD AUTO: 0.8 % (ref 0–1.5)
BILIRUB SERPL-MCNC: 0.8 MG/DL (ref 0–1.2)
BILIRUB UR QL STRIP: NEGATIVE
BUN SERPL-MCNC: 10 MG/DL (ref 8–23)
BUN/CREAT SERPL: 13.2 (ref 7–25)
CALCIUM SERPL-MCNC: 9.6 MG/DL (ref 8.6–10.5)
CASTS URNS MICRO: NORMAL
CHLORIDE SERPL-SCNC: 90 MMOL/L (ref 98–107)
CO2 SERPL-SCNC: 26.3 MMOL/L (ref 22–29)
COLOR UR: ABNORMAL
CREAT SERPL-MCNC: 0.76 MG/DL (ref 0.57–1)
EGFRCR SERPLBLD CKD-EPI 2021: 76.9 ML/MIN/1.73
EOSINOPHIL # BLD AUTO: 0.82 10*3/MM3 (ref 0–0.4)
EOSINOPHIL NFR BLD AUTO: 15.4 % (ref 0.3–6.2)
EPI CELLS #/AREA URNS HPF: NORMAL /HPF
ERYTHROCYTE [DISTWIDTH] IN BLOOD BY AUTOMATED COUNT: 12.4 % (ref 12.3–15.4)
FOLATE SERPL-MCNC: 8.7 NG/ML (ref 4.78–24.2)
GLOBULIN SER CALC-MCNC: 2.9 GM/DL
GLUCOSE SERPL-MCNC: 95 MG/DL (ref 65–99)
GLUCOSE UR QL STRIP: NEGATIVE
HCT VFR BLD AUTO: 40.3 % (ref 34–46.6)
HGB BLD-MCNC: 13.6 G/DL (ref 12–15.9)
HGB UR QL STRIP: NEGATIVE
IMM GRANULOCYTES # BLD AUTO: 0.01 10*3/MM3 (ref 0–0.05)
IMM GRANULOCYTES NFR BLD AUTO: 0.2 % (ref 0–0.5)
KETONES UR QL STRIP: NEGATIVE
LEUKOCYTE ESTERASE UR QL STRIP: ABNORMAL
LYMPHOCYTES # BLD AUTO: 1.71 10*3/MM3 (ref 0.7–3.1)
LYMPHOCYTES NFR BLD AUTO: 32.1 % (ref 19.6–45.3)
MCH RBC QN AUTO: 34.4 PG (ref 26.6–33)
MCHC RBC AUTO-ENTMCNC: 33.7 G/DL (ref 31.5–35.7)
MCV RBC AUTO: 102 FL (ref 79–97)
MONOCYTES # BLD AUTO: 0.48 10*3/MM3 (ref 0.1–0.9)
MONOCYTES NFR BLD AUTO: 9 % (ref 5–12)
NEUTROPHILS # BLD AUTO: 2.27 10*3/MM3 (ref 1.7–7)
NEUTROPHILS NFR BLD AUTO: 42.5 % (ref 42.7–76)
NITRITE UR QL STRIP: NEGATIVE
PH UR STRIP: 6.5 [PH] (ref 5–8)
PLATELET # BLD AUTO: 147 10*3/MM3 (ref 140–450)
POTASSIUM SERPL-SCNC: 3.9 MMOL/L (ref 3.5–5.2)
PROT SERPL-MCNC: 7.3 G/DL (ref 6–8.5)
PROT UR QL STRIP: ABNORMAL
RBC # BLD AUTO: 3.95 10*6/MM3 (ref 3.77–5.28)
RBC #/AREA URNS HPF: NORMAL /HPF
SODIUM SERPL-SCNC: 127 MMOL/L (ref 136–145)
SP GR UR STRIP: 1.02 (ref 1–1.03)
TSH SERPL DL<=0.005 MIU/L-ACNC: 2.42 UIU/ML (ref 0.27–4.2)
UROBILINOGEN UR STRIP-MCNC: ABNORMAL MG/DL
VIT B12 SERPL-MCNC: 490 PG/ML (ref 211–946)
WBC # BLD AUTO: 5.33 10*3/MM3 (ref 3.4–10.8)
WBC #/AREA URNS HPF: NORMAL /HPF

## 2025-06-17 NOTE — TELEPHONE ENCOUNTER
Called patient to go over lab results. Let her know that overall everything looked, although her sodium level was pretty low. Patient states that she does not drink water too regularly, usually has about one cup of water daily and a couple cups of coffee in the morning. I advised that she try to incorporate more fluids into her daily routine and it would be even better for her to drink electrolytes such as Pedialyte or Liquid IV. She stated that she would talk to her don about getting these. Will repeat a BMP in a couple of weeks to reassess.     Otherwise labs looked pretty good. CBC with macrocytosis but no anemia, vitamin B levels within normal range. TSH normal. Urine looks dehydrated, she is no longer having urine symptoms. Will monitor.

## 2025-06-23 NOTE — PROGRESS NOTES
RM:________     PCP: Abigail Lyles MD    : 1940  AGE: 84 y.o.  EST PATIENT           Wt Readings from Last 3 Encounters:   25 54.9 kg (121 lb)   25 56.2 kg (124 lb)   24 56.2 kg (124 lb)           CP______  SOA_______ DIZZINESS _____ FATIGUE ______  PALPS ______    WT: ____________ BP: __________L __________R HR______    ALLERGIES:Cortisone, Hytrin [terazosin], Amlodipine, and Darvon  [propoxyphene]      Social History     Tobacco Use    Smoking status: Never     Passive exposure: Never    Smokeless tobacco: Never   Vaping Use    Vaping status: Never Used   Substance Use Topics    Alcohol use: No     Comment: 2 cups caffeine/coffee daily    Drug use: No

## 2025-06-27 ENCOUNTER — INFUSION (OUTPATIENT)
Dept: ONCOLOGY | Facility: HOSPITAL | Age: 85
End: 2025-06-27
Payer: MEDICARE

## 2025-06-27 DIAGNOSIS — M80.08XA AGE-RELATED OSTEOPOROSIS WITH CURRENT PATHOLOGICAL FRACTURE, VERTEBRA(E), INITIAL ENCOUNTER FOR FRACTURE: Primary | ICD-10-CM

## 2025-06-27 PROCEDURE — 96372 THER/PROPH/DIAG INJ SC/IM: CPT

## 2025-06-27 PROCEDURE — 25010000002 DENOSUMAB 60 MG/ML SOLUTION PREFILLED SYRINGE: Performed by: NURSE PRACTITIONER

## 2025-06-27 RX ADMIN — DENOSUMAB 60 MG: 60 INJECTION SUBCUTANEOUS at 13:51

## 2025-07-02 ENCOUNTER — HOSPITAL ENCOUNTER (OUTPATIENT)
Dept: CARDIOLOGY | Facility: HOSPITAL | Age: 85
Discharge: HOME | End: 2025-07-02
Admitting: NURSE PRACTITIONER
Payer: MEDICARE

## 2025-07-02 ENCOUNTER — OFFICE VISIT (OUTPATIENT)
Dept: CARDIOLOGY | Age: 85
End: 2025-07-02
Payer: MEDICARE

## 2025-07-02 VITALS
WEIGHT: 123 LBS | BODY MASS INDEX: 20.49 KG/M2 | SYSTOLIC BLOOD PRESSURE: 144 MMHG | DIASTOLIC BLOOD PRESSURE: 82 MMHG | HEART RATE: 64 BPM | HEIGHT: 65 IN

## 2025-07-02 VITALS
WEIGHT: 121 LBS | HEIGHT: 65 IN | HEART RATE: 69 BPM | DIASTOLIC BLOOD PRESSURE: 90 MMHG | BODY MASS INDEX: 20.16 KG/M2 | SYSTOLIC BLOOD PRESSURE: 180 MMHG

## 2025-07-02 DIAGNOSIS — E87.1 HYPONATREMIA: ICD-10-CM

## 2025-07-02 DIAGNOSIS — I10 ESSENTIAL HYPERTENSION: Primary | Chronic | ICD-10-CM

## 2025-07-02 DIAGNOSIS — I35.1 NONRHEUMATIC AORTIC VALVE INSUFFICIENCY: ICD-10-CM

## 2025-07-02 DIAGNOSIS — I49.1 APC (ATRIAL PREMATURE CONTRACTIONS): ICD-10-CM

## 2025-07-02 LAB
AORTIC ARCH: 2.2 CM
AORTIC DIMENSIONLESS INDEX: 0.79 (DI)
ASCENDING AORTA: 3 CM
AV MEAN PRESS GRAD SYS DOP V1V2: 5.7 MMHG
AV VMAX SYS DOP: 162.9 CM/SEC
BH CV ECHO LEFT VENTRICLE GLOBAL LONGITUDINAL STRAIN: -21.3 %
BH CV ECHO MEAS - ACS: 1.84 CM
BH CV ECHO MEAS - AI P1/2T: 382.4 MSEC
BH CV ECHO MEAS - AO MAX PG: 10.6 MMHG
BH CV ECHO MEAS - AO ROOT DIAM: 2.9 CM
BH CV ECHO MEAS - AO V2 VTI: 35.3 CM
BH CV ECHO MEAS - AVA(I,D): 2.46 CM2
BH CV ECHO MEAS - EDV(CUBED): 45.8 ML
BH CV ECHO MEAS - EDV(MOD-SP2): 80 ML
BH CV ECHO MEAS - EDV(MOD-SP4): 75 ML
BH CV ECHO MEAS - EF(MOD-SP2): 60 %
BH CV ECHO MEAS - EF(MOD-SP4): 61.3 %
BH CV ECHO MEAS - ESV(CUBED): 12.6 ML
BH CV ECHO MEAS - ESV(MOD-SP2): 32 ML
BH CV ECHO MEAS - ESV(MOD-SP4): 29 ML
BH CV ECHO MEAS - FS: 35 %
BH CV ECHO MEAS - IVS/LVPW: 1.56 CM
BH CV ECHO MEAS - IVSD: 1.34 CM
BH CV ECHO MEAS - LAT PEAK E' VEL: 6.4 CM/SEC
BH CV ECHO MEAS - LV DIASTOLIC VOL/BSA (35-75): 46.9 CM2
BH CV ECHO MEAS - LV MASS(C)D: 123.2 GRAMS
BH CV ECHO MEAS - LV MAX PG: 5.3 MMHG
BH CV ECHO MEAS - LV MEAN PG: 3.1 MMHG
BH CV ECHO MEAS - LV SYSTOLIC VOL/BSA (12-30): 18.2 CM2
BH CV ECHO MEAS - LV V1 MAX: 115.1 CM/SEC
BH CV ECHO MEAS - LV V1 VTI: 27.8 CM
BH CV ECHO MEAS - LVIDD: 3.6 CM
BH CV ECHO MEAS - LVIDS: 2.32 CM
BH CV ECHO MEAS - LVOT AREA: 3.1 CM2
BH CV ECHO MEAS - LVOT DIAM: 1.99 CM
BH CV ECHO MEAS - LVPWD: 0.86 CM
BH CV ECHO MEAS - MED PEAK E' VEL: 6.2 CM/SEC
BH CV ECHO MEAS - MV A DUR: 0.14 SEC
BH CV ECHO MEAS - MV A MAX VEL: 100.4 CM/SEC
BH CV ECHO MEAS - MV DEC SLOPE: 1002 CM/SEC2
BH CV ECHO MEAS - MV DEC TIME: 0.15 SEC
BH CV ECHO MEAS - MV E MAX VEL: 124 CM/SEC
BH CV ECHO MEAS - MV E/A: 1.24
BH CV ECHO MEAS - MV MAX PG: 6.1 MMHG
BH CV ECHO MEAS - MV MEAN PG: 2.3 MMHG
BH CV ECHO MEAS - MV P1/2T: 36.8 MSEC
BH CV ECHO MEAS - MV V2 VTI: 29 CM
BH CV ECHO MEAS - MVA(P1/2T): 6 CM2
BH CV ECHO MEAS - MVA(VTI): 3 CM2
BH CV ECHO MEAS - PA ACC TIME: 0.09 SEC
BH CV ECHO MEAS - PA V2 MAX: 88.6 CM/SEC
BH CV ECHO MEAS - PULM A REVS DUR: 0.08 SEC
BH CV ECHO MEAS - PULM A REVS VEL: 29.1 CM/SEC
BH CV ECHO MEAS - PULM DIAS VEL: 58.8 CM/SEC
BH CV ECHO MEAS - PULM S/D: 1.19
BH CV ECHO MEAS - PULM SYS VEL: 70.3 CM/SEC
BH CV ECHO MEAS - QP/QS: 0.62
BH CV ECHO MEAS - RAP SYSTOLE: 3 MMHG
BH CV ECHO MEAS - RV MAX PG: 2.12 MMHG
BH CV ECHO MEAS - RV V1 MAX: 72.8 CM/SEC
BH CV ECHO MEAS - RV V1 VTI: 16.5 CM
BH CV ECHO MEAS - RVOT DIAM: 2.03 CM
BH CV ECHO MEAS - RVSP: 32 MMHG
BH CV ECHO MEAS - SUP REN AO DIAM: 2 CM
BH CV ECHO MEAS - SV(LVOT): 86.9 ML
BH CV ECHO MEAS - SV(MOD-SP2): 48 ML
BH CV ECHO MEAS - SV(MOD-SP4): 46 ML
BH CV ECHO MEAS - SV(RVOT): 53.5 ML
BH CV ECHO MEAS - SVI(LVOT): 54.4 ML/M2
BH CV ECHO MEAS - SVI(MOD-SP2): 30 ML/M2
BH CV ECHO MEAS - SVI(MOD-SP4): 28.8 ML/M2
BH CV ECHO MEAS - TAPSE (>1.6): 1.22 CM
BH CV ECHO MEAS - TR MAX PG: 28.8 MMHG
BH CV ECHO MEAS - TR MAX VEL: 268.5 CM/SEC
BH CV ECHO MEASUREMENTS AVERAGE E/E' RATIO: 19.68
BH CV XLRA - RV BASE: 2.7 CM
BH CV XLRA - RV LENGTH: 6 CM
BH CV XLRA - RV MID: 2.8 CM
BH CV XLRA - TDI S': 9 CM/SEC
LEFT ATRIUM VOLUME INDEX: 30.8 ML/M2
LV EF BIPLANE MOD: 61.6 %
SINUS: 2.8 CM
STJ: 2.25 CM

## 2025-07-02 PROCEDURE — 99214 OFFICE O/P EST MOD 30 MIN: CPT | Performed by: INTERNAL MEDICINE

## 2025-07-02 PROCEDURE — 3077F SYST BP >= 140 MM HG: CPT | Performed by: INTERNAL MEDICINE

## 2025-07-02 PROCEDURE — 1159F MED LIST DOCD IN RCRD: CPT | Performed by: INTERNAL MEDICINE

## 2025-07-02 PROCEDURE — 93000 ELECTROCARDIOGRAM COMPLETE: CPT | Performed by: INTERNAL MEDICINE

## 2025-07-02 PROCEDURE — 3078F DIAST BP <80 MM HG: CPT | Performed by: INTERNAL MEDICINE

## 2025-07-02 PROCEDURE — 93306 TTE W/DOPPLER COMPLETE: CPT

## 2025-07-02 PROCEDURE — 1160F RVW MEDS BY RX/DR IN RCRD: CPT | Performed by: INTERNAL MEDICINE

## 2025-07-02 PROCEDURE — 93356 MYOCRD STRAIN IMG SPCKL TRCK: CPT

## 2025-07-02 RX ORDER — HYDRALAZINE HYDROCHLORIDE 100 MG/1
100 TABLET, FILM COATED ORAL 2 TIMES DAILY
Qty: 180 TABLET | Refills: 3 | Status: SHIPPED | OUTPATIENT
Start: 2025-07-02

## 2025-07-02 NOTE — PROGRESS NOTES
Date of Office Visit: 25  Encounter Provider: Bacilio Hernández MD  Place of Service: [facility] CARDIOLOGY  Patient Name: Karli Loomis  :1940    Chief Complaint   Patient presents with    Hypertension   :   HPI:     Ms. Loomis is 84 y.o. and presents today in follow up. I have reviewed prior notes and there are no changes except for any new updates described below. I have also reviewed any information entered into the medical record by the patient or by ancillary staff.     She has a history of right sided breast cancer. She underwent a mastectomy and took an aromatase inhibitor for five years, which was completed in 2019. She did not receive chemotherapy or radiation. She has Waldenstrom's macroglobulinemia and was treated with rituximab, bortezomib, and venetoclax. She has hypertension, treated with losartan and metoprolol succinate. I can see in Epic that she has been on minoxidil and amlodipine in the past. She does not have diabetes or hyperlipidemia.     She presented in May 2021 with severe exertional fatigue and weakness, as well as episodes of lightheadedness.  I wanted to see how her heart rate responded to exercise as she was heavily beta blocked.  She was able to walk on a treadmill for 2.5 minutes and her heart rate went from 70 to 80 bpm.  She had to stop due to weakness.  A nuclear perfusion stress test was negative for ischemia.  An echo revealed normal left ventricular systolic function, grade 2 diastolic dysfunction, and moderate aortic insufficiency.  A 2-week heart monitor was performed after her metoprolol dose was decreased and revealed no rhythm disturbances or pauses.  Her average heart rate was 62 bpm, with a range of 45 to 174 bpm.    The severely weak episodes stopped when I decreased her metoprolol from 100 mg daily to 50 mg daily.  However, her blood pressure remained high, so terazosin was added.  Around that same time, she developed a rash,  so the medicine was stopped. Amlodipine was increased, but she developed leg swelling. We tried spironolactone and it caused polyuria.     Her SBP at home is ~140-160 mm Hg.     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    Epilepsy with simple partial seizures 12/14/2017    Headache, migraine 12/14/2017    History of transfusion of packed red blood cells     R/T SURGERY    Hyperlipidemia     Hypertension     Malignant neoplasm of overlapping sites of left breast in female, estrogen receptor positive 04/13/2016    Nonrheumatic aortic valve insufficiency     Skin cancer     Thyroid nodule     Removed more than 35 years ago    TIA (transient ischemic attack) 10/18/2018    UTI due to extended-spectrum beta lactamase (ESBL) producing Escherichia coli 01/06/2019    Waldenstrom macroglobulinemia        Past Surgical History:   Procedure Laterality Date    ADENOIDECTOMY      APPENDECTOMY      BACK SURGERY      CARPAL TUNNEL RELEASE Right     CATARACT EXTRACTION Bilateral     CHOLECYSTECTOMY      COLONOSCOPY      HYSTERECTOMY      Partial, many years ago, heavy bleeding, no cancer    KNEE ARTHROSCOPY Right 03/2009    Finding of chondromalacia and appropriate cleanup of joint was performed by Dr. Moraes.    MASTECTOMY Left 07/2014    REPLACEMENT TOTAL KNEE Right 12/2015    SKIN CANCER EXCISION      several    TONSILLECTOMY         Social History     Socioeconomic History    Marital status:    Tobacco Use    Smoking status: Never     Passive exposure: Never    Smokeless tobacco: Never   Vaping Use    Vaping status: Never Used   Substance and Sexual Activity     Alcohol use: No     Comment: 2 cups caffeine/coffee daily    Drug use: No    Sexual activity: Defer       Family History   Problem Relation Age of Onset    Mental illness Mother     Migraines Mother     Dementia Mother     Heart disease Father     Hypertension Father     Kidney disease Father     Alcohol abuse Father     No Known Problems Daughter     No Known Problems Daughter     Breast cancer Other     Heart disease Other     Hypertension Other     Diabetes Other     Cancer Maternal Grandmother     Breast cancer Maternal Grandmother     No Known Problems Maternal Grandfather     No Known Problems Paternal Grandmother     No Known Problems Paternal Grandfather     Hyperlipidemia Son     Lymphoma Neg Hx     Leukemia Neg Hx        Review of Systems   Constitutional: Positive for malaise/fatigue.   Psychiatric/Behavioral:  The patient has insomnia and is nervous/anxious.    All other systems reviewed and are negative.      Allergies   Allergen Reactions    Cortisone Unknown - High Severity     Reports having a shot years ago, and wonders if he hit a vein. She was told by another doctor that it probably went in her blood stream.     Hytrin [Terazosin] Rash    Amlodipine Rash    Darvon  [Propoxyphene] Palpitations         Current Outpatient Medications:     aspirin 81 MG tablet, Take 1 tablet by mouth Daily., Disp: , Rfl:     budesonide-formoterol (Symbicort) 160-4.5 MCG/ACT inhaler, Inhale 2 puffs 2 (Two) Times a Day., Disp: 10.2 g, Rfl: 2    cetirizine (zyrTEC) 10 MG tablet, Take 1 tablet by mouth Daily., Disp: , Rfl:     Cholecalciferol 25 MCG (1000 UT) tablet, Take 1 tablet by mouth Daily., Disp: , Rfl:     gabapentin (NEURONTIN) 400 MG capsule, Take 1 capsule by mouth 4 (Four) Times a Day., Disp: 360 capsule, Rfl: 1    hydrALAZINE (APRESOLINE) 100 MG tablet, Take 1 tablet by mouth 2 (Two) Times a Day., Disp: 180 tablet, Rfl: 3    levothyroxine (SYNTHROID, LEVOTHROID) 50 MCG tablet, Take 1 tablet by mouth Daily.,  "Disp: 90 tablet, Rfl: 3    losartan (COZAAR) 100 MG tablet, Take 1 tablet by mouth Daily., Disp: 90 tablet, Rfl: 3    niacinamide 500 MG tablet, Take 1 tablet by mouth 3 (Three) Times a Day., Disp: , Rfl:       Objective:     Vitals:    07/02/25 1119 07/02/25 1146   BP: (!) 182/70 144/82   BP Location: Left arm    Pulse: 64    Weight: 55.8 kg (123 lb)    Height: 165.1 cm (65\")        Body mass index is 20.47 kg/m².    Constitutional:       Appearance: Healthy appearance. Not in distress.   Eyes:      Conjunctiva/sclera: Conjunctivae normal.   HENT:      Nose: Nose normal.   Neck:      Vascular: JVD normal.      Lymphadenopathy: No cervical adenopathy.   Pulmonary:      Effort: Pulmonary effort is normal.      Breath sounds: Normal breath sounds.   Chest:      Comments: Left mastectomy  Cardiovascular:      Normal rate. Regular rhythm.      Murmurs: There is a grade 1/6 systolic murmur.   Pulses:     Intact distal pulses.   Edema:     Peripheral edema absent.   Abdominal:      Palpations: Abdomen is soft.      Tenderness: There is no abdominal tenderness.   Musculoskeletal: Normal range of motion.      Cervical back: Normal range of motion. Skin:     General: Skin is warm and dry.   Neurological:      General: No focal deficit present.           ECG 12 Lead    Date/Time: 7/2/2025 11:39 AM  Performed by: Bacilio Hernández MD    Authorized by: Bacilio Hernández MD  Comparison: compared with previous ECG   Similar to previous ECG  Rhythm: sinus rhythm  Conduction: conduction normal  ST Segments: ST segments normal  T Waves: T waves normal  QRS axis: normal  Other: no other findings    Clinical impression: normal ECG          Assessment:       Diagnosis Plan   1. Essential hypertension        2. Nonrheumatic aortic valve insufficiency  Adult Transthoracic Echo Complete W/ Cont if Necessary Per Protocol      3. APC (atrial premature contractions)  ECG 12 Lead      4. Hyponatremia           Plan:       Karli has a history of " severe hypertension. She runs a low HR when on AVN blockers so we have avoided them.  She developed swelling with amlodipine and nocturia/polyuria with spironolactone.  She developed a rash after being started on terazosin.  I rechecked her BP myself and it was greatly improved compared to arrival. I think an SBP in the 140s is a reasonable goal given her age. I did change hydralazine from 50mg TID to 100mg BID for ease.    She had mild-moderate AI by TTE today; I'll recheck this in one year.    She has occasional PACs but these are not currently a problem.    She has hyponatremia, which is likely due to low solute. She really doesn't eat much. She does not drink an excessive amount of free water. Neither of BP medications will cause this.     Sincerely,       Bacilio Hernández MD

## 2025-07-18 ENCOUNTER — TELEPHONE (OUTPATIENT)
Dept: INTERNAL MEDICINE | Facility: CLINIC | Age: 85
End: 2025-07-18

## 2025-07-23 ENCOUNTER — OFFICE VISIT (OUTPATIENT)
Dept: INTERNAL MEDICINE | Facility: CLINIC | Age: 85
End: 2025-07-23
Payer: MEDICARE

## 2025-07-23 ENCOUNTER — LAB (OUTPATIENT)
Facility: HOSPITAL | Age: 85
End: 2025-07-23
Payer: MEDICARE

## 2025-07-23 VITALS
HEIGHT: 65 IN | SYSTOLIC BLOOD PRESSURE: 132 MMHG | RESPIRATION RATE: 18 BRPM | OXYGEN SATURATION: 96 % | WEIGHT: 126 LBS | HEART RATE: 70 BPM | DIASTOLIC BLOOD PRESSURE: 72 MMHG | BODY MASS INDEX: 20.99 KG/M2

## 2025-07-23 DIAGNOSIS — R51.9 OCCIPITAL HEADACHE: ICD-10-CM

## 2025-07-23 DIAGNOSIS — E87.1 HYPONATREMIA WITH NORMAL EXTRACELLULAR FLUID VOLUME: Primary | ICD-10-CM

## 2025-07-23 DIAGNOSIS — M80.08XA AGE-RELATED OSTEOPOROSIS WITH CURRENT PATHOLOGICAL FRACTURE, VERTEBRA(E), INITIAL ENCOUNTER FOR FRACTURE: ICD-10-CM

## 2025-07-23 DIAGNOSIS — E87.1 HYPONATREMIA: ICD-10-CM

## 2025-07-23 LAB
ANION GAP SERPL CALCULATED.3IONS-SCNC: 9.8 MMOL/L (ref 5–15)
BUN SERPL-MCNC: 8 MG/DL (ref 8–23)
BUN/CREAT SERPL: 10.4 (ref 7–25)
CALCIUM SPEC-SCNC: 9.6 MG/DL (ref 8.6–10.5)
CHLORIDE SERPL-SCNC: 92 MMOL/L (ref 98–107)
CO2 SERPL-SCNC: 27.2 MMOL/L (ref 22–29)
CREAT SERPL-MCNC: 0.77 MG/DL (ref 0.57–1)
EGFRCR SERPLBLD CKD-EPI 2021: 75.7 ML/MIN/1.73
GLUCOSE SERPL-MCNC: 97 MG/DL (ref 65–99)
POTASSIUM SERPL-SCNC: 4.6 MMOL/L (ref 3.5–5.2)
SODIUM SERPL-SCNC: 129 MMOL/L (ref 136–145)

## 2025-07-23 PROCEDURE — 1159F MED LIST DOCD IN RCRD: CPT | Performed by: STUDENT IN AN ORGANIZED HEALTH CARE EDUCATION/TRAINING PROGRAM

## 2025-07-23 PROCEDURE — 1126F AMNT PAIN NOTED NONE PRSNT: CPT | Performed by: STUDENT IN AN ORGANIZED HEALTH CARE EDUCATION/TRAINING PROGRAM

## 2025-07-23 PROCEDURE — 3075F SYST BP GE 130 - 139MM HG: CPT | Performed by: STUDENT IN AN ORGANIZED HEALTH CARE EDUCATION/TRAINING PROGRAM

## 2025-07-23 PROCEDURE — 36415 COLL VENOUS BLD VENIPUNCTURE: CPT

## 2025-07-23 PROCEDURE — 3078F DIAST BP <80 MM HG: CPT | Performed by: STUDENT IN AN ORGANIZED HEALTH CARE EDUCATION/TRAINING PROGRAM

## 2025-07-23 PROCEDURE — 1160F RVW MEDS BY RX/DR IN RCRD: CPT | Performed by: STUDENT IN AN ORGANIZED HEALTH CARE EDUCATION/TRAINING PROGRAM

## 2025-07-23 PROCEDURE — 99213 OFFICE O/P EST LOW 20 MIN: CPT | Performed by: STUDENT IN AN ORGANIZED HEALTH CARE EDUCATION/TRAINING PROGRAM

## 2025-07-23 PROCEDURE — 80048 BASIC METABOLIC PNL TOTAL CA: CPT

## 2025-07-23 NOTE — PROGRESS NOTES
" Chief Complaint  Headache    Subjective        Karli Loomis presents to clinic today for an acute visit with a cc of new onset headache. Headaches started about a couple of weeks ago. She was worried because she had a remote history of migraine, but states that she has not had a headache in many years. They were primarily located in the back of her head, and would come and go throughout the day. Occasional tylenol seemed helpful. She thought that it might be related to her drinking too much Pedialyte, so she called to ask our clinic how much to drink. I recommend just about 8oz per day, so she cut back. States that over the past couple of days, her headaches have resolved. We have been watching her sodium levels, and I let her know that I would like to check this again today, as low sodium can sometimes cause headaches. No visual changes reported during this time.   History of Present Illness      Objective   Vital Signs:  /72 (BP Location: Left arm, Patient Position: Sitting, Cuff Size: Adult)   Pulse 70   Resp 18   Ht 165.1 cm (65\")   Wt 57.2 kg (126 lb)   SpO2 96%   BMI 20.97 kg/m²   Estimated body mass index is 20.97 kg/m² as calculated from the following:    Height as of this encounter: 165.1 cm (65\").    Weight as of this encounter: 57.2 kg (126 lb).    BMI is within normal parameters. No other follow-up for BMI required.    Physical Exam  Constitutional:       General: She is not in acute distress.     Appearance: Normal appearance.   Eyes:      Extraocular Movements: Extraocular movements intact.      Conjunctiva/sclera: Conjunctivae normal.      Pupils: Pupils are equal, round, and reactive to light.   Pulmonary:      Effort: Pulmonary effort is normal. No respiratory distress.   Musculoskeletal:         General: No swelling or deformity. Normal range of motion.   Skin:     General: Skin is warm and dry.   Neurological:      General: No focal deficit present.      Mental Status: She is " oriented to person, place, and time. Mental status is at baseline.      Cranial Nerves: No cranial nerve deficit.      Motor: No weakness.      Gait: Gait normal.   Psychiatric:         Mood and Affect: Mood normal.         Behavior: Behavior normal.     Result Review :               Current Outpatient Medications:     aspirin 81 MG tablet, Take 1 tablet by mouth Daily., Disp: , Rfl:     budesonide-formoterol (Symbicort) 160-4.5 MCG/ACT inhaler, Inhale 2 puffs 2 (Two) Times a Day., Disp: 10.2 g, Rfl: 2    cetirizine (zyrTEC) 10 MG tablet, Take 1 tablet by mouth Daily., Disp: , Rfl:     Cholecalciferol 25 MCG (1000 UT) tablet, Take 1 tablet by mouth Daily., Disp: , Rfl:     gabapentin (NEURONTIN) 400 MG capsule, Take 1 capsule by mouth 4 (Four) Times a Day., Disp: 360 capsule, Rfl: 1    hydrALAZINE (APRESOLINE) 100 MG tablet, Take 1 tablet by mouth 2 (Two) Times a Day., Disp: 180 tablet, Rfl: 3    levothyroxine (SYNTHROID, LEVOTHROID) 50 MCG tablet, Take 1 tablet by mouth Daily., Disp: 90 tablet, Rfl: 3    niacinamide 500 MG tablet, Take 1 tablet by mouth 3 (Three) Times a Day., Disp: , Rfl:     losartan (COZAAR) 100 MG tablet, Take 1 tablet by mouth Daily., Disp: 90 tablet, Rfl: 3      Assessment and Plan   Diagnoses and all orders for this visit:    1. Hyponatremia with normal extracellular fluid volume (Primary)  Assessment & Plan:  BMP is pending.      2. Occipital headache  Assessment & Plan:  Now resolved.  Continue tylenol PRN.  Monitor.               Follow Up   Return for regularly scheduled appointment with me.    Patient was given instructions and counseling regarding her condition or for health maintenance advice. Please see specific information pulled into the AVS if appropriate.     Abigail Lyles MD

## 2025-07-28 ENCOUNTER — TELEPHONE (OUTPATIENT)
Dept: ONCOLOGY | Facility: CLINIC | Age: 85
End: 2025-07-28
Payer: MEDICARE

## 2025-07-28 NOTE — TELEPHONE ENCOUNTER
Call back to patient and she states she just gets weak spells off and on.  She states her PCP had found that her sodium is low and has her drinking pedialyte daily.  She states she had weak spells a long time ago and she states Dr. Larose took care of her then.  Let her know her appointment with Dr. Valle can be moved up a bit, but she is out of the office for another 3 weeks. Asked patient if she has called her PCP to let them know about these weak spells, and possibly go to the ER when she does have a weak spell, so it could be figured out at that time. Let patient know it would be good to start with her PCP, as her previous labs in June were not troubling and since they are treating her for low sodium.   Patient states she will call her PCP.

## 2025-07-28 NOTE — TELEPHONE ENCOUNTER
Caller: Karli Loomis    Relationship: Self    Best call back number: [phone]    What is the best time to reach you: ANYTIME    Who are you requesting to speak with (clinical staff, provider,  specific staff member): CLINICAL         What was the call regarding:     HAS HAD THEM BEFORE BELIEVE IT WAS AROUND 2019 WHEN LAST HAD HAPPENED, WOULD HAVE WEAK SPELLS THAT WOULD COME ON ALL OF A SUDDEN AND WOULD BE WEAK ALL OVER AND WOULD HAVE TO SIT DOWN OR OTHER WISE WOULD FALL DOWN   AND DR BURNETTE WHEN WAS SEEING HIM HAD ADDRESSED AND TREATED BEFORE.     THIS HAS STARTED HAPPENING AGAIN NOTICED STARTED ABOUT TWO WEEKS AGO AND THE SPELLS COME AND GO      WANTED TO SEE IF CAN GET IN TO SEE DR MONET FOR SOONER APPT  THEN SEPTEMBER AND WOULD NEED  APPT ON A TUESDAY